# Patient Record
Sex: MALE | Race: OTHER | URBAN - METROPOLITAN AREA
[De-identification: names, ages, dates, MRNs, and addresses within clinical notes are randomized per-mention and may not be internally consistent; named-entity substitution may affect disease eponyms.]

---

## 2019-03-29 ENCOUNTER — INPATIENT (INPATIENT)
Facility: HOSPITAL | Age: 34
LOS: 5 days | Discharge: HOME | DRG: 189 | End: 2019-04-04
Attending: INTERNAL MEDICINE | Admitting: INTERNAL MEDICINE
Payer: MEDICAID

## 2019-03-29 VITALS
WEIGHT: 315 LBS | DIASTOLIC BLOOD PRESSURE: 94 MMHG | OXYGEN SATURATION: 87 % | SYSTOLIC BLOOD PRESSURE: 124 MMHG | TEMPERATURE: 98 F | RESPIRATION RATE: 22 BRPM | HEART RATE: 94 BPM | HEIGHT: 66 IN

## 2019-03-29 LAB
ALBUMIN SERPL ELPH-MCNC: 3.1 G/DL — LOW (ref 3.5–5)
ALP SERPL-CCNC: 100 U/L — SIGNIFICANT CHANGE UP (ref 40–120)
ALT FLD-CCNC: 67 U/L DA — HIGH (ref 10–60)
ANION GAP SERPL CALC-SCNC: 2 MMOL/L — LOW (ref 5–17)
AST SERPL-CCNC: 25 U/L — SIGNIFICANT CHANGE UP (ref 10–40)
BASE EXCESS BLDA CALC-SCNC: 5.9 MMOL/L — HIGH (ref -2–2)
BASOPHILS # BLD AUTO: 0.09 K/UL — SIGNIFICANT CHANGE UP (ref 0–0.2)
BASOPHILS NFR BLD AUTO: 0.6 % — SIGNIFICANT CHANGE UP (ref 0–2)
BILIRUB SERPL-MCNC: 1 MG/DL — SIGNIFICANT CHANGE UP (ref 0.2–1.2)
BUN SERPL-MCNC: 14 MG/DL — SIGNIFICANT CHANGE UP (ref 7–18)
CALCIUM SERPL-MCNC: 8.3 MG/DL — LOW (ref 8.4–10.5)
CHLORIDE SERPL-SCNC: 101 MMOL/L — SIGNIFICANT CHANGE UP (ref 96–108)
CO2 SERPL-SCNC: 36 MMOL/L — HIGH (ref 22–31)
CREAT SERPL-MCNC: 0.88 MG/DL — SIGNIFICANT CHANGE UP (ref 0.5–1.3)
D DIMER BLD IA.RAPID-MCNC: 546 NG/ML DDU — HIGH
EOSINOPHIL # BLD AUTO: 0.4 K/UL — SIGNIFICANT CHANGE UP (ref 0–0.5)
EOSINOPHIL NFR BLD AUTO: 2.8 % — SIGNIFICANT CHANGE UP (ref 0–6)
FLU A RESULT: SIGNIFICANT CHANGE UP
FLU A RESULT: SIGNIFICANT CHANGE UP
FLUAV AG NPH QL: SIGNIFICANT CHANGE UP
FLUBV AG NPH QL: SIGNIFICANT CHANGE UP
GLUCOSE SERPL-MCNC: 103 MG/DL — HIGH (ref 70–99)
HCO3 BLDA-SCNC: 34 MMOL/L — HIGH (ref 23–27)
HCT VFR BLD CALC: 51.8 % — HIGH (ref 39–50)
HGB BLD-MCNC: 14.9 G/DL — SIGNIFICANT CHANGE UP (ref 13–17)
HOROWITZ INDEX BLDA+IHG-RTO: 21 — SIGNIFICANT CHANGE UP
IMM GRANULOCYTES NFR BLD AUTO: 0.5 % — SIGNIFICANT CHANGE UP (ref 0–1.5)
LYMPHOCYTES # BLD AUTO: 21.6 % — SIGNIFICANT CHANGE UP (ref 13–44)
LYMPHOCYTES # BLD AUTO: 3.09 K/UL — SIGNIFICANT CHANGE UP (ref 1–3.3)
MCHC RBC-ENTMCNC: 24.6 PG — LOW (ref 27–34)
MCHC RBC-ENTMCNC: 28.8 GM/DL — LOW (ref 32–36)
MCV RBC AUTO: 85.5 FL — SIGNIFICANT CHANGE UP (ref 80–100)
MONOCYTES # BLD AUTO: 1.02 K/UL — HIGH (ref 0–0.9)
MONOCYTES NFR BLD AUTO: 7.1 % — SIGNIFICANT CHANGE UP (ref 2–14)
NEUTROPHILS # BLD AUTO: 9.61 K/UL — HIGH (ref 1.8–7.4)
NEUTROPHILS NFR BLD AUTO: 67.4 % — SIGNIFICANT CHANGE UP (ref 43–77)
NRBC # BLD: 0 /100 WBCS — SIGNIFICANT CHANGE UP (ref 0–0)
PCO2 BLDA: 70 MMHG — CRITICAL HIGH (ref 32–46)
PH BLDA: 7.31 — LOW (ref 7.35–7.45)
PLATELET # BLD AUTO: 311 K/UL — SIGNIFICANT CHANGE UP (ref 150–400)
PO2 BLDA: 72 MMHG — LOW (ref 74–108)
POTASSIUM SERPL-MCNC: 3.9 MMOL/L — SIGNIFICANT CHANGE UP (ref 3.5–5.3)
POTASSIUM SERPL-SCNC: 3.9 MMOL/L — SIGNIFICANT CHANGE UP (ref 3.5–5.3)
PROT SERPL-MCNC: 7.9 G/DL — SIGNIFICANT CHANGE UP (ref 6–8.3)
RBC # BLD: 6.06 M/UL — HIGH (ref 4.2–5.8)
RBC # FLD: 16.7 % — HIGH (ref 10.3–14.5)
RSV RESULT: SIGNIFICANT CHANGE UP
RSV RNA RESP QL NAA+PROBE: SIGNIFICANT CHANGE UP
SAO2 % BLDA: 93 % — SIGNIFICANT CHANGE UP (ref 92–96)
SODIUM SERPL-SCNC: 139 MMOL/L — SIGNIFICANT CHANGE UP (ref 135–145)
TROPONIN I SERPL-MCNC: 0.02 NG/ML — SIGNIFICANT CHANGE UP (ref 0–0.04)
WBC # BLD: 14.28 K/UL — HIGH (ref 3.8–10.5)
WBC # FLD AUTO: 14.28 K/UL — HIGH (ref 3.8–10.5)

## 2019-03-29 PROCEDURE — 93970 EXTREMITY STUDY: CPT | Mod: 26

## 2019-03-29 PROCEDURE — 71045 X-RAY EXAM CHEST 1 VIEW: CPT | Mod: 26

## 2019-03-29 RX ORDER — CEFTRIAXONE 500 MG/1
1 INJECTION, POWDER, FOR SOLUTION INTRAMUSCULAR; INTRAVENOUS ONCE
Qty: 0 | Refills: 0 | Status: COMPLETED | OUTPATIENT
Start: 2019-03-29 | End: 2019-03-29

## 2019-03-29 RX ORDER — AZITHROMYCIN 500 MG/1
500 TABLET, FILM COATED ORAL ONCE
Qty: 0 | Refills: 0 | Status: COMPLETED | OUTPATIENT
Start: 2019-03-29 | End: 2019-03-29

## 2019-03-29 RX ORDER — IPRATROPIUM/ALBUTEROL SULFATE 18-103MCG
3 AEROSOL WITH ADAPTER (GRAM) INHALATION ONCE
Qty: 0 | Refills: 0 | Status: COMPLETED | OUTPATIENT
Start: 2019-03-29 | End: 2019-03-29

## 2019-03-29 NOTE — ED ADULT NURSE NOTE - NSIMPLEMENTINTERV_GEN_ALL_ED
Implemented All Universal Safety Interventions:  [place] to call system. Call bell, personal items and telephone within reach. Instruct patient to call for assistance. Room bathroom lighting operational. Non-slip footwear when patient is off stretcher. Physically safe environment: no spills, clutter or unnecessary equipment. Stretcher in lowest position, wheels locked, appropriate side rails in place.

## 2019-03-29 NOTE — ED PROVIDER NOTE - CARE PLAN
Principal Discharge DX:	Severe persistent asthma with exacerbation  Secondary Diagnosis:	Pneumonia of both lower lobes due to infectious organism

## 2019-03-29 NOTE — ED ADULT NURSE NOTE - OBJECTIVE STATEMENT
pt complains of shortness of breath and headache when he wakes up. pt says he has sleep apnea but no bipap machine. pt able to speak in full sentences.

## 2019-03-29 NOTE — ED PROVIDER NOTE - OBJECTIVE STATEMENT
35 y/o M patient with a significant PMHx of Asthma, incarcerated in [facility] and no significant PSHx presents to the ED with c/o chest pain, cough, dizziness, HA x 1w. Patient denies any fever, DVT or PE, cardiac problems. Patient smokes marijuana. NKDA.

## 2019-03-30 DIAGNOSIS — J45.51 SEVERE PERSISTENT ASTHMA WITH (ACUTE) EXACERBATION: ICD-10-CM

## 2019-03-30 LAB
ANION GAP SERPL CALC-SCNC: 3 MMOL/L — LOW (ref 5–17)
BASE EXCESS BLDA CALC-SCNC: 3.7 MMOL/L — HIGH (ref -2–2)
BASE EXCESS BLDA CALC-SCNC: 3.8 MMOL/L — HIGH (ref -2–2)
BASE EXCESS BLDA CALC-SCNC: 4.3 MMOL/L — HIGH (ref -2–2)
BASE EXCESS BLDA CALC-SCNC: 6.8 MMOL/L — HIGH (ref -2–2)
BASOPHILS # BLD AUTO: 0.06 K/UL — SIGNIFICANT CHANGE UP (ref 0–0.2)
BASOPHILS NFR BLD AUTO: 0.5 % — SIGNIFICANT CHANGE UP (ref 0–2)
BLOOD GAS COMMENTS ARTERIAL: SIGNIFICANT CHANGE UP
BLOOD GAS COMMENTS ARTERIAL: SIGNIFICANT CHANGE UP
BUN SERPL-MCNC: 12 MG/DL — SIGNIFICANT CHANGE UP (ref 7–18)
CALCIUM SERPL-MCNC: 8.3 MG/DL — LOW (ref 8.4–10.5)
CHLORIDE SERPL-SCNC: 102 MMOL/L — SIGNIFICANT CHANGE UP (ref 96–108)
CHOLEST SERPL-MCNC: 118 MG/DL — SIGNIFICANT CHANGE UP (ref 10–199)
CO2 SERPL-SCNC: 32 MMOL/L — HIGH (ref 22–31)
CREAT SERPL-MCNC: 0.74 MG/DL — SIGNIFICANT CHANGE UP (ref 0.5–1.3)
EOSINOPHIL # BLD AUTO: 0.01 K/UL — SIGNIFICANT CHANGE UP (ref 0–0.5)
EOSINOPHIL NFR BLD AUTO: 0.1 % — SIGNIFICANT CHANGE UP (ref 0–6)
GLUCOSE SERPL-MCNC: 124 MG/DL — HIGH (ref 70–99)
HBA1C BLD-MCNC: 6.1 % — HIGH (ref 4–5.6)
HCO3 BLDA-SCNC: 34 MMOL/L — HIGH (ref 23–27)
HCO3 BLDA-SCNC: 34 MMOL/L — HIGH (ref 23–27)
HCO3 BLDA-SCNC: 35 MMOL/L — HIGH (ref 23–27)
HCO3 BLDA-SCNC: 36 MMOL/L — HIGH (ref 23–27)
HCT VFR BLD CALC: 51.9 % — HIGH (ref 39–50)
HDLC SERPL-MCNC: 30 MG/DL — LOW
HGB BLD-MCNC: 14.7 G/DL — SIGNIFICANT CHANGE UP (ref 13–17)
HOROWITZ INDEX BLDA+IHG-RTO: 30 — SIGNIFICANT CHANGE UP
HOROWITZ INDEX BLDA+IHG-RTO: 50 — SIGNIFICANT CHANGE UP
IMM GRANULOCYTES NFR BLD AUTO: 0.6 % — SIGNIFICANT CHANGE UP (ref 0–1.5)
LIPID PNL WITH DIRECT LDL SERPL: 74 MG/DL — SIGNIFICANT CHANGE UP
LYMPHOCYTES # BLD AUTO: 0.97 K/UL — LOW (ref 1–3.3)
LYMPHOCYTES # BLD AUTO: 7.4 % — LOW (ref 13–44)
MAGNESIUM SERPL-MCNC: 2.5 MG/DL — SIGNIFICANT CHANGE UP (ref 1.6–2.6)
MCHC RBC-ENTMCNC: 24.4 PG — LOW (ref 27–34)
MCHC RBC-ENTMCNC: 28.3 GM/DL — LOW (ref 32–36)
MCV RBC AUTO: 86.2 FL — SIGNIFICANT CHANGE UP (ref 80–100)
MONOCYTES # BLD AUTO: 0.18 K/UL — SIGNIFICANT CHANGE UP (ref 0–0.9)
MONOCYTES NFR BLD AUTO: 1.4 % — LOW (ref 2–14)
NEUTROPHILS # BLD AUTO: 11.78 K/UL — HIGH (ref 1.8–7.4)
NEUTROPHILS NFR BLD AUTO: 90 % — HIGH (ref 43–77)
NRBC # BLD: 0 /100 WBCS — SIGNIFICANT CHANGE UP (ref 0–0)
PCO2 BLDA: 77 MMHG — CRITICAL HIGH (ref 32–46)
PCO2 BLDA: 81 MMHG — CRITICAL HIGH (ref 32–46)
PCO2 BLDA: 83 MMHG — CRITICAL HIGH (ref 32–46)
PCO2 BLDA: 88 MMHG — CRITICAL HIGH (ref 32–46)
PH BLDA: 7.22 — LOW (ref 7.35–7.45)
PH BLDA: 7.24 — LOW (ref 7.35–7.45)
PH BLDA: 7.25 — LOW (ref 7.35–7.45)
PH BLDA: 7.3 — LOW (ref 7.35–7.45)
PHOSPHATE SERPL-MCNC: 4.2 MG/DL — SIGNIFICANT CHANGE UP (ref 2.5–4.5)
PLATELET # BLD AUTO: 268 K/UL — SIGNIFICANT CHANGE UP (ref 150–400)
PO2 BLDA: 209 MMHG — HIGH (ref 74–108)
PO2 BLDA: 57 MMHG — LOW (ref 74–108)
PO2 BLDA: 65 MMHG — LOW (ref 74–108)
PO2 BLDA: 80 MMHG — SIGNIFICANT CHANGE UP (ref 74–108)
POTASSIUM SERPL-MCNC: 4.2 MMOL/L — SIGNIFICANT CHANGE UP (ref 3.5–5.3)
POTASSIUM SERPL-SCNC: 4.2 MMOL/L — SIGNIFICANT CHANGE UP (ref 3.5–5.3)
RBC # BLD: 6.02 M/UL — HIGH (ref 4.2–5.8)
RBC # FLD: 16.5 % — HIGH (ref 10.3–14.5)
SAO2 % BLDA: 88 % — LOW (ref 92–96)
SAO2 % BLDA: 89 % — LOW (ref 92–96)
SAO2 % BLDA: 93 % — SIGNIFICANT CHANGE UP (ref 92–96)
SAO2 % BLDA: 99 % — HIGH (ref 92–96)
SODIUM SERPL-SCNC: 137 MMOL/L — SIGNIFICANT CHANGE UP (ref 135–145)
TOTAL CHOLESTEROL/HDL RATIO MEASUREMENT: 3.9 RATIO — SIGNIFICANT CHANGE UP (ref 3.4–9.6)
TRIGL SERPL-MCNC: 70 MG/DL — SIGNIFICANT CHANGE UP (ref 10–149)
TSH SERPL-MCNC: 2.17 UU/ML — SIGNIFICANT CHANGE UP (ref 0.34–4.82)
VIT B12 SERPL-MCNC: 664 PG/ML — SIGNIFICANT CHANGE UP (ref 232–1245)
WBC # BLD: 13.08 K/UL — HIGH (ref 3.8–10.5)
WBC # FLD AUTO: 13.08 K/UL — HIGH (ref 3.8–10.5)

## 2019-03-30 PROCEDURE — 99291 CRITICAL CARE FIRST HOUR: CPT

## 2019-03-30 RX ORDER — PANTOPRAZOLE SODIUM 20 MG/1
40 TABLET, DELAYED RELEASE ORAL DAILY
Qty: 0 | Refills: 0 | Status: DISCONTINUED | OUTPATIENT
Start: 2019-03-30 | End: 2019-03-31

## 2019-03-30 RX ORDER — CEFTRIAXONE 500 MG/1
1 INJECTION, POWDER, FOR SOLUTION INTRAMUSCULAR; INTRAVENOUS EVERY 24 HOURS
Qty: 0 | Refills: 0 | Status: DISCONTINUED | OUTPATIENT
Start: 2019-03-30 | End: 2019-04-03

## 2019-03-30 RX ORDER — FUROSEMIDE 40 MG
20 TABLET ORAL ONCE
Qty: 0 | Refills: 0 | Status: COMPLETED | OUTPATIENT
Start: 2019-03-30 | End: 2019-03-30

## 2019-03-30 RX ORDER — ENOXAPARIN SODIUM 100 MG/ML
40 INJECTION SUBCUTANEOUS EVERY 12 HOURS
Qty: 0 | Refills: 0 | Status: DISCONTINUED | OUTPATIENT
Start: 2019-03-30 | End: 2019-03-30

## 2019-03-30 RX ORDER — AZITHROMYCIN 500 MG/1
500 TABLET, FILM COATED ORAL EVERY 24 HOURS
Qty: 0 | Refills: 0 | Status: DISCONTINUED | OUTPATIENT
Start: 2019-03-30 | End: 2019-04-03

## 2019-03-30 RX ORDER — HEPARIN SODIUM 5000 [USP'U]/ML
5000 INJECTION INTRAVENOUS; SUBCUTANEOUS EVERY 8 HOURS
Qty: 0 | Refills: 0 | Status: DISCONTINUED | OUTPATIENT
Start: 2019-03-30 | End: 2019-04-04

## 2019-03-30 RX ORDER — IPRATROPIUM/ALBUTEROL SULFATE 18-103MCG
3 AEROSOL WITH ADAPTER (GRAM) INHALATION EVERY 6 HOURS
Qty: 0 | Refills: 0 | Status: DISCONTINUED | OUTPATIENT
Start: 2019-03-30 | End: 2019-04-04

## 2019-03-30 RX ADMIN — HEPARIN SODIUM 5000 UNIT(S): 5000 INJECTION INTRAVENOUS; SUBCUTANEOUS at 21:21

## 2019-03-30 RX ADMIN — Medication 3 MILLILITER(S): at 00:45

## 2019-03-30 RX ADMIN — CEFTRIAXONE 100 GRAM(S): 500 INJECTION, POWDER, FOR SOLUTION INTRAMUSCULAR; INTRAVENOUS at 00:07

## 2019-03-30 RX ADMIN — Medication 40 MILLIGRAM(S): at 17:19

## 2019-03-30 RX ADMIN — Medication 3 MILLILITER(S): at 21:07

## 2019-03-30 RX ADMIN — Medication 40 MILLIGRAM(S): at 11:27

## 2019-03-30 RX ADMIN — PANTOPRAZOLE SODIUM 40 MILLIGRAM(S): 20 TABLET, DELAYED RELEASE ORAL at 11:28

## 2019-03-30 RX ADMIN — Medication 20 MILLIGRAM(S): at 12:11

## 2019-03-30 RX ADMIN — ENOXAPARIN SODIUM 40 MILLIGRAM(S): 100 INJECTION SUBCUTANEOUS at 06:09

## 2019-03-30 RX ADMIN — Medication 125 MILLIGRAM(S): at 00:06

## 2019-03-30 RX ADMIN — Medication 3 MILLILITER(S): at 09:32

## 2019-03-30 RX ADMIN — AZITHROMYCIN 250 MILLIGRAM(S): 500 TABLET, FILM COATED ORAL at 00:45

## 2019-03-30 RX ADMIN — Medication 40 MILLIGRAM(S): at 06:08

## 2019-03-30 RX ADMIN — Medication 3 MILLILITER(S): at 15:24

## 2019-03-30 RX ADMIN — Medication 3 MILLILITER(S): at 00:07

## 2019-03-30 RX ADMIN — HEPARIN SODIUM 5000 UNIT(S): 5000 INJECTION INTRAVENOUS; SUBCUTANEOUS at 13:39

## 2019-03-30 NOTE — PROGRESS NOTE ADULT - SUBJECTIVE AND OBJECTIVE BOX
INTERVAL HPI/OVERNIGHT EVENTS:   Admitted to the ICU    PRESSORS: [ ] YES [ x] NO      Antimicrobial:  azithromycin  IVPB 500 milliGRAM(s) IV Intermittent every 24 hours  cefTRIAXone   IVPB 1 Gram(s) IV Intermittent every 24 hours    Cardiovascular:    Pulmonary:  ALBUTerol/ipratropium for Nebulization 3 milliLiter(s) Nebulizer every 6 hours    Hematalogic:  enoxaparin Injectable 40 milliGRAM(s) SubCutaneous every 12 hours    Other:  methylPREDNISolone sodium succinate Injectable 40 milliGRAM(s) IV Push every 6 hours  pantoprazole  Injectable 40 milliGRAM(s) IV Push daily      Drug Dosing Weight  Height (cm): 167.64 (29 Mar 2019 16:43)  Weight (kg): 226.8 (29 Mar 2019 16:43)  BMI (kg/m2): 80.7 (29 Mar 2019 16:43)  BSA (m2): 2.95 (29 Mar 2019 16:43)    CENTRAL LINE: [ ] YES [x ] NO  LOCATION:     REMOVE: [ ] YES [ ] NO      GRANDE: [ ] YES [x ] NO      REMOVE:  [ ] YES [ ] NO      A-LINE:  [ ] YES [ x] NO  LOCATION:     REMOVE:  [ ] YES [ ] NO      PMH/Social Hx/Fam Hx -reviewed admission note, no change since admission  PAST MEDICAL & SURGICAL HISTORY:  Asthma        T(C): 36.4 (03-30-19 @ 00:35), Max: 36.7 (03-29-19 @ 19:11)  HR: 86 (03-30-19 @ 02:59)  BP: 146/98 (03-30-19 @ 02:59)  BP(mean): --  ABP: --  ABP(mean): --  RR: 22 (03-30-19 @ 02:59)  SpO2: 99% (03-30-19 @ 02:59)  Wt(kg): --    ABG - ( 30 Mar 2019 02:27 )  pH, Arterial: 7.24  pH, Blood: x     /  pCO2: 83    /  pO2: 80    / HCO3: 34    / Base Excess: 3.7   /  SaO2: 93                          PHYSICAL EXAM:    General - NAD, Morbidly obese  Neck - No noticeable or palpable swelling, redness or rash around throat or on face  Lymph Nodes - No lymphadenopathy  Cardiovascular - RRR, no JVD, no carotid bruits  Lungs - Decreased Breath sounds due to increased body Habitus  Abdomen - Normal bowel sounds, abdomen soft and nontender  Extremities - +1 edema, cyanosis or clubbing  Musculo Skeletal - 5/5 strength upper and lower extremity.  .  Neurological – Alert and oriented x 3      LABS:  CBC Full  -  ( 29 Mar 2019 20:21 )  WBC Count : 14.28 K/uL  RBC Count : 6.06 M/uL  Hemoglobin : 14.9 g/dL  Hematocrit : 51.8 %  Platelet Count - Automated : 311 K/uL  Mean Cell Volume : 85.5 fl  Mean Cell Hemoglobin : 24.6 pg  Mean Cell Hemoglobin Concentration : 28.8 gm/dL  Auto Neutrophil # : 9.61 K/uL  Auto Lymphocyte # : 3.09 K/uL  Auto Monocyte # : 1.02 K/uL  Auto Eosinophil # : 0.40 K/uL  Auto Basophil # : 0.09 K/uL  Auto Neutrophil % : 67.4 %  Auto Lymphocyte % : 21.6 %  Auto Monocyte % : 7.1 %  Auto Eosinophil % : 2.8 %  Auto Basophil % : 0.6 %    03-29    139  |  101  |  14  ----------------------------<  103<H>  3.9   |  36<H>  |  0.88    Ca    8.3<L>      29 Mar 2019 20:21    TPro  7.9  /  Alb  3.1<L>  /  TBili  1.0  /  DBili  x   /  AST  25  /  ALT  67<H>  /  AlkPhos  100  03-29            RADIOLOGY & ADDITIONAL STUDIES REVIEWED:      GOALS OF CARE DISCUSSION WITH PATIENT/FAMILY/PROXY/ QUESTIONS WERE ANSWERED TO THE BEST OF MY ABILITIES    CRITICAL CARE TIME SPENT: 35 minutes INTERVAL HPI/OVERNIGHT EVENTS:   Admitted to the ICU    PRESSORS: [ ] YES [ x] NO      Antimicrobial:  azithromycin  IVPB 500 milliGRAM(s) IV Intermittent every 24 hours  cefTRIAXone   IVPB 1 Gram(s) IV Intermittent every 24 hours    Cardiovascular:    Pulmonary:  ALBUTerol/ipratropium for Nebulization 3 milliLiter(s) Nebulizer every 6 hours    Hematalogic:  enoxaparin Injectable 40 milliGRAM(s) SubCutaneous every 12 hours    Other:  methylPREDNISolone sodium succinate Injectable 40 milliGRAM(s) IV Push every 6 hours  pantoprazole  Injectable 40 milliGRAM(s) IV Push daily      Drug Dosing Weight  Height (cm): 167.64 (29 Mar 2019 16:43)  Weight (kg): 226.8 (29 Mar 2019 16:43)  BMI (kg/m2): 80.7 (29 Mar 2019 16:43)  BSA (m2): 2.95 (29 Mar 2019 16:43)    CENTRAL LINE: [ ] YES [x ] NO  LOCATION:     REMOVE: [ ] YES [ ] NO      GRANDE: [ ] YES [x ] NO      REMOVE:  [ ] YES [ ] NO      A-LINE:  [ ] YES [ x] NO  LOCATION:     REMOVE:  [ ] YES [ ] NO      PMH/Social Hx/Fam Hx -reviewed admission note, no change since admission  PAST MEDICAL & SURGICAL HISTORY:  Asthma        T(C): 36.4 (03-30-19 @ 00:35), Max: 36.7 (03-29-19 @ 19:11)  HR: 86 (03-30-19 @ 02:59)  BP: 146/98 (03-30-19 @ 02:59)  BP(mean): --  ABP: --  ABP(mean): --  RR: 22 (03-30-19 @ 02:59)  SpO2: 99% (03-30-19 @ 02:59)  Wt(kg): --    ABG - ( 30 Mar 2019 02:27 )  pH, Arterial: 7.24  pH, Blood: x     /  pCO2: 83    /  pO2: 80    / HCO3: 34    / Base Excess: 3.7   /  SaO2: 93        PHYSICAL EXAM:    General - NAD, Morbidly obese  Neck - No noticeable or palpable swelling, redness or rash around throat or on face  Lymph Nodes - No lymphadenopathy  Cardiovascular - RRR, no JVD, no carotid bruits  Lungs - Decreased Breath sounds due to increased body Habitus  Abdomen - Normal bowel sounds, abdomen soft and nontender  Extremities - +1 edema, cyanosis or clubbing  Musculo Skeletal - 5/5 strength upper and lower extremity.  .  Neurological – Alert and oriented x 3      LABS:  CBC Full  -  ( 29 Mar 2019 20:21 )  WBC Count : 14.28 K/uL  RBC Count : 6.06 M/uL  Hemoglobin : 14.9 g/dL  Hematocrit : 51.8 %  Platelet Count - Automated : 311 K/uL  Mean Cell Volume : 85.5 fl  Mean Cell Hemoglobin : 24.6 pg  Mean Cell Hemoglobin Concentration : 28.8 gm/dL  Auto Neutrophil # : 9.61 K/uL  Auto Lymphocyte # : 3.09 K/uL  Auto Monocyte # : 1.02 K/uL  Auto Eosinophil # : 0.40 K/uL  Auto Basophil # : 0.09 K/uL  Auto Neutrophil % : 67.4 %  Auto Lymphocyte % : 21.6 %  Auto Monocyte % : 7.1 %  Auto Eosinophil % : 2.8 %  Auto Basophil % : 0.6 %    03-29    139  |  101  |  14  ----------------------------<  103<H>  3.9   |  36<H>  |  0.88    Ca    8.3<L>      29 Mar 2019 20:21    TPro  7.9  /  Alb  3.1<L>  /  TBili  1.0  /  DBili  x   /  AST  25  /  ALT  67<H>  /  AlkPhos  100  03-29    RADIOLOGY & ADDITIONAL STUDIES REVIEWED:      GOALS OF CARE DISCUSSION WITH PATIENT/FAMILY/PROXY/ QUESTIONS WERE ANSWERED TO THE BEST OF MY ABILITIES    CRITICAL CARE TIME SPENT: 35 minutes

## 2019-03-30 NOTE — H&P ADULT - NSHPPHYSICALEXAM_GEN_ALL_CORE
PHYSICAL EXAM:    GENERAL: obese, no respiratory distress    HEAD:  Atraumatic, Normocephalic    EYES: EOMI, PERRLA, conjunctiva and sclera clear    NECK: Supple, No JVD    CHEST/LUNG: + wheeze bilateral and diffuse    HEART: Regular rate and rhythm; No murmurs, rubs, or gallops    ABDOMEN: Soft, Nontender, Nondistended; Bowel sounds present    EXTREMITIES:  2+ Peripheral Pulses, No clubbing, cyanosis, or edema    PSYCH: AAOx3    NEUROLOGY: non-focal    SKIN: No rashes or lesions    No other pertinent positive finding except mentioned as above.

## 2019-03-30 NOTE — ED ADULT NURSE REASSESSMENT NOTE - NS ED NURSE REASSESS COMMENT FT1
p[ awake alert oriented x3 pt hooked to bipap,hooked to cardiac monitor.waiting for ICU. CONSULT.
pt sleeping on bipap ,hooked to cardiac monitor,with saline lock intact.
received pt aao x3 with o2 nasal cannula at 2lpm,waiting to be seen.
pt endorsed to DONNA Tabares. pt stable and free from s/s of distress.

## 2019-03-30 NOTE — H&P ADULT - NSHPLABSRESULTS_GEN_ALL_CORE
Vital Signs Last 24 Hrs  T(C): 36.4 (30 Mar 2019 00:35), Max: 36.7 (29 Mar 2019 19:11)  T(F): 97.5 (30 Mar 2019 00:35), Max: 98 (29 Mar 2019 19:11)  HR: 89 (30 Mar 2019 00:35) (89 - 109)  BP: 152/96 (30 Mar 2019 00:35) (122/85 - 152/96)  BP(mean): --  RR: 20 (30 Mar 2019 00:35) (20 - 22)  SpO2: 100% (30 Mar 2019 00:35) (87% - 100%)      Labs:                        14.9   14.28 )-----------( 311      ( 29 Mar 2019 20:21 )             51.8     03-29    139  |  101  |  14  ----------------------------<  103<H>  3.9   |  36<H>  |  0.88    Ca    8.3<L>      29 Mar 2019 20:21    TPro  7.9  /  Alb  3.1<L>  /  TBili  1.0  /  DBili  x   /  AST  25  /  ALT  67<H>  /  AlkPhos  100  03-29

## 2019-03-30 NOTE — H&P ADULT - NSHPREVIEWOFSYSTEMS_GEN_ALL_CORE
REVIEW OF SYSTEMS:    CONSTITUTIONAL: No weakness, fevers or chills  EYES/ENT: No visual changes;  No vertigo or throat pain   NECK: No pain or stiffness  RESPIRATORY: + cough, wheezing, shortness of breath  CARDIOVASCULAR: No chest pain or palpitations  GASTROINTESTINAL: No abdominal or epigastric pain. No nausea, vomiting, or hematemesis; No diarrhea or constipation. No melena or hematochezia.  GENITOURINARY: No dysuria, frequency or hematuria  NEUROLOGICAL: No numbness or weakness  SKIN: No itching, rashes  No other complaint except mentioned as above.

## 2019-03-30 NOTE — PROGRESS NOTE ADULT - ASSESSMENT
34 M with LAZARO started on CPAP recently and not compliance with machine and medication, recently incarcerated, no significant PSH presented for SOB,     1. Acute hypoxic hypercapnic respiratory failure from non compliance  - Likely from asthma and/or obesity hypoventilation syndrome and/or LAZARO  - f/u blood culture and procalcitonin   - Continue with Rocephin and azithromycin as unable to r/o PNA as limited CXR  - Duoneb, solumedrol  - continue with BIPAP support  - Check echo     2. DVT PPx BMI adjusted   - 40mg BID Lovenox  - Protonix on GI PPX 34 M with LAZARO started on CPAP recently and not compliance with machine and medication, recently incarcerated, no significant PSH presented for SOB,     1. Acute hypoxic hypercapnic respiratory failure from non compliance  - Likely from obesity hypoventilation syndrome and/or LAZARO ; also marijuana use   - significant CO2 retention on ABG, also chronic based on serum bicarb   - f/u blood culture and procalcitonin   - Continue with Rocephin and azithromycin as unable to r/o PNA as limited CXR  - Duoneb, solumedrol  - continue with BIPAP support ( gave a break this morning and was given breakfast, not desating , but will place back on BIPAP given Resp acidosis)  - Echo WNL    2. DVT PPx BMI adjusted   - 40mg BID Lovenox  - Protonix on GI PPX

## 2019-03-30 NOTE — H&P ADULT - ATTENDING COMMENTS
MICU ATTENDING.    I have personally seen and examined th ept. I was physically present fro the key elements service provided.    I total spent 35 min critical acre time. I agree with the above history, physical and plan which I edited where appropriate. 33 y/o morbidly obese, LAZARO, non complaint with CPAP presented with cough and SOB. Started on BIPAP in ER.    A/P hypoxic/hypercapneic resp failure   ASthma   LAZARO   Morbid obesity    MICU COnt with BIPAP, serial ABG, taper Fio2 as tolerated  Panculture, empiric antibiotics   Steroid, bronchodilators  PFT as out pt  DVT/GI prophylaxis

## 2019-03-30 NOTE — H&P ADULT - ASSESSMENT
34 M with LAZARO started on CPAP recently and not compliance with machine and medication, recently incarcerated, no significant PSH presented for SOB,     1. Acute hypoxic hypercapnic respiratory failure from non compliance  likely from LAZARO and/or asthma  -flu panel negative, f/u blood culture and procalcitonin   -continue with Rocephin and azithromycin as unable to r/o PNA as limited CXR  -Duoneb, solumedrol  -continue with BIPAP support    2. DVT PPx BMI adjusted to 40mg BID Lovenox  Protonix on GI PPX 34 M with LAZARO started on CPAP recently and not compliance with machine and medication, recently incarcerated, no significant PSH presented for SOB,     1. Acute hypoxic hypercapnic respiratory failure from non compliance  likely from asthma and/or obesity hypoventilation syndrome and/or LAZARO  -flu panel negative, f/u blood culture and procalcitonin   -continue with Rocephin and azithromycin as unable to r/o PNA as limited CXR  -Duoneb, solumedrol  -continue with BIPAP support  -will do echo     2. DVT PPx BMI adjusted to 40mg BID Lovenox  Protonix on GI PPX

## 2019-03-30 NOTE — H&P ADULT - HISTORY OF PRESENT ILLNESS
34 M with LAZARO started on CPAP recently and not compliance with machine and medication, recently incarcerated, no significant PSH presented for SOB, cough and subjective fever started 2 weeks ago and progressively getting worse. Patient was put on BIPAP in ED.  Denied chest pain, palpitation, diaphoresis, and any other symptoms.   When seen by ICU, patient is breathing comfortably on BIPAP, sleeping. Patient admit feeling subjectively better. Significant for bilateral diffuse wheezing. Limited CXR due to body weight.   Vital Signs T(C): 36.4 HR: 89  BP: 152/96  RR: 20 SpO2: 100% ABG - ( 30 Mar 2019 00:50 )  pH, Arterial: 7.22  pH, Blood: x     /  pCO2: 88    /  pO2: 209   / HCO3: 35    / Base Excess: 3.8   /  SaO2: 99

## 2019-03-31 LAB
ALBUMIN SERPL ELPH-MCNC: 3 G/DL — LOW (ref 3.5–5)
ALP SERPL-CCNC: 82 U/L — SIGNIFICANT CHANGE UP (ref 40–120)
ALT FLD-CCNC: 51 U/L DA — SIGNIFICANT CHANGE UP (ref 10–60)
ANION GAP SERPL CALC-SCNC: 5 MMOL/L — SIGNIFICANT CHANGE UP (ref 5–17)
AST SERPL-CCNC: 11 U/L — SIGNIFICANT CHANGE UP (ref 10–40)
BASOPHILS # BLD AUTO: 0.02 K/UL — SIGNIFICANT CHANGE UP (ref 0–0.2)
BASOPHILS NFR BLD AUTO: 0.2 % — SIGNIFICANT CHANGE UP (ref 0–2)
BILIRUB SERPL-MCNC: 0.9 MG/DL — SIGNIFICANT CHANGE UP (ref 0.2–1.2)
BUN SERPL-MCNC: 16 MG/DL — SIGNIFICANT CHANGE UP (ref 7–18)
CALCIUM SERPL-MCNC: 8.7 MG/DL — SIGNIFICANT CHANGE UP (ref 8.4–10.5)
CHLORIDE SERPL-SCNC: 101 MMOL/L — SIGNIFICANT CHANGE UP (ref 96–108)
CO2 SERPL-SCNC: 33 MMOL/L — HIGH (ref 22–31)
CREAT SERPL-MCNC: 0.8 MG/DL — SIGNIFICANT CHANGE UP (ref 0.5–1.3)
EOSINOPHIL # BLD AUTO: 0 K/UL — SIGNIFICANT CHANGE UP (ref 0–0.5)
EOSINOPHIL NFR BLD AUTO: 0 % — SIGNIFICANT CHANGE UP (ref 0–6)
GLUCOSE SERPL-MCNC: 115 MG/DL — HIGH (ref 70–99)
HCT VFR BLD CALC: 49.6 % — SIGNIFICANT CHANGE UP (ref 39–50)
HGB BLD-MCNC: 14.3 G/DL — SIGNIFICANT CHANGE UP (ref 13–17)
IMM GRANULOCYTES NFR BLD AUTO: 0.5 % — SIGNIFICANT CHANGE UP (ref 0–1.5)
LYMPHOCYTES # BLD AUTO: 1.37 K/UL — SIGNIFICANT CHANGE UP (ref 1–3.3)
LYMPHOCYTES # BLD AUTO: 10.5 % — LOW (ref 13–44)
MAGNESIUM SERPL-MCNC: 2.8 MG/DL — HIGH (ref 1.6–2.6)
MCHC RBC-ENTMCNC: 24.4 PG — LOW (ref 27–34)
MCHC RBC-ENTMCNC: 28.8 GM/DL — LOW (ref 32–36)
MCV RBC AUTO: 84.5 FL — SIGNIFICANT CHANGE UP (ref 80–100)
MONOCYTES # BLD AUTO: 0.61 K/UL — SIGNIFICANT CHANGE UP (ref 0–0.9)
MONOCYTES NFR BLD AUTO: 4.7 % — SIGNIFICANT CHANGE UP (ref 2–14)
NEUTROPHILS # BLD AUTO: 11.04 K/UL — HIGH (ref 1.8–7.4)
NEUTROPHILS NFR BLD AUTO: 84.1 % — HIGH (ref 43–77)
NRBC # BLD: 0 /100 WBCS — SIGNIFICANT CHANGE UP (ref 0–0)
PHOSPHATE SERPL-MCNC: 4.2 MG/DL — SIGNIFICANT CHANGE UP (ref 2.5–4.5)
PLATELET # BLD AUTO: 324 K/UL — SIGNIFICANT CHANGE UP (ref 150–400)
POTASSIUM SERPL-MCNC: 4.4 MMOL/L — SIGNIFICANT CHANGE UP (ref 3.5–5.3)
POTASSIUM SERPL-SCNC: 4.4 MMOL/L — SIGNIFICANT CHANGE UP (ref 3.5–5.3)
PROT SERPL-MCNC: 7.6 G/DL — SIGNIFICANT CHANGE UP (ref 6–8.3)
RBC # BLD: 5.87 M/UL — HIGH (ref 4.2–5.8)
RBC # FLD: 16.2 % — HIGH (ref 10.3–14.5)
SODIUM SERPL-SCNC: 139 MMOL/L — SIGNIFICANT CHANGE UP (ref 135–145)
WBC # BLD: 13.1 K/UL — HIGH (ref 3.8–10.5)
WBC # FLD AUTO: 13.1 K/UL — HIGH (ref 3.8–10.5)

## 2019-03-31 PROCEDURE — 71045 X-RAY EXAM CHEST 1 VIEW: CPT | Mod: 26

## 2019-03-31 RX ORDER — FUROSEMIDE 40 MG
20 TABLET ORAL ONCE
Qty: 0 | Refills: 0 | Status: COMPLETED | OUTPATIENT
Start: 2019-03-31 | End: 2019-03-31

## 2019-03-31 RX ADMIN — Medication 20 MILLIGRAM(S): at 09:46

## 2019-03-31 RX ADMIN — Medication 3 MILLILITER(S): at 09:23

## 2019-03-31 RX ADMIN — HEPARIN SODIUM 5000 UNIT(S): 5000 INJECTION INTRAVENOUS; SUBCUTANEOUS at 05:21

## 2019-03-31 RX ADMIN — Medication 3 MILLILITER(S): at 03:24

## 2019-03-31 RX ADMIN — Medication 3 MILLILITER(S): at 21:09

## 2019-03-31 RX ADMIN — CEFTRIAXONE 100 GRAM(S): 500 INJECTION, POWDER, FOR SOLUTION INTRAMUSCULAR; INTRAVENOUS at 00:55

## 2019-03-31 RX ADMIN — Medication 40 MILLIGRAM(S): at 09:46

## 2019-03-31 RX ADMIN — Medication 40 MILLIGRAM(S): at 00:55

## 2019-03-31 RX ADMIN — Medication 40 MILLIGRAM(S): at 05:20

## 2019-03-31 RX ADMIN — AZITHROMYCIN 250 MILLIGRAM(S): 500 TABLET, FILM COATED ORAL at 00:56

## 2019-03-31 RX ADMIN — HEPARIN SODIUM 5000 UNIT(S): 5000 INJECTION INTRAVENOUS; SUBCUTANEOUS at 13:03

## 2019-03-31 RX ADMIN — Medication 3 MILLILITER(S): at 15:12

## 2019-03-31 NOTE — PROGRESS NOTE ADULT - SUBJECTIVE AND OBJECTIVE BOX
INTERVAL HPI/OVERNIGHT EVENTS:   Refusing to use BIPAP    PRESSORS: [ ] YES [ x] NO        Antimicrobial:  azithromycin  IVPB 500 milliGRAM(s) IV Intermittent every 24 hours  cefTRIAXone   IVPB 1 Gram(s) IV Intermittent every 24 hours    Cardiovascular:    Pulmonary:  ALBUTerol/ipratropium for Nebulization 3 milliLiter(s) Nebulizer every 6 hours    Hematalogic:  heparin  Injectable 5000 Unit(s) SubCutaneous every 8 hours    Other:  methylPREDNISolone sodium succinate Injectable 40 milliGRAM(s) IV Push every 6 hours  pantoprazole  Injectable 40 milliGRAM(s) IV Push daily      Drug Dosing Weight  Height (cm): 167.64 (29 Mar 2019 16:43)  Weight (kg): 226.8 (29 Mar 2019 16:43)  BMI (kg/m2): 80.7 (29 Mar 2019 16:43)  BSA (m2): 2.95 (29 Mar 2019 16:43)    CENTRAL LINE: [ ] YES [x ] NO  LOCATION:         GRANDE: [ ] YES [x ] NO          A-LINE:  [ ] YES [ x] NO  LOCATION:         PMH/Social Hx/[facility] Hx -reviewed admission note, no change since admission  PAST MEDICAL & SURGICAL HISTORY:  Asthma        T(C): 36.7 (03-30-19 @ 23:53), Max: 37.2 (03-30-19 @ 17:06)  HR: 101 (03-30-19 @ 23:00)  BP: 146/92 (03-30-19 @ 23:00)  BP(mean): 107 (03-30-19 @ 23:00)  ABP: --  ABP(mean): --  RR: 31 (03-30-19 @ 23:00)  SpO2: 91% (03-30-19 @ 23:00)  Wt(kg): --    ABG - ( 30 Mar 2019 10:25 )  pH, Arterial: 7.30  pH, Blood: x     /  pCO2: 77    /  pO2: 57    / HCO3: 36    / Base Excess: 6.8   /  SaO2: 88                            PHYSICAL EXAM:    General - NAD, Morbidly obese  Neck - No noticeable or palpable swelling, redness or rash around throat or on face  Lymph Nodes - No lymphadenopathy  Cardiovascular - RRR, no JVD, no carotid bruits  Lungs - Decreased Breath sounds due to increased body Habitus  Abdomen - Normal bowel sounds, abdomen soft and nontender  Extremities - +1 edema, cyanosis or clubbing  Musculo Skeletal - 5/5 strength upper and lower extremity.  .  Neurological – Alert and oriented x 3        LABS:  CBC Full  -  ( 30 Mar 2019 06:31 )  WBC Count : 13.08 K/uL  RBC Count : 6.02 M/uL  Hemoglobin : 14.7 g/dL  Hematocrit : 51.9 %  Platelet Count - Automated : 268 K/uL  Mean Cell Volume : 86.2 fl  Mean Cell Hemoglobin : 24.4 pg  Mean Cell Hemoglobin Concentration : 28.3 gm/dL  Auto Neutrophil # : 11.78 K/uL  Auto Lymphocyte # : 0.97 K/uL  Auto Monocyte # : 0.18 K/uL  Auto Eosinophil # : 0.01 K/uL  Auto Basophil # : 0.06 K/uL  Auto Neutrophil % : 90.0 %  Auto Lymphocyte % : 7.4 %  Auto Monocyte % : 1.4 %  Auto Eosinophil % : 0.1 %  Auto Basophil % : 0.5 %    03-30    137  |  102  |  12  ----------------------------<  124<H>  4.2   |  32<H>  |  0.74    Ca    8.3<L>      30 Mar 2019 06:31  Phos  4.2     03-30  Mg     2.5     03-30    TPro  7.9  /  Alb  3.1<L>  /  TBili  1.0  /  DBili  x   /  AST  25  /  ALT  67<H>  /  AlkPhos  100  03-29            RADIOLOGY & ADDITIONAL STUDIES REVIEWED:      GOALS OF CARE DISCUSSION WITH PATIENT/FAMILY/PROXY/ QUESTIONS WERE ANSWERED TO THE BEST OF MY ABILITIES    CRITICAL CARE TIME SPENT: 35 minutes

## 2019-03-31 NOTE — PROGRESS NOTE ADULT - ASSESSMENT
34 M with LAZARO started on CPAP recently and not compliance with machine and medication, recently incarcerated, no significant PSH presented for SOB,     1. Acute hypoxic hypercapnic respiratory failure from non compliance  - Likely from obesity hypoventilation syndrome and/or LAZARO ; also marijuana use   - significant CO2 retention on ABG. Likely Chronic    - f/u blood culture and procalcitonin   - Continue with Rocephin and azithromycin as unable to r/o PNA as limited CXR  - Duoneb, solumedrol  - continue with BIPAP support as needed.   Gave breaks in the evening, but will place back on BIPAP given Resp acidosis  - Echo WNL    2. DVT PPx BMI adjusted   - 40mg BID Lovenox  - Protonix on GI PPX

## 2019-04-01 LAB
ANION GAP SERPL CALC-SCNC: 3 MMOL/L — LOW (ref 5–17)
BUN SERPL-MCNC: 22 MG/DL — HIGH (ref 7–18)
CALCIUM SERPL-MCNC: 8.3 MG/DL — LOW (ref 8.4–10.5)
CHLORIDE SERPL-SCNC: 101 MMOL/L — SIGNIFICANT CHANGE UP (ref 96–108)
CO2 SERPL-SCNC: 34 MMOL/L — HIGH (ref 22–31)
CREAT SERPL-MCNC: 0.83 MG/DL — SIGNIFICANT CHANGE UP (ref 0.5–1.3)
GLUCOSE SERPL-MCNC: 80 MG/DL — SIGNIFICANT CHANGE UP (ref 70–99)
HCT VFR BLD CALC: 49.4 % — SIGNIFICANT CHANGE UP (ref 39–50)
HGB BLD-MCNC: 14.2 G/DL — SIGNIFICANT CHANGE UP (ref 13–17)
MAGNESIUM SERPL-MCNC: 2.8 MG/DL — HIGH (ref 1.6–2.6)
MCHC RBC-ENTMCNC: 24.4 PG — LOW (ref 27–34)
MCHC RBC-ENTMCNC: 28.7 GM/DL — LOW (ref 32–36)
MCV RBC AUTO: 84.7 FL — SIGNIFICANT CHANGE UP (ref 80–100)
NRBC # BLD: 0 /100 WBCS — SIGNIFICANT CHANGE UP (ref 0–0)
PHOSPHATE SERPL-MCNC: 5.8 MG/DL — HIGH (ref 2.5–4.5)
PLATELET # BLD AUTO: 292 K/UL — SIGNIFICANT CHANGE UP (ref 150–400)
POTASSIUM SERPL-MCNC: 4.3 MMOL/L — SIGNIFICANT CHANGE UP (ref 3.5–5.3)
POTASSIUM SERPL-SCNC: 4.3 MMOL/L — SIGNIFICANT CHANGE UP (ref 3.5–5.3)
RBC # BLD: 5.83 M/UL — HIGH (ref 4.2–5.8)
RBC # FLD: 16.7 % — HIGH (ref 10.3–14.5)
SODIUM SERPL-SCNC: 138 MMOL/L — SIGNIFICANT CHANGE UP (ref 135–145)
WBC # BLD: 12.66 K/UL — HIGH (ref 3.8–10.5)
WBC # FLD AUTO: 12.66 K/UL — HIGH (ref 3.8–10.5)

## 2019-04-01 PROCEDURE — 71045 X-RAY EXAM CHEST 1 VIEW: CPT | Mod: 26

## 2019-04-01 RX ORDER — CHLORHEXIDINE GLUCONATE 213 G/1000ML
1 SOLUTION TOPICAL
Qty: 0 | Refills: 0 | Status: DISCONTINUED | OUTPATIENT
Start: 2019-04-01 | End: 2019-04-04

## 2019-04-01 RX ADMIN — Medication 3 MILLILITER(S): at 21:51

## 2019-04-01 RX ADMIN — AZITHROMYCIN 250 MILLIGRAM(S): 500 TABLET, FILM COATED ORAL at 00:00

## 2019-04-01 RX ADMIN — Medication 40 MILLIGRAM(S): at 06:13

## 2019-04-01 RX ADMIN — Medication 3 MILLILITER(S): at 15:13

## 2019-04-01 RX ADMIN — HEPARIN SODIUM 5000 UNIT(S): 5000 INJECTION INTRAVENOUS; SUBCUTANEOUS at 22:38

## 2019-04-01 RX ADMIN — HEPARIN SODIUM 5000 UNIT(S): 5000 INJECTION INTRAVENOUS; SUBCUTANEOUS at 15:58

## 2019-04-01 RX ADMIN — CEFTRIAXONE 100 GRAM(S): 500 INJECTION, POWDER, FOR SOLUTION INTRAMUSCULAR; INTRAVENOUS at 00:00

## 2019-04-01 RX ADMIN — CHLORHEXIDINE GLUCONATE 1 APPLICATION(S): 213 SOLUTION TOPICAL at 16:06

## 2019-04-01 RX ADMIN — HEPARIN SODIUM 5000 UNIT(S): 5000 INJECTION INTRAVENOUS; SUBCUTANEOUS at 08:17

## 2019-04-01 RX ADMIN — Medication 3 MILLILITER(S): at 09:32

## 2019-04-01 NOTE — PROGRESS NOTE ADULT - ASSESSMENT
34 M with LAZARO started on CPAP recently and not compliance with machine and medication, recently incarcerated, no significant PSH presented for SOB,     1. Acute hypoxic hypercapnic respiratory failure from non compliance  - Likely from obesity hypoventilation syndrome and/or LAZARO ; also marijuana use   - significant CO2 retention on ABG. Likely Chronic    - Blood cultures Negative  - C/W Rocephin and azithromycin 3/30  - Duoneb, solumedrol  - continue with BIPAP support as needed.   - Gave breaks in the evening, but will place back on BIPAP given Resp acidosis  - Echo WNL    2. DVT PPx BMI adjusted   - 40mg BID Lovenox  - Protonix on GI PPX 34 M with LAZARO started on CPAP recently and not compliance with machine and medication, recently incarcerated, no significant PSH presented for SOB,     1. Acute hypoxic hypercapnic respiratory failure from non compliance  - Likely from obesity hypoventilation syndrome and/or LAZARO ; also marijuana use   - significant CO2 retention on ABG. Likely Chronic    - Blood cultures Negative  - C/W Rocephin and azithromycin 3/30  - Duoneb, solumedrol  - BIPAP  PRN  as needed.   - Echo WNL  - Advice given on weight loss and Pulmonary follow up for CPAP machine at home    2. DVT PPx BMI adjusted   - 40mg BID Lovenox  - Protonix on GI PPX

## 2019-04-01 NOTE — PROGRESS NOTE ADULT - SUBJECTIVE AND OBJECTIVE BOX
INTERVAL HPI/OVERNIGHT EVENTS:   No overnight events    PRESSORS: [ ] YES [x ] NO    Antimicrobial:  azithromycin  IVPB 500 milliGRAM(s) IV Intermittent every 24 hours  cefTRIAXone   IVPB 1 Gram(s) IV Intermittent every 24 hours    Cardiovascular:    Pulmonary:  ALBUTerol/ipratropium for Nebulization 3 milliLiter(s) Nebulizer every 6 hours    Hematalogic:  heparin  Injectable 5000 Unit(s) SubCutaneous every 8 hours    Other:  predniSONE   Tablet 40 milliGRAM(s) Oral daily      Drug Dosing Weight  Height (cm): 167.64 (29 Mar 2019 16:43)  Weight (kg): 226.8 (29 Mar 2019 16:43)  BMI (kg/m2): 80.7 (29 Mar 2019 16:43)  BSA (m2): 2.95 (29 Mar 2019 16:43)    CENTRAL LINE: [ ] YES [x ] NO  LOCATION:         GRANDE: [ ] YES [x ] NO          A-LINE:  [ ] YES [ x] NO  LOCATION:       PMH/Social Hx/[facility] Hx -reviewed admission note, no change since admission  PAST MEDICAL & SURGICAL HISTORY:  Asthma        T(C): 36.3 (04-01-19 @ 06:00), Max: 36.3 (04-01-19 @ 06:00)  HR: 87 (04-01-19 @ 10:00)  BP: 133/80 (04-01-19 @ 10:00)  BP(mean): 95 (04-01-19 @ 10:00)  ABP: --  ABP(mean): --  RR: 21 (04-01-19 @ 10:00)  SpO2: 91% (04-01-19 @ 10:00)  Wt(kg): --          03-31 @ 07:01  -  04-01 @ 07:00  --------------------------------------------------------  IN: 940 mL / OUT: 0 mL / NET: 940 mL            PHYSICAL EXAM:    General - NAD, Morbidly obese  Neck - No noticeable or palpable swelling, redness or rash around throat or on face  Lymph Nodes - No lymphadenopathy  Cardiovascular - RRR, no JVD, no carotid bruits  Lungs - Decreased Breath sounds due to increased body Habitus  Abdomen - Normal bowel sounds, abdomen soft and nontender  Extremities - +1 edema, cyanosis or clubbing  Musculo Skeletal - 5/5 strength upper and lower extremity.  .  Neurological – Alert and oriented x 3        LABS:  CBC Full  -  ( 01 Apr 2019 06:33 )  WBC Count : 12.66 K/uL  RBC Count : 5.83 M/uL  Hemoglobin : 14.2 g/dL  Hematocrit : 49.4 %  Platelet Count - Automated : 292 K/uL  Mean Cell Volume : 84.7 fl  Mean Cell Hemoglobin : 24.4 pg  Mean Cell Hemoglobin Concentration : 28.7 gm/dL  Auto Neutrophil # : x  Auto Lymphocyte # : x  Auto Monocyte # : x  Auto Eosinophil # : x  Auto Basophil # : x  Auto Neutrophil % : x  Auto Lymphocyte % : x  Auto Monocyte % : x  Auto Eosinophil % : x  Auto Basophil % : x    04-01    138  |  101  |  22<H>  ----------------------------<  80  4.3   |  34<H>  |  0.83    Ca    8.3<L>      01 Apr 2019 06:33  Phos  5.8     04-01  Mg     2.8     04-01    TPro  7.6  /  Alb  3.0<L>  /  TBili  0.9  /  DBili  x   /  AST  11  /  ALT  51  /  AlkPhos  82  03-31        Culture Results:   No growth to date. (03-30 @ 09:07)      RADIOLOGY & ADDITIONAL STUDIES REVIEWED:      GOALS OF CARE DISCUSSION WITH PATIENT/FAMILY/PROXY/ QUESTIONS WERE ANSWERED TO THE BEST OF MY ABILITIES    CRITICAL CARE TIME SPENT: 35 minutes

## 2019-04-01 NOTE — CHART NOTE - NSCHARTNOTEFT_GEN_A_CORE
34 M with LAZARO started on CPAP recently and not compliance with machine and medication, recently incarcerated, no significant PSH presented for SOB. ABG 7.31/70/72 on room air. Worsening hypercapnia to 88, placed on Bipap and admitted to ICU for acute hypercapnic and hypoxic respiratory failure.     1. Acute hypoxic hypercapnic respiratory failure from non compliance  - Likely from obesity hypoventilation syndrome and/or LAZARO ; also marijuana use   - significant CO2 retention on ABG. Likely chronic as serum bicarb in 30s  - CXR: bilateral airspace infiltrate concerning for pulmonary edema v/s PNA  - ECHO : normal EF and normal diastolic function   - Blood cultures Negative  - c/w Rocephin and azithromycin till 4/3  - c/w Duoneb and tapering steroids   - BIPAP at night   - Advice given on weight loss and Pulmonary follow up for CPAP machine at home    2. DVT PPx  - Heparin sq for DVT ppx   - No indication for GI ppx.      Patient stable for downgrade to medicine floor. Signed out to Penny Morales and  Night PGY 1 Dr. Adriano Angeles.

## 2019-04-01 NOTE — PROGRESS NOTE ADULT - SUBJECTIVE AND OBJECTIVE BOX
INTERVAL HPI/OVERNIGHT EVENTS:       Antimicrobial:  azithromycin  IVPB 500 milliGRAM(s) IV Intermittent every 24 hours  cefTRIAXone   IVPB 1 Gram(s) IV Intermittent every 24 hours    Cardiovascular:    Pulmonary:  ALBUTerol/ipratropium for Nebulization 3 milliLiter(s) Nebulizer every 6 hours    Hematalogic:  heparin  Injectable 5000 Unit(s) SubCutaneous every 8 hours    Other:  chlorhexidine 4% Liquid 1 Application(s) Topical <User Schedule>  predniSONE   Tablet 40 milliGRAM(s) Oral daily      Drug Dosing Weight  Height (cm): 167.64 (29 Mar 2019 16:43)  Weight (kg): 226.8 (29 Mar 2019 16:43)  BMI (kg/m2): 80.7 (29 Mar 2019 16:43)  BSA (m2): 2.95 (29 Mar 2019 16:43)    CENTRAL LINE: [ ] YES [ ] NO  LOCATION:   DATE INSERTED:    GRANDE: [ ] YES [ ] NO    DATE INSERTED:    A-LINE:  [ ] YES [ ] NO  LOCATION:   DATE INSERTED:    PMH/Social Hx/Fam Hx -reviewed admission note, no change since admission  PAST MEDICAL & SURGICAL HISTORY:  Asthma      T(C): 36.3 (04-01-19 @ 06:00), Max: 36.3 (04-01-19 @ 06:00)  HR: 89 (04-01-19 @ 13:00)  BP: 152/90 (04-01-19 @ 13:00)  BP(mean): 106 (04-01-19 @ 13:00)  ABP: --  ABP(mean): --  RR: 31 (04-01-19 @ 13:00)  SpO2: 92% (04-01-19 @ 13:00)  Wt(kg): --          03-31 @ 07:01  -  04-01 @ 07:00  --------------------------------------------------------  IN: 940 mL / OUT: 0 mL / NET: 940 mL            PHYSICAL EXAM:    GENERAL: No signs of distress, comfortable  HEAD: Atraumatic, Normocephalic  EYES: EOMI, PERRLA  ENMT: No erythema, exudates, or enlargement, Moist mucous membranes  NECK: Supple, normal appearance, No JVD; [  ] central line (if applicable)  CHEST/LUNG: No chest deformity, fair bilateral air entry; No rales, rhonchi, wheezing; crackles  HEART: Regular rate and rhythm; No murmurs, rubs, or gallops;   ABDOMEN: Soft, Nontender, Nondistended; Bowel sounds present  EXTREMITIES:  + Peripheral Pulses, +1 edema with chronic skin changes on b/l lower ext  NERVOUS SYSTEM: awake and alert   LYMPH: No lymphadenopathy noted  SKIN: No rashes or lesions; good turgor, warm, dry      LABS:  CBC Full  -  ( 01 Apr 2019 06:33 )  WBC Count : 12.66 K/uL  RBC Count : 5.83 M/uL  Hemoglobin : 14.2 g/dL  Hematocrit : 49.4 %  Platelet Count - Automated : 292 K/uL  Mean Cell Volume : 84.7 fl  Mean Cell Hemoglobin : 24.4 pg  Mean Cell Hemoglobin Concentration : 28.7 gm/dL  Auto Neutrophil # : x  Auto Lymphocyte # : x  Auto Monocyte # : x  Auto Eosinophil # : x  Auto Basophil # : x  Auto Neutrophil % : x  Auto Lymphocyte % : x  Auto Monocyte % : x  Auto Eosinophil % : x  Auto Basophil % : x    04-01    138  |  101  |  22<H>  ----------------------------<  80  4.3   |  34<H>  |  0.83    Ca    8.3<L>      01 Apr 2019 06:33  Phos  5.8     04-01  Mg     2.8     04-01    TPro  7.6  /  Alb  3.0<L>  /  TBili  0.9  /  DBili  x   /  AST  11  /  ALT  51  /  AlkPhos  82  03-31        Culture Results:   No growth to date. (03-30 @ 09:07)      RADIOLOGY & ADDITIONAL STUDIES REVIEWED         IMPRESSION:  PAST MEDICAL & SURGICAL HISTORY:  Asthma   p/w       Assessment and Plan:   · Assessment		  34 M with LAZARO started on CPAP recently and not compliance with machine and medication, recently incarcerated, no significant PSH presented for SOB,     1. Acute hypoxic hypercapnic respiratory failure from non compliance  - Likely from obesity hypoventilation syndrome and/or LAZARO ; also marijuana use   - significant CO2 retention on ABG. Likely Chronic    - Blood cultures Negative  - C/W Rocephin and azithromycin 3/30  - Duoneb, solumedrol  - BIPAP  PRN  as needed.   - Echo WNL  - Advice given on weight loss and Pulmonary follow up for CPAP machine at home    2. DVT PPx BMI adjusted   - 40mg BID Lovenox  - Protonix on GI PPX          GOALS OF CARE DISCUSSION WITH PATIENT/FAMILY/PROXY

## 2019-04-02 DIAGNOSIS — Z29.9 ENCOUNTER FOR PROPHYLACTIC MEASURES, UNSPECIFIED: ICD-10-CM

## 2019-04-02 DIAGNOSIS — Z02.9 ENCOUNTER FOR ADMINISTRATIVE EXAMINATIONS, UNSPECIFIED: ICD-10-CM

## 2019-04-02 DIAGNOSIS — E66.01 MORBID (SEVERE) OBESITY DUE TO EXCESS CALORIES: ICD-10-CM

## 2019-04-02 DIAGNOSIS — J96.91 RESPIRATORY FAILURE, UNSPECIFIED WITH HYPOXIA: ICD-10-CM

## 2019-04-02 LAB
ALBUMIN SERPL ELPH-MCNC: 2.8 G/DL — LOW (ref 3.5–5)
ALP SERPL-CCNC: 71 U/L — SIGNIFICANT CHANGE UP (ref 40–120)
ALT FLD-CCNC: 36 U/L DA — SIGNIFICANT CHANGE UP (ref 10–60)
ANION GAP SERPL CALC-SCNC: 6 MMOL/L — SIGNIFICANT CHANGE UP (ref 5–17)
AST SERPL-CCNC: 13 U/L — SIGNIFICANT CHANGE UP (ref 10–40)
BASE EXCESS BLDA CALC-SCNC: 5.7 MMOL/L — HIGH (ref -2–2)
BASOPHILS # BLD AUTO: 0.03 K/UL — SIGNIFICANT CHANGE UP (ref 0–0.2)
BASOPHILS NFR BLD AUTO: 0.3 % — SIGNIFICANT CHANGE UP (ref 0–2)
BILIRUB SERPL-MCNC: 0.8 MG/DL — SIGNIFICANT CHANGE UP (ref 0.2–1.2)
BLOOD GAS COMMENTS ARTERIAL: SIGNIFICANT CHANGE UP
BUN SERPL-MCNC: 19 MG/DL — HIGH (ref 7–18)
CALCIUM SERPL-MCNC: 8.2 MG/DL — LOW (ref 8.4–10.5)
CHLORIDE SERPL-SCNC: 102 MMOL/L — SIGNIFICANT CHANGE UP (ref 96–108)
CO2 SERPL-SCNC: 30 MMOL/L — SIGNIFICANT CHANGE UP (ref 22–31)
CREAT SERPL-MCNC: 0.79 MG/DL — SIGNIFICANT CHANGE UP (ref 0.5–1.3)
EOSINOPHIL # BLD AUTO: 0.06 K/UL — SIGNIFICANT CHANGE UP (ref 0–0.5)
EOSINOPHIL NFR BLD AUTO: 0.6 % — SIGNIFICANT CHANGE UP (ref 0–6)
GLUCOSE SERPL-MCNC: 75 MG/DL — SIGNIFICANT CHANGE UP (ref 70–99)
HCO3 BLDA-SCNC: 32 MMOL/L — HIGH (ref 23–27)
HCT VFR BLD CALC: 49 % — SIGNIFICANT CHANGE UP (ref 39–50)
HGB BLD-MCNC: 14.3 G/DL — SIGNIFICANT CHANGE UP (ref 13–17)
HOROWITZ INDEX BLDA+IHG-RTO: 21 — SIGNIFICANT CHANGE UP
IMM GRANULOCYTES NFR BLD AUTO: 0.3 % — SIGNIFICANT CHANGE UP (ref 0–1.5)
LYMPHOCYTES # BLD AUTO: 2.93 K/UL — SIGNIFICANT CHANGE UP (ref 1–3.3)
LYMPHOCYTES # BLD AUTO: 27.6 % — SIGNIFICANT CHANGE UP (ref 13–44)
MAGNESIUM SERPL-MCNC: 2.6 MG/DL — SIGNIFICANT CHANGE UP (ref 1.6–2.6)
MCHC RBC-ENTMCNC: 24.7 PG — LOW (ref 27–34)
MCHC RBC-ENTMCNC: 29.2 GM/DL — LOW (ref 32–36)
MCV RBC AUTO: 84.8 FL — SIGNIFICANT CHANGE UP (ref 80–100)
MONOCYTES # BLD AUTO: 1.04 K/UL — HIGH (ref 0–0.9)
MONOCYTES NFR BLD AUTO: 9.8 % — SIGNIFICANT CHANGE UP (ref 2–14)
NEUTROPHILS # BLD AUTO: 6.53 K/UL — SIGNIFICANT CHANGE UP (ref 1.8–7.4)
NEUTROPHILS NFR BLD AUTO: 61.4 % — SIGNIFICANT CHANGE UP (ref 43–77)
NRBC # BLD: 0 /100 WBCS — SIGNIFICANT CHANGE UP (ref 0–0)
PCO2 BLDA: 54 MMHG — HIGH (ref 32–46)
PH BLDA: 7.39 — SIGNIFICANT CHANGE UP (ref 7.35–7.45)
PHOSPHATE SERPL-MCNC: 4.1 MG/DL — SIGNIFICANT CHANGE UP (ref 2.5–4.5)
PLATELET # BLD AUTO: 294 K/UL — SIGNIFICANT CHANGE UP (ref 150–400)
PO2 BLDA: 61 MMHG — LOW (ref 74–108)
POTASSIUM SERPL-MCNC: 3.9 MMOL/L — SIGNIFICANT CHANGE UP (ref 3.5–5.3)
POTASSIUM SERPL-SCNC: 3.9 MMOL/L — SIGNIFICANT CHANGE UP (ref 3.5–5.3)
PROT SERPL-MCNC: 6.9 G/DL — SIGNIFICANT CHANGE UP (ref 6–8.3)
RBC # BLD: 5.78 M/UL — SIGNIFICANT CHANGE UP (ref 4.2–5.8)
RBC # FLD: 16.5 % — HIGH (ref 10.3–14.5)
SAO2 % BLDA: 91 % — LOW (ref 92–96)
SODIUM SERPL-SCNC: 138 MMOL/L — SIGNIFICANT CHANGE UP (ref 135–145)
WBC # BLD: 10.62 K/UL — HIGH (ref 3.8–10.5)
WBC # FLD AUTO: 10.62 K/UL — HIGH (ref 3.8–10.5)

## 2019-04-02 PROCEDURE — 99222 1ST HOSP IP/OBS MODERATE 55: CPT

## 2019-04-02 PROCEDURE — 99233 SBSQ HOSP IP/OBS HIGH 50: CPT | Mod: GC

## 2019-04-02 RX ADMIN — Medication 40 MILLIGRAM(S): at 06:14

## 2019-04-02 RX ADMIN — CHLORHEXIDINE GLUCONATE 1 APPLICATION(S): 213 SOLUTION TOPICAL at 06:14

## 2019-04-02 RX ADMIN — Medication 3 MILLILITER(S): at 14:17

## 2019-04-02 RX ADMIN — HEPARIN SODIUM 5000 UNIT(S): 5000 INJECTION INTRAVENOUS; SUBCUTANEOUS at 21:53

## 2019-04-02 RX ADMIN — Medication 3 MILLILITER(S): at 09:32

## 2019-04-02 RX ADMIN — CEFTRIAXONE 100 GRAM(S): 500 INJECTION, POWDER, FOR SOLUTION INTRAMUSCULAR; INTRAVENOUS at 00:30

## 2019-04-02 RX ADMIN — Medication 3 MILLILITER(S): at 03:26

## 2019-04-02 RX ADMIN — HEPARIN SODIUM 5000 UNIT(S): 5000 INJECTION INTRAVENOUS; SUBCUTANEOUS at 13:15

## 2019-04-02 RX ADMIN — HEPARIN SODIUM 5000 UNIT(S): 5000 INJECTION INTRAVENOUS; SUBCUTANEOUS at 07:08

## 2019-04-02 RX ADMIN — Medication 3 MILLILITER(S): at 21:20

## 2019-04-02 RX ADMIN — AZITHROMYCIN 250 MILLIGRAM(S): 500 TABLET, FILM COATED ORAL at 00:31

## 2019-04-02 NOTE — CONSULT NOTE ADULT - SUBJECTIVE AND OBJECTIVE BOX
Source: Patient,     Reason for Consultation:    Chief Complaint:    HPI:          MEDICATIONS  (STANDING):  ALBUTerol/ipratropium for Nebulization 3 milliLiter(s) Nebulizer every 6 hours  azithromycin  IVPB 500 milliGRAM(s) IV Intermittent every 24 hours  cefTRIAXone   IVPB 1 Gram(s) IV Intermittent every 24 hours  chlorhexidine 4% Liquid 1 Application(s) Topical <User Schedule>  heparin  Injectable 5000 Unit(s) SubCutaneous every 8 hours  predniSONE   Tablet 40 milliGRAM(s) Oral daily    MEDICATIONS  (PRN):    Allergies    No Known Allergies    Intolerances        PAST MEDICAL & SURGICAL HISTORY:  Asthma    FAMILY HISTORY:    SOCIAL HISTORY  Smoking History:   Alcohol:  Drugs:  Occupation:    T(C): 36.9 (04-02-19 @ 14:18), Max: 36.9 (04-02-19 @ 14:18)  HR: 96 (04-02-19 @ 15:02) (79 - 104)  BP: 138/65 (04-02-19 @ 14:18) (135/85 - 152/97)  RR: 17 (04-02-19 @ 14:18) (15 - 27)  SpO2: 93% (04-02-19 @ 15:02) (91% - 97%)    LABS:                        14.3   10.62 )-----------( 294      ( 02 Apr 2019 07:45 )             49.0     04-02    138  |  102  |  19<H>  ----------------------------<  75  3.9   |  30  |  0.79    Ca    8.2<L>      02 Apr 2019 07:45  Phos  4.1     04-02  Mg     2.6     04-02    TPro  6.9  /  Alb  2.8<L>  /  TBili  0.8  /  DBili  x   /  AST  13  /  ALT  36  /  AlkPhos  71  04-02    ABG - ( 02 Apr 2019 17:41 )  pH, Arterial: 7.39  pH, Blood: x     /  pCO2: 54    /  pO2: 61    / HCO3: 32    / Base Excess: 5.7   /  SaO2: 91                                Microbiology  Culture Results:   No growth to date. (03-30 @ 09:07)      RADIOLOGY & ADDITIONAL STUDIES: (My Reading) Source: Patient, Chart    Reason for Consultation: respiratory failure    Chief Complaint: Respiratory failure    HPI:  34 M with LAZARO started on CPAP recently and not compliance with machine and medication, recently incarcerated, no significant PSH presented for SOB, cough and subjective fever started 2 weeks ago and progressively getting worse. Patient was put on BIPAP in ED.  Denied chest pain, palpitation, diaphoresis, and any other symptoms.   When seen by ICU, patient is breathing comfortably on BIPAP, sleeping. Patient admit feeling subjectively better. Significant for bilateral diffuse wheezing. Limited CXR due to body weight.   Vital Signs T(C): 36.4 HR: 89  BP: 152/96  RR: 20 SpO2: 100% ABG - ( 30 Mar 2019 00:50 )  pH, Arterial: 7.22  pH, Blood: x     /  pCO2: 88    /  pO2: 209   / HCO3: 35    / Base Excess: 3.8   /  SaO2: 99 (30 Mar 2019 01:35)    The patient was monitored in the ICU and transferred to the floor. He has chronic dyspnea on exertion. He was no history of asthma but was given inhalers in [facility] but didnt notice any improvement. He was diagnosed with sleep apnea 3 years but does not use it as he is homeless.    MEDICATIONS  (STANDING):  ALBUTerol/ipratropium for Nebulization 3 milliLiter(s) Nebulizer every 6 hours  azithromycin  IVPB 500 milliGRAM(s) IV Intermittent every 24 hours  cefTRIAXone   IVPB 1 Gram(s) IV Intermittent every 24 hours  chlorhexidine 4% Liquid 1 Application(s) Topical <User Schedule>  heparin  Injectable 5000 Unit(s) SubCutaneous every 8 hours  predniSONE   Tablet 40 milliGRAM(s) Oral daily    MEDICATIONS  (PRN):    Allergies    No Known Allergies    Intolerances    PAST MEDICAL & SURGICAL HISTORY:  Asthma    FAMILY HISTORY:    SOCIAL HISTORY  Smoking History: Smokes marijuana with tobacco  Alcohol: Denies  Drugs: Marijuana  Occupation: Unemployed    T(C): 36.9 (04-02-19 @ 14:18), Max: 36.9 (04-02-19 @ 14:18)  HR: 96 (04-02-19 @ 15:02) (79 - 104)  BP: 138/65 (04-02-19 @ 14:18) (135/85 - 152/97)  RR: 17 (04-02-19 @ 14:18) (15 - 27)  SpO2: 93% (04-02-19 @ 15:02) (91% - 97%)    LABS:                        14.3   10.62 )-----------( 294      ( 02 Apr 2019 07:45 )             49.0     04-02    138  |  102  |  19<H>  ----------------------------<  75  3.9   |  30  |  0.79    Ca    8.2<L>      02 Apr 2019 07:45  Phos  4.1     04-02  Mg     2.6     04-02    TPro  6.9  /  Alb  2.8<L>  /  TBili  0.8  /  DBili  x   /  AST  13  /  ALT  36  /  AlkPhos  71  04-02    ABG - ( 02 Apr 2019 17:41 )  pH, Arterial: 7.39  pH, Blood: x     /  pCO2: 54    /  pO2: 61    / HCO3: 32    / Base Excess: 5.7   /  SaO2: 91        Microbiology  Culture Results:   No growth to date. (03-30 @ 09:07)    RADIOLOGY & ADDITIONAL STUDIES: (My Reading)    CXR- limited portable film. Prominent interstitial markings

## 2019-04-02 NOTE — PROGRESS NOTE ADULT - PROBLEM SELECTOR PLAN 2
BMI 80.7  Admits to unhealthy health maintenance practices including unhealthy diet  -Cont nocturnal BIPAP  -Dietician consult

## 2019-04-02 NOTE — PROGRESS NOTE ADULT - ASSESSMENT
34 M with LAZARO started on CPAP recently and not compliance with machine and medication, recently incarcerated, no significant PSH presented for SOB, cough and subjective fever started 2 weeks ago and progressively getting worse. Patient was put on BIPAP in ED.  Denied chest pain, palpitation, diaphoresis, and any other symptoms.   When seen by ICU, patient is breathing comfortably on BIPAP, sleeping. Patient admit feeling subjectively better. Significant for bilateral diffuse wheezing. Limited CXR due to body weight.   Vital Signs T(C): 36.4 HR: 89  BP: 152/96  RR: 20 SpO2: 100% ABG - ( 30 Mar 2019 00:50 )  pH, Arterial: 7.22  pH, Blood: x     /  pCO2: 88    /  pO2: 209   / HCO3: 35    / Base Excess: 3.8   /  SaO2: 99        pt. was initially admitted to ICU for acute hypoxic/hypercapneic resp. failure 34 M with LAZARO started on CPAP recently and not compliance with machine and medication, recently incarcerated, no significant PSH presented for SOB, cough and subjective fever started 2 weeks ago and progressively getting worse. Patient was put on BIPAP in ED.  Denied chest pain, palpitation, diaphoresis, and any other symptoms.   When seen by ICU, patient is breathing comfortably on BIPAP, sleeping. Patient admit feeling subjectively better. Significant for bilateral diffuse wheezing. Limited CXR due to body weight.   Vital Signs T(C): 36.4 HR: 89  BP: 152/96  RR: 20 SpO2: 100% ABG - ( 30 Mar 2019 00:50 )  pH, Arterial: 7.22  pH, Blood: x     /  pCO2: 88    /  pO2: 209   / HCO3: 35    / Base Excess: 3.8   /  SaO2: 99        pt. was initially admitted to ICU for acute hypoxic/hypercapneic resp. failure, treated with bronchodilators, steroids and nocturnal BIPAP, downgraded to medicine to complete course of hospitalization.

## 2019-04-02 NOTE — PROGRESS NOTE ADULT - SUBJECTIVE AND OBJECTIVE BOX
NP Note discussed with  Primary Attending    Patient is a 34y old  Male who presents with a chief complaint of SOB (01 Apr 2019 14:26)      INTERVAL HPI/OVERNIGHT EVENTS: no new complaints    MEDICATIONS  (STANDING):  ALBUTerol/ipratropium for Nebulization 3 milliLiter(s) Nebulizer every 6 hours  azithromycin  IVPB 500 milliGRAM(s) IV Intermittent every 24 hours  cefTRIAXone   IVPB 1 Gram(s) IV Intermittent every 24 hours  chlorhexidine 4% Liquid 1 Application(s) Topical <User Schedule>  heparin  Injectable 5000 Unit(s) SubCutaneous every 8 hours  predniSONE   Tablet 40 milliGRAM(s) Oral daily    MEDICATIONS  (PRN):      __________________________________________________  REVIEW OF SYSTEMS:    CONSTITUTIONAL: No fever,   EYES: no acute visual disturbances  NECK: No pain or stiffness  RESPIRATORY: No cough; No shortness of breath  CARDIOVASCULAR: No chest pain, no palpitations  GASTROINTESTINAL: No pain. No nausea or vomiting; No diarrhea   NEUROLOGICAL: No headache or numbness, no tremors  MUSCULOSKELETAL: No joint pain, no muscle pain  GENITOURINARY: no dysuria, no frequency, no hesitancy  PSYCHIATRY: no depression , no anxiety  ALL OTHER  ROS negative        Vital Signs Last 24 Hrs  T(C): 36.9 (02 Apr 2019 14:18), Max: 36.9 (02 Apr 2019 14:18)  T(F): 98.5 (02 Apr 2019 14:18), Max: 98.5 (02 Apr 2019 14:18)  HR: 95 (02 Apr 2019 14:18) (79 - 104)  BP: 138/65 (02 Apr 2019 14:18) (131/94 - 152/97)  BP(mean): 98 (01 Apr 2019 22:00) (94 - 113)  RR: 17 (02 Apr 2019 14:18) (15 - 27)  SpO2: 96% (02 Apr 2019 14:18) (91% - 97%)    ________________________________________________  PHYSICAL EXAM:  GENERAL: NAD  HEENT: Normocephalic;  conjunctivae and sclerae clear; moist mucous membranes, morbidly obese  NECK : supple  CHEST/LUNG: Clear to auscultation bilaterally with good air entry   HEART: S1 S2  regular; no murmurs, gallops or rubs  ABDOMEN: Soft, Nontender, Nondistended; Bowel sounds present  EXTREMITIES: no cyanosis; no edema; no calf tenderness  SKIN: warm and dry; no rash  NERVOUS SYSTEM:  Awake and alert; Oriented  to place, person and time ; no new deficits    _________________________________________________  LABS:                        14.3   10.62 )-----------( 294      ( 02 Apr 2019 07:45 )             49.0     04-02    138  |  102  |  19<H>  ----------------------------<  75  3.9   |  30  |  0.79    Ca    8.2<L>      02 Apr 2019 07:45  Phos  4.1     04-02  Mg     2.6     04-02    TPro  6.9  /  Alb  2.8<L>  /  TBili  0.8  /  DBili  x   /  AST  13  /  ALT  36  /  AlkPhos  71  04-02        CAPILLARY BLOOD GLUCOSE    RADIOLOGY & ADDITIONAL TESTS:      Xray Chest 1 View- PORTABLE-Routine (04.01.19 @ 08:34) >  EXAM:  XR CHEST PORTABLE ROUTINE 1V                            PROCEDURE DATE:  04/01/2019          INTERPRETATION:  CLINICAL STATEMENT: Follow-up chest pain.    TECHNIQUE: AP view of the chest.    COMPARISON: 3/31/2019    FINDINGS/  IMPRESSION:  Study limited due to suboptimal inspiration. Increased interstitial lung   markings without significant change. Improving infiltrate left lung.    No significant pleural effusion.    Heart size cannot be accurately assessed in this projection.    < end of copied text >      US Duplex Venous Lower Ext Complete, Bilateral (03.29.19 @ 21:48) >  EXAM:  US DPLX LWR EXT VEINS COMPL BI                            *** ADDENDUM 03/29/2019  ***      Addendum:    Please note bilateral lower extremities was performed.      Exam Date: 3/29/2019 9:48 PM    Ultrasound of the right and left lower extremity deep venous system         CLINICAL INFORMATION:      Bilateral lower extremity swelling    TECHNIQUE:   Doppler ultrasonography of the bilateral lower extremity   deep venous system was performed using grey scale, color flow and   spectral wave form analysis.  The veins evaluated included the common   femoral vein, the inflow of the greater saphenous vein, the superficial   femoral vein and the popliteal vein.      FINDINGS:    No previous examinations are available for review.    The bilateral lower extremity deep venous system demonstrated no   abnormality.  The veins were patent with intact Doppler flow.   This flow   varied with respiration.   Flow augmented with ipsilateral calf   compression. On transverse and longitudinal images no intraluminal   thrombus was found.  The veins were directly compressible by the   ultrasound transducer.            IMPRESSION:   Unremarkable ultrasound of the right and left lower   extremity deep venous system.             *** END OF ADDENDUM 03/29/2019  ***      PROCEDURE DATE:  03/29/2019          INTERPRETATION:  DATE: 3/29/2019 9:48 PM  HISTORY: Bilateral lower extremity swelling  COMPARISON: None  TECHNIQUE: Real-time duplex sonography of the deep veins of the right   lower extremity with color and spectral Doppler, with and without   compression. Study is limited by body cyst.    FINDINGS:      In the right leg, the following veins are compressible: the common   femoral vein, femoral vein, popliteal vein, and visualized segments of   the posterior tibial veins.    Doppler examination shows normal spontaneous and phasic flow.    IMPRESSION:     No sonographic evidence of acute deep venous thrombosis in the right   femoropopliteal system and in the posterior tibial veins.. If concern for   acute deep vein thrombosis persists, consider follow-up evaluation in 5-7   days.    < end of copied text >      Imaging Personally Reviewed:  YES/NO    Consultant(s) Notes Reviewed:   YES/ No    Care Discussed with Consultants :     Plan of care was discussed with patient and /or primary care giver; all questions and concerns were addressed and care was aligned with patient's wishes.

## 2019-04-02 NOTE — CONSULT NOTE ADULT - ASSESSMENT
1) Acute on chronic hypercapneic respiratory failure likely from acute viral vs bacterial respiratory infection  2) Super Obese BMI >80  3) Marijuana use    Clinically stable with no increased work of breathing  Will need to be assessed for need for home O2 prior to dc  His CPAP machine is working but states he doesnt use due to living situation  He likely doesnt need a new machine but may need pressure adjustments and a new sleep study once his living situation is addressed  Outpatient PFTs to assess for evidence of obstruction for possible asthma  Can empirically dc him on albuterol MDI prn until evaluation 1) Acute on chronic hypercapneic respiratory failure likely from acute viral vs bacterial respiratory infection  2) Super Obese BMI >80  3) Marijuana use    Clinically stable with no increased work of breathing  Will need to be assessed for need for home O2 prior to dc  His CPAP machine is working but states he doesnt use due to living situation  He likely doesnt need a new machine but may need pressure adjustments and a new sleep study once his living situation is addressed  Outpatient PFTs to assess for evidence of obstruction for possible asthma  Can empirically dc him on albuterol MDI prn until evaluation  His O2 sat should be maintained at 90% only given chronic hypercapnea

## 2019-04-02 NOTE — PROGRESS NOTE ADULT - PROBLEM SELECTOR PLAN 1
Admitted with hypoxic and hypercapneic resp failure 2/2 obesity, non compliance with CPAP and Marijuana use  -Downgraded from ICU 4/1, on RA with no resp distress  -Cont Abx until tomorrow  -Complete 5 day course of prednisone then stop  -f/u RA ABG  -Pulm dr. Krishna consulted  -Cont nocturnal BIPAP

## 2019-04-03 ENCOUNTER — TRANSCRIPTION ENCOUNTER (OUTPATIENT)
Age: 34
End: 2019-04-03

## 2019-04-03 LAB
ANION GAP SERPL CALC-SCNC: 4 MMOL/L — LOW (ref 5–17)
BUN SERPL-MCNC: 16 MG/DL — SIGNIFICANT CHANGE UP (ref 7–18)
CALCIUM SERPL-MCNC: 8.5 MG/DL — SIGNIFICANT CHANGE UP (ref 8.4–10.5)
CHLORIDE SERPL-SCNC: 103 MMOL/L — SIGNIFICANT CHANGE UP (ref 96–108)
CO2 SERPL-SCNC: 31 MMOL/L — SIGNIFICANT CHANGE UP (ref 22–31)
CREAT SERPL-MCNC: 0.85 MG/DL — SIGNIFICANT CHANGE UP (ref 0.5–1.3)
GLUCOSE SERPL-MCNC: 84 MG/DL — SIGNIFICANT CHANGE UP (ref 70–99)
HCT VFR BLD CALC: 50.6 % — HIGH (ref 39–50)
HGB BLD-MCNC: 14.6 G/DL — SIGNIFICANT CHANGE UP (ref 13–17)
MCHC RBC-ENTMCNC: 24.3 PG — LOW (ref 27–34)
MCHC RBC-ENTMCNC: 28.9 GM/DL — LOW (ref 32–36)
MCV RBC AUTO: 84.1 FL — SIGNIFICANT CHANGE UP (ref 80–100)
NRBC # BLD: 0 /100 WBCS — SIGNIFICANT CHANGE UP (ref 0–0)
PLATELET # BLD AUTO: 295 K/UL — SIGNIFICANT CHANGE UP (ref 150–400)
POTASSIUM SERPL-MCNC: 4 MMOL/L — SIGNIFICANT CHANGE UP (ref 3.5–5.3)
POTASSIUM SERPL-SCNC: 4 MMOL/L — SIGNIFICANT CHANGE UP (ref 3.5–5.3)
RBC # BLD: 6.02 M/UL — HIGH (ref 4.2–5.8)
RBC # FLD: 17 % — HIGH (ref 10.3–14.5)
SODIUM SERPL-SCNC: 138 MMOL/L — SIGNIFICANT CHANGE UP (ref 135–145)
WBC # BLD: 12.54 K/UL — HIGH (ref 3.8–10.5)
WBC # FLD AUTO: 12.54 K/UL — HIGH (ref 3.8–10.5)

## 2019-04-03 PROCEDURE — 99233 SBSQ HOSP IP/OBS HIGH 50: CPT | Mod: GC

## 2019-04-03 PROCEDURE — 99232 SBSQ HOSP IP/OBS MODERATE 35: CPT

## 2019-04-03 RX ORDER — ALBUTEROL 90 UG/1
1 AEROSOL, METERED ORAL
Qty: 1 | Refills: 0
Start: 2019-04-03 | End: 2019-05-02

## 2019-04-03 RX ORDER — ALBUTEROL 90 UG/1
1 AEROSOL, METERED ORAL EVERY 6 HOURS
Qty: 0 | Refills: 0 | Status: DISCONTINUED | OUTPATIENT
Start: 2019-04-03 | End: 2019-04-04

## 2019-04-03 RX ADMIN — HEPARIN SODIUM 5000 UNIT(S): 5000 INJECTION INTRAVENOUS; SUBCUTANEOUS at 13:17

## 2019-04-03 RX ADMIN — CEFTRIAXONE 100 GRAM(S): 500 INJECTION, POWDER, FOR SOLUTION INTRAMUSCULAR; INTRAVENOUS at 00:39

## 2019-04-03 RX ADMIN — Medication 3 MILLILITER(S): at 03:02

## 2019-04-03 RX ADMIN — Medication 3 MILLILITER(S): at 08:59

## 2019-04-03 RX ADMIN — AZITHROMYCIN 250 MILLIGRAM(S): 500 TABLET, FILM COATED ORAL at 00:40

## 2019-04-03 RX ADMIN — HEPARIN SODIUM 5000 UNIT(S): 5000 INJECTION INTRAVENOUS; SUBCUTANEOUS at 21:51

## 2019-04-03 RX ADMIN — Medication 3 MILLILITER(S): at 14:39

## 2019-04-03 RX ADMIN — Medication 40 MILLIGRAM(S): at 06:35

## 2019-04-03 RX ADMIN — CHLORHEXIDINE GLUCONATE 1 APPLICATION(S): 213 SOLUTION TOPICAL at 06:34

## 2019-04-03 RX ADMIN — HEPARIN SODIUM 5000 UNIT(S): 5000 INJECTION INTRAVENOUS; SUBCUTANEOUS at 06:35

## 2019-04-03 RX ADMIN — Medication 3 MILLILITER(S): at 21:04

## 2019-04-03 NOTE — PROGRESS NOTE ADULT - ASSESSMENT
1) Acute on chronic hypercapneic respiratory failure likely from acute viral vs bacterial respiratory infection  2) Super Obese BMI >80  3) Marijuana use    Clinically stable  Mild peripheral edema  Pt advised to contact [employer] to service CPAP machine given long disuse, unclear if he can get a new machine without returning it depending on his insurance  Follow up social work  Can dc on prn albuterol  Outpatient reassessment for asthma with complete PFTs  Outpatient split study for CPAP settings adjustment

## 2019-04-03 NOTE — DISCHARGE NOTE PROVIDER - CARE PROVIDER_API CALL
Patient/Family Jefe Krishna)  Critical Care Medicine; Internal Medicine; Pulmonary Disease  [address] [place] [address]  Phone: [phone]  Fax: [phone]  Follow Up Time: 1 week Jefe Krishna)  Critical Care Medicine; Internal Medicine; Pulmonary Disease  [address] [place] [address]  Phone: [phone]  Fax: [phone]  Follow Up Time: 1 week    [address]  Phone: [phone]  Fax: (   )    -  Follow Up Time: 1 week

## 2019-04-03 NOTE — PROGRESS NOTE ADULT - SUBJECTIVE AND OBJECTIVE BOX
Interval Events: No overnight events. Breathing stable. Still has some leg swelling.    OBJECTIVE:    T(C): 36.3 (03 Apr 2019 05:10), Max: 36.9 (02 Apr 2019 14:18)  T(F): 97.3 (03 Apr 2019 05:10), Max: 98.5 (02 Apr 2019 14:18)  HR: 81 (03 Apr 2019 05:10) (81 - 96)  BP: 150/77 (03 Apr 2019 05:10) (129/69 - 150/77)  RR: 22 (03 Apr 2019 05:10) (17 - 22)  SpO2: 98% (03 Apr 2019 05:10) (93% - 98%)    CAPILLARY BLOOD GLUCOSE    PHYSICAL EXAM:  General: Awake, alert, oriented X 3.   HEENT: Atraumatic, PERRL, Anicteric.   Lymph Nodes: No palpable lymphadenopathy  Respiratory: Clear to auscultation bilaterally, no wheezes, rhonchi, or rales  Cardiovascular: S1 S2 normal. No murmurs, rubs or gallops.   Abdomen: Soft, non-tender, non-distended. No organomegaly.  Extremities: Warm to touch. Peripheral pulse palpable. + Pedal edema  Skin: No rashes or skin lesions  Neurological: No focal deficits.  Psychiatry: Appropriate mood and affect.    HOSPITAL MEDICATIONS:  MEDICATIONS  (STANDING):  ALBUTerol/ipratropium for Nebulization 3 milliLiter(s) Nebulizer every 6 hours  azithromycin  IVPB 500 milliGRAM(s) IV Intermittent every 24 hours  cefTRIAXone   IVPB 1 Gram(s) IV Intermittent every 24 hours  chlorhexidine 4% Liquid 1 Application(s) Topical <User Schedule>  heparin  Injectable 5000 Unit(s) SubCutaneous every 8 hours  predniSONE   Tablet 40 milliGRAM(s) Oral daily    MEDICATIONS  (PRN):    LABS:                        14.6   12.54 )-----------( 295      ( 03 Apr 2019 07:32 )             50.6     04-03    138  |  103  |  16  ----------------------------<  84  4.0   |  31  |  0.85    Ca    8.5      03 Apr 2019 07:32  Phos  4.1     04-02  Mg     2.6     04-02    TPro  6.9  /  Alb  2.8<L>  /  TBili  0.8  /  DBili  x   /  AST  13  /  ALT  36  /  AlkPhos  71  04-02        Arterial Blood Gas:  04-02 @ 17:41  7.39/54/61/32/91/5.7  ABG lactate: --        MICROBIOLOGY:       RADIOLOGY:  [X] Reviewed and interpreted by me

## 2019-04-03 NOTE — PROGRESS NOTE ADULT - PROBLEM SELECTOR PLAN 1
Admitted with hypoxic and hypercapneic resp failure 2/2 obesity, non compliance with CPAP and Marijuana use  -Downgraded from ICU 4/1, on RA with no resp distress  -Cont Abx until tomorrow  -Complete 5 day course of prednisone then stop  -f/u RA ABG  -Pulm dr. Krishna consulted  -Cont nocturnal BIPAP  4/3-Dr. Krishna note appreciated  -6 min walk performed, pt. desaturated to 87% upon completion of walk however went right back up to 90% upon sitting, likely not a home O2 candidate. Admitted with hypoxic and hypercapneic resp failure 2/2 obesity, non compliance with CPAP and Marijuana use  -Downgraded from ICU 4/1, on RA with no resp distress  -Cont Abx until tomorrow  -Complete 5 day course of prednisone then stop  -f/u RA ABG  -Pulm dr. Krishna consulted  -Cont nocturnal BIPAP  4/3-Dr. Krishna note appreciated  -6 min walk performed, pt. desaturated to 87% upon completion of walk however went right back up to 90% upon sitting, likely not a home O2 candidate.  -Will d/c with Albuterol MDI  -f/u sleep studies as outpt.

## 2019-04-03 NOTE — DISCHARGE NOTE PROVIDER - PROVIDER TOKENS
PROVIDER:[TOKEN:[[id number]],FOLLOWUP:[1 week]] PROVIDER:[TOKEN:[[id number]],FOLLOWUP:[1 week]],FREE:[LAST:[Josué],FIRST:[Sascha],PHONE:[phone]:[(   [phone]    -],ADDRESS:[Dr. Josué Caicedo],FOLLOWUP:[1 week]]

## 2019-04-03 NOTE — DISCHARGE NOTE PROVIDER - NSDCCPCAREPLAN_GEN_ALL_CORE_FT
PRINCIPAL DISCHARGE DIAGNOSIS  Diagnosis: Acute respiratory failure with hypoxia and hypercapnia  Assessment and Plan of Treatment: You were admitted SOB and found to be in acute respiratory failure due to CO2 retention and low O2 saturation.  This is likely due to hypoventilation associated with your large body habitus, your non compliance with CPAP and your use of marijuana.  Your resp. status was also worse due to viral or bacterial infection which was treated with antibiotics.  You also received steroids and bronchodilators, placed on BIPAP with improved resp. status.  Please follow up with pulmonologist Dr. Krishna.      SECONDARY DISCHARGE DIAGNOSES  Diagnosis: Morbid obesity  Assessment and Plan of Treatment: Your body weight is 499.9 lbs and your BMI is 80.7.  This as reported by you is due to poor eating habits and lack of physical activity.  Obesity may result with many health conditions such as diabetes, heart disease, kidney failure, arthritis and many more.  Please consider weight management while you are young.  Many resources available to you on the web.  Please consider gradual increase in activity level as tolerated.    Diagnosis: Pneumonia of both lower lobes due to infectious organism  Assessment and Plan of Treatment: You were treated with antibiotics for Pneumonia with resolution.  Please follow up with your primary doctor within one week    Diagnosis: Discharge planning issues  Assessment and Plan of Treatment: You reported lack of stable living situation which makes it difficult for you to maintain health.   will look for shelter placement for you. PRINCIPAL DISCHARGE DIAGNOSIS  Diagnosis: Acute respiratory failure with hypoxia and hypercapnia  Assessment and Plan of Treatment: You were admitted SOB and found to be in acute respiratory failure due to CO2 retention and low O2 saturation.  This is likely due to hypoventilation associated with your large body habitus, your non compliance with CPAP and your use of marijuana.  Your resp. status was also worse due to viral or bacterial infection which was treated with antibiotics.  You also received steroids and bronchodilators, placed on BIPAP with improved resp. status.  Please follow up with pulmonologist Dr. Krishna.      SECONDARY DISCHARGE DIAGNOSES  Diagnosis: Morbid obesity  Assessment and Plan of Treatment: Your body weight is 499.9 lbs and your BMI is 80.7.  This as reported by you is due to poor eating habits and lack of physical activity.  Obesity may result with many health conditions such as diabetes, heart disease, kidney failure, arthritis and many more.  Please consider weight management while you are young.  Many resources available to you on the web.  Please consider gradual increase in activity level as tolerated.    Diagnosis: Pneumonia of both lower lobes due to infectious organism  Assessment and Plan of Treatment: You were treated with antibiotics for Pneumonia with resolution.  Please follow up with your primary doctor within one week  YOU ARE SCHEDULED TO VISIT DR. KRISHNA PULMONOLOGIST ON APRIL 16 AT 11:30 AM.  PLEASE MAKE SURE TO TAKE DISCHARGE PAPERS WITH YOU.    Diagnosis: Discharge planning issues  Assessment and Plan of Treatment: You reported lack of stable living situation which makes it difficult for you to maintain health.   will look for shelter placement for you.  You have changed your mind about shelter placement and want to go to your mother's place at this time.   her provided you with literature about resources and contacts should you decide to go to shelter.

## 2019-04-03 NOTE — DISCHARGE NOTE PROVIDER - CARE PROVIDERS DIRECT ADDRESSES
,[email or web address]

## 2019-04-03 NOTE — PROGRESS NOTE ADULT - SUBJECTIVE AND OBJECTIVE BOX
NP Note discussed with  Primary Attending    Patient is a 34y old  Male who presents with a chief complaint of SOB (02 Apr 2019 17:18)      INTERVAL HPI/OVERNIGHT EVENTS: no new complaints    MEDICATIONS  (STANDING):  ALBUTerol/ipratropium for Nebulization 3 milliLiter(s) Nebulizer every 6 hours  azithromycin  IVPB 500 milliGRAM(s) IV Intermittent every 24 hours  cefTRIAXone   IVPB 1 Gram(s) IV Intermittent every 24 hours  chlorhexidine 4% Liquid 1 Application(s) Topical <User Schedule>  heparin  Injectable 5000 Unit(s) SubCutaneous every 8 hours  predniSONE   Tablet 40 milliGRAM(s) Oral daily    MEDICATIONS  (PRN):      __________________________________________________  REVIEW OF SYSTEMS:    CONSTITUTIONAL: No fever,   EYES: no acute visual disturbances  NECK: No pain or stiffness  RESPIRATORY: No cough; No shortness of breath  CARDIOVASCULAR: No chest pain, no palpitations  GASTROINTESTINAL: No pain. No nausea or vomiting; No diarrhea   NEUROLOGICAL: No headache or numbness, no tremors  MUSCULOSKELETAL: No joint pain, no muscle pain  GENITOURINARY: no dysuria, no frequency, no hesitancy  PSYCHIATRY: no depression , no anxiety  ALL OTHER  ROS negative        Vital Signs Last 24 Hrs  T(C): 36.3 (03 Apr 2019 05:10), Max: 36.9 (02 Apr 2019 14:18)  T(F): 97.3 (03 Apr 2019 05:10), Max: 98.5 (02 Apr 2019 14:18)  HR: 81 (03 Apr 2019 05:10) (81 - 96)  BP: 150/77 (03 Apr 2019 05:10) (129/69 - 150/77)  BP(mean): --  RR: 22 (03 Apr 2019 05:10) (17 - 22)  SpO2: 98% (03 Apr 2019 05:10) (93% - 98%)    ________________________________________________  PHYSICAL EXAM:  GENERAL: NAD  HEENT: Normocephalic;  conjunctivae and sclerae clear; moist mucous membranes;   NECK : supple  CHEST/LUNG: Clear to auscultation bilaterally with good air entry   HEART: S1 S2  regular; no murmurs, gallops or rubs  ABDOMEN: Soft, Nontender, Nondistended; Bowel sounds present  EXTREMITIES: no cyanosis; no edema; no calf tenderness  SKIN: warm and dry; no rash  NERVOUS SYSTEM:  Awake and alert; Oriented  to place, person and time ; no new deficits    _________________________________________________  LABS:                        14.6   12.54 )-----------( 295      ( 03 Apr 2019 07:32 )             50.6     04-03    138  |  103  |  16  ----------------------------<  84  4.0   |  31  |  0.85    Ca    8.5      03 Apr 2019 07:32  Phos  4.1     04-02  Mg     2.6     04-02    TPro  6.9  /  Alb  2.8<L>  /  TBili  0.8  /  DBili  x   /  AST  13  /  ALT  36  /  AlkPhos  71  04-02        CAPILLARY BLOOD GLUCOSE            RADIOLOGY & ADDITIONAL TESTS:    Imaging Personally Reviewed:  YES/NO    Consultant(s) Notes Reviewed:   YES/ No    Care Discussed with Consultants :     Plan of care was discussed with patient and /or primary care giver; all questions and concerns were addressed and care was aligned with patient's wishes.

## 2019-04-03 NOTE — PROGRESS NOTE ADULT - ASSESSMENT
34 M with LAZARO started on CPAP recently and not compliance with machine and medication, recently incarcerated, no significant PSH presented for SOB, cough and subjective fever started 2 weeks ago and progressively getting worse. Patient was put on BIPAP in ED.  Denied chest pain, palpitation, diaphoresis, and any other symptoms.   When seen by ICU, patient is breathing comfortably on BIPAP, sleeping. Patient admit feeling subjectively better. Significant for bilateral diffuse wheezing. Limited CXR due to body weight.   Vital Signs T(C): 36.4 HR: 89  BP: 152/96  RR: 20 SpO2: 100% ABG - ( 30 Mar 2019 00:50 )  pH, Arterial: 7.22  pH, Blood: x     /  pCO2: 88    /  pO2: 209   / HCO3: 35    / Base Excess: 3.8   /  SaO2: 99        pt. was initially admitted to ICU for acute hypoxic/hypercapneic resp. failure, treated with bronchodilators, steroids and nocturnal BIPAP, downgraded to medicine to complete course of hospitalization.

## 2019-04-03 NOTE — PROGRESS NOTE ADULT - PROBLEM SELECTOR PLAN 2
BMI 80.7  Admits to unhealthy health maintenance practices including unhealthy diet  -Cont nocturnal BIPAP  -Dietician consult BMI 80.7  Admits to unhealthy health maintenance practices including unhealthy diet  -Cont nocturnal BIPAP  -Dietician consult  4/3-pt. counselling  -Counselled re. exercise as noted deconditioned at this time

## 2019-04-03 NOTE — DISCHARGE NOTE PROVIDER - HOSPITAL COURSE
34 M with LAZARO started on CPAP recently and not compliance with machine and medication, recently incarcerated, no significant PSH presented for SOB, cough and subjective fever started 2 weeks ago and progressively getting worse. Patient was put on BIPAP in ED.  Denied chest pain, palpitation, diaphoresis, and any other symptoms.         Pt. was initially admitted to ICU with acute hypoxic and hypercapneic resp. failure 2/2 obesity hypoventilation and/or LAZARO, non compliance and marijuana use.          CXR->Pulmonary vascular congestion; underlying pulmonary infiltrates/pneumonia cannot be excluded; No gross pleural effusion or pneumothorax; Enlargement of the cardiac silhouette.        US DPLX LWR EXT VEINS->No sonographic evidence of acute deep venous thrombosis in the right femoropopliteal system and in the posterior tibial veins.. If concern for     acute deep vein thrombosis persists, consider follow-up evaluation in 5-7     days. 34 M with LAZARO started on CPAP recently and not compliance with machine and medication, recently incarcerated, no significant PSH presented for SOB, cough and subjective fever started 2 weeks ago and progressively getting worse. Patient was put on BIPAP in ED.  Denied chest pain, palpitation, diaphoresis, and any other symptoms.         Pt. was initially admitted to ICU with acute hypoxic and hypercapneic resp. failure 2/2 obesity hypoventilation and/or LAZARO, non compliance and marijuana use.          CXR->Pulmonary vascular congestion; underlying pulmonary infiltrates/pneumonia cannot be excluded; No gross pleural effusion or pneumothorax; Enlargement of the cardiac silhouette.        US DPLX LWR EXT VEINS->No sonographic evidence of acute deep venous thrombosis in the right femoropopliteal system and in the posterior tibial veins.. If concern for     acute deep vein thrombosis persists, consider follow-up evaluation in 5-7     days.        Pt. was treated with Abx, Bronchodilators and Steroids with improved overall resp. status, ABG on RA 04/02->7.18-49-55-32-91%.    Pt. was followed by pulmonary Dr. Krishna-recommended to f/u sleep studies as outpt. and adjust pressure settings on CPAP; discharge with MDI prn; evaluate for home O2 which was done by 6 min walk and proved pt. not to be a home O2 candidate. 34 M with LAZARO started on CPAP recently and not compliance with machine and medication, recently incarcerated, no significant PSH presented for SOB, cough and subjective fever started 2 weeks ago and progressively getting worse. Patient was put on BIPAP in ED.  Denied chest pain, palpitation, diaphoresis, and any other symptoms.         Pt. was initially admitted to ICU with acute hypoxic and hypercapneic resp. failure 2/2 obesity hypoventilation and/or LAZARO, non compliance and marijuana use.          CXR->Pulmonary vascular congestion; underlying pulmonary infiltrates/pneumonia cannot be excluded; No gross pleural effusion or pneumothorax; Enlargement of the cardiac silhouette.        US DPLX LWR EXT VEINS->No sonographic evidence of acute deep venous thrombosis in the right femoropopliteal system and in the posterior tibial veins.. If concern for     acute deep vein thrombosis persists, consider follow-up evaluation in 5-7     days.        Pt. was treated with Abx, Bronchodilators and Steroids with improved overall resp. status, ABG on RA 04/02->7.03-91-18-32-91%.    Pt. was followed by pulmonary Dr. Krishna-recommended to f/u sleep studies as outpt. and adjust pressure settings on CPAP; discharge with MDI prn; evaluate for home O2 which was done by 6 min walk and proved pt. not to be a home O2 candidate.    Pt. declined shelter placement and decided to discharge to his mother's home.  Referrals and contacts provided by SW for possible future need.  Pt. is medically stable for discharge.

## 2019-04-04 ENCOUNTER — TRANSCRIPTION ENCOUNTER (OUTPATIENT)
Age: 34
End: 2019-04-04

## 2019-04-04 VITALS
OXYGEN SATURATION: 94 % | TEMPERATURE: 99 F | SYSTOLIC BLOOD PRESSURE: 137 MMHG | DIASTOLIC BLOOD PRESSURE: 60 MMHG | HEART RATE: 86 BPM | RESPIRATION RATE: 18 BRPM

## 2019-04-04 LAB
ANION GAP SERPL CALC-SCNC: 3 MMOL/L — LOW (ref 5–17)
BUN SERPL-MCNC: 13 MG/DL — SIGNIFICANT CHANGE UP (ref 7–18)
CALCIUM SERPL-MCNC: 8.6 MG/DL — SIGNIFICANT CHANGE UP (ref 8.4–10.5)
CHLORIDE SERPL-SCNC: 102 MMOL/L — SIGNIFICANT CHANGE UP (ref 96–108)
CO2 SERPL-SCNC: 35 MMOL/L — HIGH (ref 22–31)
CREAT SERPL-MCNC: 0.71 MG/DL — SIGNIFICANT CHANGE UP (ref 0.5–1.3)
CULTURE RESULTS: SIGNIFICANT CHANGE UP
GLUCOSE SERPL-MCNC: 81 MG/DL — SIGNIFICANT CHANGE UP (ref 70–99)
HCT VFR BLD CALC: 49.9 % — SIGNIFICANT CHANGE UP (ref 39–50)
HGB BLD-MCNC: 14.8 G/DL — SIGNIFICANT CHANGE UP (ref 13–17)
MCHC RBC-ENTMCNC: 24.6 PG — LOW (ref 27–34)
MCHC RBC-ENTMCNC: 29.7 GM/DL — LOW (ref 32–36)
MCV RBC AUTO: 82.9 FL — SIGNIFICANT CHANGE UP (ref 80–100)
NRBC # BLD: 0 /100 WBCS — SIGNIFICANT CHANGE UP (ref 0–0)
PLATELET # BLD AUTO: 287 K/UL — SIGNIFICANT CHANGE UP (ref 150–400)
POTASSIUM SERPL-MCNC: 3.7 MMOL/L — SIGNIFICANT CHANGE UP (ref 3.5–5.3)
POTASSIUM SERPL-SCNC: 3.7 MMOL/L — SIGNIFICANT CHANGE UP (ref 3.5–5.3)
RBC # BLD: 6.02 M/UL — HIGH (ref 4.2–5.8)
RBC # FLD: 16.5 % — HIGH (ref 10.3–14.5)
SODIUM SERPL-SCNC: 140 MMOL/L — SIGNIFICANT CHANGE UP (ref 135–145)
SPECIMEN SOURCE: SIGNIFICANT CHANGE UP
WBC # BLD: 11.02 K/UL — HIGH (ref 3.8–10.5)
WBC # FLD AUTO: 11.02 K/UL — HIGH (ref 3.8–10.5)

## 2019-04-04 PROCEDURE — 93306 TTE W/DOPPLER COMPLETE: CPT

## 2019-04-04 PROCEDURE — 80048 BASIC METABOLIC PNL TOTAL CA: CPT

## 2019-04-04 PROCEDURE — 71045 X-RAY EXAM CHEST 1 VIEW: CPT

## 2019-04-04 PROCEDURE — 82607 VITAMIN B-12: CPT

## 2019-04-04 PROCEDURE — 87631 RESP VIRUS 3-5 TARGETS: CPT

## 2019-04-04 PROCEDURE — 93005 ELECTROCARDIOGRAM TRACING: CPT

## 2019-04-04 PROCEDURE — 93970 EXTREMITY STUDY: CPT

## 2019-04-04 PROCEDURE — 85379 FIBRIN DEGRADATION QUANT: CPT

## 2019-04-04 PROCEDURE — 94760 N-INVAS EAR/PLS OXIMETRY 1: CPT

## 2019-04-04 PROCEDURE — 84484 ASSAY OF TROPONIN QUANT: CPT

## 2019-04-04 PROCEDURE — 83036 HEMOGLOBIN GLYCOSYLATED A1C: CPT

## 2019-04-04 PROCEDURE — 96374 THER/PROPH/DIAG INJ IV PUSH: CPT

## 2019-04-04 PROCEDURE — 87040 BLOOD CULTURE FOR BACTERIA: CPT

## 2019-04-04 PROCEDURE — 84100 ASSAY OF PHOSPHORUS: CPT

## 2019-04-04 PROCEDURE — 82803 BLOOD GASES ANY COMBINATION: CPT

## 2019-04-04 PROCEDURE — 96375 TX/PRO/DX INJ NEW DRUG ADDON: CPT | Mod: 76

## 2019-04-04 PROCEDURE — 80053 COMPREHEN METABOLIC PANEL: CPT

## 2019-04-04 PROCEDURE — 36415 COLL VENOUS BLD VENIPUNCTURE: CPT

## 2019-04-04 PROCEDURE — 85027 COMPLETE CBC AUTOMATED: CPT

## 2019-04-04 PROCEDURE — 80061 LIPID PANEL: CPT

## 2019-04-04 PROCEDURE — 83735 ASSAY OF MAGNESIUM: CPT

## 2019-04-04 PROCEDURE — 94660 CPAP INITIATION&MGMT: CPT

## 2019-04-04 PROCEDURE — 84443 ASSAY THYROID STIM HORMONE: CPT

## 2019-04-04 PROCEDURE — 94640 AIRWAY INHALATION TREATMENT: CPT

## 2019-04-04 PROCEDURE — 99239 HOSP IP/OBS DSCHRG MGMT >30: CPT

## 2019-04-04 PROCEDURE — 99291 CRITICAL CARE FIRST HOUR: CPT | Mod: 25

## 2019-04-04 RX ADMIN — Medication 3 MILLILITER(S): at 08:52

## 2019-04-04 RX ADMIN — HEPARIN SODIUM 5000 UNIT(S): 5000 INJECTION INTRAVENOUS; SUBCUTANEOUS at 05:22

## 2019-04-04 RX ADMIN — CHLORHEXIDINE GLUCONATE 1 APPLICATION(S): 213 SOLUTION TOPICAL at 05:21

## 2019-04-04 NOTE — PROGRESS NOTE ADULT - SUBJECTIVE AND OBJECTIVE BOX
MEDICAL ATTENDING NOTE  Patient is a 34y old  Male who presents with a chief complaint of SOB (03 Apr 2019 12:33)      HPI:  34 M with LAZARO started on CPAP recently and not compliance with machine and medication, recently incarcerated, no significant PSH presented for SOB, cough and subjective fever started 2 weeks ago and progressively getting worse. Patient was put on BIPAP in ED.  Denied chest pain, palpitation, diaphoresis, and any other symptoms.   When seen by ICU, patient is breathing comfortably on BIPAP, sleeping. Patient admit feeling subjectively better. Significant for bilateral diffuse wheezing. Limited CXR due to body weight.   Vital Signs T(C): 36.4 HR: 89  BP: 152/96  RR: 20 SpO2: 100% ABG - ( 30 Mar 2019 00:50 )  pH, Arterial: 7.22  pH, Blood: x     /  pCO2: 88    /  pO2: 209   / HCO3: 35    / Base Excess: 3.8   /  SaO2: 99 (30 Mar 2019 01:35)      INTERVAL HPI/OVERNIGHT EVENTS: no new complaints    MEDICATIONS  (STANDING):  ALBUTerol/ipratropium for Nebulization 3 milliLiter(s) Nebulizer every 6 hours  chlorhexidine 4% Liquid 1 Application(s) Topical <User Schedule>  heparin  Injectable 5000 Unit(s) SubCutaneous every 8 hours    MEDICATIONS  (PRN):  ALBUTerol    90 MICROgram(s) HFA Inhaler 1 Puff(s) Inhalation every 6 hours PRN bronchospasms      __________________________________________________  REVIEW OF SYSTEMS:    CONSTITUTIONAL: No fever,   EYES: no acute visual disturbances  NECK: No pain or stiffness  RESPIRATORY: No cough; No shortness of breath  CARDIOVASCULAR: No chest pain, no palpitations  GASTROINTESTINAL: No pain. No nausea or vomiting; No diarrhea   NEUROLOGICAL: No headache or numbness, no tremors  MUSCULOSKELETAL: No joint pain, no muscle pain  GENITOURINARY: no dysuria, no frequency, no hesitancy  PSYCHIATRY: no depression , no anxiety  ALL OTHER  ROS negative        Vital Signs Last 24 Hrs  T(C): 37.2 (04 Apr 2019 12:54), Max: 37.2 (04 Apr 2019 12:54)  T(F): 99 (04 Apr 2019 12:54), Max: 99 (04 Apr 2019 12:54)  HR: 86 (04 Apr 2019 12:54) (75 - 88)  BP: 137/60 (04 Apr 2019 12:54) (129/62 - 137/60)  BP(mean): --  RR: 18 (04 Apr 2019 12:54) (18 - 18)  SpO2: 94% (04 Apr 2019 12:54) (94% - 97%)    ________________________________________________  PHYSICAL EXAM:  GENERAL: Morbidly obese man, ambulatory  HEENT: Normocephalic;  conjunctivae and sclerae clear; moist mucous membranes;   NECK : supple  CHEST/LUNG: Clear to auscultation bilaterally with good air entry   HEART: S1 S2  regular; no murmurs, gallops or rubs  ABDOMEN: Soft, Nontender, Nondistended; Bowel sounds present  EXTREMITIES: no cyanosis; no edema; no calf tenderness  SKIN: warm and dry; no rash  NERVOUS SYSTEM:  Awake and alert; Oriented  to place, person and time ; no new deficits    _________________________________________________  LABS:                        14.8   11.02 )-----------( 287      ( 04 Apr 2019 06:40 )             49.9     04-04    140  |  102  |  13  ----------------------------<  81  3.7   |  35<H>  |  0.71    Ca    8.6      04 Apr 2019 06:40          CAPILLARY BLOOD GLUCOSE

## 2019-04-04 NOTE — PROGRESS NOTE ADULT - PROBLEM SELECTOR PLAN 2
BMI 80.7  Admits to unhealthy health maintenance practices including unhealthy diet  -Cont nocturnal BIPAP  Patient was referred to Bariatric Surgery, given phone number.   Also, Bariatric Surgery RN contacted patient and invited him to informational session, which he said he would attend.   -Dietician consult  4/3-pt. counselling  -Counselled re. exercise as noted deconditioned at this time

## 2019-04-04 NOTE — DISCHARGE NOTE NURSING/CASE MANAGEMENT/SOCIAL WORK - NSDCDPATPORTLINK_GEN_ALL_CORE
You can access the [email or web address], offered by [facility], by registering with the following website: [email or web address]

## 2019-04-04 NOTE — PROGRESS NOTE ADULT - PROBLEM SELECTOR PLAN 3
Pt. reports homelessness with recent incarceration  -S/W following for shelter placement..
Pt. reports homelessness with recent incarceration  Patient has decided to go to his mother's home, rather than await shelter placement.  He was given information by SW for intake in the future, if he needs it.
Pt. reports homelessness with recent incarceration  -S/W following for shelter placement..

## 2019-04-04 NOTE — PROGRESS NOTE ADULT - PROBLEM SELECTOR PLAN 1
Admitted with hypoxic and hypercapneic resp failure 2/2 obesity, non compliance with CPAP and Marijuana use  -Downgraded from ICU 4/1, on RA with no resp distress    -Complete 5 day course of prednisone then stop  -f/u RA ABG  -Pulm dr. Krishna consulted  -Cont nocturnal BIPAP  4/3-Dr. Krishna note appreciated  -6 min walk performed, pt. desaturated to 87% upon completion of walk however went right back up to 90% upon sitting, likely not a home O2 candidate.  -Will d/c with Albuterol MDI  -f/u sleep studies as outpt.

## 2019-04-04 NOTE — PROGRESS NOTE ADULT - PROVIDER SPECIALTY LIST ADULT
Critical Care
Internal Medicine
Internal Medicine
Pulmonology
Critical Care
Internal Medicine

## 2019-04-04 NOTE — PROGRESS NOTE ADULT - ATTENDING COMMENTS
Patient seen and examined with resident, Addendum to above.    35 y/o male with morbid obesity, LAZARO on CPAP but not compliant, current marijuana smoker admitted with acute hypercapnic respiratory failure requiring bipap support. Reported to be wheezing at time of admission, suggestive of a bronchospastic process. Improved with bipap support. Walking to bathroom with out difficulty. [facility] has a cpap machine as outpatient.     Assessment:  1. Acute on chronic hypercapnic respiratory failure - Unclear if underlying history of asthma, though does endorse smoking marijuana. CXR with out large infiltrates, does not appear to be toxic. Cont. Bipap support and oxygen. Empiric antibiotics. F/u cultures. Short course of steroids. Echo noted. .  2. LAZARO/OHS - Bipap support at night time. Oxygen support in day time.     - OOB to chair  - DVT prophyalxis   - Transfer out of ICU today
Please refer to my note from today.
Patient seen and examined with resident, Addendum to above.    35 y/o male with morbid obesity, LAZARO on CPAP but not compliant, current marijuana smoker admitted with acute hypercapnic respiratory failure requiring bipap support. Reported to be wheezing at time of admission, suggestive of a bronchospastic process.     Assessment:  1. Acute on chronic hypercapnic respiratory failure - Unclear if underlying history of asthma, though does endorse smoking marijuana. CXR with out large infiltrates, does not appear to be toxic. Cont. Bipap support and oxygen. Empiric antibiotics. F/u cultures. Short course of steroids. F/u Echo.  2. LAZARO/OHS - Bipap support at night time. Oxygen support in day time.     - OOB to chair  - DVT prophyalxis   - 32 min of critical care time spent on this patient's care/
Patient was seen and counseled at the bedside.   He is interested in working, but told SW that he can find a job on his own.   He was given a referral to Bariatrics, which he accepted.  Bariatric RN contacted him and invited him to informational session.   F/u CR and Dr. Krishna.
Patient was examined and discussed with MARCY Ceja.     He was admitted for SOB and lethargy and found to have LAZARO with hypoxic and hypercarbic respiratory failure.   He is morbidly obese and housing insecure.     Plan:  Pulmonary consultation.  SW consultation.
Patient was interviewed and examined at the bedside and discussed with MARCY Ceja.     He is feeling much better.  No SOB.     Obese man in NAD  Vital Signs Last 24 Hrs  T(C): 36.9 (03 Apr 2019 14:23), Max: 36.9 (03 Apr 2019 14:23)  T(F): 98.5 (03 Apr 2019 14:23), Max: 98.5 (03 Apr 2019 14:23)  HR: 85 (03 Apr 2019 17:08) (81 - 92)  BP: 151/85 (03 Apr 2019 14:23) (129/69 - 151/85)  BP(mean): --  RR: 17 (03 Apr 2019 14:23) (17 - 22)  SpO2: 95% (03 Apr 2019 17:08) (93% - 98%)  Lungs, clear  Cor, RRR  Blood Gas Profile - Arterial (04.02.19 @ 17:41)    pH, Arterial: 7.39    pCO2, Arterial: 54 mmHg    pO2, Arterial: 61 mmHg    HCO3, Arterial: 32 mmol/L    Base Excess, Arterial: 5.7 mmol/L    Oxygen Saturation, Arterial: 91 %    FIO2, Arterial: 21    Blood Gas Comments Arterial: room air lr                        14.6   12.54 )-----------( 295      ( 03 Apr 2019 07:32 )             50.6   04-03    138  |  103  |  16  ----------------------------<  84  4.0   |  31  |  0.85    Ca    8.5      03 Apr 2019 07:32  Phos  4.1     04-02  Mg     2.6     04-02    TPro  6.9  /  Alb  2.8<L>  /  TBili  0.8  /  DBili  x   /  AST  13  /  ALT  36  /  AlkPhos  71  04-02     IMP:  COPD with carbon dioxide retention, now compensated.  Previously diagnosed obstructive sleep apnea.           Morbid obesity          Undomiciled          Unemployed  Plan: Discontinue antibiotics.  Discontinue steroids.  Continue nebulizer.          SW has applied for shelter.

## 2019-04-04 NOTE — DIETITIAN INITIAL EVALUATION ADULT. - PERTINENT LABORATORY DATA
04-04 Na140 mmol/L Glu 81 mg/dL K+ 3.7 mmol/L Cr  0.71 mg/dL BUN 13 mg/dL 04-02 Phos 4.1 mg/dL 04-02 Alb 2.8 g/dL<L> 03-30 [id number] 6.1 %<H> 03-30 Chol 118 mg/dL LDL 74 mg/dL HDL 30 mg/dL<L> Trig 70 mg/dL

## 2019-04-04 NOTE — CHART NOTE - NSCHARTNOTEFT_GEN_A_CORE
Upon Nutritional Assessment by the Registered Dietitian your patient was determined to meet criteria / has evidence of the following diagnosis/diagnoses:          [ ]  Mild Protein Calorie Malnutrition        [ ]  Moderate Protein Calorie Malnutrition        [ ] Severe Protein Calorie Malnutrition        [ ] Unspecified Protein Calorie Malnutrition        [ ] Underweight / BMI <19        [x ] Morbid Obesity / BMI > 40      Findings as based on:  •  Comprehensive nutrition assessment and consultation  •  Calorie counts (nutrient intake analysis)  •  Food acceptance and intake status from observations by staff  •  Follow up  •  Patient education  •  Intervention secondary to interdisciplinary rounds  •   concerns      Treatment:    The following diet has been recommended:      PROVIDER Section:     By signing this assessment you are acknowledging and agree with the diagnosis/diagnoses assigned by the Registered Dietitian    Comments:  suggest to change diet to DASH/TLC in view of elevated BMI

## 2019-04-12 ENCOUNTER — APPOINTMENT (OUTPATIENT)
Dept: CARDIOLOGY | Facility: CLINIC | Age: 34
End: 2019-04-12
Payer: MEDICAID

## 2019-04-12 VITALS
HEIGHT: 66 IN | WEIGHT: 315 LBS | BODY MASS INDEX: 50.62 KG/M2 | SYSTOLIC BLOOD PRESSURE: 128 MMHG | DIASTOLIC BLOOD PRESSURE: 84 MMHG

## 2019-04-12 PROCEDURE — 93306 TTE W/DOPPLER COMPLETE: CPT

## 2019-04-12 PROCEDURE — 99214 OFFICE O/P EST MOD 30 MIN: CPT

## 2019-04-12 NOTE — DISCUSSION/SUMMARY
[FreeTextEntry1] : In a summary Ifrah Ca is a young male with Morbid obesity and shortness of breath on exertion, echo done was technically difficult. LV systolic function appears to be preserved. Very well aware of adverse outcomes of morbid obesity. Lose weight. Sleep apnea, recommend follow up with sleep specialist. Stop Marijuana use.

## 2019-04-12 NOTE — PHYSICAL EXAM
[General Appearance - Well Developed] : well developed [Normal Appearance] : normal appearance [Well Groomed] : well groomed [No Deformities] : no deformities [General Appearance - Well Nourished] : well nourished [General Appearance - In No Acute Distress] : no acute distress [Normal Conjunctiva] : the conjunctiva exhibited no abnormalities [Eyelids - No Xanthelasma] : the eyelids demonstrated no xanthelasmas [No Oral Cyanosis] : no oral cyanosis [Normal Oral Mucosa] : normal oral mucosa [No Oral Pallor] : no oral pallor [Respiration, Rhythm And Depth] : normal respiratory rhythm and effort [Auscultation Breath Sounds / Voice Sounds] : lungs were clear to auscultation bilaterally [Heart Rate And Rhythm] : heart rate and rhythm were normal [Arterial Pulses Normal] : the arterial pulses were normal [Murmurs] : no murmurs present [Heart Sounds] : normal S1 and S2 [Edema] : no peripheral edema present [Bowel Sounds] : normal bowel sounds [Abnormal Walk] : normal gait [Abdomen Soft] : soft [Abdomen Tenderness] : non-tender [Cyanosis, Localized] : no localized cyanosis [Nail Clubbing] : no clubbing of the fingernails [Skin Turgor] : normal skin turgor [Skin Color & Pigmentation] : normal skin color and pigmentation [] : no rash [Oriented To Time, Place, And Person] : oriented to person, place, and time [Impaired Insight] : insight and judgment were intact [FreeTextEntry1] : No JVD

## 2019-04-12 NOTE — HISTORY OF PRESENT ILLNESS
[FreeTextEntry1] : Ifrah Ca is a 34 year old male with morbid obesity comes for cardiac evaluation. History of hypertension, used to be on medications which he stopped taking by himself couple of years ago as he was in [facility] on and off. BP has been normal with out medications. Denies any chest pain or palpitations. Chronic mild- moderate shortness of breath on exertion which is relieved with rest in few minutes . Gained about 70 pounds since 2016. Wants to get weight loss surgery.

## 2019-05-14 ENCOUNTER — APPOINTMENT (OUTPATIENT)
Dept: PULMONOLOGY | Facility: CLINIC | Age: 34
End: 2019-05-14

## 2019-06-04 ENCOUNTER — APPOINTMENT (OUTPATIENT)
Dept: PULMONOLOGY | Facility: CLINIC | Age: 34
End: 2019-06-04
Payer: MEDICAID

## 2019-06-04 VITALS — BODY MASS INDEX: 82.8 KG/M2 | WEIGHT: 315 LBS

## 2019-06-04 VITALS
SYSTOLIC BLOOD PRESSURE: 136 MMHG | DIASTOLIC BLOOD PRESSURE: 79 MMHG | OXYGEN SATURATION: 89 % | HEIGHT: 66 IN | HEART RATE: 90 BPM

## 2019-06-04 PROCEDURE — 94010 BREATHING CAPACITY TEST: CPT

## 2019-06-04 PROCEDURE — ZZZZZ: CPT

## 2019-06-04 PROCEDURE — 99213 OFFICE O/P EST LOW 20 MIN: CPT | Mod: 25

## 2019-06-04 NOTE — PHYSICAL EXAM
[Well Groomed] : well groomed [General Appearance - Well Developed] : well developed [Normal Appearance] : normal appearance [No Deformities] : no deformities [General Appearance - Well Nourished] : well nourished [General Appearance - In No Acute Distress] : no acute distress [Normal Conjunctiva] : the conjunctiva exhibited no abnormalities [Normal Oropharynx] : normal oropharynx [IV] : IV [Jugular Venous Distention Increased] : there was no jugular-venous distention [Neck Appearance] : the appearance of the neck was normal [Neck Cervical Mass (___cm)] : no neck mass was observed [Heart Sounds] : normal S1 and S2 [Heart Rate And Rhythm] : heart rate and rhythm were normal [Murmurs] : no murmurs present [Edema] : no peripheral edema present [Arterial Pulses Normal] : the arterial pulses were normal [Respiration, Rhythm And Depth] : normal respiratory rhythm and effort [Auscultation Breath Sounds / Voice Sounds] : lungs were clear to auscultation bilaterally [Exaggerated Use Of Accessory Muscles For Inspiration] : no accessory muscle use [Abdomen Tenderness] : non-tender [Bowel Sounds] : normal bowel sounds [Abdomen Soft] : soft [Abnormal Walk] : normal gait [Cyanosis, Localized] : no localized cyanosis [Petechial Hemorrhages (___cm)] : no petechial hemorrhages [Nail Clubbing] : no clubbing of the fingernails [FreeTextEntry1] : 1+ pedal edema bilaterally [Skin Color & Pigmentation] : normal skin color and pigmentation [] : no ischemic changes [Cranial Nerves] : cranial nerves 2-12 were intact [Skin Turgor] : normal skin turgor [Deep Tendon Reflexes (DTR)] : deep tendon reflexes were 2+ and symmetric [No Focal Deficits] : no focal deficits [Oriented To Time, Place, And Person] : oriented to person, place, and time [Impaired Insight] : insight and judgment were intact

## 2019-06-04 NOTE — PROCEDURE
[FreeTextEntry1] : Spirometry shows a restrictive pattern. Would need lung volumes for confirmation however the patient does not fit into the body box.

## 2019-06-04 NOTE — ASSESSMENT
[FreeTextEntry1] : 1) Chronic dyspnea likely multifactorial with super obesity, cocaine use. 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 Not on CPAP

## 2019-06-04 NOTE — REASON FOR VISIT
[Follow-Up - From Hospitalization] : a hospitalization follow-up [FreeTextEntry1] : respiratory failure

## 2019-07-01 ENCOUNTER — OUTPATIENT (OUTPATIENT)
Dept: OUTPATIENT SERVICES | Facility: HOSPITAL | Age: 34
LOS: 1 days | End: 2019-07-01
Payer: MEDICAID

## 2019-07-20 ENCOUNTER — INPATIENT (INPATIENT)
Facility: HOSPITAL | Age: 34
LOS: 20 days | Discharge: HOME HEALTH | DRG: 32 | End: 2019-08-10
Attending: INTERNAL MEDICINE | Admitting: PSYCHIATRY & NEUROLOGY
Payer: MEDICAID

## 2019-07-20 ENCOUNTER — APPOINTMENT (OUTPATIENT)
Dept: SLEEP CENTER | Facility: CLINIC | Age: 34
End: 2019-07-20

## 2019-07-20 VITALS
RESPIRATION RATE: 24 BRPM | WEIGHT: 315 LBS | HEART RATE: 82 BPM | DIASTOLIC BLOOD PRESSURE: 90 MMHG | OXYGEN SATURATION: 96 % | SYSTOLIC BLOOD PRESSURE: 134 MMHG | HEIGHT: 66 IN

## 2019-07-20 PROCEDURE — 99285 EMERGENCY DEPT VISIT HI MDM: CPT | Mod: 25

## 2019-07-20 PROCEDURE — 93010 ELECTROCARDIOGRAM REPORT: CPT

## 2019-07-20 NOTE — ED ADULT NURSE NOTE - NSIMPLEMENTINTERV_GEN_ALL_ED
Implemented All Fall Risk Interventions:  [place] to call system. Call bell, personal items and telephone within reach. Instruct patient to call for assistance. Room bathroom lighting operational. Non-slip footwear when patient is off stretcher. Physically safe environment: no spills, clutter or unnecessary equipment. Stretcher in lowest position, wheels locked, appropriate side rails in place. Provide visual cue, wrist band, yellow gown, etc. Monitor gait and stability. Monitor for mental status changes and reorient to person, place, and time. Review medications for side effects contributing to fall risk. Reinforce activity limits and safety measures with patient and family.

## 2019-07-20 NOTE — ED ADULT NURSE NOTE - OBJECTIVE STATEMENT
35 y/o male PMH morbid obesity presents to ED via EMS from sleep center c/o blurry vision in @ eye, numbness/tingling to lips and b/l feet, diarrhea x 1 month, generalized abdominal pain. Pt states "was admitted last month for difficulty breathing. Was told to follow up with a sleep study. I may need a machine to breathe at night. They wouldn't take me today though because I was late for my appointment. I feel like all these issues I'm having are from not sleeping enough. I also haven't been eating at all, only stuffing my face with fruit." Reports swimming a few times over the last few months and states "I hope I didn't get a parasite." Denies chest pain, n/v, cough, blood in stool. Pt arrived on 4L O2. Placed on cardiac monitor and is NSR. EKG performed. Gross motor and neuro intact. Safety and comfort provided. 35 y/o male PMH morbid obesity presents to ED via EMS from sleep center c/o blurry vision in b/l eyes, worse in R eye, numbness/tingling to lips and b/l feet, diarrhea x 1 month, generalized abdominal discomfort. Pt states "I was admitted last month for difficulty breathing. Was told to follow up with a sleep study. I may need a machine to breathe at night. They wouldn't take me today though because I was late for my appointment. I feel like all these issues I'm having are from not sleeping enough. I also haven't been eating at all, only eating a lot of fruit." Reports swimming a few times over the last few months and states "I hope I didn't get a parasite." Also reports eye discharge x 1 week since he went to the beach. Denies chest pain, n/v, cough, blood in stool, dizziness. Pt arrived on 4L O2. Placed on cardiac monitor and is NSR. EKG performed. Gross motor intact. Pupils dilated to 7mm. +sensation to all extremities. Safety and comfort provided.

## 2019-07-21 DIAGNOSIS — G93.2 BENIGN INTRACRANIAL HYPERTENSION: ICD-10-CM

## 2019-07-21 LAB
ALBUMIN SERPL ELPH-MCNC: 3.2 G/DL — LOW (ref 3.3–5)
ALBUMIN SERPL ELPH-MCNC: 3.4 G/DL — SIGNIFICANT CHANGE UP (ref 3.3–5)
ALP SERPL-CCNC: 79 U/L — SIGNIFICANT CHANGE UP (ref 40–120)
ALP SERPL-CCNC: 80 U/L — SIGNIFICANT CHANGE UP (ref 40–120)
ALT FLD-CCNC: 20 U/L — SIGNIFICANT CHANGE UP (ref 10–45)
ALT FLD-CCNC: 20 U/L — SIGNIFICANT CHANGE UP (ref 10–45)
ANION GAP SERPL CALC-SCNC: 10 MMOL/L — SIGNIFICANT CHANGE UP (ref 5–17)
ANION GAP SERPL CALC-SCNC: 9 MMOL/L — SIGNIFICANT CHANGE UP (ref 5–17)
APTT BLD: 29.3 SEC — SIGNIFICANT CHANGE UP (ref 27.5–36.3)
AST SERPL-CCNC: 21 U/L — SIGNIFICANT CHANGE UP (ref 10–40)
AST SERPL-CCNC: 23 U/L — SIGNIFICANT CHANGE UP (ref 10–40)
BASOPHILS # BLD AUTO: 0.1 K/UL — SIGNIFICANT CHANGE UP (ref 0–0.2)
BILIRUB SERPL-MCNC: 2.2 MG/DL — HIGH (ref 0.2–1.2)
BILIRUB SERPL-MCNC: 2.8 MG/DL — HIGH (ref 0.2–1.2)
BUN SERPL-MCNC: 10 MG/DL — SIGNIFICANT CHANGE UP (ref 7–23)
BUN SERPL-MCNC: 10 MG/DL — SIGNIFICANT CHANGE UP (ref 7–23)
CALCIUM SERPL-MCNC: 8.5 MG/DL — SIGNIFICANT CHANGE UP (ref 8.4–10.5)
CALCIUM SERPL-MCNC: 8.7 MG/DL — SIGNIFICANT CHANGE UP (ref 8.4–10.5)
CHLORIDE SERPL-SCNC: 96 MMOL/L — SIGNIFICANT CHANGE UP (ref 96–108)
CHLORIDE SERPL-SCNC: 96 MMOL/L — SIGNIFICANT CHANGE UP (ref 96–108)
CO2 SERPL-SCNC: 34 MMOL/L — HIGH (ref 22–31)
CO2 SERPL-SCNC: 34 MMOL/L — HIGH (ref 22–31)
CREAT SERPL-MCNC: 0.78 MG/DL — SIGNIFICANT CHANGE UP (ref 0.5–1.3)
CREAT SERPL-MCNC: 0.85 MG/DL — SIGNIFICANT CHANGE UP (ref 0.5–1.3)
D DIMER BLD IA.RAPID-MCNC: 559 NG/ML DDU — HIGH
EOSINOPHIL # BLD AUTO: 0.1 K/UL — SIGNIFICANT CHANGE UP (ref 0–0.5)
EOSINOPHIL NFR BLD AUTO: 2 % — SIGNIFICANT CHANGE UP (ref 0–6)
GLUCOSE SERPL-MCNC: 101 MG/DL — HIGH (ref 70–99)
GLUCOSE SERPL-MCNC: 81 MG/DL — SIGNIFICANT CHANGE UP (ref 70–99)
HCT VFR BLD CALC: 51.8 % — HIGH (ref 39–50)
HGB BLD-MCNC: 15 G/DL — SIGNIFICANT CHANGE UP (ref 13–17)
HIV 1 & 2 AB SERPL IA.RAPID: SIGNIFICANT CHANGE UP
INR BLD: 1.48 RATIO — HIGH (ref 0.88–1.16)
LYMPHOCYTES # BLD AUTO: 2.2 K/UL — SIGNIFICANT CHANGE UP (ref 1–3.3)
LYMPHOCYTES # BLD AUTO: 28 % — SIGNIFICANT CHANGE UP (ref 13–44)
MCHC RBC-ENTMCNC: 22.3 PG — LOW (ref 27–34)
MCHC RBC-ENTMCNC: 29 GM/DL — LOW (ref 32–36)
MCV RBC AUTO: 77 FL — LOW (ref 80–100)
MONOCYTES # BLD AUTO: 0.8 K/UL — SIGNIFICANT CHANGE UP (ref 0–0.9)
MONOCYTES NFR BLD AUTO: 1 % — LOW (ref 2–14)
NEUTROPHILS # BLD AUTO: 7.3 K/UL — SIGNIFICANT CHANGE UP (ref 1.8–7.4)
NEUTROPHILS NFR BLD AUTO: 69 % — SIGNIFICANT CHANGE UP (ref 43–77)
PLATELET # BLD AUTO: 239 K/UL — SIGNIFICANT CHANGE UP (ref 150–400)
POTASSIUM SERPL-MCNC: 4.1 MMOL/L — SIGNIFICANT CHANGE UP (ref 3.5–5.3)
POTASSIUM SERPL-MCNC: 4.2 MMOL/L — SIGNIFICANT CHANGE UP (ref 3.5–5.3)
POTASSIUM SERPL-SCNC: 4.1 MMOL/L — SIGNIFICANT CHANGE UP (ref 3.5–5.3)
POTASSIUM SERPL-SCNC: 4.2 MMOL/L — SIGNIFICANT CHANGE UP (ref 3.5–5.3)
PROT SERPL-MCNC: 6.7 G/DL — SIGNIFICANT CHANGE UP (ref 6–8.3)
PROT SERPL-MCNC: 6.8 G/DL — SIGNIFICANT CHANGE UP (ref 6–8.3)
PROTHROM AB SERPL-ACNC: 17.2 SEC — HIGH (ref 10–12.9)
RBC # BLD: 6.73 M/UL — HIGH (ref 4.2–5.8)
RBC # FLD: 18.7 % — HIGH (ref 10.3–14.5)
SODIUM SERPL-SCNC: 139 MMOL/L — SIGNIFICANT CHANGE UP (ref 135–145)
SODIUM SERPL-SCNC: 140 MMOL/L — SIGNIFICANT CHANGE UP (ref 135–145)
WBC # BLD: 10.5 K/UL — SIGNIFICANT CHANGE UP (ref 3.8–10.5)
WBC # FLD AUTO: 10.5 K/UL — SIGNIFICANT CHANGE UP (ref 3.8–10.5)

## 2019-07-21 PROCEDURE — 99223 1ST HOSP IP/OBS HIGH 75: CPT | Mod: GC

## 2019-07-21 PROCEDURE — 70498 CT ANGIOGRAPHY NECK: CPT | Mod: 26

## 2019-07-21 PROCEDURE — 70496 CT ANGIOGRAPHY HEAD: CPT | Mod: 26,77

## 2019-07-21 PROCEDURE — 71045 X-RAY EXAM CHEST 1 VIEW: CPT | Mod: 26

## 2019-07-21 PROCEDURE — 70496 CT ANGIOGRAPHY HEAD: CPT | Mod: 26

## 2019-07-21 PROCEDURE — 70450 CT HEAD/BRAIN W/O DYE: CPT | Mod: 26,59

## 2019-07-21 RX ORDER — ENOXAPARIN SODIUM 100 MG/ML
40 INJECTION SUBCUTANEOUS DAILY
Refills: 0 | Status: DISCONTINUED | OUTPATIENT
Start: 2019-07-21 | End: 2019-07-22

## 2019-07-21 RX ORDER — SODIUM CHLORIDE 9 MG/ML
1000 INJECTION INTRAMUSCULAR; INTRAVENOUS; SUBCUTANEOUS ONCE
Refills: 0 | Status: COMPLETED | OUTPATIENT
Start: 2019-07-21 | End: 2019-07-21

## 2019-07-21 RX ADMIN — SODIUM CHLORIDE 1000 MILLILITER(S): 9 INJECTION INTRAMUSCULAR; INTRAVENOUS; SUBCUTANEOUS at 05:38

## 2019-07-21 RX ADMIN — ENOXAPARIN SODIUM 40 MILLIGRAM(S): 100 INJECTION SUBCUTANEOUS at 12:29

## 2019-07-21 RX ADMIN — SODIUM CHLORIDE 1000 MILLILITER(S): 9 INJECTION INTRAMUSCULAR; INTRAVENOUS; SUBCUTANEOUS at 13:18

## 2019-07-21 NOTE — CONSULT NOTE ADULT - ASSESSMENT
35 y/o R-handed Black M with no PMH other than self-reported possible LAZARO presenting with 5 days of painless monocular severe blurred vision and reduced acuity of the right eye with associated B/L perioral numbness and R. arm paresthesias.      1) Transient monocular vision loss (TMVL) vs amaurosis fugax.   Rather rapid onset of painless monocular visual loss and risk factor of smoking (marijuana regular use), morbid obesity and LAZARO increase likelihood a vascular process is underlying.  His visual symptoms localize anterior to the optic chiasm and based on severe discrepency in temporal visual field, visual acuity and color perception appears to be a macular issue.  Accompanied by significant abnormalties on OD fundoscopic exam, likely there is an underlying ophthalmologic issue.  -CT head as well as CTA head and neck of poor quality due to body habitus and motion artifact.  However, CTA neck does not appear to demonstrate a severe carotid artery stenosis.  This lowers, but does not eliminate, the possibility of amaurosis fugax.     NIHSS Score 12:02 19  1a Level of consciousness questions: 0  1b.Level of consciousness questions: 0  1c.Level of consciousness commands:0  2. Best Gaze: 1  3. Visual: 1  4.Facial Palsy: 0  5a. Motor left arm: 0  5b. Motor right arm: 0  6a.Motor left le  6b Motor right le  7.Limb Ataxia: 0  8. Sensory: 0  9.Best Language: 0   10.Dysarthria: 0  11.Extinction and Inattention: 0  12.Distal motor function: 0  MRS: 0    Plan:  -STAT ophthalmologic consult   -Monitor neurologic function for any acute worsening or new symptoms  -Follow up MRI brain and orbits may be needed.  -Hgb A1C, Lipid panel, CMP.   -No acute further neurologic work up: not a tPA or thrombectomy candidate.   -Outpatient follow up with neurology.     Zane Anthony, DO  PGY-2 Neurology Service INCOMPLETE    35 y/o R-handed Black M with no PMH other than self-reported possible LAZARO presenting with 5 days of painless monocular severe blurred vision and reduced acuity of the right eye with associated B/L perioral numbness and R. arm paresthesias.      1) Transient monocular vision loss (TMVL)     Rather rapid onset of painless monocular visual loss and risk factor of smoking (marijuana regular use), morbid obesity and LAZARO increase likelihood a vascular process is underlying.  His visual symptoms localize anterior to the optic chiasm and based on severe discrepancy in temporal visual field, visual acuity and color perception appears to be a macular issue.  Accompanied by significant abnormalties on OD fundoscopic exam, likely there is an underlying ophthalmologic issue.  -CT head as well as CTA head and neck of poor quality due to body habitus and motion artifact.  However, CTA neck does not appear to demonstrate a severe carotid artery stenosis.  This lowers, but does not eliminate, the possibility of amaurosis fugax.     NIHSS Score 12:02 19  1a Level of consciousness questions: 0  1b.Level of consciousness questions: 0  1c.Level of consciousness commands:0  2. Best Gaze: 1  3. Visual: 1  4.Facial Palsy: 0  5a. Motor left arm: 0  5b. Motor right arm: 0  6a.Motor left le  6b Motor right le  7.Limb Ataxia: 0  8. Sensory: 0  9.Best Language: 0   10.Dysarthria: 0  11.Extinction and Inattention: 0  12.Distal motor function: 0  MRS: 0    -STAT ophthalmologic consult revealed B/L severe papilledema.    Plan:  -STAT CTV to evaluate for venous sinus thrombosis.  Will need admission for further neurologic monitoring and work up of cause of his papilledema.   -Hgb A1C, Lipid panel, CMP.     Zane Anthony, DO  PGY-2 Neurology Service 33 y/o R-handed Black M with no PMH other than self-reported possible LAZARO presenting with 5 days of painless monocular severe blurred vision and reduced acuity of the right eye with associated B/L perioral numbness and R. arm paresthesias.      1) Transient monocular vision loss (TMVL)     Rather rapid onset of painless monocular visual loss and risk factor of smoking (marijuana regular use), morbid obesity and LAZARO increase likelihood a vascular process is underlying.  His visual symptoms localize anterior to the optic chiasm and based on severe discrepancy in temporal visual field, visual acuity and color perception appears to be a macular issue.  Accompanied by significant abnormalties on OD fundoscopic exam, likely there is an underlying ophthalmologic issue.  -CT head as well as CTA head and neck of poor quality due to body habitus and motion artifact.  However, CTA neck does not appear to demonstrate a severe carotid artery stenosis.  This lowers, but does not eliminate, the possibility of amaurosis fugax.     NIHSS Score 12:02 19  1a Level of consciousness questions: 0  1b.Level of consciousness questions: 0  1c.Level of consciousness commands:0  2. Best Gaze: 1  3. Visual: 1  4.Facial Palsy: 0  5a. Motor left arm: 0  5b. Motor right arm: 0  6a.Motor left le  6b Motor right le  7.Limb Ataxia: 0  8. Sensory: 0  9.Best Language: 0   10.Dysarthria: 0  11.Extinction and Inattention: 0  12.Distal motor function: 0  MRS: 0    -STAT ophthalmologic consult revealed B/L severe papilledema.    Plan:  -STAT CTV to evaluate for venous sinus thrombosis.  Despite having received IV contrast earlier, importance of ruling out venous sinus thrombosis outweighs risks of added contrast.    -Will need admission for further neurologic monitoring and work up of cause of his papilledema.   -Hgb A1C, Lipid panel, CMP.     Zane Anthony, DO  PGY-2 Neurology Service 33 y/o R-handed Black M with no PMH other than self-reported possible LAZARO presenting with 5 days of painless monocular severe blurred vision and reduced acuity of the right eye with associated B/L perioral numbness and R. arm paresthesias.      1) Transient monocular vision loss (TMVL)     Rather rapid onset of painless monocular visual loss and risk factor of smoking (marijuana regular use), morbid obesity and LAZARO increase likelihood a vascular process is underlying.  His visual symptoms localize anterior to the optic chiasm and based on severe discrepancy in temporal visual field, visual acuity and color perception appears to be a macular issue.  Accompanied by significant abnormalties on OD fundoscopic exam, likely there is an underlying ophthalmologic issue.  -CT head as well as CTA head and neck of poor quality due to body habitus and motion artifact.  However, CTA neck does not appear to demonstrate a severe carotid artery stenosis.  This lowers, but does not eliminate, the possibility of amaurosis fugax.   -CT Venogram protocol done and though of poor quality, less suspicious for venous sinus thrombosis. Despite having received IV contrast earlier, importance of ruling out venous sinus thrombosis outweighs risks of added contrast.    -Case reviewed and discussed with Dr. Law, radiology and Dr. Mensah.     NIHSS Score 12:02 19  1a Level of consciousness questions: 0  1b.Level of consciousness questions: 0  1c.Level of consciousness commands:0  2. Best Gaze: 1  3. Visual: 1  4.Facial Palsy: 0  5a. Motor left arm: 0  5b. Motor right arm: 0  6a.Motor left le  6b Motor right le  7.Limb Ataxia: 0  8. Sensory: 0  9.Best Language: 0   10.Dysarthria: 0  11.Extinction and Inattention: 0  12.Distal motor function: 0  MRS: 0    Plan:  -Will need admission for further neurologic monitoring and work up of cause of his papilledema including pseudotumor cerebri.   -Hgb A1C, Lipid panel, CMP.   -Will likely need IR guided LP but will need to ensure his weight (227.2 kg) is not a prohibitive factor in pursuing this.     Zane Anthony, DO  PGY-2 Neurology Service

## 2019-07-21 NOTE — ED ADULT NURSE REASSESSMENT NOTE - NS ED NURSE REASSESS COMMENT FT1
MD Randolph assessed pt at approximately 2350, called code stroke at 0000 due to R eye visual loss. Stroke team arrived approximately 0005. Taken to CT on hospital bed. NEuro team decided against TPA due to onset of symptoms being 5-7 days ago. NIHSS score 2 for visual deficits (see stroke flowsheet). Per MD Randolph at 01:15am, hourly neuro checks not needed. Pt O2 decreases to high 80s when pt is sleeping. Currently on 6L. Waiting for optho to assess.

## 2019-07-21 NOTE — ED PROVIDER NOTE - ATTENDING CONTRIBUTION TO CARE
pt is a 34 yr old morbidly obese, LAZARO with r eye visual loss, non focal neuro exam.  made a stroke code ct head nl, ct angio pending, felt that this is an isolated ophtho issue retinal findings on exam? awaiting consult but otherwise non focal neuro exam. labs sent.

## 2019-07-21 NOTE — ED PROVIDER NOTE - PHYSICAL EXAMINATION
PHYSICAL EXAM:  GENERAL: NAD, well-groomed, well-developed  HEAD:  Atraumatic, Normocephalic  EYES: PERRLA, conjunctiva and sclera clear, disconjugate gaze, R eyes naturally abducts, visual acuit 20/30 L eye, impossible to eval on R eye, pt can barely make out light/shapes   ENMT: No tonsillar erythema, exudates, or enlargement; Moist mucous membranes  NECK: Supple, No JVD  HEART: Regular rate and rhythm; No murmurs, rubs, or gallops  RESPIRATORY: CTA B/L, No W/R/R, distant lung sounds, oxygen sats 95% on 2LNC  ABDOMEN: Soft, Nontender, Nondistended; morbidly obese  NEUROLOGY: A&Ox3, normal speech, no sensory/motor deficits   EXTREMITIES:  2+ Peripheral Pulses, No clubbing, cyanosis, or edema  SKIN: warm, dry, normal color, no rash or abnormal lesions

## 2019-07-21 NOTE — H&P ADULT - ASSESSMENT
35 y/o R-handed Black M with no PMH other than self-reported possible LAZARO presenting with 5 days of painless monocular severe blurred vision and reduced acuity of the right eye with associated B/L perioral numbness and R. arm paresthesias.      1) Transient monocular vision loss (TMVL)     Rather rapid onset of painless monocular visual loss and risk factor of smoking (marijuana regular use), morbid obesity and LAZARO increase likelihood a vascular process is underlying.  His visual symptoms localize anterior to the optic chiasm and based on severe discrepancy in temporal visual field, visual acuity and color perception appears to be a macular issue.  Accompanied by significant abnormalities on OD fundoscopic exam, likely there is an underlying ophthalmologic issue.  -CT head as well as CTA head and neck of poor quality due to body habitus and motion artifact.  However, CTA neck does not appear to demonstrate a severe carotid artery stenosis.  This lowers, but does not eliminate, the possibility of amaurosis fugax.   -CT Venogram protocol done and though of poor quality, less suspicious for venous sinus thrombosis. Despite having received IV contrast earlier, importance of ruling out venous sinus thrombosis outweighs risks of added contrast.    -Case reviewed and discussed with Dr. Law, radiology and Dr. Mensah.     NIHSS Score 12:02 19  1a Level of consciousness questions: 0  1b.Level of consciousness questions: 0  1c.Level of consciousness commands:0  2. Best Gaze: 1  3. Visual: 1  4.Facial Palsy: 0  5a. Motor left arm: 0  5b. Motor right arm: 0  6a.Motor left le  6b Motor right le  7.Limb Ataxia: 0  8. Sensory: 0  9.Best Language: 0   10.Dysarthria: 0  11.Extinction and Inattention: 0  12.Distal motor function: 0  MRS: 0    Plan:  -Will need admission for further neurologic monitoring and work up of cause of his papilledema including pseudotumor cerebri.   -Hgb A1C, Lipid panel, CMP.   -Will likely need IR guided LP but will need to ensure his weight (227.2 kg) is not a prohibitive factor in pursuing this.     Zane Anthony, DO  PGY-2 Neurology Service

## 2019-07-21 NOTE — CONSULT NOTE ADULT - SUBJECTIVE AND OBJECTIVE BOX
HPI: Mr. Ifrah Pantoja is a 35 y/o R-handed Black male with no self-reported PMH other than possible LAZARO who presented on 07/20/19 from the sleep center after reporting 5 days of monocular right eye blurred vision accompanied by perioral numbness and tingling and R arm paresthesias.  He reports his last known well was when he arose on 07/15/19 at about 08:00.  Progressively throughout the coming days he began to notice worsening blurred vision out of the right eye and perioral numbness on both sides.  This continued to worsen to the point where he reports painless loss of vision other than general shapes and figures from the right eye.  He did not seek medical attention immediately as he was concerned that he would miss his appointment for CPAP evaluation for suspected LAZARO.  Upon hearing these symptoms at his appointment today, he was sent directly to the ED.  He denies any weakness, headache, black shade like loss of vision or diplopia.  He admits to some color disturbances and tearing of the right eye, but denies any eye pain.  He denies any visual changes in his left eye including blurred vision, disturbance in visual acuity or diplopia.      REVIEW OF SYSTEMS  A 10-system ROS was performed and is negative except for those items noted above and/or in the HPI.    PAST MEDICAL & SURGICAL HISTORY:  None reported other than wisdom teeth surgery 7 years ago    FAMILY HISTORY: HTN, DM    SOCIAL HISTORY: Smokes cigars socially, regular marijuana smoker, social drinker.  Denies other recreational drugs.     MEDICATIONS (HOME): None    ALLERGIES/INTOLERANCES: NKDA    VITALS & EXAMINATION:  Vital Signs Last 24 Hrs  T(C): 37 (20 Jul 2019 23:16), Max: 37 (20 Jul 2019 23:16)  T(F): 98.6 (20 Jul 2019 23:16), Max: 98.6 (20 Jul 2019 23:16)  HR: 88 (20 Jul 2019 23:16) (82 - 88)  BP: 150/81 (20 Jul 2019 23:16) (134/90 - 150/81)  BP(mean): --  RR: 20 (20 Jul 2019 23:16) (20 - 24)  SpO2: 98% (20 Jul 2019 23:16) (96% - 98%)    General: Morbidly obese male laying in bed, appears older than stated age, in no apparent distress including pain  Head: Normocephalic & atraumatic.  Cardiovascular (>2): RRR no murmurs, rubs or gallops.    Respiratory: Patent airway. All lung fields are clear to auscultation bilaterally but have diminished air entry B/L with shallow breaths.    Neurological (>12):  MS: Awake, alert, oriented to person, place, situation, time. Normal affect. Follows all commands.  Language: Speech is clear, fluent with good repetition, comprehension and naming.     CNs: PERRLA (R = 3mm, L = 3mm).     Acuity  OD Visual acuity: 20/200.  Color vision impaired (recognizes red as orange)   OS Visual acuity: 20/30 Color vision preserved.     OD superior and inferior temporal visual field loss.   OS visual fields preserved.     OD exotropia on primary gaze which worsens on right horizontal gaze.   No nystagmus appreciated.  PT reported no diplopia, but was clouded by blurred vision. V1-3 intact to LT/pinprick, well developed masseter muscles b/l. No facial asymmetry at nasolabial or epicanthal folds b/l, full eye closure strength b/l. Hearing grossly normal (rubbing fingers) b/l. Symmetric palate elevation in midline. Gag reflex deferred. Head turning & shoulder shrug intact b/l. Tongue midline, normal movements, no atrophy.    Fundoscopic:   OD: Disc color pale with dark vasculature poorly seen.  Retina appears significantly paler compared with OS and appears edematous.  Hypervascularization noted.  Optic cup to disc ratio appears 1/3.  Optic disc borders not sharp.       OS: Optic disc appears more sharp, retina appears pale but light pink.  Hypervascularization noted again.  Optic cup to disc ratio appears about 1/3.  Disc borders appear sharp.   zoraida w/ sharp discs margins No vascular changes.      Motor: Normal muscle bulk & tone. No noticeable tremor or seizure. No pronator drift.              Deltoid	Biceps	Triceps	Wrist	Finger ABd	   R	5	5	5	5	5		5 	  L	5	5	5	5	5		5    	H-Flex	H-Ext	H-ABd	H-ADd	K-Flex	K-Ext	D-Flex	P-Flex  R	5	5	5	5	5	5	5	5 	   L	5	5	5	5	5	5	5	5	     Sensation: Intact to LT/PP/Temp/Vibration/Position b/l throughout.   Cortical: Extinction on DSS (neglect): none    LABORATORY:  CBC                       15.0   10.5  )-----------( 239      ( 21 Jul 2019 00:11 )             51.8     Chem 07-21    140  |  96  |  10  ----------------------------<  81  4.2   |  34<H>  |  0.85    Ca    8.7      21 Jul 2019 00:11    TPro  6.7  /  Alb  3.4  /  TBili  2.8<H>  /  DBili  x   /  AST  21  /  ALT  20  /  AlkPhos  79  07-21    LFTs LIVER FUNCTIONS - ( 21 Jul 2019 00:11 )  Alb: 3.4 g/dL / Pro: 6.7 g/dL / ALK PHOS: 79 U/L / ALT: 20 U/L / AST: 21 U/L / GGT: x           Coagulopathy PT/INR - ( 21 Jul 2019 00:11 )   PT: 17.2 sec;   INR: 1.48 ratio    PTT - ( 21 Jul 2019 00:11 )  PTT:29.3 sec    Radiology (XR, CT, MR, U/S, TTE/REN):    < from: CT Head No Cont (07.21.19 @ 00:36) >  CT BRAIN:    Limited examination secondary to streak artifact, photon starvation and   image noise secondary to body habitus.    Within the limitations of the examination there is no evidence of acute   intracranial hemorrhage, mass effect, midline shift, extra axial   collection, hydrocephalus, or evidence of acute vascular territorial   infarction.    Mild left sphenoid sinus mucosal thickening. Remainder the paranasal   sinuses and mastoid air cells are clear. Intraorbital contents are   unremarkable. Visualized osseous structures are intact.     IMPRESSION:     Limited examination secondary to body habitus.    No acuteintracranial hemorrhage, mass effect, or evidence of acute   vascular territorial infarction.    If clinical symptoms persist or worsen, more sensitive evaluation with   brain MRI may be obtained, if no contraindications exist.     These findings were discussed with Dr. Anthony at 7/21/2019 12:26 AM by Dr. Purdy with read back confirmation.        < end of copied text >    < from: CT Angio Head w/ IV Cont (07.21.19 @ 00:36) >  FINDINGS:     CT ANGIOGRAPHY NECK:    Nondiagnostic evaluation of the major arterial vessels and soft tissues   of the neck secondary to body habitus.    CT ANGIOGRAPHY BRAIN:    Nondiagnostic evaluation of the petrous and cavernous segments of the   internal carotid arteries. Bilateral supraclinoid segments are grossly   patent. Bilateral proximal middle cerebral and anterior cerebral arteries   are grossly patent.    Nondiagnostic evaluation of the intracranial vertebral and proximal   basilar artery. Proximal bilateral posterior cerebral arteries are patent.    Evaluation for intracranial aneurysm is markedly limited. Partially empty   sella. Marked focal bony thinning involving the posterior walls of the   sphenoid sinuses at the level of the cavernoussinuses/cavernous segments   of the internal carotid arteries. Bony dehiscence cannot be entirely   excluded. Evaluation is limited on this examination. Chronic left medial   orbital wall deformity.    IMPRESSION:     CT angiography neck: Nondiagnostic evaluation of the cervical carotid and   vertebral vasculature.    CT angiography brain: No proximal large vessel occlusion involving the   anterior, middle, or posterior cerebral arteries. Nondiagnostic   evaluation of the intracranial internal carotid, vertebral, and basilar   arteries.    Essentially nondiagnostic evaluation for intracranial aneurysm.    < end of copied text >  < from: CT Angio Head w/ IV Cont (07.21.19 @ 00:36) >  FINDINGS:     CT ANGIOGRAPHY NECK:    Nondiagnostic evaluation of the major arterial vessels and soft tissues   of the neck secondary to body habitus.    CT ANGIOGRAPHY BRAIN:    Nondiagnostic evaluation of the petrous and cavernous segments of the   internal carotid arteries. Bilateral supraclinoid segments are grossly   patent. Bilateral proximal middle cerebral and anterior cerebral arteries   are grossly patent.    Nondiagnostic evaluation of the intracranial vertebral and proximal   basilar artery. Proximal bilateral posterior cerebral arteries are patent.    Evaluation for intracranial aneurysm is markedly limited. Partially empty   sella. Marked focal bony thinning involving the posterior walls of the   sphenoid sinuses at the level of the cavernoussinuses/cavernous segments   of the internal carotid arteries. Bony dehiscence cannot be entirely   excluded. Evaluation is limited on this examination. Chronic left medial   orbital wall deformity.    IMPRESSION:     CT angiography neck: Nondiagnostic evaluation of the cervical carotid and   vertebral vasculature.    CT angiography brain: No proximal large vessel occlusion involving the   anterior, middle, or posterior cerebral arteries. Nondiagnostic   evaluation of the intracranial internal carotid, vertebral, and basilar   arteries.    Essentially nondiagnostic evaluation for intracranial aneurysm.    < end of copied text > HPI: Mr. Ifrah Pantoja is a 35 y/o R-handed Black male with no self-reported PMH other than possible LAZARO who presented on 07/20/19 from the sleep center after reporting 5 days of monocular right eye blurred vision accompanied by perioral numbness and tingling and R arm paresthesias.  He reports his last known well was when he arose on 07/15/19 at about 08:00.  Progressively throughout the coming days he began to notice worsening blurred vision out of the right eye and perioral numbness on both sides.  This continued to worsen to the point where he reports painless loss of vision other than general shapes and figures from the right eye.  He did not seek medical attention immediately as he was concerned that he would miss his appointment for CPAP evaluation for suspected LAZARO.  Upon hearing these symptoms at his appointment today, he was sent directly to the ED.  He denies any weakness, black shade like loss of vision or diplopia.  He admits to some color disturbances and tearing of the right eye, but denies any eye pain.  On initial questioning, he denied any headache.  However, on repeated pressing he admits to a dull B/L posterior headache associated with the symptoms which was worse upon wakening.  This had resolved by the time of presentation to the hospital on 07/21.  He admits to "floaters" in his left eye but denies blurred vision, disturbance in visual acuity or diplopia.      REVIEW OF SYSTEMS  A 10-system ROS was performed and is negative except for those items noted above and/or in the HPI.    PAST MEDICAL & SURGICAL HISTORY:  None reported other than wisdom teeth surgery 7 years ago    FAMILY HISTORY: HTN, DM    SOCIAL HISTORY: Smokes cigars socially, regular marijuana smoker, social drinker.  Denies other recreational drugs.     MEDICATIONS (HOME): None    ALLERGIES/INTOLERANCES: NKDA    VITALS & EXAMINATION:  Vital Signs Last 24 Hrs  T(C): 37 (20 Jul 2019 23:16), Max: 37 (20 Jul 2019 23:16)  T(F): 98.6 (20 Jul 2019 23:16), Max: 98.6 (20 Jul 2019 23:16)  HR: 88 (20 Jul 2019 23:16) (82 - 88)  BP: 150/81 (20 Jul 2019 23:16) (134/90 - 150/81)  BP(mean): --  RR: 20 (20 Jul 2019 23:16) (20 - 24)  SpO2: 98% (20 Jul 2019 23:16) (96% - 98%)    General: Morbidly obese male laying in bed, appears older than stated age, in no apparent distress including pain  Head: Normocephalic & atraumatic.  Cardiovascular (>2): RRR no murmurs, rubs or gallops.    Respiratory: Patent airway. All lung fields are clear to auscultation bilaterally but have diminished air entry B/L with shallow breaths.    Neurological (>12):  MS: Awake, alert, oriented to person, place, situation, time. Normal affect. Follows all commands.  Language: Speech is clear, fluent with good repetition, comprehension and naming.     CNs: PERRLA (R = 3mm, L = 3mm).     Acuity  OD Visual acuity: 20/200.  Color vision impaired (recognizes red as orange)   OS Visual acuity: 20/30 Color vision preserved.     OD superior and inferior temporal visual field loss.   OS visual fields preserved.     OD exotropia on primary gaze which worsens on right horizontal gaze.   No nystagmus appreciated.  PT reported no diplopia, but was clouded by blurred vision. V1-3 intact to LT/pinprick, well developed masseter muscles b/l. No facial asymmetry at nasolabial or epicanthal folds b/l, full eye closure strength b/l. Hearing grossly normal (rubbing fingers) b/l. Symmetric palate elevation in midline. Gag reflex deferred. Head turning & shoulder shrug intact b/l. Tongue midline, normal movements, no atrophy.    Fundoscopic:   OD: Disc color pale with dark vasculature poorly seen.  Retina appears significantly paler compared with OS and appears edematous.  Hypervascularization noted.  Optic cup to disc ratio appears 1/3.  Optic disc borders not sharp.       OS: Optic disc appears more sharp, retina appears pale but light pink.  Hypervascularization noted again.  Optic cup to disc ratio appears about 1/3.  Disc borders appear sharp.   zoraida w/ sharp discs margins No vascular changes.      Motor: Normal muscle bulk & tone. No noticeable tremor or seizure. No pronator drift.              Deltoid	Biceps	Triceps	Wrist	Finger ABd	   R	5	5	5	5	5		5 	  L	5	5	5	5	5		5    	H-Flex	H-Ext	H-ABd	H-ADd	K-Flex	K-Ext	D-Flex	P-Flex  R	5	5	5	5	5	5	5	5 	   L	5	5	5	5	5	5	5	5	     Sensation: Intact to LT/PP/Temp/Vibration/Position b/l throughout.   Cortical: Extinction on DSS (neglect): none    LABORATORY:  CBC                       15.0   10.5  )-----------( 239      ( 21 Jul 2019 00:11 )             51.8     Chem 07-21    140  |  96  |  10  ----------------------------<  81  4.2   |  34<H>  |  0.85    Ca    8.7      21 Jul 2019 00:11    TPro  6.7  /  Alb  3.4  /  TBili  2.8<H>  /  DBili  x   /  AST  21  /  ALT  20  /  AlkPhos  79  07-21    LFTs LIVER FUNCTIONS - ( 21 Jul 2019 00:11 )  Alb: 3.4 g/dL / Pro: 6.7 g/dL / ALK PHOS: 79 U/L / ALT: 20 U/L / AST: 21 U/L / GGT: x           Coagulopathy PT/INR - ( 21 Jul 2019 00:11 )   PT: 17.2 sec;   INR: 1.48 ratio    PTT - ( 21 Jul 2019 00:11 )  PTT:29.3 sec    Radiology (XR, CT, MR, U/S, TTE/REN):    < from: CT Head No Cont (07.21.19 @ 00:36) >  CT BRAIN:    Limited examination secondary to streak artifact, photon starvation and   image noise secondary to body habitus.    Within the limitations of the examination there is no evidence of acute   intracranial hemorrhage, mass effect, midline shift, extra axial   collection, hydrocephalus, or evidence of acute vascular territorial   infarction.    Mild left sphenoid sinus mucosal thickening. Remainder the paranasal   sinuses and mastoid air cells are clear. Intraorbital contents are   unremarkable. Visualized osseous structures are intact.     IMPRESSION:     Limited examination secondary to body habitus.    No acuteintracranial hemorrhage, mass effect, or evidence of acute   vascular territorial infarction.    If clinical symptoms persist or worsen, more sensitive evaluation with   brain MRI may be obtained, if no contraindications exist.     These findings were discussed with Dr. Anthony at 7/21/2019 12:26 AM by Dr. Purdy with read back confirmation.        < end of copied text >    < from: CT Angio Head w/ IV Cont (07.21.19 @ 00:36) >  FINDINGS:     CT ANGIOGRAPHY NECK:    Nondiagnostic evaluation of the major arterial vessels and soft tissues   of the neck secondary to body habitus.    CT ANGIOGRAPHY BRAIN:    Nondiagnostic evaluation of the petrous and cavernous segments of the   internal carotid arteries. Bilateral supraclinoid segments are grossly   patent. Bilateral proximal middle cerebral and anterior cerebral arteries   are grossly patent.    Nondiagnostic evaluation of the intracranial vertebral and proximal   basilar artery. Proximal bilateral posterior cerebral arteries are patent.    Evaluation for intracranial aneurysm is markedly limited. Partially empty   sella. Marked focal bony thinning involving the posterior walls of the   sphenoid sinuses at the level of the cavernoussinuses/cavernous segments   of the internal carotid arteries. Bony dehiscence cannot be entirely   excluded. Evaluation is limited on this examination. Chronic left medial   orbital wall deformity.    IMPRESSION:     CT angiography neck: Nondiagnostic evaluation of the cervical carotid and   vertebral vasculature.    CT angiography brain: No proximal large vessel occlusion involving the   anterior, middle, or posterior cerebral arteries. Nondiagnostic   evaluation of the intracranial internal carotid, vertebral, and basilar   arteries.    Essentially nondiagnostic evaluation for intracranial aneurysm.    < end of copied text >  < from: CT Angio Head w/ IV Cont (07.21.19 @ 00:36) >  FINDINGS:     CT ANGIOGRAPHY NECK:    Nondiagnostic evaluation of the major arterial vessels and soft tissues   of the neck secondary to body habitus.    CT ANGIOGRAPHY BRAIN:    Nondiagnostic evaluation of the petrous and cavernous segments of the   internal carotid arteries. Bilateral supraclinoid segments are grossly   patent. Bilateral proximal middle cerebral and anterior cerebral arteries   are grossly patent.    Nondiagnostic evaluation of the intracranial vertebral and proximal   basilar artery. Proximal bilateral posterior cerebral arteries are patent.    Evaluation for intracranial aneurysm is markedly limited. Partially empty   sella. Marked focal bony thinning involving the posterior walls of the   sphenoid sinuses at the level of the cavernoussinuses/cavernous segments   of the internal carotid arteries. Bony dehiscence cannot be entirely   excluded. Evaluation is limited on this examination. Chronic left medial   orbital wall deformity.    IMPRESSION:     CT angiography neck: Nondiagnostic evaluation of the cervical carotid and   vertebral vasculature.    CT angiography brain: No proximal large vessel occlusion involving the   anterior, middle, or posterior cerebral arteries. Nondiagnostic   evaluation of the intracranial internal carotid, vertebral, and basilar   arteries.    Essentially nondiagnostic evaluation for intracranial aneurysm.    < end of copied text >    < from: CT Angio Head w/ IV Cont (07.21.19 @ 05:19) > (CT Venogram protocol evaluation for venous sinus thrombosis).     FINDINGS:  The examination is limited by artifact caused by photon starvation due to   large patient body habitus.  There is poor visualization of the intracranial vasculature.    The superior sagittal sinus, internal cerebral veins, basal veins of   Anthony, vein of Jorge and straight sinus are grossly patent.      The torcular Herophili, transverse sinuses, sigmoid sinuses and upper   internal jugular veins cannot be assessed on this examination.      IMPRESSION: Limited examination due to artifact from large patient body   habitus.  The torcular Herophili, transverse sinuses, sigmoid sinuses and   upper internal jugular veins cannot be assessed on this examination, and   therefore dural venous sinus thrombosis cannot be excluded.    < end of copied text >

## 2019-07-21 NOTE — PATIENT PROFILE ADULT - VISION (WITH CORRECTIVE LENSES IF THE PATIENT USUALLY WEARS THEM):
part of current HPI/Partially impaired: cannot see medication labels or newsprint, but can see obstacles in path, and the surrounding layout; can count fingers at arm's length

## 2019-07-21 NOTE — ED PROVIDER NOTE - PROGRESS NOTE DETAILS
Agustín PGY2- code stroke initially called when discovered pt had complete loss of vision R eye, after determined likely not to be neuro Optho consulted for abnormal fundoscopic exam, per optho pt has b/l severe papilledema likely subacute, neuro reconsulted, concern for elevated ICP, CT venogram ordered, likely admit to neuro for inpatient MRI, LP Satinder PGY2- Pt was signed out to me. Called neurology who requested admission to Dr. Mensah for a workup for intrcranial hypertension. No clot seen on CT scan by stroke attending.

## 2019-07-21 NOTE — CONSULT NOTE ADULT - SUBJECTIVE AND OBJECTIVE BOX
[facility] Ophthalmology Consult Note    HPI: 33 y/o M, morbid obesity, LAZARO, presents to [facility] with 5 days of poor vision OD, headaches, and tunnel vision. Patient reports that he woke up Monday morning with decreased vision in his right eye which continued to worsen over the course of the week. He reports headaches, pulsatile tinnitus, and tunnel vision OU which also started 5 days prior. Denies eye pain, flashes/floaters, diplopia, trauma      PMH: None  Meds: None  POcHx (including surgeries/lasers/trauma):  None  Drops: None  FamHx: None  Social Hx: None  Allergies: NKDA    ROS:  General (neg), Vision (per HPI), Head and Neck (neg), Pulm (neg), CV (neg), GI (neg),  (neg), Musculoskeletal (neg), Skin/Integ (neg), Neuro (neg), Endocrine (neg), Heme (neg), All/Immuno (neg)    Mood and Affect Appropriate ( x ),  Oriented to Time, Place, and Person x 3 ( x )    Ophthalmology Exam    Visual acuity (sc): HM OD; 20/20 -2 OS  Pupils: PERRL OU, no APD  Ttono: 12, 13 OU  Extraocular movements (EOMs): Full OU, no pain, no diplopia  Confrontational Visual Field (CVF):  1/4 OS, missing inferotemporal, supernasal, infero nasal. Unable to test OD 2/2 poor vision  Color Plates: 12/12 OU    Slit Lamp Exam (SLE)  External:  Flat OU  Lids/Lashes/Lacrimal Ducts: Flat OU    Sclera/Conjunctiva:  W+Q OU  Cornea: 2+ SPK OU  Anterior Chamber: D+Q OU   Iris:  Flat OU  Lens:  Cl OU    Fundus Exam: dilated with 1% tropicamide and 2.5% phenylephrine  Approval obtained from primary team for dilation  Patient aware that pupils can remained dilated for at least 4-6 hours  Exam performed with 20D lens    Vitreous: wnl OU  Disc, cup/disc: bilaterally swollen hyperemic discs w/ nerve fiber layer edema circumferentially around the discs, margins obscured OU, OS > OD. Dilated and tortuous retinal veins OU with macular folds OS  Macula:  temporal IRH OD, flat OU  Vessels:  narrow arterioles OU  Periphery: scattered IRH and MAs    Diagnostic Testing: EXAM: CT ANGIO BRAIN (W)AW IC     EXAM: CT ANGIO NECK (W)AW IC       PROCEDURE DATE: 07/21/2019           INTERPRETATION: CLINICAL INFORMATION: Blurry vision, mila-oral numbness,   right arm paresthesia.     TECHNIQUE:   Contrast enhanced axial CT images were acquired from the aortic arch to the   vertex of the calvarium, during the angiographic phase. Additional   post-contrast axial CT images through the head were obtained. Two- and   three-dimensional maximum intensity projection reformats were generated from   the angiographic images.   160 cc of Omnipaque-300 mg/ml were administered intravenously, without   immediate complication. 40 cc was discarded.     COMPARISON: None.     FINDINGS:     CT ANGIOGRAPHY NECK:     Nondiagnostic evaluation of the major arterial vessels and soft tissues of   the neck secondary to body habitus.     CT ANGIOGRAPHY BRAIN:     Nondiagnostic evaluation of the petrous and cavernous segments of the   internal carotid arteries. Bilateral supraclinoid segments are grossly   patent. Bilateral proximal middle cerebral and anterior cerebral arteries   are grossly patent.     Nondiagnostic evaluation of the intracranial vertebral and proximal basilar   artery. Proximal bilateral posterior cerebral arteries are patent.     Evaluation for intracranial aneurysm is markedly limited. Partially empty   sella. Marked focal bony thinning involving the posterior walls of the   sphenoid sinuses at the level of the cavernous sinuses/cavernous segments of   the internal carotid arteries. Bony dehiscence cannot be entirely excluded.   Evaluation is limited on this examination. Chronic left medial orbital wall   deformity.     IMPRESSION:     CT angiography neck: Nondiagnostic evaluation of the cervical carotid and   vertebral vasculature.     CT angiography brain: No proximal large vessel occlusion involving the   anterior, middle, or posterior cerebral arteries. Nondiagnostic evaluation   of the intracranial internal carotid, vertebral, and basilar arteries.     Essentially nondiagnostic evaluation for intracranial aneurysm.       Assessment: 33 y/o M, LAZARO and morbid obesity, presents with 5 days of poor vision OD, headaches, pulsatile tinnitus, and tunnel vision. Severe optic disc edema seen on DFE, left eye > right eye.       Plan:  - MRI/MRV of brain with gadolinium, MRI orbits w/ gadolinium to r/o intracranial mass  - LP per neurology  - neuro consult given additional findings  - will follow in hospital    Follow-Up:  Patient should follow up his/her ophthalmologist or in the [facility] Ophthalmology Practice within 1 week of discharge  [address] [place] [address].  [place] [address]  [phone] [facility] Ophthalmology Consult Note    HPI: 33 y/o black male, morbid obesity, LAZARO, presents to [facility] with 5 days of poor vision OD, headaches, and tunnel vision. Patient reports that he woke up Monday morning with decreased vision in his right eye which continued to worsen over the course of the week. He reports headaches, pulsatile tinnitus, and tunnel vision OU which also started 5 days prior. Denies eye pain, flashes/floaters, diplopia, trauma      PMH: None  Meds: None  POcHx (including surgeries/lasers/trauma):  None  Drops: None  FamHx: None  Social Hx: None  Allergies: NKDA    ROS:  General (neg), Vision (per HPI), Head and Neck (neg), Pulm (neg), CV (neg), GI (neg),  (neg), Musculoskeletal (neg), Skin/Integ (neg), Neuro (neg), Endocrine (neg), Heme (neg), All/Immuno (neg)    Mood and Affect Appropriate ( x ),  Oriented to Time, Place, and Person x 3 ( x )    Ophthalmology Exam    Visual acuity (sc): HM OD; 20/20 -2 OS  Pupils: + APD OD, Round and reactive OS  Ttono: 12, 13 OU  Extraocular movements (EOMs): Full OU, no pain, no diplopia  Confrontational Visual Field (CVF):  1/4 OS, missing inferotemporal, supernasal, infero nasal. Unable to test OD 2/2 poor vision  Color Plates: 12/12 OU    Slit Lamp Exam (SLE)  External:  Flat OU  Lids/Lashes/Lacrimal Ducts: Flat OU    Sclera/Conjunctiva:  W+Q OU  Cornea: 2+ SPK OU  Anterior Chamber: D+Q OU   Iris:  Flat OU  Lens:  Cl OU    Fundus Exam: dilated with 1% tropicamide and 2.5% phenylephrine  Approval obtained from primary team for dilation  Patient aware that pupils can remained dilated for at least 4-6 hours  Exam performed with 20D lens    Vitreous: wnl OU  Disc, cup/disc: bilaterally swollen hyperemic discs w/ nerve fiber layer edema circumferentially around the discs, margins obscured OU, OS > OD. Dilated and tortuous retinal veins OU with macular folds OS  Macula:  temporal IRH OD, flat OU  Vessels:  narrow arterioles OU  Periphery: scattered IRH and MAs OD    Diagnostic Testing: EXAM: CT ANGIO BRAIN (W)AW IC     EXAM: CT ANGIO NECK (W)AW IC       PROCEDURE DATE: 07/21/2019           INTERPRETATION: CLINICAL INFORMATION: Blurry vision, mila-oral numbness,   right arm paresthesia.     TECHNIQUE:   Contrast enhanced axial CT images were acquired from the aortic arch to the   vertex of the calvarium, during the angiographic phase. Additional   post-contrast axial CT images through the head were obtained. Two- and   three-dimensional maximum intensity projection reformats were generated from   the angiographic images.   160 cc of Omnipaque-300 mg/ml were administered intravenously, without   immediate complication. 40 cc was discarded.     COMPARISON: None.     FINDINGS:     CT ANGIOGRAPHY NECK:     Nondiagnostic evaluation of the major arterial vessels and soft tissues of   the neck secondary to body habitus.     CT ANGIOGRAPHY BRAIN:     Nondiagnostic evaluation of the petrous and cavernous segments of the   internal carotid arteries. Bilateral supraclinoid segments are grossly   patent. Bilateral proximal middle cerebral and anterior cerebral arteries   are grossly patent.     Nondiagnostic evaluation of the intracranial vertebral and proximal basilar   artery. Proximal bilateral posterior cerebral arteries are patent.     Evaluation for intracranial aneurysm is markedly limited. Partially empty   sella. Marked focal bony thinning involving the posterior walls of the   sphenoid sinuses at the level of the cavernous sinuses/cavernous segments of   the internal carotid arteries. Bony dehiscence cannot be entirely excluded.   Evaluation is limited on this examination. Chronic left medial orbital wall   deformity.     IMPRESSION:     CT angiography neck: Nondiagnostic evaluation of the cervical carotid and   vertebral vasculature.     CT angiography brain: No proximal large vessel occlusion involving the   anterior, middle, or posterior cerebral arteries. Nondiagnostic evaluation   of the intracranial internal carotid, vertebral, and basilar arteries.     Essentially nondiagnostic evaluation for intracranial aneurysm.       Assessment: 33 y/o M, LAZARO and morbid obesity, presents with 5 days of poor vision OD, headaches, pulsatile tinnitus, and tunnel vision. Severe optic disc edema seen on DFE, left eye > right eye.       Plan:  - MRI/MRV of brain with gadolinium, MRI orbits w/ gadolinium to r/o intracranial mass  - LP per neurology  - neuro consult given additional findings  - will follow in hospital    Follow-Up:  Patient should follow up his/her ophthalmologist or in the [facility] Ophthalmology Practice within 1 week of discharge  [address] [place] [address].  [place] [address]  [phone] [facility] Ophthalmology Consult Note    HPI: 35 y/o black male, morbid obesity, LAZARO, presents to [facility] with 5 days of poor vision OD, headaches, and tunnel vision. Patient reports that he woke up Monday morning with decreased vision in his right eye which continued to worsen over the course of the week. He reports headaches, pulsatile tinnitus, and tunnel vision OU which also started 5 days prior. Denies eye pain, flashes/floaters, diplopia, trauma      PMH: LAZARO, morbid obesity  Meds: None  POcHx (including surgeries/lasers/trauma):  None  Drops: None  FamHx: None  Social Hx: None  Allergies: NKDA    ROS:  General (neg), Vision (per HPI), Head and Neck (neg), Pulm (neg), CV (neg), GI (neg),  (neg), Musculoskeletal (neg), Skin/Integ (neg), Neuro (neg), Endocrine (neg), Heme (neg), All/Immuno (neg)    Mood and Affect Appropriate ( x ),  Oriented to Time, Place, and Person x 3 ( x )    Ophthalmology Exam    Visual acuity (sc): HM OD; 20/20 -2 OS  Pupils: + APD OD, Round and reactive OS  Ttono: 12, 13 OU  Extraocular movements (EOMs): Full OU, no pain, no diplopia  Confrontational Visual Field (CVF):  1/4 OS, missing inferotemporal, supernasal, infero nasal. Unable to test OD 2/2 poor vision  Color Plates: 10/12 OS, unable to test OD 2/2 poor vision    Slit Lamp Exam (SLE)  External:  Flat OU  Lids/Lashes/Lacrimal Ducts: Flat OU    Sclera/Conjunctiva:  W+Q OU  Cornea: 2+ SPK OU  Anterior Chamber: D+Q OU   Iris:  Flat OU  Lens:  Cl OU    Fundus Exam: dilated with 1% tropicamide and 2.5% phenylephrine  Approval obtained from primary team for dilation  Patient aware that pupils can remained dilated for at least 4-6 hours  Exam performed with 20D lens    Vitreous: wnl OU  Disc, cup/disc: bilaterally swollen hyperemic discs w/ nerve fiber layer edema circumferentially around the discs, margins obscured OU, OS > OD. Dilated and tortuous retinal veins OU with macular folds OS  Macula:  temporal IRH OD, flat OU  Vessels:  narrow arterioles OU  Periphery: scattered IRH and MAs OD    Diagnostic Testing: EXAM: CT ANGIO BRAIN (W)AW IC     EXAM: CT ANGIO NECK (W)AW IC       PROCEDURE DATE: 07/21/2019           INTERPRETATION: CLINICAL INFORMATION: Blurry vision, mila-oral numbness,   right arm paresthesia.     TECHNIQUE:   Contrast enhanced axial CT images were acquired from the aortic arch to the   vertex of the calvarium, during the angiographic phase. Additional   post-contrast axial CT images through the head were obtained. Two- and   three-dimensional maximum intensity projection reformats were generated from   the angiographic images.   160 cc of Omnipaque-300 mg/ml were administered intravenously, without   immediate complication. 40 cc was discarded.     COMPARISON: None.     FINDINGS:     CT ANGIOGRAPHY NECK:     Nondiagnostic evaluation of the major arterial vessels and soft tissues of   the neck secondary to body habitus.     CT ANGIOGRAPHY BRAIN:     Nondiagnostic evaluation of the petrous and cavernous segments of the   internal carotid arteries. Bilateral supraclinoid segments are grossly   patent. Bilateral proximal middle cerebral and anterior cerebral arteries   are grossly patent.     Nondiagnostic evaluation of the intracranial vertebral and proximal basilar   artery. Proximal bilateral posterior cerebral arteries are patent.     Evaluation for intracranial aneurysm is markedly limited. Partially empty   sella. Marked focal bony thinning involving the posterior walls of the   sphenoid sinuses at the level of the cavernous sinuses/cavernous segments of   the internal carotid arteries. Bony dehiscence cannot be entirely excluded.   Evaluation is limited on this examination. Chronic left medial orbital wall   deformity.     IMPRESSION:     CT angiography neck: Nondiagnostic evaluation of the cervical carotid and   vertebral vasculature.     CT angiography brain: No proximal large vessel occlusion involving the   anterior, middle, or posterior cerebral arteries. Nondiagnostic evaluation   of the intracranial internal carotid, vertebral, and basilar arteries.     Essentially nondiagnostic evaluation for intracranial aneurysm.       Assessment: 35 y/o M, LAZARO and morbid obesity, presents with 5 days of poor vision OD, headaches, pulsatile tinnitus, and tunnel vision intermittently. HM vision OD, + APD, EOMs full w/o pain. Ant seg exam unremarkable. Severe optic disc edema seen on DFE, left eye > right eye. Multiple intraretinal hemorrhages and microaneurysms also seen OD. Given morbid obesity and LAZARO, differential for poor vision in right eye includes non arteritic ischemic optic neuropathy vs. ocular ischemic syndrome.      Plan:  - MRI/MRV of brain with gadolinium, MRI orbits w/ gadolinium to r/o intracranial mass  - Carotid duplex to evaluate carotid circulation  - LP per neurology  - neuro consult given additional findings  - will follow in hospital  - D/w Dr. Adkins (neuro-ophthalmology attending)    Follow-Up:  Patient should follow up his/her ophthalmologist or in the [facility] Ophthalmology Practice within 1 week of discharge  [address] [facility] [place] [address] [phone]

## 2019-07-21 NOTE — H&P ADULT - NSHPPHYSICALEXAM_GEN_ALL_CORE
VITALS & EXAMINATION:  Vital Signs Last 24 Hrs  T(C): 36.9 (21 Jul 2019 07:24), Max: 37 (20 Jul 2019 23:16)  T(F): 98.4 (21 Jul 2019 07:24), Max: 98.6 (20 Jul 2019 23:16)  HR: 94 (21 Jul 2019 07:24) (82 - 97)  BP: 149/86 (21 Jul 2019 07:24) (134/90 - 150/81)  BP(mean): --  RR: 22 (21 Jul 2019 07:24) (20 - 24)  SpO2: 98% (21 Jul 2019 07:24) (95% - 98%)    General: Morbidly obese male laying in bed, appears older than stated age, in no apparent distress including pain  Head: Normocephalic & atraumatic.  Cardiovascular (>2): RRR no murmurs, rubs or gallops.    Respiratory: Patent airway. All lung fields are clear to auscultation bilaterally but have diminished air entry B/L with shallow breaths.  Neurological (>12):  MS: Awake, alert, oriented to person, place, situation, time. Normal affect. Follows all commands.  Language: Speech is clear, fluent with good repetition, comprehension and naming.   CNs: PERRLA (R = 3mm, L = 3mm).   Acuity  OD Visual acuity: 20/200.  Color vision impaired (recognizes red as orange)   OS Visual acuity: 20/30 Color vision preserved.   OD superior and inferior temporal visual field loss.   OS visual fields preserved.   OD exotropia on primary gaze which worsens on right horizontal gaze.   No nystagmus appreciated.  PT reported no diplopia, but was clouded by blurred vision. V1-3 intact to LT/pinprick, well developed masseter muscles b/l. No facial asymmetry at nasolabial or epicanthal folds b/l, full eye closure strength b/l. Hearing grossly normal (rubbing fingers) b/l. Symmetric palate elevation in midline. Gag reflex deferred. Head turning & shoulder shrug intact b/l. Tongue midline, normal movements, no atrophy.    Fundoscopic:   OD: Disc color pale with dark vasculature poorly seen.  Retina appears significantly paler compared with OS and appears edematous.  Hypervascularization noted.  Optic cup to disc ratio appears 1/3.  Optic disc borders not sharp.     OS: Optic disc appears more sharp, retina appears pale but light pink.  Hypervascularization noted again.  Optic cup to disc ratio appears about 1/3.  Disc borders appear sharp.   zoraida w/ sharp discs margins No vascular changes.    Motor: Normal muscle bulk & tone. No noticeable tremor or seizure. No pronator drift.              Deltoid	Biceps	Triceps	Wrist	Finger ABd	   R	5	5	5	5	5		5 	  L	5	5	5	5	5		5  	H-Flex	H-Ext	H-ABd	H-ADd	K-Flex	K-Ext	D-Flex	P-Flex  R	5	5	5	5	5	5	5	5 	   L	5	5	5	5	5	5	5	5	   Sensation: Intact to LT/PP/Temp/Vibration/Position b/l throughout.   Cortical: Extinction on DSS (neglect): none

## 2019-07-21 NOTE — H&P ADULT - HISTORY OF PRESENT ILLNESS
HPI: Mr. Ifrah Pantoja is a 33 y/o R-handed Black male with no self-reported PMH other than possible LAZARO who presented on 07/20/19 from the sleep center after reporting 5 days of monocular right eye blurred vision accompanied by perioral numbness and tingling and R arm paresthesias.  He reports his last known well was when he arose on 07/15/19 at about 08:00.  Progressively throughout the coming days he began to notice worsening blurred vision out of the right eye and perioral numbness on both sides.  This continued to worsen to the point where he reports painless loss of vision other than general shapes and figures from the right eye.  He did not seek medical attention immediately as he was concerned that he would miss his appointment for CPAP evaluation for suspected LAZARO.  Upon hearing these symptoms at his appointment today, he was sent directly to the ED.  He denies any weakness, black shade like loss of vision or diplopia.  He admits to some color disturbances and tearing of the right eye, but denies any eye pain.  On initial questioning, he denied any headache.  However, on repeated pressing he admits to a dull B/L posterior headache associated with the symptoms which was worse upon wakening.  This had resolved by the time of presentation to the hospital on 07/21.  He admits to "floaters" in his left eye but denies blurred vision, disturbance in visual acuity or diplopia.

## 2019-07-21 NOTE — ED PROVIDER NOTE - OBJECTIVE STATEMENT
34 yoM, PMHx of LAZARO, BIB EMS during polysomnography for concern for progression of R sided blurry vision that began 5 days ago and is now almost complete R sided vision loss. No trauma. Has had dull HA. No N/V. No other speech or focal weaknesses. No hx of eye or brain issues. No fever. L eye without sx.

## 2019-07-21 NOTE — ED ADULT NURSE REASSESSMENT NOTE - NS ED NURSE REASSESS COMMENT FT1
Pt currently sleeping. Continues to desat to high 80s% even on 5-6L O2 when sleeping. When awake, pt )2 increases to 94-97% on 5-6L. Waiting for dispo.

## 2019-07-21 NOTE — H&P ADULT - ATTENDING COMMENTS
patient seen and examined with neurology resident. Patient endorses that HA is minimal and that he is not usually bothered by HA.   history as noted above.   exam notable for only light detection in R eye, L eye acuity at least 20/40. However, there appears to be relative loss of peripheral vision, as ability to finger count is inconsistent in periphery. 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 There is anisocoria from APD, OS 4mm and reactive, OD 2mm non-reactive, no ptosis. Fundoscopic exam shows papilledema bilaterally. Remainder of exam remarkable only for bilateral conjunctival injection and copious tearing (which patient describes as chronic for many years).     Given that HA has not been prominent now or in past, and given that VF are narrowed, I suspect that patient has had increased ICP chronically resulting in peripheral vision loss. I am less clear why there was abrupt loss of vision in R eye raising concern for ischemic compression of optic N. vascular supply (see https://jamanetwork.com/journals/jamaneurology/fullarticle/3542710 for similar case report.)  CT vascular imaging is inconclusive, and does not r/o sinus thrombosis, but absence of significant HA and other cranial N. findings, as well as presence of evidence of chronic increased ICP, make sinus thrombosis relatively less likely.     Plan:  1. check D-dimer - should be elevated in presence of sinus thrombosis.  2. consider MRI/V - but may not be priority if patient has to be transported off-site.   3. LP under fluroscopy or CT guidance - if possible.   4. consider neurosurgery consult for lumbar drain - may need  shunt chronically.   5. formal VF testing by ophalmology  6. OCT testing by ophthalmology

## 2019-07-21 NOTE — H&P ADULT - NSHPLABSRESULTS_GEN_ALL_CORE
CBC                       15.0   10.5  )-----------( 239      ( 21 Jul 2019 00:11 )             51.8   Chem 07-21  140  |  96  |  10  ----------------------------<  81  4.2   |  34<H>  |  0.85  Ca    8.7      21 Jul 2019 00:11  TPro  6.7  /  Alb  3.4  /  TBili  2.8<H>  /  DBili  x   /  AST  21  /  ALT  20  /  AlkPhos  79  07-21  LFTs LIVER FUNCTIONS - ( 21 Jul 2019 00:11 )  Alb: 3.4 g/dL / Pro: 6.7 g/dL / ALK PHOS: 79 U/L / ALT: 20 U/L / AST: 21 U/L / GGT: x         Coagulopathy PT/INR - ( 21 Jul 2019 00:11 )   PT: 17.2 sec;   INR: 1.48 ratio    PTT - ( 21 Jul 2019 00:11 )  PTT:29.3 sec    CT BRAIN 07.21.19 @ 00:36  Limited examination secondary to streak artifact, photon starvation and image noise secondary to body habitus.  Within the limitations of the examination there is no evidence of acute intracranial hemorrhage, mass effect, midline shift, extra axial collection, hydrocephalus, or evidence of acute vascular territorial   infarction. Mild left sphenoid sinus mucosal thickening. Remainder the paranasal sinuses and mastoid air cells are clear. Intraorbital contents are unremarkable. Visualized osseous structures are intact.     IMPRESSION:   Limited examination secondary to body habitus.  No acute intracranial hemorrhage, mass effect, or evidence of acute vascular territorial infarction.  If clinical symptoms persist or worsen, more sensitive evaluation with   brain MRI may be obtained, if no contraindications exist. These findings were discussed with Dr. Anthony at 7/21/2019 12:26 AM by Dr. Purdy with read back confirmation.    CT Angio Head w/ IV Cont (07.21.19 @ 00:36) >  CT ANGIOGRAPHY NECK:  Nondiagnostic evaluation of the major arterial vessels and soft tissues of the neck secondary to body habitus.  CT ANGIOGRAPHY BRAIN:  Nondiagnostic evaluation of the petrous and cavernous segments of the internal carotid arteries. Bilateral supraclinoid segments are grossly patent. Bilateral proximal middle cerebral and anterior cerebral arteries   are grossly patent.  Nondiagnostic evaluation of the intracranial vertebral and proximal basilar artery. Proximal bilateral posterior cerebral arteries are patent.  Evaluation for intracranial aneurysm is markedly limited. Partially empty sella. Marked focal bony thinning involving the posterior walls of the sphenoid sinuses at the level of the cavernoussinuses/cavernous segments of the internal carotid arteries. Bony dehiscence cannot be entirely excluded. Evaluation is limited on this examination. Chronic left medial orbital wall deformity.    IMPRESSION:   CT angiography neck: Nondiagnostic evaluation of the cervical carotid and vertebral vasculature.  CT angiography brain: No proximal large vessel occlusion involving the anterior, middle, or posterior cerebral arteries. Nondiagnostic evaluation of the intracranial internal carotid, vertebral, and basilar arteries.    CT Angio Head w/ IV Cont (07.21.19 @ 05:19) > (CT Venogram protocol evaluation for venous sinus thrombosis).  FINDINGS:  The examination is limited by artifact caused by photon starvation due to large patient body habitus.  There is poor visualization of the intracranial vasculature.  The superior sagittal sinus, internal cerebral veins, basal veins of Anthony, vein of Jorge and straight sinus are grossly patent.  The torcular Herophili, transverse sinuses, sigmoid sinuses and upper internal jugular veins cannot be assessed on this examination.    IMPRESSION: Limited examination due to artifact from large patient body habitus.  The torcular Herophili, transverse sinuses, sigmoid sinuses and upper internal jugular veins cannot be assessed on this examination, and therefore dural venous sinus thrombosis cannot be excluded.

## 2019-07-21 NOTE — ED ADULT NURSE REASSESSMENT NOTE - NS ED NURSE REASSESS COMMENT FT1
spoke with Dr Galicia of the neurology team regarding cpap for patient. patient is extremely hypoxic on room air (o2 sat of deloris 60). patient is mid 90s on 5L NC. patient is sleepy although easily arousable. patient c/o sob and requested a cpap machine which he states he uses at home. respiratory was called for cpap machine. pending orders and RT for cpap. waiting for a bed upstairs. will continue to monitor.

## 2019-07-22 LAB
APTT BLD: 31.2 SEC — SIGNIFICANT CHANGE UP (ref 27.5–36.3)
BASOPHILS # BLD AUTO: 0.05 K/UL — SIGNIFICANT CHANGE UP (ref 0–0.2)
BASOPHILS NFR BLD AUTO: 0.6 % — SIGNIFICANT CHANGE UP (ref 0–2)
EOSINOPHIL # BLD AUTO: 0.32 K/UL — SIGNIFICANT CHANGE UP (ref 0–0.5)
EOSINOPHIL NFR BLD AUTO: 3.6 % — SIGNIFICANT CHANGE UP (ref 0–6)
HCT VFR BLD CALC: 51.9 % — HIGH (ref 39–50)
HGB BLD-MCNC: 14 G/DL — SIGNIFICANT CHANGE UP (ref 13–17)
IMM GRANULOCYTES NFR BLD AUTO: 0.8 % — SIGNIFICANT CHANGE UP (ref 0–1.5)
INR BLD: 1.39 RATIO — HIGH (ref 0.88–1.16)
LYMPHOCYTES # BLD AUTO: 1.56 K/UL — SIGNIFICANT CHANGE UP (ref 1–3.3)
LYMPHOCYTES # BLD AUTO: 17.8 % — SIGNIFICANT CHANGE UP (ref 13–44)
MCHC RBC-ENTMCNC: 21.8 PG — LOW (ref 27–34)
MCHC RBC-ENTMCNC: 27 GM/DL — LOW (ref 32–36)
MCV RBC AUTO: 80.7 FL — SIGNIFICANT CHANGE UP (ref 80–100)
MONOCYTES # BLD AUTO: 0.65 K/UL — SIGNIFICANT CHANGE UP (ref 0–0.9)
MONOCYTES NFR BLD AUTO: 7.4 % — SIGNIFICANT CHANGE UP (ref 2–14)
NEUTROPHILS # BLD AUTO: 6.13 K/UL — SIGNIFICANT CHANGE UP (ref 1.8–7.4)
NEUTROPHILS NFR BLD AUTO: 69.8 % — SIGNIFICANT CHANGE UP (ref 43–77)
PLATELET # BLD AUTO: 221 K/UL — SIGNIFICANT CHANGE UP (ref 150–400)
PROTHROM AB SERPL-ACNC: 16.2 SEC — HIGH (ref 10–12.9)
RBC # BLD: 6.43 M/UL — HIGH (ref 4.2–5.8)
RBC # FLD: 19.4 % — HIGH (ref 10.3–14.5)
T3 SERPL-MCNC: 95 NG/DL — SIGNIFICANT CHANGE UP (ref 80–200)
T4 AB SER-ACNC: 5.6 UG/DL — SIGNIFICANT CHANGE UP (ref 4.6–12)
T4 FREE SERPL-MCNC: 1.1 NG/DL — SIGNIFICANT CHANGE UP (ref 0.9–1.8)
TSH SERPL-MCNC: 6.44 UIU/ML — HIGH (ref 0.27–4.2)
WBC # BLD: 8.78 K/UL — SIGNIFICANT CHANGE UP (ref 3.8–10.5)
WBC # FLD AUTO: 8.78 K/UL — SIGNIFICANT CHANGE UP (ref 3.8–10.5)

## 2019-07-22 PROCEDURE — 99233 SBSQ HOSP IP/OBS HIGH 50: CPT | Mod: GC

## 2019-07-22 RX ORDER — ENOXAPARIN SODIUM 100 MG/ML
40 INJECTION SUBCUTANEOUS DAILY
Refills: 0 | Status: DISCONTINUED | OUTPATIENT
Start: 2019-07-22 | End: 2019-07-23

## 2019-07-22 RX ORDER — ACETAZOLAMIDE 250 MG/1
500 TABLET ORAL DAILY
Refills: 0 | Status: DISCONTINUED | OUTPATIENT
Start: 2019-07-22 | End: 2019-07-22

## 2019-07-22 RX ORDER — ACETAZOLAMIDE 250 MG/1
500 TABLET ORAL
Refills: 0 | Status: DISCONTINUED | OUTPATIENT
Start: 2019-07-22 | End: 2019-07-23

## 2019-07-22 RX ADMIN — ENOXAPARIN SODIUM 40 MILLIGRAM(S): 100 INJECTION SUBCUTANEOUS at 17:27

## 2019-07-22 RX ADMIN — ACETAZOLAMIDE 500 MILLIGRAM(S): 250 TABLET ORAL at 17:27

## 2019-07-22 NOTE — PROGRESS NOTE ADULT - ATTENDING COMMENTS
34 M CLass IV obesity, p/w bilateral vision loss R>L, mild headache, and paresthesias. Pt endorsed change in facial sensation, hearing, and taste, but CN were all intact on exam other than the visual exam which showed 20/200 bilaterally, pt had decreased temporal visual fields bilaterally, and red desaturation in the right eye, as well as mild proptosis compared to pictures of himself he was able to present.     DDX: malignant pseudotumor cerebri, Grave's disease, cavernous sinus thrombosis, Kirit Hunt syndrome, optic neuritis    Plan:  Send TSH and T4 to rule out Grave's disease  Pt planned for CT guided LP tomorrow morning  Start diamox now for possible IIH and see if there is any improvement.   repeated CTV to evaluate for cavernous sinus thrombosis. Will give fluids prior to IV contrast.   If testing negative and symptoms worsen will have to consider MRI to rule out leptomeningeal disease or optic neuritis.
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
## 2019-07-22 NOTE — PROGRESS NOTE ADULT - ASSESSMENT
INCOMPLETE      35 y/o R-handed Black M with no PMH other than self-reported possible LAZARO presenting with 5 days of painless monocular severe blurred vision and reduced acuity of the right eye with associated B/L perioral numbness and R. arm paresthesias.      1) Transient monocular vision loss (TMVL)     Rather rapid onset of painless monocular visual loss and risk factor of smoking (marijuana regular use), morbid obesity and LAZARO increase likelihood a vascular process is underlying.  His visual symptoms localize anterior to the optic chiasm and based on severe discrepancy in temporal visual field, visual acuity and color perception appears to be a macular issue.  Accompanied by significant abnormalities on OD fundoscopic exam, likely there is an underlying ophthalmologic issue.  -CT head as well as CTA head and neck of poor quality due to body habitus and motion artifact.  However, CTA neck does not appear to demonstrate a severe carotid artery stenosis.  This lowers, but does not eliminate, the possibility of amaurosis fugax.   -CT Venogram protocol done and though of poor quality, less suspicious for venous sinus thrombosis. Despite having received IV contrast earlier, importance of ruling out venous sinus thrombosis outweighs risks of added contrast.    -Case reviewed and discussed with Dr. Law, radiology and Dr. Mensah.     NIHSS Score 12:02 19  1a Level of consciousness questions: 0  1b.Level of consciousness questions: 0  1c.Level of consciousness commands:0  2. Best Gaze: 1  3. Visual: 1  4.Facial Palsy: 0  5a. Motor left arm: 0  5b. Motor right arm: 0  6a.Motor left le  6b Motor right le  7.Limb Ataxia: 0  8. Sensory: 0  9.Best Language: 0   10.Dysarthria: 0  11.Extinction and Inattention: 0  12.Distal motor function: 0  MRS: 0    Plan:  -Will need admission for further neurologic monitoring and work up of cause of his papilledema including pseudotumor cerebri.   -Hgb A1C, Lipid panel, CMP.   -Will likely need IR guided LP but will need to ensure his weight (227.2 kg) is not a prohibitive factor in pursuing this.     Zane Anthony, DO  PGY-2 Neurology Service 33 y/o R-handed [place] M with h/o hypothyroidism (off meds), self-reported possible LAZARO presenting with 5 days of painless severe blurred vision (Right >Left) and reduced acuity of the right eye with associated B/L perioral numbness and R. arm paresthesias.      1) Transient monocular vision loss (TMVL)     -CT head as well as CTA head and neck of poor quality due to body habitus and motion artifact.  However, CTA neck does not appear to demonstrate a severe carotid artery stenosis.  This lowers, but does not eliminate, the possibility of amaurosis fugax.   -CT Venogram protocol done and though of poor quality, less suspicious for venous sinus thrombosis. Despite having received IV contrast earlier, importance of ruling out venous sinus thrombosis outweighs risks of added contrast.        Plan:  -IR-guided LP under CT planned for 07/23 at noon- Request to measure opening pressure.    -Started Diamox 500 mg BID for presumed pseudotumor cerebri.   -Will need to discuss with ophthalmology for formal visual field testing.     Zane Anthony, DO  PGY-2 Neurology Service

## 2019-07-23 LAB
ALBUMIN SERPL ELPH-MCNC: 3.6 G/DL — SIGNIFICANT CHANGE UP (ref 3.3–5)
ALP SERPL-CCNC: 86 U/L — SIGNIFICANT CHANGE UP (ref 40–120)
ALT FLD-CCNC: 23 U/L — SIGNIFICANT CHANGE UP (ref 10–45)
ANION GAP SERPL CALC-SCNC: 8 MMOL/L — SIGNIFICANT CHANGE UP (ref 5–17)
APTT BLD: 32.9 SEC — SIGNIFICANT CHANGE UP (ref 27.5–36.3)
AST SERPL-CCNC: 24 U/L — SIGNIFICANT CHANGE UP (ref 10–40)
BILIRUB DIRECT SERPL-MCNC: 0.4 MG/DL — HIGH (ref 0–0.2)
BILIRUB INDIRECT FLD-MCNC: 1.6 MG/DL — HIGH (ref 0.2–1)
BILIRUB SERPL-MCNC: 2 MG/DL — HIGH (ref 0.2–1.2)
BILIRUB SERPL-MCNC: 2 MG/DL — HIGH (ref 0.2–1.2)
BUN SERPL-MCNC: 9 MG/DL — SIGNIFICANT CHANGE UP (ref 7–23)
CALCIUM SERPL-MCNC: 9.2 MG/DL — SIGNIFICANT CHANGE UP (ref 8.4–10.5)
CHLORIDE SERPL-SCNC: 97 MMOL/L — SIGNIFICANT CHANGE UP (ref 96–108)
CO2 SERPL-SCNC: 34 MMOL/L — HIGH (ref 22–31)
CREAT SERPL-MCNC: 0.82 MG/DL — SIGNIFICANT CHANGE UP (ref 0.5–1.3)
GAMMA INTERFERON BACKGROUND BLD IA-ACNC: 0.01 IU/ML — SIGNIFICANT CHANGE UP
GLUCOSE SERPL-MCNC: 93 MG/DL — SIGNIFICANT CHANGE UP (ref 70–99)
HBV SURFACE AB SER-ACNC: SIGNIFICANT CHANGE UP
HBV SURFACE AG SER-ACNC: SIGNIFICANT CHANGE UP
INR BLD: 1.27 RATIO — HIGH (ref 0.88–1.16)
M TB IFN-G BLD-IMP: NEGATIVE — SIGNIFICANT CHANGE UP
M TB IFN-G CD4+ BCKGRND COR BLD-ACNC: 0 IU/ML — SIGNIFICANT CHANGE UP
M TB IFN-G CD4+CD8+ BCKGRND COR BLD-ACNC: 0.01 IU/ML — SIGNIFICANT CHANGE UP
OB PNL STL: NEGATIVE — SIGNIFICANT CHANGE UP
POTASSIUM SERPL-MCNC: 4 MMOL/L — SIGNIFICANT CHANGE UP (ref 3.5–5.3)
POTASSIUM SERPL-SCNC: 4 MMOL/L — SIGNIFICANT CHANGE UP (ref 3.5–5.3)
PROT SERPL-MCNC: 7.4 G/DL — SIGNIFICANT CHANGE UP (ref 6–8.3)
PROTHROM AB SERPL-ACNC: 14.6 SEC — HIGH (ref 10–12.9)
QUANT TB PLUS MITOGEN MINUS NIL: 8.35 IU/ML — SIGNIFICANT CHANGE UP
SODIUM SERPL-SCNC: 139 MMOL/L — SIGNIFICANT CHANGE UP (ref 135–145)

## 2019-07-23 PROCEDURE — 99233 SBSQ HOSP IP/OBS HIGH 50: CPT | Mod: GC

## 2019-07-23 PROCEDURE — 99232 SBSQ HOSP IP/OBS MODERATE 35: CPT

## 2019-07-23 PROCEDURE — G0452: CPT | Mod: 26

## 2019-07-23 PROCEDURE — 77012 CT SCAN FOR NEEDLE BIOPSY: CPT | Mod: 26

## 2019-07-23 RX ORDER — ACETAZOLAMIDE 250 MG/1
1000 TABLET ORAL
Refills: 0 | Status: DISCONTINUED | OUTPATIENT
Start: 2019-07-23 | End: 2019-07-26

## 2019-07-23 RX ADMIN — ENOXAPARIN SODIUM 40 MILLIGRAM(S): 100 INJECTION SUBCUTANEOUS at 11:25

## 2019-07-23 RX ADMIN — ACETAZOLAMIDE 1000 MILLIGRAM(S): 250 TABLET ORAL at 17:04

## 2019-07-23 RX ADMIN — ACETAZOLAMIDE 500 MILLIGRAM(S): 250 TABLET ORAL at 05:44

## 2019-07-23 NOTE — PROGRESS NOTE ADULT - SUBJECTIVE AND OBJECTIVE BOX
SUBJECTIVE: PT reports continued right eye blurred vision described as just seeing shapes and lights which he feels is still not improved in any way.  Now also reporting left eye decreased visual acuity and blurring.  Continues to report left tongue, maxillary and mandibular numbness, abdominal fullness, diarrhea and raspy voice.  Notes associated increased tearing of both eyes R>L.  Reports abdominal fullness is better since starting Diamox as he is urinating often.  Denies weakness.     REVIEW OF SYSTEMS : All other systems are negative except as was mentioned in the subjective.    MEDICATIONS  (STANDING):  acetazolamide    Tablet 1000 milliGRAM(s) Oral two times a day    MEDICATIONS  (PRN):    OBJECTIVE:  Vital Signs Last 24 Hrs  T(C): 36.6 (23 Jul 2019 16:20), Max: 37 (23 Jul 2019 08:20)  T(F): 97.9 (23 Jul 2019 16:20), Max: 98.6 (23 Jul 2019 08:20)  HR: 85 (23 Jul 2019 16:20) (83 - 102)  BP: 132/86 (23 Jul 2019 16:20) (130/78 - 139/89)  BP(mean): --  RR: 18 (23 Jul 2019 16:20) (18 - 20)  SpO2: 99% (23 Jul 2019 16:20) (95% - 99%)	    PHYSICAL EXAM:  •	GENERAL APPEARANCE: Morbidly obese male, appears stated age sitting up in chair, in NAD.   •	HEENT: Head, ears and nose non-traumatic. Oropharynx clear, no bite ozzy on tongue.  •	MUSCULOSKELETAL: No cyanosis, clubbing.  Mild non-pitting edema of B/L LE.   •	PSYCHIATRIC: Mood is congruent, normal affect.  •	NEUROLOGIC EXAMINATION:        o	MENTAL STATUS & HIGHER CORTICAL FUNCTION: The patient is oriented to self, situation, time and place. Attention and concentration are preserved. Recent and remote memory function is intact.         o	LANGUAGE: Speech is fluent with no dysarthria and no aphasia.         o	CRANIAL NERVES:              I- No anosmia  	II - Pupils are equal and reactive without afferent pupillary defect. Visual fields exhibit Bi-temporal hemianopsia.  Visual acuity 20/200 right eye, 20/50 left eye. Red desaturation in Right eye.  Has some issues on ischi-patrick plates with red-green but can see most numbers.       	III, IV, and VI - R. eye exotropia, extraocular movements are full with normal pursuit and saccades.  Left gaze left-beating nystagmus.   	V - Light touch is intact in all three divisions. Motor V is intact.    	VII - No facial asymmetry or weakness. VIII - hearing intact to tuning fork.  Lopez and Rinne testing reveals no asymmetry.  	IX - Palate rises symmetrically in the midline. XI - Shoulder shrug is normal. XII - Tongue protrudes in the midline.        o	MOTOR EXAMINATION: Normal bulk in all four extremities. Muscle strength is 5/5 in all four extremities.         o	SENSORY EXAMINATION: Sensory examination is grossly intact to pain and light touch in all 4 extremities.        o	COORDINATION: Fine coordinated movements in tact other than subtle dysdiadochokinesia on left hand REBECCA.  Finger to nose showed no dysmetria. No tremors present.   Asterixis was not present.

## 2019-07-23 NOTE — PROGRESS NOTE ADULT - ATTENDING COMMENTS
I have interviewed and examined the patient and reviewed the residents note including the history, exam, assessment, and plan.  I agree with the residents assessment and plan.    35 y/o M, LAZARO and morbid obesity, presents with 5 days of poor vision OD, headaches, pulsatile tinnitus, and tunnel vision intermittently. HM vision OD, + APD, EOMs full w/o pain. Ant seg exam unremarkable. Severe optic disc edema seen on DFE, left eye > right eye. Multiple intraretinal hemorrhages and microaneurysms also seen OD. Given morbid obesity and LAZARO, differential for poor vision in right eye includes non arteritic ischemic optic neuropathy vs. less likely ocular ischemic syndrome. SLE negative on admission for cell in AC. Inferior altitudinal defect suspicious for NAION OD.  Pt will require w/u for bilateral disc swelling to evaluated for elevated intracranial pressure.  Pt also has evidence of venous congestion possibly secondary to chronic papilledema vs cardiovascular vs hypercoaguable cause.  Scattered mid-peripheral DBH raises concern for ocular ischemic syndrome, although less likely as pt has no other signs.  The decrease in vision, APD, along with an altitudinal field defect OD suggsts that this pt may have had an NAION secondary to chronic disc swelling vs undiagnosed cardiovascular risk factors.  There are case reports of pts with bilateral CRVO secondary to LAZARO.  Pt has severe LAZARO and low O2 which may be an explanation as well if imaging and LP does not explain exam findings.    Plan:  - May require further imaging as initial imaging non-diagnostic- such as MRI/MRV/MRA head and neck or carotid dopplers  - IR guided LP per neurology with opening pressure- needed urgently for diagnosis and further treatment  - will need hypercoaguable work up, consider heme/onc consult  - will need cardiovascular work up including EKG, echo, carotids, BP, BS, HgA1c  - will follow in hospital closely  - pt advised to let team know if he notices any changes in his vision  - guarded visual prognosis at this time, explained to patient  - plan per Neuro, agree with Diamox for now  - if pt has elevated ICP, weight loss required  - findings and plan discussed with patient and primary team  - case and findings d/w Dr. Adkins (neuro-ophthalmology attending)    Follow-Up:  Patient should follow up his/her ophthalmologist or in the [facility] Neuro-Ophthalmology Practice within 1 week of discharge    Tamanna Suggs MD

## 2019-07-23 NOTE — PROGRESS NOTE ADULT - ASSESSMENT
35 y/o R-handed [place]ian M with h/o hypothyroidism (off meds), self-reported LAZARO presenting with 5 days of painless severe blurred vision (Right >Left) and reduced acuity of the right eye with associated B/L perioral numbness and R. arm paresthesias.      1) Transient monocular vision loss (TMVL) now involving left eye (binocular)     -CT head as well as CTA head and neck of poor quality due to body habitus and motion artifact.  However, CTA neck does not appear to demonstrate a severe carotid artery stenosis.  This lowers, but does not eliminate, the possibility of amaurosis fugax.   -CT Venogram protocol done and though of poor quality, less suspicious for venous sinus thrombosis. Despite having received IV contrast earlier, importance of ruling out venous sinus thrombosis outweighs risks of added contrast.    -LP under CT planned for 07/23, but patient received DVT prophylaxis and procedure was cancelled.    -Per Optho formal visual field testing not done inpatient.     2)Prolonged INR, PT and elevated D-dimer  -Concern for possible hypercoagulable state.  Getting hypercoagulable work up and will note for any further abnormalities.   -Trend coags.     3)H/O hypothyroidism with increased TSH  -Free T4 and T3 are normal  -Will hold off on starting synthroid for now, but may discuss with endocrinology regarding possible sub-clinical hypothyroidism     Plan:  -IR-guided LP under CT planned for 07/24 at noon- Request to measure opening pressure.  No DVT prophylaxis (no lovenox) for next 24 hours.     -Started Diamox 500 mg BID for presumed pseudotumor cerebri.  Will increase to 1000 mg BID given worsening symptoms.     Zane Anthony, DO  PGY-2 Neurology Service

## 2019-07-23 NOTE — PROGRESS NOTE ADULT - ATTENDING COMMENTS
35 y/o man with class IV obesity, p/w bilateral progressive vision loss, right > left, proptosis,. Today feels that left eye vision in getting worse and that he can barely see his fingers in front of his face. He was started on diamox yesterday and is tolerating. He has been urinating frequently. He feels better and that the swelling in his stomach is going down. Pt was seen walking to the bathroom and there was no ataxia. On CN examination there as bilateral end gaze nystagmus, eye watering, PERRL, no APD, temporal visual fields were diminished, visual acuity was less than 20/200 in the right and 20/70 in the left, he has color desaturation in the left eye. CN were otherwise intact, sensory-motor exam otherwise intact.     Main differential is still malignant pseudotumor cerebri. He was supposed to have LP under CT today, but was canceled as he had a dose of lovenox last night. Will try and have it done. Tomorrow.   Will increase diamox to 1000mg BID.   If ICP increased on spinal tap, will need to have shunt placed.   Otherwise, medically, he has an increased TSH but normal T4, which is possible subclinical hypothyroidism. Will also send for HgA1c to evaluate for DMII given pt's obesity. He also likely has LAZARO, pt says he keeps missing his sleep study appointments.   He has pedal edema and pannus edema which is likely lymphedema due to obesity. His PT and INR were increased, will send hypercoagulable studies to work this up as there is no other source for this.

## 2019-07-24 LAB
ALBUMIN SERPL ELPH-MCNC: 3.5 G/DL — SIGNIFICANT CHANGE UP (ref 3.3–5)
ALP SERPL-CCNC: 85 U/L — SIGNIFICANT CHANGE UP (ref 40–120)
ALT FLD-CCNC: 23 U/L — SIGNIFICANT CHANGE UP (ref 10–45)
ANION GAP SERPL CALC-SCNC: 9 MMOL/L — SIGNIFICANT CHANGE UP (ref 5–17)
APPEARANCE CSF: CLEAR — SIGNIFICANT CHANGE UP
APPEARANCE SPUN FLD: COLORLESS — SIGNIFICANT CHANGE UP
APTT BLD: 36.4 SEC — HIGH (ref 27.5–36.3)
AST SERPL-CCNC: 24 U/L — SIGNIFICANT CHANGE UP (ref 10–40)
AT III ACT/NOR PPP CHRO: 83 % — LOW (ref 85–135)
B2 GLYCOPROT1 AB SER QL: NEGATIVE — SIGNIFICANT CHANGE UP
BILIRUB SERPL-MCNC: 1.6 MG/DL — HIGH (ref 0.2–1.2)
BUN SERPL-MCNC: 9 MG/DL — SIGNIFICANT CHANGE UP (ref 7–23)
CALCIUM SERPL-MCNC: 9 MG/DL — SIGNIFICANT CHANGE UP (ref 8.4–10.5)
CARDIOLIPIN AB SER-ACNC: POSITIVE
CHLORIDE SERPL-SCNC: 101 MMOL/L — SIGNIFICANT CHANGE UP (ref 96–108)
CO2 SERPL-SCNC: 33 MMOL/L — HIGH (ref 22–31)
COLOR CSF: SIGNIFICANT CHANGE UP
CREAT SERPL-MCNC: 0.89 MG/DL — SIGNIFICANT CHANGE UP (ref 0.5–1.3)
CRYPTOC AG CSF-ACNC: NEGATIVE — SIGNIFICANT CHANGE UP
CSF PCR RESULT: SIGNIFICANT CHANGE UP
DRVVT SCREEN TO CONFIRM RATIO: SIGNIFICANT CHANGE UP
GLUCOSE CSF-MCNC: 86 MG/DL — HIGH (ref 40–70)
GLUCOSE SERPL-MCNC: 104 MG/DL — HIGH (ref 70–99)
GRAM STN FLD: SIGNIFICANT CHANGE UP
HBA1C BLD-MCNC: 6.1 % — HIGH (ref 4–5.6)
HCT VFR BLD CALC: 54.7 % — HIGH (ref 39–50)
HCT VFR BLD CALC: 55.2 % — HIGH (ref 39–50)
HGB BLD-MCNC: 14.5 G/DL — SIGNIFICANT CHANGE UP (ref 13–17)
HGB BLD-MCNC: 14.5 G/DL — SIGNIFICANT CHANGE UP (ref 13–17)
INR BLD: 1.14 RATIO — SIGNIFICANT CHANGE UP (ref 0.88–1.16)
LA NT DPL PPP QL: 31.9 SEC — SIGNIFICANT CHANGE UP
LYMPHOCYTES # CSF: 68 % — SIGNIFICANT CHANGE UP (ref 40–80)
MCHC RBC-ENTMCNC: 21.3 PG — LOW (ref 27–34)
MCHC RBC-ENTMCNC: 21.5 PG — LOW (ref 27–34)
MCHC RBC-ENTMCNC: 26.3 GM/DL — LOW (ref 32–36)
MCHC RBC-ENTMCNC: 26.5 GM/DL — LOW (ref 32–36)
MCV RBC AUTO: 80.4 FL — SIGNIFICANT CHANGE UP (ref 80–100)
MCV RBC AUTO: 82 FL — SIGNIFICANT CHANGE UP (ref 80–100)
MONOS+MACROS NFR CSF: 32 % — SIGNIFICANT CHANGE UP (ref 15–45)
NEUTROPHILS # CSF: 0 % — SIGNIFICANT CHANGE UP (ref 0–6)
NRBC NFR CSF: 2 /UL — SIGNIFICANT CHANGE UP (ref 0–5)
PLATELET # BLD AUTO: 225 K/UL — SIGNIFICANT CHANGE UP (ref 150–400)
PLATELET # BLD AUTO: 242 K/UL — SIGNIFICANT CHANGE UP (ref 150–400)
POTASSIUM SERPL-MCNC: 4 MMOL/L — SIGNIFICANT CHANGE UP (ref 3.5–5.3)
POTASSIUM SERPL-SCNC: 4 MMOL/L — SIGNIFICANT CHANGE UP (ref 3.5–5.3)
PROT C ACT/NOR PPP: 64 % — LOW (ref 74–150)
PROT CSF-MCNC: 23 MG/DL — SIGNIFICANT CHANGE UP (ref 15–45)
PROT SERPL-MCNC: 7.2 G/DL — SIGNIFICANT CHANGE UP (ref 6–8.3)
PROTHROM AB SERPL-ACNC: 13 SEC — SIGNIFICANT CHANGE UP (ref 10–13.1)
RBC # BLD: 6.73 M/UL — HIGH (ref 4.2–5.8)
RBC # BLD: 6.8 M/UL — HIGH (ref 4.2–5.8)
RBC # CSF: 3 /UL — HIGH (ref 0–0)
RBC # FLD: 19.8 % — HIGH (ref 10.3–14.5)
RBC # FLD: 20 % — HIGH (ref 10.3–14.5)
SODIUM SERPL-SCNC: 143 MMOL/L — SIGNIFICANT CHANGE UP (ref 135–145)
SPECIMEN SOURCE: SIGNIFICANT CHANGE UP
TUBE TYPE: SIGNIFICANT CHANGE UP
WBC # BLD: 8.65 K/UL — SIGNIFICANT CHANGE UP (ref 3.8–10.5)
WBC # BLD: 9.47 K/UL — SIGNIFICANT CHANGE UP (ref 3.8–10.5)
WBC # FLD AUTO: 8.65 K/UL — SIGNIFICANT CHANGE UP (ref 3.8–10.5)
WBC # FLD AUTO: 9.47 K/UL — SIGNIFICANT CHANGE UP (ref 3.8–10.5)

## 2019-07-24 PROCEDURE — 99232 SBSQ HOSP IP/OBS MODERATE 35: CPT | Mod: GC

## 2019-07-24 RX ORDER — ENOXAPARIN SODIUM 100 MG/ML
40 INJECTION SUBCUTANEOUS DAILY
Refills: 0 | Status: DISCONTINUED | OUTPATIENT
Start: 2019-07-24 | End: 2019-07-26

## 2019-07-24 RX ADMIN — ENOXAPARIN SODIUM 40 MILLIGRAM(S): 100 INJECTION SUBCUTANEOUS at 18:24

## 2019-07-24 RX ADMIN — ACETAZOLAMIDE 1000 MILLIGRAM(S): 250 TABLET ORAL at 05:05

## 2019-07-24 RX ADMIN — ACETAZOLAMIDE 1000 MILLIGRAM(S): 250 TABLET ORAL at 18:24

## 2019-07-24 NOTE — PROGRESS NOTE ADULT - ASSESSMENT
35 y/o R-handed [place]ian M with h/o hypothyroidism (off meds), self-reported LAZARO presenting with 5 days of painless severe blurred vision (Right >Left) and reduced acuity of the right eye with associated B/L perioral numbness and R. arm paresthesias.  CTV performed yesterday but was a poor quality study.     Plan:   -Repeat CTV today  -LP under CT today- Request to measure opening pressure.    -Started Diamox 500 mg BID for presumed pseudotumor cerebri.  Will increase to 1000 mg BID given worsening symptoms. 35 y/o R-handed [place]ian M with h/o hypothyroidism (off meds), self-reported LAZARO presenting with 5 days of painless severe blurred vision (Right >Left) and reduced acuity of the right eye with associated B/L perioral numbness and R. arm paresthesias.  CTV performed yesterday but was a poor quality study.     Plan:   -Repeat CTV today  -LP under CT today- Request to measure opening pressure.    -Diamox increased to 1000 mg BID given worsening symptoms.

## 2019-07-24 NOTE — PROGRESS NOTE ADULT - SUBJECTIVE AND OBJECTIVE BOX
Status post CT guided lumbar puncture at the _L3-L4__  level utilizing a _6 inch 20 gauge__ spinal needle.      Opening pressure over 55mg Hg    _40__cc of _clear__ cerebral spinal fluid was obtained.    Closing pressure: 16mg Hg    No immediate complications

## 2019-07-24 NOTE — PROGRESS NOTE ADULT - ATTENDING COMMENTS
35 y/o man with morbid obesity , presenting with rapidly progressive worsening vision believed to be due to malignant IIIH.   PT not examined today due to being off floor for tests and then later he was sleeping and did not want to be disturbed.     He had CT guided LP which showed opening pressure >40cm H20, which is likely underestimate of pressure as patient could not be positioned due to body habitus.  60cc of fluid drained. Routine studies sent.     Diamox increased to 1000mg BID.   Will consult Neurosurgery as patient will require shunting given is degree of vision loss and difficulty getting repeated taps. 33 y/o man with morbid obesity , presenting with rapidly progressive worsening vision believed to be due to malignant IIIH.   PT not examined today by myself due to being off floor for tests and then later he was sleeping and did not want to be disturbed.     He had CT guided LP which showed opening pressure >40cm H20, which is likely underestimate of pressure as patient could not be positioned due to body habitus.  60cc of fluid drained. Routine studies sent.     Diamox increased to 1000mg BID.   Will consult Neurosurgery as patient will require shunting given is degree of vision loss and difficulty getting repeated taps.

## 2019-07-24 NOTE — PROGRESS NOTE ADULT - SUBJECTIVE AND OBJECTIVE BOX
SUBJECTIVE: No new complaints. No events    REVIEW OF SYSTEMS : All other systems are negative except as was mentioned in the subjective.    MEDICATIONS  (STANDING):  acetazolamide    Tablet 1000 milliGRAM(s) Oral two times a day    MEDICATIONS  (PRN):    OBJECTIVE:  Vital Signs Last 24 Hrs  T(C): 36.7 (24 Jul 2019 08:09), Max: 36.9 (24 Jul 2019 05:08)  T(F): 98.1 (24 Jul 2019 08:09), Max: 98.4 (24 Jul 2019 05:08)  HR: 95 (24 Jul 2019 10:58) (81 - 103)  BP: 138/81 (24 Jul 2019 08:09) (96/65 - 138/81)  RR: 18 (24 Jul 2019 08:09) (18 - 18)  SpO2: 97% (24 Jul 2019 10:58) (94% - 99%)    PHYSICAL EXAM:  •	GENERAL APPEARANCE: Morbidly obese male, appears stated age sitting up in chair, in NAD.   •	HEENT: Head, ears and nose non-traumatic. Oropharynx clear, no bite zozy on tongue.  •	MUSCULOSKELETAL: No cyanosis, clubbing.  Mild non-pitting edema of B/L LE.   •	PSYCHIATRIC: Mood is congruent, normal affect.  •	NEUROLOGIC EXAMINATION:        o	MENTAL STATUS & HIGHER CORTICAL FUNCTION: The patient is oriented to self, situation, time and place. Attention and concentration are preserved. Recent and remote memory function is intact.         o	LANGUAGE: Speech is fluent with no dysarthria and no aphasia.         o	CRANIAL NERVES:              I- No anosmia  	II - Pupils are equal and reactive without afferent pupillary defect. Visual fields exhibit Bi-temporal hemianopsia.  Visual acuity 20/200 right eye, 20/50 left eye. Red desaturation in Right eye.  Has some issues on ischi-patrick plates with red-green but can see most numbers.       	III, IV, and VI - R. eye exotropia, extraocular movements are full with normal pursuit and saccades.  Left gaze left-beating nystagmus.   	V - Light touch is intact in all three divisions. Motor V is intact.    	VII - No facial asymmetry or weakness. VIII - hearing intact to tuning fork.  Lopez and Rinne testing reveals no asymmetry.  	IX - Palate rises symmetrically in the midline. XI - Shoulder shrug is normal. XII - Tongue protrudes in the midline.        o	MOTOR EXAMINATION: Normal bulk in all four extremities. Muscle strength is 5/5 in all four extremities.         o	SENSORY EXAMINATION: Sensory examination is grossly intact to pain and light touch in all 4 extremities.        o	COORDINATION: Fine coordinated movements in tact other than subtle dysdiadochokinesia on left hand REBECCA.  Finger to nose showed no dysmetria. No tremors present.   Asterixis was not present.

## 2019-07-24 NOTE — CHART NOTE - NSCHARTNOTEFT_GEN_A_CORE
Pt seen at bedside. On CPAP for respiratory desaturation. Unable to perform exam. LP today with elevated opening pressure. Started on Diamox per Neurology. Will continue to follow

## 2019-07-25 DIAGNOSIS — Z71.89 OTHER SPECIFIED COUNSELING: ICD-10-CM

## 2019-07-25 LAB
ALBUMIN CSF-MCNC: 8.9 MG/DL — LOW (ref 14–25)
ALBUMIN SERPL ELPH-MCNC: 2904 MG/DL — LOW (ref 3500–5200)
IGG CSF-MCNC: 2.5 MG/DL — SIGNIFICANT CHANGE UP
IGG FLD-MCNC: 1421 MG/DL — SIGNIFICANT CHANGE UP (ref 610–1660)
IGG SYNTH RATE SER+CSF CALC-MRATE: -2.8 MG/DAY — SIGNIFICANT CHANGE UP
IGG/ALB CLEAR SER+CSF-RTO: 0.6 — SIGNIFICANT CHANGE UP
IGG/ALB CSF: 0.28 RATIO — HIGH
IGG/ALB SER: 0.49 RATIO — SIGNIFICANT CHANGE UP

## 2019-07-25 PROCEDURE — 99233 SBSQ HOSP IP/OBS HIGH 50: CPT | Mod: GC

## 2019-07-25 PROCEDURE — 99232 SBSQ HOSP IP/OBS MODERATE 35: CPT

## 2019-07-25 PROCEDURE — 99223 1ST HOSP IP/OBS HIGH 75: CPT | Mod: 57

## 2019-07-25 PROCEDURE — 71275 CT ANGIOGRAPHY CHEST: CPT | Mod: 26

## 2019-07-25 PROCEDURE — 70496 CT ANGIOGRAPHY HEAD: CPT | Mod: 26

## 2019-07-25 RX ORDER — ACETAMINOPHEN 500 MG
650 TABLET ORAL EVERY 6 HOURS
Refills: 0 | Status: DISCONTINUED | OUTPATIENT
Start: 2019-07-25 | End: 2019-07-30

## 2019-07-25 RX ORDER — SODIUM CHLORIDE 9 MG/ML
1000 INJECTION INTRAMUSCULAR; INTRAVENOUS; SUBCUTANEOUS ONCE
Refills: 0 | Status: COMPLETED | OUTPATIENT
Start: 2019-07-25 | End: 2019-07-25

## 2019-07-25 RX ORDER — POLYETHYLENE GLYCOL 3350 17 G/17G
17 POWDER, FOR SOLUTION ORAL DAILY
Refills: 0 | Status: DISCONTINUED | OUTPATIENT
Start: 2019-07-25 | End: 2019-08-10

## 2019-07-25 RX ORDER — SENNA PLUS 8.6 MG/1
1 TABLET ORAL DAILY
Refills: 0 | Status: DISCONTINUED | OUTPATIENT
Start: 2019-07-25 | End: 2019-08-10

## 2019-07-25 RX ORDER — NYSTATIN CREAM 100000 [USP'U]/G
1 CREAM TOPICAL
Refills: 0 | Status: DISCONTINUED | OUTPATIENT
Start: 2019-07-25 | End: 2019-08-10

## 2019-07-25 RX ADMIN — NYSTATIN CREAM 1 APPLICATION(S): 100000 CREAM TOPICAL at 21:19

## 2019-07-25 RX ADMIN — Medication 650 MILLIGRAM(S): at 22:06

## 2019-07-25 RX ADMIN — ACETAZOLAMIDE 1000 MILLIGRAM(S): 250 TABLET ORAL at 18:42

## 2019-07-25 RX ADMIN — Medication 650 MILLIGRAM(S): at 22:36

## 2019-07-25 RX ADMIN — ENOXAPARIN SODIUM 40 MILLIGRAM(S): 100 INJECTION SUBCUTANEOUS at 18:42

## 2019-07-25 RX ADMIN — SODIUM CHLORIDE 1000 MILLILITER(S): 9 INJECTION INTRAMUSCULAR; INTRAVENOUS; SUBCUTANEOUS at 18:42

## 2019-07-25 RX ADMIN — ACETAZOLAMIDE 1000 MILLIGRAM(S): 250 TABLET ORAL at 05:51

## 2019-07-25 NOTE — PROGRESS NOTE ADULT - SUBJECTIVE AND OBJECTIVE BOX
[facility] Ophthalmology Progress Note    S: Pt seen at bedside. Pt too lethargic to give accurate visual acuity and was not amenable to exam today    Mood and Affect Extreme lethargy    Ophthalmology Exam    Visual acuity (sc): CHRIS 2/2 lethargy  Pupils: + APD OD, Round and reactive OS  Ttono: 14 OU  Extraocular movements (EOMs): CHRIS 2/2 lethargy  Confrontational Visual Field (CVF): CHRIS 2/2 lethargy  Color Plates: CHRIS 2/2 lethargy    Pen Light Exam (PLE)   External:  Flat OU  Lids/Lashes/Lacrimal Ducts: Flat OU    Sclera/Conjunctiva:  W+Q OU  Cornea: CL OU  Anterior Chamber: D+Q OU   Iris:  Flat OU  Lens:  Cl OU    Diagnostic Testing: EXAM: CT ANGIO BRAIN (W)AW IC     EXAM: CT ANGIO NECK (W)AW IC       PROCEDURE DATE: 07/21/2019           INTERPRETATION: CLINICAL INFORMATION: Blurry vision, mila-oral numbness,   right arm paresthesia.     TECHNIQUE:   Contrast enhanced axial CT images were acquired from the aortic arch to the   vertex of the calvarium, during the angiographic phase. Additional   post-contrast axial CT images through the head were obtained. Two- and   three-dimensional maximum intensity projection reformats were generated from   the angiographic images.   160 cc of Omnipaque-300 mg/ml were administered intravenously, without   immediate complication. 40 cc was discarded.     COMPARISON: None.     FINDINGS:     CT ANGIOGRAPHY NECK:     Nondiagnostic evaluation of the major arterial vessels and soft tissues of   the neck secondary to body habitus.     CT ANGIOGRAPHY BRAIN:     Nondiagnostic evaluation of the petrous and cavernous segments of the   internal carotid arteries. Bilateral supraclinoid segments are grossly   patent. Bilateral proximal middle cerebral and anterior cerebral arteries   are grossly patent.     Nondiagnostic evaluation of the intracranial vertebral and proximal basilar   artery. Proximal bilateral posterior cerebral arteries are patent.     Evaluation for intracranial aneurysm is markedly limited. Partially empty   sella. Marked focal bony thinning involving the posterior walls of the   sphenoid sinuses at the level of the cavernous sinuses/cavernous segments of   the internal carotid arteries. Bony dehiscence cannot be entirely excluded.   Evaluation is limited on this examination. Chronic left medial orbital wall   deformity.     IMPRESSION:     CT angiography neck: Nondiagnostic evaluation of the cervical carotid and   vertebral vasculature.     CT angiography brain: No proximal large vessel occlusion involving the   anterior, middle, or posterior cerebral arteries. Nondiagnostic evaluation   of the intracranial internal carotid, vertebral, and basilar arteries.     Essentially nondiagnostic evaluation for intracranial aneurysm.       Assessment: 35 y/o M, LAZARO and morbid obesity, presents with 5 days of poor vision OD, headaches, pulsatile tinnitus, and tunnel vision intermittently. HM vision OD, + APD, EOMs full w/o pain. Ant seg exam unremarkable. Severe optic disc edema seen on DFE, left eye > right eye. Multiple intraretinal hemorrhages and microaneurysms also seen OD. Given morbid obesity and LAZARO, differential for poor vision in right eye includes non arteritic ischemic optic neuropathy vs. less likely ocular ischemic syndrome. SLE negative on admission for cell in AC. Inferior altitudinal defect suspicious for NAION OD.  Pt will require w/u for bilateral disc swelling to evaluated for elevated intracranial pressure.  Pt also has evidence of venous congestion possibly secondary to chronic papilledema vs cardiovascular vs hypercoaguable cause.  Scattered mid-peripheral DBH raises concern for ocular ischemic syndrome, although less likely as pt has no other signs.  The decrease in vision, APD, along with an altitudinal field defect OD suggsts that this pt may have had an NAION secondary to chronic disc swelling vs undiagnosed cardiovascular risk factors.  There are case reports of pts with bilateral CRVO secondary to LAZARO.  Pt has severe LAZARO and low O2 which may be an explanation as well if imaging and LP does not explain exam findings. IR guided LP with elevated opening pressure    Plan:  - plan per Neuro, agree with Diamox for now  - will need hypercoaguable work up, consider heme/onc consult, weight loss advised, agree with plan for shunt  - will need cardiovascular work up including EKG, echo, carotids, BP, BS, HgA1c  - will follow in hospital closely  - pt advised to let team know if he notices any changes in his vision  - guarded visual prognosis at this time, explained to patient  - findings and plan discussed with patient and primary team  - case and findings d/w Dr. Adkins (neuro-ophthalmology attending)    Follow-Up:  Patient should follow up his/her ophthalmologist or in the [facility] Neuro-Ophthalmology Practice within 1 week of discharge  [address].  [place], [place] [id number] [phone]    s/d/w Dr. Suggs [facility] Ophthalmology Progress Note    S: Pt seen at bedside. Pt too lethargic to give accurate visual acuity and was not amenable to exam today.  Not easily arousable, nurse brought to the room to assist in waking the pt up with little success.    Mood and Affect Extreme lethargy, unable to assess A and O due to lethargy    Ophthalmology Exam    Visual acuity (sc): CHRIS 2/2 lethargy  Pupils: + APD OD, Round and reactive OS  Ttono: 14 OU  Extraocular movements (EOMs): CHRIS 2/2 lethargy  Confrontational Visual Field (CVF): CHRIS 2/2 lethargy  Color Plates: CHRIS 2/2 lethargy    Pen Light Exam (PLE)   External:  Flat OU  Lids/Lashes/Lacrimal Ducts: Flat OU    Sclera/Conjunctiva:  W+Q OU  Cornea: CL OU  Anterior Chamber: D+Q OU   Iris:  Flat OU  Lens:  Cl OU    Diagnostic Testing: EXAM: CT ANGIO BRAIN (W)AW IC     EXAM: CT ANGIO NECK (W)AW IC       PROCEDURE DATE: 07/21/2019           INTERPRETATION: CLINICAL INFORMATION: Blurry vision, mila-oral numbness,   right arm paresthesia.     TECHNIQUE:   Contrast enhanced axial CT images were acquired from the aortic arch to the   vertex of the calvarium, during the angiographic phase. Additional   post-contrast axial CT images through the head were obtained. Two- and   three-dimensional maximum intensity projection reformats were generated from   the angiographic images.   160 cc of Omnipaque-300 mg/ml were administered intravenously, without   immediate complication. 40 cc was discarded.     COMPARISON: None.     FINDINGS:     CT ANGIOGRAPHY NECK:     Nondiagnostic evaluation of the major arterial vessels and soft tissues of   the neck secondary to body habitus.     CT ANGIOGRAPHY BRAIN:     Nondiagnostic evaluation of the petrous and cavernous segments of the   internal carotid arteries. Bilateral supraclinoid segments are grossly   patent. Bilateral proximal middle cerebral and anterior cerebral arteries   are grossly patent.     Nondiagnostic evaluation of the intracranial vertebral and proximal basilar   artery. Proximal bilateral posterior cerebral arteries are patent.     Evaluation for intracranial aneurysm is markedly limited. Partially empty   sella. Marked focal bony thinning involving the posterior walls of the   sphenoid sinuses at the level of the cavernous sinuses/cavernous segments of   the internal carotid arteries. Bony dehiscence cannot be entirely excluded.   Evaluation is limited on this examination. Chronic left medial orbital wall   deformity.     IMPRESSION:     CT angiography neck: Nondiagnostic evaluation of the cervical carotid and   vertebral vasculature.     CT angiography brain: No proximal large vessel occlusion involving the   anterior, middle, or posterior cerebral arteries. Nondiagnostic evaluation   of the intracranial internal carotid, vertebral, and basilar arteries.     Essentially nondiagnostic evaluation for intracranial aneurysm.       Assessment: 33 y/o M, LAZARO and morbid obesity, presents with 5 days of poor vision OD, headaches, pulsatile tinnitus, and tunnel vision intermittently. HM vision OD, + APD, EOMs full w/o pain. Ant seg exam unremarkable. Severe optic disc edema seen on DFE, left eye > right eye. Multiple intraretinal hemorrhages and microaneurysms also seen OD. Given morbid obesity and LAZARO, differential for poor vision in right eye includes non arteritic ischemic optic neuropathy vs. less likely ocular ischemic syndrome. SLE negative on admission for cell in AC. Inferior altitudinal defect suspicious for NAION OD secondary to LAZARO.  Pt found to have elevated ICP on IR guided LP suggesting pseudotumor cerebri as a cause for the papilledema.  No venous thrombosis or mass noted on imaging.    Plan:  - plan per Neuro, agree with Diamox for now and potential need for  shunt due to severe vision loss  - will need hypercoaguable work up, consider heme/onc consult, weight loss advised, agree with plan for shunt  - will need cardiovascular work up including EKG, echo, carotids, BP, BS, HgA1c  - will follow in hospital closely  - pt advised to let team know if he notices any changes in his vision  - guarded visual prognosis at this time, explained to patient  - findings and plan discussed with patient and primary team  - case and findings d/w Dr. Adkins (neuro-ophthalmology attending)    Follow-Up:  Patient should follow up his/her ophthalmologist or in the [facility] Neuro-Ophthalmology Practice within 1 week of discharge  [address] [place] [address] [phone]    s/d/w Dr. Suggs

## 2019-07-25 NOTE — PROGRESS NOTE ADULT - SUBJECTIVE AND OBJECTIVE BOX
SUBJECTIVE: No new complaints. No events    REVIEW OF SYSTEMS : All other systems are negative except as was mentioned in the subjective.    MEDICATIONS  (STANDING):  acetazolamide    Tablet 1000 milliGRAM(s) Oral two times a day  enoxaparin Injectable 40 milliGRAM(s) SubCutaneous daily    MEDICATIONS  (PRN):    OBJECTIVE:  Vital Signs Last 24 Hrs  T(C): 36.9 (25 Jul 2019 04:29), Max: 37.3 (24 Jul 2019 16:41)  T(F): 98.4 (25 Jul 2019 04:29), Max: 99.2 (24 Jul 2019 16:41)  HR: 97 (25 Jul 2019 04:29) (92 - 110)  BP: 131/84 (25 Jul 2019 04:29) (131/81 - 179/100)  BP(mean): --  RR: 22 (25 Jul 2019 04:29) (18 - 24)  SpO2: 100% (25 Jul 2019 04:29) (91% - 100%)    PHYSICAL EXAM:  •	GENERAL APPEARANCE: Morbidly obese male, appears stated age sitting up in chair, in NAD.   •	HEENT: Head, ears and nose non-traumatic. Oropharynx clear, no bite ozzy on tongue.  •	MUSCULOSKELETAL: No cyanosis, clubbing.  Mild non-pitting edema of B/L LE.   •	PSYCHIATRIC: Mood is congruent, normal affect.  •	NEUROLOGIC EXAMINATION:        o	MENTAL STATUS & HIGHER CORTICAL FUNCTION: The patient is oriented to self, situation, time and place. Attention and concentration are preserved. Recent and remote memory function is intact.         o	LANGUAGE: Speech is fluent with no dysarthria and no aphasia.         o	CRANIAL NERVES:              I- No anosmia  	II - Pupils are equal and reactive without afferent pupillary defect. Visual fields exhibit Bi-temporal hemianopsia.  Visual acuity 20/200 right eye, 20/50 left eye. Red desaturation in Right eye.  Has some issues on ischi-patrick plates with red-green but can see most numbers.       	III, IV, and VI - R. eye exotropia, extraocular movements are full with normal pursuit and saccades.  Left gaze left-beating nystagmus.   	V - Light touch is intact in all three divisions. Motor V is intact.    	VII - No facial asymmetry or weakness. VIII - hearing intact to tuning fork.  Lopez and Rinne testing reveals no asymmetry.  	IX - Palate rises symmetrically in the midline. XI - Shoulder shrug is normal. XII - Tongue protrudes in the midline.        o	MOTOR EXAMINATION: Normal bulk in all four extremities. Muscle strength is 5/5 in all four extremities.         o	SENSORY EXAMINATION: Sensory examination is grossly intact to pain and light touch in all 4 extremities.        o	COORDINATION: Fine coordinated movements in tact other than subtle dysdiadochokinesia on left hand REBECCA.  Finger to nose showed no dysmetria. No tremors present.   Asterixis was not present. SUBJECTIVE: Continues to c/o worsening vision in left eye.  Note he cannot see lights well at this point.  Slept well but feels would benefit from BiPAP during day.  No more diarrhea, admits to constipation.  LP done 07/24 evening with significantly elevated pressure.  CTV not done at same time as requested.     REVIEW OF SYSTEMS : All other systems are negative except as was mentioned in the subjective.    MEDICATIONS  (STANDING):  acetazolamide    Tablet 1000 milliGRAM(s) Oral two times a day  enoxaparin Injectable 40 milliGRAM(s) SubCutaneous daily  nystatin Powder 1 Application(s) Topical two times a day    MEDICATIONS  (PRN):    OBJECTIVE:  Vital Signs Last 24 Hrs  T(C): 36.9 (25 Jul 2019 04:29), Max: 37.3 (24 Jul 2019 16:41)  T(F): 98.4 (25 Jul 2019 04:29), Max: 99.2 (24 Jul 2019 16:41)  HR: 97 (25 Jul 2019 04:29) (92 - 110)  BP: 131/84 (25 Jul 2019 04:29) (131/81 - 179/100)  BP(mean): --  RR: 22 (25 Jul 2019 04:29) (18 - 24)  SpO2: 100% (25 Jul 2019 04:29) (91% - 100%)    PHYSICAL EXAM:  •	GENERAL APPEARANCE: Morbidly obese male, appears stated age laying in bed in NAD.   •	HEENT: Head, ears and nose non-traumatic. Oropharynx clear, no bite ozzy on tongue.  •	MUSCULOSKELETAL: No cyanosis, clubbing..  Moderate non-pitting edema of B/L LE.   •	PSYCHIATRIC: Mood is congruent, normal affect.  •	NEUROLOGIC EXAMINATION:        o	MENTAL STATUS & HIGHER CORTICAL FUNCTION: The patient is oriented to self, situation, time and place. Attention and concentration are preserved. Recent and remote memory function is intact.         o	LANGUAGE: Speech is fluent with no dysarthria and no aphasia.         o	CRANIAL NERVES:              I- No anosmia  	II - Pupils are now sluggish.  Left 3 mm-->2mm, R 2mm-->1 mm sluggish.  Visual fields worse with exhibit Bi-temporal superior and inferior hemianopsia.  Visual acuity 20/200 right eye, 20/50 left eye. Having significant difficulty in perceiving shapes and lights at this time.        	III, IV, and VI - R. eye exotropia, extraocular movements are full with normal pursuit and saccades.  Left gaze left-beating nystagmus.   	V - Light touch is intact in all three divisions. Motor V is intact.    	VII - No facial asymmetry or weakness. VIII - hearing intact to tuning fork.  Lopez and Rinne testing reveals no asymmetry.  	IX - Palate rises symmetrically in the midline. XI - Shoulder shrug is normal. XII - Tongue protrudes in the midline.        o	MOTOR EXAMINATION: Normal bulk in all four extremities. Muscle strength is 5/5 in all four extremities.         o	SENSORY EXAMINATION: Sensory examination is grossly intact to pain and light touch in all 4 extremities.        o	COORDINATION: Fine coordinated movements in tact other than persistent subtle dysdiadochokinesia on left hand REBECCA.                            14.5   9.47  )-----------( 225      ( 24 Jul 2019 19:26 )             55.2   PT/INR - ( 24 Jul 2019 19:37 )   PT: 13.0 sec;   INR: 1.14 ratio         PTT - ( 24 Jul 2019 19:37 )  PTT:36.4 sec    07-24    143  |  101  |  9   ----------------------------<  104<H>  4.0   |  33<H>  |  0.89    Ca    9.0      24 Jul 2019 16:14    TPro  x   /  Alb  2904<L>  /  TBili  x   /  DBili  x   /  AST  x   /  ALT  x   /  AlkPhos  x   07-24    Anticardiolipin +   Anti-thrombin III  Protein C abnormality

## 2019-07-25 NOTE — CONSULT NOTE ADULT - ASSESSMENT
Ifrah Chaves  34M w/ PMH of obesity p/w progressively worsening vision loss, numbness in UEs and headache over the last week found to have severe optic disc edema, and an LP opening pressure of over 55.     -Currently admitted to neurology, optho following  - cont acetazolamide  -Candidate for  shunt placement due to progressive vision loss and headache concerning for IIH  -will preop pending discussion with attending

## 2019-07-25 NOTE — PROGRESS NOTE ADULT - ASSESSMENT
INCOMPLETE    33 y/o R-handed Ecdorian M with h/o hypothyroidism (off meds), self-reported LAZARO presenting with 5 days of painless severe blurred vision (Right >Left) and reduced acuity of the right eye with associated B/L perioral numbness and R. arm paresthesias.  CTV performed yesterday but was a poor quality study.     Plan:   -Repeat CTV today  -LP under CT today- Request to measure opening pressure.    -Diamox increased to 1000 mg BID given worsening symptoms. 33 y/o R-handed [place]ian M with h/o hypothyroidism (off meds), self-reported LAZARO presenting with 5 days of painless severe blurred vision (Right >Left) and reduced acuity of the right eye with associated B/L perioral numbness and R. arm paresthesias.  CTV performed yesterday but was a poor quality study.     1) Increased intracranial pressure  Secondary to either venous sinus thrombus vs IIH   B/L Papilledema, worsening vision.     2) Hypercoagulable state  Hypercoag work up notable for anti-thrombin III,Protein C, coag panel prolonged  CT angiogram of chest to R/O PE as PT still SOB with elevated D dimer and hypercoag panel     3) A1C 6.1  Monitor glucose q4    Plan:   -Repeat CTV today (symptoms are worsening and order is in as CT Angiogram with CT Venogram protocol.  Discussed with radiology about absolute need to have it done today, 07/25).  If venous thrombus found, will start heparin.    -Hypercoagulable work up significant for anticardiolipin, anti-thrombin III, protein C, IgA antibody==>Consult Hematology for suspected venous thrombus and further anticoagulation need.   -Consult NSG for  Shunt given increased opening pressure >40 given rapid progression of symptoms.   -Diamox to continue at 1000 mg BID given worsening symptoms.   -NPO for CTV, afterwards restart diabetic diet.   -Glucose accu checks q 4h.  May need ISS.   -Consider endocrinology consult for elevated TSH and normal T3, T4 in setting of also elevated A1C.    -500-1000 cc bolus IVNS after CTV given contrast.      Zane Anthony, DO  PGY-2 Neurology Service

## 2019-07-25 NOTE — PROGRESS NOTE ADULT - ATTENDING COMMENTS
33 y/o super morbidly obese man with progressive vision loss now in both eyes, R>L. Opening pressure on LP was 55. NO improvement in vision however. Today could barely make out hand in front of his face. Likely due to malignant pseudotumor cerebri. There is concern that he may have underlying sinus thrombus as his PT and INR were abnormal and his hypercoagulable panel came back positive for anticardiolipin ab, positive cardiolipin ab IgA, and low antithrombin III. He went for CTV which show clear sagittal sinus but the sigmoid sinuses could not be visualized due to habitus. There was also some concern due to him being short of breath on exam today for possible PE, but the CTA also was poor quality and inconclusive.     Due to worsening vision, will need to be shunted. Neurosurgery following, pending review by attending.   Will continue Diamox 1000mg BID. May need to increase to 1500mg BID due to pt's size.   Ophthalmology following, also recommending cardiology workup with echo, carotids, EKG. Will get Heme consult for abnormal hypercoagulable labs.   Hg A1c was 6.1 which is borderline.   C/w CPAP at night for Obstructive sleep Apnea.

## 2019-07-25 NOTE — PROGRESS NOTE ADULT - ATTENDING COMMENTS
I have interviewed and examined the patient and reviewed the residents note including the history, exam, assessment, and plan.  I agree with the residents assessment and plan.    33 y/o M, LAZARO and morbid obesity, presents with 5 days of poor vision OD, headaches, pulsatile tinnitus, and tunnel vision intermittently. HM vision OD, + APD, EOMs full w/o pain. Ant seg exam unremarkable. Severe optic disc edema seen on DFE, left eye > right eye. Multiple intraretinal hemorrhages and microaneurysms also seen OD. Given morbid obesity and LAZARO, differential for poor vision in right eye includes non arteritic ischemic optic neuropathy vs. less likely ocular ischemic syndrome. SLE negative on admission for cell in AC. Inferior altitudinal defect suspicious for NAION OD secondary to LAZARO.  Pt found to have elevated ICP on IR guided LP suggesting pseudotumor cerebri as a cause for the papilledema.    Plan:  - plan per Neuro, agree with Diamox for now and potential need for  shunt due to severe vision loss  - will need hypercoaguable work up, consider heme/onc consult, weight loss advised, agree with plan for shunt  - will need cardiovascular work up including EKG, echo, carotids, BP, BS, HgA1c  - will follow in hospital closely  - pt advised to let team know if he notices any changes in his vision  - guarded visual prognosis at this time, explained to patient  - findings and plan discussed with patient and primary team  - case and findings d/w Dr. Adkins (neuro-ophthalmology attending)    Follow-Up:  Patient should follow up his/her ophthalmologist or in the [facility] Neuro-Ophthalmology Practice within 1 week of discharge    Tamanna Suggs MD

## 2019-07-25 NOTE — CONSULT NOTE ADULT - SUBJECTIVE AND OBJECTIVE BOX
p ([id number])     HPI:  HPI: Mr. Ifrah Pantoja is a 33 y/o R-handed Black male with no self-reported PMH other than possible LAZARO who presented on 07/20/19 from the sleep center after reporting 5 days of monocular right eye blurred vision accompanied by perioral numbness and tingling and R arm paresthesias.  He reports his last known well was when he arose on 07/15/19 at about 08:00.  Progressively throughout the coming days he began to notice worsening blurred vision out of the right eye and perioral numbness on both sides.  This continued to worsen to the point where he reports painless loss of vision other than general shapes and figures from the right eye.  He did not seek medical attention immediately as he was concerned that he would miss his appointment for CPAP evaluation for suspected LAZARO.  Upon hearing these symptoms at his appointment today, he was sent directly to the ED.  He denies any weakness, black shade like loss of vision or diplopia.  He admits to some color disturbances and tearing of the right eye, but denies any eye pain.  On initial questioning, he denied any headache.  However, on repeated pressing he admits to a dull B/L posterior headache associated with the symptoms which was worse upon wakening.  This had resolved by the time of presentation to the hospital on 07/21.  He admits to "floaters" in his left eye but denies blurred vision, disturbance in visual acuity or diplopia. (21 Jul 2019 08:15)      Imaging: HCT, CTA unremarkable.     Exam:  Exam: AAOx3, FC, 5/5 throughout.    Vision: able to name objects with difficulty, L eye: nasal field cuts and he is unable to count fingers accurately, while R eye is unable to see light.     --Anticoagulation:  enoxaparin Injectable 40 milliGRAM(s) SubCutaneous daily    =====================  PAST MEDICAL HISTORY   LAZARO (obstructive sleep apnea)  Asthma    PAST SURGICAL HISTORY         MEDICATIONS:  Antibiotics:    Neuro:    Other:  acetazolamide    Tablet 1000 milliGRAM(s) Oral two times a day      SOCIAL HISTORY:   Occupation:   Marital Status:     FAMILY HISTORY:      ROS: Negative except per HPI    LABS:  PT/INR - ( 24 Jul 2019 19:37 )   PT: 13.0 sec;   INR: 1.14 ratio         PTT - ( 24 Jul 2019 19:37 )  PTT:36.4 sec                        14.5   9.47  )-----------( 225      ( 24 Jul 2019 19:26 )             55.2     07-24    143  |  101  |  9   ----------------------------<  104<H>  4.0   |  33<H>  |  0.89    Ca    9.0      24 Jul 2019 16:14    TPro  x   /  Alb  2904<L>  /  TBili  x   /  DBili  x   /  AST  x   /  ALT  x   /  AlkPhos  x   07-24

## 2019-07-26 LAB
ALBUMIN SERPL ELPH-MCNC: 4.2 G/DL — SIGNIFICANT CHANGE UP (ref 3.3–5)
ALP SERPL-CCNC: 100 U/L — SIGNIFICANT CHANGE UP (ref 40–120)
ALT FLD-CCNC: 27 U/L — SIGNIFICANT CHANGE UP (ref 10–45)
ANION GAP SERPL CALC-SCNC: 10 MMOL/L — SIGNIFICANT CHANGE UP (ref 5–17)
APCR PPP: 2.86 RATIO — SIGNIFICANT CHANGE UP
APTT BLD: 37.6 SEC — HIGH (ref 27.5–36.3)
AST SERPL-CCNC: 22 U/L — SIGNIFICANT CHANGE UP (ref 10–40)
BASE EXCESS BLDA CALC-SCNC: 1.3 MMOL/L — SIGNIFICANT CHANGE UP (ref -2–2)
BASE EXCESS BLDV CALC-SCNC: 1.2 MMOL/L — SIGNIFICANT CHANGE UP (ref -2–2)
BILIRUB SERPL-MCNC: 1.7 MG/DL — HIGH (ref 0.2–1.2)
BUN SERPL-MCNC: 10 MG/DL — SIGNIFICANT CHANGE UP (ref 7–23)
CA-I SERPL-SCNC: 1.21 MMOL/L — SIGNIFICANT CHANGE UP (ref 1.12–1.3)
CALCIUM SERPL-MCNC: 9.5 MG/DL — SIGNIFICANT CHANGE UP (ref 8.4–10.5)
CHLORIDE BLDV-SCNC: 111 MMOL/L — HIGH (ref 96–108)
CHLORIDE SERPL-SCNC: 99 MMOL/L — SIGNIFICANT CHANGE UP (ref 96–108)
CO2 BLDA-SCNC: 34 MMOL/L — HIGH (ref 22–30)
CO2 BLDV-SCNC: 34 MMOL/L — HIGH (ref 22–30)
CO2 SERPL-SCNC: 28 MMOL/L — SIGNIFICANT CHANGE UP (ref 22–31)
CREAT SERPL-MCNC: 0.72 MG/DL — SIGNIFICANT CHANGE UP (ref 0.5–1.3)
GAS PNL BLDA: SIGNIFICANT CHANGE UP
GAS PNL BLDV: 132 MMOL/L — LOW (ref 135–145)
GAS PNL BLDV: SIGNIFICANT CHANGE UP
GAS PNL BLDV: SIGNIFICANT CHANGE UP
GLUCOSE BLDV-MCNC: 92 MG/DL — SIGNIFICANT CHANGE UP (ref 70–99)
GLUCOSE SERPL-MCNC: 78 MG/DL — SIGNIFICANT CHANGE UP (ref 70–99)
HCO3 BLDA-SCNC: 31 MMOL/L — HIGH (ref 21–29)
HCO3 BLDV-SCNC: 32 MMOL/L — HIGH (ref 21–29)
HCT VFR BLD CALC: 60.2 % — CRITICAL HIGH (ref 39–50)
HCT VFR BLDA CALC: 43 % — SIGNIFICANT CHANGE UP (ref 39–50)
HGB BLD CALC-MCNC: 14 G/DL — SIGNIFICANT CHANGE UP (ref 13–17)
HGB BLD-MCNC: 15.8 G/DL — SIGNIFICANT CHANGE UP (ref 13–17)
HOROWITZ INDEX BLDV+IHG-RTO: SIGNIFICANT CHANGE UP
INR BLD: 1 RATIO — SIGNIFICANT CHANGE UP (ref 0.88–1.16)
LACTATE BLDV-MCNC: 0.7 MMOL/L — SIGNIFICANT CHANGE UP (ref 0.7–2)
MCHC RBC-ENTMCNC: 21.8 PG — LOW (ref 27–34)
MCHC RBC-ENTMCNC: 26.2 GM/DL — LOW (ref 32–36)
MCV RBC AUTO: 83.1 FL — SIGNIFICANT CHANGE UP (ref 80–100)
OTHER CELLS CSF MANUAL: 18 ML/DL — SIGNIFICANT CHANGE UP (ref 18–22)
PCO2 BLDA: 80 MMHG — CRITICAL HIGH (ref 32–46)
PCO2 BLDV: 84 MMHG — HIGH (ref 35–50)
PH BLDA: 7.22 — LOW (ref 7.35–7.45)
PH BLDV: 7.2 — LOW (ref 7.35–7.45)
PLATELET # BLD AUTO: 169 K/UL — SIGNIFICANT CHANGE UP (ref 150–400)
PO2 BLDA: 93 MMHG — SIGNIFICANT CHANGE UP (ref 74–108)
PO2 BLDV: 87 MMHG — HIGH (ref 25–45)
POTASSIUM BLDV-SCNC: 3.6 MMOL/L — SIGNIFICANT CHANGE UP (ref 3.5–5.3)
POTASSIUM SERPL-MCNC: 4 MMOL/L — SIGNIFICANT CHANGE UP (ref 3.5–5.3)
POTASSIUM SERPL-SCNC: 4 MMOL/L — SIGNIFICANT CHANGE UP (ref 3.5–5.3)
PROT SERPL-MCNC: 8.6 G/DL — HIGH (ref 6–8.3)
PROTHROM AB SERPL-ACNC: 11.3 SEC — SIGNIFICANT CHANGE UP (ref 10–13.1)
RBC # BLD: 7.24 M/UL — HIGH (ref 4.2–5.8)
RBC # FLD: 20.5 % — HIGH (ref 10.3–14.5)
SAO2 % BLDA: 97 % — HIGH (ref 92–96)
SAO2 % BLDV: 96 % — HIGH (ref 67–88)
SODIUM SERPL-SCNC: 137 MMOL/L — SIGNIFICANT CHANGE UP (ref 135–145)
WBC # BLD: 7.71 K/UL — SIGNIFICANT CHANGE UP (ref 3.8–10.5)
WBC # FLD AUTO: 7.71 K/UL — SIGNIFICANT CHANGE UP (ref 3.8–10.5)

## 2019-07-26 PROCEDURE — 93970 EXTREMITY STUDY: CPT | Mod: 26

## 2019-07-26 PROCEDURE — 99222 1ST HOSP IP/OBS MODERATE 55: CPT | Mod: GC

## 2019-07-26 PROCEDURE — 99233 SBSQ HOSP IP/OBS HIGH 50: CPT | Mod: GC

## 2019-07-26 PROCEDURE — 83020 HEMOGLOBIN ELECTROPHORESIS: CPT | Mod: 26

## 2019-07-26 RX ORDER — ACETAZOLAMIDE 250 MG/1
1000 TABLET ORAL
Refills: 0 | Status: DISCONTINUED | OUTPATIENT
Start: 2019-07-27 | End: 2019-08-10

## 2019-07-26 RX ORDER — ACETAZOLAMIDE 250 MG/1
1500 TABLET ORAL
Refills: 0 | Status: DISCONTINUED | OUTPATIENT
Start: 2019-07-26 | End: 2019-07-26

## 2019-07-26 RX ADMIN — SENNA PLUS 1 TABLET(S): 8.6 TABLET ORAL at 11:24

## 2019-07-26 RX ADMIN — ACETAZOLAMIDE 1000 MILLIGRAM(S): 250 TABLET ORAL at 05:13

## 2019-07-26 RX ADMIN — ACETAZOLAMIDE 1500 MILLIGRAM(S): 250 TABLET ORAL at 18:24

## 2019-07-26 RX ADMIN — POLYETHYLENE GLYCOL 3350 17 GRAM(S): 17 POWDER, FOR SOLUTION ORAL at 11:24

## 2019-07-26 RX ADMIN — NYSTATIN CREAM 1 APPLICATION(S): 100000 CREAM TOPICAL at 05:13

## 2019-07-26 RX ADMIN — NYSTATIN CREAM 1 APPLICATION(S): 100000 CREAM TOPICAL at 18:25

## 2019-07-26 NOTE — PROGRESS NOTE ADULT - SUBJECTIVE AND OBJECTIVE BOX
[facility] Ophthalmology Progress Note    S: Pt seen at bedside. Pt too lethargic to give accurate visual acuity again     Mood and Affect Extreme lethargy    Ophthalmology Exam    Visual acuity (sc): BTL OD, At least 20/40 OS  Pupils: + APD OD, Round and reactive OS  Extraocular movements (EOMs): CHRIS 2/2 lethargy  Confrontational Visual Field (CVF): CHRIS 2/2 lethargy  Color Plates: CHRIS 2/2 lethargy    Pen Light Exam (PLE)   External:  Flat OU  Lids/Lashes/Lacrimal Ducts: Flat OU    Sclera/Conjunctiva:  W+Q OU  Cornea: CL OU  Anterior Chamber: D+Q OU   Iris:  Flat OU  Lens:  Cl OU    Diagnostic Testing: EXAM: CT ANGIO BRAIN (W)AW IC     EXAM: CT ANGIO NECK (W)AW IC       PROCEDURE DATE: 07/21/2019           INTERPRETATION: CLINICAL INFORMATION: Blurry vision, mila-oral numbness,   right arm paresthesia.     TECHNIQUE:   Contrast enhanced axial CT images were acquired from the aortic arch to the   vertex of the calvarium, during the angiographic phase. Additional   post-contrast axial CT images through the head were obtained. Two- and   three-dimensional maximum intensity projection reformats were generated from   the angiographic images.   160 cc of Omnipaque-300 mg/ml were administered intravenously, without   immediate complication. 40 cc was discarded.     COMPARISON: None.     FINDINGS:     CT ANGIOGRAPHY NECK:     Nondiagnostic evaluation of the major arterial vessels and soft tissues of   the neck secondary to body habitus.     CT ANGIOGRAPHY BRAIN:     Nondiagnostic evaluation of the petrous and cavernous segments of the   internal carotid arteries. Bilateral supraclinoid segments are grossly   patent. Bilateral proximal middle cerebral and anterior cerebral arteries   are grossly patent.     Nondiagnostic evaluation of the intracranial vertebral and proximal basilar   artery. Proximal bilateral posterior cerebral arteries are patent.     Evaluation for intracranial aneurysm is markedly limited. Partially empty   sella. Marked focal bony thinning involving the posterior walls of the   sphenoid sinuses at the level of the cavernous sinuses/cavernous segments of   the internal carotid arteries. Bony dehiscence cannot be entirely excluded.   Evaluation is limited on this examination. Chronic left medial orbital wall   deformity.     IMPRESSION:     CT angiography neck: Nondiagnostic evaluation of the cervical carotid and   vertebral vasculature.     CT angiography brain: No proximal large vessel occlusion involving the   anterior, middle, or posterior cerebral arteries. Nondiagnostic evaluation   of the intracranial internal carotid, vertebral, and basilar arteries.     Essentially nondiagnostic evaluation for intracranial aneurysm.       Assessment: 35 y/o M, LAZARO and morbid obesity, presents with 5 days of poor vision OD, headaches, pulsatile tinnitus, and tunnel vision intermittently. HM vision OD, + APD, EOMs full w/o pain. Ant seg exam unremarkable. Severe optic disc edema seen on DFE, left eye > right eye. Multiple intraretinal hemorrhages and microaneurysms also seen OD. Given morbid obesity and LAZARO, differential for poor vision in right eye includes non arteritic ischemic optic neuropathy vs. less likely ocular ischemic syndrome. SLE negative on admission for cell in AC. Inferior altitudinal defect suspicious for NAION OD.  Pt will require w/u for bilateral disc swelling to evaluated for elevated intracranial pressure.  Pt also has evidence of venous congestion possibly secondary to chronic papilledema vs cardiovascular vs hypercoaguable cause.  Scattered mid-peripheral DBH raises concern for ocular ischemic syndrome, although less likely as pt has no other signs.  The decrease in vision, APD, along with an altitudinal field defect OD suggsts that this pt may have had an NAION secondary to chronic disc swelling vs undiagnosed cardiovascular risk factors.  There are case reports of pts with bilateral CRVO secondary to LAZARO.  Pt has severe LAZARO and low O2 which may be an explanation as well if imaging and LP does not explain exam findings. IR guided LP with elevated opening pressure    Plan:  - plan per Neuro, agree with Diamox for now  - will need hypercoaguable work up, heme/onc on board, weight loss advised, agree with plan for shunt  - will need cardiovascular work up including EKG, echo, carotids, BP, BS, HgA1c  - will follow in hospital closely  - pt advised to let team know if he notices any changes in his vision  - guarded visual prognosis at this time, explained to patient  - findings and plan discussed with patient and primary team  - case and findings initially d/w Dr. Adkins (neuro-ophthalmology attending)    Follow-Up:  Patient should follow up his/her ophthalmologist or in the [facility] Neuro-Ophthalmology Practice within 1 week of discharge  [address] [place] [address] [phone]    s/d/w Dr. Zamorano (attending)

## 2019-07-26 NOTE — PROGRESS NOTE ADULT - SUBJECTIVE AND OBJECTIVE BOX
Vital Signs Last 24 Hrs  T(C): 36.4 (26 Jul 2019 05:24), Max: 36.8 (25 Jul 2019 08:19)  T(F): 97.5 (26 Jul 2019 05:24), Max: 98.2 (25 Jul 2019 08:19)  HR: 95 (26 Jul 2019 05:58) (79 - 105)  BP: 135/88 (26 Jul 2019 05:24) (111/67 - 157/92)  BP(mean): --  RR: 20 (26 Jul 2019 05:24) (18 - 22)  SpO2: 95% (26 Jul 2019 05:58) (93% - 100%)    EXAM  intact except vision, R eye no light perception, L eye: able to see light, name objects/ count fingers inconsistently.

## 2019-07-26 NOTE — PROGRESS NOTE ADULT - ASSESSMENT
35 y/o R-handed [place]ian M with h/o hypothyroidism (off meds), self-reported LAZARO presenting with 5 days of painless severe blurred vision (Right >Left) and reduced acuity of the right eye with associated B/L perioral numbness and R. arm paresthesias.  CTV performed yesterday but was a poor quality study.     1) Increased intracranial pressure- Worsening symptoms.   Secondary to either venous sinus thrombus vs IIH.  Repeat CTV didn't demonstrate any thrombus though again limited by habitus, especially in    B/L Papilledema, worsening vision.   Ophtho on board.   Neurosurgery consulted for  shunt.  Needs medical clearance.  Consulted internal medicine and anesthesia for medical clearance.   If PT's symptoms continue to worsen as rapidly as they have over past few days, will discuss risk vs benefit of  Shunt earlier if medical clearance work up not complete.   NPO after midnight  Head of bed elevated to 30 degrees.  Keep PT up as supine and Trendelenburg positions will worsen condition.   Discussed temporizing LP with radiology and this is not an option at this time.   Increase acetazolamide to 1500 mg BID in interim.     2) Hypercoagulable state  Hypercoag work up notable for anti-thrombin III,Protein C, coag panel prolonged  Hematology consulted   CT angiogram of chest to R/O PE as PT still SOB with elevated D dimer and hypercoag panel     3) A1C 6.1  NPO for now.  Once diet resumed, monitor glucose q4  Insulin sliding scale interim     Zane Anthony, DO  PGY-2 Neurology Service

## 2019-07-26 NOTE — CONSULT NOTE ADULT - SUBJECTIVE AND OBJECTIVE BOX
HPI:  HPI: Mr. Ifrah Pantoja is a 33 y/o R-handed Black male with no self-reported PMH other than possible LAZARO who presented on 07/20/19 from the sleep center after reporting 5 days of monocular right eye blurred vision accompanied by perioral numbness and tingling and R arm paresthesias.  He reports his last known well was when he arose on 07/15/19 at about 08:00.  Progressively throughout the coming days he began to notice worsening blurred vision out of the right eye and perioral numbness on both sides.  This continued to worsen to the point where he reports painless loss of vision other than general shapes and figures from the right eye.  He did not seek medical attention immediately as he was concerned that he would miss his appointment for CPAP evaluation for suspected LAZARO.  Upon hearing these symptoms at his appointment today, he was sent directly to the ED.  He denies any weakness, black shade like loss of vision or diplopia.  He admits to some color disturbances and tearing of the right eye, but denies any eye pain.  On initial questioning, he denied any headache.  However, on repeated pressing he admits to a dull B/L posterior headache associated with the symptoms which was worse upon wakening.  This had resolved by the time of presentation to the hospital on 07/21.  He admits to "floaters" in his left eye but denies blurred vision, disturbance in visual acuity or diplopia. (21 Jul 2019 08:15)      PAST MEDICAL & SURGICAL HISTORY:  LAZARO (obstructive sleep apnea)  Asthma      Review of Systems:   CONSTITUTIONAL: No fever, weight loss, or fatigue  EYES: No eye pain, visual disturbances, or discharge  ENMT:  No difficulty hearing, tinnitus, vertigo; No sinus or throat pain  NECK: No pain or stiffness  BREASTS: No pain, masses, or nipple discharge  RESPIRATORY: No cough, wheezing, chills or hemoptysis; No shortness of breath  CARDIOVASCULAR: No chest pain, palpitations, dizziness, or leg swelling  GASTROINTESTINAL: No abdominal or epigastric pain. No nausea, vomiting, or hematemesis; No diarrhea or constipation. No melena or hematochezia.  GENITOURINARY: No dysuria, frequency, hematuria, or incontinence  NEUROLOGICAL: No headaches, memory loss, loss of strength, numbness, or tremors  SKIN: No itching, burning, rashes, or lesions   LYMPH NODES: No enlarged glands  ENDOCRINE: No heat or cold intolerance; No hair loss  MUSCULOSKELETAL: No joint pain or swelling; No muscle, back, or extremity pain  PSYCHIATRIC: No depression, anxiety, mood swings, or difficulty sleeping  HEME/LYMPH: No easy bruising, or bleeding gums  ALLERY AND IMMUNOLOGIC: No hives or eczema    Allergies    No Known Allergies    Intolerances        Social History:     FAMILY HISTORY:      MEDICATIONS  (STANDING):  acetazolamide    Tablet 1000 milliGRAM(s) Oral two times a day  nystatin Powder 1 Application(s) Topical two times a day  polyethylene glycol 3350 17 Gram(s) Oral daily  senna 1 Tablet(s) Oral daily    MEDICATIONS  (PRN):  acetaminophen   Tablet .. 650 milliGRAM(s) Oral every 6 hours PRN Temp greater or equal to 38C (100.4F), Mild Pain (1 - 3), Moderate Pain (4 - 6), Severe Pain (7 - 10)        CAPILLARY BLOOD GLUCOSE        I&O's Summary    25 Jul 2019 07:01  -  26 Jul 2019 07:00  --------------------------------------------------------  IN: 1870 mL / OUT: 3 mL / NET: 1867 mL    Vital Signs Last 24 Hrs  T(C): 36.7 (26 Jul 2019 07:55), Max: 36.7 (25 Jul 2019 12:50)  T(F): 98.1 (26 Jul 2019 07:55), Max: 98.1 (25 Jul 2019 12:50)  HR: 97 (26 Jul 2019 09:43) (79 - 100)  BP: 141/90 (26 Jul 2019 07:55) (111/67 - 157/92)  BP(mean): --  RR: 20 (26 Jul 2019 07:55) (18 - 22)  SpO2: 96% (26 Jul 2019 09:43) (93% - 100%)    PHYSICAL EXAM:  GENERAL: NAD, well-developed  HEAD:  Atraumatic, Normocephalic  EYES: EOMI, PERRLA, conjunctiva and sclera clear  NECK: Supple, No JVD  CHEST/LUNG: Clear to auscultation bilaterally; No wheeze  HEART: Regular rate and rhythm; No murmurs, rubs, or gallops  ABDOMEN: Soft, Nontender, Nondistended; Bowel sounds present  EXTREMITIES:  2+ Peripheral Pulses, No clubbing, cyanosis, or edema  PSYCH: AAOx3  NEUROLOGY: non-focal  SKIN: No rashes or lesions    LABS:                        15.8   7.71  )-----------( 169      ( 26 Jul 2019 08:24 )             60.2     07-26    137  |  99  |  10  ----------------------------<  78  4.0   |  28  |  0.72    Ca    9.5      26 Jul 2019 06:22    TPro  8.6<H>  /  Alb  4.2  /  TBili  1.7<H>  /  DBili  x   /  AST  22  /  ALT  27  /  AlkPhos  100  07-26    PT/INR - ( 26 Jul 2019 10:03 )   PT: 11.3 sec;   INR: 1.00 ratio         PTT - ( 26 Jul 2019 10:03 )  PTT:37.6 sec          RADIOLOGY & ADDITIONAL TESTS:    Imaging Personally Reviewed:    Consultant(s) Notes Reviewed:      Care Discussed with Consultants/Other Providers: HPI:  Patient is a 35 y/o M w/ a PMHx of LAZARO and morbid obesity who initially p/w 5 days of monocular right eye blurred vision a/w perioral numbness/tingling and R arm paresthesias. His symptoms began on 7/15 and they progressively became worse over the next 5 days. Loss of vision was initially painless, but he also reports a B/L posterior headache which was worse upon awakening. Patient mentioned these symptoms at his CPAP evaluation and he was advised to go to the ED for further evaluation. In the ED, patient was noted to have severe papilledema on exam. Code stroke was called and initial imaging was nondiagnostic. Patient was ultimately admitted to Neurology for workup of intracranial hypertension. During patient's hospitalization, his vision loss has continued to progress. He endorses B/L vision loss (R>L). Patient underwent an LP which found an opening pressure of 55. There was no improvement in his vision afterward. Overall, patient's presentation is concerning for possible malignant pseudotumor cerebri vs underlying sinus thrombosis. Patient has had imaging of his head/neck, but they are limited 2/2 his body habitus. Patient also had a CTA Chest to r/o PE and this imaging was nondiagnostic. Overall, no evidence of a thrombosis has been found in these limited studies. Given concern for potential thrombosis, a hypercoagulable workup was pursued which revealed a + IgA anticardiolipin Ab (23.6), negative IgM and IgG anticardiolipin Abs, negative LA, negative Beta-2 Glycoprotein 1 Ab, Slightly low protein C (64), Slightly low antithrombin III assay (83%), and normal antithrombin III antigen (20). Given concern for possible hypercoagulable state and these recent lab results, Hematology was consulted for further evaluation.     PAST MEDICAL & SURGICAL HISTORY:  LAZARO (obstructive sleep apnea)  Asthma      Review of Systems:   CONSTITUTIONAL: No fever, weight loss, or fatigue  EYES: No eye pain, visual disturbances, or discharge  ENMT:  No difficulty hearing, tinnitus, vertigo; No sinus or throat pain  NECK: No pain or stiffness  BREASTS: No pain, masses, or nipple discharge  RESPIRATORY: No cough, wheezing, chills or hemoptysis; No shortness of breath  CARDIOVASCULAR: No chest pain, palpitations, dizziness, or leg swelling  GASTROINTESTINAL: No abdominal or epigastric pain. No nausea, vomiting, or hematemesis; No diarrhea or constipation. No melena or hematochezia.  GENITOURINARY: No dysuria, frequency, hematuria, or incontinence  NEUROLOGICAL: No headaches, memory loss, loss of strength, numbness, or tremors  SKIN: No itching, burning, rashes, or lesions   LYMPH NODES: No enlarged glands  ENDOCRINE: No heat or cold intolerance; No hair loss  MUSCULOSKELETAL: No joint pain or swelling; No muscle, back, or extremity pain  PSYCHIATRIC: No depression, anxiety, mood swings, or difficulty sleeping  HEME/LYMPH: No easy bruising, or bleeding gums  ALLERY AND IMMUNOLOGIC: No hives or eczema    Allergies    No Known Allergies    Intolerances        Social History:     FAMILY HISTORY:      MEDICATIONS  (STANDING):  acetazolamide    Tablet 1000 milliGRAM(s) Oral two times a day  nystatin Powder 1 Application(s) Topical two times a day  polyethylene glycol 3350 17 Gram(s) Oral daily  senna 1 Tablet(s) Oral daily    MEDICATIONS  (PRN):  acetaminophen   Tablet .. 650 milliGRAM(s) Oral every 6 hours PRN Temp greater or equal to 38C (100.4F), Mild Pain (1 - 3), Moderate Pain (4 - 6), Severe Pain (7 - 10)        CAPILLARY BLOOD GLUCOSE        I&O's Summary    25 Jul 2019 07:01  -  26 Jul 2019 07:00  --------------------------------------------------------  IN: 1870 mL / OUT: 3 mL / NET: 1867 mL    Vital Signs Last 24 Hrs  T(C): 36.7 (26 Jul 2019 07:55), Max: 36.7 (25 Jul 2019 12:50)  T(F): 98.1 (26 Jul 2019 07:55), Max: 98.1 (25 Jul 2019 12:50)  HR: 97 (26 Jul 2019 09:43) (79 - 100)  BP: 141/90 (26 Jul 2019 07:55) (111/67 - 157/92)  BP(mean): --  RR: 20 (26 Jul 2019 07:55) (18 - 22)  SpO2: 96% (26 Jul 2019 09:43) (93% - 100%)    PHYSICAL EXAM:  GENERAL: NAD, well-developed  HEAD:  Atraumatic, Normocephalic  EYES: EOMI, PERRLA, conjunctiva and sclera clear  NECK: Supple, No JVD  CHEST/LUNG: Clear to auscultation bilaterally; No wheeze  HEART: Regular rate and rhythm; No murmurs, rubs, or gallops  ABDOMEN: Soft, Nontender, Nondistended; Bowel sounds present  EXTREMITIES:  2+ Peripheral Pulses, No clubbing, cyanosis, or edema  PSYCH: AAOx3  NEUROLOGY: non-focal  SKIN: No rashes or lesions    LABS:                        15.8   7.71  )-----------( 169      ( 26 Jul 2019 08:24 )             60.2     07-26    137  |  99  |  10  ----------------------------<  78  4.0   |  28  |  0.72    Ca    9.5      26 Jul 2019 06:22    TPro  8.6<H>  /  Alb  4.2  /  TBili  1.7<H>  /  DBili  x   /  AST  22  /  ALT  27  /  AlkPhos  100  07-26    PT/INR - ( 26 Jul 2019 10:03 )   PT: 11.3 sec;   INR: 1.00 ratio         PTT - ( 26 Jul 2019 10:03 )  PTT:37.6 sec          RADIOLOGY & ADDITIONAL TESTS:    Imaging Personally Reviewed:    Consultant(s) Notes Reviewed:      Care Discussed with Consultants/Other Providers: HPI:  Patient is a 35 y/o M w/ a PMHx of LAZARO and morbid obesity who initially p/w 5 days of monocular right eye blurred vision a/w perioral numbness/tingling and R arm paresthesias. His symptoms began on 7/15 and they progressively became worse over the next 5 days. Loss of vision was initially painless, but he also reports a B/L posterior headache which was worse upon awakening. Patient mentioned these symptoms at his CPAP evaluation and he was advised to go to the ED for further evaluation. In the ED, patient was noted to have severe papilledema on exam. Code stroke was called and initial imaging was nondiagnostic. Patient was ultimately admitted to Neurology for workup of intracranial hypertension. During patient's hospitalization, his vision loss has continued to progress. He endorses B/L vision loss (R>L). Patient underwent an LP which found an opening pressure of 55. There was no improvement in his vision afterward. Overall, patient's presentation is concerning for possible malignant pseudotumor cerebri vs underlying sinus thrombosis. Patient has had imaging of his head/neck, but they are limited 2/2 his body habitus. Patient also had a CTA Chest to r/o PE and this imaging was nondiagnostic. Overall, no evidence of a thrombosis has been found in these limited studies. Given concern for potential thrombosis, a hypercoagulable workup was pursued which revealed a + IgA anticardiolipin Ab (23.6), negative IgM and IgG anticardiolipin Abs, negative LA, negative Beta-2 Glycoprotein 1 Ab, Slightly low protein C (64), Slightly low antithrombin III assay (83%), and normal antithrombin III antigen (20). Given concern for possible hypercoagulable state and these recent lab results, Hematology was consulted for further evaluation.     Patient seen and examined. He was sitting in a chair at time of encounter. He was a bit somnolent, but arousable. He continues to endorse progressively worsening vision loss and a B/L occipital headache. He also endorses occasional shortness of breath. No chest pain, nausea, vomiting, or abdominal pain. Patient was afebrile overnight. Patient denies any personal/family history of bleeding or clotting disorders. Social history notable for regular marijuana use and social cigar use. Patient's labs are also notable for an elevated hematocrit (60.2 this AM).     PAST MEDICAL & SURGICAL HISTORY:  LAZARO (obstructive sleep apnea)  Asthma      Review of Systems:   CONSTITUTIONAL: No fever or chills  EYES: + Progressive vision loss, No discharge  ENMT: No sinus or throat pain  NECK: No pain or stiffness  RESPIRATORY: + Occasional shortness of breath, No wheezing  CARDIOVASCULAR: No chest pain or palpitations  GASTROINTESTINAL: No nausea, vomiting, or abdominal pain  GENITOURINARY: No dysuria or incontinence  NEUROLOGICAL: + Occipital headache, No synocpe  SKIN: No itching or rash  MUSCULOSKELETAL: No joint pain or back pain    Allergies    No Known Allergies    Intolerances    Social History: Regular marijuana use, Smokes cigars socially, Social EtOH use, Reports that he lives with his mother    FAMILY HISTORY:  Denies family history of bleeding/clotting disorders    MEDICATIONS  (STANDING):  acetazolamide    Tablet 1000 milliGRAM(s) Oral two times a day  nystatin Powder 1 Application(s) Topical two times a day  polyethylene glycol 3350 17 Gram(s) Oral daily  senna 1 Tablet(s) Oral daily    MEDICATIONS  (PRN):  acetaminophen   Tablet .. 650 milliGRAM(s) Oral every 6 hours PRN Temp greater or equal to 38C (100.4F), Mild Pain (1 - 3), Moderate Pain (4 - 6), Severe Pain (7 - 10)        CAPILLARY BLOOD GLUCOSE        I&O's Summary    25 Jul 2019 07:01  -  26 Jul 2019 07:00  --------------------------------------------------------  IN: 1870 mL / OUT: 3 mL / NET: 1867 mL    Vital Signs Last 24 Hrs  T(C): 36.7 (26 Jul 2019 07:55), Max: 36.7 (25 Jul 2019 12:50)  T(F): 98.1 (26 Jul 2019 07:55), Max: 98.1 (25 Jul 2019 12:50)  HR: 97 (26 Jul 2019 09:43) (79 - 100)  BP: 141/90 (26 Jul 2019 07:55) (111/67 - 157/92)  BP(mean): --  RR: 20 (26 Jul 2019 07:55) (18 - 22)  SpO2: 96% (26 Jul 2019 09:43) (93% - 100%)    PHYSICAL EXAM:  GENERAL: NAD, Sitting in a chair  HEENT: NC/AT, MMM  NECK: Supple  CHEST/LUNG: Diminished air entry, No wheezing or rhonchi appreciated  HEART: RRR; +S1/S2  ABDOMEN: Obese, +BS, Soft, NT, No rigidity  GENITOURINARY: No Robert, No suprapubic TTP  EXTREMITIES: + B/L LE pitting edema  NEUROLOGY: A bit somnolent, but arousable, Following commands, Moving all extremities  SKIN: Warm and dry  PSYCH: Calm    LABS:                        15.8   7.71  )-----------( 169      ( 26 Jul 2019 08:24 )             60.2     07-26    137  |  99  |  10  ----------------------------<  78  4.0   |  28  |  0.72    Ca    9.5      26 Jul 2019 06:22    TPro  8.6<H>  /  Alb  4.2  /  TBili  1.7<H>  /  DBili  x   /  AST  22  /  ALT  27  /  AlkPhos  100  07-26    PT/INR - ( 26 Jul 2019 10:03 )   PT: 11.3 sec;   INR: 1.00 ratio         PTT - ( 26 Jul 2019 10:03 )  PTT:37.6 sec          RADIOLOGY & ADDITIONAL TESTS:  Studies reviewed. HPI:  Patient is a 35 y/o M w/ a PMHx of LAZARO and morbid obesity who initially p/w 5 days of monocular right eye blurred vision a/w perioral numbness/tingling and R arm paresthesias. His symptoms began on 7/15 and they progressively became worse over the next 5 days. Loss of vision was initially painless, but he also reports a B/L posterior headache which was worse upon awakening. Patient mentioned these symptoms at his CPAP evaluation and he was advised to go to the ED for further evaluation. In the ED, patient was noted to have severe papilledema on exam. Code stroke was called and initial imaging was nondiagnostic. Patient was ultimately admitted to Neurology for workup of intracranial hypertension. During patient's hospitalization, his vision loss has continued to progress. He endorses B/L vision loss (R>L). Patient underwent an LP which found an opening pressure of 55. There was no improvement in his vision afterward. Overall, patient's presentation is concerning for possible malignant pseudotumor cerebri vs underlying sinus thrombosis. Patient has had imaging of his head/neck, but they are limited 2/2 his body habitus. Patient also had a CTA Chest to r/o PE and this imaging was nondiagnostic. Overall, no evidence of a thrombosis has been found in these limited studies. Given concern for potential thrombosis, a hypercoagulable workup was pursued which revealed a + IgA anticardiolipin Ab (23.6), negative IgM and IgG anticardiolipin Abs, DRVVT negative, negative Beta-2 Glycoprotein 1 Ab, slightly low protein C functional assay (64), low protein C antigen assay (53), slightly low antithrombin III assay (83%), and normal antithrombin III antigen (20). Given concern for possible hypercoagulable state and these recent lab results, Hematology was consulted for further evaluation.     Patient seen and examined. He was sitting in a chair at time of encounter. He was a bit somnolent, but arousable. He continues to endorse progressively worsening vision loss and a B/L occipital headache. He also endorses occasional shortness of breath. No chest pain, nausea, vomiting, or abdominal pain. Patient was afebrile overnight. Patient denies any personal/family history of bleeding or clotting disorders. Social history notable for regular marijuana use and social cigar use. Patient's labs are also notable for an elevated hematocrit (60.2 this AM).     PAST MEDICAL & SURGICAL HISTORY:  LAZARO (obstructive sleep apnea)  Asthma      Review of Systems:   CONSTITUTIONAL: No fever or chills  EYES: + Progressive vision loss, No discharge  ENMT: No sinus or throat pain  NECK: No pain or stiffness  RESPIRATORY: + Occasional shortness of breath, No wheezing  CARDIOVASCULAR: No chest pain or palpitations  GASTROINTESTINAL: No nausea, vomiting, or abdominal pain  GENITOURINARY: No dysuria or incontinence  NEUROLOGICAL: + Occipital headache, No synocpe  SKIN: No itching or rash  MUSCULOSKELETAL: No joint pain or back pain    Allergies    No Known Allergies    Intolerances    Social History: Regular marijuana use, Smokes cigars socially, Social EtOH use, Reports that he lives with his mother    FAMILY HISTORY:  Denies family history of bleeding/clotting disorders    MEDICATIONS  (STANDING):  acetazolamide    Tablet 1000 milliGRAM(s) Oral two times a day  nystatin Powder 1 Application(s) Topical two times a day  polyethylene glycol 3350 17 Gram(s) Oral daily  senna 1 Tablet(s) Oral daily    MEDICATIONS  (PRN):  acetaminophen   Tablet .. 650 milliGRAM(s) Oral every 6 hours PRN Temp greater or equal to 38C (100.4F), Mild Pain (1 - 3), Moderate Pain (4 - 6), Severe Pain (7 - 10)        CAPILLARY BLOOD GLUCOSE        I&O's Summary    25 Jul 2019 07:01  -  26 Jul 2019 07:00  --------------------------------------------------------  IN: 1870 mL / OUT: 3 mL / NET: 1867 mL    Vital Signs Last 24 Hrs  T(C): 36.7 (26 Jul 2019 07:55), Max: 36.7 (25 Jul 2019 12:50)  T(F): 98.1 (26 Jul 2019 07:55), Max: 98.1 (25 Jul 2019 12:50)  HR: 97 (26 Jul 2019 09:43) (79 - 100)  BP: 141/90 (26 Jul 2019 07:55) (111/67 - 157/92)  BP(mean): --  RR: 20 (26 Jul 2019 07:55) (18 - 22)  SpO2: 96% (26 Jul 2019 09:43) (93% - 100%)    PHYSICAL EXAM:  GENERAL: NAD, Sitting in a chair  HEENT: NC/AT, MMM  NECK: Supple  CHEST/LUNG: Diminished air entry, No wheezing or rhonchi appreciated  HEART: RRR; +S1/S2  ABDOMEN: Obese, +BS, Soft, NT, No rigidity  GENITOURINARY: No Robert, No suprapubic TTP  EXTREMITIES: + B/L LE pitting edema  NEUROLOGY: A bit somnolent, but arousable, Following commands, Moving all extremities  SKIN: Warm and dry  PSYCH: Calm    LABS:                        15.8   7.71  )-----------( 169      ( 26 Jul 2019 08:24 )             60.2     07-26    137  |  99  |  10  ----------------------------<  78  4.0   |  28  |  0.72    Ca    9.5      26 Jul 2019 06:22    TPro  8.6<H>  /  Alb  4.2  /  TBili  1.7<H>  /  DBili  x   /  AST  22  /  ALT  27  /  AlkPhos  100  07-26    PT/INR - ( 26 Jul 2019 10:03 )   PT: 11.3 sec;   INR: 1.00 ratio         PTT - ( 26 Jul 2019 10:03 )  PTT:37.6 sec          RADIOLOGY & ADDITIONAL TESTS:  Studies reviewed. HPI:  Patient is a 33 y/o M w/ a PMHx of LAZARO and morbid obesity who initially p/w 5 days of monocular right eye blurred vision a/w perioral numbness/tingling and R arm paresthesias. His symptoms began on 7/15 and they progressively became worse over the next 5 days. Loss of vision was initially painless, but he also reports a B/L posterior headache which was worse upon awakening. Patient mentioned these symptoms at his CPAP evaluation and he was advised to go to the ED for further evaluation. In the ED, patient was noted to have severe papilledema on exam. Code stroke was called and initial imaging was nondiagnostic. Patient was ultimately admitted to Neurology for workup of intracranial hypertension. During patient's hospitalization, his vision loss has continued to progress. He endorses B/L vision loss (R>L). Patient underwent an LP which found an opening pressure of 55. There was no improvement in his vision afterward. Overall, patient's presentation is concerning for possible malignant pseudotumor cerebri vs underlying sinus thrombosis. Patient has had imaging of his head/neck, but they are limited 2/2 his body habitus. Patient also had a CTA Chest to r/o PE and this imaging was nondiagnostic. Overall, no evidence of a thrombosis has been found in these limited studies. Given concern for potential thrombosis, a hypercoagulable workup was pursued which revealed a + IgA anticardiolipin Ab (23.6), negative IgM and IgG anticardiolipin Abs, DRVVT negative, negative Beta-2 Glycoprotein 1 Ab, slightly low protein C functional assay (64), low protein C antigen assay (53), slightly low antithrombin III assay (83%), and normal antithrombin III antigen (20). Given concern for possible hypercoagulable state and these recent lab results, Hematology was consulted for further evaluation.     Patient seen and examined. He was sitting in a chair at time of encounter. He was a bit somnolent, but arousable. He continues to endorse progressively worsening vision loss and a B/L occipital headache. He also endorses occasional shortness of breath. No chest pain, nausea, vomiting, or abdominal pain. Patient was afebrile overnight. Patient denies any personal/family history of bleeding or clotting disorders. Social history notable for regular marijuana use and social cigar use. Patient's labs are also notable for an elevated hematocrit (60.2 this AM).     PAST MEDICAL & SURGICAL HISTORY:  LAZARO (obstructive sleep apnea)  Asthma      Review of Systems:   CONSTITUTIONAL: No fever or chills  EYES: + Progressive vision loss, No discharge  ENMT: No sinus or throat pain  NECK: No pain or stiffness  RESPIRATORY: + Occasional shortness of breath, No wheezing  CARDIOVASCULAR: No chest pain or palpitations  GASTROINTESTINAL: No nausea, vomiting, or abdominal pain  GENITOURINARY: No dysuria or incontinence  NEUROLOGICAL: + Occipital headache, No synocpe  SKIN: No itching or rash  MUSCULOSKELETAL: No joint pain or back pain    Allergies    No Known Allergies    Intolerances    Social History: Regular marijuana use, Smokes cigars socially, Social EtOH use, Reports that he lives with his mother    FAMILY HISTORY:  Denies family history of bleeding/clotting disorders    MEDICATIONS  (STANDING):  acetazolamide    Tablet 1000 milliGRAM(s) Oral two times a day  nystatin Powder 1 Application(s) Topical two times a day  polyethylene glycol 3350 17 Gram(s) Oral daily  senna 1 Tablet(s) Oral daily    MEDICATIONS  (PRN):  acetaminophen   Tablet .. 650 milliGRAM(s) Oral every 6 hours PRN Temp greater or equal to 38C (100.4F), Mild Pain (1 - 3), Moderate Pain (4 - 6), Severe Pain (7 - 10)        CAPILLARY BLOOD GLUCOSE        I&O's Summary    25 Jul 2019 07:01  -  26 Jul 2019 07:00  --------------------------------------------------------  IN: 1870 mL / OUT: 3 mL / NET: 1867 mL    Vital Signs Last 24 Hrs  T(C): 36.7 (26 Jul 2019 07:55), Max: 36.7 (25 Jul 2019 12:50)  T(F): 98.1 (26 Jul 2019 07:55), Max: 98.1 (25 Jul 2019 12:50)  HR: 97 (26 Jul 2019 09:43) (79 - 100)  BP: 141/90 (26 Jul 2019 07:55) (111/67 - 157/92)  BP(mean): --  RR: 20 (26 Jul 2019 07:55) (18 - 22)  SpO2: 96% (26 Jul 2019 09:43) (93% - 100%)    PHYSICAL EXAM:  GENERAL: NAD, Sitting in a chair  HEENT: NC/AT, MMM  NECK: Supple  CHEST/LUNG: Diminished air entry, No wheezing or rhonchi appreciated  HEART: RRR; +S1/S2  ABDOMEN: Obese, +BS, Soft, NT, No rigidity  GENITOURINARY: No Robert, No suprapubic TTP  EXTREMITIES: 2+ pitting B/L LE edema  NEUROLOGY: A bit somnolent, but arousable, Following commands, Moving all extremities  SKIN: Warm and dry  PSYCH: Calm    LABS:                        15.8   7.71  )-----------( 169      ( 26 Jul 2019 08:24 )             60.2     07-26    137  |  99  |  10  ----------------------------<  78  4.0   |  28  |  0.72    Ca    9.5      26 Jul 2019 06:22    TPro  8.6<H>  /  Alb  4.2  /  TBili  1.7<H>  /  DBili  x   /  AST  22  /  ALT  27  /  AlkPhos  100  07-26    PT/INR - ( 26 Jul 2019 10:03 )   PT: 11.3 sec;   INR: 1.00 ratio         PTT - ( 26 Jul 2019 10:03 )  PTT:37.6 sec          RADIOLOGY & ADDITIONAL TESTS:  Studies reviewed.

## 2019-07-26 NOTE — PROGRESS NOTE ADULT - SUBJECTIVE AND OBJECTIVE BOX
SUBJECTIVE: PT having rapid worsening of his vision.  Notes he no longer sees anything out of his right eye other than moving objects, lights and colors.  Worsening visual acuity in left eye as well as visual fields.  Reports being more somnolent and has worsening B/L posterior headache, especially when flat.       REVIEW OF SYSTEMS : All other systems are negative except as was mentioned in the subjective.    MEDICATIONS  (STANDING):  acetazolamide    Tablet 1000 milliGRAM(s) Oral two times a day  nystatin Powder 1 Application(s) Topical two times a day  polyethylene glycol 3350 17 Gram(s) Oral daily  senna 1 Tablet(s) Oral daily    MEDICATIONS  (PRN):  acetaminophen   Tablet .. 650 milliGRAM(s) Oral every 6 hours PRN Temp greater or equal to 38C (100.4F), Mild Pain (1 - 3), Moderate Pain (4 - 6), Severe Pain (7 - 10)    OBJECTIVE:  Vital Signs Last 24 Hrs  T(C): 36.7 (26 Jul 2019 12:47), Max: 36.7 (25 Jul 2019 16:44)  T(F): 98 (26 Jul 2019 12:47), Max: 98.1 (25 Jul 2019 16:44)  HR: 89 (26 Jul 2019 12:47) (79 - 100)  BP: 153/95 (26 Jul 2019 12:47) (123/67 - 157/92)  BP(mean): --  RR: 21 (26 Jul 2019 12:47) (18 - 22)  SpO2: 94% (26 Jul 2019 12:47) (93% - 100%)    PHYSICAL EXAM:  •	GENERAL APPEARANCE: Morbidly obese male, appears stated age laying in bed in NAD but more somnolent.   •	HEENT: Head, ears and nose non-traumatic. Oropharynx clear, no bite ozzy on tongue.  •	Cardio: Limited due to habitus but S1, S2 no murmurs, rubs or gallops heard.  Distant heart sounds.   •           RESP:  Breathing shallow, on 4L O2 via NC.  No crackles or wheezes heard.   •	MUSCULOSKELETAL: No cyanosis, clubbing.  Moderate 2+ pitting edema of B/L LE.  RLE increased circumference at calf vs LLE.   •	PSYCHIATRIC: Mood is congruent, normal affect.  •	NEUROLOGIC EXAMINATION:        o	MENTAL STATUS & HIGHER CORTICAL FUNCTION: The patient is oriented to self, situation, time and place.  Significantly more lethargic throughout day.  Can be aroused to name, but immediately returns to lethargic state.  Follows commands.          o	LANGUAGE: Speech is fluent with no dysarthria and no aphasia.          o	CRANIAL NERVES:              I- No anosmia  	II - Pupils are more sluggish.  Left 3 mm-->2mm, R 5mm-->3 mm sluggish.  Possible left APD, though inconsistent on repeat examining.  Visual fields worse with Bi-temporal superior and inferior hemianopsia.  Visual acuity 20/200 right eye, 20/50 left eye. Having significant difficulty in perceiving shapes and lights at this time.  Can see colors, and gross objects, but having difficulty in both eyes with borders.         	III, IV, and VI - R. eye exotropia, extraocular movements are full with normal pursuit and saccades.  Left gaze left-beating nystagmus.   	V - Light touch is intact in all three divisions. Motor V is intact.    	VII - No facial asymmetry or weakness.   	IX - Palate rises symmetrically in the midline. XI - Shoulder shrug is normal. XII - Tongue protrudes in the midline.        o	MOTOR EXAMINATION: Normal bulk in all four extremities. Muscle strength is 5/5 in all four extremities.         o	SENSORY EXAMINATION: Sensory examination is grossly intact to pain and light touch in all 4 extremities.        o	COORDINATION: Fine coordinated movements in tact other than persistent subtle dysdiadochokinesia on left hand REBECCA.      07-26    137  |  99  |  10  ----------------------------<  78  4.0   |  28  |  0.72    Ca    9.5      26 Jul 2019 06:22    TPro  8.6<H>  /  Alb  4.2  /  TBili  1.7<H>  /  DBili  x   /  AST  22  /  ALT  27  /  AlkPhos  100  07-26    CBC Full  -  ( 26 Jul 2019 08:24 )  WBC Count : 7.71 K/uL  RBC Count : 7.24 M/uL  Hemoglobin : 15.8 g/dL  Hematocrit : 60.2 %  Platelet Count - Automated : 169 K/uL  Mean Cell Volume : 83.1 fl  Mean Cell Hemoglobin : 21.8 pg  Mean Cell Hemoglobin Concentration : 26.2 gm/dL  Auto Neutrophil # : x  Auto Lymphocyte # : x  Auto Monocyte # : x  Auto Eosinophil # : x  Auto Basophil # : x  Auto Neutrophil % : x  Auto Lymphocyte % : x  Auto Monocyte % : x  Auto Eosinophil % : x  Auto Basophil % : x                    Anticardiolipin +   Anti-thrombin III  Protein C abnormality

## 2019-07-26 NOTE — CONSULT NOTE ADULT - SUBJECTIVE AND OBJECTIVE BOX
Patient is a 34y old  Male who presents with a chief complaint of Acute left eye visual acuity loss concern for pseudotumor cerebri and vision loss (26 Jul 2019 16:14)      HPI:  HPI: Mr. Ifrah Pantoja is a 35 y/o R-handed Black male with no self-reported PMH other than possible LAZARO who presented on 07/20/19 from the sleep center after reporting 5 days of monocular right eye blurred vision accompanied by perioral numbness and tingling and R arm paresthesias.  He reports his last known well was when he arose on 07/15/19 at about 08:00.  Progressively throughout the coming days he began to notice worsening blurred vision out of the right eye and perioral numbness on both sides.  This continued to worsen to the point where he reports painless loss of vision other than general shapes and figures from the right eye.  He did not seek medical attention immediately as he was concerned that he would miss his appointment for CPAP evaluation for suspected LAZARO.  Upon hearing these symptoms at his appointment today, he was sent directly to the ED.  He denies any weakness, black shade like loss of vision or diplopia.  He admits to some color disturbances and tearing of the right eye, but denies any eye pain.  On initial questioning, he denied any headache.  However, on repeated pressing he admits to a dull B/L posterior headache associated with the symptoms which was worse upon wakening.  This had resolved by the time of presentation to the hospital on 07/21.  He admits to "floaters" in his left eye but denies blurred vision, disturbance in visual acuity or diplopia. (21 Jul 2019 08:15)      PAST MEDICAL & SURGICAL HISTORY:  LAZARO (obstructive sleep apnea)  Asthma      Review of Systems:   CONSTITUTIONAL: No fever,  or fatigue  RESPIRATORY: On supp O2  CARDIOVASCULAR: No chest pain, palpitations, dizziness, or leg swelling  GASTROINTESTINAL: No abdominal or epigastric pain. No nausea, vomiting, or hematemesis; No diarrhea or constipation.   NEUROLOGICAL: Headaches,           Allergies    No Known Allergies    Intolerances        Social History:     FAMILY HISTORY:      MEDICATIONS  (STANDING):  acetazolamide    Tablet 1500 milliGRAM(s) Oral two times a day  nystatin Powder 1 Application(s) Topical two times a day  polyethylene glycol 3350 17 Gram(s) Oral daily  senna 1 Tablet(s) Oral daily    MEDICATIONS  (PRN):  acetaminophen   Tablet .. 650 milliGRAM(s) Oral every 6 hours PRN Temp greater or equal to 38C (100.4F), Mild Pain (1 - 3), Moderate Pain (4 - 6), Severe Pain (7 - 10)      CAPILLARY BLOOD GLUCOSE        I&O's Summary    25 Jul 2019 07:01  -  26 Jul 2019 07:00  --------------------------------------------------------  IN: 1870 mL / OUT: 3 mL / NET: 1867 mL    26 Jul 2019 07:01  -  26 Jul 2019 18:58  --------------------------------------------------------  IN: 0 mL / OUT: 0 mL / NET: 0 mL        PHYSICAL EXAM:    GENERAL: NAD  NECK: Supple, No JVD  CHEST/LUNG: Clear to auscultation bilaterally; No wheezing.  HEART: Regular rate and rhythm; No murmurs, rubs, or gallops  ABDOMEN: Soft, Nontender, Nondistended; Bowel sounds present  EXTREMITIES:  2+ Peripheral Pulses, No edema  NEUROLOGY: AAOx 3      LABS:                        15.8   7.71  )-----------( 169      ( 26 Jul 2019 08:24 )             60.2     07-26    137  |  99  |  10  ----------------------------<  78  4.0   |  28  |  0.72    Ca    9.5      26 Jul 2019 06:22    TPro  8.6<H>  /  Alb  4.2  /  TBili  1.7<H>  /  DBili  x   /  AST  22  /  ALT  27  /  AlkPhos  100  07-26    PT/INR - ( 26 Jul 2019 10:03 )   PT: 11.3 sec;   INR: 1.00 ratio         PTT - ( 26 Jul 2019 10:03 )  PTT:37.6 sec        CAPILLARY BLOOD GLUCOSE        07-24 @ 21:46  Culture-urine --  Culture results   No growth  method type --  Organism --  Organism Identification --  Specimen source .CSF           07-24 @ 21:46  Culture blood --  Culture results   No growth  Gram stain   polymorphonuclear leukocytes seen  No organisms seen  by cytocentrifuge  Gram stain blood --  Method type --  Organism --  Organism identification --  Specimen source .CSF      RADIOLOGY & ADDITIONAL TESTS:    Imaging Personally Reviewed:    Consultant(s) Notes Reviewed:      Care Discussed with Consultants/Other Providers:    Thanks for consult. Will follow.

## 2019-07-26 NOTE — PROGRESS NOTE ADULT - ATTENDING COMMENTS
35 y/o man with progressively worsening vision loss with high suspicion for malignant pseudotumor cerebri. He had a lumbar puncture 2 days ago with opening pressure of 55cm and 60cc of fluid drained and no improvement in his vision. Today he has bilateral increase in pupil size to 5mm with APD on the left seen by optho and his mental status is gradually becoming more lethargic. He is also complaining of more headache.     Draining more fluid would be the optimal treatment to temporize his symptoms and findings however another CT guided LP cannot be arranged. He will need to have shunt placed as soon as possible. He is pending medical and anesthesia clearance, as due to his morbid obesity there as clearly more risks of putting him under anesthesia for surgery.     Hematology consulted for abnormal coags and elevated hematocrit. CTV head/ CTA chest were subpar imaging for ruling out venous clot or PE. Concern for polycythemia given increase on hematocrit since starting diamox. Could possibly be from LAZARO vs other underlying causes. Would like to start increased dose of lovenox and aspirin after surgery. Gentle hydration, and workup for polycythemia.     Head of bed to be maintained >30deg elevated. Increased diamox to 1500mg BID, as given his size, he will likely need larger dosage.   NPO after midnight as per NSX for possible shunt tomorrow. Lovenox held, SCD's for dvt ppx for now.

## 2019-07-26 NOTE — CONSULT NOTE ADULT - ASSESSMENT
· Assessment	  35 y/o R-handed [place]ian M with h/o hypothyroidism (off meds), self-reported LAZARO presenting with 5 days of painless severe blurred vision (Right >Left) and reduced acuity of the right eye with associated B/L perioral numbness and R. arm paresthesias.  CTV performed yesterday but was a poor quality study.     1) Increased intracranial pressure- Worsening symptoms.     For  shunt. Neurology/NeuroSx f/up noted.  Increase acetazolamide to 1500 mg BID     2) Hypercoagulable state  Hypercoag work up notable for anti-thrombin III,Protein C, coag panel prolonged  Hematology f/up.  CTA chest Limited study - NO PE.    Considering pt's medical condition and nature of surgery, pt is at intermediate risk for surgery.

## 2019-07-26 NOTE — CONSULT NOTE ADULT - ASSESSMENT
*** NOTE INCOMPLETE *** Patient is a 35 y/o M w/ a PMHx of LAZARO and morbid obesity who initially p/w 5 days of monocular right eye blurred vision a/w perioral numbness/tingling and R arm paresthesias which has continued to progress, found to have a very high opening pressure on LP concerning for possible malignant pseudotumor cerebri vs underlying sinus thrombosis w/ imaging nondiagnostic 2/2 body habitus. Hematology consulted for evaluation of hypercoagulable workup.      *** NOTE INCOMPLETE *** Patient is a 35 y/o M w/ a PMHx of LAZARO and morbid obesity who initially p/w 5 days of monocular right eye blurred vision a/w perioral numbness/tingling and R arm paresthesias which has continued to progress, found to have a very high opening pressure on LP concerning for possible malignant pseudotumor cerebri vs underlying sinus thrombosis w/ imaging nondiagnostic 2/2 body habitus. Hematology consulted for evaluation of hypercoagulable workup.    # Hypercoagulable workup  - Patient p/w progressively worsening vision loss a/w occipital headache and was found to have severe papilledema and an elevated opening pressure on LP (55) raising concern for intracranial hypertension. Patient's presentation is concerning for malignant pseudotumor cerebri vs underlying sinus thrombosis. Though no evidence of a thrombosis has been found during patient's hospitalization, imaging studies are very limited given patient's body habitus.   - Reviewed hypercoagulable workup initiated by Neurology team: + IgA anticardiolipin Ab (23.6), negative IgM/IgG anticardiolipin Abs, DRVVT negative, and negative Beta-2 Glycoprotein 1 Ab. Diagnostic criteria for APLS is not met based on these labs. Recommend checking silica clotting time to complete workup.  - Difficult to interpret Protein C and Antithrombin III levels in the setting of acute illness. Antithrombin III levels may also be falsely low as patient had received Lovenox prior. Labs can be repeated as an outpatient if concern for hypercoagulability remains once acute illness has resolved/stabilized  - Factor V Leiden, Prothrombin gene, MTHFR gene mutation testing are currently pending. Follow-up results  - Patient with underlying risk factors for thrombosis including morbid obesity and occasional smoking history  - Follow-up LE dopplers to rule out DVT    # Polycythemia  - Patient noted to have an uptrending HCT over the past few days (HCT = 60.2 this AM). His baseline Hct appears to be ~ 51. 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 Also suspect that his HCT increase is related to being on Acetazolamide  - Given concern for possible hypercoagulable state and ? underlying sinus thrombosis, recommend trying to keep HCT < 60. Will plan to phlebotomize ~ 250cc today.  - Consider starting gentle hydration to keep HCT from uptrending, though would need to watch patient's fluid status very closely as he is notably edematous on exam  - Check Erythropoietin (EPO) level, JAK2 mutation, and Hgb electrophoresis  - Recommend starting patient on prophylactic Lovenox 60mg SQ QD  - Monitor CBC daily    # Somnolence  - Patient noted to be a bit somnolent on exam today, though he is arousable. Concern for possible hypercapnia in the setting of LAZARO/obesity +/- underlying neurologic issues. Of note, patient had a recent hospitalization in 3/2019 for hypercapnic respiratory failure  - Recommend Pulmonary evaluation for assistance with CPAP/BiPAP  - Management of intracranial hypertension per Neurology/Neurosurgery    Plan d/w primary team     Timi Santos, PGY4  Hematology-Oncology Fellow  Pager: [phone]

## 2019-07-27 DIAGNOSIS — E66.2 MORBID (SEVERE) OBESITY WITH ALVEOLAR HYPOVENTILATION: ICD-10-CM

## 2019-07-27 DIAGNOSIS — J45.909 UNSPECIFIED ASTHMA, UNCOMPLICATED: ICD-10-CM

## 2019-07-27 DIAGNOSIS — E66.9 OBESITY, UNSPECIFIED: ICD-10-CM

## 2019-07-27 DIAGNOSIS — Z29.9 ENCOUNTER FOR PROPHYLACTIC MEASURES, UNSPECIFIED: ICD-10-CM

## 2019-07-27 DIAGNOSIS — G93.2 BENIGN INTRACRANIAL HYPERTENSION: ICD-10-CM

## 2019-07-27 DIAGNOSIS — G47.33 OBSTRUCTIVE SLEEP APNEA (ADULT) (PEDIATRIC): ICD-10-CM

## 2019-07-27 DIAGNOSIS — R06.02 SHORTNESS OF BREATH: ICD-10-CM

## 2019-07-27 DIAGNOSIS — Z01.811 ENCOUNTER FOR PREPROCEDURAL RESPIRATORY EXAMINATION: ICD-10-CM

## 2019-07-27 LAB
ALBUMIN SERPL ELPH-MCNC: 3.6 G/DL — SIGNIFICANT CHANGE UP (ref 3.3–5)
ALP SERPL-CCNC: 81 U/L — SIGNIFICANT CHANGE UP (ref 40–120)
ALT FLD-CCNC: 23 U/L — SIGNIFICANT CHANGE UP (ref 10–45)
ANION GAP SERPL CALC-SCNC: 9 MMOL/L — SIGNIFICANT CHANGE UP (ref 5–17)
ANISOCYTOSIS BLD QL: SLIGHT — SIGNIFICANT CHANGE UP
APPEARANCE UR: ABNORMAL
APTT BLD: 34.5 SEC — SIGNIFICANT CHANGE UP (ref 27.5–36.3)
AST SERPL-CCNC: 20 U/L — SIGNIFICANT CHANGE UP (ref 10–40)
BACTERIA # UR AUTO: NEGATIVE — SIGNIFICANT CHANGE UP
BASE EXCESS BLDA CALC-SCNC: 0 MMOL/L — SIGNIFICANT CHANGE UP (ref -2–2)
BASE EXCESS BLDA CALC-SCNC: 1.5 MMOL/L — SIGNIFICANT CHANGE UP (ref -2–2)
BASO STIPL BLD QL SMEAR: PRESENT — SIGNIFICANT CHANGE UP
BASOPHILS # BLD AUTO: 0.07 K/UL — SIGNIFICANT CHANGE UP (ref 0–0.2)
BASOPHILS NFR BLD AUTO: 0.9 % — SIGNIFICANT CHANGE UP (ref 0–2)
BILIRUB SERPL-MCNC: 1.6 MG/DL — HIGH (ref 0.2–1.2)
BILIRUB UR-MCNC: NEGATIVE — SIGNIFICANT CHANGE UP
BUN SERPL-MCNC: 11 MG/DL — SIGNIFICANT CHANGE UP (ref 7–23)
CA-I BLD-SCNC: 1.26 MMOL/L — SIGNIFICANT CHANGE UP (ref 1.12–1.3)
CALCIUM SERPL-MCNC: 9.2 MG/DL — SIGNIFICANT CHANGE UP (ref 8.4–10.5)
CHLORIDE SERPL-SCNC: 102 MMOL/L — SIGNIFICANT CHANGE UP (ref 96–108)
CO2 BLDA-SCNC: 32 MMOL/L — HIGH (ref 22–30)
CO2 BLDA-SCNC: 34 MMOL/L — HIGH (ref 22–30)
CO2 SERPL-SCNC: 28 MMOL/L — SIGNIFICANT CHANGE UP (ref 22–31)
COLOR SPEC: YELLOW — SIGNIFICANT CHANGE UP
CREAT SERPL-MCNC: 0.6 MG/DL — SIGNIFICANT CHANGE UP (ref 0.5–1.3)
CULTURE RESULTS: NO GROWTH — SIGNIFICANT CHANGE UP
DIFF PNL FLD: NEGATIVE — SIGNIFICANT CHANGE UP
EOSINOPHIL # BLD AUTO: 0.26 K/UL — SIGNIFICANT CHANGE UP (ref 0–0.5)
EOSINOPHIL NFR BLD AUTO: 3.5 % — SIGNIFICANT CHANGE UP (ref 0–6)
EPI CELLS # UR: 3 — SIGNIFICANT CHANGE UP
GAS PNL BLDA: SIGNIFICANT CHANGE UP
GLUCOSE SERPL-MCNC: 82 MG/DL — SIGNIFICANT CHANGE UP (ref 70–99)
GLUCOSE UR QL: NEGATIVE — SIGNIFICANT CHANGE UP
HCO3 BLDA-SCNC: 29 MMOL/L — SIGNIFICANT CHANGE UP (ref 21–29)
HCO3 BLDA-SCNC: 32 MMOL/L — HIGH (ref 21–29)
HCT VFR BLD CALC: 53.5 % — HIGH (ref 39–50)
HCYS SERPL-MCNC: 7 UMOL/L — SIGNIFICANT CHANGE UP
HGB BLD-MCNC: 13.8 G/DL — SIGNIFICANT CHANGE UP (ref 13–17)
HYALINE CASTS # UR AUTO: 0 /LPF — SIGNIFICANT CHANGE UP (ref 0–2)
HYPOCHROMIA BLD QL: SLIGHT — SIGNIFICANT CHANGE UP
INR BLD: 1.12 RATIO — SIGNIFICANT CHANGE UP (ref 0.88–1.16)
KETONES UR-MCNC: NEGATIVE — SIGNIFICANT CHANGE UP
LEUKOCYTE ESTERASE UR-ACNC: NEGATIVE — SIGNIFICANT CHANGE UP
LYMPHOCYTES # BLD AUTO: 1.41 K/UL — SIGNIFICANT CHANGE UP (ref 1–3.3)
LYMPHOCYTES # BLD AUTO: 19.1 % — SIGNIFICANT CHANGE UP (ref 13–44)
MACROCYTES BLD QL: SLIGHT — SIGNIFICANT CHANGE UP
MANUAL SMEAR VERIFICATION: SIGNIFICANT CHANGE UP
MCHC RBC-ENTMCNC: 21.4 PG — LOW (ref 27–34)
MCHC RBC-ENTMCNC: 25.8 GM/DL — LOW (ref 32–36)
MCV RBC AUTO: 83.1 FL — SIGNIFICANT CHANGE UP (ref 80–100)
MONOCYTES # BLD AUTO: 0.45 K/UL — SIGNIFICANT CHANGE UP (ref 0–0.9)
MONOCYTES NFR BLD AUTO: 6.1 % — SIGNIFICANT CHANGE UP (ref 2–14)
NEUTROPHILS # BLD AUTO: 5.2 K/UL — SIGNIFICANT CHANGE UP (ref 1.8–7.4)
NEUTROPHILS NFR BLD AUTO: 70.4 % — SIGNIFICANT CHANGE UP (ref 43–77)
NITRITE UR-MCNC: NEGATIVE — SIGNIFICANT CHANGE UP
PCO2 BLDA: 72 MMHG — CRITICAL HIGH (ref 32–46)
PCO2 BLDA: 79 MMHG — CRITICAL HIGH (ref 32–46)
PH BLDA: 7.22 — LOW (ref 7.35–7.45)
PH BLDA: 7.24 — LOW (ref 7.35–7.45)
PH UR: 7 — SIGNIFICANT CHANGE UP (ref 5–8)
PLAT MORPH BLD: NORMAL — SIGNIFICANT CHANGE UP
PLATELET # BLD AUTO: 156 K/UL — SIGNIFICANT CHANGE UP (ref 150–400)
PO2 BLDA: 83 MMHG — SIGNIFICANT CHANGE UP (ref 74–108)
PO2 BLDA: 84 MMHG — SIGNIFICANT CHANGE UP (ref 74–108)
POLYCHROMASIA BLD QL SMEAR: SLIGHT — SIGNIFICANT CHANGE UP
POTASSIUM SERPL-MCNC: 3.9 MMOL/L — SIGNIFICANT CHANGE UP (ref 3.5–5.3)
POTASSIUM SERPL-SCNC: 3.9 MMOL/L — SIGNIFICANT CHANGE UP (ref 3.5–5.3)
PROT SERPL-MCNC: 7.2 G/DL — SIGNIFICANT CHANGE UP (ref 6–8.3)
PROT UR-MCNC: NEGATIVE — SIGNIFICANT CHANGE UP
PROTHROM AB SERPL-ACNC: 12.8 SEC — SIGNIFICANT CHANGE UP (ref 10–13.1)
RBC # BLD: 6.44 M/UL — HIGH (ref 4.2–5.8)
RBC # FLD: 20.1 % — HIGH (ref 10.3–14.5)
RBC BLD AUTO: ABNORMAL
RBC CASTS # UR COMP ASSIST: 2 /HPF — SIGNIFICANT CHANGE UP (ref 0–4)
SAO2 % BLDA: 96 % — SIGNIFICANT CHANGE UP (ref 92–96)
SAO2 % BLDA: 96 % — SIGNIFICANT CHANGE UP (ref 92–96)
SODIUM SERPL-SCNC: 139 MMOL/L — SIGNIFICANT CHANGE UP (ref 135–145)
SP GR SPEC: 1.02 — SIGNIFICANT CHANGE UP (ref 1.01–1.02)
SPECIMEN SOURCE: SIGNIFICANT CHANGE UP
UROBILINOGEN FLD QL: ABNORMAL
WBC # BLD: 7.39 K/UL — SIGNIFICANT CHANGE UP (ref 3.8–10.5)
WBC # FLD AUTO: 7.39 K/UL — SIGNIFICANT CHANGE UP (ref 3.8–10.5)
WBC UR QL: 5 /HPF — SIGNIFICANT CHANGE UP (ref 0–5)

## 2019-07-27 PROCEDURE — 99231 SBSQ HOSP IP/OBS SF/LOW 25: CPT

## 2019-07-27 PROCEDURE — 99223 1ST HOSP IP/OBS HIGH 75: CPT

## 2019-07-27 PROCEDURE — 99232 SBSQ HOSP IP/OBS MODERATE 35: CPT | Mod: GC

## 2019-07-27 RX ADMIN — NYSTATIN CREAM 1 APPLICATION(S): 100000 CREAM TOPICAL at 05:49

## 2019-07-27 RX ADMIN — ACETAZOLAMIDE 1000 MILLIGRAM(S): 250 TABLET ORAL at 18:32

## 2019-07-27 RX ADMIN — NYSTATIN CREAM 1 APPLICATION(S): 100000 CREAM TOPICAL at 18:32

## 2019-07-27 RX ADMIN — ACETAZOLAMIDE 1000 MILLIGRAM(S): 250 TABLET ORAL at 05:49

## 2019-07-27 NOTE — PROGRESS NOTE ADULT - SUBJECTIVE AND OBJECTIVE BOX
34y Male admitted with visual issue, polycythemia with HCT >60, high opening pressure on LP.     Interval Hx: Seated in chair at bedside, somnolent for a period after awakening him. On oxygen. Still has visual acuity issues and has a headaches, persistent, after the LP. No cough, has SOB, leg edema. No bruising noted by patient.     Vital Signs Last 24 Hrs  T(C): 36.6 (27 Jul 2019 12:20), Max: 36.9 (26 Jul 2019 23:51)  T(F): 97.9 (27 Jul 2019 12:20), Max: 98.4 (26 Jul 2019 23:51)  HR: 85 (27 Jul 2019 12:20) (77 - 92)  BP: 137/84 (27 Jul 2019 12:20) (109/68 - 140/82)  BP(mean): --  RR: 28 (27 Jul 2019 12:20) (16 - 28)  SpO2: 97% (27 Jul 2019 12:20) (94% - 100%)    07-26-19 @ 07:01 - 07-27-19 @ 07:00  --------------------------------------------------------  IN: 0 mL / OUT: 0 mL / NET: 0 mL    07-27-19 @ 07:01  - 07-27-19 @ 13:27  --------------------------------------------------------  IN: 0 mL / OUT: 0 mL / NET: 0 mL        PHYSICAL EXAM:  Constitutional: morbidly obese,  in no acute distress  Eyes: Anicteric.   ENT: Oropharynx is unremarkable, no pallor  Neck: No neck nodes appreciated.   Pulmonary: Clear to auscultation bilaterally  Cardiac: RRR, no murmurs  Abdomen: Normoactive bowel sounds, soft and nontender  Lymphatic: No cervical, supraclavicular or axillary lymphadenopathy appreciated  Skin: normal appearance, no significant bruising or petechiae  Neurology: Grossly intact, AAOx3  ext     chronic pitting edema    LABS:  07-27    139  |  102  |  11  ----------------------------<  82  3.9   |  28  |  0.60    Ca    9.2      27 Jul 2019 06:43    TPro  7.2  /  Alb  3.6  /  TBili  1.6<H>  /  DBili  x   /  AST  20  /  ALT  23  /  AlkPhos  81  07-27                          13.8   7.39  )-----------( 156      ( 27 Jul 2019 09:03 )             53.5     LIVER FUNCTIONS - ( 27 Jul 2019 06:43 )  Alb: 3.6 g/dL / Pro: 7.2 g/dL / ALK PHOS: 81 U/L / ALT: 23 U/L / AST: 20 U/L / GGT: x           PT/INR - ( 27 Jul 2019 08:28 )   PT: 12.8 sec;   INR: 1.12 ratio         PTT - ( 27 Jul 2019 08:28 )  PTT:34.5 sec

## 2019-07-27 NOTE — CONSULT NOTE ADULT - PROBLEM SELECTOR RECOMMENDATION 5
Patient with untreated LAZARO/OHS, super obesity.   At high risk for pulmonary complications due to obesity and disordered breathing.   Recommend anesthesia precautions and prolonged post-op monitoring due to risk sof LAZARO/OHS

## 2019-07-27 NOTE — PROGRESS NOTE ADULT - ASSESSMENT
· Assessment	  35 y/o R-handed [place]ian M with h/o hypothyroidism (off meds), self-reported LAZARO presenting with 5 days of painless severe blurred vision (Right >Left) and reduced acuity of the right eye with associated B/L perioral numbness and R. arm paresthesias.  CTV performed yesterday but was a poor quality study.     1) Increased intracranial pressure- Worsening symptoms.     For  shunt on 7/30 Neurology/NeuroSx f/up noted.  Increase acetazolamide to 1500 mg BID     2) Hypercoagulable state  Hypercoag work up notable for anti-thrombin III,Protein C, coag panel prolonged  Hematology f/up.  CTA chest Limited study - NO PE.    LAZARO:  Pul eval noted.  Nocturnal BIPAP    Considering pt's medical condition and nature of surgery, pt is at intermediate risk for surgery.

## 2019-07-27 NOTE — CONSULT NOTE ADULT - PROBLEM SELECTOR RECOMMENDATION 2
Elevated pCO2 and bicarbonate from chronic hypoventilation  Currently on CPAP 22cm H20  Would attempt bipap 22/15 and 30% FiO2 in hospital.

## 2019-07-27 NOTE — CONSULT NOTE ADULT - PROBLEM SELECTOR RECOMMENDATION 3
Will need outpatient sleep study and titration on bilevel to optimize NIPPV settings.   Would check free and total testosterone.  TSH mildly elevated but normal T4.

## 2019-07-27 NOTE — PROGRESS NOTE ADULT - SUBJECTIVE AND OBJECTIVE BOX
Patient is a 34y old  Male who presents with a chief complaint of Acute left eye visual acuity loss concern for pseudotumor cerebri and vision loss (2019 15:45)      SUBJECTIVE / OVERNIGHT EVENTS:    Events noted.  CONSTITUTIONAL: No fever,  or fatigue  RESPIRATORY: On supp O2  No N/V/Abd pain      MEDICATIONS  (STANDING):  acetazolamide    Tablet 1000 milliGRAM(s) Oral two times a day  nystatin Powder 1 Application(s) Topical two times a day  polyethylene glycol 3350 17 Gram(s) Oral daily  senna 1 Tablet(s) Oral daily    MEDICATIONS  (PRN):  acetaminophen   Tablet .. 650 milliGRAM(s) Oral every 6 hours PRN Temp greater or equal to 38C (100.4F), Mild Pain (1 - 3), Moderate Pain (4 - 6), Severe Pain (7 - 10)        CAPILLARY BLOOD GLUCOSE        I&O's Summary    2019 07:  -  2019 07:00  --------------------------------------------------------  IN: 0 mL / OUT: 0 mL / NET: 0 mL    2019 07:01  -  2019 22:30  --------------------------------------------------------  IN: 0 mL / OUT: 0 mL / NET: 0 mL        PHYSICAL EXAM:    NECK: Supple, No JVD  CHEST/LUNG: Clear to auscultation bilaterally; No wheezing.  HEART: Regular rate and rhythm; No murmurs, rubs, or gallops  ABDOMEN: Soft, Nontender, Nondistended; Bowel sounds present  EXTREMITIES:   No edema  NEUROLOGY: AAO       LABS:                        13.8   7.39  )-----------( 156      ( 2019 09:03 )             53.5         139  |  102  |  11  ----------------------------<  82  3.9   |  28  |  0.60    Ca    9.2      2019 06:43    TPro  7.2  /  Alb  3.6  /  TBili  1.6<H>  /  DBili  x   /  AST  20  /  ALT  23  /  AlkPhos  81      PT/INR - ( 2019 08:28 )   PT: 12.8 sec;   INR: 1.12 ratio         PTT - ( 2019 08:28 )  PTT:34.5 sec      Urinalysis Basic - ( 2019 07:00 )    Color: Yellow / Appearance: Turbid / S.018 / pH: x  Gluc: x / Ketone: Negative  / Bili: Negative / Urobili: 2 mg/dL   Blood: x / Protein: Negative / Nitrite: Negative   Leuk Esterase: Negative / RBC: 2 /hpf / WBC 5 /HPF   Sq Epi: x / Non Sq Epi: 3 / Bacteria: Negative      CAPILLARY BLOOD GLUCOSE         @ 21:46  Culture-urine --  Culture results   No growth  method type --  Organism --  Organism Identification --  Specimen source .CSF            @ 21:46  Culture blood --  Culture results   No growth  Gram stain   polymorphonuclear leukocytes seen  No organisms seen  by cytocentrifuge  Gram stain blood --  Method type --  Organism --  Organism identification --  Specimen source .CSF      RADIOLOGY & ADDITIONAL TESTS:    Imaging Personally Reviewed:    Consultant(s) Notes Reviewed:      Care Discussed with Consultants/Other Providers:

## 2019-07-27 NOTE — PROGRESS NOTE ADULT - ASSESSMENT
33 y/o R-handed [place]ian M with h/o hypothyroidism (off meds), self-reported LAZARO presenting with 5 days of painless severe blurred vision (Right >Left) and reduced acuity of the right eye with associated B/L perioral numbness and R. arm paresthesias.  CTV performed yesterday but was a poor quality study.     1) Increased intracranial pressure  Secondary to either venous sinus thrombus vs IIH.  Repeat CTV didn't demonstrate any thrombus though again limited by habitus, especially in    B/L Papilledema, worsening vision.   Ophtho on board.   Neurosurgery consulted for  shunt  If PT's symptoms continue to worsen as rapidly as they have over past few days, will discuss risk vs benefit of  Shunt earlier if medical clearance work up not complete.   NPO after midnight  Head of bed elevated to 30 degrees.  Keep PT up as supine and Trendelenburg positions will worsen condition.   Discussed temporizing LP with radiology and this is not an option at this time.   Increase acetazolamide to 1500 mg BID in interim.     2) Hypercoagulable state  Hypercoag work up notable for anti-thrombin III,Protein C, coag panel prolonged  Hematology consulted   CT angiogram of chest to R/O PE as PT still SOB with elevated D dimer and hypercoag panel     3) A1C 6.1  NPO for now.  Once diet resumed, monitor glucose q4  Insulin sliding scale interim

## 2019-07-27 NOTE — CONSULT NOTE ADULT - ASSESSMENT
34M with super obesity, probably LAZARO, chronic hypoventilation, likely asthma, restrictive PFTs, now here with vision loss from intracranial hypertension, awaiting  shunt.   This patient likely has obesity hypoventilation syndrome with associated hypoxemia and secondary polycythemia, LAZARO and asthma.

## 2019-07-27 NOTE — CONSULT NOTE ADULT - SUBJECTIVE AND OBJECTIVE BOX
CHIEF COMPLAINT:  Vision loss    HPI:     33 y/o male with possible LAZARO, substance abuse, super obesity who presented on 19 from the sleep center after reporting 5 days of monocular right eye blurred vision accompanied by perioral numbness and tingling and R arm paresthesias.    He did not seek medical attention immediately as he was concerned that he would miss his appointment for CPAP evaluation for suspected LAZARO.    He reports shortness of breath, PND, orthopnea, snoring, nocturia, wheezing, daytime sleepiness, weight gain over the past year, obesity for all of his life. He has snored for years. Has excessive daytime somnolence, falls asleep watching TV, as passenger in car, but reports memory and concentration intact.      Reports having a sleep study or two maybe 5 years ago but does not recall where in [place] or the results. Does not use home CPAP but has been using it in the hospital.     Now awaiting  shunt for idiopathic intracranial hypertension causing above neurologic symptoms.       PAST MEDICAL & SURGICAL HISTORY:  LAZARO (obstructive sleep apnea)  Asthma  right calf cellulitis      FAMILY HISTORY:  Mother is obese  father no disease    SOCIAL HISTORY:  Smoking: smokes marijuana daily for years  Substance Use: as above  EtOH Use: social  Marital Status: [ X] Single [ ]  [ ]  [ ]   Occupation: drug seller (unemployed)  Recent Travel: no  Country of Birth: [place]    Allergies    No Known Allergies    Intolerances        HOME MEDICATIONS:  Home Medications:  None    REVIEW OF SYSTEMS:  Constitutional: [ ] negative [ ] fevers [ ] chills [ ] weight loss [+ ] weight gain  HEENT: [+ ] negative [ ] dry eyes [ ] eye irritation [ ] postnasal drip [ ] nasal congestion  CV: [ ] negative  [ ] chest pain [ +] orthopnea [ ] palpitations [ ] murmur  Resp: [ ] negative [ ] cough [+ ] shortness of breath [+ ] dyspnea [+ ] wheezing [ -] sputum [ -] hemoptysis  GI: [+ ] negative [ ] nausea [ ] vomiting [ ] diarrhea [ ] constipation [ ] abd pain [ ] dysphagia   : [ ] negative [ ] dysuria [+] nocturia [ ] hematuria [ ] increased urinary frequency  Musculoskeletal: [+ ] negative [ ] back pain [ ] myalgias [ ] arthralgias [ ] fracture  Skin: [ +] negative [ ] rash [ ] itch  Neurological: [ ] negative [ ++] headache [ ] dizziness [ ] syncope [ ] weakness [ ] numbness  Psychiatric: [ +] negative [ ] anxiety [ ] depression  Endocrine: [+ ] negative [ ] diabetes [ ] thyroid problem  Hematologic/Lymphatic: [+] polycythemia[ ] anemia [ ] bleeding problem  Allergic/Immunologic: [+] negative [ ] itchy eyes [ ] nasal discharge [ ] hives [ ] angioedema  [+ ] All other systems negative  [ ] Unable to assess ROS because ________    OBJECTIVE:  ICU Vital Signs Last 24 Hrs  T(C): 36.6 (2019 12:20), Max: 36.9 (2019 23:51)  T(F): 97.9 (2019 12:20), Max: 98.4 (2019 23:51)  HR: 85 (2019 12:20) (77 - 92)  BP: 137/84 (2019 12:20) (109/68 - 140/82)  RR: 28 (2019 12:20) (16 - 28)  SpO2: 97% (2019 12:20) (94% - 100%)         @  @ 07:00  --------------------------------------------------------  IN: 0 mL / OUT: 0 mL / NET: 0 mL     @  @ 15:46  --------------------------------------------------------  IN: 0 mL / OUT: 0 mL / NET: 0 mL      CAPILLARY BLOOD GLUCOSE          PHYSICAL EXAM:  General: very obese, in no distress sitting in chair with O2 via nasal cannula.   HEENT: NCAT, PERRLA, EOMI  Lymph Nodes: no adenopathy  Neck: no JVD, obese neck  Respiratory: clear breath sounds bilaterally A and P  Normal effort  Cardiovascular: rate regular, normal s1 and s2   Abdomen: very obese, non-tender  Extremities: no clubbing/cyanosis, mild LE edema bilaterally  Skin:  cellulitic changes of right leg medially  Neurological: awake, alert, attentive, conversany  Psychiatry: normal    HOSPITAL MEDICATIONS:  Standing Meds:  acetazolamide    Tablet 1000 milliGRAM(s) Oral two times a day  nystatin Powder 1 Application(s) Topical two times a day  polyethylene glycol 3350 17 Gram(s) Oral daily  senna 1 Tablet(s) Oral daily      PRN Meds:  acetaminophen   Tablet .. 650 milliGRAM(s) Oral every 6 hours PRN      LABS:                        13.8   7.39  )-----------( 156      ( 2019 09:03 )             53.5     Hgb Trend: 13.8<--, 15.8<--, 14.5<--, 14.5<--, 14.0<--      139  |  102  |  11  ----------------------------<  82  3.9   |  28  |  0.60    Ca    9.2      2019 06:43    TPro  7.2  /  Alb  3.6  /  TBili  1.6<H>  /  DBili  x   /  AST  20  /  ALT  23  /  AlkPhos  81      Creatinine Trend: 0.60<--, 0.72<--, 0.89<--, 0.82<--, 0.78<--, 0.85<--  PT/INR - ( 2019 08:28 )   PT: 12.8 sec;   INR: 1.12 ratio         PTT - ( 2019 08:28 )  PTT:34.5 sec  Urinalysis Basic - ( 2019 07:00 )    Color: Yellow / Appearance: Turbid / S.018 / pH: x  Gluc: x / Ketone: Negative  / Bili: Negative / Urobili: 2 mg/dL   Blood: x / Protein: Negative / Nitrite: Negative   Leuk Esterase: Negative / RBC: 2 /hpf / WBC 5 /HPF   Sq Epi: x / Non Sq Epi: 3 / Bacteria: Negative      Arterial Blood Gas:   @ 11:33  7.24/72/84/29/96/.0  ABG lactate: --  Arterial Blood Gas:   @ 09:44  7.22/79/83/32/96/1.5  ABG lactate: --  Arterial Blood Gas:   @ 21:00  7.22/80/93/31/97/1.3  ABG lactate: --    Venous Blood Gas:   @ 23:43  7.20/84/87/32/96  VBG Lactate: 0.7      MICROBIOLOGY:     Culture - CSF with Gram Stain (collected 2019 21:46)  Source: .CSF  Gram Stain (2019 23:41):    polymorphonuclear leukocytes seen    No organisms seen    by cytocentrifuge  Preliminary Report (2019 16:33):    No growth        RADIOLOGY:  [ X] Reviewed and interpreted by me  CT poor quality due to gantry contact artifact  Small lung volumes, unable to evaluate parenchyma well due to artifact  main PA 3.3cm    PULMONARY FUNCTION TESTS: FEV1 2.06L, 53%  Spirometry suggestive of restrictive dysfunction with normal flow volume loop

## 2019-07-27 NOTE — PROGRESS NOTE ADULT - ASSESSMENT
Morbidly obese male with related respiratory issues, headaches and visual issues with polycythemia likely secondary. Epo levels an JAK2 not reported as yet. HCT is decreased to 53.5. This is not expected to be a primary polycythemia, where the goal of HCT would be <45%. 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 Target was to decrease HCT below 55% as this is secondary polycythemia in this clinical picture

## 2019-07-27 NOTE — PROGRESS NOTE ADULT - ATTENDING COMMENTS
0900 7/27/19 Attending Note    The patient was evaluated with the neurology resident. History and physical examination undertaken and chart was reviewed. Spoke to nurse.     35yo obese male admitted for benign intracranial HTN and currently complains of HA without significant nausea or vomiting. Denies diplopia, is able to walk to bathroom and currently examined while sitting in chair. Alert, oriented, without any confusional state. Cranial nerves are unremarkable except constricted visual fields. Extraocular movements are full, there is no nystagmus. Bulbar fxns normal. Motor strength is symmetric and there is mild hyperreflexia of RUE. No babinski sign. Primary sensory modalities are unremarkable. I observed the patient walking independantly without any ataxia. FTN normal including REBECCA.     Medical issues including polycythemia, clotting disorders, obesity, are being addressed by hematology and neurosurgical consultation is awaiting clearance from anesthesia and medical team. Patient is fluid restricted. Will follow.     Signed S. Thomas Mcpherson

## 2019-07-27 NOTE — PROGRESS NOTE ADULT - SUBJECTIVE AND OBJECTIVE BOX
SUBJECTIVE: Not feeling well, mouth feels dry.     REVIEW OF SYSTEMS : All other systems are negative except as was mentioned in the subjective.    MEDICATIONS  (STANDING):  acetazolamide    Tablet 1000 milliGRAM(s) Oral two times a day  nystatin Powder 1 Application(s) Topical two times a day  polyethylene glycol 3350 17 Gram(s) Oral daily  senna 1 Tablet(s) Oral daily    MEDICATIONS  (PRN):  acetaminophen   Tablet .. 650 milliGRAM(s) Oral every 6 hours PRN Temp greater or equal to 38C (100.4F), Mild Pain (1 - 3), Moderate Pain (4 - 6), Severe Pain (7 - 10)      Vital Signs (24 Hrs):  T(C): 36.2 (07-27-19 @ 08:17), Max: 36.9 (07-26-19 @ 23:51)  HR: 82 (07-27-19 @ 08:17) (77 - 97)  BP: 119/72 (07-27-19 @ 08:17) (109/68 - 153/95)  RR: 16 (07-27-19 @ 08:17) (16 - 21)  SpO2: 100% (07-27-19 @ 08:17) (94% - 100%)  Wt(kg): --  Daily     Daily     I&O's Summary    26 Jul 2019 07:01  -  27 Jul 2019 07:00  --------------------------------------------------------  IN: 0 mL / OUT: 0 mL / NET: 0 mL        PHYSICAL EXAM:  •	GENERAL APPEARANCE: Morbidly obese male, appears stated age laying in bed in NAD but more somnolent.   •	HEENT: Head, ears and nose non-traumatic. Oropharynx clear, no bite ozzy on tongue.  •	Cardio: Limited due to habitus but S1, S2 no murmurs, rubs or gallops heard.  Distant heart sounds.   •           RESP:  Breathing shallow, on 4L O2 via NC.  No crackles or wheezes heard.   •	MUSCULOSKELETAL: No cyanosis, clubbing.  Moderate 2+ pitting edema of B/L LE.  RLE increased circumference at calf vs LLE.   •	PSYCHIATRIC: Mood is congruent, normal affect.  •	NEUROLOGIC EXAMINATION:        o	MENTAL STATUS & HIGHER CORTICAL FUNCTION: The patient is oriented to self, situation, time and place.  Significantly more lethargic throughout day.  Can be aroused to name, but immediately returns to lethargic state.  Follows commands.          o	LANGUAGE: Speech is fluent with no dysarthria and no aphasia.          o	CRANIAL NERVES  	II - Pupils are more sluggish.  Left 3 mm-->2mm, R 5mm-->3 mm sluggish.  Possible left APD, though inconsistent on repeat examining.  Visual fields worse with Bi-temporal superior and inferior hemianopsia.  Visual acuity 20/200 right eye, 20/50 left eye. Having significant difficulty in perceiving shapes and lights at this time.  Can see colors, and gross objects, but having difficulty in both eyes with borders.         	III, IV, and VI - R. eye exotropia, extraocular movements are full with normal pursuit and saccades.  Left gaze left-beating nystagmus.   	V - Light touch is intact in all three divisions. Motor V is intact.    	VII - No facial asymmetry or weakness.   	IX - Palate rises symmetrically in the midline. XI - Shoulder shrug is normal. XII - Tongue protrudes in the midline.        o	MOTOR EXAMINATION: Normal bulk in all four extremities. Muscle strength is 5/5 in all four extremities.         o	SENSORY EXAMINATION: Sensory examination is grossly intact to pain and light touch in all 4 extremities.        o	COORDINATION: Fine coordinated movements in tact other than persistent subtle dysdiadochokinesia on left hand REBECCA.      07-26    137  |  99  |  10  ----------------------------<  78  4.0   |  28  |  0.72    Ca    9.5      26 Jul 2019 06:22    TPro  8.6<H>  /  Alb  4.2  /  TBili  1.7<H>  /  DBili  x   /  AST  22  /  ALT  27  /  AlkPhos  100  07-26    CBC Full  -  ( 26 Jul 2019 08:24 )  WBC Count : 7.71 K/uL  RBC Count : 7.24 M/uL  Hemoglobin : 15.8 g/dL  Hematocrit : 60.2 %  Platelet Count - Automated : 169 K/uL  Mean Cell Volume : 83.1 fl  Mean Cell Hemoglobin : 21.8 pg  Mean Cell Hemoglobin Concentration : 26.2 gm/dL  Auto Neutrophil # : x  Auto Lymphocyte # : x  Auto Monocyte # : x  Auto Eosinophil # : x  Auto Basophil # : x  Auto Neutrophil % : x  Auto Lymphocyte % : x  Auto Monocyte % : x  Auto Eosinophil % : x  Auto Basophil % : x                    Anticardiolipin +   Anti-thrombin III  Protein C abnormality

## 2019-07-27 NOTE — PROVIDER CONTACT NOTE (CRITICAL VALUE NOTIFICATION) - BACKGROUND
34 year old male p/w left eye visual acuity loss concern for pseudi tumor cerebri and vision loss
Informing provider

## 2019-07-27 NOTE — CONSULT NOTE ADULT - PROBLEM SELECTOR RECOMMENDATION 9
Multifactorial from restrictive lung dysfunction from obesity hypoventilation and asthma.   Currently using oxygen. Keep SpO2 above 88%

## 2019-07-27 NOTE — PROGRESS NOTE ADULT - ASSESSMENT
34F with pseudotumor cerebri a/w vision loss    PLAN:  Plan for  shunt next week, awaiting OR time  acetozolamide   Medical clearance appreciated 34F with pseudotumor cerebri a/w vision loss    PLAN:  Plan for  shunt on Tuesday 7/30  acetozolamide   Medical clearance appreciated

## 2019-07-27 NOTE — PROGRESS NOTE ADULT - SUBJECTIVE AND OBJECTIVE BOX
Patient seen and examined at bedside.     Vital Signs Last 24 Hrs  T(C): 36.2 (27 Jul 2019 08:17), Max: 36.9 (26 Jul 2019 23:51)  T(F): 97.1 (27 Jul 2019 08:17), Max: 98.4 (26 Jul 2019 23:51)  HR: 82 (27 Jul 2019 08:17) (77 - 97)  BP: 119/72 (27 Jul 2019 08:17) (109/68 - 153/95)  BP(mean): --  RR: 16 (27 Jul 2019 08:17) (16 - 21)  SpO2: 100% (27 Jul 2019 08:17) (94% - 100%)    EXAM  intact except vision, R eye no light perception, L eye: able to see light, name objects/ count fingers inconsistently.

## 2019-07-28 PROCEDURE — 99232 SBSQ HOSP IP/OBS MODERATE 35: CPT

## 2019-07-28 PROCEDURE — 99231 SBSQ HOSP IP/OBS SF/LOW 25: CPT

## 2019-07-28 RX ADMIN — POLYETHYLENE GLYCOL 3350 17 GRAM(S): 17 POWDER, FOR SOLUTION ORAL at 12:18

## 2019-07-28 RX ADMIN — ACETAZOLAMIDE 1000 MILLIGRAM(S): 250 TABLET ORAL at 18:36

## 2019-07-28 RX ADMIN — NYSTATIN CREAM 1 APPLICATION(S): 100000 CREAM TOPICAL at 05:42

## 2019-07-28 RX ADMIN — ACETAZOLAMIDE 1000 MILLIGRAM(S): 250 TABLET ORAL at 05:42

## 2019-07-28 RX ADMIN — NYSTATIN CREAM 1 APPLICATION(S): 100000 CREAM TOPICAL at 18:37

## 2019-07-28 RX ADMIN — SENNA PLUS 1 TABLET(S): 8.6 TABLET ORAL at 12:18

## 2019-07-28 NOTE — PROGRESS NOTE ADULT - ASSESSMENT
34M with super obesity, probably LAZARO, chronic hypoventilation, likely asthma, restrictive PFTs, now here with vision loss from intracranial hypertension, awaiting  shunt.   This patient likely has obesity hypoventilation syndrome with associated hypoxemia and secondary polycythemia, LAZARO and asthma. 34M with super obesity, probably LAZARO, chronic hypoventilation, likely asthma, restrictive PFTs, now here with vision loss from intracranial hypertension, awaiting  shunt.   This patient likely has obesity hypoventilation syndrome with associated hypoxemia and secondary polycythemia, LAZARO and asthma.   ****************  7/28- bipap adjusted to 20/8-comfortable

## 2019-07-28 NOTE — PROGRESS NOTE ADULT - PROBLEM SELECTOR PLAN 2
Elevated pCO2 and bicarbonate from chronic hypoventilation  Currently on CPAP 22cm H20  Would attempt bipap 22/15 and 30% FiO2 in hospital. Elevated pCO2 and bicarbonate from chronic hypoventilation  Currently on  bipap 20/8 and 30% FiO2 in hospital-tolerating well

## 2019-07-28 NOTE — PROGRESS NOTE ADULT - PROBLEM SELECTOR PLAN 1
Multifactorial from restrictive lung dysfunction from obesity hypoventilation and asthma.   Currently using oxygen. Keep SpO2 above 88%.

## 2019-07-28 NOTE — PROGRESS NOTE ADULT - SUBJECTIVE AND OBJECTIVE BOX
Patient is a 34y old  Male who presents with a chief complaint of Acute left eye visual acuity loss concern for pseudotumor cerebri and vision loss (2019 17:41)      SUBJECTIVE / OVERNIGHT EVENTS:    Events noted.  CONSTITUTIONAL: No fever,  or fatigue  RESPIRATORY: On supp O2  No N/V/Abd pain    MEDICATIONS  (STANDING):  acetazolamide    Tablet 1000 milliGRAM(s) Oral two times a day  nystatin Powder 1 Application(s) Topical two times a day  polyethylene glycol 3350 17 Gram(s) Oral daily  senna 1 Tablet(s) Oral daily    MEDICATIONS  (PRN):  acetaminophen   Tablet .. 650 milliGRAM(s) Oral every 6 hours PRN Temp greater or equal to 38C (100.4F), Mild Pain (1 - 3), Moderate Pain (4 - 6), Severe Pain (7 - 10)        CAPILLARY BLOOD GLUCOSE        I&O's Summary    2019 07:  -  2019 07:00  --------------------------------------------------------  IN: 120 mL / OUT: 0 mL / NET: 120 mL    2019 07:  -  2019 19:21  --------------------------------------------------------  IN: 150 mL / OUT: 0 mL / NET: 150 mL        PHYSICAL EXAM:  GENERAL: NAD  NECK: Supple, No JVD  CHEST/LUNG: Clear to auscultation bilaterally; No wheezing.  HEART: Regular rate and rhythm; No murmurs, rubs, or gallops  ABDOMEN: Soft, Nontender, Nondistended; Bowel sounds present  EXTREMITIES:   No edema  NEUROLOGY: AAO X 3      LABS:                        13.8   7.39  )-----------( 156      ( 2019 09:03 )             53.5     -    139  |  102  |  11  ----------------------------<  82  3.9   |  28  |  0.60    Ca    9.2      2019 06:43    TPro  7.2  /  Alb  3.6  /  TBili  1.6<H>  /  DBili  x   /  AST  20  /  ALT  23  /  AlkPhos  81      PT/INR - ( 2019 08:28 )   PT: 12.8 sec;   INR: 1.12 ratio         PTT - ( 2019 08:28 )  PTT:34.5 sec      Urinalysis Basic - ( 2019 07:00 )    Color: Yellow / Appearance: Turbid / S.018 / pH: x  Gluc: x / Ketone: Negative  / Bili: Negative / Urobili: 2 mg/dL   Blood: x / Protein: Negative / Nitrite: Negative   Leuk Esterase: Negative / RBC: 2 /hpf / WBC 5 /HPF   Sq Epi: x / Non Sq Epi: 3 / Bacteria: Negative      CAPILLARY BLOOD GLUCOSE         @ 21:46  Culture-urine --  Culture results   No growth  method type --  Organism --  Organism Identification --  Specimen source .CSF            @ 21:46  Culture blood --  Culture results   No growth  Gram stain   polymorphonuclear leukocytes seen  No organisms seen  by cytocentrifuge  Gram stain blood --  Method type --  Organism --  Organism identification --  Specimen source .CSF      RADIOLOGY & ADDITIONAL TESTS:    Imaging Personally Reviewed:    Consultant(s) Notes Reviewed:      Care Discussed with Consultants/Other Providers:

## 2019-07-28 NOTE — PROGRESS NOTE ADULT - SUBJECTIVE AND OBJECTIVE BOX
[facility] Ophthalmology Follow Up Note    S: Pt seen at bedside, more alert, states that bipap has helped him. Still having visual symptoms     Mood and Affect Appropriate ( x ),  Oriented to Time, Place, and Person x 3 ( x )    Ophthalmology Exam    Visual acuity (sc): HM OD, 20/200 OS  Pupils: PERRL OU,  APD OD  Ttono: 14, 13   Extraocular movements (EOMs): Full OU, no pain, no diplopia   Confrontational Visual Field (CVF):  OD unable to assess due to HM, OS diminished 4/4 but more diminished inferonasally.   Color Plates:     Slit Lamp Exam (SLE)  External:  Flat OU  Lids/Lashes/Lacrimal Ducts: Flat Ou    Sclera/Conjunctiva:  W+Q OU  Cornea: Cl OU  Anterior Chamber: D+Q OU   Iris:  Flat OU  Lens:  Cl OU      Assessment:      Plan:      Follow-Up:  Patient should follow up his/her ophthalmologist or in the [facility] Ophthalmology Practice within 1 week of discharge.  [address] [place] [address].  [place] [address] [phone]    S/D/W Dr Suggs (attending) [facility] Ophthalmology Follow Up Note    S: Pt seen at bedside, more alert, states that bipap has helped him. Still having visual symptoms     Mood and Affect Appropriate ( x ),  Oriented to Time, Place, and Person x 3 ( x )    Ophthalmology Exam    Visual acuity (sc): HM OD, 20/100 OS  Pupils: PERRL OU,  APD OD  Ttono: 14, 13   Extraocular movements (EOMs): Full OU, no pain, no diplopia   Confrontational Visual Field (CVF):  OD unable to assess due to HM, OS diminished 4/4 but more diminished inferonasally.   Color Plates:     Slit Lamp Exam (SLE)  External:  Flat OU  Lids/Lashes/Lacrimal Ducts: Flat Ou    Sclera/Conjunctiva:  W+Q OU  Cornea: Cl OU  Anterior Chamber: D+Q OU (OS- white strands)  Iris:  Flat OU  Lens:  Cl OU      Assessment:      Plan:      Follow-Up:  Patient should follow up his/her ophthalmologist or in the [facility] Ophthalmology Practice within 1 week of discharge.  [address] [place] [address].  [place] [address]  [phone]    S/D/W Dr Suggs (attending) [facility] Ophthalmology Follow Up Note    S: Pt seen at bedside, more alert, states that bipap has helped him. Still having visual symptoms     Mood and Affect Appropriate ( x ),  Oriented to Time, Place, and Person x 3 ( x )    Ophthalmology Exam    Visual acuity (sc): HM OD, 20/100 OS  Pupils: PERRL OU,  APD OD  Ttono: 14, 13   Extraocular movements (EOMs): Full OU, no pain, no diplopia   Confrontational Visual Field (CVF):  OD unable to assess due to HM, OS diminished 4/4 but more diminished inferonasally.   Color Plates:     Slit Lamp Exam (SLE)  External:  Flat OU  Lids/Lashes/Lacrimal Ducts: Flat Ou    Sclera/Conjunctiva:  W+Q OU  Cornea: Cl OU  Anterior Chamber: D+Q OU (OS- white strands in front of iris)  Iris:  Flat OU  Lens:  Cl OU      Assessment: 35 y/o M, LAZARO and morbid obesity, presents with 5 days of poor vision OD, headaches, pulsatile tinnitus, and tunnel vision intermittently. HM vision OD, + APD, EOMs full w/o pain. Ant seg exam unremarkable. Severe optic disc edema seen on DFE, left eye > right eye. Multiple intraretinal hemorrhages and microaneurysms also seen OD. Given morbid obesity and LAZARO, differential for poor vision in right eye includes non arteritic ischemic optic neuropathy vs. less likely ocular ischemic syndrome. SLE negative on admission for cell in AC. Inferior altitudinal defect suspicious for NAION OD.  Pt will require w/u for bilateral disc swelling to evaluated for elevated intracranial pressure.  Pt also has evidence of venous congestion possibly secondary to chronic papilledema vs cardiovascular vs hypercoaguable cause.  Scattered mid-peripheral DBH raises concern for ocular ischemic syndrome, although less likely as pt has no other signs.  The decrease in vision, APD, along with an altitudinal field defect OD suggsts that this pt may have had an NAION secondary to chronic disc swelling vs undiagnosed cardiovascular risk factors.  There are case reports of pts with bilateral CRVO secondary to LAZARO.  Pt has severe LAZARO and low O2 which may be an explanation as well if imaging and LP does not explain exam findings. IR guided LP with elevated opening pressure. Patient is pending  shunt on tuesday with neurosurgery.         Plan:    INCOMPLETE NOTE  - plan per Neuro, agree with Diamox for now  - will need hypercoaguable work up, heme/onc on board, weight loss advised, agree with plan for shunt on tuesday.   - will need cardiovascular work up including EKG, echo, carotids, BP, BS, HgA1c  - pt advised to let team know if he notices any changes in his vision  - guarded visual prognosis at this time, explained to patient  - findings and plan discussed with patient and primary team  - case and findings initially d/w Dr. Adkins (neuro-ophthalmology attending)    Follow-Up:  Patient should follow up his/her ophthalmologist or in the [facility] Ophthalmology Practice within 1 week of discharge.  [address] [facility].  [place], [place] [id number] [phone] [facility] Ophthalmology Follow Up Note    S: Pt seen at bedside, more alert, states that bipap has helped him. Still having visual symptoms     Mood and Affect Appropriate ( x ),  Oriented to Time, Place, and Person x 3 ( x )    Ophthalmology Exam    Visual acuity (sc): HM OD, 20/100 OS  Pupils: PERRL OU,  APD OD  Ttono: 14, 13   Extraocular movements (EOMs): Full OU, no pain, no diplopia   Confrontational Visual Field (CVF):  OD unable to assess due to HM, OS diminished 4/4 but more diminished inferonasally.   Color Plates:     Slit Lamp Exam (SLE)  External:  Flat OU  Lids/Lashes/Lacrimal Ducts: Flat Ou    Sclera/Conjunctiva:  W+Q OU  Cornea: Cl OU  Anterior Chamber: D+Q OU   Iris:  Flat OU, Iris atrophy 3 clock hour OS  Lens:  Cl OU      Assessment: 33 y/o M, LAZARO and morbid obesity, presents with 5 days of poor vision OD, headaches, pulsatile tinnitus, and tunnel vision intermittently. HM vision OD, + APD, EOMs full w/o pain. Ant seg exam unremarkable. Severe optic disc edema seen on DFE, left eye > right eye. Multiple intraretinal hemorrhages and microaneurysms also seen OD. Given morbid obesity and LAZARO, differential for poor vision in right eye includes non arteritic ischemic optic neuropathy vs. less likely ocular ischemic syndrome. SLE negative on admission for cell in AC. Inferior altitudinal defect suspicious for NAION OD.  Pt will require w/u for bilateral disc swelling to evaluated for elevated intracranial pressure.  Pt also has evidence of venous congestion possibly secondary to chronic papilledema vs cardiovascular vs hypercoaguable cause.  Scattered mid-peripheral DBH raises concern for ocular ischemic syndrome, although less likely as pt has no other signs.  The decrease in vision, APD, along with an altitudinal field defect OD suggsts that this pt may have had an NAION secondary to chronic disc swelling vs undiagnosed cardiovascular risk factors.  There are case reports of pts with bilateral CRVO secondary to LAZARO.  Pt has severe LAZARO and low O2 which may be an explanation as well if imaging and LP does not explain exam findings. IR guided LP with elevated opening pressure. Patient is pending  shunt on tuesday with neurosurgery.         Plan:    - plan per Neuro, agree with Diamox for now  - will need hypercoaguable work up, heme/onc on board, weight loss advised, agree with plan for shunt on tuesday.   - will need cardiovascular work up including EKG, echo, carotids, BP, BS, HgA1c  - pt advised to let team know if he notices any changes in his vision  - guarded visual prognosis at this time, explained to patient  - findings and plan discussed with patient and family at bedside  - case and findings initially d/w Dr. Adkins (neuro-ophthalmology attending)    Follow-Up:  Patient should follow up his/her ophthalmologist or in the [facility] Ophthalmology Practice within 1 week of discharge.  [address] [facility] [place] [address] [phone] [facility] Ophthalmology Follow Up Note    S: Pt seen at bedside, more alert, states that bipap has helped him. Still having visual symptoms     Mood and Affect Appropriate ( x ),  Oriented to Time, Place, and Person x 3 ( x )    Ophthalmology Exam    Visual acuity (sc): HM OD, 20/100 OS  Pupils: PERRL OU,  APD OD  Ttono: 14, 13   Extraocular movements (EOMs): Full OU, no pain, no diplopia   Confrontational Visual Field (CVF):  OD unable to assess due to HM, OS diminished 4/4 but can see a little bit more inferonasally.   Color Plates:     Slit Lamp Exam (SLE)  External:  Flat OU  Lids/Lashes/Lacrimal Ducts: Flat Ou    Sclera/Conjunctiva:  W+Q OU  Cornea: Cl OU  Anterior Chamber: D+Q OU   Iris:  Flat OU, Iris atrophy 3 clock hour OS  Lens:  Cl OU      Assessment: 35 y/o M, LAZARO and morbid obesity, presents with 5 days of poor vision OD, headaches, pulsatile tinnitus, and tunnel vision intermittently. HM vision OD, + APD, EOMs full w/o pain. Ant seg exam unremarkable. Severe optic disc edema seen on DFE, left eye > right eye. Multiple intraretinal hemorrhages and microaneurysms also seen OD. Given morbid obesity and LAZARO, differential for poor vision in right eye includes non arteritic ischemic optic neuropathy vs. less likely ocular ischemic syndrome. SLE negative on admission for cell in AC. Inferior altitudinal defect suspicious for NAION OD.  Pt will require w/u for bilateral disc swelling to evaluated for elevated intracranial pressure.  Pt also has evidence of venous congestion possibly secondary to chronic papilledema vs cardiovascular vs hypercoaguable cause.  Scattered mid-peripheral DBH raises concern for ocular ischemic syndrome, although less likely as pt has no other signs.  The decrease in vision, APD, along with an altitudinal field defect OD suggsts that this pt may have had an NAION secondary to chronic disc swelling vs undiagnosed cardiovascular risk factors.  There are case reports of pts with bilateral CRVO secondary to LAZARO.  Pt has severe LAZARO and low O2 which may be an explanation as well if imaging and LP does not explain exam findings. IR guided LP with elevated opening pressure. Patient is pending  shunt on tuesday with neurosurgery.         Plan:    - plan per Neuro, agree with Diamox for now  - will need hypercoaguable work up, heme/onc on board, weight loss advised, agree with plan for shunt on tuesday.   - will need cardiovascular work up including EKG, echo, carotids, BP, BS, HgA1c  - pt advised to let team know if he notices any changes in his vision  - guarded visual prognosis at this time, explained to patient  - findings and plan discussed with patient and family at bedside  - case and findings initially d/w Dr. Adkins (neuro-ophthalmology attending)    Follow-Up:  Patient should follow up his/her ophthalmologist or in the [facility] Ophthalmology Practice within 1 week of discharge.  [address] [facility].  [place], [place] [id number] [phone] [facility] Ophthalmology Follow Up Note    S: Pt seen at bedside, more alert, states that bipap has helped him. Still having visual symptoms     Mood and Affect Appropriate ( x ),  Oriented to Time, Place, and Person x 3 ( x )    Ophthalmology Exam    Visual acuity (sc): HM OD, 20/100 OS  Pupils: PERRL OU,  APD OD  Ttono: 14, 13   Extraocular movements (EOMs): Full OU, no pain, no diplopia   Confrontational Visual Field (CVF):  OD unable to assess due to HM, OS diminished 4/4 but can see a little bit more inferonasally.   Color Plates: 9/12     Slit Lamp Exam (SLE)  External:  Flat OU  Lids/Lashes/Lacrimal Ducts: Flat Ou    Sclera/Conjunctiva:  W+Q OU  Cornea: Cl OU  Anterior Chamber: D+Q OU   Iris:  Flat OU, Iris atrophy 3 clock hour OS  Lens:  Cl OU      Assessment: 35 y/o M, LAZARO and morbid obesity, presents with 5 days of poor vision OD, headaches, pulsatile tinnitus, and tunnel vision intermittently. HM vision OD, + APD, EOMs full w/o pain. Ant seg exam unremarkable. Severe optic disc edema seen on DFE, left eye > right eye. Multiple intraretinal hemorrhages and microaneurysms also seen OD. Given morbid obesity and LAZARO, differential for poor vision in right eye includes non arteritic ischemic optic neuropathy vs. less likely ocular ischemic syndrome. SLE negative on admission for cell in AC. Inferior altitudinal defect suspicious for NAION OD.  Pt will require w/u for bilateral disc swelling to evaluated for elevated intracranial pressure.  Pt also has evidence of venous congestion possibly secondary to chronic papilledema vs cardiovascular vs hypercoaguable cause.  Scattered mid-peripheral DBH raises concern for ocular ischemic syndrome, although less likely as pt has no other signs.  The decrease in vision, APD, along with an altitudinal field defect OD suggsts that this pt may have had an NAION secondary to chronic disc swelling vs undiagnosed cardiovascular risk factors.  There are case reports of pts with bilateral CRVO secondary to LAZARO.  Pt has severe LAZARO and low O2 which may be an explanation as well if imaging and LP does not explain exam findings. IR guided LP with elevated opening pressure. Patient is pending  shunt on tuesday with neurosurgery.         Plan:    - plan per Neuro, agree with Diamox for now  - will need hypercoaguable work up, heme/onc on board, weight loss advised, agree with plan for shunt on tuesday.   - will need cardiovascular work up including EKG, echo, carotids, BP, BS, HgA1c  - pt advised to let team know if he notices any changes in his vision  - guarded visual prognosis at this time, explained to patient  - findings and plan discussed with patient and family at bedside  - case and findings initially d/w Dr. Adkins (neuro-ophthalmology attending)    Follow-Up:  Patient should follow up his/her ophthalmologist or in the [facility] Ophthalmology Practice within 1 week of discharge.  [address] [facility].  [place], [place] [id number] [phone]

## 2019-07-28 NOTE — PROGRESS NOTE ADULT - PROBLEM SELECTOR PLAN 5
Patient with untreated LAZARO/OHS, super obesity.   At high risk for pulmonary complications due to obesity and disordered breathing.   Recommend anesthesia precautions and prolonged post-op monitoring due to risk sof LAZARO/OHS.

## 2019-07-28 NOTE — PROGRESS NOTE ADULT - SUBJECTIVE AND OBJECTIVE BOX
Patient seen and examined at bedside.    --Anticoagulation--    T(C): 36.8 (07-28-19 @ 08:17), Max: 36.8 (07-27-19 @ 20:28)  HR: 70 (07-28-19 @ 11:04) (70 - 93)  BP: 138/85 (07-28-19 @ 08:17) (128/75 - 145/81)  RR: 22 (07-28-19 @ 08:17) (16 - 28)  SpO2: 98% (07-28-19 @ 11:04) (94% - 98%)  Wt(kg): --    Exam:  intact except vision, R eye no light perception, L eye: able to see light, name objects/ count fingers inconsistently. Patient seen and examined at bedside.    --Anticoagulation--    T(C): 36.8 (07-28-19 @ 08:17), Max: 36.8 (07-27-19 @ 20:28)  HR: 70 (07-28-19 @ 11:04) (70 - 93)  BP: 138/85 (07-28-19 @ 08:17) (128/75 - 145/81)  RR: 22 (07-28-19 @ 08:17) (16 - 28)  SpO2: 98% (07-28-19 @ 11:04) (94% - 98%)  Wt(kg): --    Exam:  intact except vision, R eye light perception, L eye:name objects/ count fingers inconsistently.

## 2019-07-28 NOTE — PROGRESS NOTE ADULT - ASSESSMENT
· Assessment	  33 y/o R-handed Ecdorian M with h/o hypothyroidism (off meds), self-reported LAZARO presenting with 5 days of painless severe blurred vision (Right >Left) and reduced acuity of the right eye with associated B/L perioral numbness and R. arm paresthesias.  CTV performed yesterday but was a poor quality study.     1) Increased intracranial pressure- Worsening symptoms.     For  shunt on 7/30.  Neurology/NeuroSx f/up noted.    On  acetazolamide to 1500 mg BID     LAZARO/Morbid obesity:  Pul f/up   Nocturnal BIPAP    Considering pt's medical condition and nature of surgery, pt is at intermediate risk for surgery.

## 2019-07-28 NOTE — PROGRESS NOTE ADULT - ATTENDING COMMENTS
As above  SOB - multifactorial - obesity hypoventilation, likely asthma, morbid obesity  Asthma - Start symbicort, duonebs  LAZARO/OHS - Start Bipap 22/15, FiO2 30% at night. Needs outpatient sleep study and titration.  Obesity - Bariatric surgery referral  GI/DVT ppx  Pre-op Pulmonary assessment. High risk for pulmonary complications, aspiration pneumonia, bronchitis, atelectasis As above-bipap initiated (cpap prior)  SOB - multifactorial - obesity hypoventilation, likely asthma, morbid obesity  Asthma - Start symbicortroger  LAZARO/OHS - on Bipap 20/8, FiO2 30% at night. Needs outpatient sleep study and titration.  Obesity - Bariatric surgery referral  GI/DVT ppx  Pre-op Pulmonary assessment. High risk for pulmonary complications, aspiration pneumonia, bronchitis, atelectasis  Philip Silva MD-Pulmonary   [phone]

## 2019-07-28 NOTE — PROGRESS NOTE ADULT - ASSESSMENT
33 y/o R-handed Ecuadorian M with h/o hypothyroidism (off meds), self-reported LAZARO presenting with 5 days of painless severe blurred vision (Right >Left) and reduced acuity of the right eye with associated B/L perioral numbness and R. arm paresthesias.  CTV performed yesterday but was a poor quality study.     1) Increased intracranial pressure  Secondary to either venous sinus thrombus vs IIH.  Repeat CTV didn't demonstrate any thrombus though again limited by habitus, especially in    B/L Papilledema, worsening vision.   Ophtho on board.   Neurosurgery consulted for  shunt  Plan for  shunt, neurosurgery on board, family to make final decision.    NPO after midnight  Head of bed elevated to 30 degrees.  Keep PT up as supine and Trendelenburg positions will worsen condition.   Discussed temporizing LP with radiology and this is not an option at this time.   Increase acetazolamide to 1500 mg BID in interim.     2) Hypercoagulable state  Hypercoag work up notable for anti-thrombin III,Protein C, coag panel prolonged  Hematology consulted   CT angiogram of chest to R/O PE as PT still SOB with elevated D dimer and hypercoag panel     3) A1C 6.1  On diet now., monitor glucose q4  Insulin sliding scale interim

## 2019-07-28 NOTE — PROGRESS NOTE ADULT - SUBJECTIVE AND OBJECTIVE BOX
CHIEF COMPLAINT: f/up for sob, obesity hypoventilation, osas, asthma, restrictive dysfunction--    Interval Events:    REVIEW OF SYSTEMS:  Constitutional: No fevers or chills. No weight loss. No fatigue or generalized malaise.  Eyes: No itching or discharge from the eyes  ENT: No ear pain. No ear discharge. No nasal congestion. No post nasal drip. No epistaxis. No throat pain. No sore throat. No difficulty swallowing.   CV: No chest pain. No palpitations. No lightheadedness or dizziness.   Resp: No dyspnea at rest. No dyspnea on exertion. No orthopnea. No wheezing. No cough. No stridor. No sputum production. No chest pain with respiration.  GI: No nausea. No vomiting. No diarrhea.  MSK: No joint pain or pain in any extremities  Integumentary: No skin lesions. No pedal edema.  Neurological: No gross motor weakness. No sensory changes.  [ ] All other systems negative  [ ] Unable to assess ROS because ________    OBJECTIVE:  ICU Vital Signs Last 24 Hrs  T(C): 36.8 (2019 04:26), Max: 36.8 (2019 20:28)  T(F): 98.2 (2019 04:26), Max: 98.2 (2019 20:28)  HR: 79 (2019 04:26) (70 - 93)  BP: 128/75 (2019 04:26) (119/72 - 145/81)  BP(mean): --  ABP: --  ABP(mean): --  RR: 16 (2019 04:26) (16 - 28)  SpO2: 97% (2019 04:26) (94% - 100%)         @ : @ 07:00  --------------------------------------------------------  IN: 0 mL / OUT: 0 mL / NET: 0 mL     @ 07: @ 05:59  --------------------------------------------------------  IN: 120 mL / OUT: 0 mL / NET: 120 mL      CAPILLARY BLOOD GLUCOSE          PHYSICAL EXAM:  General: Awake, alert, oriented X 3.   HEENT: Atraumatic, normocephalic.                 Mallampatti Grade                 No nasal congestion.                No tonsillar or pharyngeal exudates.  Lymph Nodes: No palpable lymphadenopathy  Neck: No JVD. No carotid bruit.   Respiratory: Normal chest expansion                         Normal percussion                         Normal and equal air entry                         No wheeze, rhonchi or rales.  Cardiovascular: S1 S2 normal. No murmurs, rubs or gallops.   Abdomen: Soft, non-tender, non-distended. No organomegaly. Normoactive bowel sounds.  Extremities: Warm to touch. Peripheral pulse palpable. No pedal edema.   Skin: No rashes or skin lesions  Neurological: Motor and sensory examination equal and normal in all four extremities.  Psychiatry: Appropriate mood and affect.    HOSPITAL MEDICATIONS:  MEDICATIONS  (STANDING):  acetazolamide    Tablet 1000 milliGRAM(s) Oral two times a day  nystatin Powder 1 Application(s) Topical two times a day  polyethylene glycol 3350 17 Gram(s) Oral daily  senna 1 Tablet(s) Oral daily    MEDICATIONS  (PRN):  acetaminophen   Tablet .. 650 milliGRAM(s) Oral every 6 hours PRN Temp greater or equal to 38C (100.4F), Mild Pain (1 - 3), Moderate Pain (4 - 6), Severe Pain (7 - 10)      LABS:                        13.8   7.39  )-----------( 156      ( 2019 09:03 )             53.5         139  |  102  |  11  ----------------------------<  82  3.9   |  28  |  0.60    Ca    9.2      2019 06:43    TPro  7.2  /  Alb  3.6  /  TBili  1.6<H>  /  DBili  x   /  AST  20  /  ALT  23  /  AlkPhos  81  07-27    PT/INR - ( 2019 08:28 )   PT: 12.8 sec;   INR: 1.12 ratio         PTT - ( 2019 08:28 )  PTT:34.5 sec  Urinalysis Basic - ( 2019 07:00 )    Color: Yellow / Appearance: Turbid / S.018 / pH: x  Gluc: x / Ketone: Negative  / Bili: Negative / Urobili: 2 mg/dL   Blood: x / Protein: Negative / Nitrite: Negative   Leuk Esterase: Negative / RBC: 2 /hpf / WBC 5 /HPF   Sq Epi: x / Non Sq Epi: 3 / Bacteria: Negative      Arterial Blood Gas:   @ 11:33  7.24/72/84/29/96/.0  ABG lactate: --  Arterial Blood Gas:   @ 09:44  7.22/79/83/32/96/1.5  ABG lactate: --  Arterial Blood Gas:   @ 21:00  7.22/80/93/31/97/1.3  ABG lactate: --    Venous Blood Gas:   @ 23:43  7.20/84/87/32/96  VBG Lactate: 0.7      MICROBIOLOGY:     RADIOLOGY:  [ ] Reviewed and interpreted by me    Point of Care Ultrasound Findings:    PFT:    EKG: CHIEF COMPLAINT: f/up for sob, obesity hypoventilation, osas, asthma, restrictive dysfunction--dry mouth and HA    Interval Events: used bipap o/n     REVIEW OF SYSTEMS:  Constitutional: No fevers or chills. No weight loss. + fatigue or generalized malaise.  Eyes: No itching or discharge from the eyes  ENT: No ear pain. No ear discharge. No nasal congestion. No post nasal drip. No epistaxis. No throat pain. No sore throat. No difficulty swallowing.   CV: No chest pain. No palpitations. No lightheadedness or dizziness.   Resp: No dyspnea at rest. + dyspnea on exertion. No orthopnea. No wheezing. No cough. No stridor. No sputum production. No chest pain with respiration.  GI: No nausea. No vomiting. No diarrhea.  MSK: No joint pain or pain in any extremities  Integumentary: No skin lesions. No pedal edema.  Neurological: No gross motor weakness. No sensory changes.  [+ ] All other systems negative  [ ] Unable to assess ROS because ________    OBJECTIVE:  ICU Vital Signs Last 24 Hrs  T(C): 36.8 (2019 04:26), Max: 36.8 (2019 20:28)  T(F): 98.2 (2019 04:26), Max: 98.2 (2019 20:28)  HR: 79 (2019 04:26) (70 - 93)  BP: 128/75 (2019 04:26) (119/72 - 145/81)  BP(mean): --  ABP: --  ABP(mean): --  RR: 16 (2019 04:26) (16 - 28)  SpO2: 97% (2019 04:26) (94% - 100%)         @ :  -   @ 07:00  --------------------------------------------------------  IN: 0 mL / OUT: 0 mL / NET: 0 mL     @ 07: @ 05:59  --------------------------------------------------------  IN: 120 mL / OUT: 0 mL / NET: 120 mL      CAPILLARY BLOOD GLUCOSE          PHYSICAL EXAM: obese in bed on NC O2  General: Awake, alert, oriented X 3.   HEENT: Atraumatic, normocephalic.                 Mallampatti Grade 3                No nasal congestion.                No tonsillar or pharyngeal exudates.  Lymph Nodes: No palpable lymphadenopathy  Neck: No JVD. No carotid bruit.   Respiratory: Normal chest expansion                         Normal percussion                         Normal and equal air entry                         No wheeze, rhonchi or rales.  Cardiovascular: S1 S2 normal. No murmurs, rubs or gallops.   Abdomen: Soft, non-tender, non-distended. No organomegaly. Normoactive bowel sounds.  Extremities: Warm to touch. Peripheral pulse palpable. + pedal edema.   Skin: No rashes or skin lesions  Neurological: Motor and sensory examination equal and normal in all four extremities.  Psychiatry: Appropriate mood and affect.    HOSPITAL MEDICATIONS:  MEDICATIONS  (STANDING):  acetazolamide    Tablet 1000 milliGRAM(s) Oral two times a day  nystatin Powder 1 Application(s) Topical two times a day  polyethylene glycol 3350 17 Gram(s) Oral daily  senna 1 Tablet(s) Oral daily    MEDICATIONS  (PRN):  acetaminophen   Tablet .. 650 milliGRAM(s) Oral every 6 hours PRN Temp greater or equal to 38C (100.4F), Mild Pain (1 - 3), Moderate Pain (4 - 6), Severe Pain (7 - 10)      LABS:                        13.8   7.39  )-----------( 156      ( 2019 09:03 )             53.5     07-    139  |  102  |  11  ----------------------------<  82  3.9   |  28  |  0.60    Ca    9.2      2019 06:43    TPro  7.2  /  Alb  3.6  /  TBili  1.6<H>  /  DBili  x   /  AST  20  /  ALT  23  /  AlkPhos  81      PT/INR - ( 2019 08:28 )   PT: 12.8 sec;   INR: 1.12 ratio         PTT - ( 2019 08:28 )  PTT:34.5 sec  Urinalysis Basic - ( 2019 07:00 )    Color: Yellow / Appearance: Turbid / S.018 / pH: x  Gluc: x / Ketone: Negative  / Bili: Negative / Urobili: 2 mg/dL   Blood: x / Protein: Negative / Nitrite: Negative   Leuk Esterase: Negative / RBC: 2 /hpf / WBC 5 /HPF   Sq Epi: x / Non Sq Epi: 3 / Bacteria: Negative      Arterial Blood Gas:   @ 11:33  7.24/72/84/29/96/.0  ABG lactate: --  Arterial Blood Gas:   @ 09:44  7.22/79/83/32/96/1.5  ABG lactate: --  Arterial Blood Gas:   @ 21:00  7.22/80/93/31/97/1.3  ABG lactate: --    Venous Blood Gas:   @ 23:43  7.20/84/87/32/96  VBG Lactate: 0.7      MICROBIOLOGY:     RADIOLOGY:  [ ] Reviewed and interpreted by me    Point of Care Ultrasound Findings:    PFT:    EKG:

## 2019-07-28 NOTE — PROGRESS NOTE ADULT - ASSESSMENT
34F with pseudotumor cerebri a/w vision loss    PLAN:  -Plan for  shunt on Tuesday 7/30  -Acetozolamide 34F with pseudotumor cerebri a/w vision loss    PLAN:  -Plan for  shunt on Tuesday 7/30  -Acetozolamide   - pending heme clearance

## 2019-07-28 NOTE — PROGRESS NOTE ADULT - ATTENDING COMMENTS
1644 7/28/19 Attending Note    The patient was evaluated with the neurology resident. History and physical examination undertaken and chart was reviewed. Spoke to nurse.     35yo obese male admitted for benign intracranial HTN, HA has slight improvement since LP, which showed evidence if IIH with elevated opening pressure. Alert, oriented, without any confusional state. Cranial nerves not examined at this time. Bulbar fxns normal. Motor strength is symmetric. Primary sensory modalities are unremarkable.    Medical issues including polycythemia, clotting disorders, obesity, are being addressed by hematology and neurosurgical consultation is awaiting clearance from anesthesia and medical team. Patient is fluid restricted. Will follow.     Spoke with family at bedside, explained benefits of  Shunting and addressed any concerns regarding patient's current diagnosis and treatment plan. Neurosurgery at bedside as well. Will follow.     Signed CHICO Mcpherson

## 2019-07-29 ENCOUNTER — TRANSCRIPTION ENCOUNTER (OUTPATIENT)
Age: 34
End: 2019-07-29

## 2019-07-29 DIAGNOSIS — Z76.89 PERSONS ENCOUNTERING HEALTH SERVICES IN OTHER SPECIFIED CIRCUMSTANCES: ICD-10-CM

## 2019-07-29 LAB
ANION GAP SERPL CALC-SCNC: 11 MMOL/L — SIGNIFICANT CHANGE UP (ref 5–17)
APTT BLD: 32.8 SEC — SIGNIFICANT CHANGE UP (ref 27.5–36.3)
BASOPHILS # BLD AUTO: 0.06 K/UL — SIGNIFICANT CHANGE UP (ref 0–0.2)
BASOPHILS NFR BLD AUTO: 1 % — SIGNIFICANT CHANGE UP (ref 0–2)
BUN SERPL-MCNC: 11 MG/DL — SIGNIFICANT CHANGE UP (ref 7–23)
CALCIUM SERPL-MCNC: 8.9 MG/DL — SIGNIFICANT CHANGE UP (ref 8.4–10.5)
CHLORIDE SERPL-SCNC: 103 MMOL/L — SIGNIFICANT CHANGE UP (ref 96–108)
CO2 SERPL-SCNC: 24 MMOL/L — SIGNIFICANT CHANGE UP (ref 22–31)
CREAT SERPL-MCNC: 0.65 MG/DL — SIGNIFICANT CHANGE UP (ref 0.5–1.3)
DNA PLOIDY SPEC FC-IMP: SIGNIFICANT CHANGE UP
DRVVT SCREEN TO CONFIRM RATIO: SIGNIFICANT CHANGE UP
EOSINOPHIL # BLD AUTO: 0.41 K/UL — SIGNIFICANT CHANGE UP (ref 0–0.5)
EOSINOPHIL NFR BLD AUTO: 6.8 % — HIGH (ref 0–6)
GLUCOSE SERPL-MCNC: 70 MG/DL — SIGNIFICANT CHANGE UP (ref 70–99)
HCT VFR BLD CALC: 52.4 % — HIGH (ref 39–50)
HGB BLD-MCNC: 14 G/DL — SIGNIFICANT CHANGE UP (ref 13–17)
IMM GRANULOCYTES NFR BLD AUTO: 0.5 % — SIGNIFICANT CHANGE UP (ref 0–1.5)
INR BLD: 1.15 RATIO — SIGNIFICANT CHANGE UP (ref 0.88–1.16)
LA NT DPL PPP QL: 29.9 SEC — SIGNIFICANT CHANGE UP
LYMPHOCYTES # BLD AUTO: 1.58 K/UL — SIGNIFICANT CHANGE UP (ref 1–3.3)
LYMPHOCYTES # BLD AUTO: 26.1 % — SIGNIFICANT CHANGE UP (ref 13–44)
MCHC RBC-ENTMCNC: 21.4 PG — LOW (ref 27–34)
MCHC RBC-ENTMCNC: 26.7 GM/DL — LOW (ref 32–36)
MCV RBC AUTO: 80 FL — SIGNIFICANT CHANGE UP (ref 80–100)
MONOCYTES # BLD AUTO: 0.59 K/UL — SIGNIFICANT CHANGE UP (ref 0–0.9)
MONOCYTES NFR BLD AUTO: 9.7 % — SIGNIFICANT CHANGE UP (ref 2–14)
NEUTROPHILS # BLD AUTO: 3.39 K/UL — SIGNIFICANT CHANGE UP (ref 1.8–7.4)
NEUTROPHILS NFR BLD AUTO: 55.9 % — SIGNIFICANT CHANGE UP (ref 43–77)
PLATELET # BLD AUTO: 160 K/UL — SIGNIFICANT CHANGE UP (ref 150–400)
POTASSIUM SERPL-MCNC: 3.6 MMOL/L — SIGNIFICANT CHANGE UP (ref 3.5–5.3)
POTASSIUM SERPL-SCNC: 3.6 MMOL/L — SIGNIFICANT CHANGE UP (ref 3.5–5.3)
PROTHROM AB SERPL-ACNC: 13.1 SEC — SIGNIFICANT CHANGE UP (ref 10–13.1)
PTR INTERPRETATION: SIGNIFICANT CHANGE UP
RBC # BLD: 6.55 M/UL — HIGH (ref 4.2–5.8)
RBC # FLD: 20.3 % — HIGH (ref 10.3–14.5)
SODIUM SERPL-SCNC: 138 MMOL/L — SIGNIFICANT CHANGE UP (ref 135–145)
WBC # BLD: 6.06 K/UL — SIGNIFICANT CHANGE UP (ref 3.8–10.5)
WBC # FLD AUTO: 6.06 K/UL — SIGNIFICANT CHANGE UP (ref 3.8–10.5)

## 2019-07-29 PROCEDURE — 99232 SBSQ HOSP IP/OBS MODERATE 35: CPT

## 2019-07-29 PROCEDURE — 99232 SBSQ HOSP IP/OBS MODERATE 35: CPT | Mod: GC

## 2019-07-29 PROCEDURE — 99233 SBSQ HOSP IP/OBS HIGH 50: CPT | Mod: GC

## 2019-07-29 RX ADMIN — NYSTATIN CREAM 1 APPLICATION(S): 100000 CREAM TOPICAL at 05:50

## 2019-07-29 RX ADMIN — Medication 650 MILLIGRAM(S): at 18:32

## 2019-07-29 RX ADMIN — ACETAZOLAMIDE 1000 MILLIGRAM(S): 250 TABLET ORAL at 05:50

## 2019-07-29 RX ADMIN — Medication 650 MILLIGRAM(S): at 17:03

## 2019-07-29 RX ADMIN — ACETAZOLAMIDE 1000 MILLIGRAM(S): 250 TABLET ORAL at 17:03

## 2019-07-29 NOTE — PROGRESS NOTE ADULT - ATTENDING COMMENTS
Patient seen and examined with neurology team and above note reviewed and I agree with assessment and plan as outlined. Patient reporting no significant change in visual symptoms and continue head pressure and pain.  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   He is awaiting neurosurgery intervention for  shunt and medicine and hematology clearance today.  Continue diamox 1000mg BID and continue medical and neurologic management and supportive care and all questions answered.

## 2019-07-29 NOTE — PROGRESS NOTE ADULT - SUBJECTIVE AND OBJECTIVE BOX
Patient seen and examined at bedside.    --Anticoagulation--    T(C): 36.5 (07-29-19 @ 04:39), Max: 36.8 (07-28-19 @ 08:17)  HR: 79 (07-29-19 @ 04:39) (70 - 92)  BP: 127/82 (07-29-19 @ 04:39) (117/72 - 162/102)  RR: 18 (07-29-19 @ 04:39) (18 - 22)  SpO2: 99% (07-29-19 @ 04:39) (94% - 100%)  Wt(kg): --    Exam:  intact except vision, R eye light perception, L eye:name objects/ count fingers inconsistently.

## 2019-07-29 NOTE — PROGRESS NOTE ADULT - SUBJECTIVE AND OBJECTIVE BOX
INTERVAL HPI/OVERNIGHT EVENTS:  Patient S&E at bedside. No o/n events,     MEDICATIONS  (STANDING):  acetazolamide    Tablet 1000 milliGRAM(s) Oral two times a day  nystatin Powder 1 Application(s) Topical two times a day  polyethylene glycol 3350 17 Gram(s) Oral daily  senna 1 Tablet(s) Oral daily    MEDICATIONS  (PRN):  acetaminophen   Tablet .. 650 milliGRAM(s) Oral every 6 hours PRN Temp greater or equal to 38C (100.4F), Mild Pain (1 - 3), Moderate Pain (4 - 6), Severe Pain (7 - 10)    Allergies    No Known Allergies    Intolerances        VITAL SIGNS:  T(F): 97.7 (07-29-19 @ 04:39)  HR: 86 (07-29-19 @ 10:51)  BP: 127/82 (07-29-19 @ 04:39)  RR: 18 (07-29-19 @ 04:39)  SpO2: 98% (07-29-19 @ 10:51)  Wt(kg): --    PHYSICAL EXAM: INCOMPLETE    Constitutional: obese, NAD  Respiratory: CTAB  Cardiovascular: RRR, S1/S2  Gastrointestinal: +BS, soft, NT  Extremities: b/l LE edema  Neurological:     LABS:                        14.0   6.06  )-----------( 160      ( 29 Jul 2019 10:14 )             52.4     07-29    138  |  103  |  11  ----------------------------<  70  3.6   |  24  |  0.65    Ca    8.9      29 Jul 2019 07:04      PT/INR - ( 29 Jul 2019 09:57 )   PT: 13.1 sec;   INR: 1.15 ratio    PTT - ( 29 Jul 2019 09:57 )  PTT:32.8 sec    RADIOLOGY & ADDITIONAL TESTS:  Studies reviewed. INTERVAL HPI/OVERNIGHT EVENTS:  Patient S&E at bedside. No o/n events. Pt sitting in a chair, more awake today. Reports having a headache and b/l visual changes, notes diminished vision in temporal field.     MEDICATIONS  (STANDING):  acetazolamide    Tablet 1000 milliGRAM(s) Oral two times a day  nystatin Powder 1 Application(s) Topical two times a day  polyethylene glycol 3350 17 Gram(s) Oral daily  senna 1 Tablet(s) Oral daily    MEDICATIONS  (PRN):  acetaminophen   Tablet .. 650 milliGRAM(s) Oral every 6 hours PRN Temp greater or equal to 38C (100.4F), Mild Pain (1 - 3), Moderate Pain (4 - 6), Severe Pain (7 - 10)    Allergies    No Known Allergies    Intolerances    VITAL SIGNS:  T(F): 97.7 (07-29-19 @ 04:39)  HR: 86 (07-29-19 @ 10:51)  BP: 127/82 (07-29-19 @ 04:39)  RR: 18 (07-29-19 @ 04:39)  SpO2: 98% (07-29-19 @ 10:51)  Wt(kg): --    PHYSICAL EXAM:    Constitutional: obese, NAD  Respiratory: CTAB  Cardiovascular: RRR, S1/S2  Gastrointestinal: +BS, soft, NT  Extremities: b/l LE edema    LABS:                        14.0   6.06  )-----------( 160      ( 29 Jul 2019 10:14 )             52.4     07-29    138  |  103  |  11  ----------------------------<  70  3.6   |  24  |  0.65    Ca    8.9      29 Jul 2019 07:04      PT/INR - ( 29 Jul 2019 09:57 )   PT: 13.1 sec;   INR: 1.15 ratio    PTT - ( 29 Jul 2019 09:57 )  PTT:32.8 sec    RADIOLOGY & ADDITIONAL TESTS:  Studies reviewed.

## 2019-07-29 NOTE — PROGRESS NOTE ADULT - SUBJECTIVE AND OBJECTIVE BOX
Patient is a 34y old  Male who presents with a chief complaint of Acute left eye visual acuity loss concern for pseudotumor cerebri and vision loss (29 Jul 2019 16:25)      SUBJECTIVE / OVERNIGHT EVENTS:    Events noted.  CONSTITUTIONAL: No fever,  or fatigue  On supp O2  C/o Headache  No N/V    MEDICATIONS  (STANDING):  acetazolamide    Tablet 1000 milliGRAM(s) Oral two times a day  nystatin Powder 1 Application(s) Topical two times a day  polyethylene glycol 3350 17 Gram(s) Oral daily  senna 1 Tablet(s) Oral daily    MEDICATIONS  (PRN):  acetaminophen   Tablet .. 650 milliGRAM(s) Oral every 6 hours PRN Temp greater or equal to 38C (100.4F), Mild Pain (1 - 3), Moderate Pain (4 - 6), Severe Pain (7 - 10)        CAPILLARY BLOOD GLUCOSE        I&O's Summary    28 Jul 2019 07:01  -  29 Jul 2019 07:00  --------------------------------------------------------  IN: 150 mL / OUT: 0 mL / NET: 150 mL    29 Jul 2019 07:01  -  29 Jul 2019 23:08  --------------------------------------------------------  IN: 680 mL / OUT: 0 mL / NET: 680 mL        PHYSICAL EXAM:    NECK: Supple, No JVD  CHEST/LUNG: Clear to auscultation bilaterally; No wheezing.  HEART: Regular rate and rhythm; No murmurs, rubs, or gallops  ABDOMEN: Soft, Nontender, Nondistended; Bowel sounds present  EXTREMITIES:   No edema  NEUROLOGY: AAO X 3      LABS:                        14.0   6.06  )-----------( 160      ( 29 Jul 2019 10:14 )             52.4     07-29    138  |  103  |  11  ----------------------------<  70  3.6   |  24  |  0.65    Ca    8.9      29 Jul 2019 07:04      PT/INR - ( 29 Jul 2019 09:57 )   PT: 13.1 sec;   INR: 1.15 ratio         PTT - ( 29 Jul 2019 09:57 )  PTT:32.8 sec        CAPILLARY BLOOD GLUCOSE        07-24 @ 21:46  Culture-urine --  Culture results   No growth  method type --  Organism --  Organism Identification --  Specimen source .CSF           07-24 @ 21:46  Culture blood --  Culture results   No growth  Gram stain   polymorphonuclear leukocytes seen  No organisms seen  by cytocentrifuge  Gram stain blood --  Method type --  Organism --  Organism identification --  Specimen source .CSF      RADIOLOGY & ADDITIONAL TESTS:    Imaging Personally Reviewed:    Consultant(s) Notes Reviewed:      Care Discussed with Consultants/Other Providers:

## 2019-07-29 NOTE — PROGRESS NOTE ADULT - ASSESSMENT
Patient is a 33 y/o M w/ a PMHx of LAZARO and morbid obesity who initially p/w 5 days of monocular right eye blurred vision a/w perioral numbness/tingling and R arm paresthesias which has continued to progress, found to have a very high opening pressure on LP concerning for possible malignant pseudotumor cerebri vs underlying sinus thrombosis w/ imaging nondiagnostic 2/2 body habitus. Hematology consulted for evaluation of hypercoagulable workup.    1. Hypercoagulable workup  - Patient p/w progressively worsening vision loss a/w occipital headache and was found to have severe papilledema and an elevated opening pressure on LP (55) raising concern for intracranial hypertension. Patient's presentation is concerning for malignant pseudotumor cerebri vs underlying sinus thrombosis. Though no evidence of a thrombosis has been found during patient's hospitalization, imaging studies are very limited given patient's body habitus.   - Reviewed hypercoagulable workup initiated by Neurology team: + IgA anticardiolipin Ab (23.6), negative IgM/IgG anticardiolipin Abs, DRVVT negative, and negative Beta-2 Glycoprotein 1 Ab. Diagnostic criteria for APLS is not met based on these labs. Recommend checking silica clotting time to complete workup.  - Difficult to interpret Protein C and Antithrombin III levels in the setting of acute illness. Antithrombin III levels may also be falsely low as patient had received Lovenox prior. Labs can be repeated as an outpatient if concern for hypercoagulability remains once acute illness has resolved/stabilized  - Factor V Leiden, Prothrombin gene, MTHFR gene mutation testing are currently pending. Follow-up results  - Patient with underlying risk factors for thrombosis including morbid obesity and occasional smoking history  - LE dopplers with no evidence of DVT    2. Polycythemia  - Patient noted to have an uptrending HCT over the past few days (Hct as high as 60.2). His baseline Hct appears to be ~ 51. 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 Also suspect that his HCT increase is related to being on Acetazolamide  - Given concern for possible hypercoagulable state and ? underlying sinus thrombosis, recommend Hct goal <55%. He is s/p phlebotomy on 7/26 with 250 cc removal and Hct today is 52.4, goal is < 55% prior to OR.    - f/u Erythropoietin (EPO) level, JAK2 mutation, and Hgb electrophoresis  - Recommend starting patient on prophylactic Lovenox 60mg SQ QD  - Monitor CBC daily Patient is a 33 y/o M w/ a PMHx of LAZARO and morbid obesity who initially p/w 5 days of monocular right eye blurred vision a/w perioral numbness/tingling and R arm paresthesias which has continued to progress, found to have a very high opening pressure on LP concerning for possible malignant pseudotumor cerebri vs underlying sinus thrombosis w/ imaging nondiagnostic 2/2 body habitus. Hematology consulted for evaluation of hypercoagulable workup.    1. Hypercoagulable workup  - Patient p/w progressively worsening vision loss a/w occipital headache and was found to have severe papilledema and an elevated opening pressure on LP (55) raising concern for intracranial hypertension. Patient's presentation is concerning for malignant pseudotumor cerebri vs underlying sinus thrombosis. Though no evidence of a thrombosis has been found during patient's hospitalization, imaging studies are very limited given patient's body habitus.   - Reviewed hypercoagulable workup initiated by Neurology team: + IgA anticardiolipin Ab (23.6), negative IgM/IgG anticardiolipin Abs, DRVVT negative, and negative Beta-2 Glycoprotein 1 Ab. Diagnostic criteria for APLS is not met based on these labs.   - Difficult to interpret Protein C and Antithrombin III levels in the setting of acute illness. Antithrombin III levels may also be falsely low as patient had received Lovenox prior. Labs can be repeated as an outpatient if concern for hypercoagulability remains once acute illness has resolved/stabilized  - Factor V Leiden, Prothrombin gene, MTHFR gene mutation testing are currently pending. Follow-up results  - Patient with underlying risk factors for thrombosis including morbid obesity and occasional smoking history  - LE dopplers with no evidence of DVT    2. Polycythemia  - Patient noted to have an uptrending HCT over the past few days (Hct as high as 60.2). His baseline Hct appears to be ~ 51. 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 Also suspect that his HCT increase is related to being on Acetazolamide  - Given concern for possible hypercoagulable state and ? underlying sinus thrombosis, recommend Hct goal <55%. He is s/p phlebotomy on 7/26 with 250 cc removal and Hct today is 52.4. Goal Hcg is < 55% prior to OR.    - f/u Erythropoietin (EPO) level, JAK2 mutation, and Hgb electrophoresis  - Recommend starting patient on prophylactic Lovenox 60mg SQ QD  - Monitor CBC daily    d/w primary team

## 2019-07-29 NOTE — PROGRESS NOTE ADULT - ASSESSMENT
34F with pseudotumor cerebri a/w vision loss    PLAN:  -Plan for  shunt on Tuesday 7/30  - Acetozolamide 1000 mg bid   - pending heme clearance

## 2019-07-29 NOTE — PROGRESS NOTE ADULT - ATTENDING COMMENTS
Patient is a 35 y/o M w/ a PMHx of LAZARO and morbid obesity who initially p/w 5 days of monocular right eye blurred vision a/w perioral numbness/tingling and R arm paresthesias which has continued to progress, found to have a very high opening pressure on LP concerning for possible malignant pseudotumor cerebri vs underlying sinus thrombosis w/ imaging nondiagnostic 2/2 body habitus. Hematology consulted for evaluation of hypercoagulable workup. Patient also noted to have polycythema with rising Hct (as high as 60.2) during hospitalization. His baseline Hct appears to be ~ 51. 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 Improved with phlebotomy of 250 cc on 7/26 and stable for OR if HCT<55%.

## 2019-07-29 NOTE — PROGRESS NOTE ADULT - ATTENDING COMMENTS
I have interviewed and examined the patient and reviewed the residents note including the history, exam, assessment, and plan.  I agree with the residents assessment and plan.    35 y/o M, LAZARO and morbid obesity, presents with 5 days of poor vision OD, headaches, pulsatile tinnitus, and tunnel vision intermittently.  Pt found to have elevated open pressure on LP, suggestive of IIH secondary to morbid obesity.  Plan:  - plan per Neuro, agree with Diamox for now  -  hypercoaguable per heme/onc  - weight loss advised, agree with plan for shunt tomorrow   - pt advised to let team know if he notices any changes in his vision  - guarded visual prognosis at this time, explained to patient  - findings and plan discussed with patient and primary team as vision has worsened since admission, relayed urgency of  shunt to team as vision has worsened.  - case and findings initially d/w Dr. Adkins (neuro-ophthalmology attending)  - will continue to follow closely    Follow-Up:  Patient should follow up his/her ophthalmologist or in the [facility] within 1 week of discharge.    Tamanna Suggs MD

## 2019-07-29 NOTE — PROGRESS NOTE ADULT - ASSESSMENT
35 y/o R-handed [place]ian M with h/o hypothyroidism (off meds), self-reported LAZARO presenting with 5 days of painless severe blurred vision (Right >Left) and reduced acuity of the right eye with associated B/L perioral numbness and R. arm paresthesias. Pending neurosurgery operation tomorrow for  shunt placement.    1) Increased intracranial pressure- Worsening symptoms.   Secondary to either venous sinus thrombus vs IIH.  Repeat CTV didn't demonstrate any thrombus though again limited by habitus, especially in  B/L Papilledema, worsening vision.   Ophtho on board - notes worsening vision  Neurosurgery consulted for  shunt. Awaiting Hematology clearance   NPO after midnight  Keep head of bed elevated to 30 degrees.  Keep PT up as supine and Trendelenburg positions will worsen condition.   Acetazolamide to 1000 mg BID in interim.     2) Hypercoagulable state  Hypercoag work up notable for anti-thrombin III,Protein C, coag panel prolonged  Hematology consulted - awaiting surgical clearance  CT angiogram of chest to R/O PE as PT still SOB with elevated D dimer and hypercoag panel    3) A1C 6.1  NPO after midnight for possible  shunt placement tomorrow  Resume sliding scale after surgery 35 y/o R-handed [place]ian M with h/o hypothyroidism (off meds), self-reported LAZARO presenting with 5 days of painless severe blurred vision (Right >Left) and reduced acuity of the right eye with associated B/L perioral numbness and R. arm paresthesias. Pending neurosurgery operation tomorrow for  shunt placement.    1) Increased intracranial pressure- Worsening symptoms.   Secondary to IIH.  Repeat CTV didn't demonstrate any thrombus though again limited by habitus, especially in  B/L Papilledema, worsening vision.   Ophtho on board - notes worsening vision  Neurosurgery consulted for  shunt. Awaiting Hematology, medicine clearance   NPO after midnight  Keep head of bed elevated to 30 degrees.  Keep PT up as supine and Trendelenburg positions will worsen condition.   Acetazolamide to 1000 mg BID in interim.     2) Hypercoagulable state  Hypercoag work up notable for anti-thrombin III,Protein C, coag panel prolonged  Hematology consulted - awaiting surgical clearance  CT angiogram of chest to R/O PE as PT still SOB with elevated D dimer and hypercoag panel    3) A1C 6.1  NPO after midnight for possible  shunt placement tomorrow  Resume sliding scale after surgery

## 2019-07-29 NOTE — PROGRESS NOTE ADULT - SUBJECTIVE AND OBJECTIVE BOX
[facility] Ophthalmology Follow Up Note    S: Pt seen at bedside, admits worsening of vision since admission    Mood and Affect Appropriate ( x ),  Oriented to Time, Place, and Person x 3 ( x )    Ophthalmology Exam    Visual acuity (sc): 20/400 with eccentric fixation OD, 20/40 OS, PHNI OU  Pupils: PERRL OU,  APD OD  Ttono: 18  Extraocular movements (EOMs): Full OU, no pain, no diplopia   Confrontational Visual Field (CVF):  OD HM ST quadrant, OS central island  Color Plates: 9/12 OS     Pen Light Exam (PLE)  External:  Flat OU  Lids/Lashes/Lacrimal Ducts: Flat Ou    Sclera/Conjunctiva:  W+Q OU  Cornea: Cl OU  Anterior Chamber: D+F OU   Iris:  Flat OU  Lens:  Cl OU      Assessment: 33 y/o M, LAZARO and morbid obesity, presents with 5 days of poor vision OD, headaches, pulsatile tinnitus, and tunnel vision intermittently. HM vision OD, + APD, EOMs full w/o pain. Ant seg exam unremarkable. Severe optic disc edema seen on DFE, left eye > right eye. Multiple intraretinal hemorrhages and microaneurysms also seen OD. Given morbid obesity and LAZARO, differential for poor vision in right eye includes non arteritic ischemic optic neuropathy vs. less likely ocular ischemic syndrome. SLE negative on admission for cell in AC. Inferior altitudinal defect suspicious for NAION OD.  Pt will require w/u for bilateral disc swelling to evaluated for elevated intracranial pressure.  Pt also has evidence of venous congestion possibly secondary to chronic papilledema vs cardiovascular vs hypercoaguable cause.  Scattered mid-peripheral DBH raises concern for ocular ischemic syndrome, although less likely as pt has no other signs.  The decrease in vision, APD, along with an altitudinal field defect OD suggsts that this pt may have had an NAION secondary to chronic disc swelling vs undiagnosed cardiovascular risk factors.  There are case reports of pts with bilateral CRVO secondary to LAZARO.  Pt has severe LAZARO and low O2 which may be an explanation as well if imaging and LP does not explain exam findings. IR guided LP with elevated opening pressure. Patient is pending  shunt on tuesday with neurosurgery.     Plan:  - plan per Neuro, agree with Diamox for now  -  hypercoaguable per heme/onc  - weight loss advised, agree with plan for shunt tomorrow   - pt advised to let team know if he notices any changes in his vision  - guarded visual prognosis at this time, explained to patient  - findings and plan discussed with patient and primary team as vision has worsened since admission  - case and findings initially d/w Dr. Adkins (neuro-ophthalmology attending)    Follow-Up:  Patient should follow up his/her ophthalmologist or in the [facility] Practice within 1 week of discharge.  [address] [place] [address].  [place] [address] [phone]    s/d/w Dr. Suggs [facility] Ophthalmology Follow Up Note    S: Pt seen at bedside, admits worsening of vision since admission    Mood and Affect Appropriate ( x ),  Oriented to Time, Place, and Person x 3 ( x )    Ophthalmology Exam    Visual acuity (sc): 20/400 with eccentric fixation OD, 20/40 OS, PHNI OU  Pupils: PERRL OU,  APD OD  Ttono: 18  Extraocular movements (EOMs): Full OU, no pain, no diplopia   Confrontational Visual Field (CVF):  OD HM ST quadrant, OS central island  Color Plates: 9/12 OS     Pen Light Exam (PLE)  External:  Flat OU  Lids/Lashes/Lacrimal Ducts: Flat Ou    Sclera/Conjunctiva:  W+Q OU  Cornea: Cl OU  Anterior Chamber: D+F OU   Iris:  Flat OU  Lens:  Cl OU      Assessment: 33 y/o M, LAZARO and morbid obesity, presents with 5 days of poor vision OD, headaches, pulsatile tinnitus, and tunnel vision intermittently.  Pt found to have elevated open pressure on LP, suggestive of IIH secondary to morbid obesity.  Plan:  - plan per Neuro, agree with Diamox for now  -  hypercoaguable per heme/onc  - weight loss advised, agree with plan for shunt tomorrow   - pt advised to let team know if he notices any changes in his vision  - guarded visual prognosis at this time, explained to patient  - findings and plan discussed with patient and primary team as vision has worsened since admission, relayed urgency of  shunt to team as vision has worsened.  - case and findings initially d/w Dr. Adkins (neuro-ophthalmology attending)  - will continue to follow closely    Follow-Up:  Patient should follow up his/her ophthalmologist or in the [facility] within 1 week of discharge.  [address] [place] [address].  [place] [address]  [phone]    s/d/w Dr. Suggs

## 2019-07-29 NOTE — PROGRESS NOTE ADULT - SUBJECTIVE AND OBJECTIVE BOX
SUBJECTIVE:     INTERVAL HISTORY:    PAST MEDICAL & SURGICAL HISTORY:  LAZARO (obstructive sleep apnea)  Asthma    FAMILY HISTORY:    SOCIAL HISTORY:   T/E/D:   Occupation:   Lives with:     MEDICATIONS (HOME):  Home Medications:    MEDICATIONS  (STANDING):  acetazolamide    Tablet 1000 milliGRAM(s) Oral two times a day  nystatin Powder 1 Application(s) Topical two times a day  polyethylene glycol 3350 17 Gram(s) Oral daily  senna 1 Tablet(s) Oral daily    MEDICATIONS  (PRN):  acetaminophen   Tablet .. 650 milliGRAM(s) Oral every 6 hours PRN Temp greater or equal to 38C (100.4F), Mild Pain (1 - 3), Moderate Pain (4 - 6), Severe Pain (7 - 10)    ALLERGIES/INTOLERANCES:  Allergies  No Known Allergies    Intolerances    VITALS & EXAMINATION:  Vital Signs Last 24 Hrs  T(C): 36.7 (29 Jul 2019 12:37), Max: 36.8 (28 Jul 2019 16:19)  T(F): 98 (29 Jul 2019 12:37), Max: 98.2 (28 Jul 2019 16:19)  HR: 84 (29 Jul 2019 12:37) (75 - 91)  BP: 115/69 (29 Jul 2019 12:37) (115/69 - 162/102)  BP(mean): --  RR: 19 (29 Jul 2019 12:37) (18 - 22)  SpO2: 97% (29 Jul 2019 12:37) (94% - 100%)    General:  Constitutional: Obese Male, appears stated age, in no apparent distress including pain  Head: Normocephalic & atraumatic.  ENT: Patent ear canals, intact TM, mucus membranes moist & pink, neck supple, no lymphadenopathy.   Respiratory: Patent airway. All lung fields are clear to auscultation bilaterally.  Extremities: No cyanosis, clubbing, or edema.  Skin: No rashes, bruising, or discoloration.    Cardiovascular (>2): RRR no murmurs. Carotid pulsations symmetric, no bruits. Normal capillary beds refill, 1-2 seconds or less.     Neurological (>12):  MS: Awake, alert, oriented to person, place, situation, time. Normal affect. Follows all commands.    Language: Speech is clear, fluent with good repetition & comprehension (able to name objects___)    CNs: PERRLA (R = 3mm, L = 3mm). VFF. EOMI no nystagmus, no diplopia. V1-3 intact to LT/pinprick, well developed masseter muscles b/l. No facial asymmetry b/l, full eye closure strength b/l. Hearing grossly normal (rubbing fingers) b/l. Symmetric palate elevation in midline. Gag reflex deferred. Head turning & shoulder shrug intact b/l. Tongue midline, normal movements, no atrophy.    Fundoscopic: pale w/ sharp discs margins No vascular changes.      Motor: Normal muscle bulk & tone. No noticeable tremor or seizure. No pronator drift.              Deltoid	Biceps	Triceps	Wrist	Finger ABd	   R	5	5	5	5	5		5 	  L	5	5	5	5	5		5    	H-Flex	H-Ext	H-ABd	H-ADd	K-Flex	K-Ext	D-Flex	P-Flex  R	5	5	5	5	5	5	5	5 	   L	5	5	5	5	5	5	5	5	     Sensation: Intact to LT/PP/Temp/Vibration/Position b/l throughout.     Cortical: Extinction on DSS (neglect): none    Reflexes:              Biceps(C5)       BR(C6)     Triceps(C7)               Patellar(L4)    Achilles(S1)    Plantar Resp  R	2	          2	             2		        2		    2		Down   L	2	          2	             2		        2		    2		Down     Coordination: intact rapid-alt movements. No dysmetria to FTN/HTS    Gait: Normal Romberg. No postural instability. Normal stance and tandem gait.     LABORATORY:  CBC                       14.0   6.06  )-----------( 160      ( 29 Jul 2019 10:14 )             52.4     Chem 07-29    138  |  103  |  11  ----------------------------<  70  3.6   |  24  |  0.65    Ca    8.9      29 Jul 2019 07:04      LFTs   Coagulopathy PT/INR - ( 29 Jul 2019 09:57 )   PT: 13.1 sec;   INR: 1.15 ratio         PTT - ( 29 Jul 2019 09:57 )  PTT:32.8 sec  Lipid Panel   A1c 07-24 [id number] 6.1    Cardiac enzymes     U/A   CSF  Immunological  Other    STUDIES & IMAGING:  Studies (EKG, EEG, EMG, etc):     Radiology (XR, CT, MR, U/S, TTE/REN): SUBJECTIVE:   Patient interviewed and examined at the bedside on the morning of 7/29/19. Patient endorses continued unrelenting headache and mildly worsening vision bilaterally. Patient continues to note diminished vision out of the temporal field specifically. Patient deemed to be a candidate for  shunt as per Neurosurgery. Cleared by medicine and Cardiology, awaiting clearance from Hematology. If cleared to be taken to OR tomorrow. To continue with Diamox for time being.    PAST MEDICAL & SURGICAL HISTORY:  LAZARO (obstructive sleep apnea)  Asthma    MEDICATIONS  (STANDING):  acetazolamide    Tablet 1000 milliGRAM(s) Oral two times a day  nystatin Powder 1 Application(s) Topical two times a day  polyethylene glycol 3350 17 Gram(s) Oral daily  senna 1 Tablet(s) Oral daily    MEDICATIONS  (PRN):  acetaminophen   Tablet .. 650 milliGRAM(s) Oral every 6 hours PRN Temp greater or equal to 38C (100.4F), Mild Pain (1 - 3), Moderate Pain (4 - 6), Severe Pain (7 - 10)    ALLERGIES/INTOLERANCES:  Allergies  No Known Allergies    Intolerances    VITALS & EXAMINATION:  Vital Signs Last 24 Hrs  T(C): 36.7 (29 Jul 2019 12:37), Max: 36.8 (28 Jul 2019 16:19)  T(F): 98 (29 Jul 2019 12:37), Max: 98.2 (28 Jul 2019 16:19)  HR: 84 (29 Jul 2019 12:37) (75 - 91)  BP: 115/69 (29 Jul 2019 12:37) (115/69 - 162/102)  BP(mean): --  RR: 19 (29 Jul 2019 12:37) (18 - 22)  SpO2: 97% (29 Jul 2019 12:37) (94% - 100%)    General:  •	GENERAL APPEARANCE: Morbidly obese male, appears stated age sitting in chair in NAD, conversational  •	HEENT: Head, ears and nose non-traumatic. Oropharynx clear, no bite marks on tongue.  •	NEUROLOGIC EXAMINATION:        o	MENTAL STATUS & HIGHER CORTICAL FUNCTION: The patient is oriented to self, situation, time and place.  Significantly more responsive today.       o	LANGUAGE: Speech is fluent with no dysarthria and no aphasia.          o	CRANIAL NERVES:              I- No anosmia  	II - Pupils are more sluggish.  Left 3 mm-->2mm, R 5mm-->3 mm sluggish.  Visual fields worse with Bi-temporal superior and inferior hemianopsia.  Visual acuity 20/200 right eye, 20/50 left eye. Having significant difficulty in perceiving shapes and lights at this time.  Can see colors, and gross objects, but having difficulty in both eyes with borders.         	III, IV, and VI - R. eye exotropia, extraocular movements are full with normal pursuit and saccades.  Left gaze left-beating nystagmus.   	V - Light touch is intact in all three divisions. Motor V is intact.    	VII - No facial asymmetry or weakness.   	IX - Palate rises symmetrically in the midline. XI - Shoulder shrug is normal. XII - Tongue protrudes in the midline.        o	MOTOR EXAMINATION: Normal bulk in all four extremities. Muscle strength is 5/5 in all four extremities.         o	SENSORY EXAMINATION: Sensory examination is grossly intact to pain and light touch in all 4 extremities.        o	COORDINATION: Fine coordinated movements in tact     LABORATORY:  CBC                       14.0   6.06  )-----------( 160      ( 29 Jul 2019 10:14 )             52.4     Chem 07-29    138  |  103  |  11  ----------------------------<  70  3.6   |  24  |  0.65    Ca    8.9      29 Jul 2019 07:04      LFTs   Coagulopathy PT/INR - ( 29 Jul 2019 09:57 )   PT: 13.1 sec;   INR: 1.15 ratio         PTT - ( 29 Jul 2019 09:57 )  PTT:32.8 sec SUBJECTIVE:   Patient interviewed and examined at the bedside on the morning of 7/29/19. Patient endorses continued unrelenting headache and mildly worsening vision bilaterally. Patient continues to note diminished vision out of the temporal field specifically. Patient deemed to be a candidate for  shunt as per Neurosurgery. Cleared by medicine and Cardiology, awaiting clearance from Hematology. If cleared to be taken to OR tomorrow. To continue with Diamox for time being.    PAST MEDICAL & SURGICAL HISTORY:  LAZARO (obstructive sleep apnea)  Asthma    MEDICATIONS  (STANDING):  acetazolamide    Tablet 1000 milliGRAM(s) Oral two times a day  nystatin Powder 1 Application(s) Topical two times a day  polyethylene glycol 3350 17 Gram(s) Oral daily  senna 1 Tablet(s) Oral daily    MEDICATIONS  (PRN):  acetaminophen   Tablet .. 650 milliGRAM(s) Oral every 6 hours PRN Temp greater or equal to 38C (100.4F), Mild Pain (1 - 3), Moderate Pain (4 - 6), Severe Pain (7 - 10)    ALLERGIES/INTOLERANCES:  Allergies  No Known Allergies    Intolerances    VITALS & EXAMINATION:  Vital Signs Last 24 Hrs  T(C): 36.7 (29 Jul 2019 12:37), Max: 36.8 (28 Jul 2019 16:19)  T(F): 98 (29 Jul 2019 12:37), Max: 98.2 (28 Jul 2019 16:19)  HR: 84 (29 Jul 2019 12:37) (75 - 91)  BP: 115/69 (29 Jul 2019 12:37) (115/69 - 162/102)  BP(mean): --  RR: 19 (29 Jul 2019 12:37) (18 - 22)  SpO2: 97% (29 Jul 2019 12:37) (94% - 100%)    General:  •	GENERAL APPEARANCE: Morbidly obese male, appears stated age sitting in chair in NAD, conversational  •	HEENT: Head, ears and nose non-traumatic. Oropharynx clear, no bite marks on tongue.  •	NEUROLOGIC EXAMINATION:        o	MENTAL STATUS & HIGHER CORTICAL FUNCTION: The patient is oriented to self, situation, time and place.  Significantly more responsive today.       o	LANGUAGE: Speech is fluent with no dysarthria and no aphasia.          o	CRANIAL NERVES:              I- No anosmia  	II - Pupils are more sluggish.  Left 3 mm-->2mm, R 5mm-->3 mm sluggish.  Visual fields worse with Bi-temporal superior and inferior hemianopsia.  Visual acuity 20/200 right eye, 20/50 left eye. Having significant difficulty in perceiving shapes and lights at this time.  Can see colors, and gross objects, but having difficulty in both eyes with borders.         	III, IV, and VI - R. eye exotropia, extraocular movements are full with normal pursuit and saccades.  Left gaze left-beating nystagmus. Right cranial nerve sixth palsy  	V - Light touch is intact in all three divisions. Motor V is intact.    	VII - No facial asymmetry or weakness.   	IX - Palate rises symmetrically in the midline. XI - Shoulder shrug is normal. XII - Tongue protrudes in the midline.        o	MOTOR EXAMINATION: Normal bulk in all four extremities. Muscle strength is 5/5 in all four extremities.         o	SENSORY EXAMINATION: Sensory examination is grossly intact to pain and light touch in all 4 extremities.        o	COORDINATION: Fine coordinated movements in tact     LABORATORY:  CBC                       14.0   6.06  )-----------( 160      ( 29 Jul 2019 10:14 )             52.4     Chem 07-29    138  |  103  |  11  ----------------------------<  70  3.6   |  24  |  0.65    Ca    8.9      29 Jul 2019 07:04      LFTs   Coagulopathy PT/INR - ( 29 Jul 2019 09:57 )   PT: 13.1 sec;   INR: 1.15 ratio         PTT - ( 29 Jul 2019 09:57 )  PTT:32.8 sec

## 2019-07-29 NOTE — PROGRESS NOTE ADULT - ASSESSMENT
· Assessment	  33 y/o R-handed Ecd[place] M with h/o hypothyroidism (off meds), self-reported LAZARO presenting with 5 days of painless severe blurred vision (Right >Left) and reduced acuity of the right eye with associated B/L perioral numbness and R. arm paresthesias.  CTV performed yesterday but was a poor quality study.     1) Increased intracranial pressure- Worsening symptoms.     For  shunt on 7/30.  Neurology/NeuroSx f/up noted.    On  acetazolamide / Opthal f/up noted.    LAZARO/Morbid obesity:  Pul f/up   Nocturnal BIPAP    Polycythemia:  Hem f/up noted.  S/p phlebotomy.    Considering pt's medical condition and nature of surgery, pt is at intermediate risk for surgery.

## 2019-07-30 ENCOUNTER — APPOINTMENT (OUTPATIENT)
Dept: NEUROSURGERY | Facility: HOSPITAL | Age: 34
End: 2019-07-30
Payer: COMMERCIAL

## 2019-07-30 PROBLEM — G47.33 OBSTRUCTIVE SLEEP APNEA (ADULT) (PEDIATRIC): Chronic | Status: ACTIVE | Noted: 2019-07-21

## 2019-07-30 LAB
ALBUMIN SERPL ELPH-MCNC: 3.9 G/DL — SIGNIFICANT CHANGE UP (ref 3.3–5)
ALP SERPL-CCNC: 95 U/L — SIGNIFICANT CHANGE UP (ref 40–120)
ALT FLD-CCNC: 31 U/L — SIGNIFICANT CHANGE UP (ref 10–45)
ANION GAP SERPL CALC-SCNC: 14 MMOL/L — SIGNIFICANT CHANGE UP (ref 5–17)
AST SERPL-CCNC: 29 U/L — SIGNIFICANT CHANGE UP (ref 10–40)
BASOPHILS # BLD AUTO: 0 K/UL — SIGNIFICANT CHANGE UP (ref 0–0.2)
BASOPHILS NFR BLD AUTO: 0.4 % — SIGNIFICANT CHANGE UP (ref 0–2)
BILIRUB SERPL-MCNC: 1.3 MG/DL — HIGH (ref 0.2–1.2)
BLD GP AB SCN SERPL QL: NEGATIVE — SIGNIFICANT CHANGE UP
BUN SERPL-MCNC: 11 MG/DL — SIGNIFICANT CHANGE UP (ref 7–23)
CALCIUM SERPL-MCNC: 9.2 MG/DL — SIGNIFICANT CHANGE UP (ref 8.4–10.5)
CHLORIDE SERPL-SCNC: 104 MMOL/L — SIGNIFICANT CHANGE UP (ref 96–108)
CO2 SERPL-SCNC: 25 MMOL/L — SIGNIFICANT CHANGE UP (ref 22–31)
CREAT SERPL-MCNC: 0.67 MG/DL — SIGNIFICANT CHANGE UP (ref 0.5–1.3)
EOSINOPHIL # BLD AUTO: 0.1 K/UL — SIGNIFICANT CHANGE UP (ref 0–0.5)
EOSINOPHIL NFR BLD AUTO: 0.9 % — SIGNIFICANT CHANGE UP (ref 0–6)
EPO SERPL-MCNC: 6.1 MIU/ML — SIGNIFICANT CHANGE UP (ref 2.6–18.5)
GLUCOSE SERPL-MCNC: 110 MG/DL — HIGH (ref 70–99)
HCT VFR BLD CALC: 51.3 % — HIGH (ref 39–50)
HEMOGLOBIN INTERPRETATION: SIGNIFICANT CHANGE UP
HGB A MFR BLD: 97.9 % — SIGNIFICANT CHANGE UP (ref 95.8–98)
HGB A2 MFR BLD: 2.1 % — SIGNIFICANT CHANGE UP (ref 2–3.2)
HGB BLD-MCNC: 15.1 G/DL — SIGNIFICANT CHANGE UP (ref 13–17)
LYMPHOCYTES # BLD AUTO: 2 K/UL — SIGNIFICANT CHANGE UP (ref 1–3.3)
LYMPHOCYTES # BLD AUTO: 21.9 % — SIGNIFICANT CHANGE UP (ref 13–44)
MCHC RBC-ENTMCNC: 22.7 PG — LOW (ref 27–34)
MCHC RBC-ENTMCNC: 29.4 GM/DL — LOW (ref 32–36)
MCV RBC AUTO: 77.2 FL — LOW (ref 80–100)
MONOCYTES # BLD AUTO: 0.3 K/UL — SIGNIFICANT CHANGE UP (ref 0–0.9)
MONOCYTES NFR BLD AUTO: 3.2 % — SIGNIFICANT CHANGE UP (ref 2–14)
MTHFR GENE INTERPRETATION: SIGNIFICANT CHANGE UP
NEUTROPHILS # BLD AUTO: 6.6 K/UL — SIGNIFICANT CHANGE UP (ref 1.8–7.4)
NEUTROPHILS NFR BLD AUTO: 73.6 % — SIGNIFICANT CHANGE UP (ref 43–77)
PLATELET # BLD AUTO: 170 K/UL — SIGNIFICANT CHANGE UP (ref 150–400)
POTASSIUM SERPL-MCNC: 3.5 MMOL/L — SIGNIFICANT CHANGE UP (ref 3.5–5.3)
POTASSIUM SERPL-SCNC: 3.5 MMOL/L — SIGNIFICANT CHANGE UP (ref 3.5–5.3)
PROT SERPL-MCNC: 7.9 G/DL — SIGNIFICANT CHANGE UP (ref 6–8.3)
RBC # BLD: 6.65 M/UL — HIGH (ref 4.2–5.8)
RBC # FLD: 19.5 % — HIGH (ref 10.3–14.5)
RH IG SCN BLD-IMP: POSITIVE — SIGNIFICANT CHANGE UP
RH IG SCN BLD-IMP: POSITIVE — SIGNIFICANT CHANGE UP
SODIUM SERPL-SCNC: 143 MMOL/L — SIGNIFICANT CHANGE UP (ref 135–145)
WBC # BLD: 8.9 K/UL — SIGNIFICANT CHANGE UP (ref 3.8–10.5)
WBC # FLD AUTO: 8.9 K/UL — SIGNIFICANT CHANGE UP (ref 3.8–10.5)

## 2019-07-30 PROCEDURE — 62223 ESTABLISH BRAIN CAVITY SHUNT: CPT | Mod: 62,22

## 2019-07-30 PROCEDURE — 61781 SCAN PROC CRANIAL INTRA: CPT

## 2019-07-30 PROCEDURE — 99233 SBSQ HOSP IP/OBS HIGH 50: CPT | Mod: GC

## 2019-07-30 PROCEDURE — 70450 CT HEAD/BRAIN W/O DYE: CPT | Mod: 26

## 2019-07-30 PROCEDURE — 62223 ESTABLISH BRAIN CAVITY SHUNT: CPT | Mod: 62

## 2019-07-30 RX ORDER — ACETAMINOPHEN 500 MG
650 TABLET ORAL EVERY 6 HOURS
Refills: 0 | Status: DISCONTINUED | OUTPATIENT
Start: 2019-07-30 | End: 2019-08-10

## 2019-07-30 RX ORDER — OXYCODONE HYDROCHLORIDE 5 MG/1
10 TABLET ORAL EVERY 4 HOURS
Refills: 0 | Status: DISCONTINUED | OUTPATIENT
Start: 2019-07-30 | End: 2019-08-04

## 2019-07-30 RX ORDER — CEFAZOLIN SODIUM 1 G
2000 VIAL (EA) INJECTION EVERY 8 HOURS
Refills: 0 | Status: COMPLETED | OUTPATIENT
Start: 2019-07-30 | End: 2019-07-31

## 2019-07-30 RX ORDER — OXYCODONE HYDROCHLORIDE 5 MG/1
5 TABLET ORAL EVERY 4 HOURS
Refills: 0 | Status: DISCONTINUED | OUTPATIENT
Start: 2019-07-30 | End: 2019-08-06

## 2019-07-30 RX ORDER — ACETAMINOPHEN 500 MG
1000 TABLET ORAL ONCE
Refills: 0 | Status: COMPLETED | OUTPATIENT
Start: 2019-07-30 | End: 2019-07-30

## 2019-07-30 RX ORDER — DOCUSATE SODIUM 100 MG
100 CAPSULE ORAL THREE TIMES A DAY
Refills: 0 | Status: DISCONTINUED | OUTPATIENT
Start: 2019-07-30 | End: 2019-08-10

## 2019-07-30 RX ORDER — SODIUM CHLORIDE 9 MG/ML
1000 INJECTION INTRAMUSCULAR; INTRAVENOUS; SUBCUTANEOUS
Refills: 0 | Status: DISCONTINUED | OUTPATIENT
Start: 2019-07-30 | End: 2019-07-30

## 2019-07-30 RX ORDER — HYDROMORPHONE HYDROCHLORIDE 2 MG/ML
0.5 INJECTION INTRAMUSCULAR; INTRAVENOUS; SUBCUTANEOUS
Refills: 0 | Status: DISCONTINUED | OUTPATIENT
Start: 2019-07-30 | End: 2019-07-30

## 2019-07-30 RX ORDER — DEXTROSE MONOHYDRATE, SODIUM CHLORIDE, AND POTASSIUM CHLORIDE 50; .745; 4.5 G/1000ML; G/1000ML; G/1000ML
1000 INJECTION, SOLUTION INTRAVENOUS
Refills: 0 | Status: DISCONTINUED | OUTPATIENT
Start: 2019-07-30 | End: 2019-07-31

## 2019-07-30 RX ORDER — HYDROMORPHONE HYDROCHLORIDE 2 MG/ML
0.25 INJECTION INTRAMUSCULAR; INTRAVENOUS; SUBCUTANEOUS ONCE
Refills: 0 | Status: DISCONTINUED | OUTPATIENT
Start: 2019-07-30 | End: 2019-07-30

## 2019-07-30 RX ORDER — ONDANSETRON 8 MG/1
4 TABLET, FILM COATED ORAL ONCE
Refills: 0 | Status: DISCONTINUED | OUTPATIENT
Start: 2019-07-30 | End: 2019-07-30

## 2019-07-30 RX ADMIN — DEXTROSE MONOHYDRATE, SODIUM CHLORIDE, AND POTASSIUM CHLORIDE 75 MILLILITER(S): 50; .745; 4.5 INJECTION, SOLUTION INTRAVENOUS at 18:15

## 2019-07-30 RX ADMIN — NYSTATIN CREAM 1 APPLICATION(S): 100000 CREAM TOPICAL at 23:22

## 2019-07-30 RX ADMIN — ACETAZOLAMIDE 1000 MILLIGRAM(S): 250 TABLET ORAL at 23:21

## 2019-07-30 RX ADMIN — Medication 100 MILLIGRAM(S): at 23:21

## 2019-07-30 RX ADMIN — Medication 100 MILLIGRAM(S): at 23:22

## 2019-07-30 RX ADMIN — Medication 400 MILLIGRAM(S): at 18:45

## 2019-07-30 RX ADMIN — NYSTATIN CREAM 1 APPLICATION(S): 100000 CREAM TOPICAL at 05:07

## 2019-07-30 RX ADMIN — HYDROMORPHONE HYDROCHLORIDE 0.25 MILLIGRAM(S): 2 INJECTION INTRAMUSCULAR; INTRAVENOUS; SUBCUTANEOUS at 19:45

## 2019-07-30 RX ADMIN — Medication 1000 MILLIGRAM(S): at 19:00

## 2019-07-30 NOTE — PROGRESS NOTE ADULT - SUBJECTIVE AND OBJECTIVE BOX
SUBJECTIVE:   Patient interviewed and examined at the bedside on the morning of 7/30/19. Patient cleared for  shunt placement to occur at approximately 15:00 today. Has been cleared by Medicine, Cardiology, Hematology for operation. Patient notes marginal worsening of visual acuity especially in R temporal field. Patient to continue with 1000mg of Diamox BID for time being. No overnight events    PAST MEDICAL & SURGICAL HISTORY:  LAZARO (obstructive sleep apnea)  Asthma    MEDICATIONS (HOME):  Home Medications:    MEDICATIONS  (STANDING):  acetazolamide    Tablet 1000 milliGRAM(s) Oral two times a day  nystatin Powder 1 Application(s) Topical two times a day  polyethylene glycol 3350 17 Gram(s) Oral daily  senna 1 Tablet(s) Oral daily    MEDICATIONS  (PRN):  acetaminophen   Tablet .. 650 milliGRAM(s) Oral every 6 hours PRN Temp greater or equal to 38C (100.4F), Mild Pain (1 - 3), Moderate Pain (4 - 6), Severe Pain (7 - 10)    ALLERGIES/INTOLERANCES:  Allergies  No Known Allergies    Intolerances    VITALS & EXAMINATION:  Vital Signs Last 24 Hrs  T(C): 36.4 (30 Jul 2019 13:27), Max: 36.8 (29 Jul 2019 20:07)  T(F): 97.6 (30 Jul 2019 11:29), Max: 98.2 (29 Jul 2019 20:07)  HR: 78 (30 Jul 2019 13:27) (78 - 91)  BP: 137/85 (30 Jul 2019 13:27) (126/81 - 166/80)  BP(mean): --  RR: 20 (30 Jul 2019 13:27) (16 - 20)  SpO2: 100% (30 Jul 2019 13:27) (91% - 100%)    General:  •	GENERAL APPEARANCE: Morbidly obese male, appears stated age sitting in chair in NAD, conversational  •	HEENT: Head, ears and nose non-traumatic. Oropharynx clear, no bite marks on tongue.  •	NEUROLOGIC EXAMINATION:        o	MENTAL STATUS & HIGHER CORTICAL FUNCTION: The patient is oriented to self, situation, time and place.  Significantly more responsive today.       o	LANGUAGE: Speech is fluent with no dysarthria and no aphasia.          o	CRANIAL NERVES:              I- No anosmia  	II - Pupils are more sluggish.  Left 3 mm-->2mm, R 5mm-->3 mm sluggish.  Visual fields worse with Bi-temporal superior and inferior hemianopsia.  Visual acuity 20/200 right eye, 20/50 left eye. Having significant difficulty in perceiving shapes and lights at this time.  Can see colors, and gross objects, but having difficulty in both eyes with borders.         	III, IV, and VI - R. eye exotropia, extraocular movements are full with normal pursuit and saccades.  Left gaze left-beating nystagmus. Right cranial nerve sixth palsy  	V - Light touch is intact in all three divisions. Motor V is intact.    	VII - No facial asymmetry or weakness.   	IX - Palate rises symmetrically in the midline. XI - Shoulder shrug is normal. XII - Tongue protrudes in the midline.        o	MOTOR EXAMINATION: Normal bulk in all four extremities. Muscle strength is 5/5 in all four extremities.         o	SENSORY EXAMINATION: Sensory examination is grossly intact to pain and light touch in all 4 extremities.        o	COORDINATION: Fine coordinated movements in tact       LABORATORY:  CBC                       14.0   6.06  )-----------( 160      ( 29 Jul 2019 10:14 )             52.4     Chem 07-29    138  |  103  |  11  ----------------------------<  70  3.6   |  24  |  0.65    Ca    8.9      29 Jul 2019 07:04      LFTs   Coagulopathy PT/INR - ( 29 Jul 2019 09:57 )   PT: 13.1 sec;   INR: 1.15 ratio         PTT - ( 29 Jul 2019 09:57 )  PTT:32.8 sec  Lipid Panel   A1c 07-24 [id number] 6.1

## 2019-07-30 NOTE — BRIEF OPERATIVE NOTE - OPERATION/FINDINGS
The patient was brought to the operating room, underwent induction of general endotracheal airway, positioned supine, head turned towards left. Neurosurgery proceeded with insertion of shunt into right ventricle. Abdomen was entered via umbilical stump and insufflated. Laparoscopic camera was placed 2cm above umbilicus. Shunt was tunneled into peritoneal cavity and catheter was pulled through right upper quadrant incision. Shunt catheter was subsequently reinserted into the peritoneal cavity under direct visualization and found to flush appropriately. Abdomen closed with Monocryl suture and patient transported to PACU on non-rebreather mask.
na

## 2019-07-30 NOTE — PROGRESS NOTE ADULT - ASSESSMENT
34F with pseudotumor cerebri a/w vision loss    PLAN:  -Plan for  shunt on Tuesday 7/30  - Acetozolamide 1000 mg bid

## 2019-07-30 NOTE — PROGRESS NOTE ADULT - SUBJECTIVE AND OBJECTIVE BOX
Patient seen and examined at bedside.    --Anticoagulation--    T(C): 36 (07-30-19 @ 17:25), Max: 36.8 (07-29-19 @ 20:07)  HR: 86 (07-30-19 @ 18:45) (78 - 95)  BP: 140/65 (07-30-19 @ 18:45) (126/81 - 173/95)  RR: 19 (07-30-19 @ 18:45) (16 - 22)  SpO2: 100% (07-30-19 @ 18:45) (91% - 100%)  Wt(kg): --    Exam: intact except for vision, Has light perception in R eye, and finger counting in L eye.

## 2019-07-30 NOTE — PROGRESS NOTE ADULT - SUBJECTIVE AND OBJECTIVE BOX
Patient seen and examined at bedside.    --Anticoagulation--    T(C): 36.6 (07-30-19 @ 04:55), Max: 36.8 (07-29-19 @ 20:07)  HR: 78 (07-30-19 @ 04:55) (75 - 91)  BP: 128/83 (07-30-19 @ 04:55) (115/69 - 166/80)  RR: 20 (07-30-19 @ 04:55) (16 - 20)  SpO2: 98% (07-30-19 @ 04:55) (91% - 98%)  Wt(kg): --    Exam:  intact except vision, R eye light perception, L eye: name objects/ count fingers inconsistently.

## 2019-07-30 NOTE — PRE-ANESTHESIA EVALUATION ADULT - NSANTHOSAYNRD_GEN_A_CORE
No. LAZARO screening performed.  STOP BANG Legend: 0-2 = LOW Risk; 3-4 = INTERMEDIATE Risk; 5-8 = HIGH Risk

## 2019-07-30 NOTE — BRIEF OPERATIVE NOTE - NSICDXBRIEFPROCEDURE_GEN_ALL_CORE_FT
PROCEDURES:  Ventriculoperitoneal shunt 30-Jul-2019 14:35:00  Kendall Sprague
PROCEDURES:  Ventriculoperitoneal shunt 30-Jul-2019 14:35:00  Kendall Sprague

## 2019-07-30 NOTE — CHART NOTE - NSCHARTNOTEFT_GEN_A_CORE
Surgery Post op Note    Procedure: Ventriculoperitoneal Shunt (abdominal portion) joint case with neurosurgery   POD #0    SUBJECTIVE: Patient seen and evaluated at the bedside in PACU. Father at the bedside with the patient. On BIPAP. Resting comfortably. Complaint of pain at the bandage on right side of head.      Objective:   Vital Signs Last 24 Hrs  T(C): 36 (30 Jul 2019 17:25), Max: 36.7 (29 Jul 2019 23:25)  T(F): 96.8 (30 Jul 2019 17:25), Max: 98 (29 Jul 2019 23:25)  HR: 86 (30 Jul 2019 18:45) (78 - 95)  BP: 140/65 (30 Jul 2019 18:45) (126/81 - 173/95)  BP(mean): 94 (30 Jul 2019 18:45) (94 - 128)  RR: 19 (30 Jul 2019 18:45) (17 - 22)  SpO2: 100% (30 Jul 2019 18:45) (91% - 100%)  I&O's Summary    29 Jul 2019 07:01  -  30 Jul 2019 07:00  --------------------------------------------------------  IN: 680 mL / OUT: 600 mL / NET: 80 mL    30 Jul 2019 07:01  -  30 Jul 2019 20:45  --------------------------------------------------------  IN: 250 mL / OUT: 0 mL / NET: 250 mL      I&O's Detail    29 Jul 2019 07:01 - 30 Jul 2019 07:00  --------------------------------------------------------  IN:    Oral Fluid: 680 mL  Total IN: 680 mL    OUT:    Voided: 600 mL  Total OUT: 600 mL    Total NET: 80 mL      30 Jul 2019 07:01 - 30 Jul 2019 20:45  --------------------------------------------------------  IN:    IV PiggyBack: 100 mL    sodium chloride 0.9% with potassium chloride 20 mEq/L: 150 mL  Total IN: 250 mL    OUT:  Total OUT: 0 mL    Total NET: 250 mL            LABS:                        15.1   8.9   )-----------( 170      ( 30 Jul 2019 18:13 )             51.3     07-30    143  |  104  |  11  ----------------------------<  110<H>  3.5   |  25  |  0.67    Ca    9.2      30 Jul 2019 18:13    TPro  7.9  /  Alb  3.9  /  TBili  1.3<H>  /  DBili  x   /  AST  29  /  ALT  31  /  AlkPhos  95  07-30    PT/INR - ( 29 Jul 2019 09:57 )   PT: 13.1 sec;   INR: 1.15 ratio         PTT - ( 29 Jul 2019 09:57 )  PTT:32.8 sec      MEDICATIONS  (STANDING):  acetazolamide    Tablet 1000 milliGRAM(s) Oral two times a day  ceFAZolin   IVPB 2000 milliGRAM(s) IV Intermittent every 8 hours  docusate sodium 100 milliGRAM(s) Oral three times a day  nystatin Powder 1 Application(s) Topical two times a day  polyethylene glycol 3350 17 Gram(s) Oral daily  senna 1 Tablet(s) Oral daily  sodium chloride 0.9% with potassium chloride 20 mEq/L 1000 milliLiter(s) (75 mL/Hr) IV Continuous <Continuous>  sodium chloride 0.9%. 1000 milliLiter(s) (100 mL/Hr) IV Continuous <Continuous>    MEDICATIONS  (PRN):  acetaminophen   Tablet .. 650 milliGRAM(s) Oral every 6 hours PRN Temp greater or equal to 38C (100.4F), Mild Pain (1 - 3)  HYDROmorphone  Injectable 0.5 milliGRAM(s) IV Push every 10 minutes PRN Moderate Pain (4 - 6)  ondansetron Injectable 4 milliGRAM(s) IV Push once PRN Nausea and/or Vomiting  oxyCODONE    IR 5 milliGRAM(s) Oral every 4 hours PRN Moderate Pain (4 - 6)  oxyCODONE    IR 10 milliGRAM(s) Oral every 4 hours PRN Severe Pain (7 - 10)      Physical exam  General: NAD, on BIPAP, resting comfortably  Neuro: AAO x 3   Abdomen: soft, nontender, obese, two port sites covered with op sites (one at the umbilicus and other RUQ)    Assessment/Plan: 34y Male with pseudotumor cerebri now s/p  shunt, POD #0.    - monitor abdominal surgical incisions  - care per neurosurgery     Blank Pelaez PA-C   p [phone]

## 2019-07-30 NOTE — PROGRESS NOTE ADULT - ATTENDING COMMENTS
Patient's breathing today improved, till on supplemental oxygen by NC, but using BIPAP when needed, and CPAP at night. Vision is the same, he can see light and movement in front of his eyes. His pupils are slightly dilated at 5mm bilat, no ophthalmoplegia, although eyes are slightly disconjugate on orthophoric gaze. Otherwise neuro exam is the same.   Awaiting shunt placement by neurosurgery.

## 2019-07-30 NOTE — PROGRESS NOTE ADULT - ASSESSMENT
35 y/o R-handed [place]ian M with h/o hypothyroidism (off meds), self-reported LAZARO presenting with 5 days of painless severe blurred vision (Right >Left) and reduced acuity of the right eye with associated B/L perioral numbness and R. arm paresthesias. Pending neurosurgery operation tomorrow for  shunt placement.    1) Increased intracranial pressure- Worsening symptoms.   Secondary to IIH.  Repeat CTV didn't demonstrate any thrombus though again limited by habitus, especially in  B/L Papilledema, worsening vision.   Ophtho on board - notes worsening vision as of yesterdays note, confirmed by patient  Neurosurgery consulted for  shunt. To go to surgery at 15:00 today  NPO after midnight status maintained  Keep head of bed elevated to 30 degrees.  Keep PT up as supine and Trendelenburg positions will worsen condition.   Acetazolamide to 1000 mg BID in interim.     2) Hypercoagulable state  Hypercoag work up notable for anti-thrombin III,Protein C, coag panel prolonged  Hematology consulted and on board  CT angiogram of chest to R/O PE as PT still SOB with elevated D dimer and hypercoag panel    3) A1C 6.1  NPO after midnight for  shunt placement today  Resume sliding scale after surgery      Jose Dahl - PGY 2 35 y/o R-handed [place] M with h/o hypothyroidism (off meds), self-reported LAZARO presenting with 5 days of painless severe blurred vision (Right >Left) and reduced acuity of the right eye with associated B/L perioral numbness and R. arm paresthesias. Pending neurosurgery operation tomorrow for  shunt placement.    1) Increased intracranial pressure- Worsening symptoms.   Secondary to IIH.  Repeat CTV didn't demonstrate any thrombus though again limited by habitus, especially in  B/L Papilledema, worsening vision.   Ophtho on board - notes worsening vision as of yesterdays note, confirmed by patient  Neurosurgery consulted for  shunt. To go to surgery at 15:00 today  NPO after midnight status maintained  Keep head of bed elevated to 30 degrees.  Keep PT up as supine and Trendelenburg positions will worsen condition.   Acetazolamide to 1000 mg BID in interim. - Decreased from 1500mg due to worsening acidosis    2) Hypercoagulable state  Hypercoag work up notable for anti-thrombin III,Protein C, coag panel prolonged  Hematology consulted and on board  CT angiogram of chest to R/O PE as PT still SOB with elevated D dimer and hypercoag panel = yielded nondiagnostic study    3) A1C 6.1  NPO after midnight for  shunt placement today  Resume sliding scale after surgery      Jose Dahl - PGY 2

## 2019-07-31 LAB — PROT S FREE PPP-ACNC: 80 % NORMAL — SIGNIFICANT CHANGE UP (ref 70–150)

## 2019-07-31 PROCEDURE — 99233 SBSQ HOSP IP/OBS HIGH 50: CPT | Mod: GC

## 2019-07-31 PROCEDURE — 99232 SBSQ HOSP IP/OBS MODERATE 35: CPT | Mod: GC

## 2019-07-31 PROCEDURE — 99232 SBSQ HOSP IP/OBS MODERATE 35: CPT

## 2019-07-31 RX ADMIN — OXYCODONE HYDROCHLORIDE 10 MILLIGRAM(S): 5 TABLET ORAL at 04:11

## 2019-07-31 RX ADMIN — Medication 1 DROP(S): at 23:25

## 2019-07-31 RX ADMIN — NYSTATIN CREAM 1 APPLICATION(S): 100000 CREAM TOPICAL at 17:14

## 2019-07-31 RX ADMIN — ACETAZOLAMIDE 1000 MILLIGRAM(S): 250 TABLET ORAL at 17:14

## 2019-07-31 RX ADMIN — Medication 100 MILLIGRAM(S): at 05:09

## 2019-07-31 RX ADMIN — OXYCODONE HYDROCHLORIDE 10 MILLIGRAM(S): 5 TABLET ORAL at 10:06

## 2019-07-31 RX ADMIN — Medication 1 DROP(S): at 12:38

## 2019-07-31 RX ADMIN — OXYCODONE HYDROCHLORIDE 10 MILLIGRAM(S): 5 TABLET ORAL at 17:17

## 2019-07-31 RX ADMIN — OXYCODONE HYDROCHLORIDE 10 MILLIGRAM(S): 5 TABLET ORAL at 04:40

## 2019-07-31 RX ADMIN — NYSTATIN CREAM 1 APPLICATION(S): 100000 CREAM TOPICAL at 07:12

## 2019-07-31 RX ADMIN — ACETAZOLAMIDE 1000 MILLIGRAM(S): 250 TABLET ORAL at 05:09

## 2019-07-31 RX ADMIN — OXYCODONE HYDROCHLORIDE 10 MILLIGRAM(S): 5 TABLET ORAL at 17:00

## 2019-07-31 RX ADMIN — Medication 1 DROP(S): at 17:14

## 2019-07-31 RX ADMIN — OXYCODONE HYDROCHLORIDE 10 MILLIGRAM(S): 5 TABLET ORAL at 11:00

## 2019-07-31 RX ADMIN — Medication 100 MILLIGRAM(S): at 21:58

## 2019-07-31 RX ADMIN — Medication 100 MILLIGRAM(S): at 06:08

## 2019-07-31 NOTE — PROGRESS NOTE ADULT - SUBJECTIVE AND OBJECTIVE BOX
[facility] Ophthalmology Follow Up Note    S: Pt seen at bedside, admits improvement of vision in both eyes since shunt placed. POD #1    Mood and Affect Appropriate ( x ),  Oriented to Time, Place, and Person x 3 ( x )    Ophthalmology Exam    Visual acuity (sc): 20/400 with eccentric fixation OD, 20/30 OS, PHNI OU  Pupils: PERRL OU,  APD OD  Ttono: 20  Extraocular movements (EOMs): Full OU, no pain, no diplopia   Confrontational Visual Field (CVF):  OD HM all 4 quadrants, OS central island  Color Plates: CHRIS OD 2/2 poor VA, 7/12 OS     Pen Light Exam (PLE)  External:  Flat OU  Lids/Lashes/Lacrimal Ducts: Flat Ou    Sclera/Conjunctiva:  W+Q OU  Cornea: Cl OU  Anterior Chamber: D+F OU   Iris:  Flat OU  Lens:  Cl OU      Assessment: 33 y/o M, LAZARO and morbid obesity, presents with 5 days of poor vision OD, headaches, pulsatile tinnitus, and tunnel vision intermittently.  Pt found to have elevated open pressure on LP, suggestive of IIH secondary to morbid obesity s/p  shunt 7/30/19 with mild improvement of vision  Plan:  - on Diamox per neuro  - weight loss stressed  - pt advised to let team know if he notices any changes in his vision  - guarded visual prognosis at this time, explained to patient  - case and findings initially d/w Dr. Adkins (neuro-ophthalmology attending), will need appointment upon discharge  - will continue to follow closely    Follow-Up:  Patient should follow up his/her ophthalmologist or in the [facility] Neuro-Ophthalmology Practice within 1 week of discharge.  [address] [place] [address] [phone]    s/d/w Dr. Suggs [facility] Ophthalmology Follow Up Note    S: Pt seen at bedside, admits improvement of vision in both eyes since shunt placed. POD #1    Mood and Affect Appropriate ( x ),  Oriented to Time, Place, and Person x 3 ( x )    Ophthalmology Exam    Visual acuity (sc): 20/400 with eccentric fixation OD, 20/30 OS, PHNI OU  Pupils: PERRL OU,  APD OD  Ttono: 20  Extraocular movements (EOMs): Full OU, no pain, no diplopia   Confrontational Visual Field (CVF):  OD HM all 4 quadrants, OS central island  Color Plates: CHRIS OD 2/2 poor VA, 7/12 OS     Pen Light Exam (PLE)  External:  Flat OU  Lids/Lashes/Lacrimal Ducts: Flat OU  Sclera/Conjunctiva:  W+Q OU  Cornea: Cl OU  Anterior Chamber: D+F OU   Iris:  Flat OU  Lens:  Cl OU      Assessment: 33 y/o M, LAZARO and morbid obesity, presents with 5 days of poor vision OD, headaches, pulsatile tinnitus, and tunnel vision intermittently.  Pt found to have papilledema on exam with an elevated open pressure on LP, suggestive of IIH secondary to morbid obesity s/p  shunt 7/30/19 with mild improvement of vision OU.  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  Plan:  - on Diamox per neuro  - weight loss stressed  - pt advised to let team know if he notices any changes in his vision  - guarded visual prognosis at this time, explained to patient  - case and findings initially d/w Dr. Adkins (neuro-ophthalmology attending), will need appointment upon discharge  - will continue to follow closely  - findings and plan discussed with patient and primary team    Follow-Up:  Patient should follow up his/her ophthalmologist or in the [facility] Neuro-Ophthalmology Practice within 1 week of discharge.  [address] [place] [address]  [phone]    s/d/w Dr. Suggs

## 2019-07-31 NOTE — PROVIDER CONTACT NOTE (OTHER) - RECOMMENDATIONS
Provider will come see pt at bedside
Continue to monitor. Possible labs/stool sample
notify provider

## 2019-07-31 NOTE — PHYSICAL THERAPY INITIAL EVALUATION ADULT - ADDITIONAL COMMENTS
Pt reports that he resides alone in a friends garage, 0 steps to enter, PTA independent with mobility and ADL's, owns no DME, (+)driving.

## 2019-07-31 NOTE — PROGRESS NOTE ADULT - ASSESSMENT
35 y/o R-handed [place]ian M with h/o hypothyroidism (off meds), self-reported LAZARO presenting with 5 days of painless severe blurred vision (Right >Left) and reduced acuity of the right eye with associated B/L perioral numbness and R. arm paresthesias. S/P  shunt placement POD 1    1) Increased intracranial pressure- Stabilizing symptoms.   Secondary to IIH.  B/L Papilledema, worsening vision.   Ophtho on board - notes worsening vision as of yesterdays note, confirmed by patient, will be asked to see patient again to assess extent of papilledema  Neurosurgery consulted, s/p  shunt placement  Keep head of bed elevated to 30 degrees.  Keep PT up as supine and Trendelenburg positions will worsen condition.   Maintain Acetazolamide to 1000 mg BID in interim    2) Hypercoagulable state  Hypercoag work up notable for anti-thrombin III,Protein C, coag panel prolonged  Hematology consulted and on board    3) A1C 6.1  Clear Liquid diet currently  Consider transfer to medicine service given morbid obesity and sequela of obesity hypoventilation syndrome      Jose Dahl - PGY 2

## 2019-07-31 NOTE — PROGRESS NOTE ADULT - SUBJECTIVE AND OBJECTIVE BOX
SUBJECTIVE:   Patient interviewed and examined at the bedside on the morning of 7/31/19. Patient underwent  shunt placement yesterday via Neurosurgery, confirmatory Head CT demonstrated appropriate placement. No acute events overnight. Patient notes his headache is still present but not as bad as before. Patient reports his visual acuity is still diminished but perhaps less so than before surgery.    PAST MEDICAL & SURGICAL HISTORY:  LAZARO (obstructive sleep apnea)  Asthma    MEDICATIONS  (STANDING):  acetazolamide    Tablet 1000 milliGRAM(s) Oral two times a day  artificial tears (preservative free) Ophthalmic Solution 1 Drop(s) Both EYES four times a day  docusate sodium 100 milliGRAM(s) Oral three times a day  nystatin Powder 1 Application(s) Topical two times a day  polyethylene glycol 3350 17 Gram(s) Oral daily  senna 1 Tablet(s) Oral daily    MEDICATIONS  (PRN):  acetaminophen   Tablet .. 650 milliGRAM(s) Oral every 6 hours PRN Temp greater or equal to 38C (100.4F), Mild Pain (1 - 3)  artificial  tears Solution 1 Drop(s) Both EYES daily PRN Dry Eyes  oxyCODONE    IR 5 milliGRAM(s) Oral every 4 hours PRN Moderate Pain (4 - 6)  oxyCODONE    IR 10 milliGRAM(s) Oral every 4 hours PRN Severe Pain (7 - 10)    ALLERGIES/INTOLERANCES:  Allergies  No Known Allergies    Intolerances    VITALS & EXAMINATION:  Vital Signs Last 24 Hrs  T(C): 37.1 (31 Jul 2019 11:30), Max: 37.1 (31 Jul 2019 11:30)  T(F): 98.8 (31 Jul 2019 11:30), Max: 98.8 (31 Jul 2019 11:30)  HR: 74 (31 Jul 2019 11:30) (74 - 101)  BP: 127/74 (31 Jul 2019 11:30) (113/70 - 173/95)  BP(mean): 83 (30 Jul 2019 22:15) (79 - 128)  RR: 18 (31 Jul 2019 11:30) (15 - 22)  SpO2: 96% (31 Jul 2019 11:30) (91% - 100%)    General:  •	GENERAL APPEARANCE: Morbidly obese male, appears stated age sitting in hospital bed in NAD, conversational  •	HEENT: Head, ears and nose non-traumatic. Oropharynx clear, no bite marks on tongue.  •	NEUROLOGIC EXAMINATION:        o	MENTAL STATUS & HIGHER CORTICAL FUNCTION: The patient is oriented to self, situation, time and place.  Significantly more responsive today.         o	LANGUAGE: Speech is fluent with no dysarthria and no aphasia.          o	CRANIAL NERVES:              I- No anosmia  	II - Pupils are more sluggish.  Left 3 mm-->2mm, R 5mm-->3 mm sluggish.  Visual fields worse with Bi-temporal superior and inferior hemianopsia.  Visual acuity 20/200 right eye, 20/50 left eye. Having significant difficulty in perceiving shapes and lights at this time.  Can see colors, and gross objects, but having difficulty in both eyes with borders. Improved since yesterday, confrontation in temporal fields better picked up today        	III, IV, and VI - R. eye exotropia, extraocular movements are full with normal pursuit and saccades.  Left gaze left-beating nystagmus. Right cranial nerve sixth palsy  	V - Light touch is intact in all three divisions. Motor V is intact.    	VII - No facial asymmetry or weakness.   	IX - Palate rises symmetrically in the midline. XI - Shoulder shrug is normal. XII - Tongue protrudes in the midline.        o	MOTOR EXAMINATION: Normal bulk in all four extremities. Muscle strength is 5/5 in all four extremities.         o	SENSORY EXAMINATION: Sensory examination is grossly intact to pain and light touch in all 4 extremities.        o	COORDINATION: Fine coordinated movements in tact       LABORATORY:  CBC                       15.1   8.9   )-----------( 170      ( 30 Jul 2019 18:13 )             51.3     Chem 07-30    143  |  104  |  11  ----------------------------<  110<H>  3.5   |  25  |  0.67    Ca    9.2      30 Jul 2019 18:13    TPro  7.9  /  Alb  3.9  /  TBili  1.3<H>  /  DBili  x   /  AST  29  /  ALT  31  /  AlkPhos  95  07-30    LFTs LIVER FUNCTIONS - ( 30 Jul 2019 18:13 )  Alb: 3.9 g/dL / Pro: 7.9 g/dL / ALK PHOS: 95 U/L / ALT: 31 U/L / AST: 29 U/L / GGT: x           Radiology (XR, CT, MR, U/S, TTE/REN):  < from: CT Head No Cont (07.30.19 @ 22:01) >  IMPRESSION:    There has been interval placement of a right frontal approach shunt   catheter, with tip at the confluence of the foramina of [place]. The right   lateral ventricle has decreased in size and is now slitlike. The   remainder of the ventricular system is unchanged.    < end of copied text >

## 2019-07-31 NOTE — PROGRESS NOTE ADULT - SUBJECTIVE AND OBJECTIVE BOX
Trauma Surgery Progress Note     Subjective/24hour Events: No acute events overnight. Pain controlled. Tolerating diet. No N/V. No CP or SOB.    Vital Signs:  Vital Signs Last 24 Hrs  T(C): 36.6 (30 Jul 2019 22:49), Max: 36.6 (30 Jul 2019 22:49)  T(F): 97.9 (30 Jul 2019 22:49), Max: 97.9 (30 Jul 2019 22:49)  HR: 100 (31 Jul 2019 03:07) (78 - 101)  BP: 133/87 (30 Jul 2019 22:49) (115/58 - 173/95)  BP(mean): 83 (30 Jul 2019 22:15) (79 - 128)  RR: 20 (30 Jul 2019 22:49) (15 - 22)  SpO2: 96% (31 Jul 2019 03:07) (93% - 100%)    CAPILLARY BLOOD GLUCOSE          I&O's Detail    29 Jul 2019 07:01  -  30 Jul 2019 07:00  --------------------------------------------------------  IN:    Oral Fluid: 680 mL  Total IN: 680 mL    OUT:    Voided: 600 mL  Total OUT: 600 mL    Total NET: 80 mL      30 Jul 2019 07:01  -  31 Jul 2019 06:12  --------------------------------------------------------  IN:    IV PiggyBack: 100 mL    sodium chloride 0.9% with potassium chloride 20 mEq/L: 375 mL  Total IN: 475 mL    OUT:  Total OUT: 0 mL    Total NET: 475 mL            MEDICATIONS  (STANDING):  acetazolamide    Tablet 1000 milliGRAM(s) Oral two times a day  docusate sodium 100 milliGRAM(s) Oral three times a day  nystatin Powder 1 Application(s) Topical two times a day  polyethylene glycol 3350 17 Gram(s) Oral daily  senna 1 Tablet(s) Oral daily  sodium chloride 0.9% with potassium chloride 20 mEq/L 1000 milliLiter(s) (75 mL/Hr) IV Continuous <Continuous>    MEDICATIONS  (PRN):  acetaminophen   Tablet .. 650 milliGRAM(s) Oral every 6 hours PRN Temp greater or equal to 38C (100.4F), Mild Pain (1 - 3)  artificial  tears Solution 1 Drop(s) Both EYES daily PRN Dry Eyes  oxyCODONE    IR 5 milliGRAM(s) Oral every 4 hours PRN Moderate Pain (4 - 6)  oxyCODONE    IR 10 milliGRAM(s) Oral every 4 hours PRN Severe Pain (7 - 10)        Physical Exam:  Gen: NAD  LS: nml respiratory effort  Card: pulse regularly present  GI: abd soft, nontender  Ext: warm      Labs:    07-30    143  |  104  |  11  ----------------------------<  110<H>  3.5   |  25  |  0.67    Ca    9.2      30 Jul 2019 18:13    TPro  7.9  /  Alb  3.9  /  TBili  1.3<H>  /  DBili  x   /  AST  29  /  ALT  31  /  AlkPhos  95  07-30    LIVER FUNCTIONS - ( 30 Jul 2019 18:13 )  Alb: 3.9 g/dL / Pro: 7.9 g/dL / ALK PHOS: 95 U/L / ALT: 31 U/L / AST: 29 U/L / GGT: x                                 15.1   8.9   )-----------( 170      ( 30 Jul 2019 18:13 )             51.3     PT/INR - ( 29 Jul 2019 09:57 )   PT: 13.1 sec;   INR: 1.15 ratio         PTT - ( 29 Jul 2019 09:57 )  PTT:32.8 sec          Imaging: Trauma Surgery Progress Note     Subjective/24hour Events: No acute events overnight. Denies headache or pain. Tolerating diet. No N/V. No CP or SOB.    Vital Signs:  Vital Signs Last 24 Hrs  T(C): 36.6 (30 Jul 2019 22:49), Max: 36.6 (30 Jul 2019 22:49)  T(F): 97.9 (30 Jul 2019 22:49), Max: 97.9 (30 Jul 2019 22:49)  HR: 100 (31 Jul 2019 03:07) (78 - 101)  BP: 133/87 (30 Jul 2019 22:49) (115/58 - 173/95)  BP(mean): 83 (30 Jul 2019 22:15) (79 - 128)  RR: 20 (30 Jul 2019 22:49) (15 - 22)  SpO2: 96% (31 Jul 2019 03:07) (93% - 100%)    CAPILLARY BLOOD GLUCOSE          I&O's Detail    29 Jul 2019 07:01  -  30 Jul 2019 07:00  --------------------------------------------------------  IN:    Oral Fluid: 680 mL  Total IN: 680 mL    OUT:    Voided: 600 mL  Total OUT: 600 mL    Total NET: 80 mL      30 Jul 2019 07:01  -  31 Jul 2019 06:12  --------------------------------------------------------  IN:    IV PiggyBack: 100 mL    sodium chloride 0.9% with potassium chloride 20 mEq/L: 375 mL  Total IN: 475 mL    OUT:  Total OUT: 0 mL    Total NET: 475 mL            MEDICATIONS  (STANDING):  acetazolamide    Tablet 1000 milliGRAM(s) Oral two times a day  docusate sodium 100 milliGRAM(s) Oral three times a day  nystatin Powder 1 Application(s) Topical two times a day  polyethylene glycol 3350 17 Gram(s) Oral daily  senna 1 Tablet(s) Oral daily  sodium chloride 0.9% with potassium chloride 20 mEq/L 1000 milliLiter(s) (75 mL/Hr) IV Continuous <Continuous>    MEDICATIONS  (PRN):  acetaminophen   Tablet .. 650 milliGRAM(s) Oral every 6 hours PRN Temp greater or equal to 38C (100.4F), Mild Pain (1 - 3)  artificial  tears Solution 1 Drop(s) Both EYES daily PRN Dry Eyes  oxyCODONE    IR 5 milliGRAM(s) Oral every 4 hours PRN Moderate Pain (4 - 6)  oxyCODONE    IR 10 milliGRAM(s) Oral every 4 hours PRN Severe Pain (7 - 10)        Physical Exam:  Gen: NAD  HEENT: right  shunt in place w/ dressing c/d/i, no change in visual acuity from baseline  LS: nml respiratory effort  Card: pulse regularly present  GI: abd soft, nontender  Ext: warm      Labs:    07-30    143  |  104  |  11  ----------------------------<  110<H>  3.5   |  25  |  0.67    Ca    9.2      30 Jul 2019 18:13    TPro  7.9  /  Alb  3.9  /  TBili  1.3<H>  /  DBili  x   /  AST  29  /  ALT  31  /  AlkPhos  95  07-30    LIVER FUNCTIONS - ( 30 Jul 2019 18:13 )  Alb: 3.9 g/dL / Pro: 7.9 g/dL / ALK PHOS: 95 U/L / ALT: 31 U/L / AST: 29 U/L / GGT: x                                 15.1   8.9   )-----------( 170      ( 30 Jul 2019 18:13 )             51.3     PT/INR - ( 29 Jul 2019 09:57 )   PT: 13.1 sec;   INR: 1.15 ratio         PTT - ( 29 Jul 2019 09:57 )  PTT:32.8 sec

## 2019-07-31 NOTE — PHYSICAL THERAPY INITIAL EVALUATION ADULT - CRITERIA FOR SKILLED THERAPEUTIC INTERVENTIONS
rehab potential/anticipated discharge recommendation/anticipated equipment needs at discharge/functional limitations in following categories/impairments found

## 2019-07-31 NOTE — PROGRESS NOTE ADULT - SUBJECTIVE AND OBJECTIVE BOX
Interval Events:  s/p  shunt placement yesterday  Feeling well  Denies headache  On BiPAP - using nightly on 20/8 FiO2 30    REVIEW OF SYSTEMS:  [x] All other systems negative  [ ] Unable to assess ROS because ________    OBJECTIVE:  ICU Vital Signs Last 24 Hrs  T(C): 36.7 (31 Jul 2019 15:52), Max: 37.1 (31 Jul 2019 11:30)  T(F): 98.1 (31 Jul 2019 15:52), Max: 98.8 (31 Jul 2019 11:30)  HR: 95 (31 Jul 2019 15:57) (74 - 139)  BP: 126/77 (31 Jul 2019 15:57) (113/70 - 173/95)  BP(mean): 83 (30 Jul 2019 22:15) (79 - 128)  ABP: --  ABP(mean): --  RR: 18 (31 Jul 2019 15:52) (15 - 22)  SpO2: 90% (31 Jul 2019 15:57) (90% - 100%)        07-30 @ 07:01 - 07-31 @ 07:00  --------------------------------------------------------  IN: 475 mL / OUT: 0 mL / NET: 475 mL    07-31 @ 07:01 - 07-31 @ 16:56  --------------------------------------------------------  IN: 980 mL / OUT: 400 mL / NET: 580 mL      CAPILLARY BLOOD GLUCOSE          PHYSICAL EXAM:  General: NAD, Obese  Neck: Supple  Respiratory: CTAB  Cardiovascular: RRR, no murmur, 1+ edema  Abdomen: Soft  Extremities: Warm  Skin: Intact  Neurological:  shunt in place. A&Ox3  Psychiatry: Normal mood and affect    HOSPITAL MEDICATIONS:      acetazolamide    Tablet 1000 milliGRAM(s) Oral two times a day        acetaminophen   Tablet .. 650 milliGRAM(s) Oral every 6 hours PRN  oxyCODONE    IR 5 milliGRAM(s) Oral every 4 hours PRN  oxyCODONE    IR 10 milliGRAM(s) Oral every 4 hours PRN    docusate sodium 100 milliGRAM(s) Oral three times a day  polyethylene glycol 3350 17 Gram(s) Oral daily  senna 1 Tablet(s) Oral daily            artificial  tears Solution 1 Drop(s) Both EYES daily PRN  artificial tears (preservative free) Ophthalmic Solution 1 Drop(s) Both EYES four times a day  nystatin Powder 1 Application(s) Topical two times a day        LABS:                        15.1   8.9   )-----------( 170      ( 30 Jul 2019 18:13 )             51.3     Hgb Trend: 15.1<--, 14.0<--, 13.8<--, 15.8<--, 14.5<--  07-30    143  |  104  |  11  ----------------------------<  110<H>  3.5   |  25  |  0.67    Ca    9.2      30 Jul 2019 18:13    TPro  7.9  /  Alb  3.9  /  TBili  1.3<H>  /  DBili  x   /  AST  29  /  ALT  31  /  AlkPhos  95  07-30    Creatinine Trend: 0.67<--, 0.65<--, 0.60<--, 0.72<--, 0.89<--, 0.82<--      MICROBIOLOGY:     RADIOLOGY:  [ ] Reviewed and interpreted by me    ASSESSMENT AND RECOMMENDATION:    34M with class 3 obesity with evidence of hypoventilation (elevated HCO3 and pCO2) and likely obstructive sleep apnea who presented with vision loss s/p  shunt for treatment of pseudotumor cerebri.     Increased EPAP to 10 - concern that 8 is not high enough to resolve obstructive events if possible  Depending on where he will be discharged may be a candidate for AVAPS-AE  If plan is to send to rehab then he will be able to go with BiPAP once appropriate settings are determined  Ultimately will need close follow up for sleep study in order to properly titrate settings  Discussed with social work today that he expressed concern that he does have a definitely place to stay after discharge    Hunter Harris MD  Pulmonary and Critical Care Fellow  [phone]

## 2019-07-31 NOTE — PROGRESS NOTE ADULT - ASSESSMENT
35yo M presented 7/21 for acute visual acuity loss of left eye and concern for with pseudotumor cerebri. Now s/p ventriculoperitoneal shunt on 7/30.    Plan  - Advance diet as tolerated  -   - 35yo M presented 7/21 for acute visual acuity loss of left eye and concern for with pseudotumor cerebri. Now s/p ventriculoperitoneal shunt on 7/30.    Plan  - Advance diet as tolerated  - Monitor visual exam  - Monitor surgical sites for signs of bleeding or hematoma  - F/u read for post  shunt CT Head scan 35yo M presented 7/21 for acute visual acuity loss of left eye and concern for with pseudotumor cerebri. Now s/p ventriculoperitoneal shunt on 7/30.    Plan  - Advance diet as tolerated  - Monitor visual exam  - Monitor surgical sites for signs of bleeding or hematoma  - F/u read for post  shunt CT Head scan  - Surgery will sign off  - Page [phone] with any questions

## 2019-07-31 NOTE — PROVIDER CONTACT NOTE (OTHER) - REASON
Pt complains of new onset of L eye blurry vision. pt complains of diarrhea for a month.
pt lethargic and pupils larger than 1200 neuro check
Pt verbalized seeing red

## 2019-07-31 NOTE — PROGRESS NOTE ADULT - ATTENDING COMMENTS
I have interviewed and examined the patient and reviewed the residents note including the history, exam, assessment, and plan.  I agree with the residents assessment and plan.    33 y/o M, LAZARO and morbid obesity, presents with 5 days of poor vision OD, headaches, pulsatile tinnitus, and tunnel vision intermittently.  Pt found to have papilledema on exam with an elevated open pressure on LP, suggestive of IIH secondary to morbid obesity s/p  shunt 7/30/19 with mild improvement of vision OU.  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  Plan:  - on Diamox per neuro  - weight loss stressed  - pt advised to let team know if he notices any changes in his vision  - guarded visual prognosis at this time, explained to patient  - case and findings initially d/w Dr. Adkins (neuro-ophthalmology attending), will need appointment upon discharge  - will continue to follow closely  - findings and plan discussed with patient and primary team    Follow-Up:  Patient should follow up his/her ophthalmologist or in the [facility] within 1 week of discharge.    Tamanna Suggs MD

## 2019-07-31 NOTE — PHYSICAL THERAPY INITIAL EVALUATION ADULT - GAIT DEVIATIONS NOTED, PT EVAL
decreased step length/decreased gabriel/decreased stride length/decreased weight-shifting ability/lateral sway, unsteady gait, unable to negotiate obstacles/decreased velocity of limb motion

## 2019-07-31 NOTE — PROGRESS NOTE ADULT - ATTENDING COMMENTS
pt seen and examined d/w the team  agree  with the assessment plan of the pulmonary fellow  consider AVAPS AE

## 2019-07-31 NOTE — PROGRESS NOTE ADULT - SUBJECTIVE AND OBJECTIVE BOX
Patient is a 34y old  Male who presents with a chief complaint of Acute left eye visual acuity loss concern for pseudotumor cerebri and vision loss (31 Jul 2019 16:56)      SUBJECTIVE / OVERNIGHT EVENTS:    Events noted.  CONSTITUTIONAL: No fever,  or fatigue  RESPIRATORY: No cough, wheezing,  No shortness of breath  CARDIOVASCULAR: No chest pain, palpitations,   GASTROINTESTINAL: No abdominal or epigastric pain.   NEUROLOGICAL: No headaches,     MEDICATIONS  (STANDING):  acetazolamide    Tablet 1000 milliGRAM(s) Oral two times a day  artificial tears (preservative free) Ophthalmic Solution 1 Drop(s) Both EYES four times a day  docusate sodium 100 milliGRAM(s) Oral three times a day  nystatin Powder 1 Application(s) Topical two times a day  polyethylene glycol 3350 17 Gram(s) Oral daily  senna 1 Tablet(s) Oral daily    MEDICATIONS  (PRN):  acetaminophen   Tablet .. 650 milliGRAM(s) Oral every 6 hours PRN Temp greater or equal to 38C (100.4F), Mild Pain (1 - 3)  artificial  tears Solution 1 Drop(s) Both EYES daily PRN Dry Eyes  oxyCODONE    IR 5 milliGRAM(s) Oral every 4 hours PRN Moderate Pain (4 - 6)  oxyCODONE    IR 10 milliGRAM(s) Oral every 4 hours PRN Severe Pain (7 - 10)        CAPILLARY BLOOD GLUCOSE        I&O's Summary    30 Jul 2019 07:01  -  31 Jul 2019 07:00  --------------------------------------------------------  IN: 475 mL / OUT: 0 mL / NET: 475 mL    31 Jul 2019 07:01  -  31 Jul 2019 22:48  --------------------------------------------------------  IN: 980 mL / OUT: 400 mL / NET: 580 mL        PHYSICAL EXAM:    NECK: Supple, No JVD  CHEST/LUNG: Clear to auscultation bilaterally; No wheezing.  HEART: Regular rate and rhythm; No murmurs, rubs, or gallops  ABDOMEN: Soft, Nontender, Nondistended; Bowel sounds present  EXTREMITIES:   No edema  NEUROLOGY: AAO       LABS:                        15.1   8.9   )-----------( 170      ( 30 Jul 2019 18:13 )             51.3     07-30    143  |  104  |  11  ----------------------------<  110<H>  3.5   |  25  |  0.67    Ca    9.2      30 Jul 2019 18:13    TPro  7.9  /  Alb  3.9  /  TBili  1.3<H>  /  DBili  x   /  AST  29  /  ALT  31  /  AlkPhos  95  07-30            CAPILLARY BLOOD GLUCOSE                    RADIOLOGY & ADDITIONAL TESTS:    Imaging Personally Reviewed:    Consultant(s) Notes Reviewed:      Care Discussed with Consultants/Other Providers:

## 2019-07-31 NOTE — PROVIDER CONTACT NOTE (OTHER) - ASSESSMENT
Pt verbalized seeing red and feeling like his BP was high. Pt family at bedside. Pt stated feeling stressed. Pt eyes are red and watery. Pupils 5RB. Vital signs taken and provider notified
Pt states upon awakening this evening around 1830, pt had new onset of L eye blurry vision. Pt able to see RN but states she is blurry. Pt able to state the number of fingers RN is showing, but says that it is blurry.    Pt states he has been having liquid stool for about a month, after he went to [place] [address] [place] and went into the pool. Pt concerned it may be a parasite. Pt states he thinks he is constipated but is still having small amounts of loose stool
pupils 5rb b/l  vss  a&ox4  increased drowsiness from 1200 neuro check

## 2019-07-31 NOTE — PHYSICAL THERAPY INITIAL EVALUATION ADULT - PERTINENT HX OF CURRENT PROBLEM, REHAB EVAL
Pt is 34M admitted 7/21/19 PMHx possible LAZARO; presented from the sleep center s/p reporting 5 days of monocular right eye blurred vision accompanied by perioral numbness & tingling, R arm paresthesias.

## 2019-07-31 NOTE — PROVIDER CONTACT NOTE (OTHER) - ACTION/TREATMENT ORDERED:
Provider will see pt. No other action required at this time. Will continue to monitor pt.
Will discuss with team. No interventions at this time for new onset of blurry vision.  Will order labs for stool. Continue to monitor.
provider came with Dr Angeles to assess  pt status deemed " ok "   will possibly do lumbar puncture

## 2019-07-31 NOTE — PHYSICAL THERAPY INITIAL EVALUATION ADULT - GENERAL OBSERVATIONS, REHAB EVAL
Pt received OOB sitting in chair, A&OX4, following commands, pleasant & eager to participate, reports blurred vision, can only see shadows from right eye, impaired peripheral vision bilaterally

## 2019-07-31 NOTE — PROVIDER CONTACT NOTE (OTHER) - SITUATION
Pt verbalized seeing red and feeling like his BP was high. Pt family at bedside. Pt stated feeling stressed.
Pt complains of new onset of L eye blurry vision. pt complains of consistent liquid stool for a month.
pt lethargic and pupils larger than 1200 neuro check

## 2019-07-31 NOTE — PROGRESS NOTE ADULT - ASSESSMENT
· Assessment	  35 [facility] with h/o hypothyroidism (off meds), self-reported LAZARO presenting with 5 days of painless severe blurred vision (Right >Left) and reduced acuity of the right eye with associated B/L perioral numbness and R. arm paresthesias.  CTV performed yesterday but was a poor quality study.     1) IS/p  shunt:    Neurology/NeuroSx f/up noted.    LAZARO/Morbid obesity:  Pul f/up noted.  AVAPS AE    Polycythemia:  Hem f/up   S/p phlebotomy.

## 2019-07-31 NOTE — PROGRESS NOTE ADULT - ATTENDING COMMENTS
35 y/o man, with class III obesity, vision loss R>L eyes 2/2 pseudotumor cerebri, s/p shunt placement POD 1. Today patient notes somewhat improved vision. He is able to count fingers and can read and type on his phone with concentration. Pt is very medically complicated with morbid obesity, severe LAZARO with likely resultant pulmonary HTN and diastolic heart failure noted on outpatient visit records, secondary polycythemia likely due to LAZARO, with hx of chronic daily marijuana use and cocaine use, now with the vision loss due to pseudotumor.   Pt seen by pulmonology, consult appreciated. Will need to be on either BIPAP or recommending AVAPS AE. Will need close follow-up.   Will also need follow up with ophthalmology as well as hematology.   PT/OT eval.   Pt reports to social work that he does not have anywhere to go after discharge. Will have to work on possibly sending him to rehab.

## 2019-07-31 NOTE — PROVIDER CONTACT NOTE (OTHER) - BACKGROUND
shunt placement
acute left eye visual acuity loss concern for pseudotumor cerebri and vision loss.
increased icp

## 2019-08-01 PROCEDURE — 99222 1ST HOSP IP/OBS MODERATE 55: CPT | Mod: GC

## 2019-08-01 PROCEDURE — 99233 SBSQ HOSP IP/OBS HIGH 50: CPT | Mod: GC

## 2019-08-01 RX ADMIN — Medication 1 DROP(S): at 23:09

## 2019-08-01 RX ADMIN — NYSTATIN CREAM 1 APPLICATION(S): 100000 CREAM TOPICAL at 17:31

## 2019-08-01 RX ADMIN — ACETAZOLAMIDE 1000 MILLIGRAM(S): 250 TABLET ORAL at 17:31

## 2019-08-01 RX ADMIN — ACETAZOLAMIDE 1000 MILLIGRAM(S): 250 TABLET ORAL at 05:15

## 2019-08-01 RX ADMIN — Medication 1 DROP(S): at 17:31

## 2019-08-01 RX ADMIN — SENNA PLUS 1 TABLET(S): 8.6 TABLET ORAL at 12:01

## 2019-08-01 RX ADMIN — Medication 1 DROP(S): at 12:00

## 2019-08-01 RX ADMIN — OXYCODONE HYDROCHLORIDE 5 MILLIGRAM(S): 5 TABLET ORAL at 12:38

## 2019-08-01 RX ADMIN — OXYCODONE HYDROCHLORIDE 5 MILLIGRAM(S): 5 TABLET ORAL at 12:08

## 2019-08-01 RX ADMIN — Medication 100 MILLIGRAM(S): at 23:09

## 2019-08-01 RX ADMIN — NYSTATIN CREAM 1 APPLICATION(S): 100000 CREAM TOPICAL at 05:16

## 2019-08-01 RX ADMIN — Medication 1 DROP(S): at 05:15

## 2019-08-01 RX ADMIN — OXYCODONE HYDROCHLORIDE 5 MILLIGRAM(S): 5 TABLET ORAL at 05:51

## 2019-08-01 RX ADMIN — Medication 100 MILLIGRAM(S): at 05:15

## 2019-08-01 RX ADMIN — POLYETHYLENE GLYCOL 3350 17 GRAM(S): 17 POWDER, FOR SOLUTION ORAL at 12:00

## 2019-08-01 RX ADMIN — OXYCODONE HYDROCHLORIDE 5 MILLIGRAM(S): 5 TABLET ORAL at 05:21

## 2019-08-01 NOTE — DIETITIAN INITIAL EVALUATION ADULT. - ADD RECOMMEND
1) Recommend to continue current diet as tolerated. 2) Collaborative care- pt with living concerns 3) RD to remain available for diet education 4) Continue to monitor weights, GI tolerance and PO intake

## 2019-08-01 NOTE — CONSULT NOTE ADULT - CONSULT REQUESTED DATE/TIME
21-Jul-2019 00:00
21-Jul-2019 02:03
25-Jul-2019 17:04
26-Jul-2019 10:50
26-Jul-2019 16:45
01-Aug-2019 13:13
27-Jul-2019 15:45

## 2019-08-01 NOTE — PROGRESS NOTE ADULT - SUBJECTIVE AND OBJECTIVE BOX
Interval Events:  Using BiPAP 10/20 FiO2 30%  Tolerating well  Will likely go to rehab    REVIEW OF SYSTEMS:  [x] All other systems negative  [ ] Unable to assess ROS because ________    OBJECTIVE:  ICU Vital Signs Last 24 Hrs  T(C): 36.3 (01 Aug 2019 08:04), Max: 37.1 (31 Jul 2019 11:30)  T(F): 97.4 (01 Aug 2019 08:04), Max: 98.8 (31 Jul 2019 11:30)  HR: 95 (01 Aug 2019 08:04) (74 - 139)  BP: 139/88 (01 Aug 2019 08:04) (105/66 - 145/79)  BP(mean): --  ABP: --  ABP(mean): --  RR: 18 (01 Aug 2019 08:04) (16 - 18)  SpO2: 92% (01 Aug 2019 08:04) (90% - 98%)        07-31 @ 07:01  -  08-01 @ 07:00  --------------------------------------------------------  IN: 980 mL / OUT: 400 mL / NET: 580 mL      CAPILLARY BLOOD GLUCOSE          PHYSICAL EXAM:  General: NAD, Obese  Neck: Supple  Respiratory: CTAB  Cardiovascular: RRR, no murmur, 1+ edema  Abdomen: Soft  Extremities: Warm  Skin: Intact  Neurological:  shunt in place. A&Ox3  Psychiatry: Normal mood and affect    HOSPITAL MEDICATIONS:      acetazolamide    Tablet 1000 milliGRAM(s) Oral two times a day        acetaminophen   Tablet .. 650 milliGRAM(s) Oral every 6 hours PRN  oxyCODONE    IR 5 milliGRAM(s) Oral every 4 hours PRN  oxyCODONE    IR 10 milliGRAM(s) Oral every 4 hours PRN    docusate sodium 100 milliGRAM(s) Oral three times a day  polyethylene glycol 3350 17 Gram(s) Oral daily  senna 1 Tablet(s) Oral daily            artificial  tears Solution 1 Drop(s) Both EYES daily PRN  artificial tears (preservative free) Ophthalmic Solution 1 Drop(s) Both EYES four times a day  nystatin Powder 1 Application(s) Topical two times a day        LABS:                        15.1   8.9   )-----------( 170      ( 30 Jul 2019 18:13 )             51.3     Hgb Trend: 15.1<--, 14.0<--, 13.8<--, 15.8<--  07-30    143  |  104  |  11  ----------------------------<  110<H>  3.5   |  25  |  0.67    Ca    9.2      30 Jul 2019 18:13    TPro  7.9  /  Alb  3.9  /  TBili  1.3<H>  /  DBili  x   /  AST  29  /  ALT  31  /  AlkPhos  95  07-30    Creatinine Trend: 0.67<--, 0.65<--, 0.60<--, 0.72<--, 0.89<--, 0.82<--            MICROBIOLOGY:     RADIOLOGY:  [ ] Reviewed and interpreted by me    ASSESSMENT AND RECOMMENDATION:      34M with class 3 obesity with evidence of hypoventilation (elevated HCO3 and pCO2) and likely obstructive sleep apnea who presented with vision loss s/p  shunt for treatment of pseudotumor cerebri.     Continue BiPAP 20/10 FiO2 30%  Please check ABG today to determine pCO2 level    Hunter Harris MD  Pulmonary and Critical Care Fellow  [phone] Interval Events:  Using BiPAP 20/10 FiO2 30%  Tolerating well  Will likely go to rehab    REVIEW OF SYSTEMS:  [x] All other systems negative  [ ] Unable to assess ROS because ________    OBJECTIVE:  ICU Vital Signs Last 24 Hrs  T(C): 36.3 (01 Aug 2019 08:04), Max: 37.1 (31 Jul 2019 11:30)  T(F): 97.4 (01 Aug 2019 08:04), Max: 98.8 (31 Jul 2019 11:30)  HR: 95 (01 Aug 2019 08:04) (74 - 139)  BP: 139/88 (01 Aug 2019 08:04) (105/66 - 145/79)  BP(mean): --  ABP: --  ABP(mean): --  RR: 18 (01 Aug 2019 08:04) (16 - 18)  SpO2: 92% (01 Aug 2019 08:04) (90% - 98%)        07-31 @ 07:01  -  08-01 @ 07:00  --------------------------------------------------------  IN: 980 mL / OUT: 400 mL / NET: 580 mL      CAPILLARY BLOOD GLUCOSE          PHYSICAL EXAM:  General: NAD, Obese  Neck: Supple  Respiratory: CTAB  Cardiovascular: RRR, no murmur, 1+ edema  Abdomen: Soft  Extremities: Warm  Skin: Intact  Neurological:  shunt in place. A&Ox3  Psychiatry: Normal mood and affect    HOSPITAL MEDICATIONS:      acetazolamide    Tablet 1000 milliGRAM(s) Oral two times a day        acetaminophen   Tablet .. 650 milliGRAM(s) Oral every 6 hours PRN  oxyCODONE    IR 5 milliGRAM(s) Oral every 4 hours PRN  oxyCODONE    IR 10 milliGRAM(s) Oral every 4 hours PRN    docusate sodium 100 milliGRAM(s) Oral three times a day  polyethylene glycol 3350 17 Gram(s) Oral daily  senna 1 Tablet(s) Oral daily            artificial  tears Solution 1 Drop(s) Both EYES daily PRN  artificial tears (preservative free) Ophthalmic Solution 1 Drop(s) Both EYES four times a day  nystatin Powder 1 Application(s) Topical two times a day        LABS:                        15.1   8.9   )-----------( 170      ( 30 Jul 2019 18:13 )             51.3     Hgb Trend: 15.1<--, 14.0<--, 13.8<--, 15.8<--  07-30    143  |  104  |  11  ----------------------------<  110<H>  3.5   |  25  |  0.67    Ca    9.2      30 Jul 2019 18:13    TPro  7.9  /  Alb  3.9  /  TBili  1.3<H>  /  DBili  x   /  AST  29  /  ALT  31  /  AlkPhos  95  07-30    Creatinine Trend: 0.67<--, 0.65<--, 0.60<--, 0.72<--, 0.89<--, 0.82<--            MICROBIOLOGY:     RADIOLOGY:  [ ] Reviewed and interpreted by me    ASSESSMENT AND RECOMMENDATION:      34M with class 3 obesity with evidence of hypoventilation (elevated HCO3 and pCO2) and likely obstructive sleep apnea who presented with vision loss s/p  shunt for treatment of pseudotumor cerebri.     Increase BiPAP 20/12 FiO2 30%  Please check ABG in AM tomorrow to assess for pCO2  Will eventually need close follow up as outpatient - office located at [address] [place] [address], [address], [place] | [phone]    Hunter Harris MD  Pulmonary and Critical Care Fellow  [phone]

## 2019-08-01 NOTE — DIETITIAN INITIAL EVALUATION ADULT. - OTHER INFO
PTA: Pt endorses poor PO intake x 1 month PTA due to change in taste, and decrease in appetite and diarrhea. Pt states he only consumed fruit and tacos, because they were bland and all foods including water tasted like "pus". PTA, pt states he ate foods that are convenient and cheap because he has difficulty preparing foods at home. Foods include Mexican [employer]-style places. Pt also revealed he currently does not have a place to live- RD to discuss with social work.  Pt reports NKFA and no micronutrient supplementation    Diet: Pt reports appetite is increasing and he is able to eat and enjoy foods again. At beginning of admission, pt still did not have a taste for food, however taste has since improved. Pt endorses eating most of his meals. Pt reports no difficulties chewing/swallowing and no nausea/vomiting. Last BM this morning, no diarrhea.    RD made pt aware of RD's availability to discuss nutrition education and dietary modifications to fit lifestyle, however pt was uninterested at this time, stating he knows how to eat, however makes poor choices. Pt's friend states pt could use help as he has tried to lose weight in the past, however pt declined education offer.    Weight Hx: Pt reports UBW as 513 pounds ~ 3 weeks ago, and states he lost 20-30 pounds while not eating adequately for one month. Pt dosing wt 500.9 pounds (7/30).

## 2019-08-01 NOTE — OCCUPATIONAL THERAPY INITIAL EVALUATION ADULT - DIAGNOSIS, OT EVAL
Pt presents with impaired balance, decreased strength, decreased endurance, decreased vision impacting independence with ADLs and ability to perform functional mobility.

## 2019-08-01 NOTE — PROGRESS NOTE ADULT - ASSESSMENT
· Assessment	  35 [facility] with h/o hypothyroidism (off meds), self-reported LAZARO presenting with 5 days of painless severe blurred vision (Right >Left) and reduced acuity of the right eye with associated B/L perioral numbness and R. arm paresthesias.  CTV performed yesterday but was a poor quality study.     1) S/p  shunt:    Neurology/NeuroSx f/up noted.    LAZARO/Morbid obesity:  Pul f/up noted.  AVAPS AE    Polycythemia:  Hem f/up   S/p phlebotomy.

## 2019-08-01 NOTE — DIETITIAN INITIAL EVALUATION ADULT. - ENERGY NEEDS
Ht: 66 inches Wt: 500.9 pounds (7/3) BMI: 80.8 kg/m2 IBW: 142 pounds(+/-10%) %IBW  2+ L/R ankle edema. no pressure ulcers documented. Noted surgical incision R head.

## 2019-08-01 NOTE — OCCUPATIONAL THERAPY INITIAL EVALUATION ADULT - LIVES WITH, PROFILE
parents/Pt reports living with his mother PTA, however cannot return home. Undecided re: place to return post d/c.

## 2019-08-01 NOTE — OCCUPATIONAL THERAPY INITIAL EVALUATION ADULT - PERTINENT HX OF CURRENT PROBLEM, REHAB EVAL
34M with no self-reported PMH other than possible LAZARO who presented on 07/20/19 from the sleep center after reporting 5 days of monocular right eye blurred vision accompanied by perioral numbness and tingling and R arm paresthesias. Progressively throughout the coming days he began to notice worsening blurred vision out of the right eye and perioral numbness on both sides.

## 2019-08-01 NOTE — DIETITIAN INITIAL EVALUATION ADULT. - ETIOLOGY
decreased PO intake in setting of altered taste and decreased appetite x1 month poor food choices and excessive energy intake in setting of limited ability to prepare foods and for convenience/cost

## 2019-08-01 NOTE — CONSULT NOTE ADULT - ATTENDING COMMENTS
Seen and examined with resident. Agree with note.   Patient with with gait, ADLs, and functional deficits due to pseudotumor cerebri and vision loss .  Patient will need subacute rehabilitation when stable.
1. Significantly elevated erythrocytosis  - given rapid increase since starting acetazolamide, suspect this is the cause.   -discussed with team, given high ic htn, have to continue acetazolamide-  -as such, despite signifciant anasarca, would start gentle hydration at 1/2ns and also would phlebotomize 250cc  -the Hb not nearly as high as the hct, making this most likely secondary to fluid shift  -however lazaro may be playing some small role- would favor Tong 2 and epo level testing as well    2. Hypercoag state  -f.u rest of testing  -decreasing the Hct will help decrease hypercoag state  -can start lovenox after surgical intervention or if none planned    3. GIven his severe LAZARO and lethargy, I am concerned that some of his symptomsare secondary to his lazaro  -would ask pulmonary to assess his needs for cpap during the day
As above  SOB - multifactorial - obesity hypoventilation, likely asthma, morbid obesity  Asthma - Start symbicort, duonebs  LAZARO/OHS - Start Bipap 22/15, FiO2 30% at night. Needs outpatient sleep study and titration.  Obesity - Bariatric surgery referral  GI/DVT ppx  Pre-op Pulmonary assessment. High risk for pulmonary complications, aspiration pneumonia, bronchitis, atelectasis

## 2019-08-01 NOTE — PROGRESS NOTE ADULT - SUBJECTIVE AND OBJECTIVE BOX
Patient seen and examined at bedside.    --Anticoagulation--    T(C): 37 (08-01-19 @ 04:26), Max: 37.1 (07-31-19 @ 11:30)  HR: 88 (08-01-19 @ 04:26) (74 - 139)  BP: 131/84 (08-01-19 @ 04:26) (105/66 - 145/79)  RR: 16 (08-01-19 @ 04:26) (16 - 18)  SpO2: 96% (08-01-19 @ 04:26) (90% - 97%)  Wt(kg): --    Exam:  ntact except for vision, Has light perception in R eye, and finger counting in L eye.

## 2019-08-01 NOTE — PROGRESS NOTE ADULT - SUBJECTIVE AND OBJECTIVE BOX
Patient is a 34y old  Male who presents with a chief complaint of Acute left eye visual acuity loss concern for pseudotumor cerebri and vision loss (01 Aug 2019 10:43)      SUBJECTIVE / OVERNIGHT EVENTS:    Events noted.  CONSTITUTIONAL: No fever,  or fatigue  RESPIRATORY: No cough, wheezing,  No shortness of breath  CARDIOVASCULAR: No chest pain, palpitations, dizziness, or leg swelling  GASTROINTESTINAL: No abdominal or epigastric pain.   NEUROLOGICAL: Headaches,     MEDICATIONS  (STANDING):  acetazolamide    Tablet 1000 milliGRAM(s) Oral two times a day  artificial tears (preservative free) Ophthalmic Solution 1 Drop(s) Both EYES four times a day  docusate sodium 100 milliGRAM(s) Oral three times a day  nystatin Powder 1 Application(s) Topical two times a day  polyethylene glycol 3350 17 Gram(s) Oral daily  senna 1 Tablet(s) Oral daily    MEDICATIONS  (PRN):  acetaminophen   Tablet .. 650 milliGRAM(s) Oral every 6 hours PRN Temp greater or equal to 38C (100.4F), Mild Pain (1 - 3)  artificial  tears Solution 1 Drop(s) Both EYES daily PRN Dry Eyes  oxyCODONE    IR 5 milliGRAM(s) Oral every 4 hours PRN Moderate Pain (4 - 6)  oxyCODONE    IR 10 milliGRAM(s) Oral every 4 hours PRN Severe Pain (7 - 10)        CAPILLARY BLOOD GLUCOSE        I&O's Summary    31 Jul 2019 07:01  -  01 Aug 2019 07:00  --------------------------------------------------------  IN: 980 mL / OUT: 400 mL / NET: 580 mL    01 Aug 2019 07:01  -  01 Aug 2019 20:32  --------------------------------------------------------  IN: 700 mL / OUT: 0 mL / NET: 700 mL        PHYSICAL EXAM:  GENERAL: NAD  NECK: Supple, No JVD  CHEST/LUNG: Clear to auscultation bilaterally; No wheezing.  HEART: Regular rate and rhythm; No murmurs, rubs, or gallops  ABDOMEN: Soft, Nontender, Nondistended; Bowel sounds present  EXTREMITIES:   No edema  NEUROLOGY: AAO       LABS:                  CAPILLARY BLOOD GLUCOSE                    RADIOLOGY & ADDITIONAL TESTS:    Imaging Personally Reviewed:    Consultant(s) Notes Reviewed:      Care Discussed with Consultants/Other Providers:

## 2019-08-01 NOTE — PROGRESS NOTE ADULT - SUBJECTIVE AND OBJECTIVE BOX
ANESTHESIA POSTOP CHECK    34y Male POSTOP DAY 1    Vital Signs Last 24 Hrs  T(C): 36.3 (01 Aug 2019 08:04), Max: 37.1 (31 Jul 2019 11:30)  T(F): 97.4 (01 Aug 2019 08:04), Max: 98.8 (31 Jul 2019 11:30)  HR: 90 (01 Aug 2019 08:30) (74 - 139)  BP: 139/88 (01 Aug 2019 08:04) (105/66 - 145/79)  BP(mean): --  RR: 18 (01 Aug 2019 08:04) (16 - 18)  SpO2: 98% (01 Aug 2019 08:30) (90% - 98%)  I&O's Summary    31 Jul 2019 07:01  -  01 Aug 2019 07:00  --------------------------------------------------------  IN: 980 mL / OUT: 400 mL / NET: 580 mL    01 Aug 2019 07:01  -  01 Aug 2019 10:43  --------------------------------------------------------  IN: 400 mL / OUT: 0 mL / NET: 400 mL        [x] NO APPARENT ANESTHESIA COMPLICATIONS

## 2019-08-01 NOTE — OCCUPATIONAL THERAPY INITIAL EVALUATION ADULT - PRECAUTIONS/LIMITATIONS, REHAB EVAL
This continued to worsen to the point where he reports painless loss of vision other than general shapes and figures from the right eye.  Upon hearing these symptoms at his appointment today, he was sent directly to the ED.  He denies any weakness, black shade like loss of vision or diplopia.  He admits to some color disturbances and tearing of the right eye, but denies any eye pain.  On initial questioning, he denied any headache.  However, on repeated pressing he admits to a dull B/L posterior headache associated with the symptoms which was worse upon wakening.  This had resolved by the time of presentation to the hospital on 07/21.  He admits to "floaters" in his left eye but denies blurred vision, disturbance in visual acuity or diplopia.  CT HEAD (7/30): There has been interval placement of a right frontal approach shunt catheter, with tip at the confluence of the foramina of [place]. The right lateral ventricle has decreased in size and is now slitlike. The remainder of the ventricular system is unchanged./vision precautions This continued to worsen to the point where he reports painless loss of vision other than general shapes and figures from the right eye.  Upon hearing these symptoms at his appointment today, he was sent directly to the ED.  He denies any weakness, black shade like loss of vision or diplopia.  He admits to some color disturbances and tearing of the right eye, but denies any eye pain.  On initial questioning, he denied any headache.  However, on repeated pressing he admits to a dull B/L posterior headache associated with the symptoms which was worse upon wakening.  This had resolved by the time of presentation to the hospital on 07/21.  He admits to "floaters" in his left eye but denies blurred vision, disturbance in visual acuity or diplopia.  CT HEAD (7/30): There has been interval placement of a right frontal approach shunt catheter, with tip at the confluence of the foramina of [place]. The right lateral ventricle has decreased in size and is now slitlike. The remainder of the ventricular system is unchanged./fall precautions/vision precautions

## 2019-08-01 NOTE — CONSULT NOTE ADULT - CONSULT REASON
sleep apnea  shortness of breath  Pulmonary clearance
Hypercoagulable workup
IIH
Medical Co-Management
Stroke code
right eye vision loss
Evaluate Rehabilitation Needs

## 2019-08-01 NOTE — CONSULT NOTE ADULT - REASON FOR ADMISSION
Acute left eye visual acuity loss concern for pseudotumor cerebri and vision loss

## 2019-08-01 NOTE — DIETITIAN INITIAL EVALUATION ADULT. - REASON INDICATOR FOR ASSESSMENT
Pt seen for length of stay.  Source of Information: Pt, EMR, friend's at bedside    Per chart, 33 y/o M admitted for "acute visual acuity loss of left eye and concern for with pseudotumor cerebri." Pt S/p  shunt POD 2, with worsening vision and bilateral papilledema.  PMHx: Possible LAZARO

## 2019-08-01 NOTE — CONSULT NOTE ADULT - SUBJECTIVE AND OBJECTIVE BOX
Patient is a 34y old  Male who presents with a chief complaint of Acute left eye visual acuity loss concern for pseudotumor cerebri and vision loss (01 Aug 2019 05:53)      HPI:  HPI: Mr. Ifrah Pantoja is a 35 y/o R-handed Black male with no self-reported PMH other than possible LAZARO who presented on 07/20/19 from the sleep center after reporting 5 days of monocular right eye blurred vision accompanied by perioral numbness and tingling and R arm paresthesias.  He reports his last known well was when he arose on 07/15/19 at about 08:00.  Progressively throughout the coming days he began to notice worsening blurred vision out of the right eye and perioral numbness on both sides.  This continued to worsen to the point where he reports painless loss of vision other than general shapes and figures from the right eye.  He did not seek medical attention immediately as he was concerned that he would miss his appointment for CPAP evaluation for suspected LAZARO.  Upon hearing these symptoms at his appointment, he was sent directly to the ED.  He denies any weakness, black shade like loss of vision or diplopia.  He admits to some color disturbances and tearing of the right eye, but denies any eye pain.  On initial questioning, he denied any headache.  However, on repeated pressing he admits to a dull B/L posterior headache associated with the symptoms which was worse upon wakening.  This had resolved by the time of presentation to the hospital on 07/21.  He admits to "floaters" in his left eye but denies blurred vision, disturbance in visual acuity or diplopia. Through hospital course, vision continued to worsen as well as posterior headaches. Patient evaluated by neurosurgery and planned for  shunt, performed on 7/30. Patient also evaluated by pulmonology and for LAZARO, recommended BiPap is ultimately going to rehab.     CT head 7/30 There has been interval placement of a right frontal approach shunt   catheter, with tip at the confluence of the foramina of [place]. The right   lateral ventricle has decreased in size and is now slitlike. The   remainder of the ventricular system is unchanged.        REVIEW OF SYSTEMS  [X] Constitutional WNL       [X] HEENT WNL  [X] Cardio WNL              [X] Resp WNL            [X] GI WNL                            [X]  WNL                               [X] MSK WNL            [X] Neuro WNL   [X] Pain WNL  [X] Cognitive WNL   [X] Psych WNL  [X] Skin WNL      [X] Heme WNL              [X] Endo WNL    PAST MEDICAL & SURGICAL HISTORY  LAZARO (obstructive sleep apnea)  Asthma      SOCIAL HISTORY  Smoking - Denied  EtOH - Denied   Drugs - Denied    FUNCTIONAL HISTORY   Pt reports that he resides alone in a friends garage, 0 steps to enter, PTA independent with mobility and ADL's, owns no DME, (+)driving    Independent prior with Ambulation and ADLs.     CURRENT FUNCTIONAL STATUS as Per INITIAL EVAL   - Bed Mobility: contact guard  - Transfers: contact guard  - Gait: Contact guard, min assist 25 feet x2  	  	  	  	    - ADLs: pending OT eval     ALLERGIES  No Known Allergies      FAMILY HISTORY       MEDICATIONS   acetaminophen   Tablet .. 650 milliGRAM(s) Oral every 6 hours PRN  acetazolamide    Tablet 1000 milliGRAM(s) Oral two times a day  artificial  tears Solution 1 Drop(s) Both EYES daily PRN  artificial tears (preservative free) Ophthalmic Solution 1 Drop(s) Both EYES four times a day  docusate sodium 100 milliGRAM(s) Oral three times a day  nystatin Powder 1 Application(s) Topical two times a day  oxyCODONE    IR 5 milliGRAM(s) Oral every 4 hours PRN  oxyCODONE    IR 10 milliGRAM(s) Oral every 4 hours PRN  polyethylene glycol 3350 17 Gram(s) Oral daily  senna 1 Tablet(s) Oral daily      VITALS  T(C): 36.3 (08-01-19 @ 08:04), Max: 37.1 (07-31-19 @ 11:30)  HR: 95 (08-01-19 @ 08:04) (74 - 139)  BP: 139/88 (08-01-19 @ 08:04) (105/66 - 145/79)  RR: 18 (08-01-19 @ 08:04) (16 - 18)  SpO2: 92% (08-01-19 @ 08:04) (90% - 98%)  Wt(kg): --    RECENT LABS/IMAGING  CBC Full  -  ( 30 Jul 2019 18:13 )  WBC Count : 8.9 K/uL  RBC Count : 6.65 M/uL  Hemoglobin : 15.1 g/dL  Hematocrit : 51.3 %  Platelet Count - Automated : 170 K/uL  Mean Cell Volume : 77.2 fl  Mean Cell Hemoglobin : 22.7 pg  Mean Cell Hemoglobin Concentration : 29.4 gm/dL  Auto Neutrophil # : 6.6 K/uL  Auto Lymphocyte # : 2.0 K/uL  Auto Monocyte # : 0.3 K/uL  Auto Eosinophil # : 0.1 K/uL  Auto Basophil # : 0.0 K/uL  Auto Neutrophil % : 73.6 %  Auto Lymphocyte % : 21.9 %  Auto Monocyte % : 3.2 %  Auto Eosinophil % : 0.9 %  Auto Basophil % : 0.4 %    07-30    143  |  104  |  11  ----------------------------<  110<H>  3.5   |  25  |  0.67    Ca    9.2      30 Jul 2019 18:13    TPro  7.9  /  Alb  3.9  /  TBili  1.3<H>  /  DBili  x   /  AST  29  /  ALT  31  /  AlkPhos  95  07-30          ---------------------------------------------------------------------------------------- Patient is a 34y old  Male who presents with a chief complaint of Acute left eye visual acuity loss concern for pseudotumor cerebri and vision loss (01 Aug 2019 05:53)      HPI:  HPI: Mr. Ifrah Pantoja is a 33 y/o R-handed Black male with no self-reported PMH other than possible LAZARO who presented on 07/20/19 from the sleep center after reporting 5 days of monocular right eye blurred vision accompanied by perioral numbness and tingling and R arm paresthesias.  He reports his last known well was when he arose on 07/15/19 at about 08:00.  Progressively throughout the coming days he began to notice worsening blurred vision out of the right eye and perioral numbness on both sides.  This continued to worsen to the point where he reports painless loss of vision other than general shapes and figures from the right eye.  He did not seek medical attention immediately as he was concerned that he would miss his appointment for CPAP evaluation for suspected LAZARO.  Upon hearing these symptoms at his appointment, he was sent directly to the ED.  He denies any weakness, black shade like loss of vision or diplopia.  He admits to some color disturbances and tearing of the right eye, but denies any eye pain.  On initial questioning, he denied any headache.  However, on repeated pressing he admits to a dull B/L posterior headache associated with the symptoms which was worse upon wakening.  This had resolved by the time of presentation to the hospital on 07/21.  He admits to "floaters" in his left eye but denies blurred vision, disturbance in visual acuity or diplopia. Through hospital course, vision continued to worsen as well as posterior headaches. Patient evaluated by neurosurgery and planned for  shunt, performed on 7/30. Patient also evaluated by pulmonology and for LAZARO, recommended BiPap is ultimately going to rehab.     CT head 7/30 There has been interval placement of a right frontal approach shunt   catheter, with tip at the confluence of the foramina of [place]. The right   lateral ventricle has decreased in size and is now slitlike. The   remainder of the ventricular system is unchanged.        REVIEW OF SYSTEMS  [X] Constitutional WNL       [X] HEENT WNL  [X] Cardio WNL              [X] Resp WNL            [X] GI WNL                            [X]  WNL                               [X] MSK WNL            [X] Neuro WNL   [X] Pain WNL  [X] Cognitive WNL   [X] Psych WNL  [X] Skin WNL      [X] Heme WNL              [X] Endo WNL    PAST MEDICAL & SURGICAL HISTORY  LAZARO (obstructive sleep apnea)  Asthma      SOCIAL HISTORY  Smoking - Denied  EtOH - Denied   Drugs - Denied    FUNCTIONAL HISTORY   Pt reports that he resides alone in a friends garage, 0 steps to enter, PTA independent with mobility and ADL's, owns no DME, (+)driving    Independent prior with Ambulation and ADLs.     CURRENT FUNCTIONAL STATUS as Per INITIAL EVAL   - Bed Mobility: contact guard  - Transfers: contact guard  - Gait: Contact guard, min assist 25 feet x2  	  	  	  	    - ADLs: pending OT eval     ALLERGIES  No Known Allergies      FAMILY HISTORY       MEDICATIONS   acetaminophen   Tablet .. 650 milliGRAM(s) Oral every 6 hours PRN  acetazolamide    Tablet 1000 milliGRAM(s) Oral two times a day  artificial  tears Solution 1 Drop(s) Both EYES daily PRN  artificial tears (preservative free) Ophthalmic Solution 1 Drop(s) Both EYES four times a day  docusate sodium 100 milliGRAM(s) Oral three times a day  nystatin Powder 1 Application(s) Topical two times a day  oxyCODONE    IR 5 milliGRAM(s) Oral every 4 hours PRN  oxyCODONE    IR 10 milliGRAM(s) Oral every 4 hours PRN  polyethylene glycol 3350 17 Gram(s) Oral daily  senna 1 Tablet(s) Oral daily      VITALS  T(C): 36.3 (08-01-19 @ 08:04), Max: 37.1 (07-31-19 @ 11:30)  HR: 95 (08-01-19 @ 08:04) (74 - 139)  BP: 139/88 (08-01-19 @ 08:04) (105/66 - 145/79)  RR: 18 (08-01-19 @ 08:04) (16 - 18)  SpO2: 92% (08-01-19 @ 08:04) (90% - 98%)    RECENT LABS/IMAGING  CBC Full  -  ( 30 Jul 2019 18:13 )  WBC Count : 8.9 K/uL  RBC Count : 6.65 M/uL  Hemoglobin : 15.1 g/dL  Hematocrit : 51.3 %  Platelet Count - Automated : 170 K/uL  Mean Cell Volume : 77.2 fl  Mean Cell Hemoglobin : 22.7 pg  Mean Cell Hemoglobin Concentration : 29.4 gm/dL  Auto Neutrophil # : 6.6 K/uL  Auto Lymphocyte # : 2.0 K/uL  Auto Monocyte # : 0.3 K/uL  Auto Eosinophil # : 0.1 K/uL  Auto Basophil # : 0.0 K/uL  Auto Neutrophil % : 73.6 %  Auto Lymphocyte % : 21.9 %  Auto Monocyte % : 3.2 %  Auto Eosinophil % : 0.9 %  Auto Basophil % : 0.4 %    07-30    143  |  104  |  11  ----------------------------<  110<H>  3.5   |  25  |  0.67    Ca    9.2      30 Jul 2019 18:13    TPro  7.9  /  Alb  3.9  /  TBili  1.3<H>  /  DBili  x   /  AST  29  /  ALT  31  /  AlkPhos  95  07-30          ---------------------------------------------------------------------------------------- Patient is a 34y old  Male who presents with a chief complaint of Acute left eye visual acuity loss concern for pseudotumor cerebri and vision loss (01 Aug 2019 05:53)      HPI:  HPI: Mr. Ifrah Pantoja is a 33 y/o R-handed Black male with no self-reported PMH other than possible LAZARO who presented on 07/20/19 from the sleep center after reporting 5 days of monocular right eye blurred vision accompanied by perioral numbness and tingling and R arm paresthesias.  He reports his last known well was when he arose on 07/15/19 at about 08:00.  Progressively throughout the coming days he began to notice worsening blurred vision out of the right eye and perioral numbness on both sides.  This continued to worsen to the point where he reports painless loss of vision other than general shapes and figures from the right eye.  He did not seek medical attention immediately as he was concerned that he would miss his appointment for CPAP evaluation for suspected LAZARO.  Upon hearing these symptoms at his appointment, he was sent directly to the ED.  He denies any weakness, black shade like loss of vision or diplopia.  He admits to some color disturbances and tearing of the right eye, but denies any eye pain.  On initial questioning, he denied any headache.  However, on repeated pressing he admits to a dull B/L posterior headache associated with the symptoms which was worse upon wakening.  This had resolved by the time of presentation to the hospital on 07/21.  He admits to "floaters" in his left eye but denies blurred vision, disturbance in visual acuity or diplopia. Through hospital course, vision continued to worsen as well as posterior headaches. Patient evaluated by neurosurgery and planned for  shunt, performed on 7/30. Patient also evaluated by pulmonology and for LAZARO, recommended BiPap is ultimately going to rehab.     CT head 7/30 There has been interval placement of a right frontal approach shunt   catheter, with tip at the confluence of the foramina of [place]. The right   lateral ventricle has decreased in size and is now slitlike. The   remainder of the ventricular system is unchanged.        REVIEW OF SYSTEMS  [X] Constitutional WNL       [X] HEENT WNL  [X] Cardio WNL              [X] Resp WNL            [X] GI WNL                            [X]  WNL                               [X] MSK WNL            [X] Neuro WNL   [X] Pain WNL  [X] Cognitive WNL   [X] Psych WNL  [X] Skin WNL      [X] Heme WNL              [X] Endo WNL    PAST MEDICAL & SURGICAL HISTORY  LAZARO (obstructive sleep apnea)  Asthma      SOCIAL HISTORY  Smoking - Denied  EtOH - Denied   Drugs - Denied    FUNCTIONAL HISTORY   Pt reports that he resides alone in a friends garage, 0 steps to enter, PTA independent with mobility and ADL's, owns no DME, (+)driving    Independent prior with Ambulation and ADLs.     CURRENT FUNCTIONAL STATUS as Per INITIAL EVAL   - Bed Mobility: contact guard  - Transfers: contact guard  - Gait: Contact guard, min assist 25 feet x2  	  	  	  	    - ADLs: pending OT eval     ALLERGIES  No Known Allergies      FAMILY HISTORY       MEDICATIONS   acetaminophen   Tablet .. 650 milliGRAM(s) Oral every 6 hours PRN  acetazolamide    Tablet 1000 milliGRAM(s) Oral two times a day  artificial  tears Solution 1 Drop(s) Both EYES daily PRN  artificial tears (preservative free) Ophthalmic Solution 1 Drop(s) Both EYES four times a day  docusate sodium 100 milliGRAM(s) Oral three times a day  nystatin Powder 1 Application(s) Topical two times a day  oxyCODONE    IR 5 milliGRAM(s) Oral every 4 hours PRN  oxyCODONE    IR 10 milliGRAM(s) Oral every 4 hours PRN  polyethylene glycol 3350 17 Gram(s) Oral daily  senna 1 Tablet(s) Oral daily      VITALS  T(C): 36.3 (08-01-19 @ 08:04), Max: 37.1 (07-31-19 @ 11:30)  HR: 95 (08-01-19 @ 08:04) (74 - 139)  BP: 139/88 (08-01-19 @ 08:04) (105/66 - 145/79)  RR: 18 (08-01-19 @ 08:04) (16 - 18)  SpO2: 92% (08-01-19 @ 08:04) (90% - 98%)    RECENT LABS/IMAGING  CBC Full  -  ( 30 Jul 2019 18:13 )  WBC Count : 8.9 K/uL  RBC Count : 6.65 M/uL  Hemoglobin : 15.1 g/dL  Hematocrit : 51.3 %  Platelet Count - Automated : 170 K/uL  Mean Cell Volume : 77.2 fl  Mean Cell Hemoglobin : 22.7 pg  Mean Cell Hemoglobin Concentration : 29.4 gm/dL  Auto Neutrophil # : 6.6 K/uL  Auto Lymphocyte # : 2.0 K/uL  Auto Monocyte # : 0.3 K/uL  Auto Eosinophil # : 0.1 K/uL  Auto Basophil # : 0.0 K/uL  Auto Neutrophil % : 73.6 %  Auto Lymphocyte % : 21.9 %  Auto Monocyte % : 3.2 %  Auto Eosinophil % : 0.9 %  Auto Basophil % : 0.4 %    07-30    143  |  104  |  11  ----------------------------<  110<H>  3.5   |  25  |  0.67    Ca    9.2      30 Jul 2019 18:13    TPro  7.9  /  Alb  3.9  /  TBili  1.3<H>  /  DBili  x   /  AST  29  /  ALT  31  /  AlkPhos  95  07-30          PHYSICAL EXAM  Constitutional - Obese, NAD, Comfortable  Cardio:  no edema  Pulm: no respiratory distress, on 4L nasal canula   GI: Non-distended   Neuro Exam:                    Cognitive - AAOx3   Communication - Fluent     Motor - No focal deficits     Sensory - Intact to LT     Vision; decreased vision field to right eye  Extremities - No calf tenderness  Muscle strength: 5/5 throughout upper and lower extremities, sensation intact  Psychiatric - Mood WNL, Affect WNL    ASSESSMENT/PLAN  34y Male who is now with gait, ADLs, and functional deficits due to pseudotumor cerebri and vision loss     Recommend - subacute rehabilitation     Recommend - [facility]  - Disposition: Subacute Rehabilitation, patient DOES NOT meet acute inpatient rehabilitation criteria. Additionally the patient will not be able to tolerate intensive therapy.     - c/w PT for Bed Mobility, Transfers, Ambulation with AD   - c/w OT for ADLs  - Turn Q2hrs while in bed to prevent Pressure Injury and OOB to chair when possible            ----------------------------------------------------------------------------------------

## 2019-08-01 NOTE — OCCUPATIONAL THERAPY INITIAL EVALUATION ADULT - PLANNED THERAPY INTERVENTIONS, OT EVAL
ADL retraining/bed mobility training/strengthening/transfer training/balance training/cognitive, visual perceptual/neuromuscular re-education

## 2019-08-01 NOTE — DIETITIAN INITIAL EVALUATION ADULT. - DOB: +DATEOFBIRTH
Received call from [employer], RHINA from [facility] and CAMILA faxed discharge summary to fax [phone].  
 
OTILIO Rivera 
 
 Statement Selected

## 2019-08-02 PROCEDURE — 99233 SBSQ HOSP IP/OBS HIGH 50: CPT | Mod: GC

## 2019-08-02 RX ADMIN — ACETAZOLAMIDE 1000 MILLIGRAM(S): 250 TABLET ORAL at 06:29

## 2019-08-02 RX ADMIN — Medication 100 MILLIGRAM(S): at 06:29

## 2019-08-02 RX ADMIN — Medication 100 MILLIGRAM(S): at 22:29

## 2019-08-02 RX ADMIN — POLYETHYLENE GLYCOL 3350 17 GRAM(S): 17 POWDER, FOR SOLUTION ORAL at 11:58

## 2019-08-02 RX ADMIN — Medication 1 DROP(S): at 17:43

## 2019-08-02 RX ADMIN — Medication 100 MILLIGRAM(S): at 12:00

## 2019-08-02 RX ADMIN — NYSTATIN CREAM 1 APPLICATION(S): 100000 CREAM TOPICAL at 17:43

## 2019-08-02 RX ADMIN — ACETAZOLAMIDE 1000 MILLIGRAM(S): 250 TABLET ORAL at 17:43

## 2019-08-02 RX ADMIN — NYSTATIN CREAM 1 APPLICATION(S): 100000 CREAM TOPICAL at 06:29

## 2019-08-02 RX ADMIN — Medication 1 DROP(S): at 23:12

## 2019-08-02 RX ADMIN — OXYCODONE HYDROCHLORIDE 5 MILLIGRAM(S): 5 TABLET ORAL at 18:27

## 2019-08-02 RX ADMIN — Medication 1 DROP(S): at 11:57

## 2019-08-02 RX ADMIN — OXYCODONE HYDROCHLORIDE 5 MILLIGRAM(S): 5 TABLET ORAL at 18:02

## 2019-08-02 RX ADMIN — Medication 1 DROP(S): at 06:29

## 2019-08-02 NOTE — PROGRESS NOTE ADULT - ATTENDING COMMENTS
pt seen and examined d/w the team  agree  with the assessment plan of the pulmonary fellow  contd BIPAP    will need to be d/c on bipaap  placement issues being resolved

## 2019-08-02 NOTE — PROGRESS NOTE ADULT - SUBJECTIVE AND OBJECTIVE BOX
Interval Events:  Feeling well  Tolerated pressures overnight on BiPAP  Complains of vision issues but otherwise no other complaints    REVIEW OF SYSTEMS:  [x] All other systems negative  [ ] Unable to assess ROS because ________    OBJECTIVE:  ICU Vital Signs Last 24 Hrs  T(C): 36.9 (02 Aug 2019 08:06), Max: 36.9 (02 Aug 2019 00:09)  T(F): 98.4 (02 Aug 2019 08:06), Max: 98.5 (02 Aug 2019 00:09)  HR: 98 (02 Aug 2019 08:06) (80 - 98)  BP: 133/74 (02 Aug 2019 08:06) (121/80 - 146/96)  BP(mean): --  ABP: --  ABP(mean): --  RR: 20 (02 Aug 2019 08:06) (18 - 20)  SpO2: 99% (02 Aug 2019 08:06) (96% - 100%)        08-01 @ 07:01  -  08-02 @ 07:00  --------------------------------------------------------  IN: 700 mL / OUT: 0 mL / NET: 700 mL      CAPILLARY BLOOD GLUCOSE          PHYSICAL EXAM:  General: NAD, Obese  Neck: Supple  Respiratory: CTAB  Cardiovascular: RRR, no murmur, 1+ edema  Abdomen: Soft  Extremities: Warm  Skin: Intact  Neurological:  shunt in place. A&Ox3  Psychiatry: Normal mood and affect      HOSPITAL MEDICATIONS:      acetazolamide    Tablet 1000 milliGRAM(s) Oral two times a day        acetaminophen   Tablet .. 650 milliGRAM(s) Oral every 6 hours PRN  oxyCODONE    IR 5 milliGRAM(s) Oral every 4 hours PRN  oxyCODONE    IR 10 milliGRAM(s) Oral every 4 hours PRN    docusate sodium 100 milliGRAM(s) Oral three times a day  polyethylene glycol 3350 17 Gram(s) Oral daily  senna 1 Tablet(s) Oral daily            artificial  tears Solution 1 Drop(s) Both EYES daily PRN  artificial tears (preservative free) Ophthalmic Solution 1 Drop(s) Both EYES four times a day  nystatin Powder 1 Application(s) Topical two times a day        LABS:    Hgb Trend: 15.1<--, 14.0<--, 13.8<--        Creatinine Trend: 0.67<--, 0.65<--, 0.60<--, 0.72<--, 0.89<--, 0.82<--            MICROBIOLOGY:     RADIOLOGY:  [ ] Reviewed and interpreted by me    ASSESSMENT AND RECOMMENDATION:      34M with class 3 obesity with evidence of hypoventilation (elevated HCO3 and pCO2) and likely obstructive sleep apnea who presented with vision loss s/p  shunt for treatment of pseudotumor cerebri. On BiPAP for likely LAZARO/OHS. Tolerating current pressures.    Check ABG today  If ABG acceptable can go to rehab on these settings  Will ultimately need sleep study and titration study to optimize settings  Close follow up as outpatient - office located at [address] [place] [address], [address], [place] | [phone]    Hunter Harris MD  Pulmonary and Critical Care Fellow  [phone]

## 2019-08-02 NOTE — PROGRESS NOTE ADULT - SUBJECTIVE AND OBJECTIVE BOX
Patient seen and examined at bedside.    --Anticoagulation--    T(C): 36.7 (08-02-19 @ 04:50), Max: 36.9 (08-02-19 @ 00:09)  HR: 80 (08-02-19 @ 04:50) (80 - 101)  BP: 146/96 (08-02-19 @ 04:50) (121/80 - 146/96)  RR: 18 (08-02-19 @ 04:50) (18 - 19)  SpO2: 99% (08-02-19 @ 04:50) (92% - 100%)  Wt(kg): --    Exam:  intact except for vision, Has light perception in R eye, and finger counting in L eye.

## 2019-08-02 NOTE — PROGRESS NOTE ADULT - SUBJECTIVE AND OBJECTIVE BOX
Patient is a 34y old  Male who presents with a chief complaint of Acute left eye visual acuity loss concern for pseudotumor cerebri and vision loss (02 Aug 2019 16:27)      SUBJECTIVE / OVERNIGHT EVENTS:    Events noted.  CONSTITUTIONAL: No fever,  or fatigue  RESPIRATORY: No cough, wheezing,  No shortness of breath  CARDIOVASCULAR: No chest pain, palpitations, dizziness, or leg swelling  GASTROINTESTINAL: No abdominal or epigastric pain. No nausea, vomiting.  NEUROLOGICAL: No headaches,     MEDICATIONS  (STANDING):  acetazolamide    Tablet 1000 milliGRAM(s) Oral two times a day  artificial tears (preservative free) Ophthalmic Solution 1 Drop(s) Both EYES four times a day  docusate sodium 100 milliGRAM(s) Oral three times a day  nystatin Powder 1 Application(s) Topical two times a day  polyethylene glycol 3350 17 Gram(s) Oral daily  senna 1 Tablet(s) Oral daily    MEDICATIONS  (PRN):  acetaminophen   Tablet .. 650 milliGRAM(s) Oral every 6 hours PRN Temp greater or equal to 38C (100.4F), Mild Pain (1 - 3)  artificial  tears Solution 1 Drop(s) Both EYES daily PRN Dry Eyes  oxyCODONE    IR 5 milliGRAM(s) Oral every 4 hours PRN Moderate Pain (4 - 6)  oxyCODONE    IR 10 milliGRAM(s) Oral every 4 hours PRN Severe Pain (7 - 10)        CAPILLARY BLOOD GLUCOSE        I&O's Summary    01 Aug 2019 07:01  -  02 Aug 2019 07:00  --------------------------------------------------------  IN: 700 mL / OUT: 0 mL / NET: 700 mL    02 Aug 2019 07:01  -  03 Aug 2019 01:50  --------------------------------------------------------  IN: 780 mL / OUT: 0 mL / NET: 780 mL        PHYSICAL EXAM:  GENERAL: NAD  NECK: Supple, No JVD  CHEST/LUNG: Clear to auscultation bilaterally; No wheezing.  HEART: Regular rate and rhythm; No murmurs, rubs, or gallops  ABDOMEN: Soft, Nontender, Nondistended; Bowel sounds present  EXTREMITIES:   No edema  NEUROLOGY: AAO X 3      LABS:                  CAPILLARY BLOOD GLUCOSE                    RADIOLOGY & ADDITIONAL TESTS:    Imaging Personally Reviewed:    Consultant(s) Notes Reviewed:      Care Discussed with Consultants/Other Providers:

## 2019-08-02 NOTE — PROGRESS NOTE ADULT - ASSESSMENT
· Assessment	  33 y/o R-handed [facility] with h/o hypothyroidism (off meds), self-reported LAZARO presenting with 5 days of painless severe blurred vision (Right >Left) and reduced acuity of the right eye with associated B/L perioral numbness and R. arm paresthesias.  CTV performed yesterday but was a poor quality study.      S/p  shunt:    Neurology/NeuroSx f/up noted.    LAZARO/Morbid obesity:  Pul f/up noted.  On BIPAP    PT

## 2019-08-03 LAB
ALBUMIN SERPL ELPH-MCNC: 3.3 G/DL — SIGNIFICANT CHANGE UP (ref 3.3–5)
ALP SERPL-CCNC: 89 U/L — SIGNIFICANT CHANGE UP (ref 40–120)
ALT FLD-CCNC: 33 U/L — SIGNIFICANT CHANGE UP (ref 10–45)
ANION GAP SERPL CALC-SCNC: 15 MMOL/L — SIGNIFICANT CHANGE UP (ref 5–17)
AST SERPL-CCNC: 27 U/L — SIGNIFICANT CHANGE UP (ref 10–40)
BILIRUB SERPL-MCNC: 1.1 MG/DL — SIGNIFICANT CHANGE UP (ref 0.2–1.2)
BUN SERPL-MCNC: 14 MG/DL — SIGNIFICANT CHANGE UP (ref 7–23)
CALCIUM SERPL-MCNC: 9.2 MG/DL — SIGNIFICANT CHANGE UP (ref 8.4–10.5)
CHLORIDE SERPL-SCNC: 103 MMOL/L — SIGNIFICANT CHANGE UP (ref 96–108)
CO2 SERPL-SCNC: 23 MMOL/L — SIGNIFICANT CHANGE UP (ref 22–31)
CREAT SERPL-MCNC: 0.63 MG/DL — SIGNIFICANT CHANGE UP (ref 0.5–1.3)
GLUCOSE SERPL-MCNC: 80 MG/DL — SIGNIFICANT CHANGE UP (ref 70–99)
HCT VFR BLD CALC: 49.4 % — SIGNIFICANT CHANGE UP (ref 39–50)
HGB BLD-MCNC: 14.4 G/DL — SIGNIFICANT CHANGE UP (ref 13–17)
MCHC RBC-ENTMCNC: 22.4 PG — LOW (ref 27–34)
MCHC RBC-ENTMCNC: 29.2 GM/DL — LOW (ref 32–36)
MCV RBC AUTO: 76.8 FL — LOW (ref 80–100)
PLATELET # BLD AUTO: 187 K/UL — SIGNIFICANT CHANGE UP (ref 150–400)
POTASSIUM SERPL-MCNC: 3.7 MMOL/L — SIGNIFICANT CHANGE UP (ref 3.5–5.3)
POTASSIUM SERPL-SCNC: 3.7 MMOL/L — SIGNIFICANT CHANGE UP (ref 3.5–5.3)
PROT SERPL-MCNC: 7.1 G/DL — SIGNIFICANT CHANGE UP (ref 6–8.3)
RBC # BLD: 6.43 M/UL — HIGH (ref 4.2–5.8)
RBC # FLD: 20 % — HIGH (ref 10.3–14.5)
SODIUM SERPL-SCNC: 141 MMOL/L — SIGNIFICANT CHANGE UP (ref 135–145)
WBC # BLD: 5.8 K/UL — SIGNIFICANT CHANGE UP (ref 3.8–10.5)
WBC # FLD AUTO: 5.8 K/UL — SIGNIFICANT CHANGE UP (ref 3.8–10.5)

## 2019-08-03 PROCEDURE — 70450 CT HEAD/BRAIN W/O DYE: CPT | Mod: 26

## 2019-08-03 RX ADMIN — Medication 1 DROP(S): at 17:21

## 2019-08-03 RX ADMIN — OXYCODONE HYDROCHLORIDE 10 MILLIGRAM(S): 5 TABLET ORAL at 22:03

## 2019-08-03 RX ADMIN — NYSTATIN CREAM 1 APPLICATION(S): 100000 CREAM TOPICAL at 17:22

## 2019-08-03 RX ADMIN — Medication 1 DROP(S): at 22:00

## 2019-08-03 RX ADMIN — Medication 100 MILLIGRAM(S): at 05:23

## 2019-08-03 RX ADMIN — OXYCODONE HYDROCHLORIDE 10 MILLIGRAM(S): 5 TABLET ORAL at 22:33

## 2019-08-03 RX ADMIN — Medication 1 DROP(S): at 05:23

## 2019-08-03 RX ADMIN — ACETAZOLAMIDE 1000 MILLIGRAM(S): 250 TABLET ORAL at 05:23

## 2019-08-03 RX ADMIN — ACETAZOLAMIDE 1000 MILLIGRAM(S): 250 TABLET ORAL at 17:21

## 2019-08-03 RX ADMIN — NYSTATIN CREAM 1 APPLICATION(S): 100000 CREAM TOPICAL at 05:24

## 2019-08-03 RX ADMIN — Medication 1 DROP(S): at 12:14

## 2019-08-03 NOTE — PROGRESS NOTE ADULT - ASSESSMENT
· Assessment	  33 y/o R-handed [facility] M with h/o hypothyroidism (off meds), self-reported LAZARO presenting with 5 days of painless severe blurred vision (Right >Left) and reduced acuity of the right eye with associated B/L perioral numbness and R. arm paresthesias.  CTV performed yesterday but was a poor quality study.     Red hue both eyes-   Opthalmology f/up noted from 8/2/19  Will get CT head to r/o neurologic cause.  Pt feels that symptoms are improving      LAZARO/Morbid obesity:  Pul f/up noted.  On BIPAP    Dw family.

## 2019-08-03 NOTE — PROGRESS NOTE ADULT - SUBJECTIVE AND OBJECTIVE BOX
Patient is a 34y old  Male who presents with a chief complaint of Acute left eye visual acuity loss concern for pseudotumor cerebri and vision loss (02 Aug 2019 16:49)      SUBJECTIVE / OVERNIGHT EVENTS:    Events noted.  Red hue in both eyes - improved       MEDICATIONS  (STANDING):  acetazolamide    Tablet 1000 milliGRAM(s) Oral two times a day  artificial tears (preservative free) Ophthalmic Solution 1 Drop(s) Both EYES four times a day  docusate sodium 100 milliGRAM(s) Oral three times a day  nystatin Powder 1 Application(s) Topical two times a day  polyethylene glycol 3350 17 Gram(s) Oral daily  senna 1 Tablet(s) Oral daily    MEDICATIONS  (PRN):  acetaminophen   Tablet .. 650 milliGRAM(s) Oral every 6 hours PRN Temp greater or equal to 38C (100.4F), Mild Pain (1 - 3)  artificial  tears Solution 1 Drop(s) Both EYES daily PRN Dry Eyes  oxyCODONE    IR 5 milliGRAM(s) Oral every 4 hours PRN Moderate Pain (4 - 6)  oxyCODONE    IR 10 milliGRAM(s) Oral every 4 hours PRN Severe Pain (7 - 10)        CAPILLARY BLOOD GLUCOSE        I&O's Summary    02 Aug 2019 07:01  -  03 Aug 2019 07:00  --------------------------------------------------------  IN: 780 mL / OUT: 0 mL / NET: 780 mL    03 Aug 2019 07:01  -  03 Aug 2019 18:19  --------------------------------------------------------  IN: 240 mL / OUT: 400 mL / NET: -160 mL        PHYSICAL EXAM:    NECK: Supple, No JVD  CHEST/LUNG: Clear to auscultation bilaterally; No wheezing.  HEART: Regular rate and rhythm; No murmurs, rubs, or gallops  ABDOMEN: Soft, Nontender, Nondistended; Bowel sounds present  EXTREMITIES:   No edema  NEUROLOGY: AAO       LABS:                        14.4   5.8   )-----------( 187      ( 03 Aug 2019 06:36 )             49.4     08-03    141  |  103  |  14  ----------------------------<  80  3.7   |  23  |  0.63    Ca    9.2      03 Aug 2019 06:36    TPro  7.1  /  Alb  3.3  /  TBili  1.1  /  DBili  x   /  AST  27  /  ALT  33  /  AlkPhos  89  08-03            CAPILLARY BLOOD GLUCOSE                    RADIOLOGY & ADDITIONAL TESTS:    Imaging Personally Reviewed:    Consultant(s) Notes Reviewed:      Care Discussed with Consultants/Other Providers:

## 2019-08-04 RX ADMIN — OXYCODONE HYDROCHLORIDE 5 MILLIGRAM(S): 5 TABLET ORAL at 09:16

## 2019-08-04 RX ADMIN — OXYCODONE HYDROCHLORIDE 5 MILLIGRAM(S): 5 TABLET ORAL at 10:00

## 2019-08-04 RX ADMIN — NYSTATIN CREAM 1 APPLICATION(S): 100000 CREAM TOPICAL at 05:48

## 2019-08-04 RX ADMIN — OXYCODONE HYDROCHLORIDE 10 MILLIGRAM(S): 5 TABLET ORAL at 23:29

## 2019-08-04 RX ADMIN — NYSTATIN CREAM 1 APPLICATION(S): 100000 CREAM TOPICAL at 18:09

## 2019-08-04 RX ADMIN — Medication 1 DROP(S): at 18:09

## 2019-08-04 RX ADMIN — Medication 1 DROP(S): at 11:35

## 2019-08-04 RX ADMIN — Medication 1 DROP(S): at 23:01

## 2019-08-04 RX ADMIN — ACETAZOLAMIDE 1000 MILLIGRAM(S): 250 TABLET ORAL at 05:48

## 2019-08-04 RX ADMIN — OXYCODONE HYDROCHLORIDE 10 MILLIGRAM(S): 5 TABLET ORAL at 22:59

## 2019-08-04 RX ADMIN — ACETAZOLAMIDE 1000 MILLIGRAM(S): 250 TABLET ORAL at 18:10

## 2019-08-04 NOTE — PROGRESS NOTE ADULT - NSHPATTENDINGPLANDISCUSS_GEN_ALL_CORE
patient, neurology team
patient and neurology team
patient,
patient, neurology team
primary team
team
patient, neurology team
patient and neurology team.
patient, neurology team
patient, neurology team
patient

## 2019-08-04 NOTE — PROGRESS NOTE ADULT - SUBJECTIVE AND OBJECTIVE BOX
Patient seen and examined at bedside.    --Anticoagulation--    T(C): 36.7 (08-02-19 @ 04:50), Max: 36.9 (08-02-19 @ 00:09)  HR: 80 (08-02-19 @ 04:50) (80 - 101)  BP: 146/96 (08-02-19 @ 04:50) (121/80 - 146/96)  RR: 18 (08-02-19 @ 04:50) (18 - 19)  SpO2: 99% (08-02-19 @ 04:50) (92% - 100%)  Wt(kg): --    Exam:  intact except for vision, Has light perception in R eye, and finger counting in L eye. Now has red tinted vision

## 2019-08-04 NOTE — PROGRESS NOTE ADULT - ASSESSMENT
· Assessment	  35 y/o R-handed E[employer]orian M with h/o hypothyroidism (off meds), self-reported LAZARO presenting with 5 days of painless severe blurred vision (Right >Left) and reduced acuity of the right eye with associated B/L perioral numbness and R. arm paresthesias.  CTV performed yesterday but was a poor quality study.     Red hue both eyes-   Opthalmology f/up noted from 8/2/19  Will get CT head to r/o neurologic cause.  Pt feels that symptoms are improving      LAZARO/Morbid obesity:  Pul f/up noted.  On BIPAP    Dw family.

## 2019-08-04 NOTE — PROGRESS NOTE ADULT - SUBJECTIVE AND OBJECTIVE BOX
SUBJECTIVE:   Patient seen and examined at bedside today for reported B/L arm numbness and tingling.  He notes it started yesterday and is intermittent but frequently there.  The paresthesias are exacerbated by sitting laying and extending his neck.  He denies any weakness and notes no symptoms in the legs.  Admits to back pain from mid-cervical region down to mid-thoracic region.  Reports no prior episodes similar in nature.  Notes good  strength.  Reports his visual acuity is improving, but he is now continuously seeing everything in both eyes in a red tint, similar to several days prior but now persistent.  Denies urinary or bowel incontinence.  Notes no other new neurologic symptoms.  Reports recovering well from procedure.     MEDICATIONS  (STANDING):  acetazolamide    Tablet 1000 milliGRAM(s) Oral two times a day  artificial tears (preservative free) Ophthalmic Solution 1 Drop(s) Both EYES four times a day  docusate sodium 100 milliGRAM(s) Oral three times a day  nystatin Powder 1 Application(s) Topical two times a day  polyethylene glycol 3350 17 Gram(s) Oral daily  senna 1 Tablet(s) Oral daily    MEDICATIONS  (PRN):  acetaminophen   Tablet .. 650 milliGRAM(s) Oral every 6 hours PRN Temp greater or equal to 38C (100.4F), Mild Pain (1 - 3)  artificial  tears Solution 1 Drop(s) Both EYES daily PRN Dry Eyes  oxyCODONE    IR 5 milliGRAM(s) Oral every 4 hours PRN Moderate Pain (4 - 6)  oxyCODONE    IR 10 milliGRAM(s) Oral every 4 hours PRN Severe Pain (7 - 10)    ALLERGIES/INTOLERANCES: No Known Allergies    Vital Signs Last 24 Hrs  T(C): 36.5 (03 Aug 2019 23:32), Max: 36.7 (03 Aug 2019 15:26)  T(F): 97.7 (03 Aug 2019 23:32), Max: 98.1 (03 Aug 2019 22:00)  HR: 76 (04 Aug 2019 00:31) (76 - 101)  BP: 112/73 (03 Aug 2019 23:32) (112/73 - 141/71)  BP(mean): --  RR: 18 (03 Aug 2019 23:32) (18 - 19)  SpO2: 95% (04 Aug 2019 00:31) (92% - 100%)    General:  •	GENERAL APPEARANCE: Morbidly obese male, appears stated age standing up in room in NAD, conversational  •	HEENT: Head with right frontal scar s/p procedure healing well with staples in place.  No streaking, tenderness or signs of infection.  Incision is c/d/i.   •	NEUROLOGIC EXAMINATION:        o	MENTAL STATUS & HIGHER CORTICAL FUNCTION: The patient is oriented to self, situation, time and place.  Significantly more alert than on presentation.          o	LANGUAGE: Speech is fluent with no dysarthria and no aphasia.          o	CRANIAL NERVES:              I- No anosmia  	II - Pupils are briskly reactive and symmetric.  PERRLA   Left 3 mm-->2mm, R 5mm-->3 mm sluggish.  Visual fields worse with Bi-temporal superior and inferior hemianopsia.  Visual acuity 20/200 right eye, 20/50 left eye. Having significant difficulty in perceiving shapes and lights at this time.  Can see colors, and gross objects, but having difficulty in both eyes with borders. Improved since yesterday, confrontation in temporal fields better picked up today        	III, IV, and VI - R. eye exotropia, extraocular movements are full with normal pursuit and saccades.  Left gaze left-beating nystagmus. Right cranial nerve sixth palsy  	V - Light touch is intact in all three divisions. Motor V is intact.    	VII - No facial asymmetry or weakness.   	IX - Palate rises symmetrically in the midline. XI - Shoulder shrug is normal. XII - Tongue protrudes in the midline.        o	MOTOR EXAMINATION: Normal bulk in all four extremities. Muscle strength is 5/5 in all four extremities.         o	SENSORY EXAMINATION: Sensory examination is grossly intact to pain and light touch in all 4 extremities.        o	COORDINATION: Fine coordinated movements in tact       LABORATORY:  CBC                       15.1   8.9   )-----------( 170      ( 30 Jul 2019 18:13 )             51.3     Chem 07-30    143  |  104  |  11  ----------------------------<  110<H>  3.5   |  25  |  0.67    Ca    9.2      30 Jul 2019 18:13    TPro  7.9  /  Alb  3.9  /  TBili  1.3<H>  /  DBili  x   /  AST  29  /  ALT  31  /  AlkPhos  95  07-30    LFTs LIVER FUNCTIONS - ( 30 Jul 2019 18:13 )  Alb: 3.9 g/dL / Pro: 7.9 g/dL / ALK PHOS: 95 U/L / ALT: 31 U/L / AST: 29 U/L / GGT: x           Radiology (XR, CT, MR, U/S, TTE/REN):  < from: CT Head No Cont (07.30.19 @ 22:01) >  IMPRESSION:    There has been interval placement of a right frontal approach shunt   catheter, with tip at the confluence of the foramina of [place]. The right   lateral ventricle has decreased in size and is now slitlike. The   remainder of the ventricular system is unchanged.    < end of copied text > SUBJECTIVE:   Patient seen and examined at bedside today for reported B/L arm numbness and tingling.  He notes it started yesterday and is intermittent but frequently there.  The paresthesias are exacerbated by sitting laying and extending his neck.  He denies any weakness and notes no symptoms in the legs.  Admits to back pain from mid-cervical region down to mid-thoracic region.  Reports no prior episodes similar in nature.  Notes good  strength.  Reports his visual acuity is improving, but he is now continuously seeing everything in both eyes in a red tint, similar to several days prior but now persistent.  Denies urinary or bowel incontinence.  Notes no other new neurologic symptoms.  Reports recovering well from procedure.     MEDICATIONS  (STANDING):  acetazolamide    Tablet 1000 milliGRAM(s) Oral two times a day  artificial tears (preservative free) Ophthalmic Solution 1 Drop(s) Both EYES four times a day  docusate sodium 100 milliGRAM(s) Oral three times a day  nystatin Powder 1 Application(s) Topical two times a day  polyethylene glycol 3350 17 Gram(s) Oral daily  senna 1 Tablet(s) Oral daily    MEDICATIONS  (PRN):  acetaminophen   Tablet .. 650 milliGRAM(s) Oral every 6 hours PRN Temp greater or equal to 38C (100.4F), Mild Pain (1 - 3)  artificial  tears Solution 1 Drop(s) Both EYES daily PRN Dry Eyes  oxyCODONE    IR 5 milliGRAM(s) Oral every 4 hours PRN Moderate Pain (4 - 6)  oxyCODONE    IR 10 milliGRAM(s) Oral every 4 hours PRN Severe Pain (7 - 10)    ALLERGIES/INTOLERANCES: No Known Allergies    Vital Signs Last 24 Hrs  T(C): 36.5 (03 Aug 2019 23:32), Max: 36.7 (03 Aug 2019 15:26)  T(F): 97.7 (03 Aug 2019 23:32), Max: 98.1 (03 Aug 2019 22:00)  HR: 76 (04 Aug 2019 00:31) (76 - 101)  BP: 112/73 (03 Aug 2019 23:32) (112/73 - 141/71)  BP(mean): --  RR: 18 (03 Aug 2019 23:32) (18 - 19)  SpO2: 95% (04 Aug 2019 00:31) (92% - 100%)    General:  •	GENERAL APPEARANCE: Morbidly obese male, appears stated age standing up in room in NAD, conversational  •	HEENT: Head with right frontal scar s/p procedure healing well with staples in place.  No streaking, tenderness or signs of infection.  Incision is c/d/i.   •	NEUROLOGIC EXAMINATION:        o	MENTAL STATUS & HIGHER CORTICAL FUNCTION: The patient is oriented to self, situation, time and place.  Significantly more alert than on presentation.          o	LANGUAGE: Speech is fluent with no dysarthria and no aphasia.          o	CRANIAL NERVES:              I- No anosmia  	II - Pupils are briskly reactive and symmetric.  PERRLA   Left 3 mm-->2mm, R 5mm-->3 mm sluggish.  Visual fields worse with Bi-temporal superior and inferior hemianopsia.  Visual acuity 20/200 right eye, 20/50 left eye. Having significant difficulty in perceiving shapes and lights at this time.  Can see colors, and gross objects, but having difficulty in both eyes with borders. Improved since yesterday, confrontation in temporal fields better picked up today        	III, IV, and VI - R. eye exotropia, extraocular movements are full with normal pursuit and saccades.  Left gaze left-beating nystagmus. Right cranial nerve sixth palsy  	V - Light touch is intact in all three divisions. Motor V is intact.    	VII - No facial asymmetry or weakness.   	IX - Palate rises symmetrically in the midline. XI - Shoulder shrug is normal. XII - Tongue protrudes in the midline.        o	MOTOR EXAMINATION: Normal bulk in all four extremities. Muscle strength is 5/5 in all four extremities.         o	SENSORY EXAMINATION: Sensory examination is grossly intact to pain and light touch in all 4 extremities.        o	COORDINATION: Fine coordinated movements in tact   LABORATORY:                        14.4   5.8   )-----------( 187      ( 03 Aug 2019 06:36 )             49.4     Chem 07-30  08-03    141  |  103  |  14  ----------------------------<  80  3.7   |  23  |  0.63    Ca    9.2      03 Aug 2019 06:36    TPro  7.1  /  Alb  3.3  /  TBili  1.1  /  DBili  x   /  AST  27  /  ALT  33  /  AlkPhos  89  08-03    Radiology (XR, CT, MR, U/S, TTE/REN):    < from: CT Head No Cont (08.03.19 @ 19:31) >  FINDINGS:    Right frontal approach  shunt catheter terminates in the foramen of   unremarkable. The ventricular system is unchanged with slitlike   appearance of the right lateral ventricle. Mild procedural related   hypodensity within the right frontal lobe along the tract of the shunt   catheter.    There is no acute intracranial mass-effect, hemorrhage, midline shift, or   abnormal extra-axial fluid collection. Basal cisterns are patent. Bony   thinning along the anteroinferior sellar floor appears unchanged.    Paranasal sinuses andmastoid air cells are clear. Postprocedural changes   of the right frontal scalp related to recent  shunt catheter placement.   No calvarial fracture.    IMPRESSION:   Stable appearance of the lateral ventricles with  shunt catheter in   place.  No acute intracranial bleeding, mass effect, or shift.    < end of copied text > SUBJECTIVE:   Patient seen and examined at bedside today for reported B/L arm numbness and tingling.  He notes it started yesterday and is intermittent but frequently there.  The paresthesias are exacerbated by sitting laying and extending his neck.  He denies any weakness and notes no symptoms in the legs.  Admits to back pain from mid-cervical region down to mid-thoracic region.  Reports no prior episodes similar in nature.  Notes good  strength.  Reports his visual acuity is improving, but he is now continuously seeing everything in both eyes in a red tint, similar to several days prior but now persistent.  Denies urinary or bowel incontinence.  Notes no other new neurologic symptoms.  Reports recovering well from procedure.     MEDICATIONS  (STANDING):  acetazolamide    Tablet 1000 milliGRAM(s) Oral two times a day  artificial tears (preservative free) Ophthalmic Solution 1 Drop(s) Both EYES four times a day  docusate sodium 100 milliGRAM(s) Oral three times a day  nystatin Powder 1 Application(s) Topical two times a day  polyethylene glycol 3350 17 Gram(s) Oral daily  senna 1 Tablet(s) Oral daily    MEDICATIONS  (PRN):  acetaminophen   Tablet .. 650 milliGRAM(s) Oral every 6 hours PRN Temp greater or equal to 38C (100.4F), Mild Pain (1 - 3)  artificial  tears Solution 1 Drop(s) Both EYES daily PRN Dry Eyes  oxyCODONE    IR 5 milliGRAM(s) Oral every 4 hours PRN Moderate Pain (4 - 6)  oxyCODONE    IR 10 milliGRAM(s) Oral every 4 hours PRN Severe Pain (7 - 10)    ALLERGIES/INTOLERANCES: No Known Allergies    Vital Signs Last 24 Hrs  T(C): 36.5 (03 Aug 2019 23:32), Max: 36.7 (03 Aug 2019 15:26)  T(F): 97.7 (03 Aug 2019 23:32), Max: 98.1 (03 Aug 2019 22:00)  HR: 76 (04 Aug 2019 00:31) (76 - 101)  BP: 112/73 (03 Aug 2019 23:32) (112/73 - 141/71)  BP(mean): --  RR: 18 (03 Aug 2019 23:32) (18 - 19)  SpO2: 95% (04 Aug 2019 00:31) (92% - 100%)    General:  •	GENERAL APPEARANCE: Morbidly obese male, appears stated age standing up in room in NAD, conversational  •	HEENT: Head with right frontal scar s/p procedure healing well with staples in place.  No streaking, tenderness or signs of infection.  Incision is c/d/i.   •	NEUROLOGIC EXAMINATION:        o	MENTAL STATUS & HIGHER CORTICAL FUNCTION: The patient is oriented to self, situation, time and place.  Significantly more alert than on presentation.          o	LANGUAGE: Speech is fluent with no dysarthria and no aphasia.          o	CRANIAL NERVES:              I- No anosmia  	II - Pupils are briskly reactive and symmetric.  PERRLA   Left 3 mm-->2mm, R 5mm-->3 mm sluggish.  Visual fields worse with Bi-temporal superior and inferior hemianopsia.  Visual acuity 20/200 right eye, 20/50 left eye. Having significant difficulty in perceiving shapes and lights at this time.  Can see colors, and gross objects, but having difficulty in both eyes with borders. Improved since yesterday, confrontation in temporal fields better picked up today        	III, IV, and VI - R. eye exotropia, extraocular movements are full with normal pursuit and saccades.  Left gaze left-beating nystagmus. Right cranial nerve sixth palsy  	V - Light touch is intact in all three divisions. Motor V is intact.    	VII - No facial asymmetry or weakness.   	IX - Palate rises symmetrically in the midline. XI - Shoulder shrug is normal. XII - Tongue protrudes in the midline.        o	MOTOR EXAMINATION: Normal bulk in all four extremities. Muscle strength is 5/5 in all four extremities.         o	SENSORY EXAMINATION: Sensory examination is grossly intact to pain and light touch in all 4 extremities.        o	COORDINATION: Fine coordinated movements in tact   LABORATORY:                        14.4   5.8   )-----------( 187      ( 03 Aug 2019 06:36 )             49.4     Chem 07-30  08-03    141  |  103  |  14  ----------------------------<  80  3.7   |  23  |  0.63    Ca    9.2      03 Aug 2019 06:36    TPro  7.1  /  Alb  3.3  /  TBili  1.1  /  DBili  x   /  AST  27  /  ALT  33  /  AlkPhos  89  08-03    Radiology (XR, CT, MR, U/S, TTE/REN):    < from: CT Head No Cont (08.03.19 @ 19:31) >  FINDINGS:    Right frontal approach  shunt catheter terminates in the foramen of   unremarkable. The ventricular system is unchanged with slitlike   appearance of the right lateral ventricle. Mild procedural related   hypodensity within the right frontal lobe along the tract of the shunt   catheter.    There is no acute intracranial mass-effect, hemorrhage, midline shift, or   abnormal extra-axial fluid collection. Basal cisterns are patent. Bony   thinning along the anteroinferior sellar floor appears unchanged.    Paranasal sinuses andmastoid air cells are clear. Postprocedural changes   of the right frontal scalp related to recent  shunt catheter placement.   No calvarial fracture.    IMPRESSION:   Stable appearance of the lateral ventricles with  shunt catheter in   place.  No acute intracranial bleeding, mass effect, or shift.

## 2019-08-04 NOTE — PROGRESS NOTE ADULT - ASSESSMENT
35 y/o R-handed [place] M with h/o hypothyroidism (off meds), self-reported LAZARO presenting with 5 days of painless severe blurred vision (Right >Left) and reduced acuity of the right eye with associated B/L perioral numbness and R. arm paresthesias. S/P  shunt placement POD 1    1) Increased intracranial pressure- Stabilizing symptoms.   Secondary to IIH.  B/L Papilledema, worsening vision.   Ophtho on board - notes worsening vision as of yesterdays note, confirmed by patient, will be asked to see patient again to assess extent of papilledema  Neurosurgery consulted, s/p  shunt placement  Keep head of bed elevated to 30 degrees.  Keep PT up as supine and Trendelenburg positions will worsen condition.   Maintain Acetazolamide to 1000 mg BID in interim    2) Hypercoagulable state  Hypercoag work up notable for anti-thrombin III,Protein C, coag panel prolonged  Hematology consulted and on board    3) A1C 6.1  Clear Liquid diet currently  Consider transfer to medicine service given morbid obesity and sequela of obesity hypoventilation syndrome      Jose Dahl - PGY 2 35 y/o R-handed [place] M with h/o hypothyroidism (off meds), self-reported LAZARO presenting with 5 days of painless severe blurred vision (Right >Left) and reduced acuity of the right eye with associated B/L perioral numbness and R. arm paresthesias. S/P  shunt placement.    Now having worsening episodes of B/L Red vision tint.  Does have improved acuity in vision with L. eye 20/30 and R. eye 20/100 but has differing visual field cuts from before (now has R. nasal field cut).  Reporting B/L numbness and tingling down his arms x 2 days.  Worsened with head extension, sitting and laying. Improved with standing.  Reports cervical and thoracic back pain.      1) Increased intracranial pressure- Stabilizing symptoms but red color tint concerning.   Likely still manifestation of IIH.  B/L Papilledema still present, but improved.    Ophtho and NSG on board - F/U with recs  Keep head of bed elevated to 30 degrees.  Keep PT up as supine and Trendelenburg positions will worsen condition.   Reduce Acetazolamide to 500 mg BID as this could be causing his B/L paresthesias, numbness/tingling.     2) B/L neck and arm paresthesias.   Likely positional in nature as well as side effect of high dose acetazolamide.   Reduce acetazolamide from 1000 mg BID to 500 mg BID now that patient is s/p VPS.   No need for imaging at this time of neck.     3) Hypercoagulable state  Hypercoag work up notable for anti-thrombin III,Protein C, coag panel prolonged  Hematology consulted and on board    4) A1C 6.1  Outpatient endocrine follow up for pre-diabetes and hypothyroidism.     Zane Anthony,   PGY-2 Neurology Service

## 2019-08-04 NOTE — PROGRESS NOTE ADULT - ASSESSMENT
34M s/p  shunt  -Certas at 4  -Optho eval for red tinted vision, please f/u reccomendations.   -cont diamox  - dc staples on day 14

## 2019-08-05 LAB
JAK2 EXON 12 MUTATION: NEGATIVE — SIGNIFICANT CHANGE UP
JAK2 P.V617F BLD/T QL: NEGATIVE — SIGNIFICANT CHANGE UP

## 2019-08-05 PROCEDURE — 93306 TTE W/DOPPLER COMPLETE: CPT | Mod: 26

## 2019-08-05 RX ADMIN — Medication 1 DROP(S): at 17:08

## 2019-08-05 RX ADMIN — Medication 100 MILLIGRAM(S): at 11:23

## 2019-08-05 RX ADMIN — Medication 650 MILLIGRAM(S): at 11:24

## 2019-08-05 RX ADMIN — OXYCODONE HYDROCHLORIDE 5 MILLIGRAM(S): 5 TABLET ORAL at 23:41

## 2019-08-05 RX ADMIN — OXYCODONE HYDROCHLORIDE 5 MILLIGRAM(S): 5 TABLET ORAL at 22:53

## 2019-08-05 RX ADMIN — ACETAZOLAMIDE 1000 MILLIGRAM(S): 250 TABLET ORAL at 05:22

## 2019-08-05 RX ADMIN — POLYETHYLENE GLYCOL 3350 17 GRAM(S): 17 POWDER, FOR SOLUTION ORAL at 11:23

## 2019-08-05 RX ADMIN — Medication 1 DROP(S): at 11:23

## 2019-08-05 RX ADMIN — ACETAZOLAMIDE 1000 MILLIGRAM(S): 250 TABLET ORAL at 17:08

## 2019-08-05 NOTE — PROGRESS NOTE ADULT - ASSESSMENT
· Assessment	  35 [facility]/o R[facility]handed [facility] with h/o hypothyroidism (off meds), self-reported LAZARO presenting with 5 days of painless severe blurred vision (Right >Left) and reduced acuity of the right eye with associated B/L perioral numbness and R. arm paresthesias.  CTV performed yesterday but was a poor quality study.     Red hue both eyes-   Dw opthal resident on 8/3. Adv that there is no abnormality from eye.   NeuroSx f/up noted.    LAZARO/Morbid obesity:  Pul f/up noted.  On BIPAP

## 2019-08-05 NOTE — PROGRESS NOTE ADULT - SUBJECTIVE AND OBJECTIVE BOX
Patient seen and examined at bedside.    --Anticoagulation--    T(C): 36.7 (08-05-19 @ 05:19), Max: 37.1 (08-04-19 @ 11:52)  HR: 80 (08-05-19 @ 05:19) (61 - 90)  BP: 146/82 (08-05-19 @ 05:19) (98/56 - 146/82)  RR: 18 (08-05-19 @ 05:19) (18 - 22)  SpO2: 100% (08-05-19 @ 05:19) (94% - 100%)  Wt(kg): --    Exam:  intact except for vision, Has light perception in R eye, and finger counting in L eye. Now has red tinted vision

## 2019-08-06 LAB
HCT VFR BLD CALC: 55.7 % — HIGH (ref 39–50)
HGB BLD-MCNC: 15 G/DL — SIGNIFICANT CHANGE UP (ref 13–17)
MCHC RBC-ENTMCNC: 21.3 PG — LOW (ref 27–34)
MCHC RBC-ENTMCNC: 26.9 GM/DL — LOW (ref 32–36)
MCV RBC AUTO: 79 FL — LOW (ref 80–100)
PLATELET # BLD AUTO: 266 K/UL — SIGNIFICANT CHANGE UP (ref 150–400)
RBC # BLD: 7.05 M/UL — HIGH (ref 4.2–5.8)
RBC # FLD: 21.5 % — HIGH (ref 10.3–14.5)
WBC # BLD: 6.28 K/UL — SIGNIFICANT CHANGE UP (ref 3.8–10.5)
WBC # FLD AUTO: 6.28 K/UL — SIGNIFICANT CHANGE UP (ref 3.8–10.5)

## 2019-08-06 PROCEDURE — 99232 SBSQ HOSP IP/OBS MODERATE 35: CPT

## 2019-08-06 PROCEDURE — 70450 CT HEAD/BRAIN W/O DYE: CPT | Mod: 26

## 2019-08-06 RX ADMIN — ACETAZOLAMIDE 1000 MILLIGRAM(S): 250 TABLET ORAL at 05:49

## 2019-08-06 RX ADMIN — ACETAZOLAMIDE 1000 MILLIGRAM(S): 250 TABLET ORAL at 17:46

## 2019-08-06 RX ADMIN — Medication 1 DROP(S): at 22:05

## 2019-08-06 RX ADMIN — Medication 1 DROP(S): at 11:22

## 2019-08-06 RX ADMIN — OXYCODONE HYDROCHLORIDE 5 MILLIGRAM(S): 5 TABLET ORAL at 21:30

## 2019-08-06 RX ADMIN — NYSTATIN CREAM 1 APPLICATION(S): 100000 CREAM TOPICAL at 05:49

## 2019-08-06 RX ADMIN — Medication 100 MILLIGRAM(S): at 05:49

## 2019-08-06 RX ADMIN — OXYCODONE HYDROCHLORIDE 5 MILLIGRAM(S): 5 TABLET ORAL at 20:50

## 2019-08-06 RX ADMIN — Medication 1 DROP(S): at 05:50

## 2019-08-06 RX ADMIN — Medication 1 DROP(S): at 17:46

## 2019-08-06 NOTE — PROGRESS NOTE ADULT - SUBJECTIVE AND OBJECTIVE BOX
Patient is a 34y old  Male who presents with a chief complaint of Acute left eye visual acuity loss concern for pseudotumor cerebri and vision loss (02 Aug 2019 16:49)      SUBJECTIVE / OVERNIGHT EVENTS:    Events noted.  Red hue in both eyes - still present      MEDICATIONS  (STANDING):  acetazolamide    Tablet 1000 milliGRAM(s) Oral two times a day  artificial tears (preservative free) Ophthalmic Solution 1 Drop(s) Both EYES four times a day  docusate sodium 100 milliGRAM(s) Oral three times a day  nystatin Powder 1 Application(s) Topical two times a day  polyethylene glycol 3350 17 Gram(s) Oral daily  senna 1 Tablet(s) Oral daily    MEDICATIONS  (PRN):  acetaminophen   Tablet .. 650 milliGRAM(s) Oral every 6 hours PRN Temp greater or equal to 38C (100.4F), Mild Pain (1 - 3)  artificial  tears Solution 1 Drop(s) Both EYES daily PRN Dry Eyes  oxyCODONE    IR 5 milliGRAM(s) Oral every 4 hours PRN Moderate Pain (4 - 6)  oxyCODONE    IR 10 milliGRAM(s) Oral every 4 hours PRN Severe Pain (7 - 10)        CAPILLARY BLOOD GLUCOSE        I&O's Summary    02 Aug 2019 07:01  -  03 Aug 2019 07:00  --------------------------------------------------------  IN: 780 mL / OUT: 0 mL / NET: 780 mL    03 Aug 2019 07:01  -  03 Aug 2019 18:19  --------------------------------------------------------  IN: 240 mL / OUT: 400 mL / NET: -160 mL        PHYSICAL EXAM:    NECK: Supple, No JVD  CHEST/LUNG: Clear to auscultation bilaterally; No wheezing.  HEART: Regular rate and rhythm; No murmurs, rubs, or gallops  ABDOMEN: Soft, Nontender, Nondistended; Bowel sounds present  EXTREMITIES:   No edema  NEUROLOGY: AAO       LABS:                        14.4   5.8   )-----------( 187      ( 03 Aug 2019 06:36 )             49.4     08-03    141  |  103  |  14  ----------------------------<  80  3.7   |  23  |  0.63    Ca    9.2      03 Aug 2019 06:36    TPro  7.1  /  Alb  3.3  /  TBili  1.1  /  DBili  x   /  AST  27  /  ALT  33  /  AlkPhos  89  08-03            CAPILLARY BLOOD GLUCOSE                    RADIOLOGY & ADDITIONAL TESTS:    Imaging Personally Reviewed:    Consultant(s) Notes Reviewed:      Care Discussed with Consultants/Other Providers:

## 2019-08-06 NOTE — CHART NOTE - NSCHARTNOTEFT_GEN_A_CORE
Hematology -    Consulted for erythrocytosis. HGB/HCT now within normal limits. No primary hematologic cause of the erythrocytosis - JAK2 negative. Elevated HCT due to underlying LAZARO/morbid obesity. No further intervention required. Will sign off at this time, please page back with any questions or concerns.    Sharonda Ruiz MD  Hematology/Oncology Fellow  Pager: [phone]

## 2019-08-06 NOTE — PROGRESS NOTE ADULT - SUBJECTIVE AND OBJECTIVE BOX
[facility] Ophthalmology Follow Up Note    S: Pt seen at bedside, feels his vision is darkening in both eyes    Mood and Affect Appropriate ( x ),  Oriented to Time, Place, and Person x 3 ( x )    Ophthalmology Exam    Visual acuity (sc): 20/400 with eccentric fixation OD, 20/70 OS, PHNI OU  Pupils: R&R OU,  APD OD  Extraocular movements (EOMs): Full OU, no pain, no diplopia   Confrontational Visual Field (CVF):  OD HM all 4 quadrants and right/paracentrally , OS central island    Pen Light Exam (PLE)  External:  Flat OU  Lids/Lashes/Lacrimal Ducts: Flat Ou    Sclera/Conjunctiva:  W+Q OU  Cornea: Cl OU  Anterior Chamber: D+F OU   Iris:  Flat OU  Lens:  Cl OU      Assessment: 35 y/o M, LAZARO and morbid obesity, presents with 5 days of poor vision OD, headaches, pulsatile tinnitus, and tunnel vision intermittently.  Pt found to have elevated open pressure on LP, suggestive of IIH secondary to morbid obesity s/p  shunt 7/30/19 with mild improvement of vision, now with red hue in periphery, no vit heme on DFE friday, no RT/RH/ RD, started c/o darkening of vision and now with decreased visual acuity OS  Plan:  - non-ocular cause of reddish hue, can d/w neuro  - decreasing visual acuity/ vision changes may be 2/2 altered ICP (high or low) vs optic nerve ischemia. Neurology and Neurosx should discuss measuring ICP vs changing PO Diamox vs other options  - on Diamox per neuro  - weight loss stressed  - pt advised to let team know if he notices any changes in his vision  - guarded visual prognosis at this time, explained to patient  - case and findings initially d/w Dr. Adkins (neuro-ophthalmology attending), will need appointment upon discharge  - will continue to follow closely    Follow-Up:  Patient should follow up his/her ophthalmologist or in the [facility] Neuro-Ophthalmology Practice within 1 week of discharge.  [address] [place] [address] [phone]    s/d/w Dr. Suggs -attending [facility] Ophthalmology Follow Up Note    S: Pt seen at bedside, feels his vision is darkening in both eyes    Mood and Affect Appropriate ( x ),  Oriented to Time, Place, and Person x 3 ( x )    Ophthalmology Exam    Visual acuity (sc): 20/400 with eccentric fixation OD, 20/70 OS, PHNI OU  Pupils: R&R OU,  APD OD  Extraocular movements (EOMs): Full OU, no pain, no diplopia   Confrontational Visual Field (CVF):  OD HM all 4 quadrants and right/paracentrally , OS central island    Pen Light Exam (PLE)  External:  Flat OU  Lids/Lashes/Lacrimal Ducts: Flat Ou    Sclera/Conjunctiva:  W+Q OU  Cornea: Cl OU  Anterior Chamber: D+F OU   Iris:  Flat OU  Lens:  Cl OU      Assessment: 33 y/o M, LAZARO and morbid obesity, presents with 5 days of poor vision OD, headaches, pulsatile tinnitus, and tunnel vision intermittently.  Pt found to have elevated open pressure on LP, suggestive of IIH secondary to morbid obesity s/p  shunt 7/30/19 with mild improvement of vision, now with red hue in periphery, no vit heme on DFE friday, no RT/RH/ RD, started c/o darkening of vision and now with decreased visual acuity OS  Plan:  - decreasing visual acuity/ vision changes may be 2/2 altered ICP (high or low) vs optic nerve ischemia. Neurology and Neurosx should discuss measuring ICP vs changing PO Diamox vs other options  - on Diamox per neuro  - weight loss stressed  - pt advised to let team know if he notices any changes in his vision  - guarded visual prognosis at this time, explained to patient  - new findings discussed with Dr. Adkins, rec discussing ICP with neuro and neurosx  - will continue to follow closely  - findings and plan discussed with patient and neurosx and neurology    Follow-Up:  Patient should follow up his/her ophthalmologist or in the [facility] Neuro-Ophthalmology Practice within 1 week of discharge, sooner if symptoms worsen or change.  [address] [place] [address]  [phone]    s/d/w Dr. Suggs -attending

## 2019-08-06 NOTE — PROGRESS NOTE ADULT - ATTENDING COMMENTS
I have interviewed and examined the patient and reviewed the residents note including the history, exam, assessment, and plan.  I agree with the residents assessment and plan.    33 y/o M, LAZARO and morbid obesity, presents with 5 days of poor vision OD, headaches, pulsatile tinnitus, and tunnel vision intermittently.  Pt found to have elevated open pressure on LP, suggestive of IIH secondary to morbid obesity s/p  shunt 7/30/19 with mild improvement of vision, now with red hue in periphery, no vit heme on DFE friday, no RT/RH/ RD, started c/o darkening of vision and now with decreased visual acuity OS  Plan:  - decreasing visual acuity/ vision changes may be 2/2 altered ICP (high or low) vs optic nerve ischemia. Neurology and Neurosx should discuss measuring ICP vs changing PO Diamox vs other options  - on Diamox per neuro  - weight loss stressed  - pt advised to let team know if he notices any changes in his vision  - guarded visual prognosis at this time, explained to patient  - new findings discussed with Dr. Adkins, rec discussing ICP with neuro and neurosx  - will continue to follow closely  - findings and plan discussed with patient and neurosx and neurology    Follow-Up:  Patient should follow up his/her ophthalmologist or in the [facility] Neuro-Ophthalmology Practice within 1 week of discharge, sooner if symptoms worsen or change.  Tamanna Suggs MD

## 2019-08-06 NOTE — PROGRESS NOTE ADULT - ASSESSMENT
· Assessment	  35 [facility] with h/o hypothyroidism (off meds), self-reported LAZARO presenting with 5 days of painless severe blurred vision (Right >Left) and reduced acuity of the right eye with associated B/L perioral numbness and R. arm paresthesias.  CTV performed yesterday but was a poor quality study.     Red hue both eyes-   Dw opthal resident on 8/3. Adv that there is no abnormality from eye.   NeuroSx f/up noted.  Opthal f/up requested.     LAZARO/Morbid obesity:  Pul f/up noted.  On BIPAP

## 2019-08-07 ENCOUNTER — TRANSCRIPTION ENCOUNTER (OUTPATIENT)
Age: 34
End: 2019-08-07

## 2019-08-07 LAB
HCT VFR BLD CALC: 53.7 % — HIGH (ref 39–50)
HGB BLD-MCNC: 14.9 G/DL — SIGNIFICANT CHANGE UP (ref 13–17)
MCHC RBC-ENTMCNC: 21.6 PG — LOW (ref 27–34)
MCHC RBC-ENTMCNC: 27.7 GM/DL — LOW (ref 32–36)
MCV RBC AUTO: 77.8 FL — LOW (ref 80–100)
PLATELET # BLD AUTO: 270 K/UL — SIGNIFICANT CHANGE UP (ref 150–400)
RBC # BLD: 6.9 M/UL — HIGH (ref 4.2–5.8)
RBC # FLD: 21.8 % — HIGH (ref 10.3–14.5)
WBC # BLD: 6.88 K/UL — SIGNIFICANT CHANGE UP (ref 3.8–10.5)
WBC # FLD AUTO: 6.88 K/UL — SIGNIFICANT CHANGE UP (ref 3.8–10.5)

## 2019-08-07 RX ORDER — OXYCODONE HYDROCHLORIDE 5 MG/1
5 TABLET ORAL EVERY 4 HOURS
Refills: 0 | Status: DISCONTINUED | OUTPATIENT
Start: 2019-08-07 | End: 2019-08-10

## 2019-08-07 RX ADMIN — ACETAZOLAMIDE 1000 MILLIGRAM(S): 250 TABLET ORAL at 09:30

## 2019-08-07 RX ADMIN — Medication 1 DROP(S): at 17:04

## 2019-08-07 RX ADMIN — ACETAZOLAMIDE 1000 MILLIGRAM(S): 250 TABLET ORAL at 20:55

## 2019-08-07 RX ADMIN — OXYCODONE HYDROCHLORIDE 5 MILLIGRAM(S): 5 TABLET ORAL at 21:30

## 2019-08-07 RX ADMIN — Medication 1 DROP(S): at 11:11

## 2019-08-07 RX ADMIN — NYSTATIN CREAM 1 APPLICATION(S): 100000 CREAM TOPICAL at 17:04

## 2019-08-07 RX ADMIN — Medication 1 DROP(S): at 06:32

## 2019-08-07 RX ADMIN — OXYCODONE HYDROCHLORIDE 5 MILLIGRAM(S): 5 TABLET ORAL at 20:47

## 2019-08-07 RX ADMIN — NYSTATIN CREAM 1 APPLICATION(S): 100000 CREAM TOPICAL at 06:32

## 2019-08-07 NOTE — DISCHARGE NOTE NURSING/CASE MANAGEMENT/SOCIAL WORK - NSDCPEWEB_GEN_ALL_CORE
[place] website --- [email or web address] for Tobacco Control website --- [email or web address]

## 2019-08-07 NOTE — CHART NOTE - NSCHARTNOTEFT_GEN_A_CORE
Nutrition Follow Up Note  Patient seen for: Nutrition follow up on     Source: Comprehensive chart review, patient, previous RD note    Diet : Regular    As per chart, Pt is a 34 year old male, history of hypothyroidism (off meds), self-reported LAZARO presenting with 5 days of painless severe blurred vision (Right >Left) and reduced acuity of the right eye with associated B/L perioral numbness and R. arm paresthesias. CTV performed  but was a poor quality study. S/P  shunt , plan to d/c staples on day 14. As per SW, "patient will have to go home with home care(PT/OT) and bipap as per pulmonary team. trying to establish where patient will be staying after discharge".     Patient reports good PO intake. Consuming % of his meals. RD visited patient at time of lunch, completing 100%. No reports of nausea/vomiting/diarrhea/constipation. Last BM reported today. Denies any difficulties chewing/swallowing. Tolerating without any signs/symptoms of intolerance. Offered Pt nutrition education as per last RD note, however Pt declined at this time.     PO intake : good    Source for PO intake: patient    Enteral /Parenteral Nutrition: N/A    Daily Weight in k.7 ()  % Weight Change: No new weights documented in flow sheets    Pertinent Medications: MEDICATIONS  (STANDING):  acetazolamide    Tablet 1000 milliGRAM(s) Oral two times a day  artificial tears (preservative free) Ophthalmic Solution 1 Drop(s) Both EYES four times a day  docusate sodium 100 milliGRAM(s) Oral three times a day  nystatin Powder 1 Application(s) Topical two times a day  polyethylene glycol 3350 17 Gram(s) Oral daily  senna 1 Tablet(s) Oral daily    MEDICATIONS  (PRN):  acetaminophen   Tablet .. 650 milliGRAM(s) Oral every 6 hours PRN Temp greater or equal to 38C (100.4F), Mild Pain (1 - 3)  artificial  tears Solution 1 Drop(s) Both EYES daily PRN Dry Eyes    Pertinent Labs: Reviewed, WNL    Skin per nursing documentation: surgical incisions right head, abdomen, as per flow sheets  Edema: 2+ B/L ankles    Estimated Needs:   [X] no change since previous assessment    Previous Nutrition Diagnosis: Unintended weight loss + Overweight/Obesity   Nutrition Diagnosis is: Unintended weight loss is resolved, pt consuming >75% of his meals. Overweight/obesity is ongoing, being addressed with RD attempting to provide patient with diet education, however Pt not amenable at this time.     New Nutrition Diagnosis: N/A    Recommend  1) Continue with current diet order of regular.   2) Monitor pt's PO intake, weight, skin, edema, GI distress.   3) RD to attempt diet education at next follow up visit if patient receptive/willing.     Monitoring and Evaluation:     Continue to monitor Nutritional intake, Tolerance to diet prescription, weights, labs, skin integrity    RD remains available upon request and will follow up per protocol Nutrition Follow Up Note  Patient seen for: Nutrition follow up on     Source: Comprehensive chart review, patient, previous RD note    Diet : Regular    As per chart, Pt is a 34 year old male, history of hypothyroidism (off meds), self-reported LAZARO presenting with 5 days of painless severe blurred vision (Right >Left) and reduced acuity of the right eye with associated B/L perioral numbness and R. arm paresthesias. CTV performed  but was a poor quality study. S/P  shunt , plan to d/c staples on day 14. As per SW, "patient will have to go home with home care(PT/OT) and bipap as per pulmonary team. trying to establish where patient will be staying after discharge".     Patient reports good PO intake. Consuming % of his meals. RD visited patient at time of lunch, completing 100%. No reports of nausea/vomiting/diarrhea/constipation. Last BM reported today. Denies any difficulties chewing/swallowing. Tolerating without any signs/symptoms of intolerance. Offered Pt nutrition education as per last RD note, however Pt declined at this time.     PO intake : good    Source for PO intake: patient    Enteral /Parenteral Nutrition: N/A    Daily Weight in k.7 ()  % Weight Change: No new weights documented in flow sheets    Pertinent Medications: MEDICATIONS  (STANDING):  acetazolamide    Tablet 1000 milliGRAM(s) Oral two times a day  artificial tears (preservative free) Ophthalmic Solution 1 Drop(s) Both EYES four times a day  docusate sodium 100 milliGRAM(s) Oral three times a day  nystatin Powder 1 Application(s) Topical two times a day  polyethylene glycol 3350 17 Gram(s) Oral daily  senna 1 Tablet(s) Oral daily    MEDICATIONS  (PRN):  acetaminophen   Tablet .. 650 milliGRAM(s) Oral every 6 hours PRN Temp greater or equal to 38C (100.4F), Mild Pain (1 - 3)  artificial  tears Solution 1 Drop(s) Both EYES daily PRN Dry Eyes    Pertinent Labs: Reviewed, WNL    Skin per nursing documentation: surgical incisions right head, abdomen, as per flow sheets  Edema: 2+ B/L ankles    Estimated Needs:   [X] no change since previous assessment    Previous Nutrition Diagnosis: Unintended weight loss + Overweight/Obesity   Nutrition Diagnosis is: Unintended weight loss is resolved, pt consuming >75% of his meals. Overweight/obesity is ongoing, being addressed with RD attempting to provide patient with diet education, however Pt not amenable at this time.     New Nutrition Diagnosis: Not ready for diet/lifestyle change   Related to: denial of need to change  As evidenced by: unwillingness to participate in therapeutic diet education     Recommend  1) Continue with current diet order of regular.   2) Monitor pt's PO intake, weight, skin, edema, GI distress.   3) RD to attempt diet education at next follow up visit if patient receptive/willing.     Monitoring and Evaluation:     Continue to monitor Nutritional intake, Tolerance to diet prescription, weights, labs, skin integrity    RD remains available upon request and will follow up per protocol

## 2019-08-07 NOTE — DISCHARGE NOTE PROVIDER - CARE PROVIDERS DIRECT ADDRESSES
,[email or web address]

## 2019-08-07 NOTE — DISCHARGE NOTE PROVIDER - HOSPITAL COURSE
35 y/o M, LAZARO and morbid obesity, presents with 5 days of poor vision OD, headaches, pulsatile tinnitus, and tunnel vision intermittently.  Pt found to have elevated open pressure on LP, suggestive of IIH secondary to morbid obesity s/p  shunt 7/30/19 with mild improvement of vision, now with red hue in periphery, no vit heme on DFE friday, no RT/RH/ RD, started c/o darkening of vision and now with decreased visual acuity OS    - decreasing visual acuity/ vision changes may be 2/2 altered ICP (high or low) vs optic nerve ischemia.- on Diamox per neuro    - dc staples on day 14 post surgery . Follow up with neuro surgery.

## 2019-08-07 NOTE — DISCHARGE NOTE NURSING/CASE MANAGEMENT/SOCIAL WORK - NSDCDPATPORTLINK_GEN_ALL_CORE
You can access the [facility] Patient Portal, offered by [facility], by registering with the following website: [email or web address]

## 2019-08-07 NOTE — CHART NOTE - NSCHARTNOTEFT_GEN_A_CORE
Medicine NP note:  Patient with chronic hypercapnic respiratory failure and OHS will require non invasive ventilation with avaps-ae.   Bipap ST has been tried and proven ineffective due to pt's high PCO2 levels. Without non invasive ventilation the patient will continue to have high PCO2 levels and possible readmission.

## 2019-08-07 NOTE — CHART NOTE - NSCHARTNOTEFT_GEN_A_CORE
Medicine NP note:  34y old male LAZARO/Morbidly obese: presents with a chief complaint of Acute left eye visual acuity loss concern for pseudotumor cerebri and vision loss (02 Aug 2019 16:49) / h/o hypothyroidism (off meds), self-reported LAZARO presenting with 5 days of painless severe blurred vision (Right >Left) and reduced acuity of the right eye with associated B/L perioral numbness and R. arm paresthesias.      Red hue both eyes-seen and followed by opthalmology - with no acute abnormality from eye.   NeuroSx f/up noted and opthalmology f/up completed.   Pt. was seen and followed by Pulmonology and has hypercapnic respiratory failure  and OHS with need for Bipap (on Bipap).  Pt. pending final plan for out-pt f/u.  Discussed with Dr. Sanders.

## 2019-08-07 NOTE — DISCHARGE NOTE PROVIDER - CARE PROVIDER_API CALL
Alondra Cruz)  [facility] Neurosurgery  General  [address], [address]  [place] [address]  Phone: [phone]  Fax: [phone]  Follow Up Time: Alondra Cruz)  [facility] Neurosurgery  General  [address], [address]  [place] [address]  Phone: [phone]  Fax: [phone]  Follow Up Time:     Jim Adkins)  Ophthalmology  [address], [address]  [place] [address]  Phone: [phone]  Fax: [phone]  Follow Up Time:

## 2019-08-07 NOTE — DISCHARGE NOTE PROVIDER - NSFOLLOWUPCLINICS_GEN_ALL_ED_FT
[facility] General Internal Medicine  General Internal Medicine  [address] [place] [address]  Phone: [phone]  Fax:   Follow Up Time:     [facility] Pulmonolgy and Sleep Medicine  Pulmonology  [address] [place] [address]  Phone: [phone]  Fax:   Follow Up Time:

## 2019-08-07 NOTE — DISCHARGE NOTE PROVIDER - PROVIDER TOKENS
PROVIDER:[TOKEN:[[id number]]] PROVIDER:[TOKEN:[[id number]]],PROVIDER:[TOKEN:[[id number]]]

## 2019-08-07 NOTE — PROGRESS NOTE ADULT - ASSESSMENT
34M s/p  shunt  -Carmen at   -Optho eval for red tinted vision, will discuss with Neurology for further management   - dc staples on day 14

## 2019-08-07 NOTE — DISCHARGE NOTE NURSING/CASE MANAGEMENT/SOCIAL WORK - NSDCPEEMAIL_GEN_ALL_CORE
[facility] for Tobacco Control email [email or web address]

## 2019-08-07 NOTE — DISCHARGE NOTE PROVIDER - NSDCFUADDAPPT_GEN_ALL_CORE_FT
Follow-Up:  Patient should follow up his/her ophthalmologist or in the [facility] Neuro-Ophthalmology Practice within 1 week of discharge, sooner if symptoms worsen or change.  [address] [place] [address]  [phone]  Follow up with Neurosurgery on Aug 14/2019 for staples removal.

## 2019-08-07 NOTE — DISCHARGE NOTE PROVIDER - NSDCCPCAREPLAN_GEN_ALL_CORE_FT
PRINCIPAL DISCHARGE DIAGNOSIS  Diagnosis: Intracranial hypertension  Assessment and Plan of Treatment: s/p  shunt on 7/30/19  Follow up with Neurosurgery by 8/13/2019 for staples removal.      SECONDARY DISCHARGE DIAGNOSES  Diagnosis: Visual impairment  Assessment and Plan of Treatment: - c/w diamox as prescribed  - weight loss stressed  - pt advised to let team know if he notices any changes in his vision  - guarded visual prognosis at this time, explained to patient

## 2019-08-07 NOTE — PROGRESS NOTE ADULT - SUBJECTIVE AND OBJECTIVE BOX
Patient is a 34y old  Male who presents with a chief complaint of Acute left eye visual acuity loss concern for pseudotumor cerebri and vision loss (07 Aug 2019 15:54)      SUBJECTIVE / OVERNIGHT EVENTS:    Events noted.  CONSTITUTIONAL: No fever,  or fatigue  RESPIRATORY: No cough, wheezing,  No shortness of breath  CARDIOVASCULAR: No chest pain, palpitations, dizziness, or leg swelling  GASTROINTESTINAL: No abdominal or epigastric pain.   NEUROLOGICAL: No headaches,     MEDICATIONS  (STANDING):  acetazolamide    Tablet 1000 milliGRAM(s) Oral two times a day  artificial tears (preservative free) Ophthalmic Solution 1 Drop(s) Both EYES four times a day  docusate sodium 100 milliGRAM(s) Oral three times a day  nystatin Powder 1 Application(s) Topical two times a day  polyethylene glycol 3350 17 Gram(s) Oral daily  senna 1 Tablet(s) Oral daily    MEDICATIONS  (PRN):  acetaminophen   Tablet .. 650 milliGRAM(s) Oral every 6 hours PRN Temp greater or equal to 38C (100.4F), Mild Pain (1 - 3)  artificial  tears Solution 1 Drop(s) Both EYES daily PRN Dry Eyes  oxyCODONE    IR 5 milliGRAM(s) Oral every 4 hours PRN Moderate Pain (4 - 6)        CAPILLARY BLOOD GLUCOSE        I&O's Summary    06 Aug 2019 07:01  -  07 Aug 2019 07:00  --------------------------------------------------------  IN: 1200 mL / OUT: 0 mL / NET: 1200 mL    07 Aug 2019 07:01  -  07 Aug 2019 23:08  --------------------------------------------------------  IN: 560 mL / OUT: 0 mL / NET: 560 mL        PHYSICAL EXAM:  GENERAL: NAD  NECK: Supple, No JVD  CHEST/LUNG: Clear to auscultation bilaterally; No wheezing.  HEART: Regular rate and rhythm; No murmurs, rubs, or gallops  ABDOMEN: Soft, Nontender, Nondistended; Bowel sounds present  EXTREMITIES:   No edema  NEUROLOGY: AAO       LABS:                        14.9   6.88  )-----------( 270      ( 07 Aug 2019 14:54 )             53.7                   CAPILLARY BLOOD GLUCOSE                    RADIOLOGY & ADDITIONAL TESTS:    Imaging Personally Reviewed:    Consultant(s) Notes Reviewed:      Care Discussed with Consultants/Other Providers:

## 2019-08-07 NOTE — PROGRESS NOTE ADULT - SUBJECTIVE AND OBJECTIVE BOX
Patient seen and examined at bedside.    --Anticoagulation--    T(C): 36.7 (08-07-19 @ 05:18), Max: 37.3 (08-06-19 @ 17:21)  HR: 73 (08-07-19 @ 05:18) (73 - 92)  BP: 114/77 (08-07-19 @ 05:18) (114/77 - 128/82)  RR: 19 (08-07-19 @ 05:18) (19 - 20)  SpO2: 100% (08-07-19 @ 05:18) (95% - 100%)  Wt(kg): --    Exam:  34M s/p  shunt  -Certas at 4  -Optho eval for red tinted vision, please f/u reccomendations.   -cont diamox  - dc staples on day 14

## 2019-08-07 NOTE — PROGRESS NOTE ADULT - ASSESSMENT
· Assessment	  33 [facility] with h/o hypothyroidism (off meds), self-reported LAZARO presenting with 5 days of painless severe blurred vision (Right >Left) and reduced acuity of the right eye with associated B/L perioral numbness and R. arm paresthesias.  CTV performed yesterday but was a poor quality study.     Red hue both eyes-   NeuroSx f/up noted.  Opthal f/up noted: OP f/up with opthal.      LAZARO/Morbid obesity:  Pul f/up noted.  On BIPAP

## 2019-08-08 PROCEDURE — 99232 SBSQ HOSP IP/OBS MODERATE 35: CPT

## 2019-08-08 RX ADMIN — Medication 1 DROP(S): at 06:18

## 2019-08-08 RX ADMIN — NYSTATIN CREAM 1 APPLICATION(S): 100000 CREAM TOPICAL at 17:08

## 2019-08-08 RX ADMIN — NYSTATIN CREAM 1 APPLICATION(S): 100000 CREAM TOPICAL at 06:18

## 2019-08-08 RX ADMIN — OXYCODONE HYDROCHLORIDE 5 MILLIGRAM(S): 5 TABLET ORAL at 21:10

## 2019-08-08 RX ADMIN — ACETAZOLAMIDE 1000 MILLIGRAM(S): 250 TABLET ORAL at 06:18

## 2019-08-08 RX ADMIN — ACETAZOLAMIDE 1000 MILLIGRAM(S): 250 TABLET ORAL at 17:08

## 2019-08-08 RX ADMIN — Medication 1 DROP(S): at 17:08

## 2019-08-08 RX ADMIN — OXYCODONE HYDROCHLORIDE 5 MILLIGRAM(S): 5 TABLET ORAL at 20:38

## 2019-08-08 RX ADMIN — Medication 1 DROP(S): at 12:00

## 2019-08-08 NOTE — PROGRESS NOTE ADULT - ASSESSMENT
· Assessment	  35 y/o R-handed Ecdorian M with h/o hypothyroidism (off meds), self-reported LAZARO presenting with 5 days of painless severe blurred vision (Right >Left) and reduced acuity of the right eye with associated B/L perioral numbness and R. arm paresthesias.  CTV performed yesterday but was a poor quality study.     Red hue both eyes-   NeuroSx f/up noted.  Opthal f/up noted: OP f/up with opthal.      LAZARO/Morbid obesity:  Pul f/up noted.  On BIPAP    Based on patients' ongoing issues with deconditioning and generalized weakness secondary to patient's diagnosis of (LAZARO , Pseudo tumor cerebri), patient will require a semi electric hospital bed. This is necessary to achieve positioning, elevation and the head of bed needs to be elevated at least 30 degrees most of the time. Bed pillows and wedges have been tried and ruled out. · Assessment	  33 y/o R-handed Ecdorian M with h/o hypothyroidism (off meds), self-reported LAZARO presenting with 5 days of painless severe blurred vision (Right >Left) and reduced acuity of the right eye with associated B/L perioral numbness and R. arm paresthesias.  CTV performed yesterday but was a poor quality study.     Red hue both eyes-   NeuroSx f/up noted.  Opthal f/up noted: OP f/up with opthal.      LAZARO/Morbid obesity:  Pul f/up noted.  On BIPAP    DC planning.    Based on patients' ongoing issues with deconditioning and generalized weakness secondary to patient's diagnosis of (LAZARO , Pseudo tumor cerebri), patient will require a semi electric hospital bed. This is necessary to achieve positioning, elevation and the head of bed needs to be elevated at least 30 degrees most of the time. Bed pillows and wedges have been tried and ruled out.

## 2019-08-08 NOTE — PROGRESS NOTE ADULT - SUBJECTIVE AND OBJECTIVE BOX
[facility] Ophthalmology Follow Up Note    S: Pt seen at bedside, feels his vision is still darkening in both eyes    Mood and Affect Appropriate ( x ),  Oriented to Time, Place, and Person x 3 ( x )    Ophthalmology Exam    Visual acuity (sc): 20/400 with eccentric fixation OD, 20/70 OS, PHNI OU  Pupils: R&R OU,  APD OD  Extraocular movements (EOMs): Full OU, no pain, no diplopia   Confrontational Visual Field (CVF):  OD HM all 4 quadrants and right/paracentrally , OS central island    Pen Light Exam (PLE)  External:  Flat OU  Lids/Lashes/Lacrimal Ducts: Flat Ou    Sclera/Conjunctiva:  W+Q OU  Cornea: Cl OU  Anterior Chamber: D+F OU   Iris:  Flat OU  Lens:  Cl OU      Assessment: 33 y/o M, LAZARO and morbid obesity, presents with 5 days of poor vision OD, headaches, pulsatile tinnitus, and tunnel vision intermittently.  Pt found to have elevated open pressure on LP, suggestive of IIH secondary to morbid obesity s/p  shunt 7/30/19 with mild improvement of vision, now with red hue in periphery, no vit heme on DFE friday, no RT/RH/ RD, started c/o darkening of vision and now with decreased visual acuity OS  Plan:  - decreasing visual acuity/ vision changes may be 2/2 altered ICP (high or low) vs optic nerve ischemia. Neurology and Neurosx should discuss measuring ICP vs changing PO Diamox vs other options  - on Diamox per neuro  - weight loss stressed  - pt advised to let team know if he notices any changes in his vision  - guarded visual prognosis at this time, explained to patient  - new findings discussed with Dr. Adkins, rec discussing ICP with neuro and neurosx  - will continue to follow closely  - findings and plan discussed with patient and neurosx and neurology    Follow-Up:  Patient should follow up his/her ophthalmologist or in the [facility] Neuro-Ophthalmology Practice within 1 week of discharge, sooner if symptoms worsen or change.  [address] [place] [address]  [phone]    s/d/w Dr. Suggs -attending

## 2019-08-08 NOTE — PROGRESS NOTE ADULT - SUBJECTIVE AND OBJECTIVE BOX
Patient is a 34y old  Male who presents with a chief complaint of Acute left eye visual acuity loss concern for pseudotumor cerebri and vision loss (07 Aug 2019 15:54)      SUBJECTIVE / OVERNIGHT EVENTS:    Events noted.  CONSTITUTIONAL: No fever,  or fatigue  RESPIRATORY: No cough, wheezing,  No shortness of breath  CARDIOVASCULAR: No chest pain, palpitations, dizziness, or leg swelling  GASTROINTESTINAL: No abdominal or epigastric pain.       MEDICATIONS  (STANDING):  acetazolamide    Tablet 1000 milliGRAM(s) Oral two times a day  artificial tears (preservative free) Ophthalmic Solution 1 Drop(s) Both EYES four times a day  docusate sodium 100 milliGRAM(s) Oral three times a day  nystatin Powder 1 Application(s) Topical two times a day  polyethylene glycol 3350 17 Gram(s) Oral daily  senna 1 Tablet(s) Oral daily    MEDICATIONS  (PRN):  acetaminophen   Tablet .. 650 milliGRAM(s) Oral every 6 hours PRN Temp greater or equal to 38C (100.4F), Mild Pain (1 - 3)  artificial  tears Solution 1 Drop(s) Both EYES daily PRN Dry Eyes  oxyCODONE    IR 5 milliGRAM(s) Oral every 4 hours PRN Moderate Pain (4 - 6)        CAPILLARY BLOOD GLUCOSE        I&O's Summary    06 Aug 2019 07:01  -  07 Aug 2019 07:00  --------------------------------------------------------  IN: 1200 mL / OUT: 0 mL / NET: 1200 mL    07 Aug 2019 07:01  -  07 Aug 2019 23:08  --------------------------------------------------------  IN: 560 mL / OUT: 0 mL / NET: 560 mL        PHYSICAL EXAM:    NECK: Supple, No JVD  CHEST/LUNG: Clear to auscultation bilaterally; No wheezing.  HEART: Regular rate and rhythm; No murmurs, rubs, or gallops  ABDOMEN: Soft, Nontender, Nondistended; Bowel sounds present  EXTREMITIES:   No edema  NEUROLOGY: AAO       LABS:                        14.9   6.88  )-----------( 270      ( 07 Aug 2019 14:54 )             53.7                   CAPILLARY BLOOD GLUCOSE                    RADIOLOGY & ADDITIONAL TESTS:    Imaging Personally Reviewed:    Consultant(s) Notes Reviewed:      Care Discussed with Consultants/Other Providers:

## 2019-08-08 NOTE — PROGRESS NOTE ADULT - SUBJECTIVE AND OBJECTIVE BOX
Patient seen and examined at bedside.    --Anticoagulation--    T(C): 36.2 (08-08-19 @ 05:31), Max: 36.9 (08-07-19 @ 07:43)  HR: 73 (08-08-19 @ 05:31) (73 - 90)  BP: 112/68 (08-08-19 @ 05:31) (112/68 - 153/88)  RR: 18 (08-08-19 @ 05:31) (18 - 20)  SpO2: 100% (08-08-19 @ 05:31) (94% - 100%)  Wt(kg): --    Exam:  intact except for vision, Has light perception in R eye, and finger counting in L eye. Continues to have red tinted vision

## 2019-08-09 RX ORDER — LEVOTHYROXINE SODIUM 125 MCG
25 TABLET ORAL DAILY
Refills: 0 | Status: DISCONTINUED | OUTPATIENT
Start: 2019-08-09 | End: 2019-08-10

## 2019-08-09 RX ADMIN — NYSTATIN CREAM 1 APPLICATION(S): 100000 CREAM TOPICAL at 06:33

## 2019-08-09 RX ADMIN — ACETAZOLAMIDE 1000 MILLIGRAM(S): 250 TABLET ORAL at 17:47

## 2019-08-09 RX ADMIN — Medication 1 DROP(S): at 13:14

## 2019-08-09 RX ADMIN — NYSTATIN CREAM 1 APPLICATION(S): 100000 CREAM TOPICAL at 17:48

## 2019-08-09 RX ADMIN — Medication 1 DROP(S): at 06:33

## 2019-08-09 RX ADMIN — Medication 1 DROP(S): at 17:46

## 2019-08-09 RX ADMIN — ACETAZOLAMIDE 1000 MILLIGRAM(S): 250 TABLET ORAL at 06:34

## 2019-08-09 RX ADMIN — OXYCODONE HYDROCHLORIDE 5 MILLIGRAM(S): 5 TABLET ORAL at 19:55

## 2019-08-09 RX ADMIN — OXYCODONE HYDROCHLORIDE 5 MILLIGRAM(S): 5 TABLET ORAL at 20:25

## 2019-08-09 NOTE — PROGRESS NOTE ADULT - ASSESSMENT
· Assessment	  35 y/o R-handed Ecdorian M with h/o hypothyroidism (off meds), self-reported LAZARO presenting with 5 days of painless severe blurred vision (Right >Left) and reduced acuity of the right eye with associated B/L perioral numbness and R. arm paresthesias.  CTV performed yesterday but was a poor quality study.     Red hue both eyes-   NeuroSx f/up noted.  Opthal f/up noted: OP f/up with opthal.  Neuro f/up requested.    LAZARO/Morbid obesity:  Pul f/up noted.  On BIPAP    DC planning.    Based on patients' ongoing issues with deconditioning and generalized weakness secondary to patient's diagnosis of (LAZARO , Pseudo tumor cerebri), patient will require a semi electric hospital bed. This is necessary to achieve positioning, elevation and the head of bed needs to be elevated at least 30 degrees most of the time. Bed pillows and wedges have been tried and ruled out.

## 2019-08-09 NOTE — PROGRESS NOTE ADULT - SUBJECTIVE AND OBJECTIVE BOX
Patient seen and examined at bedside.    --Anticoagulation--    T(C): 36.6 (08-09-19 @ 04:45), Max: 37.4 (08-08-19 @ 11:19)  HR: 77 (08-09-19 @ 04:45) (75 - 102)  BP: 134/101 (08-09-19 @ 04:45) (127/81 - 149/93)  RR: 19 (08-09-19 @ 04:45) (18 - 19)  SpO2: 100% (08-09-19 @ 04:45) (87% - 100%)  Wt(kg): --    Exam: intact except for vision, Has light perception in R eye, and finger counting in L eye. Continues to have red tinted vision

## 2019-08-10 VITALS — OXYGEN SATURATION: 93 % | HEART RATE: 90 BPM

## 2019-08-10 PROCEDURE — 84439 ASSAY OF FREE THYROXINE: CPT

## 2019-08-10 PROCEDURE — 86147 CARDIOLIPIN ANTIBODY EA IG: CPT

## 2019-08-10 PROCEDURE — 87205 SMEAR GRAM STAIN: CPT

## 2019-08-10 PROCEDURE — 84157 ASSAY OF PROTEIN OTHER: CPT

## 2019-08-10 PROCEDURE — 85301 ANTITHROMBIN III ANTIGEN: CPT

## 2019-08-10 PROCEDURE — 83090 ASSAY OF HOMOCYSTEINE: CPT

## 2019-08-10 PROCEDURE — 82947 ASSAY GLUCOSE BLOOD QUANT: CPT

## 2019-08-10 PROCEDURE — 97162 PT EVAL MOD COMPLEX 30 MIN: CPT

## 2019-08-10 PROCEDURE — 84295 ASSAY OF SERUM SODIUM: CPT

## 2019-08-10 PROCEDURE — 81240 F2 GENE: CPT

## 2019-08-10 PROCEDURE — 94660 CPAP INITIATION&MGMT: CPT

## 2019-08-10 PROCEDURE — 97116 GAIT TRAINING THERAPY: CPT

## 2019-08-10 PROCEDURE — 93005 ELECTROCARDIOGRAM TRACING: CPT

## 2019-08-10 PROCEDURE — 85379 FIBRIN DEGRADATION QUANT: CPT

## 2019-08-10 PROCEDURE — 97110 THERAPEUTIC EXERCISES: CPT

## 2019-08-10 PROCEDURE — 83605 ASSAY OF LACTIC ACID: CPT

## 2019-08-10 PROCEDURE — 85014 HEMATOCRIT: CPT

## 2019-08-10 PROCEDURE — 83036 HEMOGLOBIN GLYCOSYLATED A1C: CPT

## 2019-08-10 PROCEDURE — 89051 BODY FLUID CELL COUNT: CPT

## 2019-08-10 PROCEDURE — 82803 BLOOD GASES ANY COMBINATION: CPT

## 2019-08-10 PROCEDURE — 85613 RUSSELL VIPER VENOM DILUTED: CPT

## 2019-08-10 PROCEDURE — 93970 EXTREMITY STUDY: CPT

## 2019-08-10 PROCEDURE — 85027 COMPLETE CBC AUTOMATED: CPT

## 2019-08-10 PROCEDURE — 82945 GLUCOSE OTHER FLUID: CPT

## 2019-08-10 PROCEDURE — 82435 ASSAY OF BLOOD CHLORIDE: CPT

## 2019-08-10 PROCEDURE — 82668 ASSAY OF ERYTHROPOIETIN: CPT

## 2019-08-10 PROCEDURE — 84436 ASSAY OF TOTAL THYROXINE: CPT

## 2019-08-10 PROCEDURE — 82248 BILIRUBIN DIRECT: CPT

## 2019-08-10 PROCEDURE — 81270 JAK2 GENE: CPT

## 2019-08-10 PROCEDURE — 36415 COLL VENOUS BLD VENIPUNCTURE: CPT

## 2019-08-10 PROCEDURE — 83020 HEMOGLOBIN ELECTROPHORESIS: CPT

## 2019-08-10 PROCEDURE — 81291 MTHFR GENE: CPT

## 2019-08-10 PROCEDURE — 85302 CLOT INHIBIT PROT C ANTIGEN: CPT

## 2019-08-10 PROCEDURE — 80048 BASIC METABOLIC PNL TOTAL CA: CPT

## 2019-08-10 PROCEDURE — 97530 THERAPEUTIC ACTIVITIES: CPT

## 2019-08-10 PROCEDURE — 81241 F5 GENE: CPT

## 2019-08-10 PROCEDURE — 86900 BLOOD TYPING SEROLOGIC ABO: CPT

## 2019-08-10 PROCEDURE — 86146 BETA-2 GLYCOPROTEIN ANTIBODY: CPT

## 2019-08-10 PROCEDURE — 86901 BLOOD TYPING SEROLOGIC RH(D): CPT

## 2019-08-10 PROCEDURE — 82272 OCCULT BLD FECES 1-3 TESTS: CPT

## 2019-08-10 PROCEDURE — 86480 TB TEST CELL IMMUN MEASURE: CPT

## 2019-08-10 PROCEDURE — 87483 CNS DNA AMP PROBE TYPE 12-25: CPT

## 2019-08-10 PROCEDURE — 84443 ASSAY THYROID STIM HORMONE: CPT

## 2019-08-10 PROCEDURE — 85307 ASSAY ACTIVATED PROTEIN C: CPT

## 2019-08-10 PROCEDURE — 82247 BILIRUBIN TOTAL: CPT

## 2019-08-10 PROCEDURE — 86706 HEP B SURFACE ANTIBODY: CPT

## 2019-08-10 PROCEDURE — 80053 COMPREHEN METABOLIC PANEL: CPT

## 2019-08-10 PROCEDURE — 85306 CLOT INHIBIT PROT S FREE: CPT

## 2019-08-10 PROCEDURE — 93306 TTE W/DOPPLER COMPLETE: CPT

## 2019-08-10 PROCEDURE — 86850 RBC ANTIBODY SCREEN: CPT

## 2019-08-10 PROCEDURE — 87070 CULTURE OTHR SPECIMN AEROBIC: CPT

## 2019-08-10 PROCEDURE — 85610 PROTHROMBIN TIME: CPT

## 2019-08-10 PROCEDURE — C1894: CPT

## 2019-08-10 PROCEDURE — 82330 ASSAY OF CALCIUM: CPT

## 2019-08-10 PROCEDURE — 84132 ASSAY OF SERUM POTASSIUM: CPT

## 2019-08-10 PROCEDURE — 70498 CT ANGIOGRAPHY NECK: CPT

## 2019-08-10 PROCEDURE — 71045 X-RAY EXAM CHEST 1 VIEW: CPT

## 2019-08-10 PROCEDURE — 86403 PARTICLE AGGLUT ANTBDY SCRN: CPT

## 2019-08-10 PROCEDURE — 97166 OT EVAL MOD COMPLEX 45 MIN: CPT

## 2019-08-10 PROCEDURE — 87340 HEPATITIS B SURFACE AG IA: CPT

## 2019-08-10 PROCEDURE — 85303 CLOT INHIBIT PROT C ACTIVITY: CPT

## 2019-08-10 PROCEDURE — 86703 HIV-1/HIV-2 1 RESULT ANTBDY: CPT

## 2019-08-10 PROCEDURE — 71275 CT ANGIOGRAPHY CHEST: CPT

## 2019-08-10 PROCEDURE — 85300 ANTITHROMBIN III ACTIVITY: CPT

## 2019-08-10 PROCEDURE — 70496 CT ANGIOGRAPHY HEAD: CPT

## 2019-08-10 PROCEDURE — 81001 URINALYSIS AUTO W/SCOPE: CPT

## 2019-08-10 PROCEDURE — 82962 GLUCOSE BLOOD TEST: CPT

## 2019-08-10 PROCEDURE — 77012 CT SCAN FOR NEEDLE BIOPSY: CPT

## 2019-08-10 PROCEDURE — 85730 THROMBOPLASTIN TIME PARTIAL: CPT

## 2019-08-10 PROCEDURE — C1713: CPT

## 2019-08-10 PROCEDURE — 99285 EMERGENCY DEPT VISIT HI MDM: CPT | Mod: 25

## 2019-08-10 PROCEDURE — C1889: CPT

## 2019-08-10 PROCEDURE — 70450 CT HEAD/BRAIN W/O DYE: CPT

## 2019-08-10 PROCEDURE — 97535 SELF CARE MNGMENT TRAINING: CPT

## 2019-08-10 PROCEDURE — 84480 ASSAY TRIIODOTHYRONINE (T3): CPT

## 2019-08-10 RX ORDER — OXYCODONE HYDROCHLORIDE 5 MG/1
1 TABLET ORAL
Qty: 20 | Refills: 0
Start: 2019-08-10 | End: 2019-08-14

## 2019-08-10 RX ORDER — LEVOTHYROXINE SODIUM 125 MCG
1 TABLET ORAL
Qty: 30 | Refills: 0
Start: 2019-08-10 | End: 2019-09-08

## 2019-08-10 RX ORDER — POLYETHYLENE GLYCOL 3350 17 G/17G
17 POWDER, FOR SOLUTION ORAL
Qty: 0 | Refills: 0 | DISCHARGE
Start: 2019-08-10

## 2019-08-10 RX ORDER — SENNA PLUS 8.6 MG/1
1 TABLET ORAL
Qty: 0 | Refills: 0 | DISCHARGE
Start: 2019-08-10

## 2019-08-10 RX ORDER — DOCUSATE SODIUM 100 MG
1 CAPSULE ORAL
Qty: 0 | Refills: 0 | DISCHARGE
Start: 2019-08-10

## 2019-08-10 RX ORDER — ACETAZOLAMIDE 250 MG/1
2 TABLET ORAL
Qty: 120 | Refills: 0
Start: 2019-08-10 | End: 2019-09-08

## 2019-08-10 RX ORDER — ACETAZOLAMIDE 250 MG/1
500 TABLET ORAL
Refills: 0 | Status: DISCONTINUED | OUTPATIENT
Start: 2019-08-10 | End: 2019-08-10

## 2019-08-10 RX ADMIN — Medication 1 DROP(S): at 12:52

## 2019-08-10 RX ADMIN — ACETAZOLAMIDE 1000 MILLIGRAM(S): 250 TABLET ORAL at 05:34

## 2019-08-10 RX ADMIN — Medication 25 MICROGRAM(S): at 05:34

## 2019-08-10 RX ADMIN — ACETAZOLAMIDE 500 MILLIGRAM(S): 250 TABLET ORAL at 17:39

## 2019-08-10 RX ADMIN — NYSTATIN CREAM 1 APPLICATION(S): 100000 CREAM TOPICAL at 05:35

## 2019-08-10 NOTE — PROGRESS NOTE ADULT - ATTENDING COMMENTS
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 He continues to only count fingers. Unable to read on his phone.     Will reduce diamox to 500mg BID and HCT shows slit like ventricles  Remainder of plan per resident; s note

## 2019-08-10 NOTE — PROGRESS NOTE ADULT - ASSESSMENT
35 y/o R-handed Ecuadorian M with h/o hypothyroidism (off meds), self-reported LAZARO presenting with 5 days of painless severe blurred vision (Right >Left) and reduced acuity of the right eye with associated B/L perioral numbness and R. arm paresthesias. S/P  shunt placement.        Keep head of bed elevated to 30 degrees.  Keep PT up as supine and Trendelenburg positions will worsen condition.   Reduce Acetazolamide to 500 mg BID po from 1000mg bid  Follow up with Ophthalmology and Neurosurgery within 1 week of discharge  Follow up with Neurology within 2 weeks of discharge

## 2019-08-10 NOTE — PROGRESS NOTE ADULT - PROVIDER SPECIALTY LIST ADULT
Anesthesia
Heme/Onc
Heme/Onc
Internal Medicine
Neurology
Neurosurgery
Ophthalmology
Pulmonology
Radiology
Surgery
Neurology
Ophthalmology
Ophthalmology
Internal Medicine
Neurology

## 2019-08-10 NOTE — PROGRESS NOTE ADULT - ASSESSMENT
34M s/p  shunt  - Certas at 4  - Optho rec o/p f/u. Continues to have Red hue in visual fields.    - dc staples on POD 14

## 2019-08-10 NOTE — PROGRESS NOTE ADULT - SUBJECTIVE AND OBJECTIVE BOX
SUBJECTIVE:   Patient seen and examined at bedside on morning of 8/10/19. Patient continues to recover well from  shunt placement. Still notes red hue to vision being present.       MEDICATIONS  (STANDING):  acetazolamide    Tablet 1000 milliGRAM(s) Oral two times a day  artificial tears (preservative free) Ophthalmic Solution 1 Drop(s) Both EYES four times a day  docusate sodium 100 milliGRAM(s) Oral three times a day  nystatin Powder 1 Application(s) Topical two times a day  polyethylene glycol 3350 17 Gram(s) Oral daily  senna 1 Tablet(s) Oral daily    MEDICATIONS  (PRN):  acetaminophen   Tablet .. 650 milliGRAM(s) Oral every 6 hours PRN Temp greater or equal to 38C (100.4F), Mild Pain (1 - 3)  artificial  tears Solution 1 Drop(s) Both EYES daily PRN Dry Eyes  oxyCODONE    IR 5 milliGRAM(s) Oral every 4 hours PRN Moderate Pain (4 - 6)  oxyCODONE    IR 10 milliGRAM(s) Oral every 4 hours PRN Severe Pain (7 - 10)    ALLERGIES/INTOLERANCES: No Known Allergies    Vital Signs Last 24 Hrs  T(C): 36.5 (10 Aug 2019 12:45), Max: 37.2 (09 Aug 2019 16:19)  T(F): 97.7 (10 Aug 2019 12:45), Max: 99 (09 Aug 2019 16:19)  HR: 89 (10 Aug 2019 12:45) (66 - 100)  BP: 130/85 (10 Aug 2019 12:45) (102/57 - 147/80)  BP(mean): --  RR: 19 (10 Aug 2019 12:45) (18 - 19)  SpO2: 93% (10 Aug 2019 12:45) (91% - 100%)    General:  •	GENERAL APPEARANCE: Morbidly obese male, appears stated age standing up in room in NAD, conversational  •	HEENT: Head with right frontal scar s/p procedure healing well with staples in place.  No streaking, tenderness or signs of infection.  Incision is c/d/i.   •	NEUROLOGIC EXAMINATION:        o	MENTAL STATUS & HIGHER CORTICAL FUNCTION: The patient is oriented to self, situation, time and place.  Significantly more alert than on presentation.          o	LANGUAGE: Speech is fluent with no dysarthria and no aphasia.          o	CRANIAL NERVES:              I- No anosmia  	II - Pupils are briskly reactive and symmetric.  PERRLA   Left 3 mm-->2mm, R 5mm-->3 mm sluggish.  Visual fields worse with Bi-temporal superior and inferior hemianopsia.  Visual acuity 20/200 right eye, 20/50 left eye. Having significant difficulty in perceiving shapes and lights at this time.  Can see colors, and gross objects, but having difficulty in both eyes with borders. Improved since yesterday, confrontation in temporal fields better picked up today        	III, IV, and VI - R. eye exotropia, extraocular movements are full with normal pursuit and saccades.   	V - Light touch is intact in all three divisions. Motor V is intact.    	VII - No facial asymmetry or weakness.   	IX - Palate rises symmetrically in the midline. XI - Shoulder shrug is normal. XII - Tongue protrudes in the midline.        o	MOTOR EXAMINATION: Normal bulk in all four extremities. Muscle strength is 5/5 in all four extremities.         o	SENSORY EXAMINATION: Sensory examination is grossly intact to pain and light touch in all 4 extremities.        o	COORDINATION: Fine coordinated movements in tact     I&O's Summary    09 Aug 2019 07:01  -  10 Aug 2019 07:00  --------------------------------------------------------  IN: 500 mL / OUT: 0 mL / NET: 500 mL    10 Aug 2019 07:01  -  10 Aug 2019 14:03  --------------------------------------------------------  IN: 360 mL / OUT: 0 mL / NET: 360 mL      Radiology (XR, CT, MR, U/S, TTE/REN):    < from: CT Head No Cont (08.03.19 @ 19:31) >  FINDINGS:    Right frontal approach  shunt catheter terminates in the foramen of   unremarkable. The ventricular system is unchanged with slitlike   appearance of the right lateral ventricle. Mild procedural related   hypodensity within the right frontal lobe along the tract of the shunt       There is no acute intracranial mass-effect, hemorrhage, midline shift, or   abnormal extra-axial fluid collection. Basal cisterns are patent. Bony   thinning along the anteroinferior sellar floor appears unchanged.    Paranasal sinuses andmastoid air cells are clear. Postprocedural changes   of the right frontal scalp related to recent  shunt catheter placement.   No calvarial fracture.    IMPRESSION:   Stable appearance of the lateral ventricles with  shunt catheter in   place.  No acute intracranial bleeding, mass effect, or shift.

## 2019-08-10 NOTE — PROGRESS NOTE ADULT - ASSESSMENT
· Assessment	  35 y/o R-handed Ecdorian M with h/o hypothyroidism (off meds), self-reported LAZARO presenting with 5 days of painless severe blurred vision (Right >Left) and reduced acuity of the right eye with associated B/L perioral numbness and R. arm paresthesias.  CTV performed yesterday but was a poor quality study.     Red hue both eyes-   NeuroSx f/up noted.  Opthal f/up noted: OP f/up with opthal.  Neuro f/up noted.  Reduce diamox 500 mg po BID.    LAZARO/Morbid obesity:  Pul f/up noted.  On BIPAP    DC planning.    Based on patients' ongoing issues with deconditioning and generalized weakness secondary to patient's diagnosis of (LAZARO , Pseudo tumor cerebri), patient will require a semi electric hospital bed. This is necessary to achieve positioning, elevation and the head of bed needs to be elevated at least 30 degrees most of the time. Bed pillows and wedges have been tried and ruled out.

## 2019-08-10 NOTE — PROGRESS NOTE ADULT - SUBJECTIVE AND OBJECTIVE BOX
Patient seen and examined at bedside.    --Anticoagulation--    T(C): 36.6 (08-10-19 @ 07:53), Max: 37.2 (08-09-19 @ 16:19)  HR: 99 (08-10-19 @ 07:53) (66 - 100)  BP: 124/85 (08-10-19 @ 07:53) (102/57 - 147/80)  RR: 19 (08-10-19 @ 07:53) (18 - 19)  SpO2: 95% (08-10-19 @ 07:53) (91% - 100%)  Wt(kg): --    Exam:  intact except for vision, Has light perception in R eye, and finger counting in L eye. Continues to have red tinted vision

## 2019-08-10 NOTE — PROGRESS NOTE ADULT - REASON FOR ADMISSION
Acute left eye visual acuity loss concern for pseudotumor cerebri and vision loss

## 2019-08-14 ENCOUNTER — APPOINTMENT (OUTPATIENT)
Dept: OPHTHALMOLOGY | Facility: CLINIC | Age: 34
End: 2019-08-14
Payer: COMMERCIAL

## 2019-08-14 ENCOUNTER — APPOINTMENT (OUTPATIENT)
Dept: NEUROSURGERY | Facility: CLINIC | Age: 34
End: 2019-08-14
Payer: COMMERCIAL

## 2019-08-14 ENCOUNTER — NON-APPOINTMENT (OUTPATIENT)
Age: 34
End: 2019-08-14

## 2019-08-14 VITALS
BODY MASS INDEX: 50.62 KG/M2 | HEIGHT: 66 IN | DIASTOLIC BLOOD PRESSURE: 82 MMHG | HEART RATE: 91 BPM | SYSTOLIC BLOOD PRESSURE: 138 MMHG | WEIGHT: 315 LBS

## 2019-08-14 DIAGNOSIS — Z72.0 TOBACCO USE: ICD-10-CM

## 2019-08-14 PROCEDURE — 99213 OFFICE O/P EST LOW 20 MIN: CPT

## 2019-08-14 PROCEDURE — 99024 POSTOP FOLLOW-UP VISIT: CPT

## 2019-08-14 NOTE — ASSESSMENT
[FreeTextEntry1] : This is a 35 yo male with pseudotumor cerebri who is s/p VPS (Certas, setting changed from 4 to 2 today). He continues to c/o blurry vision (R>L) with light sensitivity which causes gait/balance/performing ADLs disturbance. Previous LP site is within in normal without edema/discharge/tenderness. Neuro exam is otherwise unremarkable. I have recommended\par - Bactroban ointment twice a day for 2 weeks on surgical incision site for prophylactic infection\par - follow up with general surgery for post op check\par - refer to bariatric surgery (Dr. Lin) \par - follow up with PCP for blood pressure management (patient unable to get physical therapy due to recent elevated blood pressure 140/90's at home)\par - CTH wo in a month for shunt setting change\par - RTC after image

## 2019-08-14 NOTE — PHYSICAL EXAM
[General Appearance - Alert] : alert [General Appearance - In No Acute Distress] : in no acute distress [General Appearance - Well Nourished] : well nourished [Clean] : clean [Irregular] : irregular [Dry] : dry [Healing Well] : healing well [No Drainage] : without drainage [Normal Skin] : normal [Impaired Insight] : insight and judgment were intact [Oriented To Time, Place, And Person] : oriented to person, place, and time [Memory Recent] : recent memory was not impaired [Affect] : the affect was normal [Person] : oriented to person [Place] : oriented to place [Time] : oriented to time [Remote Intact] : remote memory intact [Short Term Intact] : short term memory intact [Registration Intact] : recent registration memory intact [Span Intact] : the attention span was normal [Concentration Intact] : normal concentrating ability [Fluency] : fluency intact [Comprehension] : comprehension intact [Current Events] : adequate knowledge of current events [Vocabulary] : adequate range of vocabulary [Past History] : adequate knowledge of personal past history [Cranial Nerves Oculomotor (III)] : extraocular motion intact [Cranial Nerves Trigeminal (V)] : facial sensation intact symmetrically [Cranial Nerves Facial (VII)] : face symmetrical [Cranial Nerves Glossopharyngeal (IX)] : tongue and palate midline [Cranial Nerves Accessory (XI - Cranial And Spinal)] : head turning and shoulder shrug symmetric [Cranial Nerves Vestibulocochlear (VIII)] : hearing was intact bilaterally [Cranial Nerves Hypoglossal (XII)] : there was no tongue deviation with protrusion [Motor Strength] : muscle strength was normal in all four extremities [5] : S1 flexor hallucis longus 5/5 [Sensation Tactile Decrease] : light touch was intact [Limited Balance] : the patient's balance was impaired [1+] : Patella left 1+ [No Visual Abnormalities] : no visible abnormailities [Normal] : normal [PERRL With Normal Accommodation] : pupils were equal in size, round, reactive to light, with normal accommodation [Extraocular Movements] : extraocular movements were intact [Outer Ear] : the ears and nose were normal in appearance [Hearing Threshold Finger Rub Not [place]] : hearing was normal [Examination Of The Oral Cavity] : the lips and gums were normal [] : no respiratory distress [Edema] : there was no peripheral edema [Exaggerated Use Of Accessory Muscles For Inspiration] : no accessory muscle use [No CVA Tenderness] : no ~M costovertebral angle tenderness [Abnormal Walk] : normal gait [Motor Tone] : muscle strength and tone were normal [Tender] : not tender [Erythema] : not erythematous [Warm] : not warm [Indurated] : not indurated [Fluctuant] : not fluctuant [Romberg's Sign] : Romberg's sign was negtive [FreeTextEntry1] : mild tenderness on right side mid back

## 2019-08-14 NOTE — REASON FOR VISIT
[Family Member] : family member [de-identified] : ventriculoperitoneal shunt placement (Certas @4) [de-identified] : 7/30/2019

## 2019-08-14 NOTE — REVIEW OF SYSTEMS
[As Noted in HPI] : as noted in HPI [Negative] : Endocrine [de-identified] : intermittent headache [FreeTextEntry3] : b/l blurry vision with light sensitivity (R>L) [FreeTextEntry9] : moderate back pain

## 2019-08-14 NOTE — HISTORY OF PRESENT ILLNESS
[FreeTextEntry1] : Mr. CHIQUIS BARAKAT is a 35 yo male with hypothyroidism, morbid obesity, self reported LAZARO who initially presented to [facility] ED on 7/20/19 for 5 days of b/l blurry/tunnel vision (R>L) with perioral numbness/tingling, pulsatile tinnitus, right arm paresthesias and occasional occipital headache. Initial CTA head/neck and CT venogram showed poor quality image and hypercoagulable work up showed multiple prolonged clotting time with polycythemia.  He was found to have severe papilledema and LP trial showed opening pressure of over 55 concerning pseudotumor cerebri. On 7/30/19, he underwent VPS placement (certas @ 4). Post operative hospital stay was uneventful. Optho f/u showed red hue in periphery with decreasing visual acuity likely r/t altered ICP and started Diamox with weight loss recommendation. He was discharged to home on 8/10/19. Today he presents for follow up and states his vision remains unchanged with light sensitivity and c/o moderate mid back pain (constant pressure pain without radicular pain/numbness/paresthesia) since the hospitalization but denies dizziness, n/v, fever/chills, seizure activity, cognitive changes, bowel/urinary incontinence, weakness. He ambulates with walker for balance 2/2 vision problem. Surgical incision appears to be healing well and staples/sutures are removed today without complications. Shunt setting checked today @ 4.

## 2019-08-19 ENCOUNTER — APPOINTMENT (OUTPATIENT)
Dept: PULMONOLOGY | Facility: CLINIC | Age: 34
End: 2019-08-19
Payer: COMMERCIAL

## 2019-08-19 VITALS
RESPIRATION RATE: 16 BRPM | SYSTOLIC BLOOD PRESSURE: 103 MMHG | DIASTOLIC BLOOD PRESSURE: 71 MMHG | HEART RATE: 90 BPM | OXYGEN SATURATION: 96 % | HEIGHT: 66 IN | TEMPERATURE: 98 F | WEIGHT: 315 LBS | BODY MASS INDEX: 50.62 KG/M2

## 2019-08-19 PROCEDURE — 99203 OFFICE O/P NEW LOW 30 MIN: CPT

## 2019-08-26 ENCOUNTER — APPOINTMENT (OUTPATIENT)
Dept: INTERNAL MEDICINE | Facility: CLINIC | Age: 34
End: 2019-08-26
Payer: MEDICAID

## 2019-08-26 ENCOUNTER — OUTPATIENT (OUTPATIENT)
Dept: OUTPATIENT SERVICES | Facility: HOSPITAL | Age: 34
LOS: 1 days | End: 2019-08-26
Payer: MEDICAID

## 2019-08-26 ENCOUNTER — RESULT CHARGE (OUTPATIENT)
Age: 34
End: 2019-08-26

## 2019-08-26 VITALS
HEIGHT: 66 IN | OXYGEN SATURATION: 96 % | DIASTOLIC BLOOD PRESSURE: 90 MMHG | SYSTOLIC BLOOD PRESSURE: 140 MMHG | HEART RATE: 84 BPM

## 2019-08-26 DIAGNOSIS — E66.01 MORBID (SEVERE) OBESITY DUE TO EXCESS CALORIES: ICD-10-CM

## 2019-08-26 DIAGNOSIS — Z86.39 PERSONAL HISTORY OF OTHER ENDOCRINE, NUTRITIONAL AND METABOLIC DISEASE: ICD-10-CM

## 2019-08-26 DIAGNOSIS — R30.0 DYSURIA: ICD-10-CM

## 2019-08-26 DIAGNOSIS — E03.9 HYPOTHYROIDISM, UNSPECIFIED: ICD-10-CM

## 2019-08-26 DIAGNOSIS — I10 ESSENTIAL (PRIMARY) HYPERTENSION: ICD-10-CM

## 2019-08-26 DIAGNOSIS — G93.2 BENIGN INTRACRANIAL HYPERTENSION: ICD-10-CM

## 2019-08-26 DIAGNOSIS — Z87.09 PERSONAL HISTORY OF OTHER DISEASES OF THE RESPIRATORY SYSTEM: ICD-10-CM

## 2019-08-26 DIAGNOSIS — Z83.438 FAMILY HISTORY OF OTHER DISORDER OF LIPOPROTEIN METABOLISM AND OTHER LIPIDEMIA: ICD-10-CM

## 2019-08-26 LAB
BILIRUB UR QL STRIP: NORMAL
CLARITY UR: CLEAR
COLLECTION METHOD: NORMAL
GLUCOSE UR-MCNC: NORMAL
HCG UR QL: 1 EU/DL
HGB UR QL STRIP.AUTO: NORMAL
KETONES UR-MCNC: NORMAL
LEUKOCYTE ESTERASE UR QL STRIP: NORMAL
NITRITE UR QL STRIP: NORMAL
PH UR STRIP: 7
PROT UR STRIP-MCNC: NORMAL
SP GR UR STRIP: 1.01

## 2019-08-26 PROCEDURE — 99214 OFFICE O/P EST MOD 30 MIN: CPT | Mod: GC

## 2019-08-26 PROCEDURE — 81003 URINALYSIS AUTO W/O SCOPE: CPT

## 2019-08-26 PROCEDURE — G0463: CPT | Mod: 25

## 2019-08-26 RX ORDER — POTASSIUM CHLORIDE 1500 MG/1
20 TABLET, FILM COATED, EXTENDED RELEASE ORAL DAILY
Qty: 3 | Refills: 0 | Status: COMPLETED | COMMUNITY
Start: 2019-06-04 | End: 2019-08-26

## 2019-08-26 RX ORDER — FUROSEMIDE 20 MG/1
20 TABLET ORAL DAILY
Qty: 3 | Refills: 0 | Status: COMPLETED | COMMUNITY
Start: 2019-06-04 | End: 2019-08-26

## 2019-08-26 NOTE — HISTORY OF PRESENT ILLNESS
[FreeTextEntry1] : 34 yom with morbid obesity BMI of 80.7, hospitalized between 7/21-8/10 by neurosurgery for increase IPCP, vision loss requiring  shunt.\par \par Patient is here today for evaluation of sleep disordered breathing with hypercapnea.\par Patient was initially seen at pulmonary in [facility], had evaluation, was sent to sleep study. Scheduling was delayed, and he was getting  progressively more edematous.\par He was in the sleep lab when he became dyspneic and his vision was getting more blurry, triggering transfer to hospital fro evaluation.  shunt was placed while hospitalized to manage psedotumor cerebri in addition to diuretics. His vision did not improve after placement of shunt.\par He continues to take diamox, which was started started in hospital for  pseudotumor cerebri and seizure prophylaxis.\par AVAPs was initiated in hospital to manage hypercapnic respiratory failure and he was discharged on trilogy.\par He reports regular use nightly.\par  His bedtime is  10pm, wake up time at 8-9 am. he has no problems initiating or maintaining sleep, he estimate avaps use 8-10 hrs every night.\par He uses uses full face.\par He reports feeling less tired during the day since initiation of avaps.\par His weight is stable over last year or so. he always had problems with weight, but it got much worse since diagnosis of hypothyroidism.\par ABG 2 weeks before discharge: 7.24/72/84/32/96%, HCO3- at discharge 23 ( with normal renal function, but on diamox)\par \par

## 2019-08-26 NOTE — END OF VISIT
[] : Resident [FreeTextEntry3] : 34M h/o morbid obesity, hypothyroidism, recently admitted with worsening vision and found to have IIH now s/p  shunt without significant improvement in vision; since discharge has been following with neurosurgery and ophthalmology; may not recover vision but started on acetazolamide. Here with his mother today who helps care for him. He is concerned about his ability to function with decreased vision, also reports 1d pain in tip of penis with urination. VS noted, borderline diastolic hypertension. no apparent distress.  examination shows no glans lesions or tenderness. Urethral meatus is normal. No CVAT. Urine dipstick without signs of infection. Discussed  hygiene and care, especially with likely increased urination with acetazolamide use; A1c was normal in the hospital. Referral to bariatric clinic at [facility], he is motivated. Pending PSG for dx LAZARO, may be able to avoid dx HTN and treat LAZARO. Encouraged f/u with neurology and referred; continue f/u with ophtho and NS.

## 2019-08-26 NOTE — HISTORY OF PRESENT ILLNESS
[Post-hospitalization from ___ Hospital] : Post-hospitalization from [unfilled] Hospital [Admitted on: ___] : The patient was admitted on [unfilled] [Discharged on ___] : discharged on [unfilled] [FreeTextEntry2] : 35 yo male with hypothyroidism, morbid obesity, self reported LAZARO who initially presented to [facility] ED on 7/20/19 for 5 days of b/l blurry/tunnel vision (R>L) with perioral numbness/tingling, pulsatile tinnitus, right arm paresthesias and occasional occipital headache. He was found to have severe papilledema and LP with opening pressure of 55. He was diagnosed with IIH and on 7/30/19, he underwent VPS placement (certas @ 4). His post operative course has been complicated by persistent vision changes including red hue in periphery with decreasing visual acuity. Per optho, this was though to be be secondary to altered ICP and was started on Diamox with weight loss recommendation. He was discharged to home on 8/10/19. He has subsequently followed up with Neurosurgery, Pulmonary, and ophthalmology. \par \par Was previously referred to bariatric surgery in [place].

## 2019-08-26 NOTE — PHYSICAL EXAM
[No Acute Distress] : no acute distress [Normal Oropharynx] : the oropharynx was normal [No Respiratory Distress] : no respiratory distress  [Clear to Auscultation] : lungs were clear to auscultation bilaterally [Regular Rhythm] : with a regular rhythm [Normal S1, S2] : normal S1 and S2 [No Murmur] : no murmur heard [Soft] : abdomen soft [Non Tender] : non-tender [No Masses] : no abdominal mass palpated [Normal Bowel Sounds] : normal bowel sounds [No CVA Tenderness] : no CVA  tenderness [No Spinal Tenderness] : no spinal tenderness [Alert and Oriented x3] : oriented to person, place, and time [Urethral Meatus] : meatus normal [de-identified] : limited peripheral vision on the R eye, limited peripheral vision on the L eye, EOMI, pupils dilated but reactive to light  [de-identified] : morbidly obese [de-identified] : mild peripheral edema in the LE bilaterally around the ankles  [de-identified] : obese abdomen [FreeTextEntry1] : no discharge or abnormal lesions

## 2019-08-26 NOTE — HEALTH RISK ASSESSMENT
[Monthly or less (1 pt)] : Monthly or less (1 point) [1 or 2 (0 pts)] : 1 or 2 (0 points) [Less than monthly (1 pt)] : Less than monthly (1 point) [Yes] : In the past 12 months have you used drugs other than those required for medical reasons? Yes [No falls in past year] : Patient reported no falls in the past year [0] : 2) Feeling down, depressed, or hopeless: Not at all (0) [With Family] : lives with family [Unemployed] : unemployed [Single] : single [] : No [de-identified] : Optholmology, Neurosurgery, Pulmonary [de-identified] : Cocaine, Oxycodone [[id number]] : 2

## 2019-08-26 NOTE — REVIEW OF SYSTEMS
[Dyspnea] : dyspnea [Hypertension] : ~T hypertension [Orthopnea] : orthopnea [Negative] : Psychiatric [Fever] : no fever [Chills] : no chills [Fatigue] : no fatigue [Poor Appetite] : normal appetite  [Recent Wt Gain (___ Lbs)] : no recent weight gain [Recent Wt Loss (___ Lbs)] : no recent weight loss [Postnasal Drip] : no postnasal drip [Sore Throat] : no sore throat [Cough] : no cough [Dry Mouth] : no dry mouth [Sputum] : not coughing up ~M sputum [Hemoptysis] : no hemoptysis [Pleuritic Pain] : no pleuritic pain [Chest Tightness] : no chest tightness [Wheezing] : no wheezing [Palpitations] : no palpitations [Immunocompromised] : no immunocompromised disease [Edema] : ~T edema was not present [de-identified] : alert

## 2019-08-26 NOTE — REVIEW OF SYSTEMS
[Vision Problems] : vision problems [Dysuria] : dysuria [Back Pain] : back pain [Unsteady Walk] : ataxia [Fever] : no fever [Sore Throat] : no sore throat [Chills] : no chills [Chest Pain] : no chest pain [Palpitations] : no palpitations [Orthopena] : no orthopnea [Shortness Of Breath] : no shortness of breath [Nausea] : no nausea [Abdominal Pain] : no abdominal pain [Vomiting] : no vomiting [Headache] : no headache [Depression] : no depression

## 2019-08-26 NOTE — PHYSICAL EXAM
[Well Groomed] : well groomed [General Appearance - In No Acute Distress] : no acute distress [Normal Conjunctiva] : the conjunctiva exhibited no abnormalities [Elongated Uvula] : elongated uvula [III] : III [Neck Appearance] : the appearance of the neck was normal [Neck Cervical Mass (___cm)] : no neck mass was observed [Neck Circumference: ___] : neck circumference is [unfilled] [Heart Sounds] : normal S1 and S2 [Heart Rate And Rhythm] : heart rate and rhythm were normal [Auscultation Breath Sounds / Voice Sounds] : lungs were clear to auscultation bilaterally [Respiration, Rhythm And Depth] : normal respiratory rhythm and effort [Nail Clubbing] : no clubbing of the fingernails [Cyanosis, Localized] : no localized cyanosis [Skin Color & Pigmentation] : normal skin color and pigmentation [Skin Turgor] : normal skin turgor [] : no rash [Motor Exam] : the motor exam was normal [Oriented To Time, Place, And Person] : oriented to person, place, and time [Impaired Insight] : insight and judgment were intact [Affect] : the affect was normal [Mood] : the mood was normal [Normal Oropharynx] : abnormal oropharynx [Low Lying Soft Palate] : no low lying soft palate [Erythema] : no erythema of the pharynx [FreeTextEntry1] : impaired vision and hearing

## 2019-08-26 NOTE — ASSESSMENT
[FreeTextEntry1] : 34 yom with pseudotumor cerebri on diamox and likely OHS is presenting to establish care.\par \par Patient history of hypercapnea in setting of morbid obesity, absence of know lung disease or other hypoventilating disease and likely LAZARO suggest presence of OHS.\par Will continue avaps, but set up for bilevel PAP titration to allow better flexibility. Patient will schedule in lab Bilevel titration with ETCO2 monitoring. He'll continue diamox for neurological symptoms/disorders.\par Will repeat abg to check for improvement of pCO2 and pH, clinically he is improving.\par Patient will schedule PFT's to rule out obstructive lung disease, although based on history it is very unlikely and it is  safe to say this is true OHS.\par OV after ordered test with Dr. Smith or any other sleep specialist/ provider.\par

## 2019-08-26 NOTE — COUNSELING
[Potential consequences of obesity discussed] : Potential consequences of obesity discussed [Benefits of weight loss discussed] : Benefits of weight loss discussed

## 2019-08-26 NOTE — ASSESSMENT
[FreeTextEntry1] : 35 yo male with hypothyroidism, morbid obesity, self reported LAZARO, and recent hospitalization for IIH s/p  shunt with residual visual loss and gait disturbance who presents today for a post discharge follow up and establishment of care. \par \par 1) IIH\par - patient has followed up with neurosurgery, repeat CT head in 1 month with follow up appointment, no symptoms at this time\par - neurology referral to be given\par - continue diamox \par \par 2) Vision loss\par - 2/2 to II, patient has followed with ophtho and per patient, they have told him it will take time for his deficits to resolved but their is a chance they may not\par - continue artifical tears\par \par 3) LAZARO\par - patient has seen pulmonology, uses [facility] at home and has sleep study pending \par  - f/u with pulm\par \par 4) Hypothyroidism\par - continue synthroid \par \par 5) Morbid Obesity\par - patient interested in bariatric surgery, has previously been referred to [facility] but did not complete evaluation\par - will refer to [facility] \par \par 6) Dysuria\par - negative UA, exam is normal\par - educated or proper hygiene and encouraged to wipe carefully\par - told to return to clinic if he developed worsening symptoms, fevers, or chills\par \par RTC in 2 months for follow up

## 2019-08-27 ENCOUNTER — APPOINTMENT (OUTPATIENT)
Dept: OPHTHALMOLOGY | Facility: CLINIC | Age: 34
End: 2019-08-27

## 2019-08-29 ENCOUNTER — OTHER (OUTPATIENT)
Age: 34
End: 2019-08-29

## 2019-09-09 ENCOUNTER — OUTPATIENT (OUTPATIENT)
Dept: OUTPATIENT SERVICES | Facility: HOSPITAL | Age: 34
LOS: 1 days | End: 2019-09-09
Payer: MEDICAID

## 2019-09-09 ENCOUNTER — APPOINTMENT (OUTPATIENT)
Dept: CT IMAGING | Facility: HOSPITAL | Age: 34
End: 2019-09-09
Payer: COMMERCIAL

## 2019-09-09 DIAGNOSIS — Z98.2 PRESENCE OF CEREBROSPINAL FLUID DRAINAGE DEVICE: ICD-10-CM

## 2019-09-09 DIAGNOSIS — G93.2 BENIGN INTRACRANIAL HYPERTENSION: ICD-10-CM

## 2019-09-09 PROCEDURE — 70450 CT HEAD/BRAIN W/O DYE: CPT | Mod: 26

## 2019-09-09 PROCEDURE — 70450 CT HEAD/BRAIN W/O DYE: CPT

## 2019-09-11 ENCOUNTER — APPOINTMENT (OUTPATIENT)
Dept: NEUROSURGERY | Facility: CLINIC | Age: 34
End: 2019-09-11
Payer: COMMERCIAL

## 2019-09-11 ENCOUNTER — APPOINTMENT (OUTPATIENT)
Dept: OPHTHALMOLOGY | Facility: CLINIC | Age: 34
End: 2019-09-11
Payer: COMMERCIAL

## 2019-09-11 ENCOUNTER — NON-APPOINTMENT (OUTPATIENT)
Age: 34
End: 2019-09-11

## 2019-09-11 PROCEDURE — 92012 INTRM OPH EXAM EST PATIENT: CPT

## 2019-09-11 PROCEDURE — 99024 POSTOP FOLLOW-UP VISIT: CPT

## 2019-09-12 ENCOUNTER — RX RENEWAL (OUTPATIENT)
Age: 34
End: 2019-09-12

## 2019-09-12 VITALS
TEMPERATURE: 97.9 F | SYSTOLIC BLOOD PRESSURE: 150 MMHG | DIASTOLIC BLOOD PRESSURE: 92 MMHG | RESPIRATION RATE: 20 BRPM | HEART RATE: 85 BPM | OXYGEN SATURATION: 95 %

## 2019-09-13 ENCOUNTER — RX RENEWAL (OUTPATIENT)
Age: 34
End: 2019-09-13

## 2019-09-16 NOTE — REVIEW OF SYSTEMS
[As Noted in HPI] : as noted in HPI [Negative] : Endocrine [de-identified] : intermittent headache [FreeTextEntry9] : moderate chronic back pain  [FreeTextEntry3] : b/l blurry vision with light sensitivity now worsening on L side

## 2019-09-16 NOTE — PHYSICAL EXAM
[General Appearance - Alert] : alert [General Appearance - In No Acute Distress] : in no acute distress [General Appearance - Well Nourished] : well nourished [Irregular] : irregular [Clean] : clean [Dry] : dry [Healing Well] : healing well [No Drainage] : without drainage [Normal Skin] : normal [Oriented To Time, Place, And Person] : oriented to person, place, and time [Impaired Insight] : insight and judgment were intact [Affect] : the affect was normal [Memory Recent] : recent memory was not impaired [Person] : oriented to person [Place] : oriented to place [Time] : oriented to time [Remote Intact] : remote memory intact [Short Term Intact] : short term memory intact [Registration Intact] : recent registration memory intact [Span Intact] : the attention span was normal [Concentration Intact] : normal concentrating ability [Fluency] : fluency intact [Comprehension] : comprehension intact [Current Events] : adequate knowledge of current events [Past History] : adequate knowledge of personal past history [Vocabulary] : adequate range of vocabulary [Cranial Nerves Oculomotor (III)] : extraocular motion intact [Cranial Nerves Trigeminal (V)] : facial sensation intact symmetrically [Cranial Nerves Facial (VII)] : face symmetrical [Cranial Nerves Vestibulocochlear (VIII)] : hearing was intact bilaterally [Cranial Nerves Glossopharyngeal (IX)] : tongue and palate midline [Cranial Nerves Accessory (XI - Cranial And Spinal)] : head turning and shoulder shrug symmetric [Cranial Nerves Hypoglossal (XII)] : there was no tongue deviation with protrusion [Motor Strength] : muscle strength was normal in all four extremities [Sensation Tactile Decrease] : light touch was intact [5] : S1 flexor hallucis longus 5/5 [Limited Balance] : the patient's balance was impaired [1+] : Patella left 1+ [No Visual Abnormalities] : no visible abnormailities [Normal] : normal [PERRL With Normal Accommodation] : pupils were equal in size, round, reactive to light, with normal accommodation [Extraocular Movements] : extraocular movements were intact [Outer Ear] : the ears and nose were normal in appearance [Examination Of The Oral Cavity] : the lips and gums were normal [Hearing Threshold Finger Rub Not Bates] : hearing was normal [] : no respiratory distress [Exaggerated Use Of Accessory Muscles For Inspiration] : no accessory muscle use [No CVA Tenderness] : no ~M costovertebral angle tenderness [Motor Tone] : muscle strength and tone were normal [Abnormal Walk] : normal gait [Erythema] : not erythematous [Warm] : not warm [Tender] : not tender [Indurated] : not indurated [Fluctuant] : not fluctuant [Romberg's Sign] : Romberg's sign was negtive [Able to toe walk] : the patient was not able to toe walk [Able to heel walk] : the patient was not able to heel walk [FreeTextEntry1] : mild tenderness on right side mid back

## 2019-09-16 NOTE — HISTORY OF PRESENT ILLNESS
[FreeTextEntry1] : Mr. CHIQUIS BARAKAT is a 33 yo male with hypothyroidism, morbid obesity (BMI 80), self reported LAZARO who initially presented to [facility] ED on 7/20/19 for 5 days of b/l blurry/tunnel vision (R>L) with perioral numbness/tingling, pulsatile tinnitus, right arm paresthesias and occasional occipital headache. Initial CTA head/neck and CT venogram showed poor quality image and hypercoagulable work up showed multiple prolonged clotting time with polycythemia.  He was found to have severe papilledema and LP trial showed opening pressure of over 55 concerning pseudotumor cerebri. On 7/30/19, he underwent VPS placement (certas @ 4). Post operative hospital stay was uneventful. Optho f/u showed red hue in periphery with decreasing visual acuity likely r/t altered ICP and started Diamox with weight loss recommendation. He was discharged to home on 8/10/19. \par Today he returns for follow up and states his left side vision has been progressively worse since the last office visit and intermittent occipital headache remained unchanged. He denies n/v, dizziness, abdominal pain, bowel/bladder dysfunction. Currently taking Diamox 500mg BID and scheduled to see Dr. Adkins today. He has not seen bariatric surgeon for weight loss d/t transportation issue. \par surgical incision appears to be healing well. Shunt pumps but refills sluggish and setting checked at 4 today.

## 2019-09-18 ENCOUNTER — APPOINTMENT (OUTPATIENT)
Dept: SURGERY | Facility: CLINIC | Age: 34
End: 2019-09-18
Payer: MEDICAID

## 2019-09-18 VITALS
WEIGHT: 315 LBS | HEART RATE: 104 BPM | SYSTOLIC BLOOD PRESSURE: 135 MMHG | OXYGEN SATURATION: 94 % | HEIGHT: 66 IN | BODY MASS INDEX: 50.62 KG/M2 | DIASTOLIC BLOOD PRESSURE: 87 MMHG

## 2019-09-18 PROCEDURE — 99203 OFFICE O/P NEW LOW 30 MIN: CPT

## 2019-09-18 NOTE — PLAN
[FreeTextEntry1] : We reviewed the plan for outpatient workup, as detailed below.\par -standard preoperative workup to include cardiology and pulmonology clearance, upper endoscopy with biopsy for H pylori, and preoperative fasting blood work, \par -patient to meet with psychologist and nutritionist for preoperative counseling regarding dietary goals, lifestyle changes, and postoperative expectations\par -we will assess the need for a medically-supervised diet, if required by the patient's insurer\par -educational seminars with bariatric coordinator and team\par  - patient was advised to loose weight before surgery

## 2019-09-18 NOTE — REASON FOR VISIT
[Initial Consult] : an initial consult for [Morbid Obesity (BMI>40)] : morbid obesity (bmi>40) [Parent] : parent

## 2019-09-18 NOTE — REVIEW OF SYSTEMS
[Recent Change In Weight] : ~T recent weight change [Vision Problems] : vision problems [Lower Ext Edema] : lower extremity edema [SOB on Exertion] : shortness of breath during exertion [Abdominal Pain] : abdominal pain [Vomiting] : vomiting [Diarrhea] : diarrhea [Dizziness] : dizziness [Negative] : Allergic/Immunologic [Patient Intake Form Reviewed] : Patient intake form was reviewed [Fever] : no fever [Chills] : no chills [Odynophagia] : no odynophagia [Dysphagia] : no dysphagia [Palpitations] : no palpitations [Chest Pain] : no chest pain [Shortness Of Breath] : no shortness of breath [Dysuria] : no dysuria [de-identified] : sometimes

## 2019-09-18 NOTE — ASSESSMENT
[FreeTextEntry1] : \par The patient has failed weight loss by non-operative means. The patient meets criteria for weight loss surgery, based on current BMI of 68 with obesity-related comorbidities of LAZARO . There are no contraindications to bariatric surgery. Risks, benefits, and alternatives to bariatric surgery were fully discussed, as well as expected outcomes. Complications including dehydration, nausea/vomiting, gastroesophageal reflux, perforation or leak, bowel obstruction, bleeding, venous thromboembolism, vitamin deficiency, and need for reoperation were discussed. All the patient's questions were answered and the patient would like to proceed with surgical planning.

## 2019-09-18 NOTE — HISTORY OF PRESENT ILLNESS
[de-identified] : Mr Anastacio Rg  presents for bariatric surgery consultation. Patient has a long-standing history of severe obesity refractory to multiple prior attempts at weight loss including conservative treatments of diet and exercise.  patient reports hospital admission in July for IIH and  shunt placement. patient also reports LAZARO. He did not have a sleep study for official diagnosis. Patient reports blindness since July and need of escort. Patient reports attending Bariatric workshop in [facility]. Patient has been obese since childhood, and the most weight that patient has been able to lose by any means is negligible. Previous weight loss efforts include: Calorie-counting, restricted intake. Patient has limited capacity for exercise due to excess weight and vision problems.  Patient has researched weight loss surgery extensively, and he feels that weight loss surgery is the only option at this point for effective and clinically meaningful weight loss. The patient is interested in undergoing sleeve gastrectomy.

## 2019-09-18 NOTE — PHYSICAL EXAM
[Obese, well nourished, in no acute distress] : obese, well nourished, in no acute distress [Normal] : affect appropriate [de-identified] : bl eye blindness  [de-identified] : short [de-identified] : large pannus extending below pubis. non-tender , soft no palpable masses no hernias  [de-identified] : right hand minor weakness

## 2019-09-19 LAB
ANION GAP SERPL CALC-SCNC: 11 MMOL/L
BUN SERPL-MCNC: 16 MG/DL
CALCIUM SERPL-MCNC: 9.2 MG/DL
CHLORIDE SERPL-SCNC: 111 MMOL/L
CO2 SERPL-SCNC: 20 MMOL/L
CREAT SERPL-MCNC: 0.82 MG/DL
GLUCOSE SERPL-MCNC: 98 MG/DL
POTASSIUM SERPL-SCNC: 3.7 MMOL/L
SODIUM SERPL-SCNC: 142 MMOL/L

## 2019-09-24 ENCOUNTER — OTHER (OUTPATIENT)
Age: 34
End: 2019-09-24

## 2019-10-02 ENCOUNTER — RX RENEWAL (OUTPATIENT)
Age: 34
End: 2019-10-02

## 2019-10-07 ENCOUNTER — APPOINTMENT (OUTPATIENT)
Dept: CT IMAGING | Facility: HOSPITAL | Age: 34
End: 2019-10-07
Payer: MEDICAID

## 2019-10-07 ENCOUNTER — OUTPATIENT (OUTPATIENT)
Dept: OUTPATIENT SERVICES | Facility: HOSPITAL | Age: 34
LOS: 1 days | End: 2019-10-07
Payer: MEDICAID

## 2019-10-07 DIAGNOSIS — G93.2 BENIGN INTRACRANIAL HYPERTENSION: ICD-10-CM

## 2019-10-07 DIAGNOSIS — Z98.2 PRESENCE OF CEREBROSPINAL FLUID DRAINAGE DEVICE: ICD-10-CM

## 2019-10-07 PROCEDURE — 70450 CT HEAD/BRAIN W/O DYE: CPT

## 2019-10-07 PROCEDURE — 70450 CT HEAD/BRAIN W/O DYE: CPT | Mod: 26

## 2019-10-08 ENCOUNTER — APPOINTMENT (OUTPATIENT)
Dept: OPHTHALMOLOGY | Facility: CLINIC | Age: 34
End: 2019-10-08
Payer: MEDICAID

## 2019-10-08 ENCOUNTER — NON-APPOINTMENT (OUTPATIENT)
Age: 34
End: 2019-10-08

## 2019-10-08 PROCEDURE — 92012 INTRM OPH EXAM EST PATIENT: CPT

## 2019-10-10 ENCOUNTER — APPOINTMENT (OUTPATIENT)
Dept: NEUROSURGERY | Facility: CLINIC | Age: 34
End: 2019-10-10
Payer: MEDICAID

## 2019-10-10 ENCOUNTER — INPATIENT (INPATIENT)
Facility: HOSPITAL | Age: 34
LOS: 6 days | Discharge: HOME | DRG: 32 | End: 2019-10-17
Attending: NEUROLOGICAL SURGERY | Admitting: NEUROLOGICAL SURGERY
Payer: MEDICAID

## 2019-10-10 VITALS
HEIGHT: 66 IN | OXYGEN SATURATION: 96 % | TEMPERATURE: 99 F | RESPIRATION RATE: 17 BRPM | WEIGHT: 315 LBS | HEART RATE: 95 BPM | DIASTOLIC BLOOD PRESSURE: 83 MMHG | SYSTOLIC BLOOD PRESSURE: 128 MMHG

## 2019-10-10 DIAGNOSIS — T85.618A BREAKDOWN (MECHANICAL) OF OTHER SPECIFIED INTERNAL PROSTHETIC DEVICES, IMPLANTS AND GRAFTS, INITIAL ENCOUNTER: ICD-10-CM

## 2019-10-10 DIAGNOSIS — Z87.898 PERSONAL HISTORY OF OTHER SPECIFIED CONDITIONS: ICD-10-CM

## 2019-10-10 LAB
ALBUMIN SERPL ELPH-MCNC: 3.9 G/DL — SIGNIFICANT CHANGE UP (ref 3.3–5)
ALP SERPL-CCNC: 118 U/L — SIGNIFICANT CHANGE UP (ref 40–120)
ALT FLD-CCNC: 24 U/L — SIGNIFICANT CHANGE UP (ref 10–45)
ANION GAP SERPL CALC-SCNC: 11 MMOL/L — SIGNIFICANT CHANGE UP (ref 5–17)
APTT BLD: 34.1 SEC — SIGNIFICANT CHANGE UP (ref 27.5–36.3)
AST SERPL-CCNC: 14 U/L — SIGNIFICANT CHANGE UP (ref 10–40)
BASOPHILS # BLD AUTO: 0.11 K/UL — SIGNIFICANT CHANGE UP (ref 0–0.2)
BASOPHILS NFR BLD AUTO: 0.9 % — SIGNIFICANT CHANGE UP (ref 0–2)
BILIRUB SERPL-MCNC: 0.4 MG/DL — SIGNIFICANT CHANGE UP (ref 0.2–1.2)
BLD GP AB SCN SERPL QL: NEGATIVE — SIGNIFICANT CHANGE UP
BUN SERPL-MCNC: 18 MG/DL — SIGNIFICANT CHANGE UP (ref 7–23)
CALCIUM SERPL-MCNC: 8.9 MG/DL — SIGNIFICANT CHANGE UP (ref 8.4–10.5)
CHLORIDE SERPL-SCNC: 110 MMOL/L — HIGH (ref 96–108)
CO2 SERPL-SCNC: 22 MMOL/L — SIGNIFICANT CHANGE UP (ref 22–31)
CREAT SERPL-MCNC: 0.84 MG/DL — SIGNIFICANT CHANGE UP (ref 0.5–1.3)
EOSINOPHIL # BLD AUTO: 0.51 K/UL — HIGH (ref 0–0.5)
EOSINOPHIL NFR BLD AUTO: 4.3 % — SIGNIFICANT CHANGE UP (ref 0–6)
GLUCOSE SERPL-MCNC: 101 MG/DL — HIGH (ref 70–99)
HCT VFR BLD CALC: 45.5 % — SIGNIFICANT CHANGE UP (ref 39–50)
HGB BLD-MCNC: 14.4 G/DL — SIGNIFICANT CHANGE UP (ref 13–17)
INR BLD: 1 RATIO — SIGNIFICANT CHANGE UP (ref 0.88–1.16)
LYMPHOCYTES # BLD AUTO: 1.73 K/UL — SIGNIFICANT CHANGE UP (ref 1–3.3)
LYMPHOCYTES # BLD AUTO: 14.6 % — SIGNIFICANT CHANGE UP (ref 13–44)
MCHC RBC-ENTMCNC: 25 PG — LOW (ref 27–34)
MCHC RBC-ENTMCNC: 31.6 GM/DL — LOW (ref 32–36)
MCV RBC AUTO: 79.1 FL — LOW (ref 80–100)
MONOCYTES # BLD AUTO: 0.71 K/UL — SIGNIFICANT CHANGE UP (ref 0–0.9)
MONOCYTES NFR BLD AUTO: 6 % — SIGNIFICANT CHANGE UP (ref 2–14)
NEUTROPHILS # BLD AUTO: 7.96 K/UL — HIGH (ref 1.8–7.4)
NEUTROPHILS NFR BLD AUTO: 66.4 % — SIGNIFICANT CHANGE UP (ref 43–77)
PLATELET # BLD AUTO: 291 K/UL — SIGNIFICANT CHANGE UP (ref 150–400)
POTASSIUM SERPL-MCNC: 3.6 MMOL/L — SIGNIFICANT CHANGE UP (ref 3.5–5.3)
POTASSIUM SERPL-SCNC: 3.6 MMOL/L — SIGNIFICANT CHANGE UP (ref 3.5–5.3)
PROT SERPL-MCNC: 7.8 G/DL — SIGNIFICANT CHANGE UP (ref 6–8.3)
PROTHROM AB SERPL-ACNC: 11.5 SEC — SIGNIFICANT CHANGE UP (ref 10–12.9)
RBC # BLD: 5.75 M/UL — SIGNIFICANT CHANGE UP (ref 4.2–5.8)
RBC # FLD: 25.7 % — HIGH (ref 10.3–14.5)
RH IG SCN BLD-IMP: POSITIVE — SIGNIFICANT CHANGE UP
SODIUM SERPL-SCNC: 143 MMOL/L — SIGNIFICANT CHANGE UP (ref 135–145)
WBC # BLD: 11.83 K/UL — HIGH (ref 3.8–10.5)
WBC # FLD AUTO: 11.83 K/UL — HIGH (ref 3.8–10.5)

## 2019-10-10 PROCEDURE — 93010 ELECTROCARDIOGRAM REPORT: CPT

## 2019-10-10 PROCEDURE — 70450 CT HEAD/BRAIN W/O DYE: CPT | Mod: 26

## 2019-10-10 PROCEDURE — 99285 EMERGENCY DEPT VISIT HI MDM: CPT

## 2019-10-10 PROCEDURE — 99024 POSTOP FOLLOW-UP VISIT: CPT

## 2019-10-10 RX ORDER — POLYETHYLENE GLYCOL 3350 17 G/17G
17 POWDER, FOR SOLUTION ORAL ONCE
Refills: 0 | Status: COMPLETED | OUTPATIENT
Start: 2019-10-10 | End: 2019-10-14

## 2019-10-10 RX ORDER — ACETAZOLAMIDE 250 MG/1
500 TABLET ORAL
Refills: 0 | Status: DISCONTINUED | OUTPATIENT
Start: 2019-10-10 | End: 2019-10-15

## 2019-10-10 RX ORDER — ONDANSETRON 8 MG/1
4 TABLET, FILM COATED ORAL EVERY 8 HOURS
Refills: 0 | Status: DISCONTINUED | OUTPATIENT
Start: 2019-10-10 | End: 2019-10-15

## 2019-10-10 RX ORDER — SENNA PLUS 8.6 MG/1
1 TABLET ORAL ONCE
Refills: 0 | Status: DISCONTINUED | OUTPATIENT
Start: 2019-10-10 | End: 2019-10-15

## 2019-10-10 RX ORDER — ALBUTEROL 90 UG/1
1 AEROSOL, METERED ORAL ONCE
Refills: 0 | Status: DISCONTINUED | OUTPATIENT
Start: 2019-10-10 | End: 2019-10-15

## 2019-10-10 RX ORDER — OXYCODONE HYDROCHLORIDE 5 MG/1
5 TABLET ORAL ONCE
Refills: 0 | Status: DISCONTINUED | OUTPATIENT
Start: 2019-10-10 | End: 2019-10-11

## 2019-10-10 RX ORDER — INFLUENZA VIRUS VACCINE 15; 15; 15; 15 UG/.5ML; UG/.5ML; UG/.5ML; UG/.5ML
0.5 SUSPENSION INTRAMUSCULAR ONCE
Refills: 0 | Status: DISCONTINUED | OUTPATIENT
Start: 2019-10-10 | End: 2019-10-17

## 2019-10-10 RX ORDER — LEVOTHYROXINE SODIUM 125 MCG
25 TABLET ORAL DAILY
Refills: 0 | Status: DISCONTINUED | OUTPATIENT
Start: 2019-10-10 | End: 2019-10-15

## 2019-10-10 RX ORDER — DOCUSATE SODIUM 100 MG
100 CAPSULE ORAL THREE TIMES A DAY
Refills: 0 | Status: DISCONTINUED | OUTPATIENT
Start: 2019-10-10 | End: 2019-10-15

## 2019-10-10 NOTE — ED PROVIDER NOTE - PHYSICAL EXAMINATION
A&Ox3, NAD. NCAT. PERRL, EOMI, difficulty to assess d/t blindness, Neck supple, no LAD. Lungs CTAB. +S1S2, RRR, No m/r/g. Abd soft, obeseNT/ND, +BS, no rebound or guarding. Extremities: cap refill <2, pulses in distal extremities 4+, no edema. Skin without rash. CN II-XII intact. Strength 5/5 UE/LE. Sensations intact throughout. Gait steady.

## 2019-10-10 NOTE — ED ADULT NURSE NOTE - OBJECTIVE STATEMENT
34 year old male presented to ED with c/o of nonfunctioning  shunt. hx of pseudotumor cerebri, requesting possible revision by neurosurgery via Dr. Cruz. pt denies CP, SOB, nausea/vomiting, numbness/tingling, fever, cough, chills, dizziness, headache, blurred vision, neuro intact. pt a&ox3, lung sounds clear, heart rate regular, abdomen soft nontender nondistended to palp. skin intact. IV in right forearm 20G and patent. Will continue to monitor and assess while offering support and reassurance.

## 2019-10-10 NOTE — ED PROVIDER NOTE - OBJECTIVE STATEMENT
35 yo male with pmh obesity, LAZARO, pseudotumor cerebri s/p shunt placement, BL blindness presenting from Dr. Cruz's office for concern of  shunt blockage. Pt was seeing Dr. Cruz for routine follow up, has minor headache but denies difficulty ambulating, weakness, slurred speech, numbness, tingling, paresthesias.

## 2019-10-10 NOTE — PATIENT PROFILE ADULT - STATED REASON FOR ADMISSION
"went for routine follow up with nsx jalen. She checked shunt and I guess it wasn't working so they will have to revise it and to come striaght to the ER. "

## 2019-10-10 NOTE — H&P ADULT - HISTORY OF PRESENT ILLNESS
34M s/p VPS insertion 7/30/19 for psuedotumor presents to ED after being sent here by Dr. Cruz for likely proximal shunt malfunction.    Patient denies any new neurologic symptoms, specifically denies HA, N, V.    Previous VPS incision is well healed.

## 2019-10-10 NOTE — H&P ADULT - ASSESSMENT
Ifrah Chaves  34M with PMHx psuedotumor s/p VPS presenting at request of Dr. Cruz for possible revision of nonfunctioning  shunt. Patient denies new neurologic symptoms. On exam: Patient is AAOx3, states he has no vision and has not had vision since previous Sx. Shunt pumps easily but does not refill. Full strength bilateral upper and lower extremities. Denies HA, N, V. Previous incision well healed.  - Admit to nsgy  - Preop labs  - CT Stereo  - Likely proximal malfunction but will notify gen surg in case need for distal revision  - On add on schedule for tomorrow

## 2019-10-10 NOTE — ED PROVIDER NOTE - ATTENDING CONTRIBUTION TO CARE
Attending MD Christopher:   I personally have seen and examined this patient.  Physician assistant note reviewed and agree on plan of care and except where noted.  See HPI, PE, and MDM for details.       Attending MD Christopher:    Gen:  NAD, morbidly obese   Neck: supple, no swelling, trachea midline  CV: heart with reg rhythm, no obvious murmur appreciated   Resp: CTAB, breathing comfortably  Abd: soft, NT, ND  Extremities: extremities warm to the touch, no peripheral edema   Msk: no extremity deformities or bony tenderness  Pysch: appropriate affect    Neuro: moves all extremities spontaneously, no gross motor or sensory deficits

## 2019-10-10 NOTE — ED ADULT NURSE NOTE - CHPI ED NUR SYMPTOMS NEG
no blurred vision/no change in level of consciousness/no confusion/no fever/no dizziness/no vomiting/no loss of consciousness/no nausea/no numbness/no weakness

## 2019-10-10 NOTE — ED PROVIDER NOTE - CLINICAL SUMMARY MEDICAL DECISION MAKING FREE TEXT BOX
Attending MD Christopher: 34M with ho pseudotumor cerebri sp  shunt presenting at request of Dr. Cruz of neurosurgery for reportedly nonfunctioning  shunt, here for possible revision. Patient denies any new neurologic symptoms. Neurosurgery consulted and will see patient. Further diagnostics and management will be per neurosurgery evaluation.

## 2019-10-11 DIAGNOSIS — G47.33 OBSTRUCTIVE SLEEP APNEA (ADULT) (PEDIATRIC): ICD-10-CM

## 2019-10-11 DIAGNOSIS — T85.618A BREAKDOWN (MECHANICAL) OF OTHER SPECIFIED INTERNAL PROSTHETIC DEVICES, IMPLANTS AND GRAFTS, INITIAL ENCOUNTER: ICD-10-CM

## 2019-10-11 DIAGNOSIS — Z29.9 ENCOUNTER FOR PROPHYLACTIC MEASURES, UNSPECIFIED: ICD-10-CM

## 2019-10-11 DIAGNOSIS — Z01.818 ENCOUNTER FOR OTHER PREPROCEDURAL EXAMINATION: ICD-10-CM

## 2019-10-11 DIAGNOSIS — E03.9 HYPOTHYROIDISM, UNSPECIFIED: ICD-10-CM

## 2019-10-11 PROCEDURE — 99024 POSTOP FOLLOW-UP VISIT: CPT

## 2019-10-11 PROCEDURE — 99223 1ST HOSP IP/OBS HIGH 75: CPT | Mod: GC

## 2019-10-11 RX ORDER — ACETAMINOPHEN 500 MG
650 TABLET ORAL EVERY 6 HOURS
Refills: 0 | Status: DISCONTINUED | OUTPATIENT
Start: 2019-10-11 | End: 2019-10-15

## 2019-10-11 RX ORDER — SODIUM CHLORIDE 9 MG/ML
3 INJECTION INTRAMUSCULAR; INTRAVENOUS; SUBCUTANEOUS EVERY 8 HOURS
Refills: 0 | Status: DISCONTINUED | OUTPATIENT
Start: 2019-10-11 | End: 2019-10-15

## 2019-10-11 RX ORDER — ENOXAPARIN SODIUM 100 MG/ML
40 INJECTION SUBCUTANEOUS AT BEDTIME
Refills: 0 | Status: DISCONTINUED | OUTPATIENT
Start: 2019-10-11 | End: 2019-10-13

## 2019-10-11 RX ORDER — OXYCODONE HYDROCHLORIDE 5 MG/1
5 TABLET ORAL EVERY 4 HOURS
Refills: 0 | Status: DISCONTINUED | OUTPATIENT
Start: 2019-10-11 | End: 2019-10-15

## 2019-10-11 RX ADMIN — ACETAZOLAMIDE 500 MILLIGRAM(S): 250 TABLET ORAL at 06:26

## 2019-10-11 RX ADMIN — ENOXAPARIN SODIUM 40 MILLIGRAM(S): 100 INJECTION SUBCUTANEOUS at 21:29

## 2019-10-11 RX ADMIN — OXYCODONE HYDROCHLORIDE 5 MILLIGRAM(S): 5 TABLET ORAL at 23:15

## 2019-10-11 RX ADMIN — Medication 100 MILLIGRAM(S): at 06:26

## 2019-10-11 RX ADMIN — SODIUM CHLORIDE 3 MILLILITER(S): 9 INJECTION INTRAMUSCULAR; INTRAVENOUS; SUBCUTANEOUS at 12:12

## 2019-10-11 RX ADMIN — SODIUM CHLORIDE 3 MILLILITER(S): 9 INJECTION INTRAMUSCULAR; INTRAVENOUS; SUBCUTANEOUS at 21:05

## 2019-10-11 RX ADMIN — Medication 25 MICROGRAM(S): at 06:26

## 2019-10-11 RX ADMIN — ACETAZOLAMIDE 500 MILLIGRAM(S): 250 TABLET ORAL at 17:54

## 2019-10-11 RX ADMIN — OXYCODONE HYDROCHLORIDE 5 MILLIGRAM(S): 5 TABLET ORAL at 22:44

## 2019-10-11 NOTE — CONSULT NOTE ADULT - SUBJECTIVE AND OBJECTIVE BOX
CHIQUIS BARAKAT  34y  Male      Patient is a 34y old  Male who presents with a chief complaint of VPS Malfunction (10 Oct 2019 17:02)    HPI:  Pt is a 34F with PMHx of hypothyroidism, morbid obesity, LAZARO/OHS on CPAP QHS, and malignant pseudotumor cerebri c/b blindness and now s/p  shunt on 7/30 who was sent in by Dr. Cruz for nonfunctioning  shunt. Medicine is consulted for pre-op clearance. Pt initially presented in 7/2019 after experiencing right sided vision loss. Pt now states he is blind in both eyes without any light perception. He reports strict compliance with his CPAP machine and uses it every night. He reports improvement in daytime somnolence and snoring, but endorses shortness of breath with minimal activity and is now mostly sedentary given morbid obesity and blindness. He reports occasional wheezing with activity. Memory and concentration are intact and at baseline. He takes diamox and levothyroxine, with which he is also strictly compliant.     Pt reports that his previous surgery went well without complication. He was able to be extubated and placed on BiPAP. He has no hx of adverse rxn to anesthesia or family hx of sudden cardiac death.       PAST MEDICAL/SURGICAL HISTORY  PAST MEDICAL & SURGICAL HISTORY:  LAZARO (obstructive sleep apnea)  Asthma      REVIEW OF SYSTEMS:  CONSTITUTIONAL: No fever, weight loss, or fatigue  EYES: blindness  ENMT:  No difficulty hearing, tinnitus, vertigo; No sinus or throat pain  NECK: No pain or stiffness  BREASTS: No pain, masses, or nipple discharge  RESPIRATORY: No cough, wheezing, chills or hemoptysis; No shortness of breath  CARDIOVASCULAR: No chest pain, palpitations, dizziness, or leg swelling  GASTROINTESTINAL: No abdominal or epigastric pain. No nausea, vomiting, or hematemesis; No diarrhea or constipation. No melena or hematochezia.  GENITOURINARY: No dysuria, frequency, hematuria, or incontinence  NEUROLOGICAL: No headaches, memory loss, loss of strength, numbness, or tremors  SKIN: No itching, burning, rashes, or lesions   LYMPH NODES: No enlarged glands  ENDOCRINE: No heat or cold intolerance; No hair loss  MUSCULOSKELETAL: +back pain, +b/l knee pain  PSYCHIATRIC: No depression, anxiety, mood swings, or difficulty sleeping  HEME/LYMPH: No easy bruising, or bleeding gums  ALLERY AND IMMUNOLOGIC: No hives or eczema    T(C): 36.4 (10-10-19 @ 23:51), Max: 37.3 (10-10-19 @ 15:12)  HR: 82 (10-10-19 @ 23:51) (80 - 95)  BP: 117/69 (10-10-19 @ 23:51) (112/79 - 128/83)  RR: 20 (10-10-19 @ 23:51) (16 - 20)  SpO2: 96% (10-10-19 @ 23:51) (95% - 99%)  Wt(kg): --Vital Signs Last 24 Hrs  T(C): 36.4 (10 Oct 2019 23:51), Max: 37.3 (10 Oct 2019 15:12)  T(F): 97.5 (10 Oct 2019 23:51), Max: 99.1 (10 Oct 2019 15:12)  HR: 82 (10 Oct 2019 23:51) (80 - 95)  BP: 117/69 (10 Oct 2019 23:51) (112/79 - 128/83)  BP(mean): --  RR: 20 (10 Oct 2019 23:51) (16 - 20)  SpO2: 96% (10 Oct 2019 23:51) (95% - 99%)    PHYSICAL EXAM:  GENERAL: NAD  HEAD:  Atraumatic, Normocephalic  EYES: EOMI, PERRLA, conjunctiva and sclera clear  ENMT: No tonsillar erythema, exudates, or enlargement; Moist mucous membranes, Good dentition, No lesions  NECK: Supple, No JVD, Normal thyroid  NERVOUS SYSTEM:  Alert & Oriented X3, Good concentration; Motor Strength 5/5 B/L upper and lower extremities; DTRs 2+ intact and symmetric  CHEST/LUNG: Clear to percussion bilaterally; No rales, rhonchi, wheezing, or rubs  HEART: Regular rate and rhythm; No murmurs, rubs, or gallops  ABDOMEN: Soft, Nontender, Nondistended; Bowel sounds present  EXTREMITIES:  2+ Peripheral Pulses, No clubbing, cyanosis, or edema  LYMPH: No lymphadenopathy noted  SKIN: No rashes or lesions    Consultant(s) Notes Reviewed:  [x ] YES  [ ] NO  Care Discussed with Consultants/Other Providers [ x] YES  [ ] NO    LABS:  CBC   10-10-19 @ 17:00  Hematcorit 45.5  Hemoglobin 14.4  Mean Cell Hemoglobin 25.0  Platelet Count-Automated 291  RBC Count 5.75  Red Cell Distrib Width 25.7  Wbc Count 11.83      BMP  10-10-19 @ 17:00  Anion Gap. Serum 11  Blood Urea Nitrogen,Serm 18  Calcium, Total Serum 8.9  Carbon Dioxide, Serum 22  Chloride, Serum 110  Creatinine, Serum 0.84  eGFR in  133  eGFR in Non Afican American 115  Gloucose, serum 101  Potassium, Serum 3.6  Sodium, Serum 143                  CMP  10-10-19 @ 17:00  Amber Aminotransferase(ALT/SGPT)24  Albumin, Serum 3.9  Alkaline Phosphatase, Serum 118  Anion Gap, Serum 11  Aspartate Aminotransferase (AST/SGOT)14  Bilirubin Total, Serum 0.4  Blood Urea Nitrogen, Serum 18  Calcium,Total Serum 8.9  Carbon Dioxide, Serum 22  Chloride, Serum 110  Creatinine, Serum 0.84  eGFR if  133  eGFR if Non African American 115  Glucose, Serum 101  Potassium, Serum 3.6  Protein Total, Serum 7.8  Sodium, Serum 143                          PT/INR  PT/INR  10-10-19 @ 17:00  INR 1.00  Prothrombin Time Comment --  Prothrobin Time, [id number]      Amylase/Lipase            RADIOLOGY & ADDITIONAL TESTS:    Imaging Personally Reviewed:  [ x ] YES  [ ] NO CHIQUIS BARAKAT  34y  Male      Patient is a 34y old  Male who presents with a chief complaint of VPS Malfunction (10 Oct 2019 17:02)    HPI:  Pt is a 34F with PMHx of hypothyroidism, morbid obesity, LAZARO/OHS on CPAP QHS, and malignant pseudotumor cerebri c/b blindness and now s/p  shunt on 7/30 who was sent in by Dr. Cruz for nonfunctioning  shunt. Medicine is consulted for pre-op clearance. Pt initially presented in 7/2019 after experiencing right sided vision loss. Pt now states he is blind in both eyes without any light perception. He reports strict compliance with his CPAP machine and uses it every night. He reports improvement in daytime somnolence and snoring, but endorses shortness of breath with minimal activity and is now mostly sedentary given morbid obesity and blindness. He reports occasional wheezing with activity. Memory and concentration are intact and at baseline. He takes diamox and levothyroxine, with which he is also strictly compliant.     Pt reports that his previous surgery went well without complication. He was able to be extubated and placed on BiPAP. He has no hx of adverse rxn to anesthesia or family hx of sudden cardiac death.       PAST MEDICAL/SURGICAL HISTORY  PAST MEDICAL & SURGICAL HISTORY:  LAZARO (obstructive sleep apnea)  Asthma      REVIEW OF SYSTEMS:  CONSTITUTIONAL: No fever, weight loss, or fatigue  EYES: blindness  ENMT:  No difficulty hearing, tinnitus, vertigo; No sinus or throat pain  NECK: No pain or stiffness  BREASTS: No pain, masses, or nipple discharge  RESPIRATORY: No cough, wheezing, chills or hemoptysis; No shortness of breath  CARDIOVASCULAR: No chest pain, palpitations, dizziness, or leg swelling  GASTROINTESTINAL: No abdominal or epigastric pain. No nausea, vomiting, or hematemesis; No diarrhea or constipation. No melena or hematochezia.  GENITOURINARY: No dysuria, frequency, hematuria, or incontinence  NEUROLOGICAL: No headaches, memory loss, loss of strength, numbness, or tremors  SKIN: No itching, burning, rashes, or lesions   LYMPH NODES: No enlarged glands  ENDOCRINE: No heat or cold intolerance; No hair loss  MUSCULOSKELETAL: +back pain, +b/l knee pain  PSYCHIATRIC: No depression, anxiety, mood swings, or difficulty sleeping  HEME/LYMPH: No easy bruising, or bleeding gums  ALLERY AND IMMUNOLOGIC: No hives or eczema    T(C): 36.4 (10-10-19 @ 23:51), Max: 37.3 (10-10-19 @ 15:12)  HR: 82 (10-10-19 @ 23:51) (80 - 95)  BP: 117/69 (10-10-19 @ 23:51) (112/79 - 128/83)  RR: 20 (10-10-19 @ 23:51) (16 - 20)  SpO2: 96% (10-10-19 @ 23:51) (95% - 99%)  Wt(kg): --Vital Signs Last 24 Hrs  T(C): 36.4 (10 Oct 2019 23:51), Max: 37.3 (10 Oct 2019 15:12)  T(F): 97.5 (10 Oct 2019 23:51), Max: 99.1 (10 Oct 2019 15:12)  HR: 82 (10 Oct 2019 23:51) (80 - 95)  BP: 117/69 (10 Oct 2019 23:51) (112/79 - 128/83)  BP(mean): --  RR: 20 (10 Oct 2019 23:51) (16 - 20)  SpO2: 96% (10 Oct 2019 23:51) (95% - 99%)    PHYSICAL EXAM:  GENERAL: NAD  HEAD:  Atraumatic, Normocephalic  EYES: EOMI, pupils sluggishly reactive to light  ENMT: No tonsillar erythema, exudates, or enlargement; Moist mucous membranes, Good dentition, No lesions  NECK: Supple, No JVD, Normal thyroid  NERVOUS SYSTEM:  Alert & Oriented X3, Good concentration; Motor Strength 5/5 B/L upper and lower extremities; DTRs 2+ intact and symmetric  CHEST/LUNG: clear to auscultation bilaterally  HEART: Regular rate and rhythm; No murmurs, rubs, or gallops  ABDOMEN: obese, nontender to palpation  EXTREMITIES:  2+ Peripheral Pulses, No clubbing, cyanosis, or edema  LYMPH: No lymphadenopathy noted  SKIN: No rashes or lesions    Consultant(s) Notes Reviewed:  [x ] YES  [ ] NO  Care Discussed with Consultants/Other Providers [ x] YES  [ ] NO    LABS:  CBC   10-10-19 @ 17:00  Hematcorit 45.5  Hemoglobin 14.4  Mean Cell Hemoglobin 25.0  Platelet Count-Automated 291  RBC Count 5.75  Red Cell Distrib Width 25.7  Wbc Count 11.83      BMP  10-10-19 @ 17:00  Anion Gap. Serum 11  Blood Urea Nitrogen,Serm 18  Calcium, Total Serum 8.9  Carbon Dioxide, Serum 22  Chloride, Serum 110  Creatinine, Serum 0.84  eGFR in  133  eGFR in Non Afican American 115  Gloucose, serum 101  Potassium, Serum 3.6  Sodium, Serum 143                  CMP  10-10-19 @ 17:00  Amber Aminotransferase(ALT/SGPT)24  Albumin, Serum 3.9  Alkaline Phosphatase, Serum 118  Anion Gap, Serum 11  Aspartate Aminotransferase (AST/SGOT)14  Bilirubin Total, Serum 0.4  Blood Urea Nitrogen, Serum 18  Calcium,Total Serum 8.9  Carbon Dioxide, Serum 22  Chloride, Serum 110  Creatinine, Serum 0.84  eGFR if  133  eGFR if Non African American 115  Glucose, Serum 101  Potassium, Serum 3.6  Protein Total, Serum 7.8  Sodium, Serum 143                          PT/INR  PT/INR  10-10-19 @ 17:00  INR 1.00  Prothrombin Time Comment --  Prothrobin Time, [id number]      Amylase/Lipase            RADIOLOGY & ADDITIONAL TESTS:    Imaging Personally Reviewed:  [ x ] YES  [ ] NO CHIQUIS BARAKAT  34y  Male    Patient is a 34y old  Male who presents with a chief complaint of VPS Malfunction (10 Oct 2019 17:02)    HPI:  Pt is a 34F with PMHx of hypothyroidism, morbid obesity, LAZARO/OHS on CPAP QHS, and malignant pseudotumor cerebri c/b blindness and now s/p  shunt on 7/30 who was sent in by Dr. Cruz for nonfunctioning  shunt. Pt may have had occasional slight headache Medicine is consulted for pre-op clearance. Pt initially presented in 7/2019 after experiencing right sided vision loss. Pt now states he is blind in both eyes without any light perception. He reports strict compliance with his CPAP machine and uses it every night. He reports improvement in daytime somnolence and snoring, but endorses shortness of breath with minimal activity and is now mostly sedentary given morbid obesity and blindness. He reports occasional wheezing with activity. Memory and concentration are intact and at baseline. He takes diamox and levothyroxine, with which he is also strictly compliant.     Pt reports that his previous surgery went well without complication. He was able to be extubated and placed on BiPAP. He has no hx of adverse rxn to anesthesia or family hx of sudden cardiac death.     PAST MEDICAL/SURGICAL HISTORY  PAST MEDICAL & SURGICAL HISTORY:  LAZARO (obstructive sleep apnea)  Asthma    REVIEW OF SYSTEMS:  CONSTITUTIONAL: No fever, weight loss, or fatigue  EYES: blindness  ENMT:  No difficulty hearing, tinnitus, vertigo; No sinus or throat pain  NECK: No pain or stiffness  BREASTS: No pain, masses, or nipple discharge  RESPIRATORY: No cough, wheezing, chills or hemoptysis; No shortness of breath  CARDIOVASCULAR: No chest pain, palpitations, dizziness, or leg swelling  GASTROINTESTINAL: No abdominal or epigastric pain. No nausea, vomiting, or hematemesis; No diarrhea or constipation. No melena or hematochezia.  GENITOURINARY: No dysuria, frequency, hematuria, or incontinence  NEUROLOGICAL: No headaches, memory loss, loss of strength, numbness, or tremors  SKIN: No itching, burning, rashes, or lesions   LYMPH NODES: No enlarged glands  ENDOCRINE: No heat or cold intolerance; No hair loss  MUSCULOSKELETAL: +back pain, +b/l knee pain  PSYCHIATRIC: No depression, anxiety, mood swings, or difficulty sleeping  HEME/LYMPH: No easy bruising, or bleeding gums  ALLERY AND IMMUNOLOGIC: No hives or eczema    T(C): 36.4 (10-10-19 @ 23:51), Max: 37.3 (10-10-19 @ 15:12)  HR: 82 (10-10-19 @ 23:51) (80 - 95)  BP: 117/69 (10-10-19 @ 23:51) (112/79 - 128/83)  RR: 20 (10-10-19 @ 23:51) (16 - 20)  SpO2: 96% (10-10-19 @ 23:51) (95% - 99%)  Wt(kg): --Vital Signs Last 24 Hrs  T(C): 36.4 (10 Oct 2019 23:51), Max: 37.3 (10 Oct 2019 15:12)  T(F): 97.5 (10 Oct 2019 23:51), Max: 99.1 (10 Oct 2019 15:12)  HR: 82 (10 Oct 2019 23:51) (80 - 95)  BP: 117/69 (10 Oct 2019 23:51) (112/79 - 128/83)  BP(mean): --  RR: 20 (10 Oct 2019 23:51) (16 - 20)  SpO2: 96% (10 Oct 2019 23:51) (95% - 99%)    PHYSICAL EXAM:  GENERAL: NAD  HEAD:  Atraumatic, Normocephalic  EYES: EOMI, pupils sluggishly reactive to light  ENMT: No tonsillar erythema, exudates, or enlargement; Moist mucous membranes, Good dentition, No lesions  NECK: Supple, No JVD, Normal thyroid  NERVOUS SYSTEM:  Alert & Oriented X3, Good concentration; Motor Strength 5/5 B/L upper and lower extremities; DTRs 2+ intact and symmetric  CHEST/LUNG: clear to auscultation bilaterally  HEART: Regular rate and rhythm; No murmurs, rubs, or gallops  ABDOMEN: obese, nontender to palpation  EXTREMITIES:  2+ Peripheral Pulses, No clubbing, cyanosis, or edema  LYMPH: No lymphadenopathy noted  SKIN: No rashes or lesions    Consultant(s) Notes Reviewed:  [x ] YES  [ ] NO  Care Discussed with Consultants/Other Providers [ x] YES  [ ] NO    LABS:  CBC   10-10-19 @ 17:00  Hematcorit 45.5  Hemoglobin 14.4  Mean Cell Hemoglobin 25.0  Platelet Count-Automated 291  RBC Count 5.75  Red Cell Distrib Width 25.7  Wbc Count 11.83    BMP  10-10-19 @ 17:00  Anion Gap. Serum 11  Blood Urea Nitrogen,Serm 18  Calcium, Total Serum 8.9  Carbon Dioxide, Serum 22  Chloride, Serum 110  Creatinine, Serum 0.84  eGFR in  133  eGFR in Non Afican American 115  Gloucose, serum 101  Potassium, Serum 3.6  Sodium, Serum 143    CMP  10-10-19 @ 17:00  Amber Aminotransferase(ALT/SGPT)24  Albumin, Serum 3.9  Alkaline Phosphatase, Serum 118  Anion Gap, Serum 11  Aspartate Aminotransferase (AST/SGOT)14  Bilirubin Total, Serum 0.4  Blood Urea Nitrogen, Serum 18  Calcium,Total Serum 8.9  Carbon Dioxide, Serum 22  Chloride, Serum 110  Creatinine, Serum 0.84  eGFR if  133  eGFR if Non African American 115  Glucose, Serum 101  Potassium, Serum 3.6  Protein Total, Serum 7.8  Sodium, Serum 143    PT/INR  PT/INR  10-10-19 @ 17:00  INR 1.00  Prothrombin Time Comment --  Prothrobin Time, [id number]    Amylase/Lipase  RADIOLOGY & ADDITIONAL TESTS:    Imaging Personally Reviewed:  [ x ] YES  [ ] NO    I have reviewed the labs and imaging. CHIQUIS BARAKAT  34y  Male  Patient is a 34y old  Male who presents with a chief complaint of VPS Malfunction    HPI:  Pt is a 34F with PMHx of hypothyroidism, morbid obesity, LAZARO/OHS on CPAP QHS, and malignant pseudotumor cerebri c/b blindness and now s/p  shunt on 7/30 who was sent in by Dr. Cruz for nonfunctioning  shunt. Pt may have had occasional slight headache Medicine is consulted for pre-op clearance. Pt initially presented in 7/2019 after experiencing right sided vision loss. Pt now states he is blind in both eyes without any light perception. He reports strict compliance with his CPAP machine and uses it every night. He reports improvement in daytime somnolence and snoring, but endorses shortness of breath with minimal activity and is now mostly sedentary given morbid obesity and blindness. He reports occasional wheezing with activity. Memory and concentration are intact and at baseline. He takes diamox and levothyroxine, with which he is also strictly compliant.     Pt reports that his previous surgery went well without complication. He was able to be extubated and placed on BiPAP. He has no hx of adverse rxn to anesthesia or family hx of sudden cardiac death.     PAST MEDICAL/SURGICAL HISTORY  PAST MEDICAL & SURGICAL HISTORY:  LAZARO (obstructive sleep apnea)  Asthma    REVIEW OF SYSTEMS:  CONSTITUTIONAL: No fever, weight loss, or fatigue  EYES: blindness  ENMT:  No difficulty hearing, tinnitus, vertigo; No sinus or throat pain  NECK: No pain or stiffness  BREASTS: No pain, masses, or nipple discharge  RESPIRATORY: No cough, wheezing, chills or hemoptysis; No shortness of breath  CARDIOVASCULAR: No chest pain, palpitations, dizziness, or leg swelling  GASTROINTESTINAL: No abdominal or epigastric pain. No nausea, vomiting, or hematemesis; No diarrhea or constipation. No melena or hematochezia.  GENITOURINARY: No dysuria, frequency, hematuria, or incontinence  NEUROLOGICAL: No headaches, memory loss, loss of strength, numbness, or tremors  SKIN: No itching, burning, rashes, or lesions   LYMPH NODES: No enlarged glands  ENDOCRINE: No heat or cold intolerance; No hair loss  MUSCULOSKELETAL: +back pain, +b/l knee pain  PSYCHIATRIC: No depression, anxiety, mood swings, or difficulty sleeping  HEME/LYMPH: No easy bruising, or bleeding gums  ALLERY AND IMMUNOLOGIC: No hives or eczema    T(C): 36.4 (10-10-19 @ 23:51), Max: 37.3 (10-10-19 @ 15:12)  HR: 82 (10-10-19 @ 23:51) (80 - 95)  BP: 117/69 (10-10-19 @ 23:51) (112/79 - 128/83)  RR: 20 (10-10-19 @ 23:51) (16 - 20)  SpO2: 96% (10-10-19 @ 23:51) (95% - 99%)  Wt(kg): --Vital Signs Last 24 Hrs  T(C): 36.4 (10 Oct 2019 23:51), Max: 37.3 (10 Oct 2019 15:12)  T(F): 97.5 (10 Oct 2019 23:51), Max: 99.1 (10 Oct 2019 15:12)  HR: 82 (10 Oct 2019 23:51) (80 - 95)  BP: 117/69 (10 Oct 2019 23:51) (112/79 - 128/83)  BP(mean): --  RR: 20 (10 Oct 2019 23:51) (16 - 20)  SpO2: 96% (10 Oct 2019 23:51) (95% - 99%)    PHYSICAL EXAM:  GENERAL: NAD  HEAD:  Atraumatic, Normocephalic  EYES: EOMI, pupils sluggishly reactive to light  ENMT: No tonsillar erythema, exudates, or enlargement; Moist mucous membranes, Good dentition, No lesions  NECK: Supple, No JVD, Normal thyroid  NERVOUS SYSTEM:  Alert & Oriented X3, Good concentration; Motor Strength 5/5 B/L upper and lower extremities; DTRs 2+ intact and symmetric  CHEST/LUNG: clear to auscultation bilaterally  HEART: Regular rate and rhythm; No murmurs, rubs, or gallops  ABDOMEN: obese, nontender to palpation  EXTREMITIES:  2+ Peripheral Pulses, No clubbing, cyanosis, or edema  LYMPH: No lymphadenopathy noted  SKIN: No rashes or lesions    Consultant(s) Notes Reviewed:  [x ] YES  [ ] NO  Care Discussed with Consultants/Other Providers [ x] YES  [ ] NO    LABS:  CBC   10-10-19 @ 17:00  Hematcorit 45.5  Hemoglobin 14.4  Mean Cell Hemoglobin 25.0  Platelet Count-Automated 291  RBC Count 5.75  Red Cell Distrib Width 25.7  Wbc Count 11.83    BMP  10-10-19 @ 17:00  Anion Gap. Serum 11  Blood Urea Nitrogen,Serm 18  Calcium, Total Serum 8.9  Carbon Dioxide, Serum 22  Chloride, Serum 110  Creatinine, Serum 0.84  eGFR in  133  eGFR in Non Afican American 115  Gloucose, serum 101  Potassium, Serum 3.6  Sodium, Serum 143    CMP  10-10-19 @ 17:00  Amber Aminotransferase(ALT/SGPT)24  Albumin, Serum 3.9  Alkaline Phosphatase, Serum 118  Anion Gap, Serum 11  Aspartate Aminotransferase (AST/SGOT)14  Bilirubin Total, Serum 0.4  Blood Urea Nitrogen, Serum 18  Calcium,Total Serum 8.9  Carbon Dioxide, Serum 22  Chloride, Serum 110  Creatinine, Serum 0.84  eGFR if  133  eGFR if Non African American 115  Glucose, Serum 101  Potassium, Serum 3.6  Protein Total, Serum 7.8  Sodium, Serum 143    PT/INR  PT/INR  10-10-19 @ 17:00  INR 1.00  Prothrombin Time Comment --  Prothrobin Time, [id number]    Amylase/Lipase  RADIOLOGY & ADDITIONAL TESTS:    Imaging Personally Reviewed:  [ x ] YES  [ ] NO    I have reviewed the labs and imaging.

## 2019-10-11 NOTE — REVIEW OF SYSTEMS
[As Noted in HPI] : as noted in HPI [Negative] : Musculoskeletal [de-identified] : intermittent headache [FreeTextEntry3] : b/l near blindness (no light perception)

## 2019-10-11 NOTE — PROGRESS NOTE ADULT - SUBJECTIVE AND OBJECTIVE BOX
CHIEF COMPLAINT: patient reports no change in vision, he has had no vision in either eye since last surgery, denies HAs N/V  OR cancelled for today as shunt slightly filling sluggishly at bedside with Dr Cruz.     Vital Signs Last 24 Hrs  T(C): 36.6 (11 Oct 2019 15:41), Max: 36.6 (11 Oct 2019 04:27)  T(F): 97.8 (11 Oct 2019 15:41), Max: 97.8 (11 Oct 2019 04:27)  HR: 91 (11 Oct 2019 15:41) (71 - 91)  BP: 133/70 (11 Oct 2019 15:41) (104/68 - 133/70)  BP(mean): --  RR: 18 (11 Oct 2019 15:41) (17 - 20)  SpO2: 98% (11 Oct 2019 15:41) (95% - 98%)    PHYSICAL EXAM:    General: No Acute Distress     Neurological: Awake, alert oriented to person, place and time, Following Commands, bilaterally blind (this is old), Face Symmetrical, Speech Fluent, Moving all extremities, Muscle Strength normal in all four extremities, No Drift, Sensation to Light Touch Intact    Pulmonary: Clear to Auscultation, No Rales, No Rhonchi, No Wheezes     Cardiovascular: S1, S2, Regular Rate and Rhythm     Gastrointestinal: Soft, Nontender, Nondistended     LABS:                        14.4   11.83 )-----------( 291      ( 10 Oct 2019 17:00 )             45.5    10-10    143  |  110<H>  |  18  ----------------------------<  101<H>  3.6   |  22  |  0.84    Ca    8.9      10 Oct 2019 17:00    TPro  7.8  /  Alb  3.9  /  TBili  0.4  /  DBili  x   /  AST  14  /  ALT  24  /  AlkPhos  118  10-10  PT/INR - ( 10 Oct 2019 17:00 )   PT: 11.5 sec;   INR: 1.00 ratio    PTT - ( 10 Oct 2019 17:00 )  PTT:34.1 sec    MEDICATIONS  (STANDING):  acetazolamide    Tablet 500 milliGRAM(s) Oral two times a day  docusate sodium 100 milliGRAM(s) Oral three times a day  influenza   Vaccine 0.5 milliLiter(s) IntraMuscular once  levothyroxine 25 MICROGram(s) Oral daily  polyethylene glycol 3350 17 Gram(s) Oral Once  senna 1 Tablet(s) Oral Once  sodium chloride 0.9% lock flush 3 milliLiter(s) IV Push every 8 hours    MEDICATIONS  (PRN):  acetaminophen   Tablet .. 650 milliGRAM(s) Oral every 6 hours PRN Temp greater or equal to 38C (100.4F), Mild Pain (1 - 3)  ALBUTerol    90 MICROgram(s) HFA Inhaler 1 Puff(s) Inhalation Once PRN bronchospasms  artificial  tears Solution 1 Drop(s) Both EYES daily PRN Dry Eyes  ondansetron Injectable 4 milliGRAM(s) IV Push every 8 hours PRN Nausea and/or Vomiting  oxyCODONE    IR 5 milliGRAM(s) Oral Once PRN Moderate Pain (4 - 6)    DIET: [] Regular [x] CCD [] Renal [] Puree [] Dysphagia [] Tube Feeds:     IMAGING:   < from: CT Head No Cont (10.10.19 @ 18:11) >  INTERPRETATION:  INDICATIONS:  pre op CT stereo  pre op CT stereo  .    TECHNIQUE:  Serial axial images were obtained from the skull base to the   vertex without intravenous contrast. Coronal and sagittal reformatted   images were obtained. Thin section images were obtained using   stereotactic technique.    COMPARISON EXAMINATION: 10/7/2019    FINDINGS:    VENTRICLES AND SULCI:  Unchanged. The ventricles are slitlike. A right   frontal approach intraventricular catheter is again seen, unchanged in   position with the tip in the region of the foramen of Monro. Low-density   changes surround the catheter within the right frontal lobe as seen   previously    INTRA-AXIAL:  Unchanged. No mass effect or hemorrhage.    EXTRA-AXIAL:  No mass or collection is seen.    VISUALIZED SINUSES:  Large left lamina papyracea dehiscence, likely   posttraumatic.    VISUALIZED MASTOIDS:  Clear.    CALVARIUM:  Right frontal tonya hole    MISCELLANEOUS:  None.      IMPRESSION:    Stable exam.    Right frontal approach intraventricular catheter in place.    < end of copied text >

## 2019-10-11 NOTE — CONSULT NOTE ADULT - ASSESSMENT
34F with PMHx of hypothyroidism, morbid obesity, LAZARO/OHS on CPAP QHS, and malignant pseudotumor cerebri c/b blindness and now s/p  shunt on 7/30 who was sent in by Dr. Cruz for nonfunctioning  shunt. Medicine consulted for pre-op clearance for  shunt replacement.

## 2019-10-11 NOTE — CHART NOTE - NSCHARTNOTEFT_GEN_A_CORE
CAPRINI SCORE [CLOT] Score on Admission for     AGE RELATED RISK FACTORS                                                       MOBILITY RELATED FACTORS  [ ] Age 41-60 years                                            (1 Point)                  [ ] Bed rest                                                        (1 Point)  [ ] Age: 61-74 years                                           (2 Points)                 [ ] Plaster cast                                                   (2 Points)  [ ] Age= 75 years                                              (3 Points)                 [ ] Bed bound for more than 72 hours                 (2 Points)    DISEASE RELATED RISK FACTORS                                               GENDER SPECIFIC FACTORS  [ ] Edema in the lower extremities                       (1 Point)                  [ ] Pregnancy                                                     (1 Point)  [ ] Varicose veins                                               (1 Point)                  [ ] Post-partum < 6 weeks                                   (1 Point)             [ x] BMI > 25 Kg/m2                                            (1 Point)                  [ ] Hormonal therapy  or oral contraception          (1 Point)                 [ ] Sepsis (in the previous month)                        (1 Point)            [ ] History of pregnancy complications                 (1 point)  [ ] Pneumonia or serious lung disease                                            [ ] Unexplained or recurrent                     (1 Point)           (in the previous month)                               (1 Point)  [ ] Abnormal pulmonary function test                     (1 Point)                 SURGERY RELATED RISK FACTORS (include planned surgeries)  [ ] Acute myocardial infarction                              (1 Point)                 [ ]  Section                                             (1 Point)  [ ] Congestive heart failure (in the previous month)  (1 Point)         [ ] Minor surgery                                                  (1 Point)   [ ] Inflammatory bowel disease                             (1 Point)                 [ ] Arthroscopic surgery                                        (2 Points)  [ ] Central venous access                                      (2 Points)                 [ ] General surgery lasting more than 45 minutes   (2 Points)       [ ] Stroke (in the previous month)                          (5 Points)               [ ] Elective arthroplasty                                         (5 Points)            [ ] current or past malignancy                              (2 Points)                                                                                                       HEMATOLOGY RELATED FACTORS                                                 TRAUMA RELATED RISK FACTORS  [ ] Prior episodes of VTE                                     (3 Points)                [ ] Fracture of the hip, pelvis, or leg                       (5 Points)  [ ] Positive family history for VTE                         (3 Points)             [ ] Acute spinal cord injury (in the previous month)  (5 Points)  [ ] Prothrombin 59287 A                                     (3 Points)                [ ] Paralysis  (less than 1 month)                             (5 Points)  [ ] Factor V Leiden                                             (3 Points)                  [ ] Multiple Trauma within 1 month                        (5 Points)  [ ] Lupus anticoagulants                                     (3 Points)                                                           [ ] Anticardiolipin antibodies                               (3 Points)                                                       [ ] High homocysteine in the blood                      (3 Points)                                             [ ] Other congenital or acquired thrombophilia      (3 Points)                                                [ ] Heparin induced thrombocytopenia                  (3 Points)                                          Total Score [     1     ]    Risk:  Very low 0   Low 1 to 2   Moderate 3 to 4   High =5       VTE Prophylaxis Recommendations:  [x ] mechanical pneumatic compression devices                                      [ ] contraindicated: _____________________  [ x] chemo prophylaxis                                                                                  [ ] contraindicated _____________________    **** HIGH LIKELIHOOD DVT PRESENT ON ADMISSION  [ ] (please order LE dopplers within 24 hours of admission)

## 2019-10-11 NOTE — CONSULT NOTE ADULT - ATTENDING COMMENTS
34 year old man with pseudotumor cerebi s/p VPS, morbid obesity, LAZARO/OHS on AVAPS at home who presents with  ?VPS malfunction, plan to repair in OR this admission. Pulmonary consulted for pre-op risk stratification and management of LAZARO/OHS    - AVAPS settings have been placed in the chart  - please use AVAPS while sleeping  - if patient does go to the OR he will need to be placed on AVAPS post surgery and kept under close observation until the effects of all sedative medications have worn off    please call with questions
I have reviewed the labs and imaging. I have edited the above note as appropriate and agree with above  34F with PMHx of hypothyroidism, morbid obesity, LAZARO/OHS on CPAP QHS, and malignant pseudotumor cerebri c/b blindness and now s/p  shunt on 7/30 who was sent in by Dr. Cruz for nonfunctioning  shunt with plan for shunt revision. Add on for procedure tomorrow. Pt is overall intermediate risk patient. Difficult to assess functional status given obesity and blindness. He endorses significant improvement in functional status compared to prior admission.  -Would likely not benefit from additional medical work up before needed procedure  -Pulmonology consult in AM  -Would start BIPAP for LAZARO, would also extubate to bipap after procedure but defer to pulmonology  -Cont. levothyroxine  -Cont. acetazolamide  -DVT PPx, as per neurosurgery

## 2019-10-11 NOTE — CONSULT NOTE ADULT - ASSESSMENT
This is a 35 y/o M with PMHx of IIH s/p VPS, morbid obesity, LAZARO/OHS on AVAPS/BIPAP at home who presents with VPS malfunction, plan to repair in OR this admission. Pulmonary consulted for pre-op risk stratification and management of LAZARO/OHS.    #LAZARO/OHS  -     -----------------------------------  Ang Bernard PGY-4  Pulmonary/Critical Care Fellow  Pager: [phone] ([facility]) [phone] (NS)  Pulmonary Spectra #[id number] This is a 35 y/o M with PMHx of IIH s/p VPS, morbid obesity, LAZARO/OHS on AVAPS/BIPAP at home who presents with VPS malfunction, plan to repair in OR this admission. Pulmonary consulted for pre-op risk stratification and management of LAZARO/OHS.    #LAZARO/OHS  - start patient on AVAPS: settings , Pmax 35, PShi 25, PSlo 15, EPAP 10  - patient has no acute respiratory complaints at this time  - patient is at low risk for pulmonary complications for planned VPS revision, if he is placed under general anesthesia he should be extubated to AVAPS with same settings as above  - attempt to keep net negative fluid status before planned procedure Monday    -----------------------------------  Ang Bernard PGY-4  Pulmonary/Critical Care Fellow  Pager: [phone] ([facility]) [phone] (NS)  Pulmonary Spectra #[id number]

## 2019-10-11 NOTE — CONSULT NOTE ADULT - PROBLEM SELECTOR RECOMMENDATION 9
Pt scheduled for  shunt replacement in AM. Pt tolerated  shunt placement in 7/2018 well without complication.  - Recommend continuing home CPAP QHS with home settings while inpatient  - REN done in 8/2018 was suboptimal study, but showed grossly normal LVSF  - Although RCRI is 0, given pt's poor functional status, body habitus, and poor exercise tolerance, pt is intermediate risk for a low risk procedure.  - close post-op monitoring due to hx of LAZARO/OHS Pt scheduled for  shunt revision in AM. Pt tolerated  shunt placement in 7/2018 well without complication. Had plan to extubate to bipap after previous procedure. Pt has been more compliant with CPAP since prior admission, while overall functional status still limited, pt endorses it is improved compared to prior admission.  - Recommend continuing home CPAP/BIPAP QHS while inpatient  - TTE done in 8/2018 was suboptimal study, but showed grossly normal LVSF  - Although RCRI is 0, given pt's poor functional status, body habitus, and poor exercise tolerance, pt is intermediate risk for a low risk procedure.  - Pt would likely not benefit from additional medicine testing and thus would not delay needed procedure. No absolute medical contraindication for shunt revision  -Would resume bipap 20/8 FI02 30% bipap inpatient at night and post procedure for now  -Would consult pulmonology in AM to assist in optimization for procedure

## 2019-10-11 NOTE — CONSULT NOTE ADULT - SUBJECTIVE AND OBJECTIVE BOX
CHIEF COMPLAINT: VPS malfunction    HPI:  Patient is a 35 y/o M with PMHx of IIH s/p VPS, LAZARO/OHS on AVAPS/BIPAP at home who presents from clinic for suspected VPS malfunction. Plan to repair VPS in OR, pulmonary consulted for pre-op risk stratification and assistance in LAZARO/OHS management.    Patient seen and examined at bedside.     PAST MEDICAL & SURGICAL HISTORY:  LAZARO (obstructive sleep apnea)  Asthma      FAMILY HISTORY:      SOCIAL HISTORY:  Smoking: [ ] Never Smoked [ ] Former Smoker (__ packs x ___ years) [ ] Current Smoker  (__ packs x ___ years)  Substance Use: [ ] Never Used [ ] Used ____  EtOH Use:  Marital Status: [ ] Single [ ]  [ ]  [ ]   Sexual History:   Occupation:  Recent Travel:  Country of Birth:  Advance Directives:    Allergies    No Known Allergies    Intolerances        HOME MEDICATIONS:  Home Medications:  docusate sodium 100 mg oral capsule: 1 cap(s) orally 3 times a day (10 Aug 2019 16:27)  ocular lubricant ophthalmic solution: 1 drop(s) to each affected eye once a day, As needed, Dry Eyes (10 Aug 2019 16:27)  ocular lubricant ophthalmic solution: 1 drop(s) to each affected eye 4 times a day (10 Aug 2019 16:27)  polyethylene glycol 3350 oral powder for reconstitution: 17 gram(s) orally once a day (10 Aug 2019 16:27)  senna oral tablet: 1 tab(s) orally once a day (10 Aug 2019 16:27)      REVIEW OF SYSTEMS:  Constitutional: [ ] negative [ ] fevers [ ] chills [ ] weight loss [ ] weight gain  HEENT: [ ] negative [ ] dry eyes [ ] eye irritation [ ] postnasal drip [ ] nasal congestion  CV: [ ] negative  [ ] chest pain [ ] orthopnea [ ] palpitations [ ] murmur  Resp: [ ] negative [ ] cough [ ] shortness of breath [ ] dyspnea [ ] wheezing [ ] sputum [ ] hemoptysis  GI: [ ] negative [ ] nausea [ ] vomiting [ ] diarrhea [ ] constipation [ ] abd pain [ ] dysphagia   : [ ] negative [ ] dysuria [ ] nocturia [ ] hematuria [ ] increased urinary frequency  Musculoskeletal: [ ] negative [ ] back pain [ ] myalgias [ ] arthralgias [ ] fracture  Skin: [ ] negative [ ] rash [ ] itch  Neurological: [ ] negative [ ] headache [ ] dizziness [ ] syncope [ ] weakness [ ] numbness  Psychiatric: [ ] negative [ ] anxiety [ ] depression  Endocrine: [ ] negative [ ] diabetes [ ] thyroid problem  Hematologic/Lymphatic: [ ] negative [ ] anemia [ ] bleeding problem  Allergic/Immunologic: [ ] negative [ ] itchy eyes [ ] nasal discharge [ ] hives [ ] angioedema  [ ] All other systems negative  [ ] Unable to assess ROS because ________    OBJECTIVE:  ICU Vital Signs Last 24 Hrs  T(C): 36.5 (11 Oct 2019 12:31), Max: 37.3 (10 Oct 2019 15:12)  T(F): 97.7 (11 Oct 2019 12:31), Max: 99.1 (10 Oct 2019 15:12)  HR: 86 (11 Oct 2019 12:31) (71 - 95)  BP: 106/75 (11 Oct 2019 12:31) (104/68 - 128/83)  BP(mean): --  ABP: --  ABP(mean): --  RR: 18 (11 Oct 2019 12:31) (16 - 20)  SpO2: 95% (11 Oct 2019 12:31) (95% - 99%)        CAPILLARY BLOOD GLUCOSE          PHYSICAL EXAM:  General:   HEENT:   Lymph Nodes:  Neck:   Respiratory:   Cardiovascular:   Abdomen:   Extremities:   Skin:   Neurological:  Psychiatry:    LINES:     HOSPITAL MEDICATIONS:  Standing Meds:  acetazolamide    Tablet 500 milliGRAM(s) Oral two times a day  docusate sodium 100 milliGRAM(s) Oral three times a day  influenza   Vaccine 0.5 milliLiter(s) IntraMuscular once  levothyroxine 25 MICROGram(s) Oral daily  polyethylene glycol 3350 17 Gram(s) Oral Once  senna 1 Tablet(s) Oral Once  sodium chloride 0.9% lock flush 3 milliLiter(s) IV Push every 8 hours      PRN Meds:  acetaminophen   Tablet .. 650 milliGRAM(s) Oral every 6 hours PRN  ALBUTerol    90 MICROgram(s) HFA Inhaler 1 Puff(s) Inhalation Once PRN  artificial  tears Solution 1 Drop(s) Both EYES daily PRN  ondansetron Injectable 4 milliGRAM(s) IV Push every 8 hours PRN  oxyCODONE    IR 5 milliGRAM(s) Oral Once PRN      LABS:                        14.4   11.83 )-----------( 291      ( 10 Oct 2019 17:00 )             45.5     Hgb Trend: 14.4<--  10-10    143  |  110<H>  |  18  ----------------------------<  101<H>  3.6   |  22  |  0.84    Ca    8.9      10 Oct 2019 17:00    TPro  7.8  /  Alb  3.9  /  TBili  0.4  /  DBili  x   /  AST  14  /  ALT  24  /  AlkPhos  118  10-10    Creatinine Trend: 0.84<--  PT/INR - ( 10 Oct 2019 17:00 )   PT: 11.5 sec;   INR: 1.00 ratio         PTT - ( 10 Oct 2019 17:00 )  PTT:34.1 sec          MICROBIOLOGY:       RADIOLOGY:  [ ] Reviewed and interpreted by me    PULMONARY FUNCTION TESTS:    EKG: CHIEF COMPLAINT: VPS malfunction    HPI:  Patient is a 35 y/o M with PMHx of IIH s/p VPS c/b blindness, morbid obesity, LAZARO/OHS on AVAPS/BIPAP at home, hypothyroidism who presents from clinic for suspected VPS malfunction. Plan to repair VPS in OR, pulmonary consulted for pre-op risk stratification and assistance in LAZARO/OHS management.    Patient seen and examined at bedside.     PAST MEDICAL & SURGICAL HISTORY:  LAZARO (obstructive sleep apnea)  Asthma  Idiopathic Intracranial Hypertension  Hypothyroidism    FAMILY HISTORY:      SOCIAL HISTORY:  Smoking: [ ] Never Smoked [ ] Former Smoker (__ packs x ___ years) [ ] Current Smoker  (__ packs x ___ years)  Substance Use: [ ] Never Used [ ] Used ____  EtOH Use:  Marital Status: [ ] Single [ ]  [ ]  [ ]   Sexual History:   Occupation:  Recent Travel:  Country of Birth:  Advance Directives:    Allergies    No Known Allergies    Intolerances      HOME MEDICATIONS:  Home Medications:  docusate sodium 100 mg oral capsule: 1 cap(s) orally 3 times a day (10 Aug 2019 16:27)  ocular lubricant ophthalmic solution: 1 drop(s) to each affected eye once a day, As needed, Dry Eyes (10 Aug 2019 16:27)  ocular lubricant ophthalmic solution: 1 drop(s) to each affected eye 4 times a day (10 Aug 2019 16:27)  polyethylene glycol 3350 oral powder for reconstitution: 17 gram(s) orally once a day (10 Aug 2019 16:27)  senna oral tablet: 1 tab(s) orally once a day (10 Aug 2019 16:27)      REVIEW OF SYSTEMS:  Constitutional: [ ] negative [ ] fevers [ ] chills [ ] weight loss [ ] weight gain  HEENT: [ ] negative [ ] dry eyes [ ] eye irritation [ ] postnasal drip [ ] nasal congestion  CV: [ ] negative  [ ] chest pain [ ] orthopnea [ ] palpitations [ ] murmur  Resp: [ ] negative [ ] cough [ ] shortness of breath [ ] dyspnea [ ] wheezing [ ] sputum [ ] hemoptysis  GI: [ ] negative [ ] nausea [ ] vomiting [ ] diarrhea [ ] constipation [ ] abd pain [ ] dysphagia   : [ ] negative [ ] dysuria [ ] nocturia [ ] hematuria [ ] increased urinary frequency  Musculoskeletal: [ ] negative [ ] back pain [ ] myalgias [ ] arthralgias [ ] fracture  Skin: [ ] negative [ ] rash [ ] itch  Neurological: [ ] negative [ ] headache [ ] dizziness [ ] syncope [ ] weakness [ ] numbness  Psychiatric: [ ] negative [ ] anxiety [ ] depression  Endocrine: [ ] negative [ ] diabetes [ ] thyroid problem  Hematologic/Lymphatic: [ ] negative [ ] anemia [ ] bleeding problem  Allergic/Immunologic: [ ] negative [ ] itchy eyes [ ] nasal discharge [ ] hives [ ] angioedema  [ ] All other systems negative  [ ] Unable to assess ROS because ________    OBJECTIVE:  ICU Vital Signs Last 24 Hrs  T(C): 36.5 (11 Oct 2019 12:31), Max: 37.3 (10 Oct 2019 15:12)  T(F): 97.7 (11 Oct 2019 12:31), Max: 99.1 (10 Oct 2019 15:12)  HR: 86 (11 Oct 2019 12:31) (71 - 95)  BP: 106/75 (11 Oct 2019 12:31) (104/68 - 128/83)  BP(mean): --  ABP: --  ABP(mean): --  RR: 18 (11 Oct 2019 12:31) (16 - 20)  SpO2: 95% (11 Oct 2019 12:31) (95% - 99%)      CAPILLARY BLOOD GLUCOSE      PHYSICAL EXAM:  General:   HEENT:   Neck:   Respiratory:   Cardiovascular:   Abdomen:   Extremities:   Skin:   Neurological:  Psychiatry:    LINES:     HOSPITAL MEDICATIONS:  Standing Meds:  acetazolamide    Tablet 500 milliGRAM(s) Oral two times a day  docusate sodium 100 milliGRAM(s) Oral three times a day  influenza   Vaccine 0.5 milliLiter(s) IntraMuscular once  levothyroxine 25 MICROGram(s) Oral daily  polyethylene glycol 3350 17 Gram(s) Oral Once  senna 1 Tablet(s) Oral Once  sodium chloride 0.9% lock flush 3 milliLiter(s) IV Push every 8 hours      PRN Meds:  acetaminophen   Tablet .. 650 milliGRAM(s) Oral every 6 hours PRN  ALBUTerol    90 MICROgram(s) HFA Inhaler 1 Puff(s) Inhalation Once PRN  artificial  tears Solution 1 Drop(s) Both EYES daily PRN  ondansetron Injectable 4 milliGRAM(s) IV Push every 8 hours PRN  oxyCODONE    IR 5 milliGRAM(s) Oral Once PRN      LABS:                        14.4   11.83 )-----------( 291      ( 10 Oct 2019 17:00 )             45.5     Hgb Trend: 14.4<--  10-10    143  |  110<H>  |  18  ----------------------------<  101<H>  3.6   |  22  |  0.84    Ca    8.9      10 Oct 2019 17:00    TPro  7.8  /  Alb  3.9  /  TBili  0.4  /  DBili  x   /  AST  14  /  ALT  24  /  AlkPhos  118  10-10    Creatinine Trend: 0.84<--  PT/INR - ( 10 Oct 2019 17:00 )   PT: 11.5 sec;   INR: 1.00 ratio         PTT - ( 10 Oct 2019 17:00 )  PTT:34.1 sec          MICROBIOLOGY:       RADIOLOGY:  [ ] Reviewed and interpreted by me    PULMONARY FUNCTION TESTS:    EKG: CHIEF COMPLAINT: VPS malfunction    HPI:  Patient is a 33 y/o M with PMHx of IIH s/p VPS c/b blindness, morbid obesity, LAZARO/OHS on AVAPS/BIPAP at home, hypothyroidism who presents from clinic for suspected VPS malfunction. Plan to repair VPS in OR, pulmonary consulted for pre-op risk stratification and assistance in LAZARO/OHS management.    Patient seen and examined at bedside. Reports he feels well, has mild headache that is worse when lying down. Denies any f/c, SOB, cough, chest pain, pleuritic chest pain, sick contacts or recent bouts of respiratory infections. Reports compliance with AVAPS machine at home.    PAST MEDICAL & SURGICAL HISTORY:  LAZARO (obstructive sleep apnea)  Asthma  Idiopathic Intracranial Hypertension  Hypothyroidism    FAMILY HISTORY:  Noncontributory    SOCIAL HISTORY:  Smoking: [x] Never Smoked [ ] Former Smoker (__ packs x ___ years) [ ] Current Smoker  (__ packs x ___ years)  Substance Use: [ ] Never Used [x] Used marijuana  EtOH Use: denies  Marital Status: [ ] Single [ ]  [ ]  [ ]     Allergies    No Known Allergies    Intolerances      HOME MEDICATIONS:  Home Medications:  docusate sodium 100 mg oral capsule: 1 cap(s) orally 3 times a day (10 Aug 2019 16:27)  ocular lubricant ophthalmic solution: 1 drop(s) to each affected eye once a day, As needed, Dry Eyes (10 Aug 2019 16:27)  ocular lubricant ophthalmic solution: 1 drop(s) to each affected eye 4 times a day (10 Aug 2019 16:27)  polyethylene glycol 3350 oral powder for reconstitution: 17 gram(s) orally once a day (10 Aug 2019 16:27)  senna oral tablet: 1 tab(s) orally once a day (10 Aug 2019 16:27)      REVIEW OF SYSTEMS:  Constitutional: [ ] negative [ ] fevers [ ] chills [ ] weight loss [ ] weight gain  HEENT: [ ] negative [ ] dry eyes [ ] eye irritation [ ] postnasal drip [ ] nasal congestion  CV: [ ] negative  [ ] chest pain [ ] orthopnea [ ] palpitations [ ] murmur  Resp: [ ] negative [ ] cough [ ] shortness of breath [ ] dyspnea [ ] wheezing [ ] sputum [ ] hemoptysis  GI: [ ] negative [ ] nausea [ ] vomiting [ ] diarrhea [ ] constipation [ ] abd pain [ ] dysphagia   : [ ] negative [ ] dysuria [ ] nocturia [ ] hematuria [ ] increased urinary frequency  Musculoskeletal: [ ] negative [ ] back pain [ ] myalgias [ ] arthralgias [ ] fracture  Skin: [ ] negative [ ] rash [ ] itch  Neurological: [ ] negative [x] headache [ ] dizziness [ ] syncope [ ] weakness [ ] numbness  Psychiatric: [ ] negative [ ] anxiety [ ] depression  Endocrine: [ ] negative [ ] diabetes [ ] thyroid problem  Hematologic/Lymphatic: [ ] negative [ ] anemia [ ] bleeding problem  Allergic/Immunologic: [ ] negative [ ] itchy eyes [ ] nasal discharge [ ] hives [ ] angioedema  [x] All other systems negative  [ ] Unable to assess ROS because ________    OBJECTIVE:  ICU Vital Signs Last 24 Hrs  T(C): 36.5 (11 Oct 2019 12:31), Max: 37.3 (10 Oct 2019 15:12)  T(F): 97.7 (11 Oct 2019 12:31), Max: 99.1 (10 Oct 2019 15:12)  HR: 86 (11 Oct 2019 12:31) (71 - 95)  BP: 106/75 (11 Oct 2019 12:31) (104/68 - 128/83)  BP(mean): --  ABP: --  ABP(mean): --  RR: 18 (11 Oct 2019 12:31) (16 - 20)  SpO2: 95% (11 Oct 2019 12:31) (95% - 99%)      CAPILLARY BLOOD GLUCOSE      PHYSICAL EXAM:  General: awake and alert, well appearing obese male sitting up in bed, NAD  HEENT: well healed surgical scar on skull, conjunctiva normal, MMM  Neck: supple  Respiratory: CTA b/l, no w/r/c appreciated, difficult to auscultate  Cardiovascular: S1 S2 present, RRR, no m/r/g appreciated, difficult to auscultate  Abdomen: soft, obese, NT/ND  Extremities: no c/c/e  Skin: no rashes or lesions noted  Neurological: AAOx3, blind  Psychiatry: calm, cooperative    LINES:     HOSPITAL MEDICATIONS:  Standing Meds:  acetazolamide    Tablet 500 milliGRAM(s) Oral two times a day  docusate sodium 100 milliGRAM(s) Oral three times a day  influenza   Vaccine 0.5 milliLiter(s) IntraMuscular once  levothyroxine 25 MICROGram(s) Oral daily  polyethylene glycol 3350 17 Gram(s) Oral Once  senna 1 Tablet(s) Oral Once  sodium chloride 0.9% lock flush 3 milliLiter(s) IV Push every 8 hours      PRN Meds:  acetaminophen   Tablet .. 650 milliGRAM(s) Oral every 6 hours PRN  ALBUTerol    90 MICROgram(s) HFA Inhaler 1 Puff(s) Inhalation Once PRN  artificial  tears Solution 1 Drop(s) Both EYES daily PRN  ondansetron Injectable 4 milliGRAM(s) IV Push every 8 hours PRN  oxyCODONE    IR 5 milliGRAM(s) Oral Once PRN      LABS:                        14.4   11.83 )-----------( 291      ( 10 Oct 2019 17:00 )             45.5     Hgb Trend: 14.4<--  10-10    143  |  110<H>  |  18  ----------------------------<  101<H>  3.6   |  22  |  0.84    Ca    8.9      10 Oct 2019 17:00    TPro  7.8  /  Alb  3.9  /  TBili  0.4  /  DBili  x   /  AST  14  /  ALT  24  /  AlkPhos  118  10-10    Creatinine Trend: 0.84<--  PT/INR - ( 10 Oct 2019 17:00 )   PT: 11.5 sec;   INR: 1.00 ratio         PTT - ( 10 Oct 2019 17:00 )  PTT:34.1 sec      MICROBIOLOGY:       RADIOLOGY:  [x] Reviewed and interpreted by me    PULMONARY FUNCTION TESTS: none on file

## 2019-10-11 NOTE — ASSESSMENT
[FreeTextEntry1] : This is a 33 yo male with pseudotumor cerebri who is s/p VPS ([employer], last setting @ 2). He c/o worsening b/l vision and now near blind. Recent repeat CTH shows stable shunt placement without subdural collection, no changes in ventricles. Shunt pumps but no refill today. I advised patient to go to [facility] ED today for possible revision of the malfunctioning shunt.

## 2019-10-11 NOTE — HISTORY OF PRESENT ILLNESS
[FreeTextEntry1] : Mr. CHIQUIS BARAKAT is a 33 yo male with hypothyroidism, morbid obesity (BMI 80), self reported LAZARO (on CPAP) who initially presented to [facility] ED on 7/20/19 for 5 days of b/l blurry/tunnel vision (R>L) with perioral numbness/tingling, pulsatile tinnitus, right arm paresthesias and occasional occipital headache. Initial CTA head/neck and CT venogram showed poor quality image and hypercoagulable work up showed multiple prolonged clotting time with polycythemia.  He was found to have severe papilledema and LP trial showed opening pressure of over 55 concerning pseudotumor cerebri. On 7/30/19, he underwent VPS placement (certas @ 4). Post operative hospital stay was uneventful. Optho f/u showed red hue in periphery with decreasing visual acuity likely r/t altered ICP and started Diamox with weight loss recommendation. He was discharged to home on 8/10/19. \par Today he returns for follow up and c/o worsening vision (now near blind in both eyes) since the last office visit. He was seen by Dr. Adkins 2 days ago and advised to taper Diamox down to 500mg BID. \par He continues to have intermittent mild headache but denies dizziness, weakness, seizure activity, n/v. He walks with cane and assist and assist to perform ADLs 2/2 visual deficits. Surgical incision appears to be healed well, shunt pumps but no refills.

## 2019-10-11 NOTE — CONSULT NOTE ADULT - PROBLEM SELECTOR RECOMMENDATION 2
Pt has been compliant with cpap at home recently. He states CPAP self adjusts setting. He is functionally blind.   -Would resume bipap at bedtime  -See above regarding pulmonology consult  -Will defer to pulm if pt should start symbicort and standing nebs

## 2019-10-11 NOTE — PHYSICAL EXAM
[General Appearance - In No Acute Distress] : in no acute distress [General Appearance - Alert] : alert [Irregular] : irregular [Clean] : clean [Dry] : dry [No Drainage] : without drainage [Well-Healed] : well-healed [Normal Skin] : normal [Oriented To Time, Place, And Person] : oriented to person, place, and time [Impaired Insight] : insight and judgment were intact [Affect] : the affect was normal [Memory Recent] : recent memory was not impaired [Person] : oriented to person [Time] : oriented to time [Place] : oriented to place [Short Term Intact] : short term memory intact [Remote Intact] : remote memory intact [Registration Intact] : recent registration memory intact [Span Intact] : the attention span was normal [Concentration Intact] : normal concentrating ability [Fluency] : fluency intact [Comprehension] : comprehension intact [Past History] : adequate knowledge of personal past history [Current Events] : adequate knowledge of current events [Vocabulary] : adequate range of vocabulary [Cranial Nerves Facial (VII)] : face symmetrical [Cranial Nerves Trigeminal (V)] : facial sensation intact symmetrically [Cranial Nerves Vestibulocochlear (VIII)] : hearing was intact bilaterally [Cranial Nerves Glossopharyngeal (IX)] : tongue and palate midline [Cranial Nerves Accessory (XI - Cranial And Spinal)] : head turning and shoulder shrug symmetric [Cranial Nerves Hypoglossal (XII)] : there was no tongue deviation with protrusion [Motor Strength] : muscle strength was normal in all four extremities [5] : S1 flexor hallucis longus 5/5 [Limited Balance] : the patient's balance was impaired [Sensation Tactile Decrease] : light touch was intact [1+] : Patella left 1+ [Normal] : normal [No Visual Abnormalities] : no visible abnormailities [Hearing Threshold Finger Rub Not Edwards] : hearing was normal [Examination Of The Oral Cavity] : the lips and gums were normal [] : no respiratory distress [Exaggerated Use Of Accessory Muscles For Inspiration] : no accessory muscle use [No Spinal Tenderness] : no spinal tenderness [Abnormal Walk] : normal gait [No CVA Tenderness] : no ~M costovertebral angle tenderness [Motor Tone] : muscle strength and tone were normal [Erythema] : not erythematous [Tender] : not tender [Warm] : not warm [Indurated] : not indurated [FreeTextEntry1] : b/l blindness

## 2019-10-12 LAB
APPEARANCE CSF: CLEAR — SIGNIFICANT CHANGE UP
APPEARANCE SPUN FLD: COLORLESS — SIGNIFICANT CHANGE UP
COLOR CSF: SIGNIFICANT CHANGE UP
EOSINOPHIL # CSF: 48 % — SIGNIFICANT CHANGE UP
GLUCOSE CSF-MCNC: 75 MG/DL — HIGH (ref 40–70)
LACTATE CSF-MCNC: 1.9 MMOL/L — SIGNIFICANT CHANGE UP (ref 1.1–2.4)
LYMPHOCYTES # CSF: 38 % — LOW (ref 40–80)
MONOS+MACROS NFR CSF: 10 % — LOW (ref 15–45)
NEUTROPHILS # CSF: 3 % — SIGNIFICANT CHANGE UP (ref 0–6)
NRBC NFR CSF: 21 /UL — HIGH (ref 0–5)
OTHER CELLS CSF MANUAL: 1 % — HIGH (ref 0–0)
PROT CSF-MCNC: 36 MG/DL — SIGNIFICANT CHANGE UP (ref 15–45)
RBC # CSF: 445 /UL — HIGH (ref 0–0)
TUBE TYPE: SIGNIFICANT CHANGE UP

## 2019-10-12 PROCEDURE — 70450 CT HEAD/BRAIN W/O DYE: CPT | Mod: 26

## 2019-10-12 PROCEDURE — 99024 POSTOP FOLLOW-UP VISIT: CPT

## 2019-10-12 PROCEDURE — 88108 CYTOPATH CONCENTRATE TECH: CPT | Mod: 26

## 2019-10-12 RX ADMIN — OXYCODONE HYDROCHLORIDE 5 MILLIGRAM(S): 5 TABLET ORAL at 20:35

## 2019-10-12 RX ADMIN — Medication 100 MILLIGRAM(S): at 21:36

## 2019-10-12 RX ADMIN — Medication 25 MICROGRAM(S): at 05:28

## 2019-10-12 RX ADMIN — OXYCODONE HYDROCHLORIDE 5 MILLIGRAM(S): 5 TABLET ORAL at 20:05

## 2019-10-12 RX ADMIN — OXYCODONE HYDROCHLORIDE 5 MILLIGRAM(S): 5 TABLET ORAL at 10:24

## 2019-10-12 RX ADMIN — ACETAZOLAMIDE 500 MILLIGRAM(S): 250 TABLET ORAL at 17:50

## 2019-10-12 RX ADMIN — SODIUM CHLORIDE 3 MILLILITER(S): 9 INJECTION INTRAMUSCULAR; INTRAVENOUS; SUBCUTANEOUS at 14:43

## 2019-10-12 RX ADMIN — SODIUM CHLORIDE 3 MILLILITER(S): 9 INJECTION INTRAMUSCULAR; INTRAVENOUS; SUBCUTANEOUS at 05:26

## 2019-10-12 RX ADMIN — ENOXAPARIN SODIUM 40 MILLIGRAM(S): 100 INJECTION SUBCUTANEOUS at 21:36

## 2019-10-12 RX ADMIN — SODIUM CHLORIDE 3 MILLILITER(S): 9 INJECTION INTRAMUSCULAR; INTRAVENOUS; SUBCUTANEOUS at 21:34

## 2019-10-12 RX ADMIN — ACETAZOLAMIDE 500 MILLIGRAM(S): 250 TABLET ORAL at 05:28

## 2019-10-12 NOTE — PROGRESS NOTE ADULT - SUBJECTIVE AND OBJECTIVE BOX
CHIEF COMPLAINT: patient reports no change in vision, he has had no vision in either eye since last surgery, denies HAs N/V  sitting in chair having breakfast    Vital Signs Last 24 Hrs  T(C): 36.8 (12 Oct 2019 12:30), Max: 36.8 (12 Oct 2019 12:30)  T(F): 98.2 (12 Oct 2019 12:30), Max: 98.2 (12 Oct 2019 12:30)  HR: 80 (12 Oct 2019 12:30) (61 - 99)  BP: 123/75 (12 Oct 2019 12:30) (108/72 - 133/70)  BP(mean): --  RR: 18 (12 Oct 2019 12:30) (18 - 20)  SpO2: 96% (12 Oct 2019 12:30) (96% - 100%)    PHYSICAL EXAM:    General: No Acute Distress     Neurological: Awake, alert oriented to person, place and time, Following Commands, bilaterally blind (this is old), Face Symmetrical, Speech Fluent, Moving all extremities, Muscle Strength normal in all four extremities, No Drift,   Sensation to Light Touch Intact    Pulmonary: Clear to Auscultation, No Rales, No Rhonchi, No Wheezes     Cardiovascular: S1, S2, Regular Rate and Rhythm     Gastrointestinal: Soft, Nontender, Nondistended     LABS:                        14.4   11.83 )-----------( 291      ( 10 Oct 2019 17:00 )             45.5    10-10    143  |  110<H>  |  18  ----------------------------<  101<H>  3.6   |  22  |  0.84    Ca    8.9      10 Oct 2019 17:00    TPro  7.8  /  Alb  3.9  /  TBili  0.4  /  DBili  x   /  AST  14  /  ALT  24  /  AlkPhos  118  10-10  PT/INR - ( 10 Oct 2019 17:00 )   PT: 11.5 sec;   INR: 1.00 ratio    PTT - ( 10 Oct 2019 17:00 )  PTT:34.1 sec    MEDICATIONS  (STANDING):  acetazolamide    Tablet 500 milliGRAM(s) Oral two times a day  docusate sodium 100 milliGRAM(s) Oral three times a day  influenza   Vaccine 0.5 milliLiter(s) IntraMuscular once  levothyroxine 25 MICROGram(s) Oral daily  polyethylene glycol 3350 17 Gram(s) Oral Once  senna 1 Tablet(s) Oral Once  sodium chloride 0.9% lock flush 3 milliLiter(s) IV Push every 8 hours    MEDICATIONS  (PRN):  acetaminophen   Tablet .. 650 milliGRAM(s) Oral every 6 hours PRN Temp greater or equal to 38C (100.4F), Mild Pain (1 - 3)  ALBUTerol 90 MICROgram(s) HFA Inhaler 1 Puff(s) Inhalation Once PRN bronchospasms  artificial  tears Solution 1 Drop(s) Both EYES daily PRN Dry Eyes  ondansetron Injectable 4 milliGRAM(s) IV Push every 8 hours PRN Nausea and/or Vomiting  oxyCODONE IR 5 milliGRAM(s) Oral Once PRN Moderate Pain (4 - 6)    DIET: [] Regular [x] CCD [] Renal [] Puree [] Dysphagia [] Tube Feeds:     IMAGING:   < from: CT Head No Cont (10.10.19 @ 18:11) >  INTERPRETATION:  INDICATIONS:  pre op CT stereo  pre op CT stereo  .    TECHNIQUE:  Serial axial images were obtained from the skull base to the   vertex without intravenous contrast. Coronal and sagittal reformatted   images were obtained. Thin section images were obtained using   stereotactic technique.    COMPARISON EXAMINATION: 10/7/2019    FINDINGS:    VENTRICLES AND SULCI:  Unchanged. The ventricles are slitlike. A right   frontal approach intraventricular catheter is again seen, unchanged in   position with the tip in the region of the foramen of Monro. Low-density   changes surround the catheter within the right frontal lobe as seen   previously    INTRA-AXIAL:  Unchanged. No mass effect or hemorrhage.    EXTRA-AXIAL:  No mass or collection is seen.    VISUALIZED SINUSES:  Large left lamina papyracea dehiscence, likely   posttraumatic.    VISUALIZED MASTOIDS:  Clear.    CALVARIUM:  Right frontal tonya hole    MISCELLANEOUS:  None.      IMPRESSION:    Stable exam.    Right frontal approach intraventricular catheter in place.    < end of copied text >

## 2019-10-13 LAB
ALBUMIN SERPL ELPH-MCNC: 3.6 G/DL — SIGNIFICANT CHANGE UP (ref 3.3–5)
ALP SERPL-CCNC: 108 U/L — SIGNIFICANT CHANGE UP (ref 40–120)
ALT FLD-CCNC: 28 U/L — SIGNIFICANT CHANGE UP (ref 10–45)
ANION GAP SERPL CALC-SCNC: 12 MMOL/L — SIGNIFICANT CHANGE UP (ref 5–17)
APTT BLD: 34.7 SEC — SIGNIFICANT CHANGE UP (ref 27.5–36.3)
AST SERPL-CCNC: 18 U/L — SIGNIFICANT CHANGE UP (ref 10–40)
BASOPHILS # BLD AUTO: 0.05 K/UL — SIGNIFICANT CHANGE UP (ref 0–0.2)
BASOPHILS NFR BLD AUTO: 0.5 % — SIGNIFICANT CHANGE UP (ref 0–2)
BILIRUB SERPL-MCNC: 0.8 MG/DL — SIGNIFICANT CHANGE UP (ref 0.2–1.2)
BLD GP AB SCN SERPL QL: NEGATIVE — SIGNIFICANT CHANGE UP
BUN SERPL-MCNC: 21 MG/DL — SIGNIFICANT CHANGE UP (ref 7–23)
CALCIUM SERPL-MCNC: 9.2 MG/DL — SIGNIFICANT CHANGE UP (ref 8.4–10.5)
CHLORIDE SERPL-SCNC: 106 MMOL/L — SIGNIFICANT CHANGE UP (ref 96–108)
CO2 SERPL-SCNC: 21 MMOL/L — LOW (ref 22–31)
CREAT SERPL-MCNC: 0.79 MG/DL — SIGNIFICANT CHANGE UP (ref 0.5–1.3)
EOSINOPHIL # BLD AUTO: 0.42 K/UL — SIGNIFICANT CHANGE UP (ref 0–0.5)
EOSINOPHIL NFR BLD AUTO: 4.2 % — SIGNIFICANT CHANGE UP (ref 0–6)
GLUCOSE SERPL-MCNC: 93 MG/DL — SIGNIFICANT CHANGE UP (ref 70–99)
GRAM STN FLD: SIGNIFICANT CHANGE UP
HCT VFR BLD CALC: 45.3 % — SIGNIFICANT CHANGE UP (ref 39–50)
HGB BLD-MCNC: 13.9 G/DL — SIGNIFICANT CHANGE UP (ref 13–17)
IMM GRANULOCYTES NFR BLD AUTO: 0.8 % — SIGNIFICANT CHANGE UP (ref 0–1.5)
INR BLD: 1.06 RATIO — SIGNIFICANT CHANGE UP (ref 0.88–1.16)
LYMPHOCYTES # BLD AUTO: 2.91 K/UL — SIGNIFICANT CHANGE UP (ref 1–3.3)
LYMPHOCYTES # BLD AUTO: 28.8 % — SIGNIFICANT CHANGE UP (ref 13–44)
MCHC RBC-ENTMCNC: 25.5 PG — LOW (ref 27–34)
MCHC RBC-ENTMCNC: 30.7 GM/DL — LOW (ref 32–36)
MCV RBC AUTO: 83 FL — SIGNIFICANT CHANGE UP (ref 80–100)
MONOCYTES # BLD AUTO: 0.7 K/UL — SIGNIFICANT CHANGE UP (ref 0–0.9)
MONOCYTES NFR BLD AUTO: 6.9 % — SIGNIFICANT CHANGE UP (ref 2–14)
NEUTROPHILS # BLD AUTO: 5.94 K/UL — SIGNIFICANT CHANGE UP (ref 1.8–7.4)
NEUTROPHILS NFR BLD AUTO: 58.8 % — SIGNIFICANT CHANGE UP (ref 43–77)
PLATELET # BLD AUTO: 230 K/UL — SIGNIFICANT CHANGE UP (ref 150–400)
POTASSIUM SERPL-MCNC: 3.7 MMOL/L — SIGNIFICANT CHANGE UP (ref 3.5–5.3)
POTASSIUM SERPL-SCNC: 3.7 MMOL/L — SIGNIFICANT CHANGE UP (ref 3.5–5.3)
PROT SERPL-MCNC: 7.2 G/DL — SIGNIFICANT CHANGE UP (ref 6–8.3)
PROTHROM AB SERPL-ACNC: 12.2 SEC — SIGNIFICANT CHANGE UP (ref 10–13.1)
RBC # BLD: 5.46 M/UL — SIGNIFICANT CHANGE UP (ref 4.2–5.8)
RBC # FLD: 25.4 % — HIGH (ref 10.3–14.5)
RH IG SCN BLD-IMP: POSITIVE — SIGNIFICANT CHANGE UP
SODIUM SERPL-SCNC: 139 MMOL/L — SIGNIFICANT CHANGE UP (ref 135–145)
SPECIMEN SOURCE: SIGNIFICANT CHANGE UP
WBC # BLD: 10.1 K/UL — SIGNIFICANT CHANGE UP (ref 3.8–10.5)
WBC # FLD AUTO: 10.1 K/UL — SIGNIFICANT CHANGE UP (ref 3.8–10.5)

## 2019-10-13 PROCEDURE — 74176 CT ABD & PELVIS W/O CONTRAST: CPT | Mod: 26

## 2019-10-13 PROCEDURE — 71250 CT THORAX DX C-: CPT | Mod: 26

## 2019-10-13 RX ADMIN — OXYCODONE HYDROCHLORIDE 5 MILLIGRAM(S): 5 TABLET ORAL at 01:00

## 2019-10-13 RX ADMIN — Medication 100 MILLIGRAM(S): at 18:12

## 2019-10-13 RX ADMIN — Medication 100 MILLIGRAM(S): at 21:00

## 2019-10-13 RX ADMIN — Medication 25 MICROGRAM(S): at 05:50

## 2019-10-13 RX ADMIN — ACETAZOLAMIDE 500 MILLIGRAM(S): 250 TABLET ORAL at 18:11

## 2019-10-13 RX ADMIN — OXYCODONE HYDROCHLORIDE 5 MILLIGRAM(S): 5 TABLET ORAL at 16:30

## 2019-10-13 RX ADMIN — OXYCODONE HYDROCHLORIDE 5 MILLIGRAM(S): 5 TABLET ORAL at 00:24

## 2019-10-13 RX ADMIN — SODIUM CHLORIDE 3 MILLILITER(S): 9 INJECTION INTRAMUSCULAR; INTRAVENOUS; SUBCUTANEOUS at 13:03

## 2019-10-13 RX ADMIN — ACETAZOLAMIDE 500 MILLIGRAM(S): 250 TABLET ORAL at 05:50

## 2019-10-13 RX ADMIN — OXYCODONE HYDROCHLORIDE 5 MILLIGRAM(S): 5 TABLET ORAL at 16:04

## 2019-10-13 RX ADMIN — OXYCODONE HYDROCHLORIDE 5 MILLIGRAM(S): 5 TABLET ORAL at 21:00

## 2019-10-13 RX ADMIN — OXYCODONE HYDROCHLORIDE 5 MILLIGRAM(S): 5 TABLET ORAL at 09:53

## 2019-10-13 RX ADMIN — OXYCODONE HYDROCHLORIDE 5 MILLIGRAM(S): 5 TABLET ORAL at 21:30

## 2019-10-13 RX ADMIN — SODIUM CHLORIDE 3 MILLILITER(S): 9 INJECTION INTRAMUSCULAR; INTRAVENOUS; SUBCUTANEOUS at 05:47

## 2019-10-13 RX ADMIN — SODIUM CHLORIDE 3 MILLILITER(S): 9 INJECTION INTRAMUSCULAR; INTRAVENOUS; SUBCUTANEOUS at 21:01

## 2019-10-13 NOTE — PROGRESS NOTE ADULT - SUBJECTIVE AND OBJECTIVE BOX
no acute events overnight.    Vital Signs Last 24 Hrs  T(C): 36.6 (13 Oct 2019 04:50), Max: 37 (12 Oct 2019 23:33)  T(F): 97.9 (13 Oct 2019 04:50), Max: 98.6 (12 Oct 2019 23:33)  HR: 75 (13 Oct 2019 04:52) (66 - 98)  BP: 125/84 (13 Oct 2019 04:50) (109/78 - 143/90)  BP(mean): --  RR: 17 (13 Oct 2019 04:52) (17 - 21)  SpO2: 100% (13 Oct 2019 04:52) (93% - 100%)  PHYSICAL EXAM:    General: No Acute Distress     Neurological: Awake, alert oriented to person, place and time, Following Commands, bilaterally blind (this is old), Face Symmetrical, Speech Fluent, Moving all extremities, Muscle Strength normal in all four extremities, No Drift,   Sensation to Light Touch Intact    Pulmonary: Clear to Auscultation, No Rales, No Rhonchi, No Wheezes     Cardiovascular: S1, S2, Regular Rate and Rhythm     Gastrointestinal: Soft, Nontender, Nondistended     LABS:    10-13    139  |  106  |  21  ----------------------------<  93  3.7   |  21<L>  |  0.79    Ca    9.2      13 Oct 2019 06:28    TPro  7.2  /  Alb  3.6  /  TBili  0.8  /  DBili  x   /  AST  18  /  ALT  28  /  AlkPhos  108  10-13    MEDICATIONS  (STANDING):  acetazolamide    Tablet 500 milliGRAM(s) Oral two times a day  docusate sodium 100 milliGRAM(s) Oral three times a day  influenza   Vaccine 0.5 milliLiter(s) IntraMuscular once  levothyroxine 25 MICROGram(s) Oral daily  polyethylene glycol 3350 17 Gram(s) Oral Once  senna 1 Tablet(s) Oral Once  sodium chloride 0.9% lock flush 3 milliLiter(s) IV Push every 8 hours    MEDICATIONS  (PRN):  acetaminophen   Tablet .. 650 milliGRAM(s) Oral every 6 hours PRN Temp greater or equal to 38C (100.4F), Mild Pain (1 - 3)  ALBUTerol 90 MICROgram(s) HFA Inhaler 1 Puff(s) Inhalation Once PRN bronchospasms  artificial  tears Solution 1 Drop(s) Both EYES daily PRN Dry Eyes  ondansetron Injectable 4 milliGRAM(s) IV Push every 8 hours PRN Nausea and/or Vomiting  oxyCODONE IR 5 milliGRAM(s) Oral Once PRN Moderate Pain (4 - 6)    DIET: [] Regular [x] CCD [] Renal [] Puree [] Dysphagia [] Tube Feeds:     IMAGING:   < from: CT Head No Cont (10.10.19 @ 18:11) >  INTERPRETATION:  INDICATIONS:  pre op CT stereo  pre op CT stereo  .    TECHNIQUE:  Serial axial images were obtained from the skull base to the   vertex without intravenous contrast. Coronal and sagittal reformatted   images were obtained. Thin section images were obtained using   stereotactic technique.    COMPARISON EXAMINATION: 10/7/2019    FINDINGS:    VENTRICLES AND SULCI:  Unchanged. The ventricles are slitlike. A right   frontal approach intraventricular catheter is again seen, unchanged in   position with the tip in the region of the foramen of Monro. Low-density   changes surround the catheter within the right frontal lobe as seen   previously    INTRA-AXIAL:  Unchanged. No mass effect or hemorrhage.    EXTRA-AXIAL:  No mass or collection is seen.    VISUALIZED SINUSES:  Large left lamina papyracea dehiscence, likely   posttraumatic.    VISUALIZED MASTOIDS:  Clear.    CALVARIUM:  Right frontal tonya hole    MISCELLANEOUS:  None.      IMPRESSION:    Stable exam.    Right frontal approach intraventricular catheter in place.    < end of copied text >

## 2019-10-13 NOTE — CONSULT NOTE ADULT - SUBJECTIVE AND OBJECTIVE BOX
GENERAL SURGERY CONSULT NOTE    Patient is a 34y old  Male who presents with a chief complaint of VPS Malfunction (13 Oct 2019 08:15), general surgery consulted for laparoscopy     HPI: 34M s/p VPS insertion 7/30/19 for psuedotumor presents to ED after being sent here by Dr. Cruz for likely proximal shunt malfunction. General surgery consulted for assisting with laparoscopy     10-points review of system performed with pertinent negative and positive findings documented in the HPI     PAST MEDICAL & SURGICAL HISTORY:  LAZARO (obstructive sleep apnea)  Asthma    FAMILY HISTORY: Family history not pertinent as reviewed with the patient and family    SOCIAL HISTORY: No pertinent social history    MEDICATIONS  (STANDING):  acetazolamide    Tablet 500 milliGRAM(s) Oral two times a day  docusate sodium 100 milliGRAM(s) Oral three times a day  influenza   Vaccine 0.5 milliLiter(s) IntraMuscular once  levothyroxine 25 MICROGram(s) Oral daily  polyethylene glycol 3350 17 Gram(s) Oral Once  senna 1 Tablet(s) Oral Once  sodium chloride 0.9% lock flush 3 milliLiter(s) IV Push every 8 hours    MEDICATIONS  (PRN):  acetaminophen   Tablet .. 650 milliGRAM(s) Oral every 6 hours PRN Temp greater or equal to 38C (100.4F), Mild Pain (1 - 3)  ALBUTerol    90 MICROgram(s) HFA Inhaler 1 Puff(s) Inhalation Once PRN bronchospasms  artificial  tears Solution 1 Drop(s) Both EYES daily PRN Dry Eyes  ondansetron Injectable 4 milliGRAM(s) IV Push every 8 hours PRN Nausea and/or Vomiting  oxyCODONE    IR 5 milliGRAM(s) Oral every 4 hours PRN Moderate Pain (4 - 6)    Allergies: No Known Allergies    Vital Signs Last 24 Hrs  T(C): 36.7 (13 Oct 2019 19:41), Max: 37.1 (13 Oct 2019 15:30)  T(F): 98.1 (13 Oct 2019 19:41), Max: 98.7 (13 Oct 2019 15:30)  HR: 97 (13 Oct 2019 19:41) (71 - 97)  BP: 143/91 (13 Oct 2019 19:41) (111/68 - 143/91)  BP(mean): --  RR: 18 (13 Oct 2019 19:41) (17 - 21)  SpO2: 97% (13 Oct 2019 19:41) (92% - 100%)    • Constitutional: obese   • ENMT: No oral lesions; no gross abnormalities  • Neck: No bruits; no thyromegaly or nodules   • Breasts: not examined  • Back: not examined   • Respiratory: unlabored breathing on room air   • Cardiovascular	: RRR with S1 and S2   • Gastrointestinal: large body habitus, prior laparoscopy port site incision scars noted   • Genitourinary: not examined   • Rectal: not examined   • Extremities: No cyanosis, clubbing or edema   • Neurological: Alert & oriented; blind at baseline   • Skin: No lesions; no rash  • Musculoskeletal: No joint pain, swelling or deformity; no limitation of movement  • Psychiatric: Affect and characteristics of appearance, verbalizations, behaviors are appropriate                         13.9   10.10 )-----------( 230      ( 13 Oct 2019 08:44 )             45.3     10-13    139  |  106  |  21  ----------------------------<  93  3.7   |  21<L>  |  0.79    Ca    9.2      13 Oct 2019 06:28    TPro  7.2  /  Alb  3.6  /  TBili  0.8  /  DBili  x   /  AST  18  /  ALT  28  /  AlkPhos  108  10-13    PT/INR - ( 13 Oct 2019 09:22 )   PT: 12.2 sec;   INR: 1.06 ratio      PTT - ( 13 Oct 2019 09:22 )  PTT:34.7 sec    IMAGING STUDIES:  EXAM:  CT BRAIN                        PROCEDURE DATE:  10/12/2019      IMPRESSION:  Right frontal approach ventriculostomy catheter in unchanged position.    Pericatheter gliosis/edema similar to the prior examination.    Air and contrast in the shunt reservoir, consistent with injection of   Omnipaque.    High-density material tracking along the ventriculostomy catheter   consistent with extravasated contrast.     Small amount of contrast is seen in the left occipital horn. Contrast   also fills the fourth ventricle.    No hydrocephalus GENERAL SURGERY CONSULT NOTE    Patient is a 34y old  Male who presents with a chief complaint of VPS Malfunction (13 Oct 2019 08:15), general surgery consulted for laparoscopy     HPI: 34M s/p VPS insertion 7/30/19 for psuedotumor presents to ED after being sent here by Dr. Cruz for likely proximal shunt malfunction. General surgery consulted for assisting with laparoscopy     10-points review of system performed with pertinent negative and positive findings documented in the HPI     PAST MEDICAL & SURGICAL HISTORY:  LAZARO (obstructive sleep apnea)  Asthma    FAMILY HISTORY: Family history not pertinent as reviewed with the patient and family    SOCIAL HISTORY: No pertinent social history    MEDICATIONS  (STANDING):  acetazolamide    Tablet 500 milliGRAM(s) Oral two times a day  docusate sodium 100 milliGRAM(s) Oral three times a day  influenza   Vaccine 0.5 milliLiter(s) IntraMuscular once  levothyroxine 25 MICROGram(s) Oral daily  polyethylene glycol 3350 17 Gram(s) Oral Once  senna 1 Tablet(s) Oral Once  sodium chloride 0.9% lock flush 3 milliLiter(s) IV Push every 8 hours    MEDICATIONS  (PRN):  acetaminophen   Tablet .. 650 milliGRAM(s) Oral every 6 hours PRN Temp greater or equal to 38C (100.4F), Mild Pain (1 - 3)  ALBUTerol    90 MICROgram(s) HFA Inhaler 1 Puff(s) Inhalation Once PRN bronchospasms  artificial  tears Solution 1 Drop(s) Both EYES daily PRN Dry Eyes  ondansetron Injectable 4 milliGRAM(s) IV Push every 8 hours PRN Nausea and/or Vomiting  oxyCODONE    IR 5 milliGRAM(s) Oral every 4 hours PRN Moderate Pain (4 - 6)    Allergies: No Known Allergies    Vital Signs Last 24 Hrs  T(C): 36.7 (13 Oct 2019 19:41), Max: 37.1 (13 Oct 2019 15:30)  T(F): 98.1 (13 Oct 2019 19:41), Max: 98.7 (13 Oct 2019 15:30)  HR: 97 (13 Oct 2019 19:41) (71 - 97)  BP: 143/91 (13 Oct 2019 19:41) (111/68 - 143/91)  BP(mean): --  RR: 18 (13 Oct 2019 19:41) (17 - 21)  SpO2: 97% (13 Oct 2019 19:41) (92% - 100%)    • Constitutional: obese   • ENMT: not examined   • Neck: trachea midline   • Breasts: not examined  • Back: not examined   • Respiratory: unlabored breathing on room air   • Cardiovascular	: RRR with S1 and S2   • Gastrointestinal: large body habitus, prior laparoscopy port site incision scars noted   • Genitourinary: not examined   • Rectal: not examined   • Extremities: No cyanosis, clubbing or edema   • Neurological: Alert & oriented; blind at baseline   • Skin: No lesions; no rash  • Musculoskeletal: No joint pain, swelling or deformity; no limitation of movement  • Psychiatric: Affect and characteristics of appearance, verbalizations, behaviors are appropriate                         13.9   10.10 )-----------( 230      ( 13 Oct 2019 08:44 )             45.3     10-13    139  |  106  |  21  ----------------------------<  93  3.7   |  21<L>  |  0.79    Ca    9.2      13 Oct 2019 06:28    TPro  7.2  /  Alb  3.6  /  TBili  0.8  /  DBili  x   /  AST  18  /  ALT  28  /  AlkPhos  108  10-13    PT/INR - ( 13 Oct 2019 09:22 )   PT: 12.2 sec;   INR: 1.06 ratio      PTT - ( 13 Oct 2019 09:22 )  PTT:34.7 sec    IMAGING STUDIES:  EXAM:  CT BRAIN                        PROCEDURE DATE:  10/12/2019      IMPRESSION:  Right frontal approach ventriculostomy catheter in unchanged position.    Pericatheter gliosis/edema similar to the prior examination.    Air and contrast in the shunt reservoir, consistent with injection of   Omnipaque.    High-density material tracking along the ventriculostomy catheter   consistent with extravasated contrast.     Small amount of contrast is seen in the left occipital horn. Contrast   also fills the fourth ventricle.    No hydrocephalus

## 2019-10-13 NOTE — CONSULT NOTE ADULT - ASSESSMENT
35 yo male s/p VPS insertion 7/30/19 for psuedotumor cerebri admitted for shunt malfunction, planning for shunt revision as an add-on case on 10/14, general surgery consulted for assistance with laparoscopy     - Consent for laparoscopy obtained at bedside, will stand by to help with the procedure  - Discussed with Dr. Dwyer

## 2019-10-14 ENCOUNTER — APPOINTMENT (OUTPATIENT)
Dept: PULMONOLOGY | Facility: CLINIC | Age: 34
End: 2019-10-14

## 2019-10-14 LAB
CSF COMMENTS: SIGNIFICANT CHANGE UP

## 2019-10-14 PROCEDURE — 99231 SBSQ HOSP IP/OBS SF/LOW 25: CPT | Mod: 57

## 2019-10-14 RX ORDER — SODIUM CHLORIDE 9 MG/ML
1000 INJECTION INTRAMUSCULAR; INTRAVENOUS; SUBCUTANEOUS
Refills: 0 | Status: DISCONTINUED | OUTPATIENT
Start: 2019-10-14 | End: 2019-10-15

## 2019-10-14 RX ADMIN — SODIUM CHLORIDE 3 MILLILITER(S): 9 INJECTION INTRAMUSCULAR; INTRAVENOUS; SUBCUTANEOUS at 13:14

## 2019-10-14 RX ADMIN — Medication 25 MICROGRAM(S): at 05:12

## 2019-10-14 RX ADMIN — Medication 100 MILLIGRAM(S): at 13:14

## 2019-10-14 RX ADMIN — Medication 100 MILLIGRAM(S): at 05:12

## 2019-10-14 RX ADMIN — ACETAZOLAMIDE 500 MILLIGRAM(S): 250 TABLET ORAL at 17:25

## 2019-10-14 RX ADMIN — SODIUM CHLORIDE 3 MILLILITER(S): 9 INJECTION INTRAMUSCULAR; INTRAVENOUS; SUBCUTANEOUS at 21:38

## 2019-10-14 RX ADMIN — POLYETHYLENE GLYCOL 3350 17 GRAM(S): 17 POWDER, FOR SOLUTION ORAL at 19:28

## 2019-10-14 RX ADMIN — Medication 100 MILLIGRAM(S): at 21:39

## 2019-10-14 RX ADMIN — OXYCODONE HYDROCHLORIDE 5 MILLIGRAM(S): 5 TABLET ORAL at 13:14

## 2019-10-14 RX ADMIN — SODIUM CHLORIDE 3 MILLILITER(S): 9 INJECTION INTRAMUSCULAR; INTRAVENOUS; SUBCUTANEOUS at 05:10

## 2019-10-14 RX ADMIN — ACETAZOLAMIDE 500 MILLIGRAM(S): 250 TABLET ORAL at 05:12

## 2019-10-14 RX ADMIN — OXYCODONE HYDROCHLORIDE 5 MILLIGRAM(S): 5 TABLET ORAL at 19:28

## 2019-10-14 RX ADMIN — OXYCODONE HYDROCHLORIDE 5 MILLIGRAM(S): 5 TABLET ORAL at 13:45

## 2019-10-14 RX ADMIN — OXYCODONE HYDROCHLORIDE 5 MILLIGRAM(S): 5 TABLET ORAL at 19:58

## 2019-10-14 NOTE — PROGRESS NOTE ADULT - SUBJECTIVE AND OBJECTIVE BOX
GENERAL SURGERY DAILY PROGRESS NOTE:     Subjective:  Pt seen and examined. No acute events overnight.     Objective:    MEDICATIONS  (STANDING):  acetazolamide    Tablet 500 milliGRAM(s) Oral two times a day  docusate sodium 100 milliGRAM(s) Oral three times a day  influenza   Vaccine 0.5 milliLiter(s) IntraMuscular once  levothyroxine 25 MICROGram(s) Oral daily  polyethylene glycol 3350 17 Gram(s) Oral Once  senna 1 Tablet(s) Oral Once  sodium chloride 0.9% lock flush 3 milliLiter(s) IV Push every 8 hours    MEDICATIONS  (PRN):  acetaminophen   Tablet .. 650 milliGRAM(s) Oral every 6 hours PRN Temp greater or equal to 38C (100.4F), Mild Pain (1 - 3)  ALBUTerol    90 MICROgram(s) HFA Inhaler 1 Puff(s) Inhalation Once PRN bronchospasms  artificial  tears Solution 1 Drop(s) Both EYES daily PRN Dry Eyes  ondansetron Injectable 4 milliGRAM(s) IV Push every 8 hours PRN Nausea and/or Vomiting  oxyCODONE    IR 5 milliGRAM(s) Oral every 4 hours PRN Moderate Pain (4 - 6)      Vital Signs Last 24 Hrs  T(C): 36.4 (14 Oct 2019 08:36), Max: 37.1 (13 Oct 2019 15:30)  T(F): 97.6 (14 Oct 2019 08:36), Max: 98.7 (13 Oct 2019 15:30)  HR: 76 (14 Oct 2019 08:36) (67 - 97)  BP: 108/73 (14 Oct 2019 08:36) (107/59 - 143/91)  BP(mean): --  RR: 18 (14 Oct 2019 08:36) (18 - 18)  SpO2: 100% (14 Oct 2019 08:36) (92% - 100%)    I&O's Detail    13 Oct 2019 07:01  -  14 Oct 2019 07:00  --------------------------------------------------------  IN:    Oral Fluid: 840 mL  Total IN: 840 mL    OUT:  Total OUT: 0 mL    Total NET: 840 mL    PHYSICAL EXAM  No focal neurologic deficits  Sclera nonicteric  NAD, awake and alert  Respirations nonlabored  Abdomen soft, nontender, nondistended  No guarding or rebound tenderness        Daily     Daily     LABS:                        13.9   10.10 )-----------( 230      ( 13 Oct 2019 08:44 )             45.3     10-13    139  |  106  |  21  ----------------------------<  93  3.7   |  21<L>  |  0.79    Ca    9.2      13 Oct 2019 06:28    TPro  7.2  /  Alb  3.6  /  TBili  0.8  /  DBili  x   /  AST  18  /  ALT  28  /  AlkPhos  108  10-13    PT/INR - ( 13 Oct 2019 09:22 )   PT: 12.2 sec;   INR: 1.06 ratio         PTT - ( 13 Oct 2019 09:22 )  PTT:34.7 sec      RADIOLOGY & ADDITIONAL STUDIES:

## 2019-10-14 NOTE — PROGRESS NOTE ADULT - SUBJECTIVE AND OBJECTIVE BOX
CHIEF COMPLAINT: patient reports no change in vision, he has had no vision in either eye since last surgery, denies HAs N/V  surgery postponed until tomorrow    Vital Signs Last 24 Hrs  T(C): 36.8 (14 Oct 2019 12:43), Max: 37.1 (13 Oct 2019 15:30)  T(F): 98.3 (14 Oct 2019 12:43), Max: 98.7 (13 Oct 2019 15:30)  HR: 68 (14 Oct 2019 12:43) (67 - 97)  BP: 128/83 (14 Oct 2019 12:43) (107/59 - 143/91)  BP(mean): --  RR: 18 (14 Oct 2019 12:43) (18 - 18)  SpO2: 99% (14 Oct 2019 12:43) (95% - 100%)    PHYSICAL EXAM:    General: No Acute Distress     Neurological: Awake, alert oriented to person, place and time, Following Commands, bilaterally blind (this is old), Face Symmetrical, Speech Fluent, Moving all extremities, Muscle Strength normal in all four extremities, No Drift,   Sensation to Light Touch Intact    Pulmonary: Clear to Auscultation, No Rales, No Rhonchi, No Wheezes     Cardiovascular: S1, S2, Regular Rate and Rhythm     Gastrointestinal: Soft, Nontender, Nondistended     LABS:                         13.9   10.10 )-----------( 230      ( 13 Oct 2019 08:44 )             45.3   10-13    139  |  106  |  21  ----------------------------<  93  3.7   |  21<L>  |  0.79    Ca    9.2      13 Oct 2019 06:28    TPro  7.2  /  Alb  3.6  /  TBili  0.8  /  DBili  x   /  AST  18  /  ALT  28  /  AlkPhos  108  10-13    MEDICATIONS  (STANDING):  acetazolamide    Tablet 500 milliGRAM(s) Oral two times a day  docusate sodium 100 milliGRAM(s) Oral three times a day  influenza   Vaccine 0.5 milliLiter(s) IntraMuscular once  levothyroxine 25 MICROGram(s) Oral daily  polyethylene glycol 3350 17 Gram(s) Oral Once  senna 1 Tablet(s) Oral Once  sodium chloride 0.9% lock flush 3 milliLiter(s) IV Push every 8 hours    MEDICATIONS  (PRN):  acetaminophen   Tablet .. 650 milliGRAM(s) Oral every 6 hours PRN Temp greater or equal to 38C (100.4F), Mild Pain (1 - 3)  ALBUTerol    90 MICROgram(s) HFA Inhaler 1 Puff(s) Inhalation Once PRN bronchospasms  artificial  tears Solution 1 Drop(s) Both EYES daily PRN Dry Eyes  ondansetron Injectable 4 milliGRAM(s) IV Push every 8 hours PRN Nausea and/or Vomiting  oxyCODONE    IR 5 milliGRAM(s) Oral every 4 hours PRN Moderate Pain (4 - 6)    DIET: [] Regular [x] CCD [] Renal [] Puree [] Dysphagia [] Tube Feeds:     IMAGING:   < from: CT Head No Cont (10.10.19 @ 18:11) >  INTERPRETATION:  INDICATIONS:  pre op CT stereo  pre op CT stereo  .    TECHNIQUE:  Serial axial images were obtained from the skull base to the   vertex without intravenous contrast. Coronal and sagittal reformatted   images were obtained. Thin section images were obtained using   stereotactic technique.    COMPARISON EXAMINATION: 10/7/2019    FINDINGS:    VENTRICLES AND SULCI:  Unchanged. The ventricles are slitlike. A right   frontal approach intraventricular catheter is again seen, unchanged in   position with the tip in the region of the foramen of Monro. Low-density   changes surround the catheter within the right frontal lobe as seen   previously    INTRA-AXIAL:  Unchanged. No mass effect or hemorrhage.    EXTRA-AXIAL:  No mass or collection is seen.    VISUALIZED SINUSES:  Large left lamina papyracea dehiscence, likely   posttraumatic.    VISUALIZED MASTOIDS:  Clear.    CALVARIUM:  Right frontal tonya hole    MISCELLANEOUS:  None.      IMPRESSION:    Stable exam.    Right frontal approach intraventricular catheter in place.    < end of copied text > CHIEF COMPLAINT: patient reports no change in vision, he has had no vision in either eye since last surgery, denies HAs N/V  surgery postponed until tomorrow    Vital Signs Last 24 Hrs  T(C): 36.8 (14 Oct 2019 12:43), Max: 37.1 (13 Oct 2019 15:30)  T(F): 98.3 (14 Oct 2019 12:43), Max: 98.7 (13 Oct 2019 15:30)  HR: 68 (14 Oct 2019 12:43) (67 - 97)  BP: 128/83 (14 Oct 2019 12:43) (107/59 - 143/91)  BP(mean): --  RR: 18 (14 Oct 2019 12:43) (18 - 18)  SpO2: 99% (14 Oct 2019 12:43) (95% - 100%)    PHYSICAL EXAM:    General: No Acute Distress     Neurological: Awake, alert oriented to person, place and time, Following Commands, bilaterally blind (this is old), Face Symmetrical, Speech Fluent, Moving all extremities, Muscle Strength normal in all four extremities, No Drift,   Sensation to Light Touch Intact    Pulmonary: Clear to Auscultation, No Rales, No Rhonchi, No Wheezes     Cardiovascular: S1, S2, Regular Rate and Rhythm     Gastrointestinal: Soft, Nontender, Nondistended     LABS:                         13.9   10.10 )-----------( 230      ( 13 Oct 2019 08:44 )             45.3   10-13    139  |  106  |  21  ----------------------------<  93  3.7   |  21<L>  |  0.79    Ca    9.2      13 Oct 2019 06:28    TPro  7.2  /  Alb  3.6  /  TBili  0.8  /  DBili  x   /  AST  18  /  ALT  28  /  AlkPhos  108  10-13    MEDICATIONS  (STANDING):  acetazolamide    Tablet 500 milliGRAM(s) Oral two times a day  docusate sodium 100 milliGRAM(s) Oral three times a day  influenza   Vaccine 0.5 milliLiter(s) IntraMuscular once  levothyroxine 25 MICROGram(s) Oral daily  polyethylene glycol 3350 17 Gram(s) Oral Once  senna 1 Tablet(s) Oral Once  sodium chloride 0.9% lock flush 3 milliLiter(s) IV Push every 8 hours    MEDICATIONS  (PRN):  acetaminophen   Tablet .. 650 milliGRAM(s) Oral every 6 hours PRN Temp greater or equal to 38C (100.4F), Mild Pain (1 - 3)  ALBUTerol    90 MICROgram(s) HFA Inhaler 1 Puff(s) Inhalation Once PRN bronchospasms  artificial  tears Solution 1 Drop(s) Both EYES daily PRN Dry Eyes  ondansetron Injectable 4 milliGRAM(s) IV Push every 8 hours PRN Nausea and/or Vomiting  oxyCODONE    IR 5 milliGRAM(s) Oral every 4 hours PRN Moderate Pain (4 - 6)    DIET: [] Regular [x] CCD [] Renal [] Puree [] Dysphagia [] Tube Feeds:     IMAGING:       < from: CT Head No Cont (10.10.19 @ 18:11) >  < from: CT Abdomen and Pelvis No Cont (10.13.19 @ 23:26) >  INTERPRETATION:  CLINICAL INFORMATION: Evaluate ventriculoperitoneal   shunt integrity. Preoperative study.    COMPARISON: CT angiography of the chest from 7/25/2019    PROCEDURE:   CT of the Chest, Abdomen and Pelvis was performed without intravenous   contrast.   Intravenous contrast: None.  Oral contrast: None.  Sagittal and coronal reformats were performed.    FINDINGS:  Study is limited by artifact.    CHEST:     LUNGS AND LARGE AIRWAYS: Patent central airways. No pulmonary nodules.   Trace left lower lobe linear atelectasis.  PLEURA: No pleural effusion. Bilateral apical focal pleural thickening,   which may represent scarring.  VESSELS: Within normal limits.  HEART: Heart size is normal. No pericardial effusion.  MEDIASTINUM AND EDWAR: Multiple periaortic and paratracheal lymph nodes,   all measuring less than 1 cm in short axis.  CHEST WALL AND LOWER NECK:  shunt catheter seen traversing the anterior   right chest wall without kinks or discontinuities.    ABDOMEN AND PELVIS:    LIVER: Within normal limits. Apparent heterogeneity is likely secondary   to artifact.  BILE DUCTS: Normal caliber.  GALLBLADDER: Within normal limits.  SPLEEN: Within normal limits. Apparent heterogeneity, likely secondary to   artifact.  PANCREAS: Within normal limits.  ADRENALS: Within normal limits.  KIDNEYS/URETERS: Within normal limits.    BLADDER: Within normal limits.  REPRODUCTIVE ORGANS: Prostate within normal limits.    BOWEL: No bowel obstruction. Appendix is normal.  PERITONEUM: Evaluation limited secondary to artifact.  VESSELS: Within normal limits.  RETROPERITONEUM/LYMPH NODES: No lymphadenopathy.    ABDOMINAL WALL:  shunt catheter in the right anterior abdominal wall;   at the level of the mid abdomen the catheter extends superficially to   lower evaluation is limited on this study before sharply turning and   descending into the abdominal cavity; there are several loops of the   shunt catheter in the abdominal cavity before it ultimately terminates in   the right lower quadrant of the abdomen.  BONES: Degenerative changes. Grade 1 retrolisthesis of L5 on S1.    IMPRESSION:      shunt catheter seen traversing the right anterior chest and abdominal   wall, before entering the right mid abdomen and looping before   terminating in the right lower quadrant of the abdomen; no kinks or   discontinuities are seen within its visualized course.      < end of copied text >  INTERPRETATION:  INDICATIONS:  pre op CT stereo  pre op CT stereo  .    TECHNIQUE:  Serial axial images were obtained from the skull base to the   vertex without intravenous contrast. Coronal and sagittal reformatted   images were obtained. Thin section images were obtained using   stereotactic technique.    COMPARISON EXAMINATION: 10/7/2019    FINDINGS:    VENTRICLES AND SULCI:  Unchanged. The ventricles are slitlike. A right   frontal approach intraventricular catheter is again seen, unchanged in   position with the tip in the region of the foramen of Monro. Low-density   changes surround the catheter within the right frontal lobe as seen   previously    INTRA-AXIAL:  Unchanged. No mass effect or hemorrhage.    EXTRA-AXIAL:  No mass or collection is seen.    VISUALIZED SINUSES:  Large left lamina papyracea dehiscence, likely   posttraumatic.    VISUALIZED MASTOIDS:  Clear.    CALVARIUM:  Right frontal tonya hole    MISCELLANEOUS:  None.      IMPRESSION:    Stable exam.    Right frontal approach intraventricular catheter in place.    < end of copied text >

## 2019-10-15 ENCOUNTER — APPOINTMENT (OUTPATIENT)
Dept: NEUROSURGERY | Facility: HOSPITAL | Age: 34
End: 2019-10-15
Payer: MEDICAID

## 2019-10-15 LAB
CULTURE RESULTS: NO GROWTH — SIGNIFICANT CHANGE UP
GRAM STN FLD: SIGNIFICANT CHANGE UP
SPECIMEN SOURCE: SIGNIFICANT CHANGE UP
SPECIMEN SOURCE: SIGNIFICANT CHANGE UP

## 2019-10-15 PROCEDURE — 62230 REPLACE/REVISE BRAIN SHUNT: CPT | Mod: 78

## 2019-10-15 PROCEDURE — 61781 SCAN PROC CRANIAL INTRA: CPT | Mod: 78

## 2019-10-15 PROCEDURE — 62225 REPLACE/IRRIGATE CATHETER: CPT | Mod: 78

## 2019-10-15 PROCEDURE — 70450 CT HEAD/BRAIN W/O DYE: CPT | Mod: 26

## 2019-10-15 RX ORDER — ACETAZOLAMIDE 250 MG/1
500 TABLET ORAL
Refills: 0 | Status: DISCONTINUED | OUTPATIENT
Start: 2019-10-15 | End: 2019-10-16

## 2019-10-15 RX ORDER — MORPHINE SULFATE 50 MG/1
4 CAPSULE, EXTENDED RELEASE ORAL EVERY 8 HOURS
Refills: 0 | Status: DISCONTINUED | OUTPATIENT
Start: 2019-10-15 | End: 2019-10-16

## 2019-10-15 RX ORDER — LEVOTHYROXINE SODIUM 125 MCG
25 TABLET ORAL DAILY
Refills: 0 | Status: DISCONTINUED | OUTPATIENT
Start: 2019-10-15 | End: 2019-10-17

## 2019-10-15 RX ORDER — DOCUSATE SODIUM 100 MG
100 CAPSULE ORAL THREE TIMES A DAY
Refills: 0 | Status: DISCONTINUED | OUTPATIENT
Start: 2019-10-15 | End: 2019-10-17

## 2019-10-15 RX ORDER — ONDANSETRON 8 MG/1
4 TABLET, FILM COATED ORAL ONCE
Refills: 0 | Status: COMPLETED | OUTPATIENT
Start: 2019-10-15 | End: 2019-10-15

## 2019-10-15 RX ORDER — SODIUM CHLORIDE 9 MG/ML
1000 INJECTION INTRAMUSCULAR; INTRAVENOUS; SUBCUTANEOUS
Refills: 0 | Status: DISCONTINUED | OUTPATIENT
Start: 2019-10-15 | End: 2019-10-16

## 2019-10-15 RX ORDER — ACETAMINOPHEN 500 MG
1000 TABLET ORAL ONCE
Refills: 0 | Status: COMPLETED | OUTPATIENT
Start: 2019-10-15 | End: 2019-10-16

## 2019-10-15 RX ORDER — HYDROMORPHONE HYDROCHLORIDE 2 MG/ML
0.5 INJECTION INTRAMUSCULAR; INTRAVENOUS; SUBCUTANEOUS
Refills: 0 | Status: DISCONTINUED | OUTPATIENT
Start: 2019-10-15 | End: 2019-10-15

## 2019-10-15 RX ORDER — SODIUM CHLORIDE 9 MG/ML
1000 INJECTION INTRAMUSCULAR; INTRAVENOUS; SUBCUTANEOUS
Refills: 0 | Status: DISCONTINUED | OUTPATIENT
Start: 2019-10-15 | End: 2019-10-15

## 2019-10-15 RX ORDER — ONDANSETRON 8 MG/1
4 TABLET, FILM COATED ORAL EVERY 8 HOURS
Refills: 0 | Status: DISCONTINUED | OUTPATIENT
Start: 2019-10-15 | End: 2019-10-17

## 2019-10-15 RX ORDER — SENNA PLUS 8.6 MG/1
1 TABLET ORAL ONCE
Refills: 0 | Status: COMPLETED | OUTPATIENT
Start: 2019-10-15 | End: 2019-10-16

## 2019-10-15 RX ORDER — MORPHINE SULFATE 50 MG/1
4 CAPSULE, EXTENDED RELEASE ORAL ONCE
Refills: 0 | Status: DISCONTINUED | OUTPATIENT
Start: 2019-10-15 | End: 2019-10-16

## 2019-10-15 RX ORDER — ACETAMINOPHEN 500 MG
650 TABLET ORAL EVERY 6 HOURS
Refills: 0 | Status: DISCONTINUED | OUTPATIENT
Start: 2019-10-15 | End: 2019-10-17

## 2019-10-15 RX ORDER — ALBUTEROL 90 UG/1
1 AEROSOL, METERED ORAL ONCE
Refills: 0 | Status: DISCONTINUED | OUTPATIENT
Start: 2019-10-15 | End: 2019-10-17

## 2019-10-15 RX ORDER — OXYCODONE HYDROCHLORIDE 5 MG/1
5 TABLET ORAL EVERY 4 HOURS
Refills: 0 | Status: DISCONTINUED | OUTPATIENT
Start: 2019-10-15 | End: 2019-10-15

## 2019-10-15 RX ADMIN — OXYCODONE HYDROCHLORIDE 5 MILLIGRAM(S): 5 TABLET ORAL at 16:00

## 2019-10-15 RX ADMIN — SODIUM CHLORIDE 75 MILLILITER(S): 9 INJECTION INTRAMUSCULAR; INTRAVENOUS; SUBCUTANEOUS at 00:45

## 2019-10-15 RX ADMIN — ONDANSETRON 4 MILLIGRAM(S): 8 TABLET, FILM COATED ORAL at 20:29

## 2019-10-15 RX ADMIN — HYDROMORPHONE HYDROCHLORIDE 0.5 MILLIGRAM(S): 2 INJECTION INTRAMUSCULAR; INTRAVENOUS; SUBCUTANEOUS at 16:15

## 2019-10-15 RX ADMIN — HYDROMORPHONE HYDROCHLORIDE 0.5 MILLIGRAM(S): 2 INJECTION INTRAMUSCULAR; INTRAVENOUS; SUBCUTANEOUS at 16:00

## 2019-10-15 RX ADMIN — ACETAZOLAMIDE 500 MILLIGRAM(S): 250 TABLET ORAL at 05:24

## 2019-10-15 RX ADMIN — Medication 100 MILLIGRAM(S): at 16:05

## 2019-10-15 RX ADMIN — HYDROMORPHONE HYDROCHLORIDE 0.5 MILLIGRAM(S): 2 INJECTION INTRAMUSCULAR; INTRAVENOUS; SUBCUTANEOUS at 19:30

## 2019-10-15 RX ADMIN — OXYCODONE HYDROCHLORIDE 5 MILLIGRAM(S): 5 TABLET ORAL at 22:03

## 2019-10-15 RX ADMIN — SODIUM CHLORIDE 75 MILLILITER(S): 9 INJECTION INTRAMUSCULAR; INTRAVENOUS; SUBCUTANEOUS at 20:30

## 2019-10-15 RX ADMIN — OXYCODONE HYDROCHLORIDE 5 MILLIGRAM(S): 5 TABLET ORAL at 16:30

## 2019-10-15 RX ADMIN — ONDANSETRON 4 MILLIGRAM(S): 8 TABLET, FILM COATED ORAL at 16:00

## 2019-10-15 RX ADMIN — Medication 100 MILLIGRAM(S): at 22:03

## 2019-10-15 RX ADMIN — SODIUM CHLORIDE 3 MILLILITER(S): 9 INJECTION INTRAMUSCULAR; INTRAVENOUS; SUBCUTANEOUS at 05:22

## 2019-10-15 RX ADMIN — ACETAZOLAMIDE 500 MILLIGRAM(S): 250 TABLET ORAL at 19:56

## 2019-10-15 RX ADMIN — OXYCODONE HYDROCHLORIDE 5 MILLIGRAM(S): 5 TABLET ORAL at 22:33

## 2019-10-15 RX ADMIN — SODIUM CHLORIDE 75 MILLILITER(S): 9 INJECTION INTRAMUSCULAR; INTRAVENOUS; SUBCUTANEOUS at 14:11

## 2019-10-15 RX ADMIN — OXYCODONE HYDROCHLORIDE 5 MILLIGRAM(S): 5 TABLET ORAL at 00:48

## 2019-10-15 RX ADMIN — HYDROMORPHONE HYDROCHLORIDE 0.5 MILLIGRAM(S): 2 INJECTION INTRAMUSCULAR; INTRAVENOUS; SUBCUTANEOUS at 19:00

## 2019-10-15 RX ADMIN — HYDROMORPHONE HYDROCHLORIDE 0.5 MILLIGRAM(S): 2 INJECTION INTRAMUSCULAR; INTRAVENOUS; SUBCUTANEOUS at 20:24

## 2019-10-15 RX ADMIN — OXYCODONE HYDROCHLORIDE 5 MILLIGRAM(S): 5 TABLET ORAL at 01:18

## 2019-10-15 RX ADMIN — HYDROMORPHONE HYDROCHLORIDE 0.5 MILLIGRAM(S): 2 INJECTION INTRAMUSCULAR; INTRAVENOUS; SUBCUTANEOUS at 20:54

## 2019-10-15 RX ADMIN — Medication 25 MICROGRAM(S): at 05:23

## 2019-10-15 RX ADMIN — Medication 100 MILLIGRAM(S): at 05:23

## 2019-10-15 NOTE — PROGRESS NOTE ADULT - SUBJECTIVE AND OBJECTIVE BOX
GENERAL SURGERY DAILY PROGRESS NOTE:     Subjective:  Pt seen and examined. No acute events overnight. Plan for  shunt revision today.     Objective:    MEDICATIONS  (STANDING):  influenza   Vaccine 0.5 milliLiter(s) IntraMuscular once    MEDICATIONS  (PRN):      Vital Signs Last 24 Hrs  T(C): 37.2 (15 Oct 2019 04:45), Max: 37.2 (15 Oct 2019 04:45)  T(F): 98.9 (15 Oct 2019 04:45), Max: 98.9 (15 Oct 2019 04:45)  HR: 79 (15 Oct 2019 09:00) (68 - 100)  BP: 105/57 (15 Oct 2019 04:45) (105/57 - 134/88)  BP(mean): --  RR: 18 (15 Oct 2019 04:45) (18 - 19)  SpO2: 97% (15 Oct 2019 09:00) (95% - 99%)    I&O's Detail    14 Oct 2019 07:01  -  15 Oct 2019 07:00  --------------------------------------------------------  IN:    Oral Fluid: 840 mL    sodium chloride 0.9%: 450 mL  Total IN: 1290 mL    OUT:  Total OUT: 0 mL    Total NET: 1290 mL    PHYSICAL EXAM  NAD, awake and alert  Respirations nonlabored  Abdomen obese, soft, nontender, nondistended  No guarding or rebound tenderness        Daily Height in cm: 167.64 (15 Oct 2019 09:00)    Daily     LABS:                RADIOLOGY & ADDITIONAL STUDIES:

## 2019-10-15 NOTE — BRIEF OPERATIVE NOTE - NSICDXBRIEFPROCEDURE_GEN_ALL_CORE_FT
PROCEDURES:  Creation, replacement, or removal of ventriculo-peritoneal shunt 15-Oct-2019 13:17:11  Alonso Harris

## 2019-10-16 LAB
HCT VFR BLD CALC: 40.6 % — SIGNIFICANT CHANGE UP (ref 39–50)
HGB BLD-MCNC: 13 G/DL — SIGNIFICANT CHANGE UP (ref 13–17)
MCHC RBC-ENTMCNC: 26.1 PG — LOW (ref 27–34)
MCHC RBC-ENTMCNC: 32 GM/DL — SIGNIFICANT CHANGE UP (ref 32–36)
MCV RBC AUTO: 81.5 FL — SIGNIFICANT CHANGE UP (ref 80–100)
NRBC # BLD: 0 /100 WBCS — SIGNIFICANT CHANGE UP (ref 0–0)
PLATELET # BLD AUTO: 228 K/UL — SIGNIFICANT CHANGE UP (ref 150–400)
RBC # BLD: 4.98 M/UL — SIGNIFICANT CHANGE UP (ref 4.2–5.8)
RBC # FLD: 24.2 % — HIGH (ref 10.3–14.5)
WBC # BLD: 12.03 K/UL — HIGH (ref 3.8–10.5)
WBC # FLD AUTO: 12.03 K/UL — HIGH (ref 3.8–10.5)

## 2019-10-16 RX ORDER — MORPHINE SULFATE 50 MG/1
4 CAPSULE, EXTENDED RELEASE ORAL EVERY 8 HOURS
Refills: 0 | Status: DISCONTINUED | OUTPATIENT
Start: 2019-10-16 | End: 2019-10-17

## 2019-10-16 RX ORDER — OXYCODONE HYDROCHLORIDE 5 MG/1
10 TABLET ORAL EVERY 4 HOURS
Refills: 0 | Status: DISCONTINUED | OUTPATIENT
Start: 2019-10-16 | End: 2019-10-17

## 2019-10-16 RX ORDER — OXYCODONE HYDROCHLORIDE 5 MG/1
15 TABLET ORAL EVERY 4 HOURS
Refills: 0 | Status: DISCONTINUED | OUTPATIENT
Start: 2019-10-16 | End: 2019-10-17

## 2019-10-16 RX ORDER — POLYETHYLENE GLYCOL 3350 17 G/17G
17 POWDER, FOR SOLUTION ORAL DAILY
Refills: 0 | Status: DISCONTINUED | OUTPATIENT
Start: 2019-10-16 | End: 2019-10-17

## 2019-10-16 RX ORDER — ENOXAPARIN SODIUM 100 MG/ML
40 INJECTION SUBCUTANEOUS AT BEDTIME
Refills: 0 | Status: DISCONTINUED | OUTPATIENT
Start: 2019-10-16 | End: 2019-10-17

## 2019-10-16 RX ADMIN — ONDANSETRON 4 MILLIGRAM(S): 8 TABLET, FILM COATED ORAL at 15:58

## 2019-10-16 RX ADMIN — MORPHINE SULFATE 4 MILLIGRAM(S): 50 CAPSULE, EXTENDED RELEASE ORAL at 17:51

## 2019-10-16 RX ADMIN — OXYCODONE HYDROCHLORIDE 15 MILLIGRAM(S): 5 TABLET ORAL at 09:36

## 2019-10-16 RX ADMIN — Medication 100 MILLIGRAM(S): at 05:46

## 2019-10-16 RX ADMIN — OXYCODONE HYDROCHLORIDE 15 MILLIGRAM(S): 5 TABLET ORAL at 10:00

## 2019-10-16 RX ADMIN — OXYCODONE HYDROCHLORIDE 15 MILLIGRAM(S): 5 TABLET ORAL at 14:15

## 2019-10-16 RX ADMIN — Medication 400 MILLIGRAM(S): at 00:05

## 2019-10-16 RX ADMIN — Medication 1000 MILLIGRAM(S): at 00:35

## 2019-10-16 RX ADMIN — ACETAZOLAMIDE 500 MILLIGRAM(S): 250 TABLET ORAL at 05:46

## 2019-10-16 RX ADMIN — OXYCODONE HYDROCHLORIDE 15 MILLIGRAM(S): 5 TABLET ORAL at 21:24

## 2019-10-16 RX ADMIN — MORPHINE SULFATE 4 MILLIGRAM(S): 50 CAPSULE, EXTENDED RELEASE ORAL at 05:56

## 2019-10-16 RX ADMIN — OXYCODONE HYDROCHLORIDE 15 MILLIGRAM(S): 5 TABLET ORAL at 21:54

## 2019-10-16 RX ADMIN — Medication 100 MILLIGRAM(S): at 21:24

## 2019-10-16 RX ADMIN — ENOXAPARIN SODIUM 40 MILLIGRAM(S): 100 INJECTION SUBCUTANEOUS at 21:24

## 2019-10-16 RX ADMIN — MORPHINE SULFATE 4 MILLIGRAM(S): 50 CAPSULE, EXTENDED RELEASE ORAL at 06:26

## 2019-10-16 RX ADMIN — OXYCODONE HYDROCHLORIDE 15 MILLIGRAM(S): 5 TABLET ORAL at 15:56

## 2019-10-16 RX ADMIN — MORPHINE SULFATE 4 MILLIGRAM(S): 50 CAPSULE, EXTENDED RELEASE ORAL at 18:10

## 2019-10-16 RX ADMIN — OXYCODONE HYDROCHLORIDE 15 MILLIGRAM(S): 5 TABLET ORAL at 03:15

## 2019-10-16 RX ADMIN — Medication 25 MICROGRAM(S): at 05:46

## 2019-10-16 RX ADMIN — Medication 100 MILLIGRAM(S): at 12:47

## 2019-10-16 RX ADMIN — ACETAZOLAMIDE 500 MILLIGRAM(S): 250 TABLET ORAL at 17:13

## 2019-10-16 RX ADMIN — OXYCODONE HYDROCHLORIDE 15 MILLIGRAM(S): 5 TABLET ORAL at 02:33

## 2019-10-16 RX ADMIN — SENNA PLUS 1 TABLET(S): 8.6 TABLET ORAL at 21:24

## 2019-10-16 NOTE — DIETITIAN INITIAL EVALUATION ADULT. - ADD RECOMMEND
Change diet to regular in setting of no history of DM. Obesity alert placed in chart; discussed with provider. Reinforce diet education PRN. Monitor tolerance to diet prescription, nutritional intake, weight trends, labs and skin integrity.

## 2019-10-16 NOTE — DIETITIAN INITIAL EVALUATION ADULT. - FACTORS AFF FOOD INTAKE
none/Denies nausea/vomiting, diarrhea or chewing/swallowing difficulties. Endorses constipation with last BM two days ago; reports is taking colace and his RN is aware.

## 2019-10-16 NOTE — PROGRESS NOTE ADULT - SUBJECTIVE AND OBJECTIVE BOX
PT seem and examined. Pt complains of a headache but denies all other complaints     Vital Signs Last 24 Hrs  T(C): 36.6 (16 Oct 2019 12:44), Max: 37.3 (15 Oct 2019 20:39)  T(F): 97.8 (16 Oct 2019 12:44), Max: 99.1 (15 Oct 2019 20:39)  HR: 71 (16 Oct 2019 12:44) (71 - 115)  BP: 124/78 (16 Oct 2019 12:44) (115/71 - 169/87)  BP(mean): 94 (15 Oct 2019 19:30) (94 - 122)  RR: 21 (16 Oct 2019 12:44) (12 - 24)  SpO2: 100% (16 Oct 2019 12:44) (95% - 100%)                       13.0   12.03 )-----------( 228      ( 16 Oct 2019 10:51 )             40.6      NEUROIMAGING: < from: CT Head No Cont (10.15.19 @ 18:25) >  IMPRESSION:  Right frontal ventricular catheter with its tip in the   region of the foramen of Dimitri. There is new right frontal ventricular   air suggesting recent manipulation of the catheter..    PHYSICAL EXAM:    General: No Acute Distress     Neurological: Awake, alert oriented to person, place and time, Following Commands, blind in both eyes (preop finding), EOMI, Face Symmetrical, Speech Fluent, Moving all extremities, Muscle Strength normal in all four extremities, No Drift, Sensation to Light Touch Intact    Pulmonary: Clear to Auscultation, No Rales, No Rhonchi, No Wheezes     Cardiovascular: S1, S2, Regular Rate and Rhythm     Gastrointestinal: Soft, Nontender, Nondistended     Incision: Dressing in place, clean dry and intact     MEDICATIONS:   Antibiotics:    Neuro:  acetaminophen   Tablet .. 650 milliGRAM(s) Oral every 6 hours PRN Temp greater or equal to 38C (100.4F), Mild Pain (1 - 3)  morphine  - Injectable 4 milliGRAM(s) IV Push every 8 hours PRN breakthrough  oxyCODONE    IR 10 milliGRAM(s) Oral every 4 hours PRN Moderate Pain (4 - 6)  oxyCODONE    IR 15 milliGRAM(s) Oral every 4 hours PRN Severe Pain (7 - 10)    Anticoagulation:    Cardiology:  acetazolamide    Tablet 500 milliGRAM(s) Oral two times a day    Endo:   levothyroxine 25 MICROGram(s) Oral daily    Pulm:  ALBUTerol    90 MICROgram(s) HFA Inhaler 1 Puff(s) Inhalation Once PRN    GI/:  docusate sodium 100 milliGRAM(s) Oral three times a day  senna 1 Tablet(s) Oral Once    Other:  artificial  tears Solution 1 Drop(s) Both EYES daily PRN Dry Eyes  influenza   Vaccine 0.5 milliLiter(s) IntraMuscular once

## 2019-10-16 NOTE — DIETITIAN INITIAL EVALUATION ADULT. - OTHER INFO
DIET HISTORY PTA: Reports good appetite and intake PTA. Reports recently has been making more healthful changes to his diet. States his mom has been assisting him prepare healthy meals, including lean proteins and more vegetables. Denies PTA micronutrient supplementation. NKFA. Pt denies history of T2DM.     WEIGHT HISTORY: Pt reports UBW of 430 pounds, reporting recent significant intentional weight loss of 100 pounds. Per review of last RD note, pt weighed 500 pounds (7/30). Current weight noted as 423 pounds (10/15), suggesting 77 pound (15%) weight loss in 3 months. Pt contributes some of this weight loss to fluid losses related to beginning diamox.    INTERVENTION: Reviewed weight loss nutrition therapy. Discussed benefits of weight loss, appropriate portion sizes, using MyPlate to create balanced meals, sources of fiber to aid with constipation, choosing lean proteins and healthy fats and including fruits/vegetables at each meal. Pt very  receptive to information and verbalized understanding. He is motivated to lose more weight. Provided written education titled Weight Loss Tips, MyPlate Planner. DIET HISTORY PTA: Reports good appetite and intake PTA. Reports recently has been making more healthful changes to his diet. States his mom has been assisting him prepare healthy meals, including lean proteins and more vegetables. Denies PTA micronutrient supplementation. NKFA. Pt denies history of DM; noted HgbA1c of 6.1%.     WEIGHT HISTORY: Pt reports UBW of 430 pounds, reporting recent significant intentional weight loss of 100 pounds. Per review of last RD note, pt weighed 500 pounds (7/30). Current weight noted as 423 pounds (10/15), suggesting 77 pound (15%) weight loss in 3 months. Pt contributes some of this weight loss to fluid losses related to beginning diamox.    INTERVENTION: Reviewed weight loss nutrition therapy. Discussed benefits of weight loss, appropriate portion sizes, using MyPlate to create balanced meals, sources of fiber to aid with constipation, choosing lean proteins and healthy fats and including fruits/vegetables at each meal. Pt very  receptive to information and verbalized understanding. He is motivated to lose more weight. Provided written education titled Weight Loss Tips, MyPlate Planner.

## 2019-10-16 NOTE — CHART NOTE - NSCHARTNOTEFT_GEN_A_CORE
Upon Nutritional Assessment by the Registered Dietitian your patient was determined to meet criteria / has evidence of the following diagnosis/diagnoses:          [ ]  Mild Protein Calorie Malnutrition        [ ]  Moderate Protein Calorie Malnutrition        [ ] Severe Protein Calorie Malnutrition        [ ] Unspecified Protein Calorie Malnutrition        [ ] Underweight / BMI <19        [ x] Morbid Obesity / BMI > 40      Findings as based on:  [ ] Comprehensive nutrition assessment   [ ] Nutrition Focused Physical Exam  [x ] Other: BMI 68.3 kg/m2       Nutrition Plan/Recommendations:    1) Recommend change diet to regular in setting of no history of DM.   2) Diet education on weight loss reviewed. Reinforce diet education PRN.   3) Monitor tolerance to diet prescription, nutritional intake, weight trends, labs and skin integrity      PROVIDER Section:     By signing this assessment you are acknowledging and agree with the diagnosis/diagnoses assigned by the Registered Dietitian    Comments:

## 2019-10-16 NOTE — DIETITIAN INITIAL EVALUATION ADULT. - REASON INDICATOR FOR ASSESSMENT
Pt seen for BMI>40/length of stay assessment. Information obtained from patient, patient's mother, and EMR.     Per chart, pt is a 34 year old male with PMH of asthma, LAZARO s/p VPS insertion 7/30 for pseudotumor. Now s/p VPS revision (POD#1).

## 2019-10-16 NOTE — DIETITIAN INITIAL EVALUATION ADULT. - PHYSICAL APPEARANCE
Per chart, no edema, surgical incision to head s/p VPS revision. Ht: 66 inches, Wt: 191.9kg (10/15), BMI: 68.3 kg/m2, IBW: 142 pounds (+/-10%), %IBW: 297%/obese

## 2019-10-17 ENCOUNTER — TRANSCRIPTION ENCOUNTER (OUTPATIENT)
Age: 34
End: 2019-10-17

## 2019-10-17 VITALS
HEART RATE: 95 BPM | OXYGEN SATURATION: 95 % | SYSTOLIC BLOOD PRESSURE: 137 MMHG | TEMPERATURE: 99 F | DIASTOLIC BLOOD PRESSURE: 82 MMHG | RESPIRATION RATE: 18 BRPM

## 2019-10-17 PROCEDURE — 99232 SBSQ HOSP IP/OBS MODERATE 35: CPT

## 2019-10-17 RX ORDER — ACETAMINOPHEN 500 MG
2 TABLET ORAL
Qty: 0 | Refills: 0 | DISCHARGE
Start: 2019-10-17

## 2019-10-17 RX ORDER — LEVOTHYROXINE SODIUM 125 MCG
1 TABLET ORAL
Qty: 0 | Refills: 0 | DISCHARGE
Start: 2019-10-17

## 2019-10-17 RX ORDER — OXYCODONE HYDROCHLORIDE 5 MG/1
1 TABLET ORAL
Qty: 30 | Refills: 0
Start: 2019-10-17

## 2019-10-17 RX ADMIN — Medication 100 MILLIGRAM(S): at 05:33

## 2019-10-17 RX ADMIN — Medication 25 MICROGRAM(S): at 05:33

## 2019-10-17 RX ADMIN — OXYCODONE HYDROCHLORIDE 15 MILLIGRAM(S): 5 TABLET ORAL at 05:33

## 2019-10-17 RX ADMIN — POLYETHYLENE GLYCOL 3350 17 GRAM(S): 17 POWDER, FOR SOLUTION ORAL at 14:27

## 2019-10-17 RX ADMIN — OXYCODONE HYDROCHLORIDE 15 MILLIGRAM(S): 5 TABLET ORAL at 06:03

## 2019-10-17 RX ADMIN — Medication 100 MILLIGRAM(S): at 14:27

## 2019-10-17 RX ADMIN — Medication 5 MILLIGRAM(S): at 05:33

## 2019-10-17 RX ADMIN — MORPHINE SULFATE 4 MILLIGRAM(S): 50 CAPSULE, EXTENDED RELEASE ORAL at 02:21

## 2019-10-17 RX ADMIN — MORPHINE SULFATE 4 MILLIGRAM(S): 50 CAPSULE, EXTENDED RELEASE ORAL at 01:51

## 2019-10-17 NOTE — DISCHARGE NOTE NURSING/CASE MANAGEMENT/SOCIAL WORK - PATIENT PORTAL LINK FT
You can access the FollowMyHealth Patient Portal offered by [facility] by registering at the following website: [email or web address]. By joining [facility]’s FollowMyHealth portal, you will also be able to view your health information using other applications (apps) compatible with our system.

## 2019-10-17 NOTE — DISCHARGE NOTE PROVIDER - CARE PROVIDER_API CALL
Alondra Cruz)  [facility] Neurosurgery  General  [address], [address]  [place] [address]  Phone: [phone]  Fax: [phone]  Follow Up Time:

## 2019-10-17 NOTE — PHYSICAL THERAPY INITIAL EVALUATION ADULT - PERTINENT HX OF CURRENT PROBLEM, REHAB EVAL
Pt is a 34 year old male with PMHx of asthma, blindness, obesity and LAZARO had shunt placed in 07/19 for pseudotumor cerebri. Presented for likely proximal shunt malfunction. Underwent proximal VPS revision on 10/15. CT head stable.

## 2019-10-17 NOTE — DISCHARGE NOTE PROVIDER - NSDCFUADDINST_GEN_ALL_CORE_FT
please notify physician if fevers, bleeding, swelling, pain not relieved by medication, increased sluggishness or irritability, increased nausea or vomiting, inability to tolerate foods or liquids, new or increasing weakness/numbness/tingling.   please do not engage in strenuous activity, heavy lifting, drive, or return to work or school until cleared by surgeon.   please keep incision clean and dry, do not submerge wound in water for prolonged periods of time, pat dry after showering, and do not use any creams or ointments to incision. Sutures/staples to be removed in Dr Cruz office.   You have a programmable shunt and if you have an MRI you should have your shunt reset within 24 hours, please keep a record of your settings. please notify physician if fevers, bleeding, swelling, pain not relieved by medication, increased sluggishness or irritability, increased nausea or vomiting, inability to tolerate foods or liquids, new or increasing weakness/numbness/tingling.   please do not engage in strenuous activity, heavy lifting, drive, or return to work or school until cleared by surgeon.   please keep incision clean and dry, do not submerge wound in water for prolonged periods of time, pat dry after showering, and do not use any creams or ointments to incision. Sutures/staples to be removed in Dr Cruz office.   You have a programmable shunt and if you have an MRI you should have your shunt reset within 24 hours, please keep a record of your settings.  On discharge Strata valve @ 1.0.

## 2019-10-17 NOTE — DISCHARGE NOTE PROVIDER - HOSPITAL COURSE
34M s/p VPS insertion 7/30/19 for psuedotumor presents to ED after being sent here by Dr. Cruz for likely proximal shunt malfunction.    Patient denies any new neurologic symptoms, specifically denies HA, N, V. Previous VPS incision is well healed. (10 Oct 2019 17:02)        Patient admitted, shunt valve not consistently pumping/refilling and found to be malfunctioning. On 10/15/19 s/p proximal shunt revision- valve now strata @ 1.0 for VPS Malfunction. He was seen in consultation by hospitalist medicine and pulmonary for optimization for OR. PT evaluated him and recommended         On the day of discharge he was medically and neurologically stable for discharge to home. 34M s/p VPS insertion 7/30/19 for psuedotumor presents to ED after being sent here by Dr. Cruz for likely proximal shunt malfunction.    Patient denies any new neurologic symptoms, specifically denies HA, N, V. Previous VPS incision is well healed. (10 Oct 2019 17:02)        Patient admitted, shunt valve not consistently pumping/refilling and found to be malfunctioning. On 10/15/19 s/p proximal shunt revision- valve now strata @ 1.0 for VPS Malfunction. He was seen in consultation by hospitalist medicine and pulmonary for optimization for OR. PT evaluated him and recommended outpatient PT.     On the day of discharge he was medically and neurologically stable for discharge to home.

## 2019-10-17 NOTE — PROGRESS NOTE ADULT - PROVIDER SPECIALTY LIST ADULT
Neurosurgery
Surgery
Surgery
Hospitalist

## 2019-10-17 NOTE — PHYSICAL THERAPY INITIAL EVALUATION ADULT - CRITERIA FOR SKILLED THERAPEUTIC INTERVENTIONS
impairments found/therapy frequency/anticipated discharge recommendation/rehab potential/predicted duration of therapy intervention/functional limitations in following categories/risk reduction/prevention

## 2019-10-17 NOTE — DISCHARGE NOTE PROVIDER - NSDCFUSCHEDAPPT_GEN_ALL_CORE_FT
CHIQUIS BARAKAT ; 11/04/2019 ; NPP Med Int [address] [facility]  CHIQUIS BARAKAT ; 11/16/2019 ; NPP Med SleepLab [address] [facility]  CHIQUIS BARAKAT ; 11/20/2019 ; NPP G[place]urg [address] [facility]  CHIQUIS BARAKAT ; 12/04/2019 ; NPP Med Int [address] [facility]  CHIQUIS BARAKAT ; 12/09/2019 ; NPP Cardio [address] [facility]  CHIQUIS BARAKAT ; 12/09/2019 ; NPP Ophthal [address] [facility]  CHIQUIS BARAKAT ; 01/03/2020 ; NPP Med Int [address] [facility]  CHIQUIS BARAKAT ; 01/07/2020 ; NPP Med [address] [facility] CHIQUIS BARAKAT ; 11/04/2019 ; NPP Med Int [address] [facility]  CHIQUIS BARAKAT ; 11/16/2019 ; NPP Med SleepLab [address] [facility]  CHIQUIS BARAKAT ; 11/20/2019 ; NPP [place] [address] [facility]  CHIQUIS BARAKAT ; 12/04/2019 ; NPP Med Int [address] [facility]  CHIQUIS BARAKAT ; 12/09/2019 ; NPP Cardio [address] [facility]  CHIQUIS BARAKAT ; 12/09/2019 ; NPP Ophthal [address] [facility]  CHIQUIS BARAKAT ; 01/03/2020 ; NPP Med Int [address] [facility]  CHIQUIS BARAKAT ; 01/07/2020 ; NPP Med [address] [facility] CHIQUIS BARAKAT ; 11/04/2019 ; NPP Med Int [address] [facility] [facility]  CHIQUIS BARAKAT ; 11/16/2019 ; NPP Med SleepLab [address] [facility]  CHIQUIS BARAKAT ; 11/20/2019 ; NPP [place] [address] 25 [facility]  CHIQUIS BARAKAT ; 12/04/2019 ; NPP Med Int [address] [facility]  CHIQUIS BARAKAT ; 12/09/2019 ; NPP Cardio [address] [facility]  CHIQUIS BARAKAT ; 12/09/2019 ; NPP Ophthal [address] [facility]  CHIQUIS BARAKAT ; 01/03/2020 ; NPP Med Int [address] [facility]  CHIQUIS BARAKAT ; 01/07/2020 ; NPP Med [address] 25 [facility]

## 2019-10-17 NOTE — DISCHARGE NOTE PROVIDER - REASON FOR ADMISSION
10/15/19 s/p proximal  shunt revision- valve is strata @ 1 for malfunctioning VPS. 10/15/19 s/p proximal shunt revision- valve now strata @ 1.0 for VPS Malfunction.

## 2019-10-17 NOTE — DISCHARGE NOTE PROVIDER - NSDCACTIVITY_GEN_ALL_CORE
No heavy lifting/straining/Bathing allowed/Do not make important decisions/Stairs allowed/Walking - Indoors allowed/Walking - Outdoors allowed/Showering allowed/Do not drive or operate machinery

## 2019-10-17 NOTE — PHYSICAL THERAPY INITIAL EVALUATION ADULT - ADDITIONAL COMMENTS
PTA pt lived in apt with mom (who assists with ADLs) and brothers, no steps to negotiate +elevator,  has   Bariatric bed, Bariatric walker, 3:1 commode, Bipap from [facility]. PTA pt lived in apt with mom (who assists with all ADLs) and brothers, no steps to negotiate +elevator,  has bariatric bed, bariatric walker, 3:1 commode, Bipap from [facility].

## 2019-10-17 NOTE — PROGRESS NOTE ADULT - ASSESSMENT
33 yo male s/p VPS insertion 7/30/19 for psuedotumor cerebri admitted for shunt malfunction, planning for shunt revision as an add-on case on 10/14, general surgery consulted for assistance with laparoscopy     - Consent for laparoscopy obtained at bedside, will stand by to help with the procedure - pt added on for surgery today  - please call if need assistance - case scheduled for today at 8:30 am    ACS, [id number]
35 yo male s/p VPS insertion 7/30/19 for psuedotumor cerebri admitted for shunt malfunction, planning for shunt revision as an add-on case on 10/14, general surgery consulted for assistance with laparoscopy     - Consent for laparoscopy obtained at bedside, will stand by to help with the procedure - pt added on for surgery today  - please call if needs assistance    ACS, [id number]
ASSESSMENT AND PLAN: 34 year old male with PMHx of asthma, obesity and LAZARO had shunt placed in 07/19 for pseudotumor cerebri. Presented for likely proximal shunt malfunction. Underwent proximal VPS revision on 10/15 and now hs strata@1.0 previously certas@1. Came in blind on exam. CT Head and neuro exam stable     NEURO: Pain continue oxycodone 10mg and 15mg for mod to severe pain and morphine for breakthrough pain   Continue diamox     PULM: LAZARO continue bipap and continuous pulse ox    Encouraged incentive spirometer     CV: SBP WNL     ENDO: Continue synthroid     HEME/ONC:                      DVT ppx: SQL started after read on CT head stable     RENAL: IVL     ID: afebrile     GI:  colace senna for GI ppx     DISCHARGE PLANNING: PT/OT  Will D/w Neurosurgeon     Assessment:  Please Check When Present   []  GCS  E   V  M     Heart Failure: []Acute, [] acute on chronic , []chronic  Heart Failure:  [] Diastolic (HFpEF), [] Systolic (HFrEF), []Combined (HFpEF and HFrEF), [] RHF, [] Pulm HTN, [] Other    [] NAN- improving [] ATN, [] AIN, [] other  [] CKD1, [] CKD2, [] CKD 3, [] CKD 4, [] CKD 5, []ESRD    Encephalopathy: [] Metabolic, [] Hepatic, [] toxic, [] Neurological, [] Other    Abnormal Nutritional Status: [] malnutrition (see nutrition note), [ ]underweight: BMI < 19, [] morbid obesity: BMI >40, [] Cachexia    [] Sepsis  [] hypovolemic shock,[] cardiogenic shock, [] hemorrhagic shock, [] neurogenic shock  [] Acute Respiratory Failure  []Cerebral edema, [] Brain compression/ herniation,   [] Functional quadriplegia  [] Acute blood loss anemia
HPI:  34M s/p VPS insertion 7/30/19 for psuedotumor presents to ED after being sent here by Dr. Cruz for likely proximal shunt malfunction.    Patient denies any new neurologic symptoms, specifically denies HA, N, V.    Previous VPS incision is well healed. (10 Oct 2019 17:02)    PAST MEDICAL & SURGICAL HISTORY:  LAZARO (obstructive sleep apnea)  Asthma    PLAN:  Neuro: continue diamox and pain control, OR cancelled for today, possible shunt revision next week.   Respiratory: patient instructed on incentive spirometer  Endocrine: HGA1 c 6.1, will monitor               DVT ppx: [] SQH [x] SQL and venodynes bilaterally  Renal: IVL  ID: afebrile  GI: bowel regimen  PT/OT: ambulating independently  pulmonary called for optimization prior to possible OR next week     Will discuss with Dr Cruz  Spectralink # [id number]    Assessment:  Please Check When Present   []  GCS  E   V  M     Heart Failure: []Acute, [] acute on chronic , []chronic  Heart Failure:  [] Diastolic (HFpEF), [] Systolic (HFrEF), []Combined (HFpEF and HFrEF), [] RHF, [] Pulm HTN, [] Other    [] NAN, [] ATN, [] AIN, [] other  [] CKD1, [] CKD2, [] CKD 3, [] CKD 4, [] CKD 5, []ESRD    Encephalopathy: [] Metabolic, [] Hepatic, [] toxic, [] Neurological, [] Other    Abnormal Nutritional Status: [] malnutrition (see nutrition note), [ ]underweight: BMI < 19, [] morbid obesity: BMI >40, [] Cachexia    [] Sepsis  [] hypovolemic shock,[] cardiogenic shock, [] hemorrhagic shock, [] neurogenic shock  [] Acute Respiratory Failure  []Cerebral edema, [] Brain compression/ herniation,   [] Functional quadriplegia  [] Acute blood loss anemia
HPI:  34M s/p VPS insertion 7/30/19 for psuedotumor presents to ED after being sent here by Dr. Cruz for likely proximal shunt malfunction.    Patient denies any new neurologic symptoms, specifically denies HA, N, V.    Previous VPS incision is well healed. (10 Oct 2019 17:02)    PAST MEDICAL & SURGICAL HISTORY:  LAZARO (obstructive sleep apnea)  Asthma    PLAN:  Neuro: continue diamox and pain control, OR cancelled today, shunt revision tomorrow.   Respiratory: patient instructed on incentive spirometer  Endocrine: HGA1 c 6.1               DVT ppx: [] SQH [x] SQL and venodynes bilaterally  Renal: IVL  ID: afebrile  GI: bowel regimen  PT/OT: ambulating independently  Hospitalist medicine and pulmonary saw for optimization prior to OR tomorrow.   NPO @ midnight    Will discuss with Dr Cruz  Spectralink # [id number]    Assessment:  Please Check When Present   []  GCS  E   V  M     Heart Failure: []Acute, [] acute on chronic , []chronic  Heart Failure:  [] Diastolic (HFpEF), [] Systolic (HFrEF), []Combined (HFpEF and HFrEF), [] RHF, [] Pulm HTN, [] Other    [] NAN, [] ATN, [] AIN, [] other  [] CKD1, [] CKD2, [] CKD 3, [] CKD 4, [] CKD 5, []ESRD    Encephalopathy: [] Metabolic, [] Hepatic, [] toxic, [] Neurological, [] Other    Abnormal Nutritional Status: [] malnutrition (see nutrition note), [ ]underweight: BMI < 19, [] morbid obesity: BMI >40, [] Cachexia    [] Sepsis  [] hypovolemic shock,[] cardiogenic shock, [] hemorrhagic shock, [] neurogenic shock  [] Acute Respiratory Failure  []Cerebral edema, [] Brain compression/ herniation,   [] Functional quadriplegia  [] Acute blood loss anemia
HPI:  34M s/p VPS insertion 7/30/19 for psuedotumor presents to ED after being sent here by Dr. Cruz for likely proximal shunt malfunction.    Patient denies any new neurologic symptoms, specifically denies HA, N, V.    Previous VPS incision is well healed. (10 Oct 2019 17:02)    PAST MEDICAL & SURGICAL HISTORY:  LAZARO (obstructive sleep apnea)  Asthma    PLAN:  Neuro: continue diamox and pain control, OR cancelled yesterday, possible shunt revision next week.   Respiratory: patient instructed on incentive spirometer  Endocrine: HGA1 c 6.1               DVT ppx: [] SQH [x] SQL and venodynes bilaterally  Renal: IVL  ID: afebrile  GI: bowel regimen  PT/OT: ambulating independently  Hospitalist medicine and pulmonary called for optimization prior to possible OR next week   shunt tap/ventriculogram today    Will discuss with Dr Cruz  Spectralink # [id number]    Assessment:  Please Check When Present   []  GCS  E   V  M     Heart Failure: []Acute, [] acute on chronic , []chronic  Heart Failure:  [] Diastolic (HFpEF), [] Systolic (HFrEF), []Combined (HFpEF and HFrEF), [] RHF, [] Pulm HTN, [] Other    [] NAN, [] ATN, [] AIN, [] other  [] CKD1, [] CKD2, [] CKD 3, [] CKD 4, [] CKD 5, []ESRD    Encephalopathy: [] Metabolic, [] Hepatic, [] toxic, [] Neurological, [] Other    Abnormal Nutritional Status: [] malnutrition (see nutrition note), [ ]underweight: BMI < 19, [] morbid obesity: BMI >40, [] Cachexia    [] Sepsis  [] hypovolemic shock,[] cardiogenic shock, [] hemorrhagic shock, [] neurogenic shock  [] Acute Respiratory Failure  []Cerebral edema, [] Brain compression/ herniation,   [] Functional quadriplegia  [] Acute blood loss anemia
HPI:  34M s/p VPS insertion 7/30/19 for psuedotumor presents to ED after being sent here by Dr. Cruz for likely proximal shunt malfunction.    Patient denies any new neurologic symptoms, specifically denies HA, N, V.    Previous VPS incision is well healed. (10 Oct 2019 17:02)    PAST MEDICAL & SURGICAL HISTORY:  LAZARO (obstructive sleep apnea)  Asthma    PLAN:  Neuro: continue diamox and pain control, tentative shunt revision tomorrow 10/14  Respiratory: patient instructed on incentive spirometer  Endocrine: HGA1 c 6.1               DVT ppx: [] SQH [x] SQL and venodynes bilaterally  Renal: IVL  ID: afebrile  GI: bowel regimen  PT/OT: ambulating independently  Hospitalist medicine and pulmonary called for optimization prior to possible OR next week     Will discuss with Dr Cruz
HPI:  34M s/p VPS insertion 7/30/19 for psuedotumor presents to ED after being sent here by Dr. Cruz for likely proximal shunt malfunction.    Patient denies any new neurologic symptoms, specifically denies HA, N, V.    Previous VPS incision is well healed. (10 Oct 2019 17:02)    PAST MEDICAL & SURGICAL HISTORY:  LAZARO (obstructive sleep apnea)  Asthma    PROCEDURE: 10/15/19 s/p proximal shunt revision- valve now strata @ 1.0 for VPS Malfunction.    PLAN:  Neuro: pain meds PRN  Respiratory: patient instructed on incentive spirometer  Endocrine: continue synthroid             DVT ppx: [] SQH [x] SQL and venodynes bilaterally  Renal: IVL  ID: afebrile  GI: bowel regimen  PT/OT: outpatient PT  d/c IVL and d/c home today  Discussed with patient and family wound care, follow up plans, activity, and medications. Questions answered, and they verbalized understanding.   [facility]    Will discuss with Dr Cruz   Spectralink # [id number]    Assessment:  Please Check When Present   []  GCS  E   V  M     Heart Failure: []Acute, [] acute on chronic , []chronic  Heart Failure:  [] Diastolic (HFpEF), [] Systolic (HFrEF), []Combined (HFpEF and HFrEF), [] RHF, [] Pulm HTN, [] Other    [] NAN, [] ATN, [] AIN, [] other  [] CKD1, [] CKD2, [] CKD 3, [] CKD 4, [] CKD 5, []ESRD    Encephalopathy: [] Metabolic, [] Hepatic, [] toxic, [] Neurological, [] Other    Abnormal Nutritional Status: [] malnutrition (see nutrition note), [ ]underweight: BMI < 19, [] morbid obesity: BMI >40, [] Cachexia    [] Sepsis  [] hypovolemic shock,[] cardiogenic shock, [] hemorrhagic shock, [] neurogenic shock  [] Acute Respiratory Failure  []Cerebral edema, [] Brain compression/ herniation,   [] Functional quadriplegia  [] Acute blood loss anemia
34M s/p VPS insertion 7/30/19 for psuedotumor admitted for shunt malfunction.    S/p shunt revision on 10/15/19. Doing well neurologically.     1. Shunt malfunction- above.     2. Pseudotumor cerebri    3. Morbid obesity- Nutritional counseling. Outpt f/u.     4. Hypothyroidism- Continue Synthroid.       Plan for discharge today.

## 2019-10-17 NOTE — PROGRESS NOTE ADULT - SUBJECTIVE AND OBJECTIVE BOX
CHIEF COMPLAINT: patient anxious to go home, reports pain controlled with current meds    OVERNIGHT EVENTS:     Vital Signs Last 24 Hrs  T(C): 37 (17 Oct 2019 16:04), Max: 37 (17 Oct 2019 16:04)  T(F): 98.6 (17 Oct 2019 16:04), Max: 98.6 (17 Oct 2019 16:04)  HR: 95 (17 Oct 2019 16:04) (74 - 95)  BP: 137/82 (17 Oct 2019 16:04) (124/70 - 148/69)  BP(mean): --  RR: 18 (17 Oct 2019 16:04) (18 - 22)  SpO2: 95% (17 Oct 2019 16:04) (92% - 100%)    PHYSICAL EXAM:    General: No Acute Distress     Neurological: Awake, alert oriented to person, place and time, Following Commands, legally blind bilateral, face Symmetrical, Speech Fluent, Moving all extremities, Muscle Strength normal in all four extremities, No Drift, Sensation to Light Touch Intact    Pulmonary: Clear to Auscultation, No Rales, No Rhonchi, No Wheezes     Cardiovascular: S1, S2, Regular Rate and Rhythm     Gastrointestinal: Soft, Nontender, Nondistended     Incision: +sutures to head    LABS:                        13.0   12.03 )-----------( 228      ( 16 Oct 2019 10:51 )             40.6              10-16 @ 07:01  -  10-17 @ 07:00  --------------------------------------------------------  IN: 360 mL / OUT: 950 mL / NET: -590 mL    10-17 @ 07:01  -  10-17 @ 16:34  --------------------------------------------------------  IN: 660 mL / OUT: 0 mL / NET: 660 mL        MEDICATIONS:  Anticoagulation:  enoxaparin Injectable 40 milliGRAM(s) SubCutaneous at bedtime    Endo:  levothyroxine 25 MICROGram(s) Oral daily    Neuro:  acetaminophen   Tablet .. 650 milliGRAM(s) Oral every 6 hours PRN Temp greater or equal to 38C (100.4F), Mild Pain (1 - 3)  ondansetron Injectable 4 milliGRAM(s) IV Push every 8 hours PRN Nausea and/or Vomiting  oxyCODONE    IR 10 milliGRAM(s) Oral every 4 hours PRN Moderate Pain (4 - 6)  oxyCODONE    IR 15 milliGRAM(s) Oral every 4 hours PRN Severe Pain (7 - 10)    Pulm:  ALBUTerol    90 MICROgram(s) HFA Inhaler 1 Puff(s) Inhalation Once PRN bronchospasms    GI/:  bisacodyl 5 milliGRAM(s) Oral every 12 hours PRN Constipation  docusate sodium 100 milliGRAM(s) Oral three times a day  polyethylene glycol 3350 17 Gram(s) Oral daily    Other:   artificial  tears Solution 1 Drop(s) Both EYES daily PRN Dry Eyes  influenza   Vaccine 0.5 milliLiter(s) IntraMuscular once    DIET: [] Regular [x] CCD [] Renal [] Puree [] Dysphagia [] Tube Feeds:     IMAGING:   < from: CT Head No Cont (10.15.19 @ 18:25) >  TECHNIQUE:  Serial axial images were obtained from the skull base to the   vertex without intravenous contrast.    COMPARISON EXAMINATION: 10/12/2019    FINDINGS:    VENTRICLES AND SULCI: Right frontal ventricular catheter with its tip in   the right foramen of Dimitri. Air is now appreciated in the frontal horn of   the right lateral ventricle not seen on the prior.  INTRA-AXIAL:  No mass, blood or abnormal attenuation is seen.  EXTRA-AXIAL:  No mass or collection is seen.  VISUALIZED SINUSES:  Clear.  VISUALIZED MASTOIDS:  Clear.  CALVARIUM: Right frontal tonya hole  MISCELLANEOUS: Suspicion of a partially empty sella.    IMPRESSION:  Right frontal ventricular catheter with its tip in the   region of the foramen of Dimitri. There is new right frontal ventricular   air suggesting recent manipulation of the catheter..      < end of copied text >

## 2019-10-17 NOTE — PROGRESS NOTE ADULT - SUBJECTIVE AND OBJECTIVE BOX
Patient is a 34y old  Male who presents with a chief complaint of 10/15/19 s/p proximal shunt revision- valve now strata @ 1.0 for VPS Malfunction. (17 Oct 2019 10:07)    HPI: Pt sitting up in chair. Feels well. Denies headache or dizziness.     Vital Signs Last 24 Hrs  T(C): 36.5 (17 Oct 2019 12:05), Max: 37 (16 Oct 2019 15:59)  T(F): 97.7 (17 Oct 2019 12:05), Max: 98.6 (16 Oct 2019 15:59)  HR: 74 (17 Oct 2019 12:05) (74 - 95)  BP: 138/83 (17 Oct 2019 12:05) (124/70 - 148/69)  BP(mean): --  RR: 19 (17 Oct 2019 12:05) (18 - 22)  SpO2: 98% (17 Oct 2019 12:05) (92% - 100%)                          13.0   12.03 )-----------( 228      ( 16 Oct 2019 10:51 )             40.6     MEDICATIONS  (STANDING):  docusate sodium 100 milliGRAM(s) Oral three times a day  enoxaparin Injectable 40 milliGRAM(s) SubCutaneous at bedtime  influenza   Vaccine 0.5 milliLiter(s) IntraMuscular once  levothyroxine 25 MICROGram(s) Oral daily  polyethylene glycol 3350 17 Gram(s) Oral daily    MEDICATIONS  (PRN):  acetaminophen   Tablet .. 650 milliGRAM(s) Oral every 6 hours PRN Temp greater or equal to 38C (100.4F), Mild Pain (1 - 3)  ALBUTerol    90 MICROgram(s) HFA Inhaler 1 Puff(s) Inhalation Once PRN bronchospasms  artificial  tears Solution 1 Drop(s) Both EYES daily PRN Dry Eyes  bisacodyl 5 milliGRAM(s) Oral every 12 hours PRN Constipation  ondansetron Injectable 4 milliGRAM(s) IV Push every 8 hours PRN Nausea and/or Vomiting  oxyCODONE    IR 10 milliGRAM(s) Oral every 4 hours PRN Moderate Pain (4 - 6)  oxyCODONE    IR 15 milliGRAM(s) Oral every 4 hours PRN Severe Pain (7 - 10)

## 2019-10-17 NOTE — DISCHARGE NOTE PROVIDER - NSDCCPCAREPLAN_GEN_ALL_CORE_FT
PRINCIPAL DISCHARGE DIAGNOSIS  Diagnosis: Shunt malfunction, initial encounter  Assessment and Plan of Treatment: 10/15/19 s/p proximal shunt revision- valve now strata @ 1.0 for VPS Malfunction.   Dr Cruz- neurosurgeon- please call office for appointment in 10-14 days. Suture/staple removal at that time.   Primary medical doctor and pulmonary doctor within 2 weeks.      SECONDARY DISCHARGE DIAGNOSES  Diagnosis: LAZARO (obstructive sleep apnea)  Assessment and Plan of Treatment: Please follow up with primary medical doctor and pulmonary doctor within 2 weeks.    Diagnosis: Hypothyroidism, unspecified type  Assessment and Plan of Treatment: Please continue medications and follow up with your primary care physician within 2 weeks. PRINCIPAL DISCHARGE DIAGNOSIS  Diagnosis: Shunt malfunction, initial encounter  Assessment and Plan of Treatment: 10/15/19 s/p proximal shunt revision- valve now strata @ 1.0 for VPS Malfunction.   Dr rCuz- neurosurgeon- please call office for appointment in 10-14 days. Suture/staple removal at that time.   Primary medical doctor and pulmonary doctor within 2 weeks.      SECONDARY DISCHARGE DIAGNOSES  Diagnosis: LAZARO (obstructive sleep apnea)  Assessment and Plan of Treatment: Please follow up with primary medical doctor and pulmonary doctor within 2 weeks.    Diagnosis: Hypothyroidism, unspecified type  Assessment and Plan of Treatment: Please continue medications and follow up with your primary care physician within 2 weeks.

## 2019-10-24 ENCOUNTER — APPOINTMENT (OUTPATIENT)
Dept: NEUROSURGERY | Facility: CLINIC | Age: 34
End: 2019-10-24
Payer: MEDICAID

## 2019-10-24 VITALS
RESPIRATION RATE: 20 BRPM | OXYGEN SATURATION: 99 % | TEMPERATURE: 97.8 F | DIASTOLIC BLOOD PRESSURE: 76 MMHG | HEART RATE: 94 BPM | SYSTOLIC BLOOD PRESSURE: 135 MMHG

## 2019-10-24 PROCEDURE — 99024 POSTOP FOLLOW-UP VISIT: CPT

## 2019-10-29 ENCOUNTER — OTHER (OUTPATIENT)
Age: 34
End: 2019-10-29

## 2019-10-29 LAB
CULTURE RESULTS: SIGNIFICANT CHANGE UP
SPECIMEN SOURCE: SIGNIFICANT CHANGE UP

## 2019-10-31 PROCEDURE — 87070 CULTURE OTHR SPECIMN AEROBIC: CPT

## 2019-10-31 PROCEDURE — 86850 RBC ANTIBODY SCREEN: CPT

## 2019-10-31 PROCEDURE — 85014 HEMATOCRIT: CPT

## 2019-10-31 PROCEDURE — 89051 BODY FLUID CELL COUNT: CPT

## 2019-10-31 PROCEDURE — C1889: CPT

## 2019-10-31 PROCEDURE — 85610 PROTHROMBIN TIME: CPT

## 2019-10-31 PROCEDURE — 83605 ASSAY OF LACTIC ACID: CPT

## 2019-10-31 PROCEDURE — 97162 PT EVAL MOD COMPLEX 30 MIN: CPT

## 2019-10-31 PROCEDURE — 82945 GLUCOSE OTHER FLUID: CPT

## 2019-10-31 PROCEDURE — 84132 ASSAY OF SERUM POTASSIUM: CPT

## 2019-10-31 PROCEDURE — 86901 BLOOD TYPING SEROLOGIC RH(D): CPT

## 2019-10-31 PROCEDURE — 85730 THROMBOPLASTIN TIME PARTIAL: CPT

## 2019-10-31 PROCEDURE — 82947 ASSAY GLUCOSE BLOOD QUANT: CPT

## 2019-10-31 PROCEDURE — 84295 ASSAY OF SERUM SODIUM: CPT

## 2019-10-31 PROCEDURE — 74176 CT ABD & PELVIS W/O CONTRAST: CPT

## 2019-10-31 PROCEDURE — 71250 CT THORAX DX C-: CPT

## 2019-10-31 PROCEDURE — 99285 EMERGENCY DEPT VISIT HI MDM: CPT

## 2019-10-31 PROCEDURE — C1713: CPT

## 2019-10-31 PROCEDURE — 94660 CPAP INITIATION&MGMT: CPT

## 2019-10-31 PROCEDURE — 82330 ASSAY OF CALCIUM: CPT

## 2019-10-31 PROCEDURE — 87205 SMEAR GRAM STAIN: CPT

## 2019-10-31 PROCEDURE — 86900 BLOOD TYPING SEROLOGIC ABO: CPT

## 2019-10-31 PROCEDURE — 85027 COMPLETE CBC AUTOMATED: CPT

## 2019-10-31 PROCEDURE — 80053 COMPREHEN METABOLIC PANEL: CPT

## 2019-10-31 PROCEDURE — 82565 ASSAY OF CREATININE: CPT

## 2019-10-31 PROCEDURE — 84157 ASSAY OF PROTEIN OTHER: CPT

## 2019-10-31 PROCEDURE — 82803 BLOOD GASES ANY COMBINATION: CPT

## 2019-10-31 PROCEDURE — 82435 ASSAY OF BLOOD CHLORIDE: CPT

## 2019-10-31 PROCEDURE — 93005 ELECTROCARDIOGRAM TRACING: CPT

## 2019-10-31 PROCEDURE — 70450 CT HEAD/BRAIN W/O DYE: CPT

## 2019-11-01 ENCOUNTER — OUTPATIENT (OUTPATIENT)
Dept: OUTPATIENT SERVICES | Facility: HOSPITAL | Age: 34
LOS: 1 days | End: 2019-11-01
Payer: MEDICAID

## 2019-11-01 ENCOUNTER — APPOINTMENT (OUTPATIENT)
Dept: NEUROSURGERY | Facility: CLINIC | Age: 34
End: 2019-11-01
Payer: MEDICAID

## 2019-11-01 VITALS — DIASTOLIC BLOOD PRESSURE: 73 MMHG | SYSTOLIC BLOOD PRESSURE: 120 MMHG | HEART RATE: 89 BPM

## 2019-11-01 DIAGNOSIS — Z87.09 PERSONAL HISTORY OF OTHER DISEASES OF THE RESPIRATORY SYSTEM: ICD-10-CM

## 2019-11-01 DIAGNOSIS — Z56.0 UNEMPLOYMENT, UNSPECIFIED: ICD-10-CM

## 2019-11-01 DIAGNOSIS — Z83.438 FAMILY HISTORY OF OTHER DISORDER OF LIPOPROTEIN METABOLISM AND OTHER LIPIDEMIA: ICD-10-CM

## 2019-11-01 DIAGNOSIS — Z83.49 FAMILY HISTORY OF OTHER ENDOCRINE, NUTRITIONAL AND METABOLIC DISEASES: ICD-10-CM

## 2019-11-01 DIAGNOSIS — Z87.891 PERSONAL HISTORY OF NICOTINE DEPENDENCE: ICD-10-CM

## 2019-11-01 PROCEDURE — 99024 POSTOP FOLLOW-UP VISIT: CPT

## 2019-11-01 SDOH — ECONOMIC STABILITY - INCOME SECURITY: UNEMPLOYMENT, UNSPECIFIED: Z56.0

## 2019-11-04 ENCOUNTER — OUTPATIENT (OUTPATIENT)
Dept: OUTPATIENT SERVICES | Facility: HOSPITAL | Age: 34
LOS: 1 days | End: 2019-11-04
Payer: MEDICAID

## 2019-11-04 ENCOUNTER — APPOINTMENT (OUTPATIENT)
Dept: INTERNAL MEDICINE | Facility: CLINIC | Age: 34
End: 2019-11-04
Payer: MEDICAID

## 2019-11-04 VITALS
SYSTOLIC BLOOD PRESSURE: 128 MMHG | WEIGHT: 315 LBS | HEART RATE: 78 BPM | OXYGEN SATURATION: 96 % | BODY MASS INDEX: 69.75 KG/M2 | DIASTOLIC BLOOD PRESSURE: 78 MMHG

## 2019-11-04 DIAGNOSIS — Z87.898 PERSONAL HISTORY OF OTHER SPECIFIED CONDITIONS: ICD-10-CM

## 2019-11-04 DIAGNOSIS — E87.6 HYPOKALEMIA: ICD-10-CM

## 2019-11-04 DIAGNOSIS — I10 ESSENTIAL (PRIMARY) HYPERTENSION: ICD-10-CM

## 2019-11-04 PROBLEM — Z83.438 FAMILY HISTORY OF HYPERLIPIDEMIA: Status: ACTIVE | Noted: 2019-08-26

## 2019-11-04 PROBLEM — Z56.0 UNEMPLOYED: Status: ACTIVE | Noted: 2019-09-18

## 2019-11-04 PROBLEM — Z87.891 FORMER SMOKER: Status: ACTIVE | Noted: 2019-04-12

## 2019-11-04 PROBLEM — Z87.09 HISTORY OF ASTHMA: Status: RESOLVED | Noted: 2019-04-12 | Resolved: 2019-11-04

## 2019-11-04 LAB — HBA1C MFR BLD HPLC: 5

## 2019-11-04 PROCEDURE — G0463: CPT | Mod: 25

## 2019-11-04 PROCEDURE — 90688 IIV4 VACCINE SPLT 0.5 ML IM: CPT

## 2019-11-04 PROCEDURE — 83036 HEMOGLOBIN GLYCOSYLATED A1C: CPT

## 2019-11-04 PROCEDURE — G0008: CPT

## 2019-11-04 PROCEDURE — 99213 OFFICE O/P EST LOW 20 MIN: CPT | Mod: GE

## 2019-11-04 RX ORDER — SOFT LENS DISINFECTANT
SOLUTION, NON-ORAL MISCELLANEOUS
Qty: 1 | Refills: 0 | Status: COMPLETED | COMMUNITY
Start: 2019-06-04 | End: 2019-11-04

## 2019-11-04 RX ORDER — ALBUTEROL SULFATE 2.5 MG/3ML
(2.5 MG/3ML) SOLUTION RESPIRATORY (INHALATION)
Qty: 1 | Refills: 3 | Status: COMPLETED | COMMUNITY
Start: 2019-06-04 | End: 2019-11-04

## 2019-11-04 NOTE — HISTORY OF PRESENT ILLNESS
[de-identified] : 33 yo male with hypothyroidism, morbid obesity, self reported LAZARO, and recent hospitalization for IIH s/p  shunt with residual visual loss and gait disturbance who presents today for a follow up visit.\par \par Since his last visit, the patient followed up with neurosurgery. About 3 weeks ago he presented to Dr. Cruz's office and complaining of worsening vision and headaches. Was sent to the ER and found that shunt was not working so he underwent a revision surgery. Has followed up with neurosurgery since revision and had staples removed. Continues to take diamox at this time. Unfortunately patient is now completely blind s/p repeat surgery. \par \par Patient also underwent initial evaluation at [facility] for weight loss surgery and was considered to be a good candidate. Patient has appointments scheduled with cardiology, pulmonology, GI. Already saw nutritionist and psychiatrist. Patient needs to see PCP monthly for 6 months for "weight checks" and today is his first visit for weight check. Also requesting flu shot.

## 2019-11-04 NOTE — ASSESSMENT
[FreeTextEntry1] : 35 yo male with hypothyroidism, morbid obesity, self reported LAZARO, and recent hospitalization for IIH s/p  shunt with residual visual loss and gait disturbance who presents today for a follow up visit.\par \par 1) HCM\par - flu shot given today\par - TFTs from July --> repeat in December/January\par \par 2) IIH s/p shunt complicated by acquired blindness\par - s/p revision surgery 3 weeks ago with blindness\par - f/u with neurosurgery in 3 months\par - patients plans to use [employer] for assistance with new blindness \par - continue diamox\par \par 3) Hypothyroidism\par - given recent surgery, check TFTs again in Dec/Jan\par - continue synthroid \par \par 4) Bariatric Surgery Evaluation\par - patient started process for bariatric surgery \par - will need monthly check ins for weight monitoring --> patient must be roomed in Room 20 or alternate weight management clinic room so that patient can be weighed (confirmed with neurosurgeon that scale is okay to be used with patient's shunt)\par - MUST BE WEIGHED AT EVERY VISIT --> MD must sign packet that patient will bring with him\par - cards, pulm, and GI appointments already made --> follow up with any additional recommendations from them \par - psychiatry and nutrition already on board \par - patient's aunt helps coordinate all appointments (Ruth)\par \par 5) Pre-diabetes\par - A1c 5.0 today (down from 6.1)\par - reinforced healthy behaviors, no need for further testing at this time \par \par RTC in 1 month for weight check\par Discussed with Dr. López

## 2019-11-04 NOTE — REVIEW OF SYSTEMS
[Vision Problems] : vision problems [Back Pain] : back pain [Unsteady Walk] : ataxia [Fever] : no fever [Chills] : no chills [Chest Pain] : no chest pain [Palpitations] : no palpitations [Shortness Of Breath] : no shortness of breath [Cough] : no cough [Abdominal Pain] : no abdominal pain [Nausea] : no nausea [Vomiting] : no vomiting [Skin Rash] : no skin rash [Headache] : no headache [Depression] : no depression

## 2019-11-04 NOTE — PHYSICAL EXAM
[No Acute Distress] : no acute distress [EOMI] : extraocular movements intact [Normal Oropharynx] : the oropharynx was normal [No Respiratory Distress] : no respiratory distress  [No Accessory Muscle Use] : no accessory muscle use [Clear to Auscultation] : lungs were clear to auscultation bilaterally [Normal Rate] : normal rate  [Regular Rhythm] : with a regular rhythm [Normal S1, S2] : normal S1 and S2 [Soft] : abdomen soft [No HSM] : no HSM [Alert and Oriented x3] : oriented to person, place, and time [de-identified] : morbidly obese [de-identified] : + blind, ambulating with cane and assistance

## 2019-11-05 NOTE — HISTORY OF PRESENT ILLNESS
[FreeTextEntry1] : Mr. CHIQUIS BARAKAT is a 35 yo male with hypothyroidism, morbid obesity (BMI 80), self reported LAZARO (on CPAP) who initially presented to [facility] ED on 7/20/19 for 5 days of b/l blurry/tunnel vision (R>L) with perioral numbness/tingling, pulsatile tinnitus, right arm paresthesias and occasional occipital headache. Initial CTA head/neck and CT venogram showed poor quality image and hypercoagulable work up showed multiple prolonged clotting time with polycythemia.  He was found to have severe papilledema and LP trial showed opening pressure of over 55 concerning pseudotumor cerebri. On 7/30/19, he underwent VPS placement (certas @ 4). Post operative hospital stay was uneventful. Optho f/u showed red hue in periphery with decreasing visual acuity likely r/t altered ICP and started Diamox with weight loss recommendation. He was discharged to home on 8/10/19. \par Unfortunately, he c/o progressively worsening vision (near blind in both eyes) despite of increased Diamox tx (1500mg daily) and shunt setting change (dialed down from 4 to 2). Upon his follow up visit on 10/10/19, shunt did not refill concerning malfunctioning shunt. He was readmitted to the hospital on the same day and underwent revision of proximal shunt on 10/15/19. Postoperative hospital stay was uneventful and he was discharged to home on 10/17/19. \par Today he returns for follow up and denies any new onset of headache/dizziness, n/v, weakness. Surgical incision appears to be healing well. Nylon sutures were removed today without complications.

## 2019-11-05 NOTE — PHYSICAL EXAM
[General Appearance - Alert] : alert [General Appearance - In No Acute Distress] : in no acute distress [Irregular] : irregular [Clean] : clean [Dry] : dry [No Drainage] : without drainage [Normal Skin] : normal [Oriented To Time, Place, And Person] : oriented to person, place, and time [Impaired Insight] : insight and judgment were intact [Affect] : the affect was normal [Memory Recent] : recent memory was not impaired [Person] : oriented to person [Place] : oriented to place [Time] : oriented to time [Short Term Intact] : short term memory intact [Remote Intact] : remote memory intact [Registration Intact] : recent registration memory intact [Span Intact] : the attention span was normal [Concentration Intact] : normal concentrating ability [Fluency] : fluency intact [Comprehension] : comprehension intact [Current Events] : adequate knowledge of current events [Past History] : adequate knowledge of personal past history [Vocabulary] : adequate range of vocabulary [Cranial Nerves Trigeminal (V)] : facial sensation intact symmetrically [Cranial Nerves Facial (VII)] : face symmetrical [Cranial Nerves Vestibulocochlear (VIII)] : hearing was intact bilaterally [Cranial Nerves Glossopharyngeal (IX)] : tongue and palate midline [Cranial Nerves Accessory (XI - Cranial And Spinal)] : head turning and shoulder shrug symmetric [Cranial Nerves Hypoglossal (XII)] : there was no tongue deviation with protrusion [Motor Strength] : muscle strength was normal in all four extremities [5] : S1 flexor hallucis longus 5/5 [Sensation Tactile Decrease] : light touch was intact [Limited Balance] : the patient's balance was impaired [1+] : Patella left 1+ [No Visual Abnormalities] : no visible abnormailities [Normal] : normal [Hearing Threshold Finger Rub Not Ray] : hearing was normal [Examination Of The Oral Cavity] : the lips and gums were normal [] : no respiratory distress [Exaggerated Use Of Accessory Muscles For Inspiration] : no accessory muscle use [No CVA Tenderness] : no ~M costovertebral angle tenderness [No Spinal Tenderness] : no spinal tenderness [Abnormal Walk] : normal gait [Motor Tone] : muscle strength and tone were normal [Healing Well] : healing well [Erythema] : erythematous [Tender] : not tender [Warm] : not warm [Indurated] : not indurated [FreeTextEntry1] : b/l blindness

## 2019-11-05 NOTE — ASSESSMENT
[FreeTextEntry1] : This is a 35 yo male with pseudotumor cerebri who is s/p VPS (Certas, last setting @ 2) and subsequent revision for malfunctioning pump (Strata @ 1.0). He reports mild improvement of his vision and denies any other neurological symptoms. Neuro exam is unremarkable. I have recommended\par - f/u with Dr. Adkins as scheduled (continue Diamox 1000mg daily until see him for further management plan)\par - CTH wo in 3 months\par - RTC after image

## 2019-11-05 NOTE — ASSESSMENT
[FreeTextEntry1] : This is a 35 yo male with pseudotumor cerebri who is s/p VPS (Certas, last setting @ 2) then subsequent revision for malfunctioning pump (Strata @ 1.0). He reports mild improvement of his vision and denies any other neurological symptoms. Neuro exam is unremarkable. I have recommended\par - Bactroban ointment BID for 2 weeks prophylactic wound infection\par - f/u with Dr. Adkins as scheduled (continue Diamox 1000mg daily until see him for further management plan)\par - RTC in one week for suture removal

## 2019-11-05 NOTE — HISTORY OF PRESENT ILLNESS
[FreeTextEntry1] : Mr. CHIQUIS BARAKAT is a 33 yo male with hypothyroidism, morbid obesity (BMI 80), self reported LAZARO (on CPAP) who initially presented to [facility] ED on 7/20/19 for 5 days of b/l blurry/tunnel vision (R>L) with perioral numbness/tingling, pulsatile tinnitus, right arm paresthesias and occasional occipital headache. Initial CTA head/neck and CT venogram showed poor quality image and hypercoagulable work up showed multiple prolonged clotting time with polycythemia.  He was found to have severe papilledema and LP trial showed opening pressure of over 55 concerning pseudotumor cerebri. On 7/30/19, he underwent VPS placement (certas @ 4). Post operative hospital stay was uneventful. Optho f/u showed red hue in periphery with decreasing visual acuity likely r/t altered ICP and started Diamox with weight loss recommendation. He was discharged to home on 8/10/19. \par Unfortunately, he c/o progressively worsening vision (near blind in both eyes) despite of increased Diamox tx (1500mg daily) and shunt setting change (dialed down from 4 to 2). Upon his follow up visit on 10/10/19, shunt did not refill concerning malfunctioning shunt. He was readmitted to the hospital on the same day and underwent revision of proximal shunt on 10/15/19. Postoperative hospital stay was uneventful and he was discharged to home on 10/17/19. \par Today he returns for follow up and states he is doing well at home. Patient reports that his vision slightly improved (able to see some light but unable to see objects). Currently taking diamox 1000mg daily. He denies headache, dizziness, n/v, fever/chills, weakness, or any other focal neuro deficits. \par Surgical incision appears to be healing well with minor redness around incision site without tenderness/discharge. Nylon sutures are intact.

## 2019-11-05 NOTE — REVIEW OF SYSTEMS
[As Noted in HPI] : as noted in HPI [Negative] : Neurological [FreeTextEntry3] : b/l near blindness (slight light perception)

## 2019-11-05 NOTE — PHYSICAL EXAM
[General Appearance - Alert] : alert [General Appearance - In No Acute Distress] : in no acute distress [Irregular] : irregular [Clean] : clean [Dry] : dry [Healing Well] : healing well [No Drainage] : without drainage [Normal Skin] : normal [Erythema] : erythematous [Oriented To Time, Place, And Person] : oriented to person, place, and time [Impaired Insight] : insight and judgment were intact [Affect] : the affect was normal [Memory Recent] : recent memory was not impaired [Person] : oriented to person [Place] : oriented to place [Time] : oriented to time [Short Term Intact] : short term memory intact [Remote Intact] : remote memory intact [Registration Intact] : recent registration memory intact [Span Intact] : the attention span was normal [Concentration Intact] : normal concentrating ability [Fluency] : fluency intact [Comprehension] : comprehension intact [Current Events] : adequate knowledge of current events [Past History] : adequate knowledge of personal past history [Vocabulary] : adequate range of vocabulary [Cranial Nerves Trigeminal (V)] : facial sensation intact symmetrically [Cranial Nerves Facial (VII)] : face symmetrical [Cranial Nerves Vestibulocochlear (VIII)] : hearing was intact bilaterally [Cranial Nerves Glossopharyngeal (IX)] : tongue and palate midline [Cranial Nerves Accessory (XI - Cranial And Spinal)] : head turning and shoulder shrug symmetric [Cranial Nerves Hypoglossal (XII)] : there was no tongue deviation with protrusion [Motor Strength] : muscle strength was normal in all four extremities [5] : S1 flexor hallucis longus 5/5 [Sensation Tactile Decrease] : light touch was intact [Limited Balance] : the patient's balance was impaired [1+] : Patella left 1+ [No Visual Abnormalities] : no visible abnormailities [Normal] : normal [Hearing Threshold Finger Rub Not Glenn] : hearing was normal [Examination Of The Oral Cavity] : the lips and gums were normal [] : no respiratory distress [Exaggerated Use Of Accessory Muscles For Inspiration] : no accessory muscle use [No CVA Tenderness] : no ~M costovertebral angle tenderness [No Spinal Tenderness] : no spinal tenderness [Abnormal Walk] : normal gait [Motor Tone] : muscle strength and tone were normal [Tender] : not tender [Warm] : not warm [Indurated] : not indurated [FreeTextEntry1] : b/l blindness

## 2019-11-05 NOTE — REASON FOR VISIT
[Parent] : parent [de-identified] : revision of proximal shunt pump (Strata @ 1.0) for malfunction VPS [de-identified] : 10/15/2019

## 2019-11-05 NOTE — REASON FOR VISIT
[Parent] : parent [de-identified] : one week follow up for wound check and possible suture removal

## 2019-11-05 NOTE — REVIEW OF SYSTEMS
[As Noted in HPI] : as noted in HPI [Negative] : Endocrine [FreeTextEntry3] : b/l near blindness (slight light perception)

## 2019-11-06 DIAGNOSIS — Z23 ENCOUNTER FOR IMMUNIZATION: ICD-10-CM

## 2019-11-06 DIAGNOSIS — H54.7 UNSPECIFIED VISUAL LOSS: ICD-10-CM

## 2019-11-06 DIAGNOSIS — E66.01 MORBID (SEVERE) OBESITY DUE TO EXCESS CALORIES: ICD-10-CM

## 2019-11-06 DIAGNOSIS — E03.9 HYPOTHYROIDISM, UNSPECIFIED: ICD-10-CM

## 2019-11-07 ENCOUNTER — APPOINTMENT (OUTPATIENT)
Dept: NEUROSURGERY | Facility: CLINIC | Age: 34
End: 2019-11-07

## 2019-11-13 ENCOUNTER — APPOINTMENT (OUTPATIENT)
Dept: PULMONOLOGY | Facility: CLINIC | Age: 34
End: 2019-11-13
Payer: MEDICAID

## 2019-11-13 PROCEDURE — 94060 EVALUATION OF WHEEZING: CPT

## 2019-11-13 PROCEDURE — 94726 PLETHYSMOGRAPHY LUNG VOLUMES: CPT

## 2019-11-13 PROCEDURE — 94729 DIFFUSING CAPACITY: CPT

## 2019-11-13 PROCEDURE — 82803 BLOOD GASES ANY COMBINATION: CPT

## 2019-11-13 PROCEDURE — ZZZZZ: CPT

## 2019-11-13 PROCEDURE — 36600 WITHDRAWAL OF ARTERIAL BLOOD: CPT | Mod: 59

## 2019-11-16 ENCOUNTER — OUTPATIENT (OUTPATIENT)
Dept: OUTPATIENT SERVICES | Facility: HOSPITAL | Age: 34
LOS: 1 days | End: 2019-11-16
Payer: MEDICAID

## 2019-11-16 ENCOUNTER — APPOINTMENT (OUTPATIENT)
Dept: SLEEP CENTER | Facility: CLINIC | Age: 34
End: 2019-11-16
Payer: MEDICAID

## 2019-11-16 PROCEDURE — 95811 POLYSOM 6/>YRS CPAP 4/> PARM: CPT | Mod: 26

## 2019-11-16 PROCEDURE — 95811 POLYSOM 6/>YRS CPAP 4/> PARM: CPT

## 2019-11-18 DIAGNOSIS — G47.33 OBSTRUCTIVE SLEEP APNEA (ADULT) (PEDIATRIC): ICD-10-CM

## 2019-11-20 ENCOUNTER — APPOINTMENT (OUTPATIENT)
Dept: SURGERY | Facility: CLINIC | Age: 34
End: 2019-11-20
Payer: MEDICAID

## 2019-11-20 VITALS
WEIGHT: 315 LBS | DIASTOLIC BLOOD PRESSURE: 80 MMHG | BODY MASS INDEX: 50.62 KG/M2 | SYSTOLIC BLOOD PRESSURE: 118 MMHG | HEART RATE: 74 BPM | HEIGHT: 66 IN | OXYGEN SATURATION: 95 %

## 2019-11-20 PROCEDURE — 99213 OFFICE O/P EST LOW 20 MIN: CPT

## 2019-11-20 NOTE — PHYSICAL EXAM
[Obese, well nourished, in no acute distress] : obese, well nourished, in no acute distress [Normal] : affect appropriate [de-identified] : Obese, protuberant. Soft, nontender, nondistended, no rebound or guarding.

## 2019-11-20 NOTE — ASSESSMENT
[FreeTextEntry1] : Patient remains an excellent candidate for weight loss surgery, given the  presence/risk of developing or worsening of  multiple obesity-related comorbidities, and remains committed to proceeding with weight loss surgery. \par \par Patient is in the process of obtaining  the required pre-operative evaluations.  \par \par \par \par

## 2019-11-20 NOTE — HISTORY OF PRESENT ILLNESS
[de-identified] : Patient is here for monthly pre-operative follow-up and  weight management program in preparation for bariatric surgery.  Patient is working on eating smaller portions and becoming more mindful, but he does continue to struggle.  \par Patient has made a concerted effort to eat more balanced and nutritionally sound meals, which is difficult for him. He was not successful with journaling his intake.  \par Patient continues to struggle with weight loss despite continuous concerted efforts since the prior visit.  \par Patient remains interested in a sleeve gastrectomy.    \par Patient reports no interval changes to overall health status or medical history and has no additional complaints at this time.  \par \par Patient has completed the following evaluations: 1st visit with nutritionist. Psych completed.  Cardiology, pulmonary and GI scheduled.  \par \par

## 2019-11-20 NOTE — PLAN
[FreeTextEntry1] : Patient will obtain any outstanding evaluations in short order. Once again the pre-op requirements/evaluations and any available results were reviewed. \par \par We will collect and review any available/additional results and records of the multi-disciplinary evaluation.  I encouraged patient to bring copies of all completed testing/evaluations to the next office visit with me. \par \par I encouraged the patient to continue a diet and exercise program to promote optimum health for the surgical procedure and post-op course.\par \par Patient  voiced understanding of these behavioral recommendations and agrees to practice them with the understanding that success with these behaviors will be assessed at future visits. \par \par Patient’s questions and concerns addressed to patient's satisfaction and patient verbalized an understanding of the information discussed.\par

## 2019-11-20 NOTE — CONSULT LETTER
[Dear  ___] : Dear  [unfilled], [Consult Letter:] : I had the pleasure of evaluating your patient, [unfilled]. [Please see my note below.] : Please see my note below. [Consult Closing:] : Thank you very much for allowing me to participate in the care of this patient.  If you have any questions, please do not hesitate to contact me. [Sincerely,] : Sincerely, [FreeTextEntry3] : Sriram

## 2019-11-20 NOTE — REVIEW OF SYSTEMS
[Vision Problems] : vision problems [Recent Change In Weight] : ~T recent weight change [SOB on Exertion] : shortness of breath during exertion [Joint Stiffness] : joint stiffness [Joint Pain] : joint pain [Negative] : Heme/Lymph [Fever] : no fever [Chills] : no chills [Dysphagia] : no dysphagia [Chest Pain] : no chest pain [Shortness Of Breath] : no shortness of breath [Palpitations] : no palpitations [Muscle Pain] : no muscle pain [Wheezing] : no wheezing

## 2019-12-01 PROCEDURE — G9005: CPT

## 2019-12-04 ENCOUNTER — APPOINTMENT (OUTPATIENT)
Dept: INTERNAL MEDICINE | Facility: CLINIC | Age: 34
End: 2019-12-04
Payer: MEDICAID

## 2019-12-04 ENCOUNTER — APPOINTMENT (OUTPATIENT)
Dept: PULMONOLOGY | Facility: CLINIC | Age: 34
End: 2019-12-04

## 2019-12-04 ENCOUNTER — APPOINTMENT (OUTPATIENT)
Dept: PULMONOLOGY | Facility: CLINIC | Age: 34
End: 2019-12-04
Payer: MEDICAID

## 2019-12-04 ENCOUNTER — OUTPATIENT (OUTPATIENT)
Dept: OUTPATIENT SERVICES | Facility: HOSPITAL | Age: 34
LOS: 1 days | End: 2019-12-04
Payer: MEDICAID

## 2019-12-04 VITALS
SYSTOLIC BLOOD PRESSURE: 110 MMHG | DIASTOLIC BLOOD PRESSURE: 76 MMHG | TEMPERATURE: 97.6 F | OXYGEN SATURATION: 93 % | RESPIRATION RATE: 17 BRPM | HEART RATE: 78 BPM

## 2019-12-04 VITALS
DIASTOLIC BLOOD PRESSURE: 80 MMHG | HEIGHT: 66 IN | BODY MASS INDEX: 50.62 KG/M2 | SYSTOLIC BLOOD PRESSURE: 120 MMHG | WEIGHT: 315 LBS

## 2019-12-04 DIAGNOSIS — I10 ESSENTIAL (PRIMARY) HYPERTENSION: ICD-10-CM

## 2019-12-04 PROCEDURE — 99212 OFFICE O/P EST SF 10 MIN: CPT | Mod: GE

## 2019-12-04 PROCEDURE — G0463: CPT

## 2019-12-04 PROCEDURE — 99203 OFFICE O/P NEW LOW 30 MIN: CPT | Mod: GC

## 2019-12-04 NOTE — ASSESSMENT
[FreeTextEntry1] : 34M with morbid obesity (BMI 70), obesity hypoventilation syndrome, chronic hypercapnea, admission for hypercapnic respiratory failure at [facility] 4/2019 discharged on trilogy, pseudotumor cerebri (dx 7/2019) on acetazolamide 1000 mg daily  s/p  shunt 7/2019 but with residual blindness presents for follow up of obesity hypoventilation syndrome. PFTs 11/2019 with air trapping and ABG and sleep study on insufficient settings of BIPAP 12/8 demonstrating persistent hypercapnea. He has not been using her trilogy non-invasive ventilator due to frequent alarms. We contacted [facility] to obtain and adjust his settings and alarms. Once we can ensure his noninvasive ventilator is set properly, we can reassess and proceed with pulmonary clearance for bariatric surgery. \par \par Discussed with Dr. Posadas\par \par \par \par \par Sleep study was performed on BIPAP 12/8 which is likely insufficient settings given his body habitus

## 2019-12-04 NOTE — REVIEW OF SYSTEMS
[Difficulty Maintaining Sleep] : difficulty maintaining sleep [As Noted in HPI] : as noted in HPI [Snoring] : snoring [Witnessed Apneas] : demonstrated ~M apnea [Nonrestorative Sleep] : nonrestorative sleep [Negative] : Sleep Disorder

## 2019-12-04 NOTE — PHYSICAL EXAM
[General Appearance - In No Acute Distress] : no acute distress [Elongated Uvula] : elongated uvula [III] : III [Thyroid Diffuse Enlargement] : the thyroid was not enlarged [Neck Appearance] : the appearance of the neck was normal [Heart Rate And Rhythm] : heart rate and rhythm were normal [] : no respiratory distress [Respiration, Rhythm And Depth] : normal respiratory rhythm and effort [Auscultation Breath Sounds / Voice Sounds] : lungs were clear to auscultation bilaterally [Abdomen Tenderness] : non-tender [Abdomen Soft] : soft [Bowel Sounds] : normal bowel sounds [Abnormal Walk] : normal gait [Nail Clubbing] : no clubbing of the fingernails [Cyanosis, Localized] : no localized cyanosis [Skin Lesions] : no skin lesions [Skin Turgor] : normal skin turgor [Motor Exam] : the motor exam was normal [Affect] : the affect was normal [Mood] : the mood was normal [Oriented To Time, Place, And Person] : oriented to person, place, and time [FreeTextEntry2] : no pedal edema [FreeTextEntry1] : bilateral blindness, CN otherwise intact

## 2019-12-04 NOTE — HISTORY OF PRESENT ILLNESS
[FreeTextEntry1] : 34M with morbid obesity (BMI 70), obesity hypoventilation syndrome, chronic hypercapnea, admission for hypercapnic respiratory failure at [facility] 4/2019 discharged on trilogy, pseudotumor cerebri (dx 7/2019) on acetazolamide 1000 mg daily  s/p  shunt 7/2019 but with residual blindness presents for follow up of obesity hypoventilation syndrome. Pt reports that he has not been using his trilogy lately because the machine alarm goes off all night. He feels a little more tired since he stopped using the machine but does not fall asleep during the day. He snores at night and has multiple nighttime awakenings. He has dyspnea on exertion which is unchanged. No cough or fever.\par \par Pt recently had a sleep study 11/16/19 which was performed on settings of 12/8 with ET PCO2 in the 50s and persistent nocturnal hypoventilation. He also had PFTs 11/13/19 which showed air trapping and decreased ERV. ABG 11/13/19 pH 7.31, pCO2 47, pO2 71, HCO3 22.6.  He is undergoing evaluation for bariatric surgery.

## 2019-12-04 NOTE — REASON FOR VISIT
[Follow-Up] : a follow-up visit [FreeTextEntry2] : Obesity Hyperventilation Syndrome with Chronic Hypercapnea

## 2019-12-05 LAB — TSH SERPL-ACNC: 2.64 UIU/ML

## 2019-12-09 ENCOUNTER — APPOINTMENT (OUTPATIENT)
Dept: CARDIOLOGY | Facility: CLINIC | Age: 34
End: 2019-12-09

## 2019-12-09 DIAGNOSIS — Z76.89 PERSONS ENCOUNTERING HEALTH SERVICES IN OTHER SPECIFIED CIRCUMSTANCES: ICD-10-CM

## 2019-12-09 NOTE — PHYSICAL EXAM
[Normal] : no rash [de-identified] : Blind, gait abnormality  [de-identified] : morbidly obese

## 2019-12-09 NOTE — ASSESSMENT
[FreeTextEntry1] : 35 yo male with hypothyroidism, morbid obesity, self reported LAZARO, and recent hospitalization for IIH s/p  shunt with residual visual loss and gait disturbance who presents today for a follow up visit.\par \par RTC in 1 month for weight check. Needs room 20 for digital scale. Must weight patient\par \par Discussed with Dr. Corrigan\par Rafy Begum MD\par Internal Medicine PGY2

## 2019-12-09 NOTE — END OF VISIT
[FreeTextEntry3] : 34 year old man history of hypothyroidism, self reported LAZARO, IIH s/p  shunt s/p revision c/b complete blindness here for follow up. Last visit 11/4/2019- undergoing bariatric surgery evaluation. Documented as good candidate. Needs months appt for weight check. Patient with no complaints today. \par \par VS reviewed\par PE as above\par Medications reviewed\par \par A/P \par Morbid Obesity\par Weight stable. Diet counseling given.\par Follows with Nutrition and Bariatric Surgery.\par Pending appt with Pulmonary and Cardiology\par  [] : Resident

## 2019-12-09 NOTE — REVIEW OF SYSTEMS
[Vision Problems] : vision problems [Negative] : Heme/Lymph [FreeTextEntry3] : Blind no new vision problems

## 2019-12-09 NOTE — HISTORY OF PRESENT ILLNESS
[Parent] : parent [de-identified] : 35 yo male with hypothyroidism, morbid obesity, self reported LAZARO, and recent hospitalization for IIH s/p  shunt with residual visual loss and gait disturbance who presents today for a follow up visit and weight check pending bariatric surgery. He has gained 2 lbs over the past month, he attributes this to thanksgiving. He plans to being physical therapy and adhere to his dietary changes more prudently over the course of the next 6 months. \par \par  [FreeTextEntry1] : Pre bariatric surgery weight check

## 2019-12-18 ENCOUNTER — APPOINTMENT (OUTPATIENT)
Dept: SURGERY | Facility: CLINIC | Age: 34
End: 2019-12-18
Payer: MEDICAID

## 2019-12-18 VITALS
DIASTOLIC BLOOD PRESSURE: 85 MMHG | HEIGHT: 66 IN | HEART RATE: 97 BPM | SYSTOLIC BLOOD PRESSURE: 124 MMHG | TEMPERATURE: 98.4 F

## 2019-12-18 VITALS — BODY MASS INDEX: 69.4 KG/M2 | WEIGHT: 315 LBS

## 2019-12-18 PROCEDURE — 99213 OFFICE O/P EST LOW 20 MIN: CPT

## 2019-12-18 NOTE — PHYSICAL EXAM
[Obese, well nourished, in no acute distress] : obese, well nourished, in no acute distress [Normal] : normal appearance, no rash, nodules, vesicles, ulcers, erythema [de-identified] : bl eye blindness  [de-identified] : short [de-identified] : large pannus extending below pubis. non-tender , soft no palpable masses no hernias  [de-identified] : right hand minor weakness

## 2019-12-18 NOTE — REVIEW OF SYSTEMS
[Recent Change In Weight] : ~T recent weight change [Vision Problems] : vision problems [Negative] : Allergic/Immunologic

## 2019-12-18 NOTE — ASSESSMENT
[FreeTextEntry1] : Patient remains an excellent candidate for weight loss surgery, given the presence/risk of developing or worsening of multiple obesity-related comorbidities, and remains committed to proceeding with weight loss surgery.\par \par Patient is in the process of obtaining the required pre-operative evaluations, which for this patient include: \par Psych\par GI\par  Cardiology\par Pulmonary -repeat Sleep study

## 2019-12-18 NOTE — HISTORY OF PRESENT ILLNESS
[de-identified] : Mr. CHIQUIS BARAKAT  is here for monthly pre-operative follow-up and weight management program in preparation for bariatric surgery. he  is making good food choices, working on eating smaller portions and becoming more mindful. Mr. BARAKAT  has made a concerted effort to eat more balanced and nutritionally sound meals, and to engage in some level of physical activity on a regular basis. he  continues to struggle with weight loss despite continuous concerted efforts since the prior visit.Patient has completed the following evaluations:  Pulmonary,   Patient has also seen our bariatric program coordinator and nutritionist. he has attended our bariatric information session.   Pulmonary specialist has advised the patient to have a repeat sleep study. he is scheduled for early February.  patient states there have been some issues with his insurance plan and he had to reapply. his Insurance is active as of December 1, 2019. \par   [de-identified] : Patient reports no interval changes to her overall health status or medical history

## 2019-12-18 NOTE — PLAN
[FreeTextEntry1] : Patient will obtain any outstanding evaluations in short order. Once again the pre-op requirements/evaluations and any available results were reviewed.  \par We will collect and review any available/additional results and records of the multi-disciplinary evaluation. I encouraged patient to bring copies of all completed testing/evaluations to the next office visit with me. \par I encouraged the patient to continue a diet and exercise program to promote optimum health for the surgical procedure and post-op course. Patient voiced understanding of these behavioral recommendations and agrees to practice them with the understanding that success with these behaviors will be assessed at future visits. \par \par Patient’s questions and concerns addressed to patient's satisfaction and patient verbalized an understanding of the information discussed. \par  \par

## 2019-12-23 DIAGNOSIS — E03.9 HYPOTHYROIDISM, UNSPECIFIED: ICD-10-CM

## 2019-12-23 DIAGNOSIS — E66.01 MORBID (SEVERE) OBESITY DUE TO EXCESS CALORIES: ICD-10-CM

## 2020-01-01 ENCOUNTER — OUTPATIENT (OUTPATIENT)
Dept: OUTPATIENT SERVICES | Facility: HOSPITAL | Age: 35
LOS: 1 days | End: 2020-01-01
Payer: MEDICAID

## 2020-01-03 ENCOUNTER — APPOINTMENT (OUTPATIENT)
Dept: INTERNAL MEDICINE | Facility: CLINIC | Age: 35
End: 2020-01-03
Payer: MEDICAID

## 2020-01-03 ENCOUNTER — OUTPATIENT (OUTPATIENT)
Dept: OUTPATIENT SERVICES | Facility: HOSPITAL | Age: 35
LOS: 1 days | End: 2020-01-03
Payer: MEDICAID

## 2020-01-03 VITALS
WEIGHT: 315 LBS | SYSTOLIC BLOOD PRESSURE: 126 MMHG | BODY MASS INDEX: 50.62 KG/M2 | HEIGHT: 66 IN | DIASTOLIC BLOOD PRESSURE: 72 MMHG

## 2020-01-03 DIAGNOSIS — I10 ESSENTIAL (PRIMARY) HYPERTENSION: ICD-10-CM

## 2020-01-03 PROCEDURE — G0463: CPT

## 2020-01-03 PROCEDURE — 99212 OFFICE O/P EST SF 10 MIN: CPT | Mod: GE

## 2020-01-06 NOTE — PHYSICAL EXAM
[No Acute Distress] : no acute distress [Normal Sclera/Conjunctiva] : normal sclera/conjunctiva [No Respiratory Distress] : no respiratory distress  [Normal Outer Ear/Nose] : the outer ears and nose were normal in appearance [No Accessory Muscle Use] : no accessory muscle use [Normal Rate] : normal rate  [Regular Rhythm] : with a regular rhythm [No Rash] : no rash [Normal Affect] : the affect was normal [de-identified] : Morbid obesity [de-identified] : Decreased breath sounds

## 2020-01-06 NOTE — ASSESSMENT
[FreeTextEntry1] : Forms completed for home care services: copy mailed to [facility] agency and copy faxed as per pt request

## 2020-01-06 NOTE — REVIEW OF SYSTEMS
[Negative] : Constitutional [Discharge] : no discharge [Pain] : no pain [Chest Pain] : no chest pain [Palpitations] : no palpitations [Wheezing] : no wheezing [Cough] : no cough [Dysuria] : no dysuria [Skin Rash] : no skin rash

## 2020-01-06 NOTE — HISTORY OF PRESENT ILLNESS
[de-identified] : Mr. CHIQUIS BARAKAT is a 34 year old male with history of hypothyroidism,  morbid obesity (BMI 70), obesity hypoventilation syndrome, chronic hypercapnia on trilogy, pseudotumor cerebri (dx 7/2019) on acetazolamide 1000 mg daily s/p  shunt 7/2019 but with residual blindness presents for follow up. \par Pt reports he has been doing well without any recent illnesses. He reports following his diet has been challenging with the holidays and has been eating pork/paella and not always having regular meals. He is still trying to increase fruits and vegetables in his diet, and has been walking as able for exercise.

## 2020-01-07 ENCOUNTER — APPOINTMENT (OUTPATIENT)
Dept: INTERNAL MEDICINE | Facility: CLINIC | Age: 35
End: 2020-01-07
Payer: MEDICAID

## 2020-01-07 ENCOUNTER — LABORATORY RESULT (OUTPATIENT)
Age: 35
End: 2020-01-07

## 2020-01-07 VITALS
DIASTOLIC BLOOD PRESSURE: 76 MMHG | WEIGHT: 315 LBS | OXYGEN SATURATION: 94 % | HEIGHT: 66 IN | HEART RATE: 95 BPM | SYSTOLIC BLOOD PRESSURE: 129 MMHG | BODY MASS INDEX: 50.62 KG/M2 | TEMPERATURE: 98 F

## 2020-01-07 PROCEDURE — 83014 H PYLORI DRUG ADMIN: CPT

## 2020-01-07 PROCEDURE — 99204 OFFICE O/P NEW MOD 45 MIN: CPT | Mod: 25

## 2020-01-07 NOTE — HISTORY OF PRESENT ILLNESS
[Nausea] : denies nausea [Heartburn] : denies heartburn [Diarrhea] : denies diarrhea [Vomiting] : denies vomiting [Constipation] : denies constipation [Yellow Skin Or Eyes (Jaundice)] : denies jaundice [Abdominal Pain] : denies abdominal pain [Rectal Pain] : denies rectal pain [Abdominal Swelling] : denies abdominal swelling [Abdominal Surgery] : abdominal surgery [Good Compliance] : good compliance with treatment [Wt Loss ___ Lbs] : no recent weight loss [Wt Gain ___ Lbs] : no recent weight gain [GERD] : no gastroesophageal reflux disease [Hiatus Hernia] : no hiatus hernia [Cholelithiasis] : no cholelithiasis [Pancreatitis] : no pancreatitis [Peptic Ulcer Disease] : no peptic ulcer disease [Kidney Stone] : no kidney stone [Inflammatory Bowel Disease] : no inflammatory bowel disease [Irritable Bowel Syndrome] : no irritable bowel syndrome [Malignancy] : no malignancy [Diverticulitis] : no diverticulitis [Alcohol Abuse] : no alcohol abuse [Appendectomy] : no appendectomy [Cholecystectomy] : no cholecystectomy [de-identified] : Pt is a 34 yr old man who is accompanied by his mother  who was referred by Dr Phillips for evaluation prior to bariatric surgery.   he has hx of hypertension, thyroid disorder , and sleep apnea and recently was hospitalized for pseudotumor cerebri and had  shunt and became legally blind.  he had hypercapnic respiratory failure  on 4/19 and discharged on trilogy .  he will need another sleep study on Feb 8th before he is cleared for surgery.   [_________] : Performed [unfilled] [de-identified] : LUNGS AND LARGE AIRWAYS: Patent central airways. No pulmonary nodules. Trace \par left lower lobe linear atelectasis. \par PLEURA: No pleural effusion. Bilateral apical focal pleural thickening, \par which may represent scarring. \par VESSELS: Within normal limits. \par HEART: Heart size is normal. No pericardial effusion. \par MEDIASTINUM AND EDWAR: Multiple periaortic and paratracheal lymph nodes, all \par measuring less than 1 cm in short axis. \par CHEST WALL AND LOWER NECK:  shunt catheter seen traversing the anterior \par right chest wall without kinks or discontinuities. \par \par ABDOMEN AND PELVIS: \par \par LIVER: Within normal limits. Apparent heterogeneity is likely secondary to \par artifact. \par BILE DUCTS: Normal caliber. \par GALLBLADDER: Within normal limits. \par SPLEEN: Within normal limits. Apparent heterogeneity, likely secondary to \par artifact. \par PANCREAS: Within normal limits. \par ADRENALS: Within normal limits. \par KIDNEYS/URETERS: Within normal limits. \par \par BLADDER: Within normal limits. \par REPRODUCTIVE ORGANS: Prostate within normal limits. \par \par BOWEL: No bowel obstruction. Appendix is normal. \par PERITONEUM: Evaluation limited secondary to artifact. \par VESSELS: Within normal limits. \par RETROPERITONEUM/LYMPH NODES: No lymphadenopathy. \par ABDOMINAL WALL:  shunt catheter in the right anterior abdominal wall; at \par the level of the mid abdomen the catheter extends superficially to lower \par evaluation is limited on this study before sharply turning and descending \par into the abdominal cavity; there are several loops of the shunt catheter in \par the abdominal cavity before it ultimately terminates in the right lower \par quadrant of the abdomen. \par BONES: Degenerative changes. Grade 1 retrolisthesis of L5 on S1. \par \par IMPRESSION: \par \par  shunt catheter seen traversing the right anterior chest and abdominal \par wall, before entering the right mid abdomen and looping before terminating \par in the right lower quadrant of the abdomen; no kinks or discontinuities are \par seen within its visualized course. \par \par  [de-identified] : LUNGS AND LARGE AIRWAYS: Patent central airways. No pulmonary nodules. Trace \par left lower lobe linear atelectasis. \par PLEURA: No pleural effusion. Bilateral apical focal pleural thickening, \par which may represent scarring. \par VESSELS: Within normal limits. \par HEART: Heart size is normal. No pericardial effusion. \par MEDIASTINUM AND EDWAR: Multiple periaortic and paratracheal lymph nodes, all \par measuring less than 1 cm in short axis. \par CHEST WALL AND LOWER NECK:  shunt catheter seen traversing the anterior \par right chest wall without kinks or discontinuities. \par \par ABDOMEN AND PELVIS: \par \par LIVER: Within normal limits. Apparent heterogeneity is likely secondary to \par artifact. \par BILE DUCTS: Normal caliber. \par GALLBLADDER: Within normal limits. \par SPLEEN: Within normal limits. Apparent heterogeneity, likely secondary to \par artifact. \par PANCREAS: Within normal limits. \par ADRENALS: Within normal limits. \par KIDNEYS/URETERS: Within normal limits. \par \par BLADDER: Within normal limits. \par REPRODUCTIVE ORGANS: Prostate within normal limits. \par \par BOWEL: No bowel obstruction. Appendix is normal. \par PERITONEUM: Evaluation limited secondary to artifact. \par VESSELS: Within normal limits. \par RETROPERITONEUM/LYMPH NODES: No lymphadenopathy. \par ABDOMINAL WALL:  shunt catheter in the right anterior abdominal wall; at \par the level of the mid abdomen the catheter extends superficially to lower \par evaluation is limited on this study before sharply turning and descending \par into the abdominal cavity; there are several loops of the shunt catheter in \par the abdominal cavity before it ultimately terminates in the right lower \par quadrant of the abdomen. \par BONES: Degenerative changes. Grade 1 retrolisthesis of L5 on S1. \par \par IMPRESSION: \par \par  shunt catheter seen traversing the right anterior chest and abdominal \par wall, before entering the right mid abdomen and looping before terminating \par in the right lower quadrant of the abdomen; no kinks or discontinuities are \par seen within its visualized course. \par \par LUNGS AND LARGE AIRWAYS: Patent central airways. No pulmonary nodules. Trace \par left lower lobe linear atelectasis. \par PLEURA: No pleural effusion. Bilateral apical focal pleural thickening, \par which may represent scarring. \par VESSELS: Within normal limits. \par HEART: Heart size is normal. No pericardial effusion. \par MEDIASTINUM AND EDWAR: Multiple periaortic and paratracheal lymph nodes, all \par measuring less than 1 cm in short axis. \par CHEST WALL AND LOWER NECK:  shunt catheter seen traversing the anterior \par right chest wall without kinks or discontinuities. \par \par ABDOMEN AND PELVIS: \par \par LIVER: Within normal limits. Apparent heterogeneity is likely secondary to \par artifact. \par BILE DUCTS: Normal caliber. \par GALLBLADDER: Within normal limits. \par SPLEEN: Within normal limits. Apparent heterogeneity, likely secondary to \par artifact. \par PANCREAS: Within normal limits. \par ADRENALS: Within normal limits. \par KIDNEYS/URETERS: Within normal limits. \par \par BLADDER: Within normal limits. \par REPRODUCTIVE ORGANS: Prostate within normal limits. \par \par BOWEL: No bowel obstruction. Appendix is normal. \par PERITONEUM: Evaluation limited secondary to artifact. \par VESSELS: Within normal limits. \par RETROPERITONEUM/LYMPH NODES: No lymphadenopathy. \par ABDOMINAL WALL:  shunt catheter in the right anterior abdominal wall; at \par the level of the mid abdomen the catheter extends superficially to lower \par evaluation is limited on this study before sharply turning and descending \par into the abdominal cavity; there are several loops of the shunt catheter in \par the abdominal cavity before it ultimately terminates in the right lower \par quadrant of the abdomen. \par BONES: Degenerative changes. Grade 1 retrolisthesis of L5 on S1. \par \par IMPRESSION: \par \par  shunt catheter seen traversing the right anterior chest and abdominal \par wall, before entering the right mid abdomen and looping before terminating \par in the right lower quadrant of the abdomen; no kinks or discontinuities are \par seen within its visualized course. \par \par

## 2020-01-07 NOTE — ASSESSMENT
[FreeTextEntry1] : bmi 71  risks of obesity and need for diet and eating healthy Obesity is a complex disorder involving an excessive amount of body fat. Obesity isn't just a cosmetic concern. It increases your risk of diseases and health problems, such as heart disease, diabetes and high blood pressure.\par \par Being extremely obese means you are especially likely to have health problems related to your weight.\par \par \par The good news is that even modest weight loss can improve or prevent the health problems associated with obesity. Dietary changes, increased physical activity and behavior changes can help you lose weight. Prescription medications and weight-loss surgery are additional options for treating obesity.\par \par \par \par \par Symptoms\par \par Obesity is diagnosed when your body mass index (BMI) is 30 or higher. Your body mass index is calculated by dividing your weight in kilograms (kg) by your height in meters (m) squared. \par \par \par BMI\par \par Weight status\par \par \par Below 18.5 Underweight \par 18.5-24.9 Normal \par 25.0-29.9 Overweight \par 30.0-34.9 Obese (Class I) \par 35.0-39.9 Obese (Class II) \par 40.0 and higher Extreme obesity (Class III) \par \par For most people, BMI provides a reasonable estimate of body fat. However, BMI doesn't directly measure body fat, so some people, such as muscular athletes, may have a BMI in the obese category even though they don't have excess body fat. Ask your doctor if your BMI is a problem. \par \par When to see a doctor\par \par If you think you may be obese, and especially if you're concerned about weight-related health problems, see your doctor or health care provider. You and your provider can evaluate your health risks and discuss your weight-loss options. \par \par Request an Appointment at [facility]\par \par Causes\par \par Although there are genetic, behavioral and hormonal influences on body weight, obesity occurs when you take in more calories than you burn through exercise and normal daily activities. Your body stores these excess calories as fat.\par \par Obesity can sometimes be traced to a medical cause, such as Prader-Willi syndrome, Cushing's syndrome, and other diseases and conditions. However, these disorders are rare and, in general, the principal causes of obesity are:\par •Inactivity. If you're not very active, you don't burn as many calories. With a sedentary lifestyle, you can easily take in more calories every day than you use through exercise and normal daily activities.\par •Unhealthy diet and eating habits. Weight gain is inevitable if you regularly eat more calories than you burn. And most Americans' diets are too high in calories and are full of fast food and high-calorie beverages.\par \par Risk factors\par \par Obesity usually results from a combination of causes and contributing factors, including:\par •Genetics. Your genes may affect the amount of body fat you store, and where that fat is distributed. Genetics may also play a role in how efficiently your body converts food into energy and how your body burns calories during exercise.\par •Family lifestyle. Obesity tends to run in families. If one or both of your parents are obese, your risk of being obese is increased. That's not just because of genetics. Family members tend to share similar eating and activity habits.\par •Inactivity. If you're not very active, you don't burn as many calories. With a sedentary lifestyle, you can easily take in more calories every day than you burn through exercise and routine daily activities. Having medical problems, such as arthritis, can lead to decreased activity, which contributes to weight gain.\par •Unhealthy diet. A diet that's high in calories, lacking in fruits and vegetables, full of fast food, and laden with high-calorie beverages and oversized portions contributes to weight gain.\par •Medical problems. In some people, obesity can be traced to a medical cause, such as Prader-Willi syndrome, Cushing's syndrome and other conditions. Medical problems, such as arthritis, also can lead to decreased activity, which may result in weight gain.\par •Certain medications. Some medications can lead to weight gain if you don't compensate through diet or activity. These medications include some antidepressants, anti-seizure medications, diabetes medications, antipsychotic medications, steroids and beta blockers.\par •Social and economic issues. Research has linked social and economic factors to obesity. Avoiding obesity is difficult if you don't have safe areas to exercise. Similarly, you may not have been taught healthy ways of cooking, or you may not have money to buy healthier foods. In addition, the people you spend time with may influence your weight — you're more likely to become obese if you have obese friends or relatives.\par •Age. Obesity can occur at any age, even in young children. But as you age, hormonal changes and a less active lifestyle increase your risk of obesity. In addition, the amount of muscle in your body tends to decrease with age. This lower muscle mass leads to a decrease in metabolism. These changes also reduce calorie needs, and can make it harder to keep off excess weight. If you don't consciously control what you eat and become more physically active as you age, you'll likely gain weight.\par •Pregnancy. During pregnancy, a woman's weight necessarily increases. Some women find this weight difficult to lose after the baby is born. This weight gain may contribute to the development of obesity in women.\par •Quitting smoking. Quitting smoking is often associated with weight gain. And for some, it can lead to enough weight gain that the person becomes obese. In the long run, however, quitting smoking is still a greater benefit to your health than continuing to smoke.\par •Lack of sleep. Not getting enough sleep or getting too much sleep can cause changes in hormones that increase your appetite. You may also crave foods high in calories and carbohydrates, which can contribute to weight gain.\par \par Even if you have one or more of these risk factors, it doesn't mean that you're destined to become obese. You can counteract most risk factors through diet, physical activity and exercise, and behavior changes.\par \par Complications\par \par If you're obese, you're more likely to develop a number of potentially serious health problems, including:\par •High triglycerides and low high-density lipoprotein (HDL) cholesterol\par •Type 2 diabetes\par •High blood pressure\par •Metabolic syndrome — a combination of high blood sugar, high blood pressure, high triglycerides and low HDL cholesterol\par •Heart disease\par •Stroke\par •Cancer, including cancer of the uterus, cervix, endometrium, ovaries, breast, colon, rectum, esophagus, liver, gallbladder, pancreas, kidney and prostate\par •Breathing disorders, including sleep apnea, a potentially serious sleep disorder in which breathing repeatedly stops and starts\par •Gallbladder disease\par •Gynecological problems, such as infertility and irregular periods\par •Erectile dysfunction and sexual health issues\par •Nonalcoholic fatty liver disease, a condition in which fat builds up in the liver and can cause inflammation or scarring\par •Osteoarthritis\par \par Quality of life\par \par When you're obese, your overall quality of life may be diminished. You may not be able to do things you used to do, such as participating in enjoyable activities. You may avoid public places. Obese people may even encounter discrimination.\par \par Other weight-related issues that may affect your quality of life include:\par •Depression\par •Disability\par •Sexual problems\par •Shame and guilt\par •Social isolation\par •Lower work achievement\par \par Prevention\par \par Whether you're at risk of becoming obese, currently overweight or at a healthy weight, you can take steps to prevent unhealthy weight gain and related health problems. Not surprisingly, the steps to prevent weight gain are the same as the steps to lose weight: daily exercise, a healthy diet, and a long-term commitment to watch what you eat and drink.\par •Exercise regularly. You need to get 150 to 300 minutes of moderate-intensity activity a week to prevent weight gain. Moderately intense physical activities include fast walking and swimming.\par •Follow a healthy eating plan. Focus on low-calorie, nutrient-dense foods, such as fruits, veg\par He needs fu with pulmonary and repeat sleep study  and cardiac clearance before he can have the egd  . I will tentatively schedule him for March and will discuss with pulmonary if pt will need to have intubation for the egd with anesthesia and i or setting.  I discussed the risks of the procedure and anesthesia.   He will continue monthly weight ins with his pcp and will also have fecal occult testing and h pylori testing.   he was explained the procedures egd , anesthesia   risks and complications alternatives , prep and post procedure care.  I have answered all his questions.   \par

## 2020-01-07 NOTE — PHYSICAL EXAM
[Well Developed] : well developed [Well Nourished] : well nourished [PERRL With Normal Accommodation] : pupils were equal in size, round, and reactive to light [Normal Verbal Skills] : the patient had normal verbal communication skills [Sclera] : the sclera and conjunctiva were normal [Normal Right Ear] : Right ear: the external ear, canal and tympanic membrane were normal [Normal Left Eye] : Left eye: normal [Normal Right Eye] : Right eye: normal [Normal Nasopharynx] : Nasopharynx: the nasal turbinates, mucosa, adenoids, eustachian tubes were normal [Normal Nose] : Nose: the external nose, nasal mucosa, and septum were normal [Normal Left Ear] : Left ear: the external ear, canal and tympanic membrane were normal [Normal Lips/Teeth/Gums] : Lips, teeth and gums were normal [Normal Oropharynx] : Oropharynx: the oral mucosa, hard and soft palates, tongue, tonsils, and posterior pharynx were normal [Normal Neck] : Neck: Supple, no masses or thyromegaly [Normal Appearance] : was normal in appearance [Normal Larynx] : Larynx: the epiglottis and vocal cords were normal [Neck Supple] : was supple [Enlarged Diffusely] : was not enlarged [Rate ___] : at [unfilled] breaths per minute [Normal Rhythm/Effort] : normal respiratory rhythm and effort [Clear Bilaterally] : the lungs were clear to auscultation bilaterally [Normal to Percussion] : the lungs were normal to percussion [5th Left ICS - MCL] : palpated at the 5th LICS in the midclavicular line [Normal Rate] : normal [Heart Rate ___] : [unfilled] bpm [Rhythm Regular] : regular [Normal S1] : normal S1 [Normal S2] : normal S2 [S3] : no S3 [No Murmur] : no murmurs heard [S4] : no S4 [Lt] : no varicose veins of the left leg [No Pitting Edema] : no pitting edema present [Rt] : no varicose veins of the right leg [Bruit] : no bruit heard [Right Carotid Bruit] : no bruit heard over the right carotid [Left Carotid Bruit] : no bruit heard over the left carotid [Right Femoral Bruit] : no bruit heard over the right femoral artery [Left Femoral Bruit] : no bruit heard over the left femoral artery [No Abnormalities] : the abdominal aorta was not enlarged and no bruit was heard [2+] : left 2+ [No Discharge] : no discharge [Examination Of The Breasts] : a normal appearance [Obese] : obese [Scar] : a scar was noted [Soft, Nontender] : the abdomen was soft and nontender [RUQ] : in the right upper quadrant [None] : no CVA tenderness [Patient Refused] : rectal exam was refused by the patient [FreeTextEntry1] : will do stool sample  [Cervical Lymph Nodes Enlarged Anterior Bilaterally] : anterior cervical [Cervical Lymph Nodes Enlarged Posterior Bilaterally] : posterior cervical [Axillary Lymph Nodes Enlarged Bilaterally] : axillary [Supraclavicular Lymph Nodes Enlarged Bilaterally] : supraclavicular [Normal Kyphosis] : normal kyphosis [Normal Lordosis] : normal lordosis [No Visual Abnormalities] : no visible abnormalities [No Scoliosis] : no scoliosis [No Masses] : no masses [No Tenderness to Palpation] : no spine tenderness on palpation [Full ROM] : full ROM [No Pain with ROM] : no pain with motion in any direction [Intact] : all reflexes within normal limits bilaterally [Normal] : motor strength was normal in all muscle groups [Involuntary Movements] : no involuntary movements were seen [Normal Station and Gait] : the gait and station were normal [Normal Motor Tone] : the muscle tone was normal [Normal Scalp] : inspection of the scalp showed no abnormalities [Examination Of The Hair] : texture and distribution of hair was normal [Cranial Nerves] : cranial nerves 2-12 were intact [Sensation] : the sensory exam was normal to light touch and pinprick [Deep Tendon Reflexes (DTR)] : deep tendon reflexes were 2+ and symmetric [Motor Exam] : the motor exam was normal [Impaired Insight] : insight and judgment were intact [No Focal Deficits] : no focal deficits [Oriented To Time, Place, And Person] : oriented to person, place, and time [Affect] : the affect was normal

## 2020-01-07 NOTE — REVIEW OF SYSTEMS
[Confused] : no confusion [Eyesight Problems] : eyesight problems [Joint Pain] : joint pain [Convulsions] : no convulsions [Fainting] : no fainting [Dizziness] : no dizziness [Limb Weakness] : no limb weakness [Difficulty Walking] : no difficulty walking [Negative] : Heme/Lymph

## 2020-01-08 ENCOUNTER — APPOINTMENT (OUTPATIENT)
Dept: PULMONOLOGY | Facility: CLINIC | Age: 35
End: 2020-01-08

## 2020-01-08 LAB
25(OH)D3 SERPL-MCNC: 16.8 NG/ML
ALBUMIN SERPL ELPH-MCNC: 4.4 G/DL
ALP BLD-CCNC: 119 U/L
ALT SERPL-CCNC: 27 U/L
ANION GAP SERPL CALC-SCNC: 19 MMOL/L
APPEARANCE: ABNORMAL
APTT BLD: 35.7 SEC
AST SERPL-CCNC: 18 U/L
BASOPHILS # BLD AUTO: 0.1 K/UL
BASOPHILS NFR BLD AUTO: 0.8 %
BILIRUB SERPL-MCNC: 0.7 MG/DL
BILIRUBIN URINE: NEGATIVE
BLOOD URINE: NORMAL
BUN SERPL-MCNC: 16 MG/DL
CALCIUM SERPL-MCNC: 9.7 MG/DL
CHLORIDE SERPL-SCNC: 102 MMOL/L
CHOLEST SERPL-MCNC: 181 MG/DL
CHOLEST/HDLC SERPL: 5.8 RATIO
CO2 SERPL-SCNC: 22 MMOL/L
COLOR: YELLOW
CREAT SERPL-MCNC: 0.75 MG/DL
EOSINOPHIL # BLD AUTO: 0.5 K/UL
EOSINOPHIL NFR BLD AUTO: 4.2 %
ESTIMATED AVERAGE GLUCOSE: 103 MG/DL
FERRITIN SERPL-MCNC: 211 NG/ML
FOLATE SERPL-MCNC: 10 NG/ML
FRUCTOSAMINE SERPL-MCNC: 204 UMOL/L
GLUCOSE QUALITATIVE U: NEGATIVE
GLUCOSE SERPL-MCNC: 85 MG/DL
HBA1C MFR BLD HPLC: 5.2 %
HBV CORE IGG+IGM SER QL: NONREACTIVE
HBV SURFACE AB SER QL: NONREACTIVE
HBV SURFACE AG SER QL: NONREACTIVE
HCT VFR BLD CALC: 52.5 %
HCV AB SER QL: NONREACTIVE
HCV S/CO RATIO: 0.14 S/CO
HDLC SERPL-MCNC: 31 MG/DL
HEPATITIS A IGG ANTIBODY: REACTIVE
HGB BLD-MCNC: 16.3 G/DL
HIV1+2 AB SPEC QL IA.RAPID: NONREACTIVE
IMM GRANULOCYTES NFR BLD AUTO: 0.7 %
INR PPP: 1.03 RATIO
IRON SATN MFR SERPL: 24 %
IRON SERPL-MCNC: 72 UG/DL
KETONES URINE: NEGATIVE
LDLC SERPL CALC-MCNC: 102 MG/DL
LEUKOCYTE ESTERASE URINE: ABNORMAL
LYMPHOCYTES # BLD AUTO: 3.2 K/UL
LYMPHOCYTES NFR BLD AUTO: 26.6 %
MAGNESIUM SERPL-MCNC: 2.2 MG/DL
MAN DIFF?: NORMAL
MCHC RBC-ENTMCNC: 28.7 PG
MCHC RBC-ENTMCNC: 31 GM/DL
MCV RBC AUTO: 92.4 FL
MONOCYTES # BLD AUTO: 0.67 K/UL
MONOCYTES NFR BLD AUTO: 5.6 %
NEUTROPHILS # BLD AUTO: 7.47 K/UL
NEUTROPHILS NFR BLD AUTO: 62.1 %
NITRITE URINE: NEGATIVE
PH URINE: 6
PLATELET # BLD AUTO: 276 K/UL
POTASSIUM SERPL-SCNC: 4.1 MMOL/L
PROT SERPL-MCNC: 7.8 G/DL
PROTEIN URINE: ABNORMAL
PT BLD: 11.7 SEC
RBC # BLD: 5.68 M/UL
RBC # FLD: 12.8 %
SODIUM SERPL-SCNC: 143 MMOL/L
SPECIFIC GRAVITY URINE: 1.03
T PALLIDUM AB SER QL IA: NEGATIVE
TIBC SERPL-MCNC: 305 UG/DL
TRIGL SERPL-MCNC: 238 MG/DL
TSH SERPL-ACNC: 2.03 UIU/ML
UIBC SERPL-MCNC: 233 UG/DL
UREA BREATH TEST QL: NEGATIVE
UROBILINOGEN URINE: NORMAL
VIT B12 SERPL-MCNC: 603 PG/ML
WBC # FLD AUTO: 12.02 K/UL

## 2020-01-10 LAB — SELENIUM SERPL-MCNC: 148 UG/L

## 2020-01-13 DIAGNOSIS — E66.01 MORBID (SEVERE) OBESITY DUE TO EXCESS CALORIES: ICD-10-CM

## 2020-01-13 LAB
M TB IFN-G BLD-IMP: NEGATIVE
QUANTIFERON TB PLUS MITOGEN MINUS NIL: >10 IU/ML
QUANTIFERON TB PLUS NIL: 0.19 IU/ML
QUANTIFERON TB PLUS TB1 MINUS NIL: -0.06 IU/ML
QUANTIFERON TB PLUS TB2 MINUS NIL: -0.02 IU/ML
VIT B1 SERPL-MCNC: 196.2 NMOL/L
VIT B2 SERPL-MCNC: 199 UG/L
VIT B6 SERPL-MCNC: 5.3 UG/L
VIT C SERPL-MCNC: 0.3 MG/DL

## 2020-01-14 ENCOUNTER — NON-APPOINTMENT (OUTPATIENT)
Age: 35
End: 2020-01-14

## 2020-01-14 ENCOUNTER — APPOINTMENT (OUTPATIENT)
Dept: OPHTHALMOLOGY | Facility: CLINIC | Age: 35
End: 2020-01-14
Payer: MEDICAID

## 2020-01-14 PROCEDURE — 92012 INTRM OPH EXAM EST PATIENT: CPT

## 2020-01-21 LAB — HEMOCCULT STL QL IA: NEGATIVE

## 2020-01-24 NOTE — ED PROVIDER NOTE - DATE/TIME 2
Please make an appointment to [facility] (phone: [phone] as soon as possible in order to establish primary care and with your liver specialist, Dr. Higginbotham, [facility] (phone: [phone]) for further evaluation and recommendations.  Please take the prescribed medications tonight.  If your symptoms worsen despite taking the prescribed medications please come back to the emergency department.  
21-Jul-2019 07:40

## 2020-02-03 ENCOUNTER — OUTPATIENT (OUTPATIENT)
Dept: OUTPATIENT SERVICES | Facility: HOSPITAL | Age: 35
LOS: 1 days | End: 2020-02-03
Payer: MEDICAID

## 2020-02-03 ENCOUNTER — APPOINTMENT (OUTPATIENT)
Dept: INTERNAL MEDICINE | Facility: CLINIC | Age: 35
End: 2020-02-03
Payer: MEDICAID

## 2020-02-03 VITALS
BODY MASS INDEX: 50.62 KG/M2 | OXYGEN SATURATION: 95 % | HEIGHT: 66 IN | HEART RATE: 86 BPM | DIASTOLIC BLOOD PRESSURE: 80 MMHG | SYSTOLIC BLOOD PRESSURE: 132 MMHG | WEIGHT: 315 LBS

## 2020-02-03 DIAGNOSIS — Z86.69 PERSONAL HISTORY OF OTHER DISEASES OF THE NERVOUS SYSTEM AND SENSE ORGANS: ICD-10-CM

## 2020-02-03 DIAGNOSIS — I10 ESSENTIAL (PRIMARY) HYPERTENSION: ICD-10-CM

## 2020-02-03 DIAGNOSIS — N39.0 URINARY TRACT INFECTION, SITE NOT SPECIFIED: ICD-10-CM

## 2020-02-03 DIAGNOSIS — R21 RASH AND OTHER NONSPECIFIC SKIN ERUPTION: ICD-10-CM

## 2020-02-03 DIAGNOSIS — Z86.39 PERSONAL HISTORY OF OTHER ENDOCRINE, NUTRITIONAL AND METABOLIC DISEASE: ICD-10-CM

## 2020-02-03 PROCEDURE — G0463: CPT

## 2020-02-03 PROCEDURE — 99213 OFFICE O/P EST LOW 20 MIN: CPT | Mod: GE

## 2020-02-03 RX ORDER — MUPIROCIN 20 MG/G
2 OINTMENT TOPICAL TWICE DAILY
Qty: 1 | Refills: 1 | Status: DISCONTINUED | COMMUNITY
Start: 2019-08-14 | End: 2020-02-03

## 2020-02-03 RX ORDER — ACETAZOLAMIDE 500 MG/1
500 CAPSULE, EXTENDED RELEASE ORAL DAILY
Refills: 0 | Status: DISCONTINUED | COMMUNITY
End: 2020-02-03

## 2020-02-03 RX ORDER — CIPROFLOXACIN HYDROCHLORIDE 250 MG/1
250 TABLET, FILM COATED ORAL
Qty: 10 | Refills: 0 | Status: DISCONTINUED | COMMUNITY
Start: 2020-01-08 | End: 2020-02-03

## 2020-02-04 PROBLEM — Z86.69 HISTORY OF SLEEP APNEA: Status: RESOLVED | Noted: 2019-09-18 | Resolved: 2020-02-04

## 2020-02-04 PROBLEM — Z86.39 HISTORY OF THYROID DISORDER: Status: RESOLVED | Noted: 2019-09-18 | Resolved: 2020-02-04

## 2020-02-04 PROBLEM — N39.0 ACUTE LOWER UTI: Status: RESOLVED | Noted: 2020-01-08 | Resolved: 2020-02-04

## 2020-02-04 PROBLEM — R21 SKIN RASH: Status: ACTIVE | Noted: 2020-02-04

## 2020-02-04 NOTE — HISTORY OF PRESENT ILLNESS
[FreeTextEntry1] : Follow-up [de-identified] : 34M with history of hypothyroidism, morbid obesity, obesity hypoventilation syndrome, and pseudotumor cerebri (diagnosed 7/2019) s/p  shunt placement c/b legal blindness who presents for follow-up. \par \par Has been doing very well since last visit. Has been making an effort to get out of the house daily to visit friends and get some exercise. Having meals at home and not eating fast-food. Has lost 12 lbs since last visit. \par \par States vision has gotten a little better. \par \par Noticed dry skin on his forearms and is asking if there is a certain type of cream to use.

## 2020-02-04 NOTE — PHYSICAL EXAM
[Well-Appearing] : well-appearing [No Acute Distress] : no acute distress [Normal Sclera/Conjunctiva] : normal sclera/conjunctiva [PERRL] : pupils equal round and reactive to light [EOMI] : extraocular movements intact [Supple] : supple [No Lymphadenopathy] : no lymphadenopathy [Thyroid Normal, No Nodules] : the thyroid was normal and there were no nodules present [No Accessory Muscle Use] : no accessory muscle use [Pedal Pulses Present] : the pedal pulses are present [Soft] : abdomen soft [No Edema] : there was no peripheral edema [Non Tender] : non-tender [Normal] : affect was normal and insight and judgment were intact [de-identified] : + [de-identified] : Exam limited due to body habitus but lungs clear to auscultation bilaterally,  [de-identified] : Patches of hyperpigmented, lichenified skin along dorsal aspect of forearms/elbows bilaterally

## 2020-02-04 NOTE — REVIEW OF SYSTEMS
[Vision Problems] : vision problems [Dyspnea on Exertion] : dyspnea on exertion [Back Pain] : back pain [Fever] : no fever [Chills] : no chills [Fatigue] : no fatigue [Hearing Loss] : no hearing loss [Chest Pain] : no chest pain [Palpitations] : no palpitations [Lower Ext Edema] : no lower extremity edema [Wheezing] : no wheezing [Cough] : no cough [Abdominal Pain] : no abdominal pain [Nausea] : no nausea [Constipation] : no constipation [Diarrhea] : no diarrhea [Vomiting] : no vomiting [Melena] : no melena [Hematuria] : no hematuria [Dysuria] : no dysuria [Frequency] : no frequency [Joint Pain] : no joint pain [Headache] : no headache [Dizziness] : no dizziness [Insomnia] : no insomnia [Depression] : no depression [Anxiety] : no anxiety

## 2020-02-05 DIAGNOSIS — R21 RASH AND OTHER NONSPECIFIC SKIN ERUPTION: ICD-10-CM

## 2020-02-05 DIAGNOSIS — E66.01 MORBID (SEVERE) OBESITY DUE TO EXCESS CALORIES: ICD-10-CM

## 2020-02-05 DIAGNOSIS — E03.9 HYPOTHYROIDISM, UNSPECIFIED: ICD-10-CM

## 2020-02-05 DIAGNOSIS — E66.2 MORBID (SEVERE) OBESITY WITH ALVEOLAR HYPOVENTILATION: ICD-10-CM

## 2020-02-05 DIAGNOSIS — E55.9 VITAMIN D DEFICIENCY, UNSPECIFIED: ICD-10-CM

## 2020-02-05 DIAGNOSIS — Z71.89 OTHER SPECIFIED COUNSELING: ICD-10-CM

## 2020-02-08 ENCOUNTER — APPOINTMENT (OUTPATIENT)
Dept: SLEEP CENTER | Facility: CLINIC | Age: 35
End: 2020-02-08
Payer: MEDICAID

## 2020-02-08 ENCOUNTER — OUTPATIENT (OUTPATIENT)
Dept: OUTPATIENT SERVICES | Facility: HOSPITAL | Age: 35
LOS: 1 days | End: 2020-02-08
Payer: MEDICAID

## 2020-02-08 PROCEDURE — 95811 POLYSOM 6/>YRS CPAP 4/> PARM: CPT

## 2020-02-08 PROCEDURE — 95811 POLYSOM 6/>YRS CPAP 4/> PARM: CPT | Mod: 26

## 2020-02-10 DIAGNOSIS — G47.33 OBSTRUCTIVE SLEEP APNEA (ADULT) (PEDIATRIC): ICD-10-CM

## 2020-02-26 ENCOUNTER — APPOINTMENT (OUTPATIENT)
Dept: PULMONOLOGY | Facility: CLINIC | Age: 35
End: 2020-02-26
Payer: MEDICAID

## 2020-02-26 ENCOUNTER — APPOINTMENT (OUTPATIENT)
Dept: NEUROSURGERY | Facility: CLINIC | Age: 35
End: 2020-02-26
Payer: MEDICAID

## 2020-02-26 VITALS
WEIGHT: 315 LBS | BODY MASS INDEX: 50.62 KG/M2 | HEIGHT: 66 IN | SYSTOLIC BLOOD PRESSURE: 139 MMHG | DIASTOLIC BLOOD PRESSURE: 87 MMHG | HEART RATE: 90 BPM

## 2020-02-26 VITALS
HEART RATE: 89 BPM | TEMPERATURE: 98 F | DIASTOLIC BLOOD PRESSURE: 74 MMHG | RESPIRATION RATE: 17 BRPM | SYSTOLIC BLOOD PRESSURE: 111 MMHG | OXYGEN SATURATION: 98 %

## 2020-02-26 PROCEDURE — 99213 OFFICE O/P EST LOW 20 MIN: CPT | Mod: GC

## 2020-02-26 PROCEDURE — 99213 OFFICE O/P EST LOW 20 MIN: CPT

## 2020-02-27 NOTE — REASON FOR VISIT
[Shortness of Breath] : shortness of breath [Follow-Up] : a follow-up visit [Family Member] : family member

## 2020-02-27 NOTE — HISTORY OF PRESENT ILLNESS
[FreeTextEntry1] : 34M with morbid obesity (BMI 70), obesity hypoventilation syndrome, chronic hypercapnia, admission for hypercapnic respiratory failure at [facility] 4/2019 discharged on trilogy, pseudotumor cerebri (dx 7/2019) s/p  shunt 7/2019 but with residual blindness presents for follow up of obesity hypoventilation syndrome and preoperative risk assessment for bariatric surgery. Pt says he has been doing well and denies cough or shortness of breath. He has unchanged dyspnea on exertion. He reports adherence with his trilogy non-invasive ventilator, says he wakes up 1-2x nightly and does not feel sleepy during the day. He reports that he recently underwent a sleep study (likely for titration). Pt says he is also planned for endoscopy, was recently seen by GI. \par \par \par

## 2020-03-02 ENCOUNTER — OUTPATIENT (OUTPATIENT)
Dept: OUTPATIENT SERVICES | Facility: HOSPITAL | Age: 35
LOS: 1 days | End: 2020-03-02
Payer: MEDICAID

## 2020-03-02 ENCOUNTER — APPOINTMENT (OUTPATIENT)
Dept: INTERNAL MEDICINE | Facility: CLINIC | Age: 35
End: 2020-03-02
Payer: MEDICAID

## 2020-03-02 VITALS
DIASTOLIC BLOOD PRESSURE: 80 MMHG | HEART RATE: 79 BPM | SYSTOLIC BLOOD PRESSURE: 120 MMHG | WEIGHT: 315 LBS | HEIGHT: 66 IN | BODY MASS INDEX: 50.62 KG/M2 | OXYGEN SATURATION: 96 %

## 2020-03-02 VITALS — BODY MASS INDEX: 71.34 KG/M2 | WEIGHT: 315 LBS

## 2020-03-02 DIAGNOSIS — I10 ESSENTIAL (PRIMARY) HYPERTENSION: ICD-10-CM

## 2020-03-02 PROCEDURE — 99213 OFFICE O/P EST LOW 20 MIN: CPT | Mod: GE

## 2020-03-02 PROCEDURE — G0463: CPT

## 2020-03-04 NOTE — ASSESSMENT
[FreeTextEntry1] : 35 M hypothyroidism, morbid obesity (BMI 70s), OHS/LAZARO on CPAP, and pseudotumor cerebri s/p VPS placement (7/30/19) and revision (10/2019), who presents for monthly follow up visit for bariatric surgery evaluation\par \par #Morbid obesity\par - Today visit 5 out of 6\par - Gain of 14 lbs currently 442 lbs\par - advised patient to replace morning cereal with hard boiled eggs or to consider baked chicken breast for lunch instead of sandwich which can be prepared ahead of time given time constraint of patients mother who is currently working. \par - cardiology appt pending\par - preop endoscopy later this month\par - already seen by pulm, BH, GI\par \par #Pseudotumor Cerebri\par - s/p  shunt in 07/2019 followed by revision in 10/2019\par - now off diamox per last NSx eval\par - currently with residual blindness with mild improvement\par \par #Hypothyroidism\par - TSH 2s at goal in 02/2020\par - c/w taking synthroid 25 mcg\par \par #OHS/LAZARO\par - seen by pulm, recommending continuing trilogy AVAPS\par \par #Pruritic patches on Dorsum \par DDX: Psoriasis, Polycythemia, Cryoglobulinemia\par - Hep panel neg in 2020\par - c/w moisturizing cream\par - start low dose hydrocortisone cream\par - if no resolution consider repeat CBC (r/o polycythemia),\par \par #HCM\par - flu: 2019\par - Tdap temporarily contraindicated due to neurological deficits related to pseudotumor cerebri\par \par RTC in 1 month

## 2020-03-04 NOTE — HISTORY OF PRESENT ILLNESS
[FreeTextEntry1] : Follow up [de-identified] : 35 M hypothyroidism, morbid obesity (BMI 70s), OHS/LAZARO on CPAP, and pseudotumor cerebri s/p VPS placement (7/30/19) and revision (10/2019). Since last revision in 10/2019, he has been doing well. Vision has otherwise been stable. Patient currently wishing to pursue gastric bypass surgery. Patient was recently being evaluated by multiple providers for pre-operative clearance, including GI, pulm, behavioral health.  Saw neurosx on 2/26 off diamox and no further adjustment to VPS. Some improvement of vision however still low clarity. Denies fevers, chills, headaches, back pain. \par \par Exercise: Walking everyday x) 60 mins. \par Meals: 1 bowel cereal, lunch sandwich with cold cuts\par \par

## 2020-03-04 NOTE — REVIEW OF SYSTEMS
[Vision Problems] : vision problems [Fever] : no fever [Chills] : no chills [Hearing Loss] : no hearing loss [Sore Throat] : no sore throat [Chest Pain] : no chest pain [Palpitations] : no palpitations [Lower Ext Edema] : no lower extremity edema [Shortness Of Breath] : no shortness of breath [Cough] : no cough [Abdominal Pain] : no abdominal pain [Nausea] : no nausea [Constipation] : no constipation [Heartburn] : no heartburn [Dysuria] : no dysuria [Hesitancy] : no hesitancy [Frequency] : no frequency [Joint Pain] : no joint pain [Back Pain] : no back pain [Joint Swelling] : no joint swelling [Skin Rash] : no skin rash [Memory Loss] : no memory loss

## 2020-03-04 NOTE — PHYSICAL EXAM
[No Acute Distress] : no acute distress [No Edema] : there was no peripheral edema [Soft] : abdomen soft [Non Tender] : non-tender [Non-distended] : non-distended [Normal Bowel Sounds] : normal bowel sounds [de-identified] : obese; no rash in pannus fold [de-identified] : Dry hyperigmented macules on dorsal aspects of hands overlying to R 2nd PIP; pruritic no pus fluctuance or TTP [de-identified] : + impaired visual field

## 2020-03-04 NOTE — HISTORY OF PRESENT ILLNESS
[FreeTextEntry1] : Mr. CHIQUIS BARAKAT is a 33 yo male with hypothyroidism, morbid obesity (BMI 80), self reported LAZARO (on CPAP) who initially presented to [facility] ED on 7/20/19 for 5 days of b/l blurry/tunnel vision (R>L) with perioral numbness/tingling, pulsatile tinnitus, right arm paresthesias and occasional occipital headache. Initial CTA head/neck and CT venogram showed poor quality image and hypercoagulable work up showed multiple prolonged clotting time with polycythemia.  He was found to have severe papilledema and LP trial showed opening pressure of over 55 concerning pseudotumor cerebri. On 7/30/19, he underwent VPS placement (certas @ 4). Post operative hospital stay was uneventful. Optho f/u showed red hue in periphery with decreasing visual acuity likely r/t altered ICP and started Diamox with weight loss recommendation. He was discharged to home on 8/10/19. \par Unfortunately, he c/o progressively worsening vision (near blind in both eyes) despite of increased Diamox tx (1500mg daily) and shunt setting change (dialed down from 4 to 2). Upon his follow up visit on 10/10/19, shunt did not refill concerning malfunctioning shunt. He was readmitted to the hospital on the same day and underwent revision of proximal shunt on 10/15/19. Postoperative hospital stay was uneventful and he was discharged to home on 10/17/19. Today he returns for follow up and states he is doing well at home. Scheduled for bypass in May pending medical clearances. Diamox stopped.

## 2020-03-09 ENCOUNTER — APPOINTMENT (OUTPATIENT)
Dept: INTERNAL MEDICINE | Facility: CLINIC | Age: 35
End: 2020-03-09
Payer: MEDICAID

## 2020-03-09 ENCOUNTER — LABORATORY RESULT (OUTPATIENT)
Age: 35
End: 2020-03-09

## 2020-03-09 VITALS
HEART RATE: 87 BPM | TEMPERATURE: 98.4 F | DIASTOLIC BLOOD PRESSURE: 82 MMHG | SYSTOLIC BLOOD PRESSURE: 127 MMHG | HEIGHT: 66 IN | BODY MASS INDEX: 50.62 KG/M2 | OXYGEN SATURATION: 93 % | WEIGHT: 315 LBS

## 2020-03-09 VITALS
SYSTOLIC BLOOD PRESSURE: 127 MMHG | HEIGHT: 66 IN | HEART RATE: 87 BPM | DIASTOLIC BLOOD PRESSURE: 82 MMHG | RESPIRATION RATE: 16 BRPM | TEMPERATURE: 98.4 F | WEIGHT: 315 LBS | BODY MASS INDEX: 50.62 KG/M2 | OXYGEN SATURATION: 98 %

## 2020-03-09 VITALS
TEMPERATURE: 98.4 F | WEIGHT: 315 LBS | SYSTOLIC BLOOD PRESSURE: 127 MMHG | OXYGEN SATURATION: 93 % | BODY MASS INDEX: 50.62 KG/M2 | DIASTOLIC BLOOD PRESSURE: 82 MMHG | HEART RATE: 87 BPM | HEIGHT: 66 IN

## 2020-03-09 PROCEDURE — 93000 ELECTROCARDIOGRAM COMPLETE: CPT

## 2020-03-09 PROCEDURE — 99215 OFFICE O/P EST HI 40 MIN: CPT | Mod: 25

## 2020-03-09 NOTE — RESULTS/DATA
[ECG Reviewed] : reviewed [Normal] : The 12 - lead ECG is normal [NSR] : normal sinus rhythm [Ventricular Rate___] : ventricular rate is [unfilled] beats per minute [P Waves Normal] : the P wave is normal [ECG Intervals TN.] : TN interval is normal [OR Interval___] : [unfilled] seconds [Normal QRS] : the QRS is normal [QRS Interval___] : QRS interval: [unfilled] seconds [ECG Axis] : the QRS axis is normal [Normal ST Segments] : the ST segments are normal [ECG T. Waves] : normal

## 2020-03-10 ENCOUNTER — LABORATORY RESULT (OUTPATIENT)
Age: 35
End: 2020-03-10

## 2020-03-10 DIAGNOSIS — G93.2 BENIGN INTRACRANIAL HYPERTENSION: ICD-10-CM

## 2020-03-10 DIAGNOSIS — H54.7 UNSPECIFIED VISUAL LOSS: ICD-10-CM

## 2020-03-10 DIAGNOSIS — E03.9 HYPOTHYROIDISM, UNSPECIFIED: ICD-10-CM

## 2020-03-10 LAB
ALBUMIN SERPL ELPH-MCNC: 4 G/DL
ALP BLD-CCNC: 102 U/L
ALT SERPL-CCNC: 19 U/L
ANION GAP SERPL CALC-SCNC: 13 MMOL/L
APPEARANCE: ABNORMAL
AST SERPL-CCNC: 13 U/L
BASOPHILS # BLD AUTO: 0.09 K/UL
BASOPHILS NFR BLD AUTO: 0.8 %
BILIRUB SERPL-MCNC: 0.5 MG/DL
BILIRUBIN URINE: NEGATIVE
BLOOD URINE: NEGATIVE
BUN SERPL-MCNC: 13 MG/DL
CALCIUM SERPL-MCNC: 9.4 MG/DL
CHLORIDE SERPL-SCNC: 103 MMOL/L
CO2 SERPL-SCNC: 25 MMOL/L
COLOR: YELLOW
CREAT SERPL-MCNC: 0.66 MG/DL
EOSINOPHIL # BLD AUTO: 0.48 K/UL
EOSINOPHIL NFR BLD AUTO: 4.1 %
GLUCOSE QUALITATIVE U: NEGATIVE
GLUCOSE SERPL-MCNC: 88 MG/DL
HCT VFR BLD CALC: 48.8 %
HGB BLD-MCNC: 15.3 G/DL
IMM GRANULOCYTES NFR BLD AUTO: 0.7 %
KETONES URINE: NEGATIVE
LEUKOCYTE ESTERASE URINE: ABNORMAL
LYMPHOCYTES # BLD AUTO: 3.34 K/UL
LYMPHOCYTES NFR BLD AUTO: 28.2 %
MAN DIFF?: NORMAL
MCHC RBC-ENTMCNC: 28.7 PG
MCHC RBC-ENTMCNC: 31.4 GM/DL
MCV RBC AUTO: 91.6 FL
MONOCYTES # BLD AUTO: 0.59 K/UL
MONOCYTES NFR BLD AUTO: 5 %
NEUTROPHILS # BLD AUTO: 7.25 K/UL
NEUTROPHILS NFR BLD AUTO: 61.2 %
NITRITE URINE: NEGATIVE
PH URINE: 6.5
PLATELET # BLD AUTO: 268 K/UL
POTASSIUM SERPL-SCNC: 4.1 MMOL/L
PROT SERPL-MCNC: 7.1 G/DL
PROTEIN URINE: ABNORMAL
RBC # BLD: 5.33 M/UL
RBC # FLD: 13.4 %
SODIUM SERPL-SCNC: 141 MMOL/L
SPECIFIC GRAVITY URINE: 1.03
UROBILINOGEN URINE: NORMAL
WBC # FLD AUTO: 11.83 K/UL

## 2020-03-10 NOTE — PHYSICAL EXAM
[Well Developed] : well developed [Well Nourished] : well nourished [Normal Voice Quality] : was normal [Normal Sclera/Conjunctiva] : normal sclera/conjunctiva [PERRL] : pupils equal round and reactive to light [Normal Oropharynx] : the oropharynx was normal [No JVD] : no jugular venous distention [Supple] : supple [Rate ___] : at [unfilled] breaths per minute [Normal Rhythm/Effort] : normal respiratory rhythm and effort [Clear Bilaterally] : the lungs were clear to auscultation bilaterally [Normal to Percussion] : the lungs were normal to percussion [5th Left ICS - MCL] : palpated at the 5th LICS in the midclavicular line [Heart Rate ___] : [unfilled] bpm [Rhythm Regular] : regular [Normal Rate] : normal [Normal S1] : normal S1 [Normal S2] : normal S2 [No Murmur] : no murmurs heard [No Pitting Edema] : no pitting edema present [2+] : left 2+ [No Abnormalities] : the abdominal aorta was not enlarged and no bruit was heard [Obese] : obese [Soft, Nontender] : the abdomen was soft and nontender [None] : no CVA tenderness [Normal Posterior Cervical Nodes] : no posterior cervical lymphadenopathy [Normal Anterior Cervical Nodes] : no anterior cervical lymphadenopathy [Normal Station and Gait] : the gait and station were normal [Normal Motor Tone] : the muscle tone was normal [Involuntary Movements] : no involuntary movements were seen [Normal] : the deep tendon reflexes were normal [Normal Mental Status] : the patient's orientation, memory, attention, language and fund of knowledge were normal [Appropriate] : appropriate [Normal Verbal Skills] : a deficiency in verbal communication skills was noted [Normal Nonverbal Skills] : deficient nonverbal communication skills were noted [Rt] : no varicose veins of the right leg [Lt] : no varicose veins of the left leg [Bruit] : no bruit heard [S3] : no S3 [S4] : no S4 [Right Carotid Bruit] : no bruit heard over the right carotid [Left Carotid Bruit] : no bruit heard over the left carotid [Right Femoral Bruit] : no bruit heard over the right femoral artery [Left Femoral Bruit] : no bruit heard over the left femoral artery [Impaired judgment] : intact judgment [Impaired Insight] : intact insight

## 2020-03-10 NOTE — ASSESSMENT
[High Risk Surgery - Intraperitoneal, Intrathoracic or Supringuinal Vascular Procedures] : High Risk Surgery - Intraperitoneal, Intrathoracic or Supringuinal Vascular Procedures - No (0) [Ischemic Heart Disease] : Ischemic Heart Disease - No (0) [Congestive Heart Failure] : Congestive Heart Failure - No (0) [Insulin-dependent Diabetic (1 Point)] : Insulin-dependent Diabetic - No (0) [0] : 0 , RCRI Class: I, Risk of Post-Op Cardiac Complications: 3.9%, 95% CI for Risk Estimate: 2.8% - 5.4% [As per surgery] : as per surgery [FreeTextEntry4] : Pt has no hypoxemia and HCO3 is normal suggesting improvement of hypercapnia with trilogy AVAPs-AE. His PFT is unremarkable, shows normal lung function aside from suggestion of air trapping and decreased ERV. His pulmonary function is currently optimized for bariatric surgery and endoscopy. After surgery, he should be extubated to noninvasive ventilator support, bipap 18/10. \par He had repeated sleep study on 2/8/20 and his frequency of sleep disordered breathing was reduced to near normal limits with bilevel seting of 24/16 cm elizabeth  mild o2 desay persisted  with bilevel.  It was suggested that he may benefit fromNIV with AVAPS-AE .   Pt is having a low risk procedure and is low risks for cardiac adverse outcome and cri is 0.4% .  he was explained the procedure egd and  anesthesia   risks and complications alternatives , prep and post procedure care.  I have answered all his questions.  he will have blood testing today    The risks, benefits, and alternatives were explained in detail to the patient. Risks including but not limited to bleeding, perforation, adverse reaction to medications, cardiopulmonary compromise and their attendant sequalae explained. Sequelae including but not limited to need for surgery, , prolonged hospital stay, placement of drainage tubes, blood transfusions, disability, morbidity and mortality was explained. \par It has been made clear to the patient that although egd  , no test is 100% accurate. He/she indicated understanding of the above and wishes to proceed. \par All questions and concerns have been addressed. \par \par \par \par  [FreeTextEntry7] : none prior  npo after 8-m

## 2020-03-10 NOTE — HISTORY OF PRESENT ILLNESS
[No Pertinent Cardiac History] : no history of aortic stenosis, atrial fibrillation, coronary artery disease, recent myocardial infarction, or implantable device/pacemaker [Sleep Apnea] : sleep apnea [No Adverse Anesthesia Reaction] : no adverse anesthesia reaction in self or family member [(Patient denies any chest pain, claudication, dyspnea on exertion, orthopnea, palpitations or syncope)] : Patient denies any chest pain, claudication, dyspnea on exertion, orthopnea, palpitations or syncope [Smoker] : not a smoker [Chronic Anticoagulation] : no chronic anticoagulation [Chronic Kidney Disease] : no chronic kidney disease [Diabetes] : no diabetes [FreeTextEntry1] : egd [FreeTextEntry2] : 3/27/20 [FreeTextEntry3] : Blanca  [FreeTextEntry4] : Pt is a 34 yr old man accompanied by his mother  for evaluation for bariatric surgery.   he has hx of hpn, thyroid disorder, sleep apnea and pseudotumor cerebri.  he had a  shunt and became legally blind.  he has hx of hypercapnic respiratory failure in 4/19  and discharged on trilogy.  he had repeated sleep study done He is doing well with trilogy non invasive ventilator .  He is to have egd done for evaluation for  bariatric surgery.   [FreeTextEntry7] : echo  grossly normal left ventricular systolic function

## 2020-03-10 NOTE — PLAN
[FreeTextEntry1] : pt will be cleared after I reviewed labs and call pulmonary to determine if pt can go forward with egd  without intubation.   I reviewed his labs and is cleared for procedure and waiting for pulmonary response

## 2020-03-11 ENCOUNTER — APPOINTMENT (OUTPATIENT)
Dept: SURGERY | Facility: CLINIC | Age: 35
End: 2020-03-11
Payer: MEDICAID

## 2020-03-11 VITALS
BODY MASS INDEX: 50.62 KG/M2 | TEMPERATURE: 98.7 F | DIASTOLIC BLOOD PRESSURE: 80 MMHG | HEART RATE: 79 BPM | WEIGHT: 315 LBS | HEIGHT: 66 IN | SYSTOLIC BLOOD PRESSURE: 124 MMHG

## 2020-03-11 PROCEDURE — 99213 OFFICE O/P EST LOW 20 MIN: CPT

## 2020-03-11 NOTE — PLAN
[FreeTextEntry1] : I have had a lengthy conversation with the patient and have discussed my impressions and treatment plans with the patient. Risks and benefits of the planned surgery were discussed in depth especially leak, sepsis, and death, reflux, inability to lose desired amount of weight or regain of weight, need for other procedure or re-operation, among others. All surgical options were discussed including non-surgical treatments. \par \par The patient is interested in a sleeve gastrectomy for weight loss. \par \par The weight loss surgery education packet as well as the nutrition education packet have been reviewed and given to the patient, and I provided and reviewed detailed documents addressing these same topics. I have provided literature about the procedures and encouraged the patient to further research the surgeries, and patient feels well informed. I also provided patient with detailed information regarding the pre-operative requirements with respect to testing, medical, nutritional and psychological evaluations and documentation of same. \par Also discussed was importance of taking supplements and attending regular follow-up. I reviewed importance of behavioral modification and follow-up in order to optimize outcomes and avoid complications. The patient is aware of the expected length of stay and discharge plan for a sleeve gastrectomy. I encouraged the patient to maintain a diet and exercise program to promote optimum health for the surgical procedure and post-op course. \par Patient agrees to begin a multi-disciplinary evaluation as discussed and provide required documentation and progress. I informed patient that once all testing and evaluations (as deemed necessary) are completed, reviewed, and documentation received, this information to justify medical necessity for weight loss surgery will be submitted to insurance for pre-certification.  Patient will begin the pre-operative process with a visit to PCP, for whom detailed information regarding the necessary evaluations and documentation of obesity-related medical care was given to patient. Patient will also make appointment for consultation with a nutritionist as well as a psychologist. \par The patient had the opportunity to ask pertinent questions which were answered. All of patient’s questions and concerns were addressed to patient’s satisfaction, and patient verbalized an understanding of the information discussed\par

## 2020-03-11 NOTE — ASSESSMENT
[FreeTextEntry1] : Patient remains an excellent candidate for weight loss surgery, given the presence/risk of developing or worsening of multiple obesity-related comorbidities, and remains committed to proceeding with weight loss surgery.\par \par Patient is in the process of obtaining the required pre-operative evaluations, which for this patient include:\par  GI\par  Cardiology\par

## 2020-03-11 NOTE — REVIEW OF SYSTEMS
[Patient Intake Form Reviewed] : Patient intake form was reviewed [Negative] : Allergic/Immunologic [FreeTextEntry3] : blind

## 2020-03-11 NOTE — PHYSICAL EXAM
[Obese, well nourished, in no acute distress] : obese, well nourished, in no acute distress [Normal] : grossly intact [de-identified] : Obese, protuberant. Soft, nontender, nondistended, no rebound or guarding.

## 2020-03-11 NOTE — HISTORY OF PRESENT ILLNESS
[de-identified] : Mr. CHIQUIS BARAKAT  is here for monthly pre-operative follow-up and weight management program in preparation for bariatric surgery. he  is making good food choices, working on eating smaller portions and becoming more mindful. Mr. BARAKAT  has made a concerted effort to eat more balanced and nutritionally sound meals, and to engage in some level of physical activity on a regular basis. he  continues to struggle with weight loss despite continuous concerted efforts since the prior visit.\par Patient has completed the following evaluations:  Pulmonary, Psych. GI,  She has EGD scheduled for  03/27/2020. Patient   was cleared by pulmonary with recommendations for BIPAP settings after the surgery .  Patient has also seen our bariatric program coordinator and nutritionist. he has attended our bariatric information session.   \par   [de-identified] : Patient reports no interval changes to her overall health status or medical history

## 2020-03-27 ENCOUNTER — APPOINTMENT (OUTPATIENT)
Dept: INTERNAL MEDICINE | Facility: HOSPITAL | Age: 35
End: 2020-03-27

## 2020-03-30 ENCOUNTER — APPOINTMENT (OUTPATIENT)
Dept: ULTRASOUND IMAGING | Facility: HOSPITAL | Age: 35
End: 2020-03-30

## 2020-04-03 ENCOUNTER — APPOINTMENT (OUTPATIENT)
Dept: INTERNAL MEDICINE | Facility: CLINIC | Age: 35
End: 2020-04-03

## 2020-04-24 ENCOUNTER — OUTPATIENT (OUTPATIENT)
Dept: OUTPATIENT SERVICES | Facility: HOSPITAL | Age: 35
LOS: 1 days | End: 2020-04-24
Payer: MEDICAID

## 2020-04-24 ENCOUNTER — APPOINTMENT (OUTPATIENT)
Dept: INTERNAL MEDICINE | Facility: CLINIC | Age: 35
End: 2020-04-24

## 2020-04-24 DIAGNOSIS — E66.9 OBESITY, UNSPECIFIED: ICD-10-CM

## 2020-04-24 DIAGNOSIS — I10 ESSENTIAL (PRIMARY) HYPERTENSION: ICD-10-CM

## 2020-04-24 PROCEDURE — G0463: CPT

## 2020-04-24 RX ORDER — AMOXICILLIN 500 MG/1
500 CAPSULE ORAL TWICE DAILY
Qty: 20 | Refills: 0 | Status: DISCONTINUED | COMMUNITY
Start: 2020-03-12 | End: 2020-04-24

## 2020-05-01 ENCOUNTER — APPOINTMENT (OUTPATIENT)
Dept: INTERNAL MEDICINE | Facility: CLINIC | Age: 35
End: 2020-05-01

## 2020-05-01 ENCOUNTER — OUTPATIENT (OUTPATIENT)
Dept: OUTPATIENT SERVICES | Facility: HOSPITAL | Age: 35
LOS: 1 days | End: 2020-05-01
Payer: MEDICAID

## 2020-05-01 DIAGNOSIS — I10 ESSENTIAL (PRIMARY) HYPERTENSION: ICD-10-CM

## 2020-05-01 DIAGNOSIS — R07.9 CHEST PAIN, UNSPECIFIED: ICD-10-CM

## 2020-05-01 PROCEDURE — G0463: CPT

## 2020-05-05 ENCOUNTER — APPOINTMENT (OUTPATIENT)
Dept: INTERNAL MEDICINE | Facility: CLINIC | Age: 35
End: 2020-05-05

## 2020-05-13 ENCOUNTER — APPOINTMENT (OUTPATIENT)
Dept: SURGERY | Facility: CLINIC | Age: 35
End: 2020-05-13
Payer: MEDICAID

## 2020-05-13 VITALS — WEIGHT: 315 LBS | BODY MASS INDEX: 70.37 KG/M2

## 2020-05-13 PROCEDURE — 99213 OFFICE O/P EST LOW 20 MIN: CPT | Mod: 95

## 2020-05-13 NOTE — PHYSICAL EXAM
[Obese, well nourished, in no acute distress] : obese, well nourished, in no acute distress [Normal] : grossly intact [de-identified] : His breathing is nonlabored, he is not tachypneic. [de-identified] : Obese, protuberant. Soft, nontender, nondistended, no rebound or guarding.

## 2020-05-13 NOTE — HISTORY OF PRESENT ILLNESS
[de-identified] : Patient reports no interval changes to her overall health status or medical history  [de-identified] : Mr. CHIQUIS BARAKAT  is here for monthly pre-operative follow-up and weight management program in preparation for bariatric surgery. he  is making good food choices, working on eating smaller portions and becoming more mindful. He has also tried to increase his physical activity. He recently purchased a punching bag which has been using regularly and has lost about 5 pounds. Mr. BARAKAT  has made a concerted effort to eat more balanced and nutritionally sound meals, and to engage in some level of physical activity on a regular basis.\par Patient has completed the following evaluations:  Pulmonary, Psych. His previously scheduled endoscopy was postponed due to the current COVID  crisis. Patient   was cleared by pulmonary with recommendations for BIPAP settings after the surgery .  He also has not been able to see his cardiologist due to the current crisis.Patient has also seen our bariatric program coordinator and nutritionist. he has attended our bariatric information session.   \par

## 2020-05-13 NOTE — PLAN
[FreeTextEntry1] : I have had a lengthy conversation with the patient and have discussed my impressions and treatment plans with the patient. Risks and benefits of the planned surgery were discussed in depth especially leak, sepsis, and death, reflux, inability to lose desired amount of weight or regain of weight, need for other procedure or re-operation, among others. All surgical options were discussed including non-surgical treatments. \par \par The patient is interested in a sleeve gastrectomy for weight loss. \par \par The weight loss surgery education packet as well as the nutrition education packet have been reviewed and given to the patient, and I provided and reviewed detailed documents addressing these same topics. I have provided literature about the procedures and encouraged the patient to further research the surgeries, and patient feels well informed. I also provided patient with detailed information regarding the pre-operative requirements with respect to testing, medical, nutritional and psychological evaluations and documentation of same. \par Also discussed was importance of taking supplements and attending regular follow-up. I reviewed importance of behavioral modification and follow-up in order to optimize outcomes and avoid complications. The patient is aware of the expected length of stay and discharge plan for a sleeve gastrectomy. I encouraged the patient to maintain a diet and exercise program to promote optimum health for the surgical procedure and post-op course. \par Patient agrees to begin a multi-disciplinary evaluation as discussed and provide required documentation and progress. I informed patient that once all testing and evaluations (as deemed necessary) are completed, reviewed, and documentation received, this information to justify medical necessity for weight loss surgery will be submitted to insurance for pre-certification.  Patient will begin the pre-operative process with a visit to PCP, for whom detailed information regarding the necessary evaluations and documentation of obesity-related medical care was given to patient. Patient will also make appointment for consultation with a nutritionist as well as a psychologist. \par The patient had the opportunity to ask pertinent questions which were answered. All of patient’s questions and concerns were addressed to patient’s satisfaction, and patient verbalized an understanding of the information discussed\par  \par I will see patient once again in one month.

## 2020-05-13 NOTE — CONSULT LETTER
[Consult Letter:] : I had the pleasure of evaluating your patient, [unfilled]. [Dear  ___] : Dear  [unfilled], [Consult Closing:] : Thank you very much for allowing me to participate in the care of this patient.  If you have any questions, please do not hesitate to contact me. [Sincerely,] : Sincerely, [Please see my note below.] : Please see my note below. [FreeTextEntry3] : Sriram

## 2020-05-13 NOTE — REASON FOR VISIT
[Home] : at home, [unfilled] , at the time of the visit. [Medical Office: ([facility])___] : at the medical office located in  [Patient] : the patient [Self] : self [Follow-Up Visit] : a follow-up visit for [Morbid Obesity (BMI<40)] : morbid obesity (bmi<40)

## 2020-06-01 ENCOUNTER — APPOINTMENT (OUTPATIENT)
Dept: INTERNAL MEDICINE | Facility: CLINIC | Age: 35
End: 2020-06-01

## 2020-06-04 ENCOUNTER — OUTPATIENT (OUTPATIENT)
Dept: OUTPATIENT SERVICES | Facility: HOSPITAL | Age: 35
LOS: 1 days | End: 2020-06-04
Payer: MEDICAID

## 2020-06-04 ENCOUNTER — APPOINTMENT (OUTPATIENT)
Dept: INTERNAL MEDICINE | Facility: CLINIC | Age: 35
End: 2020-06-04

## 2020-06-04 DIAGNOSIS — M25.50 PAIN IN UNSPECIFIED JOINT: ICD-10-CM

## 2020-06-04 DIAGNOSIS — M54.5 LOW BACK PAIN: ICD-10-CM

## 2020-06-04 PROCEDURE — G0463: CPT

## 2020-06-05 DIAGNOSIS — I10 ESSENTIAL (PRIMARY) HYPERTENSION: ICD-10-CM

## 2020-06-10 ENCOUNTER — APPOINTMENT (OUTPATIENT)
Dept: SURGERY | Facility: CLINIC | Age: 35
End: 2020-06-10
Payer: MEDICAID

## 2020-06-10 VITALS — BODY MASS INDEX: 70.37 KG/M2 | WEIGHT: 315 LBS

## 2020-06-10 PROCEDURE — 99213 OFFICE O/P EST LOW 20 MIN: CPT | Mod: 95

## 2020-06-10 NOTE — PHYSICAL EXAM
[Obese, well nourished, in no acute distress] : obese, well nourished, in no acute distress [Normal] : affect appropriate [de-identified] : His breathing is nonlabored, he is not tachypneic.

## 2020-06-10 NOTE — REASON FOR VISIT
[Home] : at home, [unfilled] , at the time of the visit. [Medical Office: ([facility])___] : at the medical office located in  [Family Member] : family member [Verbal consent obtained from patient] : the patient, [unfilled] [Follow-Up Visit] : a follow-up visit for [Morbid Obesity (BMI<40)] : morbid obesity (bmi<40)

## 2020-06-10 NOTE — HISTORY OF PRESENT ILLNESS
[de-identified] : Mr. CHIQUIS BARAKAT  is here for monthly pre-operative follow-up and weight management program in preparation for bariatric surgery. he  is making good food choices, working on eating smaller portions and becoming more mindful. He has also tried to increase his physical activity, but this has been very difficult due to the quarantine.   He does feel that his weight has been stable.   Mr. BARAKAT  has made a concerted effort to eat more balanced and nutritionally sound meals, and to engage in some level of physical activity on a regular basis.\par Patient has completed the following evaluations:  Pulmonary, Psych. His previously scheduled endoscopy was postponed due to the current COVID  crisis. Patient   was cleared by pulmonary with recommendations for BIPAP settings after the surgery .  He also has not been able to see his cardiologist due to the current crisis.Patient has also seen our bariatric program coordinator and nutritionist. he has attended our bariatric information session.   \par   [de-identified] : Patient reports no interval changes to her overall health status or medical history

## 2020-06-15 ENCOUNTER — APPOINTMENT (OUTPATIENT)
Dept: INTERNAL MEDICINE | Facility: CLINIC | Age: 35
End: 2020-06-15
Payer: MEDICAID

## 2020-06-15 ENCOUNTER — APPOINTMENT (OUTPATIENT)
Dept: CARDIOLOGY | Facility: CLINIC | Age: 35
End: 2020-06-15
Payer: MEDICAID

## 2020-06-15 VITALS
TEMPERATURE: 99.5 F | SYSTOLIC BLOOD PRESSURE: 147 MMHG | DIASTOLIC BLOOD PRESSURE: 82 MMHG | OXYGEN SATURATION: 96 % | WEIGHT: 315 LBS | BODY MASS INDEX: 50.62 KG/M2 | HEART RATE: 88 BPM | RESPIRATION RATE: 16 BRPM | HEIGHT: 66 IN

## 2020-06-15 VITALS
TEMPERATURE: 99.5 F | DIASTOLIC BLOOD PRESSURE: 82 MMHG | HEART RATE: 88 BPM | WEIGHT: 315 LBS | OXYGEN SATURATION: 96 % | BODY MASS INDEX: 50.62 KG/M2 | SYSTOLIC BLOOD PRESSURE: 147 MMHG | HEIGHT: 66 IN

## 2020-06-15 PROCEDURE — 99205 OFFICE O/P NEW HI 60 MIN: CPT

## 2020-06-15 PROCEDURE — 99214 OFFICE O/P EST MOD 30 MIN: CPT

## 2020-06-15 PROCEDURE — 93000 ELECTROCARDIOGRAM COMPLETE: CPT

## 2020-06-15 NOTE — ASSESSMENT
[FreeTextEntry1] : Bariactric surg status  he will need completion of his  cardiac evaluation and neurosurgeon before cleared for procedure I explained risk of procedure and alternatives and  anesthesia.  he will return to me for clearance since he states it is easier for his mom to bring him here.   Pt is having a low risk procedure and is high risk for cardiac adverse outcome .  he was explained the procedure egd and anesthesia   risks and complications alternatives , prep and post procedure care.  I have answered all his questions.  he will have blood testing today  week of procedure and will return  after he has completed his cardiac and  neurosurgery evaluation.  .\par

## 2020-06-15 NOTE — PHYSICAL EXAM
[Well Developed] : well developed [PERRL With Normal Accommodation] : pupils were equal in size, round, and reactive to light [Well Nourished] : well nourished [Oropharynx] : the oropharynx was normal [Normal Rate] : the respiratory rate was normal [Rate ___] : at [unfilled] breaths per minute [Normal Rhythm/Effort] : normal respiratory rhythm and effort [Clear Bilaterally] : the lungs were clear to auscultation bilaterally [Normal to Percussion] : the lungs were normal to percussion [Normal] : palpation of the chest was normal [Heart Rate And Rhythm] : heart rate was normal and rhythm regular [Heart Sounds] : normal S1 and S2 [Murmurs] : no murmurs [Full Pulse] : the pedal pulses are present [Heart Sounds Pericardial Friction Rub] : no pericardial rub [Heart Sounds Gallop] : no gallops [Obese] : obese [Edema] : there was no peripheral edema [Soft, Nontender] : the abdomen was soft and nontender [None] : no hernias were palpable [Cervical Lymph Nodes Enlarged Anterior Bilaterally] : anterior cervical [Cervical Lymph Nodes Enlarged Posterior Bilaterally] : posterior cervical [No CVA Tenderness] : no ~M costovertebral angle tenderness [Nail Clubbing] : no clubbing  or cyanosis of the fingernails [Abnormal Walk] : normal gait [Musculoskeletal - Swelling] : no joint swelling seen [Motor Tone] : muscle strength and tone were normal [Skin Color & Pigmentation] : normal skin color and pigmentation [Skin Turgor] : normal skin turgor [Motor Exam] : the motor exam was normal [] : no rash [Affect] : the affect was normal [Impaired Insight] : insight and judgment were intact [Oriented To Time, Place, And Person] : oriented to person, place, and time

## 2020-06-15 NOTE — PHYSICAL EXAM
[Well Developed] : well developed [PERRL With Normal Accommodation] : pupils were equal in size, round, and reactive to light [Well Nourished] : well nourished [Oropharynx] : the oropharynx was normal [Rate ___] : at [unfilled] breaths per minute [Normal Rate] : the respiratory rate was normal [Normal Rhythm/Effort] : normal respiratory rhythm and effort [Normal to Percussion] : the lungs were normal to percussion [Clear Bilaterally] : the lungs were clear to auscultation bilaterally [Normal] : palpation of the chest was normal [Heart Rate And Rhythm] : heart rate was normal and rhythm regular [Heart Sounds] : normal S1 and S2 [Heart Sounds Gallop] : no gallops [Heart Sounds Pericardial Friction Rub] : no pericardial rub [Murmurs] : no murmurs [Full Pulse] : the pedal pulses are present [Obese] : obese [Edema] : there was no peripheral edema [Soft, Nontender] : the abdomen was soft and nontender [None] : no hernias were palpable [Cervical Lymph Nodes Enlarged Posterior Bilaterally] : posterior cervical [Cervical Lymph Nodes Enlarged Anterior Bilaterally] : anterior cervical [Abnormal Walk] : normal gait [Nail Clubbing] : no clubbing  or cyanosis of the fingernails [No CVA Tenderness] : no ~M costovertebral angle tenderness [Motor Tone] : muscle strength and tone were normal [Musculoskeletal - Swelling] : no joint swelling seen [Skin Color & Pigmentation] : normal skin color and pigmentation [Skin Turgor] : normal skin turgor [] : no rash [Motor Exam] : the motor exam was normal [Impaired Insight] : insight and judgment were intact [Affect] : the affect was normal [Oriented To Time, Place, And Person] : oriented to person, place, and time

## 2020-06-15 NOTE — RESULTS/DATA
[ECG Reviewed] : reviewed [NSR] : normal sinus rhythm [AZ Interval___] : [unfilled] seconds [Normal QRS] : the QRS is normal [QRS Interval___] : QRS interval: [unfilled] seconds [QTc Interval___] : QTc interval: [unfilled] [ECG Axis] : the QRS axis is normal [Normal ST Segments] : the ST segments are normal [ECG T. Waves] : normal

## 2020-06-15 NOTE — RESULTS/DATA
[ECG Reviewed] : reviewed [NSR] : normal sinus rhythm [DC Interval___] : [unfilled] seconds [Normal QRS] : the QRS is normal [QRS Interval___] : QRS interval: [unfilled] seconds [QTc Interval___] : QTc interval: [unfilled] [ECG Axis] : the QRS axis is normal [Normal ST Segments] : the ST segments are normal [ECG T. Waves] : normal

## 2020-06-16 LAB
SARS-COV-2 IGG SERPL IA-ACNC: 14.5 INDEX
SARS-COV-2 IGG SERPL QL IA: POSITIVE

## 2020-06-17 ENCOUNTER — APPOINTMENT (OUTPATIENT)
Dept: NEUROSURGERY | Facility: CLINIC | Age: 35
End: 2020-06-17
Payer: MEDICAID

## 2020-06-17 PROCEDURE — 99213 OFFICE O/P EST LOW 20 MIN: CPT | Mod: 95

## 2020-06-17 NOTE — HISTORY OF PRESENT ILLNESS
[Sleep Apnea] : sleep apnea [No Adverse Anesthesia Reaction] : no adverse anesthesia reaction in self or family member [Heartburn] : denies heartburn [Nausea] : denies nausea [Vomiting] : denies vomiting [Diarrhea] : denies diarrhea [Constipation] : denies constipation [Abdominal Pain] : denies abdominal pain [Yellow Skin Or Eyes (Jaundice)] : denies jaundice [Abdominal Swelling] : denies abdominal swelling [Rectal Pain] : denies rectal pain [Wt Loss ___ Lbs] : recent [unfilled] ~Upound(s) weight loss [de-identified] : Pt is a 34 yr old man who  is to have bariactric surgery . he has hx of hypertension, thyroid disorder and sleep apnea and was recently hospitalized for pseudotumor cerebri and had  shunt and became legally blind.  he has hypercapnic respiratory failure on 4/19  and discharged on trilogy.  he had recommendation recently of  AVAPS AE recommended and is on and doing well.  He is to  have egd for bariactric  surgery prep  with Dr Phillips.  he has seen cardiologist today.   Pt is accompanied by his mom.  he has not been cleared by [facility] [place] due to chest discomfort and is  to have  evaluation and also has been having headaches and will see neurosurgeon.  Once completed and cleared by his pcp he will go forward with egd.  He is scheduled for July 10 and will need clearance  with pcr covid 3 days prior to procedure.   [Chronic Anticoagulation] : no chronic anticoagulation [Chronic Kidney Disease] : no chronic kidney disease [FreeTextEntry1] : egd [Diabetes] : no diabetes [FreeTextEntry2] : 6/19/20 [FreeTextEntry4] : Pt is a 34 yr old man who  is to have bariactric surgery . he has hx of hypertension, thyroid disorder and sleep apnea and was recently hospitalized for pseudotumor cerebri and had  shunt and became legally blind.  he has hypercapnic respiratory failure on 4/19  and discharged on trilogy.  he had recommendation recently of  AVAPS AE recommended and is on and doing well.  He is to  have egd for bariactric  surgery prep  with Dr Phillips.  he has seen cardiologist today.   [FreeTextEntry3] : Blanca  [FreeTextEntry7] : echo  technically difficult   grossly normal left ventricular systolic function.

## 2020-06-17 NOTE — HISTORY OF PRESENT ILLNESS
[Sleep Apnea] : sleep apnea [No Adverse Anesthesia Reaction] : no adverse anesthesia reaction in self or family member [Heartburn] : denies heartburn [Nausea] : denies nausea [Vomiting] : denies vomiting [Constipation] : denies constipation [Diarrhea] : denies diarrhea [Abdominal Pain] : denies abdominal pain [Yellow Skin Or Eyes (Jaundice)] : denies jaundice [Abdominal Swelling] : denies abdominal swelling [Rectal Pain] : denies rectal pain [Wt Loss ___ Lbs] : recent [unfilled] ~Upound(s) weight loss [de-identified] : Pt is a 34 yr old man who  is to have bariactric surgery . he has hx of hypertension, thyroid disorder and sleep apnea and was recently hospitalized for pseudotumor cerebri and had  shunt and became legally blind.  he has hypercapnic respiratory failure on 4/19  and discharged on trilogy.  he had recommendation recently of  AVAPS AE recommended and is on and doing well.  He is to  have egd for bariactric  surgery prep  with Dr Phillips.  he has seen cardiologist today.   Pt is accompanied by his mom.  he has not been cleared by [facility] [place] due to chest discomfort and is  to have  evaluation and also has been having headaches and will see neurosurgeon.  Once completed and cleared by his pcp he will go forward with egd.  He is scheduled for July 10 and will need clearance  with pcr covid 3 days prior to procedure.   [Chronic Anticoagulation] : no chronic anticoagulation [Diabetes] : no diabetes [FreeTextEntry1] : egd [Chronic Kidney Disease] : no chronic kidney disease [FreeTextEntry2] : 6/19/20 [FreeTextEntry3] : Blanca  [FreeTextEntry4] : Pt is a 34 yr old man who  is to have bariactric surgery . he has hx of hypertension, thyroid disorder and sleep apnea and was recently hospitalized for pseudotumor cerebri and had  shunt and became legally blind.  he has hypercapnic respiratory failure on 4/19  and discharged on trilogy.  he had recommendation recently of  AVAPS AE recommended and is on and doing well.  He is to  have egd for bariactric  surgery prep  with Dr Phillips.  he has seen cardiologist today.   [FreeTextEntry7] : echo  technically difficult   grossly normal left ventricular systolic function.

## 2020-06-18 NOTE — HISTORY OF PRESENT ILLNESS
[FreeTextEntry1] : This visit was conducted via two-way telehealth video conference platform. Consent was obtained. The patient, Ifrah Merinogerry, was at home and the neurosurgeon, Dr. Alondra Cruz was in a [facility] office, [address] [place] [address]. The patient, Ifrah Freedtaiwo, and Dr. Cruz were present for the entire visit. No physical exam was performed given the virtual nature of the visit.\par \par Patient is a 34 yo male with hypothyroidism, morbid obesity (BMI 80), self reported LAZARO (on CPAP) who initially presented to [facility] ED on 7/20/19 for visual loss. He was found to have severe papilledema and LP trial showed opening pressure of over 55 concerning pseudotumor cerebri. On 7/30/19, he underwent VPS placement (certas @ 4).  Today he returns for follow up and states he is doing well at home. He is hoping to undergo gastric bypass surgery. \par \par He is neurologically stable.

## 2020-06-18 NOTE — ASSESSMENT
[FreeTextEntry1] : 34 yo M s/p VPS placement for pseudotumor. Patient is legally blind and has not had significant improvement in his vision since shunt placement. Currently he is scheduled for bariatric surgery sometime this summer. No contraindication for gastric bypass or general anesthesia from a neurosurgical perspective.

## 2020-06-18 NOTE — ASSESSMENT
[FreeTextEntry1] : 35M PMHx legal blindness in both eyes, morbid obesity (BMI 70s) being evaluated for bariatric surgery, OHS/LAZARO on CPAP, pseudotumor cerebri s/p VPS placement (7/2019) with revision (10/2019) presents for telephone follow-up visit; chief complaint is persistent back pain radiating to left leg with associated numbness and radiating to neck.\par \par #Back pain\par - Longstanding back pain (2-3 years) with characteristics of nerve involvement (radiating to left leg with intermittent numbness)\par - Ddx: nerve impingement, slipped disc, vertebral compression fracture\par - Patient denies urinary or fecal physiological dysfunction, saddle anesthesia or other neurological deficits\par - Will order MRI of lumbar spine without contrast to assess for nerve pathology\par - Will reorder physical therapy script so patient can return to PT\par \par #Left arm pain\par - Per patient, sustained during exercise session in May (doing pushups)\par - Likely due to muscle strain\par - Physical therapy as above with Tylenol PRN\par - Advised to call clinic if symptoms continue; contingency is to evaluate for fracture or dislocation\par \par #Morbid obesity\par - BMI 70+\par - Currently being evaluated for bariatric surgery\par - Advised to continue physical exercise/PT as tolerated; patient's aim is to lose 30 pounds prior to tentative surgery\par - Bariatric surgery following patient; also requires follow-up with nutritionist and psychologist as requirement for pre-op evaluation\par \par #Pseudotumor Cerebri\par - VPS placed 7/2019, revised 10/2019\par - Patient endorses intermittent headaches which he states is associated with the shunt\par - Advised to follow-up with Neurosurgery; per note from 2/2020, CT head ordered but not performed, likely because of COVID-19 pandemic\par \par #Legal blindness\par - Patient states that he feels blindness is due to pseudotumor cerebri\par - Advised to follow with Dr. Jim Adkins\par \par 53 minutes spent on encounter, including discussion with patient to acquire above history, discussion of plan with Attending, and conveyance of plan and contingencies with patient.\par \par Phone consent obtained after explanation of risks of this modality of communication, particularly confidentiality and billing, which the patient has acknowledged and accepted the risks.\par \par Case discussed with Dr. Dolly Valentine.\par \par Noni Delong II, PGY-1\par [employer], [employer]

## 2020-06-18 NOTE — HISTORY OF PRESENT ILLNESS
[Verbal consent obtained from patient] : the patient, [unfilled] [FreeTextEntry1] : Patient presents for telephone visit for follow-up. [de-identified] : 35M PMHx legal blindness in both eyes, morbid obesity (BMI 70s), OHS/LAZARO on CPAP, pseudotumor cerebri s/p VPS placement (7/2019) with revision (10/2019) presents for telephone follow-up visit. Patient's last interaction with Internal Medicine clinic was a telephone call on 5/1/2020, in which patient was complaining of left arm and chest pain (determined to most likely be musculoskeletal in nature) after an exercise session. Patient also noted a knot in left breast which he was advised to monitor. Patient also stated that he was concerned about his shunt and was advised to follow-up with Neurosurgery. Patient is also undergoing evaluation by Bariatric Surgery for sleeve gastrectomy.\par \par For this telephone call, patient is demonstrating complaints about longstanding low back and left leg pain. Patient states that the pain started approximately 2-3 years ago; patient does not recall any inciting event. He always suspected that it was a pinched nerve but this was never officially diagnosed. He says that the pain started to get worse in September and has been lingering ever since. The low back pain is described as a sharp 8/10 pain that radiates down left leg to thigh; he also states that he has occasional left leg numbness. He says that he has been doing squats, yoga, and stretching and this has helped somewhat, but his pain remains persistent and has compromised his mobility to the point that he is having slight difficulty walking. Patient also endorses that starting in April, this back pain has started to radiate to the back of his neck; this pain is relieved by straightening of the neck. \par \par Additionally, patient also indicates left arm/elbow pain due to injury sustained due to a combination of overexertion doing push-ups one month ago (noted above) and sleeping in a suboptimal position. Patient states he was exercising in order to lose 30 pounds in preparation for bariatric surgery; since he has experience the left arm pain he states that he has limited his exercise and physical exertion (he only does stretching and no longer does push-ups or uses his punching bag). The pain is described as a sharp 7/10 pain located at the left elbow and radiating to the shoulder. Patient has been going to physical therapy and his last session was in January/February; since then, patient has been unable to go due to transportation issues that he says has since resolved.\par \par For pain relief, patient states that he uses Tylenol very sparsely; he does not use Motrin/Advil or any other NSAIDs.\par \par Otherwise, patient reports occasional headaches that he attributes to the shunt. He denies fever, lightheadedness/dizziness, SOB, ABD pain, constipation/diarrhea, nausea/vomiting, difficulty with urination or defecation, numbness underneath privates/buttocks, pain on urination, leg swelling. Patient states that he had a minor fall 2 weeks ago in which he fell on his buttocks because a computer chair rolled form underneath him. He denies sustaining any significant injury from that incident.\par \par Eye doctor is Jim Adkins, who he last saw in February.

## 2020-06-18 NOTE — REVIEW OF SYSTEMS
[Vision Problems] : vision problems [Back Pain] : back pain [Joint Pain] : joint pain [Headache] : headache [Fever] : no fever [Chills] : no chills [Fatigue] : no fatigue [Hearing Loss] : no hearing loss [Sore Throat] : no sore throat [Chest Pain] : no chest pain [Palpitations] : no palpitations [Shortness Of Breath] : no shortness of breath [Cough] : no cough [Abdominal Pain] : no abdominal pain [Nausea] : no nausea [Constipation] : no constipation [Diarrhea] : no diarrhea [Vomiting] : no vomiting [Dysuria] : no dysuria [FreeTextEntry3] : Longstanding legal blindness [FreeTextEntry9] : Left elbow pain

## 2020-06-27 NOTE — PROGRESS NOTE ADULT - SUBJECTIVE AND OBJECTIVE BOX
Interval History/Significant Events: Patient seen and examined this AM. NO acute events overnight. He remains anuric with only 10 mL of UOP overnight. Otherwise leukocytosis improving. Cultures remain without growth to date. Will obtain repeat TTE and wean sedation today to assess patient's mentation. Wife at bedside and informed of plan of care. All questions answered.    Review of Systems   Unable to perform ROS: Intubated     Objective:     Vital Signs (Most Recent):  Temp: 98.6 °F (37 °C) (06/27/20 1505)  Pulse: 70 (06/27/20 1600)  Resp: (!) 22 (06/27/20 1600)  BP: 99/63 (06/27/20 1600)  SpO2: 96 % (06/27/20 1600) Vital Signs (24h Range):  Temp:  [97 °F (36.1 °C)-99.1 °F (37.3 °C)] 98.6 °F (37 °C)  Pulse:  [69-82] 70  Resp:  [8-26] 22  SpO2:  [91 %-97 %] 96 %  BP: ()/(57-73) 99/63  Arterial Line BP: (107-117)/(48-54) 114/50   Weight: 109.5 kg (241 lb 6.5 oz)  Body mass index is 35.65 kg/m².      Intake/Output Summary (Last 24 hours) at 6/27/2020 1703  Last data filed at 6/27/2020 1600  Gross per 24 hour   Intake 4738.62 ml   Output 3997 ml   Net 741.62 ml       Physical Exam  Vitals signs and nursing note reviewed.   Constitutional:       Appearance: He is morbidly obese. He is ill-appearing. He is not diaphoretic.      Interventions: He is sedated and intubated.   HENT:      Head: Normocephalic and atraumatic.      Nose: Nose normal. No congestion.      Mouth/Throat:      Mouth: Mucous membranes are dry.   Eyes:      General: No scleral icterus.     Extraocular Movements: Extraocular movements intact.      Pupils: Pupils are equal, round, and reactive to light.   Neck:      Musculoskeletal: Normal range of motion and neck supple.      Comments: CVC line to right IJ.  Cardiovascular:      Rate and Rhythm: Normal rate.      Pulses: Normal pulses.      Heart sounds: No murmur. No friction rub. No gallop.    Pulmonary:      Effort: He is intubated.      Breath sounds: No wheezing, rhonchi or rales.       Comments: Transmitted ventilatory sounds. Well healed sternotomy scar to chest. Left-sided port palpated over left chest wall. CVC to right subclavian.   Abdominal:      General: Bowel sounds are normal. There is no distension.      Comments: Obese/protuberant abdomen. Midline incision present without evidence of erythema, induration, or discharge. Ostomy with brown liquid stool to right abdominal wall.    Musculoskeletal:         General: Swelling (bilateral upper extremities) present.      Right lower leg: Edema present.      Left lower leg: Edema present.      Comments: Scar to right lower extremity from prior vein harvest.   Lymphadenopathy:      Cervical: No cervical adenopathy.   Skin:     Coloration: Skin is pale.      Findings: Bruising (upper extremities bilaterally) present.         Vents:  Vent Mode: A/C (06/27/20 1526)  Ventilator Initiated: Yes (06/26/20 1743)  Set Rate: 22 BPM (06/27/20 1526)  Vt Set: 420 mL (06/27/20 1526)  PEEP/CPAP: 10 cmH20 (06/27/20 1526)  Oxygen Concentration (%): 40 (06/27/20 1600)  Peak Airway Pressure: 31 cmH2O (06/27/20 1526)  Plateau Pressure: 20 cmH20 (06/27/20 1526)  Total Ve: 10.3 mL (06/27/20 1526)  F/VT Ratio<105 (RSBI): (!) 60.32 (06/27/20 1526)  Lines/Drains/Airways     Central Venous Catheter Line                 Port A Cath Single Lumen 05/02/18 2105 left subclavian 786 days    Percutaneous Central Line Insertion/Assessment - Triple Lumen  06/24/20 1836 right internal jugular 2 days         Hemodialysis Catheter 06/26/20 0932 1 day    Permacath 06/26/20 0932 1 day          Drain                 Colostomy 05/03/18 0715  days         Colostomy 06/24/20 RLQ 3 days         NG/OG Tube 06/24/20 1103 [place] sump 16 Fr. Right nostril 3 days         Urethral Catheter 06/26/20 1932 less than 1 day          Airway                 Airway - Non-Surgical 06/24/20 1959 Endotracheal Tube-Hi/Lo 2 days          Arterial Line                 Arterial Line 06/24/20 1852 Right  Patient seen and examined at bedside.    --Anticoagulation--    T(C): 36.6 (07-30-19 @ 22:49), Max: 36.6 (07-30-19 @ 22:49)  HR: 100 (07-31-19 @ 03:07) (78 - 101)  BP: 133/87 (07-30-19 @ 22:49) (115/58 - 173/95)  RR: 20 (07-30-19 @ 22:49) (15 - 22)  SpO2: 96% (07-31-19 @ 03:07) (93% - 100%)  Wt(kg): --    Exam:  intact except for vision, Has light perception in R eye, and finger counting in L eye. Radial 2 days          Peripheral Intravenous Line                 Midline Catheter Insertion/Assessment  - Single Lumen (RETIRED) 04/03/20 1312 Right basilic vein (medial side of arm) other (see comments) 85 days         Peripheral IV - Double Lumen 06/24/20 1455 18 G Left;Posterior Hand 3 days         Peripheral IV - Single Lumen 06/24/20 0740 20 G Left Antecubital 3 days              Significant Labs:    CBC/Anemia Profile:  Recent Labs   Lab 06/26/20  0739 06/26/20  1558 06/26/20  1801 06/27/20  0315   WBC 11.40  --  11.55 11.32   HGB 10.8*  --  10.0* 9.5*   HCT 32.4* 31* 31.8* 29.7*   PLT 97*  --  69* 58*   MCV 78*  --  81* 81*   RDW 18.5*  --  18.1* 17.9*        Chemistries:  Recent Labs   Lab 06/26/20  0324 06/26/20  1801 06/26/20  2203 06/27/20  0315 06/27/20  1450   * 135* 132* 136 138   K 4.8 4.8 4.8 4.6 4.5   CL 98* 97 95 95 101   CO2 15* 21* 23 29 24   BUN 43* 43* 36* 25* 12   CREATININE 5.4* 5.1* 4.3* 3.1* 1.8*   CALCIUM 7.2* 7.0* 7.1* 7.1* 7.5*   ALBUMIN 2.0* 1.7* 1.6* 1.6* 1.6*   PROT 4.6* 5.1*  --  5.3*  --    BILITOT 1.2 1.0  --  0.9  --    ALKPHOS 72 76  --  83  --    * 188*  --  176*  --    * 188*  --  145*  --    MG 1.5*  --  1.8 1.7 2.0   PHOS  --   --  5.4* 4.1 3.0     Significant Imaging:  I have reviewed all pertinent imaging results/findings within the past 24 hours.

## 2020-06-29 ENCOUNTER — OUTPATIENT (OUTPATIENT)
Dept: OUTPATIENT SERVICES | Facility: HOSPITAL | Age: 35
LOS: 1 days | End: 2020-06-29
Payer: MEDICAID

## 2020-06-29 ENCOUNTER — APPOINTMENT (OUTPATIENT)
Dept: CT IMAGING | Facility: HOSPITAL | Age: 35
End: 2020-06-29
Payer: MEDICAID

## 2020-06-29 DIAGNOSIS — Z98.2 PRESENCE OF CEREBROSPINAL FLUID DRAINAGE DEVICE: ICD-10-CM

## 2020-06-29 DIAGNOSIS — R07.9 CHEST PAIN, UNSPECIFIED: ICD-10-CM

## 2020-06-29 PROCEDURE — 70450 CT HEAD/BRAIN W/O DYE: CPT

## 2020-06-29 PROCEDURE — 93306 TTE W/DOPPLER COMPLETE: CPT | Mod: 26

## 2020-06-29 PROCEDURE — 93306 TTE W/DOPPLER COMPLETE: CPT

## 2020-06-29 PROCEDURE — 70450 CT HEAD/BRAIN W/O DYE: CPT | Mod: 26

## 2020-07-06 ENCOUNTER — APPOINTMENT (OUTPATIENT)
Dept: INTERNAL MEDICINE | Facility: CLINIC | Age: 35
End: 2020-07-06
Payer: MEDICAID

## 2020-07-06 VITALS
OXYGEN SATURATION: 97 % | BODY MASS INDEX: 50.62 KG/M2 | HEIGHT: 66 IN | WEIGHT: 315 LBS | DIASTOLIC BLOOD PRESSURE: 88 MMHG | SYSTOLIC BLOOD PRESSURE: 137 MMHG | HEART RATE: 90 BPM | TEMPERATURE: 98.1 F

## 2020-07-06 PROCEDURE — 99215 OFFICE O/P EST HI 40 MIN: CPT

## 2020-07-06 RX ORDER — HYDROCORTISONE 0.5 G/100G
0.5 CREAM TOPICAL TWICE DAILY
Qty: 1 | Refills: 0 | Status: COMPLETED | COMMUNITY
Start: 2020-03-02 | End: 2020-07-06

## 2020-07-06 NOTE — RESULTS/DATA
[] : results reviewed [ECG Reviewed] : reviewed [Normal] : The 12 - lead ECG is normal [NSR] : normal sinus rhythm [Ventricular Rate___] : ventricular rate is [unfilled] beats per minute [P Waves Normal] : the P wave is normal [ECG Intervals RI.] : RI interval is normal [Normal QRS] : the QRS is normal [NH Interval___] : [unfilled] seconds [ECG Axis] : the QRS axis is normal [QRS Interval___] : QRS interval: [unfilled] seconds [QTc Interval___] : QTc interval: [unfilled] [Normal ST Segments] : the ST segments are normal [ECG T. Waves] : normal

## 2020-07-07 ENCOUNTER — APPOINTMENT (OUTPATIENT)
Dept: DISASTER EMERGENCY | Facility: CLINIC | Age: 35
End: 2020-07-07

## 2020-07-08 LAB — SARS-COV-2 N GENE NPH QL NAA+PROBE: NOT DETECTED

## 2020-07-08 NOTE — ADDENDUM
[FreeTextEntry1] : pt has been cleared for procedure by cardiologist echo was normal oraml diastolic function normal right ventricular function Pt is cleared for procedure  if covid pcr is negative  covid pcr is negative pt is cleared for procedure.

## 2020-07-08 NOTE — ASSESSMENT
[High Risk Surgery - Intraperitoneal, Intrathoracic or Supringuinal Vascular Procedures] : High Risk Surgery - Intraperitoneal, Intrathoracic or Supringuinal Vascular Procedures - No (0) [Ischemic Heart Disease] : Ischemic Heart Disease - No (0) [Congestive Heart Failure] : Congestive Heart Failure - No (0) [Prior Cerebrovascular Accident or TIA] : Prior Cerebrovascular Accident or TIA - No (0) [Creatinine >= 2mg/dL (1 Point)] : Creatinine >= 2mg/dL - No (0) [Insulin-dependent Diabetic (1 Point)] : Insulin-dependent Diabetic - No (0) [0] : 0 , RCRI Class: I, Risk of Post-Op Cardiac Complications: 3.9%, 95% CI for Risk Estimate: 2.8% - 5.4% [As per surgery] : as per surgery [FreeTextEntry4] : Pt is having a low risk procedure and is to have clearance by cardiologist .  he was explained the procedure egd  anesthesia   risks and complications alternatives , prep and post procedure care.  I have answered all his questions.   he has been cleared by neurosurgeon .  he understands complications.    .\par The risks, benefits, and alternatives were explained in detail to the patient. Risks including but not limited to bleeding, perforation, adverse reaction to medications, cardiopulmonary compromise and their attendant sequalae explained. Sequelae including but not limited to need for surgery, , prolonged hospital stay, placement of drainage tubes, blood transfusions, disability, morbidity and mortality was explained. \par It has been made clear to the patient that although egd is still considered the best test to screen for bariactric surgery, no test is 100% accurate. He/she indicated understanding of the above and wishes to proceed. \par All questions and concerns have been addressed. \par  leep apnea is associated with adverse clinical consequences which an affect most organ systems. Cardiovascular disease risk includes arrhythmias, atrial fibrillation, hypertension, coronary artery disease, and stroke. Metabolic disorders include diabetes type 2, non-alcoholic fatty liver disease. Mood disorder especially depression; and cognitive decline especially in the elderly. Associations with chronic reflux/Rojas’s esophagus some but not all inclusive. \par -Reasons include arousal consistent with hypopnea; respiratory events most prominent in REM sleep or supine position; therefore sleep staging and body position are important for accurate diagnosis and estimation of AHI. \par  pt will be cleared for procedure after SDr Montenegro has reported his echo and no furhter testing is needed.  and pcr is back. \par \par \par  [FreeTextEntry7] : none

## 2020-07-08 NOTE — PHYSICAL EXAM
[Normal Voice Quality] : was normal [Well Developed] : well developed [PERRL] : pupils equal round and reactive to light [Normal Verbal Skills] : the patient had normal verbal communication skills [Normal Nonverbal Skills] : normal nonverbal communication skills were demonstrated [Normal Oropharynx] : the oropharynx was normal [Supple] : supple [Rate ___] : at [unfilled] breaths per minute [Normal Rhythm/Effort] : normal respiratory rhythm and effort [Clear Bilaterally] : the lungs were clear to auscultation bilaterally [Normal to Percussion] : the lungs were normal to percussion [5th Left ICS - MCL] : palpated at the 5th LICS in the midclavicular line [Heart Rate ___] : [unfilled] bpm [Normal Rate] : normal [Normal S1] : normal S1 [Normal S2] : normal S2 [No Murmur] : no murmurs heard [No Pitting Edema] : no pitting edema present [2+] : left 2+ [No Abnormalities] : the abdominal aorta was not enlarged and no bruit was heard [None] : no CVA tenderness [Obese] : obese [Normal Anterior Cervical Nodes] : no anterior cervical lymphadenopathy [Normal Posterior Cervical Nodes] : no posterior cervical lymphadenopathy [Normal Station and Gait] : the gait and station were normal [No CVA Tenderness] : no CVA  tenderness [Normal Motor Tone] : the muscle tone was normal [Involuntary Movements] : no involuntary movements were seen [Normal] : affect was normal and insight and judgment were intact [Rt] : no varicose veins of the right leg [Lt] : no varicose veins of the left leg [Bruit] : no bruit heard [S3] : no S3 [S4] : no S4 [Right Carotid Bruit] : no bruit heard over the right carotid [Left Carotid Bruit] : no bruit heard over the left carotid [Right Femoral Bruit] : no bruit heard over the right femoral artery [Left Femoral Bruit] : no bruit heard over the left femoral artery [Abnormal Color] : normal color and pigmentation [Skin Lesions 1] : no skin lesions were observed [Skin Turgor Decreased] : normal skin turgor

## 2020-07-08 NOTE — HISTORY OF PRESENT ILLNESS
[Sleep Apnea] : sleep apnea [No Adverse Anesthesia Reaction] : no adverse anesthesia reaction in self or family member [(Patient denies any chest pain, claudication, dyspnea on exertion, orthopnea, palpitations or syncope)] : Patient denies any chest pain, claudication, dyspnea on exertion, orthopnea, palpitations or syncope [Chronic Anticoagulation] : no chronic anticoagulation [Chronic Kidney Disease] : no chronic kidney disease [Diabetes] : no diabetes [FreeTextEntry1] : egd  [FreeTextEntry2] : 7/10/20 [FreeTextEntry3] : Blanca  [FreeTextEntry4] : Pt is a 34 yr old man who is to have bariactric surgery. he has hx of hypertension, thyroid disorder and sleep apnea and was recently hospitalized for pseudotumor cerebri and had  shunt and became legally blind. he has hypercapnic respiratory failure on 4/19 and discharged on trilogy. he had recommendation recently of AVAPS AE recommended and is on and doing well. He is to have egd for bariactric surgery prep with Dr Phillips. he has seen cardiologist today. Pt is accompanied by his mom. he has not been cleared by Dr Montenegro as of today and had echio and I will obtain reports  and also has been having headaches and saw neurosurgeon and cleared for surgery and procedure. p he will go forward with egd. He is scheduled for July 10 and with pcr covid 3 days prior to procedure.  he has not gone to his pcp  for clearance since his test was cnaceled.  \par  [FreeTextEntry7] : echo pending results

## 2020-07-10 ENCOUNTER — APPOINTMENT (OUTPATIENT)
Dept: INTERNAL MEDICINE | Facility: HOSPITAL | Age: 35
End: 2020-07-10

## 2020-07-10 ENCOUNTER — OUTPATIENT (OUTPATIENT)
Dept: OUTPATIENT SERVICES | Facility: HOSPITAL | Age: 35
LOS: 1 days | End: 2020-07-10
Payer: MEDICAID

## 2020-07-10 DIAGNOSIS — K21.9 GASTRO-ESOPHAGEAL REFLUX DISEASE WITHOUT ESOPHAGITIS: ICD-10-CM

## 2020-07-10 PROCEDURE — 43235 EGD DIAGNOSTIC BRUSH WASH: CPT

## 2020-07-10 PROCEDURE — 43239 EGD BIOPSY SINGLE/MULTIPLE: CPT

## 2020-07-10 NOTE — CHART NOTE - NSCHARTNOTEFT_GEN_A_CORE
Esophagogastroduodenoscopy Report      Indication: Bariactric surgery preparation  Referring MD: Dr. Rios  Anesthesia: MAC  Consent:  Informed consent was obtained from the patient after providing any opportunity for questions  Procedure: The gastroscope was gently passed through the incisoral orifice into the oral cavity and under direct visualization the esophagus was intubated. The endoscope was passed down the esophagus, through the stomach and into proximal jejunum. Color, texture, mucosa and anatomy of the esophagus, stomach, and duodenum were carefully examined with the scope. The patient tolerated the procedure well. After completion of the examination, the patient was transferred to the recovery room.   Preparation: NPO   Findings:  Scope 8765    Oropharynx	Normal    Esophagus	Normal.     EG-junction	small hiatal hernia noted 3cm     Cardia:	Normal.    Body:	Normal     Antrum:	mild erythema     Pylorus:	Normal.    Duodenal Bulb:	Normal     2nd portion:	Normal    3rd portion:	Normal.    EBL:0      Impression:  small hiatal hernia, mild gastritis    Plan: 1- PPI to be started       Time Started:  8P:18am                                   Time Ended: 8:23 am      Attending: Chris SAWYERCP    Chris Spear M.D., F.A.C.P.

## 2020-07-22 ENCOUNTER — APPOINTMENT (OUTPATIENT)
Dept: SURGERY | Facility: CLINIC | Age: 35
End: 2020-07-22
Payer: MEDICAID

## 2020-07-22 VITALS
BODY MASS INDEX: 50.62 KG/M2 | HEART RATE: 85 BPM | SYSTOLIC BLOOD PRESSURE: 130 MMHG | DIASTOLIC BLOOD PRESSURE: 83 MMHG | HEIGHT: 66 IN | TEMPERATURE: 97.3 F | WEIGHT: 315 LBS

## 2020-07-22 PROCEDURE — 99213 OFFICE O/P EST LOW 20 MIN: CPT

## 2020-07-22 NOTE — CONSULT LETTER
[Dear  ___] : Dear  [unfilled], [Please see my note below.] : Please see my note below. [Consult Letter:] : I had the pleasure of evaluating your patient, [unfilled]. [Sincerely,] : Sincerely, [Consult Closing:] : Thank you very much for allowing me to participate in the care of this patient.  If you have any questions, please do not hesitate to contact me. [FreeTextEntry3] : Sriram

## 2020-07-22 NOTE — PHYSICAL EXAM
[Obese, well nourished, in no acute distress] : obese, well nourished, in no acute distress [Normal] : affect appropriate [de-identified] : His breathing is nonlabored, he is not tachypneic. [de-identified] : Obese, protuberant. Soft, nontender, nondistended, no rebound or guarding. [de-identified] : No jaundice

## 2020-07-22 NOTE — HISTORY OF PRESENT ILLNESS
[de-identified] : Patient is here for monthly pre-operative follow-up in preparation for bariatric surgery. Patient is making good food choices, working on eating smaller portions and becoming more mindful. Patient has made a concerted effort to eat more balanced and nutritionally sound meals, and to engage in some level of physical activity on a regular basis. he states he was able to loose some weight  but had gained it back during quarantine  due to the COVID pandemic.   Patient continues to struggle with weight loss despite continuous concerted efforts since the prior visit. Patient attended our weight loss seminar, and has completed all of the required pre-operative testing and evaluations, and is ready to proceed with a sleeve  gastrectomy. \par Patient has struggled with pre-operative weight loss due to the COVID crisis, and he understands that this is imperative for his moving forward with and success from weight loss surgery.   [de-identified] : He states he had his blood tested and he has antibodies for COVID-19. he states he experienced short term cough and lose of taste and smell.

## 2020-07-22 NOTE — ASSESSMENT
[FreeTextEntry1] : Patient remains an excellent candidate for weight loss surgery, given the  presence/risk of developing or worsening of  multiple obesity-related comorbidities, and remains committed to proceeding with weight loss surgery. \par \par Patient has completed the required pre-operative evaluations, and is ready to proceed with surgical scheduling once he is able to achieve some  pre-operative weight loss.\par

## 2020-07-22 NOTE — REVIEW OF SYSTEMS
[Recent Change In Weight] : ~T recent weight change [SOB on Exertion] : shortness of breath during exertion [Joint Pain] : joint pain [Negative] : Heme/Lymph [Fever] : no fever [Odynophagia] : no odynophagia [Chills] : no chills [Dysphagia] : no dysphagia [Shortness Of Breath] : no shortness of breath [Chest Pain] : no chest pain [Palpitations] : no palpitations [Cough] : no cough [Wheezing] : no wheezing [Fainting] : no fainting [Dizziness] : no dizziness

## 2020-07-22 NOTE — PLAN
[FreeTextEntry1] : Patient will obtain any outstanding evaluations in short order. Once again the pre-op requirements/evaluations and any available results were reviewed. \par \par We will collect and review any available/additional results and records of the multi-disciplinary evaluation.  I encouraged patient to bring copies of all completed testing/evaluations to the next office visit with me. \par \par I encouraged the patient to continue a diet and exercise program to promote optimum health for the surgical procedure and post-op course.\par \par Patient  voiced understanding of these behavioral recommendations and agrees to practice them with the understanding that success with these behaviors will be assessed at future visits. \par \par We will review all available pre-operative evaluations and documentation, and submit to insurance provider for pre-authorization.  \par \par Patient’s questions and concerns addressed to patient's satisfaction and patient verbalized an understanding of the information discussed.\par \par He will be seeing our program nutritionist today so that he can get back on track with pre-operative weight loss.

## 2020-07-28 NOTE — ASSESSMENT
LMTCB   [FreeTextEntry1] : This is a 35 yo male with pseudotumor cerebri who is s/p VPS ([employer], setting changed from 4 to 2 today). He continues to c/o blurry vision now it is worsening on L side with light sensitivity which causes gait/balance/performing ADLs disturbance. Repeat CTH shows stable. \par - increase Diamox to 500mg TID (Dr. Adkins aware this plan)\par - repeat CTH wo in a month for post shunt setting change\par - f/u bariatric surgeon as soon as possible\par - RTC after image

## 2020-08-19 ENCOUNTER — APPOINTMENT (OUTPATIENT)
Dept: SURGERY | Facility: CLINIC | Age: 35
End: 2020-08-19
Payer: MEDICAID

## 2020-08-19 VITALS
TEMPERATURE: 97.5 F | HEART RATE: 97 BPM | HEIGHT: 66 IN | SYSTOLIC BLOOD PRESSURE: 131 MMHG | DIASTOLIC BLOOD PRESSURE: 89 MMHG

## 2020-08-19 VITALS — BODY MASS INDEX: 69.4 KG/M2 | WEIGHT: 315 LBS

## 2020-08-19 PROCEDURE — 99213 OFFICE O/P EST LOW 20 MIN: CPT

## 2020-08-19 NOTE — ASSESSMENT
[FreeTextEntry1] : Patient remains an excellent candidate for weight loss surgery, given the  presence/risk of developing or worsening of  multiple obesity-related comorbidities, and remains committed to proceeding with weight loss surgery. \par \par Patient has completed the required pre-operative evaluations, and is ready to proceed with surgical scheduling once he is able to achieve some  pre-operative weight loss.\par \par He has made some good interval progress with pre-operative weight loss, which I would like him to continue.

## 2020-08-19 NOTE — PLAN
[FreeTextEntry1] : \par I encouraged the patient to continue a diet and exercise program to promote optimum health for the surgical procedure and post-op course.\par \par Patient  voiced understanding of these behavioral recommendations and agrees to practice them with the understanding that success with these behaviors will be assessed at future visits. \par \par Patient’s questions and concerns addressed to patient's satisfaction and patient verbalized an understanding of the information discussed.\par \par He will be seeing our program nutritionist today so that he can continue with pre-operative weight loss.

## 2020-08-19 NOTE — REVIEW OF SYSTEMS
[Patient Intake Form Reviewed] : Patient intake form was reviewed [Recent Change In Weight] : ~T recent weight change [SOB on Exertion] : shortness of breath during exertion [Joint Pain] : joint pain [Negative] : Allergic/Immunologic [Fever] : no fever [Chills] : no chills [Dysphagia] : no dysphagia [Odynophagia] : no odynophagia [Palpitations] : no palpitations [Chest Pain] : no chest pain [Wheezing] : no wheezing [Shortness Of Breath] : no shortness of breath [Dizziness] : no dizziness [Cough] : no cough [Fainting] : no fainting

## 2020-08-19 NOTE — HISTORY OF PRESENT ILLNESS
[de-identified] : Patient is here for monthly pre-operative follow-up in preparation for bariatric surgery. Patient is making good food choices, working on eating smaller portions and becoming more mindful, incorporating the recommendations of our nutritionist. Patient has made a concerted effort to eat more balanced and nutritionally sound meals, and to engage in some level of physical activity on a regular basis.  Patient continues to struggle with weight loss despite continuous concerted efforts since the prior visit. Patient attended our weight loss seminar, and has completed all of the required pre-operative testing and evaluations, and is ready to proceed with a sleeve  gastrectomy. \par Patient has struggled with pre-operative weight loss due to the COVID crisis, and he understands that this is imperative for his moving forward with and success from weight loss surgery.   [de-identified] : Patient reports no interval changes to medications, medical history or overall health status.\par

## 2020-08-19 NOTE — PHYSICAL EXAM
[Obese, well nourished, in no acute distress] : obese, well nourished, in no acute distress [Normal] : affect appropriate [de-identified] : His breathing is nonlabored, he is not tachypneic. [de-identified] : Obese, protuberant. Soft, nontender, nondistended, no rebound or guarding. [de-identified] : No jaundice

## 2020-09-14 NOTE — OCCUPATIONAL THERAPY INITIAL EVALUATION ADULT - WEIGHT-BEARING RESTRICTIONS: CHAIR/BED,  REHAB EVAL
PEC paper work scanned into system faxed to [facility] and 's office. Spoke with Teet[place] at [facility].   weight-bearing as tolerated

## 2020-09-23 ENCOUNTER — APPOINTMENT (OUTPATIENT)
Dept: SURGERY | Facility: CLINIC | Age: 35
End: 2020-09-23

## 2020-09-23 ENCOUNTER — APPOINTMENT (OUTPATIENT)
Dept: SURGERY | Facility: CLINIC | Age: 35
End: 2020-09-23
Payer: MEDICAID

## 2020-09-23 ENCOUNTER — APPOINTMENT (OUTPATIENT)
Dept: BARIATRICS | Facility: CLINIC | Age: 35
End: 2020-09-23

## 2020-09-23 VITALS
HEIGHT: 66 IN | HEART RATE: 85 BPM | SYSTOLIC BLOOD PRESSURE: 137 MMHG | DIASTOLIC BLOOD PRESSURE: 88 MMHG | TEMPERATURE: 98.3 F

## 2020-09-23 VITALS — WEIGHT: 315 LBS | BODY MASS INDEX: 69.24 KG/M2

## 2020-09-23 PROCEDURE — 99213 OFFICE O/P EST LOW 20 MIN: CPT

## 2020-09-23 NOTE — HISTORY OF PRESENT ILLNESS
[de-identified] : Patient is here for monthly pre-operative follow-up in preparation for bariatric surgery. Patient is making good food choices, working on eating smaller portions and becoming more mindful, incorporating the recommendations of our nutritionist. Patient has made a concerted effort to eat more balanced and nutritionally sound meals, and to engage in some level of physical activity on a regular basis.  Patient continues to struggle with weight loss despite continuous concerted efforts since the prior visit.  \par He continues to be motivated to make the necessary pre-operative progress, and understands that this is important for his overall course.   [de-identified] : Patient reports no interval changes to medications, medical history or overall health status.\par

## 2020-09-23 NOTE — REVIEW OF SYSTEMS
[SOB on Exertion] : shortness of breath during exertion [Joint Pain] : joint pain [Negative] : Allergic/Immunologic [Fever] : no fever [Chills] : no chills [Dysphagia] : no dysphagia [Chest Pain] : no chest pain [Palpitations] : no palpitations [Shortness Of Breath] : no shortness of breath [Wheezing] : no wheezing [Cough] : no cough [Abdominal Pain] : no abdominal pain [Reflux/Heartburn] : no reflex/heartburn [Dysuria] : no dysuria [Skin Rash] : no skin rash

## 2020-09-23 NOTE — PHYSICAL EXAM
[Obese, well nourished, in no acute distress] : obese, well nourished, in no acute distress [Normal] : affect appropriate [de-identified] : His breathing is nonlabored, he is not tachypneic. [de-identified] : Obese, protuberant. Soft, nontender, nondistended, no rebound or guarding. [de-identified] : No jaundice

## 2020-09-23 NOTE — PLAN
[FreeTextEntry1] : Patient will continue to follow the recommendations of our nutritionist, and he feels strongly that he will be successful in continuing to make some progress toward pre-op weight loss. \par Patients questions and concerns were addressed to his satisfaction. \par

## 2020-09-29 ENCOUNTER — EMERGENCY (EMERGENCY)
Facility: HOSPITAL | Age: 35
LOS: 1 days | Discharge: HOME | End: 2020-09-29
Attending: EMERGENCY MEDICINE
Payer: MEDICAID

## 2020-09-29 VITALS
HEART RATE: 102 BPM | TEMPERATURE: 99 F | OXYGEN SATURATION: 95 % | RESPIRATION RATE: 18 BRPM | DIASTOLIC BLOOD PRESSURE: 88 MMHG | WEIGHT: 315 LBS | HEIGHT: 66 IN | SYSTOLIC BLOOD PRESSURE: 131 MMHG

## 2020-09-29 VITALS
RESPIRATION RATE: 19 BRPM | OXYGEN SATURATION: 95 % | HEART RATE: 78 BPM | DIASTOLIC BLOOD PRESSURE: 66 MMHG | TEMPERATURE: 98 F | SYSTOLIC BLOOD PRESSURE: 105 MMHG

## 2020-09-29 DIAGNOSIS — Z98.2 PRESENCE OF CEREBROSPINAL FLUID DRAINAGE DEVICE: Chronic | ICD-10-CM

## 2020-09-29 LAB
ALBUMIN SERPL ELPH-MCNC: 3.6 G/DL — SIGNIFICANT CHANGE UP (ref 3.5–5)
ALP SERPL-CCNC: 106 U/L — SIGNIFICANT CHANGE UP (ref 40–120)
ALT FLD-CCNC: 34 U/L DA — SIGNIFICANT CHANGE UP (ref 10–60)
ANION GAP SERPL CALC-SCNC: 6 MMOL/L — SIGNIFICANT CHANGE UP (ref 5–17)
AST SERPL-CCNC: 12 U/L — SIGNIFICANT CHANGE UP (ref 10–40)
BASOPHILS # BLD AUTO: 0.06 K/UL — SIGNIFICANT CHANGE UP (ref 0–0.2)
BASOPHILS NFR BLD AUTO: 0.5 % — SIGNIFICANT CHANGE UP (ref 0–2)
BILIRUB SERPL-MCNC: 0.5 MG/DL — SIGNIFICANT CHANGE UP (ref 0.2–1.2)
BUN SERPL-MCNC: 15 MG/DL — SIGNIFICANT CHANGE UP (ref 7–18)
CALCIUM SERPL-MCNC: 9.2 MG/DL — SIGNIFICANT CHANGE UP (ref 8.4–10.5)
CHLORIDE SERPL-SCNC: 106 MMOL/L — SIGNIFICANT CHANGE UP (ref 96–108)
CK SERPL-CCNC: 75 U/L — SIGNIFICANT CHANGE UP (ref 35–232)
CO2 SERPL-SCNC: 28 MMOL/L — SIGNIFICANT CHANGE UP (ref 22–31)
CREAT SERPL-MCNC: 0.86 MG/DL — SIGNIFICANT CHANGE UP (ref 0.5–1.3)
D DIMER BLD IA.RAPID-MCNC: 196 NG/ML DDU — SIGNIFICANT CHANGE UP
EOSINOPHIL # BLD AUTO: 0.19 K/UL — SIGNIFICANT CHANGE UP (ref 0–0.5)
EOSINOPHIL NFR BLD AUTO: 1.6 % — SIGNIFICANT CHANGE UP (ref 0–6)
GLUCOSE SERPL-MCNC: 93 MG/DL — SIGNIFICANT CHANGE UP (ref 70–99)
HCT VFR BLD CALC: 46.4 % — SIGNIFICANT CHANGE UP (ref 39–50)
HGB BLD-MCNC: 15.5 G/DL — SIGNIFICANT CHANGE UP (ref 13–17)
IMM GRANULOCYTES NFR BLD AUTO: 0.5 % — SIGNIFICANT CHANGE UP (ref 0–1.5)
LYMPHOCYTES # BLD AUTO: 2.98 K/UL — SIGNIFICANT CHANGE UP (ref 1–3.3)
LYMPHOCYTES # BLD AUTO: 24.5 % — SIGNIFICANT CHANGE UP (ref 13–44)
MAGNESIUM SERPL-MCNC: 2.4 MG/DL — SIGNIFICANT CHANGE UP (ref 1.6–2.6)
MCHC RBC-ENTMCNC: 28.4 PG — SIGNIFICANT CHANGE UP (ref 27–34)
MCHC RBC-ENTMCNC: 33.4 GM/DL — SIGNIFICANT CHANGE UP (ref 32–36)
MCV RBC AUTO: 85.1 FL — SIGNIFICANT CHANGE UP (ref 80–100)
MONOCYTES # BLD AUTO: 0.81 K/UL — SIGNIFICANT CHANGE UP (ref 0–0.9)
MONOCYTES NFR BLD AUTO: 6.7 % — SIGNIFICANT CHANGE UP (ref 2–14)
NEUTROPHILS # BLD AUTO: 8.07 K/UL — HIGH (ref 1.8–7.4)
NEUTROPHILS NFR BLD AUTO: 66.2 % — SIGNIFICANT CHANGE UP (ref 43–77)
NRBC # BLD: 0 /100 WBCS — SIGNIFICANT CHANGE UP (ref 0–0)
PLATELET # BLD AUTO: 263 K/UL — SIGNIFICANT CHANGE UP (ref 150–400)
POTASSIUM SERPL-MCNC: 3.7 MMOL/L — SIGNIFICANT CHANGE UP (ref 3.5–5.3)
POTASSIUM SERPL-SCNC: 3.7 MMOL/L — SIGNIFICANT CHANGE UP (ref 3.5–5.3)
PROT SERPL-MCNC: 8.1 G/DL — SIGNIFICANT CHANGE UP (ref 6–8.3)
RBC # BLD: 5.45 M/UL — SIGNIFICANT CHANGE UP (ref 4.2–5.8)
RBC # FLD: 13.9 % — SIGNIFICANT CHANGE UP (ref 10.3–14.5)
SODIUM SERPL-SCNC: 140 MMOL/L — SIGNIFICANT CHANGE UP (ref 135–145)
TROPONIN I SERPL-MCNC: <0.015 NG/ML — SIGNIFICANT CHANGE UP (ref 0–0.04)
TROPONIN I SERPL-MCNC: <0.015 NG/ML — SIGNIFICANT CHANGE UP (ref 0–0.04)
WBC # BLD: 12.17 K/UL — HIGH (ref 3.8–10.5)
WBC # FLD AUTO: 12.17 K/UL — HIGH (ref 3.8–10.5)

## 2020-09-29 PROCEDURE — 93005 ELECTROCARDIOGRAM TRACING: CPT

## 2020-09-29 PROCEDURE — 36415 COLL VENOUS BLD VENIPUNCTURE: CPT

## 2020-09-29 PROCEDURE — 99283 EMERGENCY DEPT VISIT LOW MDM: CPT | Mod: 25

## 2020-09-29 PROCEDURE — 80053 COMPREHEN METABOLIC PANEL: CPT

## 2020-09-29 PROCEDURE — 71046 X-RAY EXAM CHEST 2 VIEWS: CPT

## 2020-09-29 PROCEDURE — 84484 ASSAY OF TROPONIN QUANT: CPT

## 2020-09-29 PROCEDURE — 99284 EMERGENCY DEPT VISIT MOD MDM: CPT

## 2020-09-29 PROCEDURE — 85025 COMPLETE CBC W/AUTO DIFF WBC: CPT

## 2020-09-29 PROCEDURE — 71046 X-RAY EXAM CHEST 2 VIEWS: CPT | Mod: 26

## 2020-09-29 PROCEDURE — 85379 FIBRIN DEGRADATION QUANT: CPT

## 2020-09-29 PROCEDURE — 82550 ASSAY OF CK (CPK): CPT

## 2020-09-29 PROCEDURE — 83735 ASSAY OF MAGNESIUM: CPT

## 2020-09-29 RX ORDER — ASPIRIN/CALCIUM CARB/MAGNESIUM 324 MG
162 TABLET ORAL ONCE
Refills: 0 | Status: COMPLETED | OUTPATIENT
Start: 2020-09-29 | End: 2020-09-29

## 2020-09-29 RX ADMIN — Medication 162 MILLIGRAM(S): at 17:44

## 2020-09-29 NOTE — ED PROVIDER NOTE - PROGRESS NOTE DETAILS
Pt's feeling much better, no sob,2 sets Trop-neg, Pt claims he is in the process of getting bariatric surgery.  Will have care coordinator refer pt to cardio within this week.

## 2020-09-29 NOTE — ED PROVIDER NOTE - PATIENT PORTAL LINK FT
You can access the FollowMyHealth Patient Portal offered by [facility] by registering at the following website: [email or web address]. By joining [employer]’s FollowMyHealth portal, you will also be able to view your health information using other applications (apps) compatible with our system.

## 2020-09-29 NOTE — ED PROVIDER NOTE - OBJECTIVE STATEMENT
35 y.o. male c/o palpitations, constant SS & Lt sided CP under breast sharp/burning, Lt arm numbness ~1 hr. ago. sob, lightheadedness, no diaphoresis, n/v, Pt had similar episode 1 week ago.  Seen by  on call, given Flu vaccine.  Pt was working out 3-4 mos. ago.  No coughing, fever 35 y.o. male c/o palpitations, constant SS & Lt sided CP under breast sharp/burning, Lt arm numbness ~1 hr. ago. sob, lightheadedness, no diaphoresis, n/v, Pt had similar episode on & off for past few mos.  Pt claims this started few mos ago when he did push up, he felt he tore his chest muscle.  Pt seen by  on call, given Flu vaccine.  No coughing, fever

## 2020-10-05 ENCOUNTER — APPOINTMENT (OUTPATIENT)
Dept: CARDIOLOGY | Facility: CLINIC | Age: 35
End: 2020-10-05
Payer: MEDICAID

## 2020-10-05 VITALS
TEMPERATURE: 97.9 F | RESPIRATION RATE: 17 BRPM | WEIGHT: 315 LBS | DIASTOLIC BLOOD PRESSURE: 104 MMHG | SYSTOLIC BLOOD PRESSURE: 159 MMHG | OXYGEN SATURATION: 96 % | BODY MASS INDEX: 50.62 KG/M2 | HEART RATE: 79 BPM | HEIGHT: 66 IN

## 2020-10-05 PROCEDURE — 99215 OFFICE O/P EST HI 40 MIN: CPT

## 2020-10-06 NOTE — REVIEW OF SYSTEMS
[Recent Weight Gain (___ Lbs)] : recent [unfilled] ~Ulb weight gain [Chest  Pressure] : chest pressure [Chest Pain] : chest pain

## 2020-10-06 NOTE — REASON FOR VISIT
[FreeTextEntry1] : Dear Nikhil Phillips and Blanca:\par \par I had the pleasure re-evaluating your patient Mr. Ifrah Chaves, who presents for urgent follow-up cardiovascular evaluation after recent presentation to [facility] ED with symptoms of atypical chest discomfort.  He had complained as he does now in the office of having several discrete areas of chest area discomfort affecting multiple points in his right and left breast.  Of note, he has multiple folds of adipose tissue on his torso and it would appear that much of his discomfort is within the soft tissue.\par \par As you know, he is a 35-yo man with morbid obesity (BMI 68-69), blindness due to pseudotumor cerebri s/p  shunt, obesity hypoventilation syndrome/chronic hypercapnia/LAZARO, and hypothyroidism.  He has noted having atypical symptoms of right- and left-sided chest pressure over the past several months, not necessarily related to exertion, but at times can be induced with ambulation. He attributes his symptoms to deconditioning and weight. He does also state that he does not have a FH of early CAD.  An echocardiogram from 8/5/19 demonstrated grossly normal LV systolic function; the RV was not well visualized.\par \par 12-lead ECG demonstrates SR, normal axis, normal intervals. Physical examination is remarkable for morbid obesity.\par \par At this time, will arrange for coronary CT angiogram to assess for his symptoms of chest pain.  He is unable to undergo pharmacologic nuclear stress testing because of his significant morbid obesity.  He also cannot ambulate because of blindness.  \par \par Nikhil Phillips and Blanca, thank you again for entrusting his care with me.\par \par Warmest regards,\par Alec Montenegro\par

## 2020-10-06 NOTE — PHYSICAL EXAM
[FreeTextEntry1] : Blind [Normal Oral Mucosa] : normal oral mucosa [No Oral Pallor] : no oral pallor [No Oral Cyanosis] : no oral cyanosis [Normal Jugular Venous A Waves Present] : normal jugular venous A waves present [Normal Jugular Venous V Waves Present] : normal jugular venous V waves present [No Jugular Venous Rivero A Waves] : no jugular venous rivero A waves [Respiration, Rhythm And Depth] : normal respiratory rhythm and effort [Exaggerated Use Of Accessory Muscles For Inspiration] : no accessory muscle use [Auscultation Breath Sounds / Voice Sounds] : lungs were clear to auscultation bilaterally [Heart Rate And Rhythm] : heart rate and rhythm were normal [Heart Sounds] : normal S1 and S2 [Murmurs] : no murmurs present [Abdomen Soft] : soft [Abdomen Tenderness] : non-tender [Abdomen Mass (___ Cm)] : no abdominal mass palpated [Abnormal Walk] : normal gait [Gait - Sufficient For Exercise Testing] : the gait was sufficient for exercise testing [Nail Clubbing] : no clubbing of the fingernails [Cyanosis, Localized] : no localized cyanosis [Petechial Hemorrhages (___cm)] : no petechial hemorrhages [Skin Color & Pigmentation] : normal skin color and pigmentation [] : no rash [No Venous Stasis] : no venous stasis [Skin Lesions] : no skin lesions [No Skin Ulcers] : no skin ulcer [No Xanthoma] : no  xanthoma was observed [Oriented To Time, Place, And Person] : oriented to person, place, and time [Affect] : the affect was normal [Mood] : the mood was normal [No Anxiety] : not feeling anxious

## 2020-10-07 PROBLEM — G91.9 HYDROCEPHALUS, UNSPECIFIED: Chronic | Status: ACTIVE | Noted: 2020-09-29

## 2020-10-13 ENCOUNTER — INPATIENT (INPATIENT)
Facility: HOSPITAL | Age: 35
LOS: 2 days | Discharge: HOME | DRG: 287 | End: 2020-10-16
Attending: INTERNAL MEDICINE | Admitting: HOSPITALIST
Payer: MEDICAID

## 2020-10-13 VITALS
SYSTOLIC BLOOD PRESSURE: 142 MMHG | HEIGHT: 66 IN | OXYGEN SATURATION: 100 % | DIASTOLIC BLOOD PRESSURE: 90 MMHG | TEMPERATURE: 98 F | HEART RATE: 88 BPM | WEIGHT: 315 LBS

## 2020-10-13 DIAGNOSIS — Z98.2 PRESENCE OF CEREBROSPINAL FLUID DRAINAGE DEVICE: Chronic | ICD-10-CM

## 2020-10-13 DIAGNOSIS — R07.9 CHEST PAIN, UNSPECIFIED: ICD-10-CM

## 2020-10-13 LAB
ALBUMIN SERPL ELPH-MCNC: 4.1 G/DL — SIGNIFICANT CHANGE UP (ref 3.3–5)
ALP SERPL-CCNC: 90 U/L — SIGNIFICANT CHANGE UP (ref 40–120)
ALT FLD-CCNC: 29 U/L — SIGNIFICANT CHANGE UP (ref 10–45)
ANION GAP SERPL CALC-SCNC: 11 MMOL/L — SIGNIFICANT CHANGE UP (ref 5–17)
AST SERPL-CCNC: 23 U/L — SIGNIFICANT CHANGE UP (ref 10–40)
BASOPHILS # BLD AUTO: 0.08 K/UL — SIGNIFICANT CHANGE UP (ref 0–0.2)
BASOPHILS NFR BLD AUTO: 0.6 % — SIGNIFICANT CHANGE UP (ref 0–2)
BILIRUB SERPL-MCNC: 0.4 MG/DL — SIGNIFICANT CHANGE UP (ref 0.2–1.2)
BUN SERPL-MCNC: 18 MG/DL — SIGNIFICANT CHANGE UP (ref 7–23)
CALCIUM SERPL-MCNC: 9.5 MG/DL — SIGNIFICANT CHANGE UP (ref 8.4–10.5)
CHLORIDE SERPL-SCNC: 103 MMOL/L — SIGNIFICANT CHANGE UP (ref 96–108)
CO2 SERPL-SCNC: 24 MMOL/L — SIGNIFICANT CHANGE UP (ref 22–31)
CREAT SERPL-MCNC: 0.75 MG/DL — SIGNIFICANT CHANGE UP (ref 0.5–1.3)
D DIMER BLD IA.RAPID-MCNC: 205 NG/ML DDU — SIGNIFICANT CHANGE UP
EOSINOPHIL # BLD AUTO: 0.43 K/UL — SIGNIFICANT CHANGE UP (ref 0–0.5)
EOSINOPHIL NFR BLD AUTO: 3.4 % — SIGNIFICANT CHANGE UP (ref 0–6)
GLUCOSE SERPL-MCNC: 93 MG/DL — SIGNIFICANT CHANGE UP (ref 70–99)
HCT VFR BLD CALC: 49.2 % — SIGNIFICANT CHANGE UP (ref 39–50)
HGB BLD-MCNC: 16 G/DL — SIGNIFICANT CHANGE UP (ref 13–17)
IMM GRANULOCYTES NFR BLD AUTO: 0.5 % — SIGNIFICANT CHANGE UP (ref 0–1.5)
LIDOCAIN IGE QN: 27 U/L — SIGNIFICANT CHANGE UP (ref 7–60)
LYMPHOCYTES # BLD AUTO: 25.8 % — SIGNIFICANT CHANGE UP (ref 13–44)
LYMPHOCYTES # BLD AUTO: 3.24 K/UL — SIGNIFICANT CHANGE UP (ref 1–3.3)
MAGNESIUM SERPL-MCNC: 2.2 MG/DL — SIGNIFICANT CHANGE UP (ref 1.6–2.6)
MCHC RBC-ENTMCNC: 27.8 PG — SIGNIFICANT CHANGE UP (ref 27–34)
MCHC RBC-ENTMCNC: 32.5 GM/DL — SIGNIFICANT CHANGE UP (ref 32–36)
MCV RBC AUTO: 85.6 FL — SIGNIFICANT CHANGE UP (ref 80–100)
MONOCYTES # BLD AUTO: 0.93 K/UL — HIGH (ref 0–0.9)
MONOCYTES NFR BLD AUTO: 7.4 % — SIGNIFICANT CHANGE UP (ref 2–14)
NEUTROPHILS # BLD AUTO: 7.81 K/UL — HIGH (ref 1.8–7.4)
NEUTROPHILS NFR BLD AUTO: 62.3 % — SIGNIFICANT CHANGE UP (ref 43–77)
NRBC # BLD: 0 /100 WBCS — SIGNIFICANT CHANGE UP (ref 0–0)
PHOSPHATE SERPL-MCNC: 3.3 MG/DL — SIGNIFICANT CHANGE UP (ref 2.5–4.5)
PLATELET # BLD AUTO: 247 K/UL — SIGNIFICANT CHANGE UP (ref 150–400)
POTASSIUM SERPL-MCNC: 4.3 MMOL/L — SIGNIFICANT CHANGE UP (ref 3.5–5.3)
POTASSIUM SERPL-SCNC: 4.3 MMOL/L — SIGNIFICANT CHANGE UP (ref 3.5–5.3)
PROT SERPL-MCNC: 7.6 G/DL — SIGNIFICANT CHANGE UP (ref 6–8.3)
RBC # BLD: 5.75 M/UL — SIGNIFICANT CHANGE UP (ref 4.2–5.8)
RBC # FLD: 13.6 % — SIGNIFICANT CHANGE UP (ref 10.3–14.5)
SODIUM SERPL-SCNC: 138 MMOL/L — SIGNIFICANT CHANGE UP (ref 135–145)
TROPONIN T, HIGH SENSITIVITY RESULT: <6 NG/L — SIGNIFICANT CHANGE UP (ref 0–51)
TSH SERPL-MCNC: 2.67 UIU/ML — SIGNIFICANT CHANGE UP (ref 0.27–4.2)
WBC # BLD: 12.55 K/UL — HIGH (ref 3.8–10.5)
WBC # FLD AUTO: 12.55 K/UL — HIGH (ref 3.8–10.5)

## 2020-10-13 PROCEDURE — 99285 EMERGENCY DEPT VISIT HI MDM: CPT

## 2020-10-13 PROCEDURE — 70250 X-RAY EXAM OF SKULL: CPT | Mod: 26

## 2020-10-13 PROCEDURE — 93010 ELECTROCARDIOGRAM REPORT: CPT

## 2020-10-13 PROCEDURE — 74018 RADEX ABDOMEN 1 VIEW: CPT | Mod: 26

## 2020-10-13 PROCEDURE — 71045 X-RAY EXAM CHEST 1 VIEW: CPT | Mod: 26

## 2020-10-13 RX ORDER — ACETAMINOPHEN 500 MG
650 TABLET ORAL ONCE
Refills: 0 | Status: COMPLETED | OUTPATIENT
Start: 2020-10-13 | End: 2020-10-13

## 2020-10-13 RX ORDER — LIDOCAINE 4 G/100G
1 CREAM TOPICAL ONCE
Refills: 0 | Status: COMPLETED | OUTPATIENT
Start: 2020-10-13 | End: 2020-10-13

## 2020-10-13 RX ADMIN — Medication 650 MILLIGRAM(S): at 18:35

## 2020-10-13 RX ADMIN — LIDOCAINE 1 PATCH: 4 CREAM TOPICAL at 18:35

## 2020-10-13 NOTE — ED PROVIDER NOTE - NS ED ROS FT
GENERAL: No fever or chills, //             EYES: no change in vision, //             HEENT: no trouble swallowing or speaking, //             CARDIAC: chest pain, //              PULMONARY: no cough or SOB, //             GI: no abdominal pain, no nausea or no vomiting, no diarrhea or constipation, //             : No changes in urination,  //            SKIN: no rashes,  //            NEURO: no headache,  //             MSK: No joint pain otherwise as HPI or negative. ~Loc Wilks DO PGY3

## 2020-10-13 NOTE — ED ADULT NURSE NOTE - OBJECTIVE STATEMENT
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 he has a h/o high bp but has not had follow up. he states when he presses his left breast area he feels a lump and it swells and there is pain, when he ices it he says the swelling goes down. on assessment a and o x 3,lungs clear abd soft non tender no swelling in extremities, the patient is very obese and walks with a cane for sight. can feel a lump under breast. denies n/v/ fevers. pt also started a work out regime and said he felt something pop in that chest wall area. no other issues.

## 2020-10-13 NOTE — ED PROVIDER NOTE - PHYSICAL EXAMINATION
General: nad   HEENT: EOMI, PERRLA, normal mucosa, normal oropharynx, no lesions on the lips or on oral mucosa, normal external ear  Neck: supple, no lymphadenopathy, full range of motion, no nuchal rigidity  CV: RRR, normal S1 and S2 with no murmur, capillary refill less than two seconds  Resp: lungs CTA b/l, good aeration bilaterally, symmetric chest wall   Abd: non-distended, soft, non-tender  : no CVA tenderness  MSK: full range of motion, no cyanosis, no edema, no clubbing, no immobility, ttp left of manubrium c/w sx  Neuro: CN II-XII grossly intact, muscle strength 5/5 in all extremities  Skin: no rashes, skin intact

## 2020-10-13 NOTE — ED PROVIDER NOTE - ATTENDING CONTRIBUTION TO CARE
RGUJRAL 34yo male hx pseudotumor cerebri s/p b/l eye blindness and  shunt 2019, Hypothyroid presents with chest pain x 2-3 weeks. States pain started when he was exercising. Pain is on the left side, constant. Denies SOB, + palp intermittently. Denies HA, n/v. Denies any abdominal pain, cough, uri, f/c.   Pt is being screened for bariatric surgery, has a CT chest ? coronary scheduled this week with his cardiologist.  No prior stress test. No recent travel.  On exam, Patient is awake, alert and oriented x 3.  Patient is well appearing and in no acute distress. Obese.  NCAT  Neck is supple  Lungs are CTA B/L,+S1S2 no murmurs, +mild ttp to left chest.  Abdomen:Soft nd/nt+bs no rebound or guarding.  Extremity no edema or calf tender.  Skin with no rash.  EKG reviewed, check labs, d dimer, trop. Re eval.

## 2020-10-13 NOTE — ED PROVIDER NOTE - PROGRESS NOTE DETAILS
Pt reassessed, states he still has chest pain. Pt not candidate for cdu, will admit to hosptialist. [email or web address]

## 2020-10-13 NOTE — ED PROVIDER NOTE - OBJECTIVE STATEMENT
36 yo m pmh hydrocephalus s/p  shunt, blindness, obesity, pw cp. pt reports sx began a few weeks ago when he started a new exercise regimen including push ups. states he feels the area is "inflamed". reports sx are better w/ ice packs. today ran out of ice packs. has not used apap for sx or any pain meds. was eval at OSH one week prior and DC. pt states has had difficulty obtaining appt due to pandemic. denies family hx cad. denies fam hx dvt/pe. sx non pleuritic, non exer.

## 2020-10-13 NOTE — ED ADULT NURSE REASSESSMENT NOTE - NS ED NURSE REASSESS COMMENT FT1
Report received from DONNA Green. Pt HIRO+Fatou, MARQUITA, presented to ED c/o chest pain. Pt aware of plan of care, pending x-ray.

## 2020-10-13 NOTE — ED PROVIDER NOTE - CLINICAL SUMMARY MEDICAL DECISION MAKING FREE TEXT BOX
lula pgy3: 34 yo m bibems for chest wall pain, sx worse w/ movement and reproducible w/ palpation. likely costochondritis. sx not c/w acs. pt reporting palpitations w/ sx, will assess for electrolyte abnormalities, acute ischemia. perc negative, however will check dimer. likely dc w/ outpatient fu. control sx.

## 2020-10-13 NOTE — ED ADULT NURSE REASSESSMENT NOTE - NS ED NURSE REASSESS COMMENT FT1
Pt A+Ox3, VSS, covid swab collected and sent to lab. Pt aware of plan of care, admitted to telemetry for chest pain, pending admission bed.

## 2020-10-14 DIAGNOSIS — G93.2 BENIGN INTRACRANIAL HYPERTENSION: ICD-10-CM

## 2020-10-14 DIAGNOSIS — R07.9 CHEST PAIN, UNSPECIFIED: ICD-10-CM

## 2020-10-14 DIAGNOSIS — R00.2 PALPITATIONS: ICD-10-CM

## 2020-10-14 DIAGNOSIS — I10 ESSENTIAL (PRIMARY) HYPERTENSION: ICD-10-CM

## 2020-10-14 LAB
A1C WITH ESTIMATED AVERAGE GLUCOSE RESULT: 5.5 % — SIGNIFICANT CHANGE UP (ref 4–5.6)
ALBUMIN SERPL ELPH-MCNC: 3.9 G/DL — SIGNIFICANT CHANGE UP (ref 3.3–5)
ALP SERPL-CCNC: 79 U/L — SIGNIFICANT CHANGE UP (ref 40–120)
ALT FLD-CCNC: 26 U/L — SIGNIFICANT CHANGE UP (ref 10–45)
ANION GAP SERPL CALC-SCNC: 8 MMOL/L — SIGNIFICANT CHANGE UP (ref 5–17)
APTT BLD: 32.7 SEC — SIGNIFICANT CHANGE UP (ref 27.5–35.5)
AST SERPL-CCNC: 17 U/L — SIGNIFICANT CHANGE UP (ref 10–40)
BASOPHILS # BLD AUTO: 0.07 K/UL — SIGNIFICANT CHANGE UP (ref 0–0.2)
BASOPHILS NFR BLD AUTO: 0.8 % — SIGNIFICANT CHANGE UP (ref 0–2)
BILIRUB SERPL-MCNC: 0.7 MG/DL — SIGNIFICANT CHANGE UP (ref 0.2–1.2)
BUN SERPL-MCNC: 15 MG/DL — SIGNIFICANT CHANGE UP (ref 7–23)
CALCIUM SERPL-MCNC: 9 MG/DL — SIGNIFICANT CHANGE UP (ref 8.4–10.5)
CHLORIDE SERPL-SCNC: 104 MMOL/L — SIGNIFICANT CHANGE UP (ref 96–108)
CHOLEST SERPL-MCNC: 137 MG/DL — SIGNIFICANT CHANGE UP (ref 10–199)
CK SERPL-CCNC: 51 U/L — SIGNIFICANT CHANGE UP (ref 30–200)
CO2 SERPL-SCNC: 26 MMOL/L — SIGNIFICANT CHANGE UP (ref 22–31)
CREAT SERPL-MCNC: 0.7 MG/DL — SIGNIFICANT CHANGE UP (ref 0.5–1.3)
EOSINOPHIL # BLD AUTO: 0.37 K/UL — SIGNIFICANT CHANGE UP (ref 0–0.5)
EOSINOPHIL NFR BLD AUTO: 4 % — SIGNIFICANT CHANGE UP (ref 0–6)
ESTIMATED AVERAGE GLUCOSE: 111 MG/DL — SIGNIFICANT CHANGE UP (ref 68–114)
GLUCOSE SERPL-MCNC: 94 MG/DL — SIGNIFICANT CHANGE UP (ref 70–99)
HCT VFR BLD CALC: 45.9 % — SIGNIFICANT CHANGE UP (ref 39–50)
HDLC SERPL-MCNC: 31 MG/DL — LOW
HGB BLD-MCNC: 15 G/DL — SIGNIFICANT CHANGE UP (ref 13–17)
IMM GRANULOCYTES NFR BLD AUTO: 0.6 % — SIGNIFICANT CHANGE UP (ref 0–1.5)
INR BLD: 1.08 RATIO — SIGNIFICANT CHANGE UP (ref 0.88–1.16)
LIPID PNL WITH DIRECT LDL SERPL: 68 MG/DL — SIGNIFICANT CHANGE UP
LYMPHOCYTES # BLD AUTO: 2.84 K/UL — SIGNIFICANT CHANGE UP (ref 1–3.3)
LYMPHOCYTES # BLD AUTO: 30.6 % — SIGNIFICANT CHANGE UP (ref 13–44)
MAGNESIUM SERPL-MCNC: 2.2 MG/DL — SIGNIFICANT CHANGE UP (ref 1.6–2.6)
MCHC RBC-ENTMCNC: 28.2 PG — SIGNIFICANT CHANGE UP (ref 27–34)
MCHC RBC-ENTMCNC: 32.7 GM/DL — SIGNIFICANT CHANGE UP (ref 32–36)
MCV RBC AUTO: 86.3 FL — SIGNIFICANT CHANGE UP (ref 80–100)
MONOCYTES # BLD AUTO: 0.69 K/UL — SIGNIFICANT CHANGE UP (ref 0–0.9)
MONOCYTES NFR BLD AUTO: 7.4 % — SIGNIFICANT CHANGE UP (ref 2–14)
NEUTROPHILS # BLD AUTO: 5.25 K/UL — SIGNIFICANT CHANGE UP (ref 1.8–7.4)
NEUTROPHILS NFR BLD AUTO: 56.6 % — SIGNIFICANT CHANGE UP (ref 43–77)
NRBC # BLD: 0 /100 WBCS — SIGNIFICANT CHANGE UP (ref 0–0)
PHOSPHATE SERPL-MCNC: 3.5 MG/DL — SIGNIFICANT CHANGE UP (ref 2.5–4.5)
PLATELET # BLD AUTO: 208 K/UL — SIGNIFICANT CHANGE UP (ref 150–400)
POTASSIUM SERPL-MCNC: 3.8 MMOL/L — SIGNIFICANT CHANGE UP (ref 3.5–5.3)
POTASSIUM SERPL-SCNC: 3.8 MMOL/L — SIGNIFICANT CHANGE UP (ref 3.5–5.3)
PROT SERPL-MCNC: 7 G/DL — SIGNIFICANT CHANGE UP (ref 6–8.3)
PROTHROM AB SERPL-ACNC: 12.9 SEC — SIGNIFICANT CHANGE UP (ref 10.6–13.6)
RBC # BLD: 5.32 M/UL — SIGNIFICANT CHANGE UP (ref 4.2–5.8)
RBC # FLD: 13.5 % — SIGNIFICANT CHANGE UP (ref 10.3–14.5)
SARS-COV-2 IGG SERPL QL IA: POSITIVE
SARS-COV-2 IGM SERPL IA-ACNC: 66.5 AU/ML — HIGH
SARS-COV-2 RNA SPEC QL NAA+PROBE: SIGNIFICANT CHANGE UP
SODIUM SERPL-SCNC: 138 MMOL/L — SIGNIFICANT CHANGE UP (ref 135–145)
TOTAL CHOLESTEROL/HDL RATIO MEASUREMENT: 4.4 RATIO — SIGNIFICANT CHANGE UP (ref 3.4–9.6)
TRIGL SERPL-MCNC: 191 MG/DL — HIGH (ref 10–149)
TROPONIN T, HIGH SENSITIVITY RESULT: <6 NG/L — SIGNIFICANT CHANGE UP (ref 0–51)
TSH SERPL-MCNC: 4.03 UIU/ML — SIGNIFICANT CHANGE UP (ref 0.27–4.2)
WBC # BLD: 9.28 K/UL — SIGNIFICANT CHANGE UP (ref 3.8–10.5)
WBC # FLD AUTO: 9.28 K/UL — SIGNIFICANT CHANGE UP (ref 3.8–10.5)

## 2020-10-14 PROCEDURE — 99223 1ST HOSP IP/OBS HIGH 75: CPT

## 2020-10-14 RX ORDER — HYDRALAZINE HCL 50 MG
10 TABLET ORAL EVERY 6 HOURS
Refills: 0 | Status: DISCONTINUED | OUTPATIENT
Start: 2020-10-14 | End: 2020-10-16

## 2020-10-14 RX ORDER — ASPIRIN/CALCIUM CARB/MAGNESIUM 324 MG
325 TABLET ORAL ONCE
Refills: 0 | Status: COMPLETED | OUTPATIENT
Start: 2020-10-14 | End: 2020-10-14

## 2020-10-14 RX ORDER — METOPROLOL TARTRATE 50 MG
50 TABLET ORAL DAILY
Refills: 0 | Status: DISCONTINUED | OUTPATIENT
Start: 2020-10-14 | End: 2020-10-16

## 2020-10-14 RX ORDER — LEVOTHYROXINE SODIUM 125 MCG
25 TABLET ORAL DAILY
Refills: 0 | Status: DISCONTINUED | OUTPATIENT
Start: 2020-10-14 | End: 2020-10-16

## 2020-10-14 RX ADMIN — Medication 50 MILLIGRAM(S): at 05:40

## 2020-10-14 RX ADMIN — Medication 325 MILLIGRAM(S): at 04:20

## 2020-10-14 RX ADMIN — Medication 25 MICROGRAM(S): at 05:40

## 2020-10-14 RX ADMIN — LIDOCAINE 1 PATCH: 4 CREAM TOPICAL at 10:30

## 2020-10-14 NOTE — CONSULT NOTE ADULT - SUBJECTIVE AND OBJECTIVE BOX
Patient is a 35y old  Male who presents with a chief complaint of CP (14 Oct 2020 03:23)    HPI: 35M c hx MO, pseudotumor cerebri c/b b/l blindness s/p  shunt, LAZARO, recent ED visit @ [facility] for CP, pw CP.    Pt states a few months ago, attempted to do a pushup and thinks he tore a muscle in his left chest. Since then, he's had persistent left-sided chest pain that intermittently radiates to his neck, worse when he lies on his left side, and worse with palpation. Pt denies the CP is worse with exertion or deep breaths. 2 weeks ago, pt reported having an episode of palpitations at the same time as the chest pain, and went to [facility], where CE were neg x2, and pt was deemed to have a musculoskeletal source of his chest pain. Since then, pt has had 2-3 more episodes of the palpitations. Pt is also currently being evaluated for gastric bypass, and his cardiologist wanted him to get a CT coronary. Yesterday, pt was having chest pain again, and wanted to make an appointment with his PMD, but his call went to an answering service that sent him an ambulance, and due to his high blood pressure and chest pain, was brought into the hospital. Pt denies SOB. Other ROS as below.    VS: Tm 98.1, P 88, /90, R 20, 98% RA    In the ED, given lidocaine patch, Tylenol    Patient seen in ED 10/14 AM. denied CP, SOB or palpitations at the time.    Additional information:  -CP began 3 months ago after working out. has worsened ("more constant") over past 3 weeks  -chest pain alleviated by ice packs  -patient endorses history of anxiety and panic attacks  -has been taking metoprolol succinate 50 mg qD since prescribed by [facility] ~2 weeks ago  -he states highest blood pressure that he took at home was 160s/90s    PAST MEDICAL & SURGICAL HISTORY:  Pseudotumor cerebri c/b b/l blindness  Hydrocephalus S/P  shunt    LAZARO (obstructive sleep apnea)  Hypothyroidism    MEDICATIONS  (STANDING):  levothyroxine 25 MICROGram(s) Oral daily  metoprolol succinate ER 50 milliGRAM(s) Oral daily    MEDICATIONS  (PRN):  artificial tears (preservative free) Ophthalmic Solution 1 Drop(s) Both EYES four times a day PRN Dry Eyes  hydrALAZINE 10 milliGRAM(s) Oral every 6 hours PRN Systolic blood pressure > 160    FAMILY HISTORY:  No pertinent family history in first degree relatives    Allergic/Immunologic:	  SOCIAL HISTORY:  Lives with mother. Former cigarette smoker (2-3 years, 2 cigarettes per day). Current marijuana smoker. Denies other drug use or alcohol use.    REVIEW OF SYSTEMS:  CONSTITUTIONAL: No weakness, fevers or chills  NECK: No pain  RESPIRATORY: No cough, wheezing, shortness of breath  CARDIOVASCULAR: No chest pain or palpitations  GASTROINTESTINAL: No abdominal or epigastric pain. No nausea, vomiting, or hematemesis; No diarrhea or constipation  GENITOURINARY: No dysuria, frequency  NEUROLOGICAL: No numbness or weakness  SKIN: No rashes    Vital Signs Last 24 Hrs  T(C): 36.5 (14 Oct 2020 09:09), Max: 36.7 (13 Oct 2020 18:00)  T(F): 97.7 (14 Oct 2020 09:09), Max: 98.1 (13 Oct 2020 18:00)  HR: 73 (14 Oct 2020 09:09) (65 - 88)  BP: 146/96 (14 Oct 2020 09:09) (105/57 - 167/99)  BP(mean): 68 (14 Oct 2020 05:30) (68 - 72)  RR: 18 (14 Oct 2020 09:09) (14 - 20)  SpO2: 100% (14 Oct 2020 09:09) (94% - 100%)    PHYSICAL EXAM:  GENERAL: Obese male, resting comfortably, NAD  SKIN: No rashes  HEENT: NCAT, PERRL, MMM  LUNGS: CTAB  CHEST: distant heart sounds, RRR, +S1S2. Tenderness to palpation over sternum.  ABDOMEN: obese abdomen, +BS, soft NTND  EXTREMITIES: no peripheral edema, distal pulses intact  NEURO: alert and oriented, MAEx4, no gross focal neurologic deficits  PSYCH: normal mood and affect    LABS:                        15.0   9.28  )-----------( 208      ( 14 Oct 2020 05:47 )             45.9     10-14    138  |  104  |  15  ----------------------------<  94  3.8   |  26  |  0.70    Ca    9.0      14 Oct 2020 04:29  Phos  3.5     10-14  Mg     2.2     10-14    TPro  7.0  /  Alb  3.9  /  TBili  0.7  /  DBili  x   /  AST  17  /  ALT  26  /  AlkPhos  79  10-14    PT/INR - ( 14 Oct 2020 04:29 )   PT: 12.9 sec;   INR: 1.08 ratio    PTT - ( 14 Oct 2020 04:29 )  PTT:32.7 sec    HbA1C 5.5    Chol 137  HDL 31 LDL 68    CARDIAC MARKERS (14 Oct 2020)  hsT <6 --> <6  creatine kinase 75 --> 51  Troponin I <0.015 --> <0.015    ECG: Normal sinus rhythm (10/13)  Vent rate 88,  ms, QRS 94, QTc 459    RADIOLOGY & ADDITIONAL STUDIES:  XR shunt series 10/13  IMPRESSION:  There is no kink or discontinuity of  shunt within the confines of this exam. Patient is a 35y old  Male who presents with a chief complaint of CP (14 Oct 2020 03:23)    HPI: 35M PMH morbid obesity, pseudotumor cerebri c/b b/l blindness s/p  shunt, LAZARO, recent ED visit @ [facility] for CP, pw CP.    3 months ago, patient was working out; attempted to do push up and thinks he tore muscle in left chest. After this, intermittent left sided chest pain sharp, at times radiating to left neck. Denies worsening with exertion, shortness of breath.     3 months ago, patient attempted to do a pushup and thinks he tore a muscle in his left chest. Since then, persistent sharp, left-sided chest pain that intermittently radiates to his neck, worse when he lies on his left side, worse with palpation. Alleviated by ice. Pain temporarily resolved but 3 weeks ago, it returned as episodic left-sided chest pain, this time with pressure-like quality, 5-6X/day.    Denies CP worse with exertion or deep breaths, palpitations,      Pt denies the CP is worse with exertion or deep breaths. 2 weeks ago, pt reported having an episode of palpitations at the same time as the chest pain, and went to [facility], where CE were neg x2, and pt was deemed to have a musculoskeletal source of his chest pain. Since then, pt has had 2-3 more episodes of the palpitations. Pt is also currently being evaluated for gastric bypass, and his cardiologist wanted him to get a CT coronary. Yesterday, pt was having chest pain again, and wanted to make an appointment with his PMD, but his call went to an answering service that sent him an ambulance, and due to his high blood pressure and chest pain, was brought into the hospital. Pt denies SOB. Other ROS as below.    VS: Tm 98.1, P 88, /90, R 20, 98% RA    In the ED, given lidocaine patch, Tylenol    Patient seen in ED 10/14 AM. denied CP, SOB or palpitations at the time.    Additional information:  -CP began 3 months ago after working out. has worsened ("more constant") over past 3 weeks  -chest pain alleviated by ice packs  -patient endorses history of anxiety and panic attacks  -has been taking metoprolol succinate 50 mg qD since prescribed by [facility] ~2 weeks ago  -he states highest blood pressure that he took at home was 160s/90s    PAST MEDICAL & SURGICAL HISTORY:  Pseudotumor cerebri c/b b/l blindness  Hydrocephalus S/P  shunt    LAZARO (obstructive sleep apnea)  Hypothyroidism    MEDICATIONS  (STANDING):  levothyroxine 25 MICROGram(s) Oral daily  metoprolol succinate ER 50 milliGRAM(s) Oral daily    MEDICATIONS  (PRN):  artificial tears (preservative free) Ophthalmic Solution 1 Drop(s) Both EYES four times a day PRN Dry Eyes  hydrALAZINE 10 milliGRAM(s) Oral every 6 hours PRN Systolic blood pressure > 160    FAMILY HISTORY:  No pertinent family history in first degree relatives    Allergic/Immunologic:	  SOCIAL HISTORY:  Lives with mother. Former cigarette smoker (2-3 years, 2 cigarettes per day). Current marijuana smoker. Denies other drug use or alcohol use.    REVIEW OF SYSTEMS:  CONSTITUTIONAL: No weakness, fevers or chills  NECK: No pain  RESPIRATORY: No cough, wheezing, shortness of breath  CARDIOVASCULAR: No chest pain or palpitations  GASTROINTESTINAL: No abdominal or epigastric pain. No nausea, vomiting, or hematemesis; No diarrhea or constipation  GENITOURINARY: No dysuria, frequency  NEUROLOGICAL: No numbness or weakness  SKIN: No rashes    Vital Signs Last 24 Hrs  T(C): 36.5 (14 Oct 2020 09:09), Max: 36.7 (13 Oct 2020 18:00)  T(F): 97.7 (14 Oct 2020 09:09), Max: 98.1 (13 Oct 2020 18:00)  HR: 73 (14 Oct 2020 09:09) (65 - 88)  BP: 146/96 (14 Oct 2020 09:09) (105/57 - 167/99)  BP(mean): 68 (14 Oct 2020 05:30) (68 - 72)  RR: 18 (14 Oct 2020 09:09) (14 - 20)  SpO2: 100% (14 Oct 2020 09:09) (94% - 100%)    PHYSICAL EXAM:  GENERAL: Obese male, resting comfortably, NAD  SKIN: No rashes  HEENT: NCAT, PERRL, MMM  LUNGS: CTAB  CHEST: distant heart sounds, RRR, +S1S2. Tenderness to palpation over sternum.  ABDOMEN: obese abdomen, +BS, soft NTND  EXTREMITIES: no peripheral edema, distal pulses intact  NEURO: alert and oriented, MAEx4, no gross focal neurologic deficits  PSYCH: normal mood and affect    LABS:                        15.0   9.28  )-----------( 208      ( 14 Oct 2020 05:47 )             45.9     10-14    138  |  104  |  15  ----------------------------<  94  3.8   |  26  |  0.70    Ca    9.0      14 Oct 2020 04:29  Phos  3.5     10-14  Mg     2.2     10-14    TPro  7.0  /  Alb  3.9  /  TBili  0.7  /  DBili  x   /  AST  17  /  ALT  26  /  AlkPhos  79  10-14    PT/INR - ( 14 Oct 2020 04:29 )   PT: 12.9 sec;   INR: 1.08 ratio    PTT - ( 14 Oct 2020 04:29 )  PTT:32.7 sec    HbA1C 5.5    Chol 137  HDL 31 LDL 68    CARDIAC MARKERS (14 Oct 2020)  hsT <6 --> <6  creatine kinase 75 --> 51  Troponin I <0.015 --> <0.015    ECG: Normal sinus rhythm (10/13)  Vent rate 88,  ms, QRS 94, QTc 459    RADIOLOGY & ADDITIONAL STUDIES:  XR shunt series 10/13  IMPRESSION:  There is no kink or discontinuity of  shunt within the confines of this exam. Patient is a 35y old  Male who presents with a chief complaint of chest pain (14 Oct 2020 03:23)    HPI: 35M PMH morbid obesity, pseudotumor cerebri c/b b/l blindness s/p  shunt, LAZARO, recent ED visit @ [facility] for chest pain, presents with chest pain.    3 months ago, patient attempted to do a push-up and thinks he tore a muscle in his left chest. Afterwards, he had constant, sharp, left-sided chest pain, 10/10, that intermittently radiated to left neck. 3 weeks ago, pain became episodic with a different quality (pressure-like, 5-6X/day, remains severe (10/10)). 2 weeks ago, an episode of palpitations with the chest pain prompted him to go to [facility] (CE negative, pain thought to be musculoskeletal). At that time he was started on metoprolol succinate 50 qD for HTN. Yesterday, patient's chest pain along with high blood pressure (160s/90s) prompted him to come to the ED.    Chest pain is aggravated by movement and palpation, and is alleviated by ice. Does not worsen with exertion or deep breaths.    Of note, patient is currently being evaluated for gastric bypass surgery. Cardiologist wanted him to get a CT coronary, but patient is unable to obtain this test or nuclear stress test due to his body weight.    In the ED VS: T 98.1, P 88, /90, 100% RA. He was given Lidocaine patch and Tylenol, and later aspirin 325.    Patient seen in ED 10/14 AM. Asymptomatic at the time; denied CP, SOB or palpitations.    PAST MEDICAL & SURGICAL HISTORY:  Pseudotumor cerebri c/b b/l blindness  Hydrocephalus S/P  shunt    LAZARO (obstructive sleep apnea)  Hypothyroidism    MEDICATIONS  (STANDING):  levothyroxine 25 MICROGram(s) Oral daily  metoprolol succinate ER 50 milliGRAM(s) Oral daily    MEDICATIONS  (PRN):  artificial tears (preservative free) Ophthalmic Solution 1 Drop(s) Both EYES four times a day PRN Dry Eyes  hydrALAZINE 10 milliGRAM(s) Oral every 6 hours PRN Systolic blood pressure > 160    FAMILY HISTORY:  No pertinent family history in first degree relatives    Allergic/Immunologic:	  SOCIAL HISTORY:  Lives with mother. Former cigarette smoker (2-3 years, 2 cigarettes per day). Current marijuana smoker. Denies other drug use or alcohol use.    REVIEW OF SYSTEMS:  CONSTITUTIONAL: No weakness, fevers or chills  NECK: No pain  RESPIRATORY: No cough, wheezing, shortness of breath  CARDIOVASCULAR: No chest pain or palpitations  GASTROINTESTINAL: No abdominal or epigastric pain. No nausea, vomiting, or hematemesis; No diarrhea or constipation  GENITOURINARY: No dysuria, frequency  NEUROLOGICAL: No numbness or weakness  SKIN: No rashes    Vital Signs Last 24 Hrs  T(C): 36.5 (14 Oct 2020 09:09), Max: 36.7 (13 Oct 2020 18:00)  T(F): 97.7 (14 Oct 2020 09:09), Max: 98.1 (13 Oct 2020 18:00)  HR: 73 (14 Oct 2020 09:09) (65 - 88)  BP: 146/96 (14 Oct 2020 09:09) (105/57 - 167/99)  BP(mean): 68 (14 Oct 2020 05:30) (68 - 72)  RR: 18 (14 Oct 2020 09:09) (14 - 20)  SpO2: 100% (14 Oct 2020 09:09) (94% - 100%)    PHYSICAL EXAM:  GENERAL: Obese male, resting comfortably, alert and conversant, NAD  SKIN: No rashes  HEENT: NCAT, PERRL, MMM  LUNGS: CTAB  CHEST: distant heart sounds, RRR, +S1S2. Tenderness to palpation over central/left chest.  ABDOMEN: obese abdomen, +BS, soft NTND  EXTREMITIES: no peripheral edema, distal pulses intact  NEURO: alert and oriented, MAEx4, no gross focal neurologic deficits  PSYCH: normal mood and affect    LABS:             15.0   9.28  )-----------( 208      ( 14 Oct 2020 05:47 )             45.9     10-14    138  |  104  |  15  ----------------------------<  94  3.8   |  26  |  0.70    Ca    9.0      14 Oct 2020 04:29  Phos  3.5     10-14  Mg     2.2     10-14    TPro  7.0  /  Alb  3.9  /  TBili  0.7  /  DBili  x   /  AST  17  /  ALT  26  /  AlkPhos  79  10-14    PT/INR - ( 14 Oct 2020 04:29 )   PT: 12.9 sec;   INR: 1.08 ratio    PTT - ( 14 Oct 2020 04:29 )  PTT:32.7 sec    HbA1C 5.5    Chol 137  HDL 31 LDL 68    CARDIAC MARKERS (14 Oct 2020)  hsT <6 --> <6  creatine kinase 75 --> 51  Troponin I <0.015 --> <0.015    ECG: Normal sinus rhythm (10/13)  Vent rate 88,  ms, QRS 94, QTc 459    RADIOLOGY & ADDITIONAL STUDIES:  XR shunt series 10/13  IMPRESSION:  There is no kink or discontinuity of  shunt within the confines of this exam. Patient is a 35y old  Male who presents with a chief complaint of chest pain (14 Oct 2020 03:23)    HPI: 35M PMH morbid obesity, pseudotumor cerebri c/b b/l blindness s/p  shunt, LAZARO, recent ED visit @ [facility] for chest pain, presents with chest pain.    3 months ago, patient attempted to do a push-up and thinks he tore a muscle in his left chest. Afterwards, he had constant, sharp, left-sided chest pain, 10/10, that intermittently radiated to left neck. 3 weeks ago, pain became episodic with a different quality (pressure-like, 5-6X/day, remains severe (10/10)). 2 weeks ago, an episode of palpitations with the chest pain prompted him to go to [facility] (CE negative, pain thought to be musculoskeletal). At that time he was started on metoprolol succinate 50 qD for HTN. Yesterday, patient's chest pain along with high blood pressure (160s/90s) prompted him to come to the ED.    Chest pain is aggravated by movement and palpation, and is alleviated by ice. Does not worsen with exertion or deep breaths.    Of note, patient is currently being evaluated for gastric bypass surgery. Cardiologist wanted him to get a CT coronary, but patient is unable to obtain this test or nuclear stress test due to his body weight.    In the ED VS: T 98.1, P 88, /90, 100% RA. He was given Lidocaine patch and Tylenol, and later aspirin 325.    Patient seen in ED 10/14 AM. Asymptomatic at the time; denied CP, SOB or palpitations.    PAST MEDICAL & SURGICAL HISTORY:  Pseudotumor cerebri c/b b/l blindness  Hydrocephalus S/P  shunt    LAZARO (obstructive sleep apnea)  Hypothyroidism    MEDICATIONS  (STANDING):  levothyroxine 25 MICROGram(s) Oral daily  metoprolol succinate ER 50 milliGRAM(s) Oral daily    MEDICATIONS  (PRN):  artificial tears (preservative free) Ophthalmic Solution 1 Drop(s) Both EYES four times a day PRN Dry Eyes  hydrALAZINE 10 milliGRAM(s) Oral every 6 hours PRN Systolic blood pressure > 160    FAMILY HISTORY:  No pertinent family history in first degree relatives    Allergic/Immunologic:	  SOCIAL HISTORY:  Legally blind, lives with mother who assists him. Former cigarette smoker (2-3 years, 2 cigarettes per day). Active marijuana smoker (last 2 months ago). Denies other drug use or alcohol use.    REVIEW OF SYSTEMS:  CONSTITUTIONAL: No weakness, fevers or chills  NECK: No pain  RESPIRATORY: No cough, wheezing, shortness of breath  CARDIOVASCULAR: No chest pain or palpitations  GASTROINTESTINAL: No abdominal or epigastric pain. No nausea, vomiting, or hematemesis; No diarrhea or constipation  GENITOURINARY: No dysuria, frequency  NEUROLOGICAL: No numbness or weakness  SKIN: No rashes    Vital Signs Last 24 Hrs  T(C): 36.5 (14 Oct 2020 09:09), Max: 36.7 (13 Oct 2020 18:00)  T(F): 97.7 (14 Oct 2020 09:09), Max: 98.1 (13 Oct 2020 18:00)  HR: 73 (14 Oct 2020 09:09) (65 - 88)  BP: 146/96 (14 Oct 2020 09:09) (105/57 - 167/99)  BP(mean): 68 (14 Oct 2020 05:30) (68 - 72)  RR: 18 (14 Oct 2020 09:09) (14 - 20)  SpO2: 100% (14 Oct 2020 09:09) (94% - 100%)    PHYSICAL EXAM:  GENERAL: Obese male, resting comfortably, alert and conversant, NAD  SKIN: No rashes  HEENT: NCAT, PERRL, MMM  LUNGS: CTAB  CHEST: distant heart sounds, RRR, +S1S2. Tenderness to palpation over central/left chest.  ABDOMEN: obese abdomen, +BS, soft NTND  EXTREMITIES: no peripheral edema, distal pulses intact  NEURO: alert and oriented, MAEx4, no gross focal neurologic deficits  PSYCH: normal mood and affect    LABS:             15.0   9.28  )-----------( 208      ( 14 Oct 2020 05:47 )             45.9     10-14    138  |  104  |  15  ----------------------------<  94  3.8   |  26  |  0.70    Ca    9.0      14 Oct 2020 04:29  Phos  3.5     10-14  Mg     2.2     10-14    TPro  7.0  /  Alb  3.9  /  TBili  0.7  /  DBili  x   /  AST  17  /  ALT  26  /  AlkPhos  79  10-14    PT/INR - ( 14 Oct 2020 04:29 )   PT: 12.9 sec;   INR: 1.08 ratio    PTT - ( 14 Oct 2020 04:29 )  PTT:32.7 sec    HbA1C 5.5    Chol 137  HDL 31 LDL 68    CARDIAC MARKERS (14 Oct 2020)  hsT <6 --> <6  creatine kinase 75 --> 51  Troponin I <0.015 --> <0.015    ECG: Normal sinus rhythm (10/13)  Vent rate 88,  ms, QRS 94, QTc 459    RADIOLOGY & ADDITIONAL STUDIES:  XR shunt series 10/13  IMPRESSION:  There is no kink or discontinuity of  shunt within the confines of this exam.

## 2020-10-14 NOTE — H&P ADULT - NSHPLABSRESULTS_GEN_ALL_CORE
Personally reviewed old records.  Personally reviewed labs.  Personally reviewed imaging.                        16.0   12.55 )-----------( 247      ( 13 Oct 2020 18:43 )             49.2       10-13    138  |  103  |  18  ----------------------------<  93  4.3   |  24  |  0.75    Ca    9.5      13 Oct 2020 18:43  Phos  3.3     10-13  Mg     2.2     10-13    TPro  7.6  /  Alb  4.1  /  TBili  0.4  /  DBili  x   /  AST  23  /  ALT  29  /  AlkPhos  90  10-13            LIVER FUNCTIONS - ( 13 Oct 2020 18:43 )  Alb: 4.1 g/dL / Pro: 7.6 g/dL / ALK PHOS: 90 U/L / ALT: 29 U/L / AST: 23 U/L / GGT: x

## 2020-10-14 NOTE — H&P ADULT - PROBLEM SELECTOR PLAN 1
- will give ASA load  - tele  - will order CT coronary. per ED pt may be too large for CT, so pt can get 2d stress test otherwise.  - [place] [address] Exp Problem Focused - Mod. Complex

## 2020-10-14 NOTE — H&P ADULT - ASSESSMENT
35M c hx MO, pseudotumor cerebri c/b b/l blindness s/p  shunt, LAZARO, recent ED visit @ [facility] for CP, pw CP.

## 2020-10-14 NOTE — H&P ADULT - NSHPREVIEWOFSYSTEMS_GEN_ALL_CORE
REVIEW OF SYSTEMS:  CONSTITUTIONAL: No weakness. No fevers. No chills. No rigors. No weight loss. No night sweats. No poor appetite.  EYES: +blurry vision. No eye pain.  ENT: No hearing difficulty. No vertigo. No dysphagia. No sore throat. No Sinusitis/rhinorrhea.   NECK: +pain. No stiffness/rigidity.  CARDIAC: +chest pain. +palpitations. No lightheadedness. No syncope.  RESPIRATORY: No cough. No SOB. No hemoptysis.  GASTROINTESTINAL: No abdominal pain. No nausea. No vomiting. No hematemesis. No diarrhea. No constipation. No melena. No hematochezia.  GENITOURINARY: No dysuria. No frequency. No hesitancy. No hematuria. No oliguria.  NEUROLOGICAL: No numbness/tingling. No focal weakness. No urinary or fecal incontinence. No headache. No unsteady gait.  BACK: No back pain. No flank pain.  EXTREMITIES: No lower extremity edema. Full ROM. No joint pain.  SKIN: No rashes. No itching. No other lesions.  PSYCHIATRIC: No depression. No anxiety. No SI/HI.  ALLERGIC: No lip swelling. No hives.  All other review of systems is negative unless indicated above.  Unless indicated above, unable to assess ROS 2/2

## 2020-10-14 NOTE — CHART NOTE - NSCHARTNOTEFT_GEN_A_CORE
[facility] Division of Hospital Medicine  Rosemary Arora MD  Pager (M-F, 8A-5P): [phone]  Other Times:  [phone]    Patient is a 35y old  Male who presents with a chief complaint of CP (14 Oct 2020 13:24)      SUBJECTIVE / OVERNIGHT EVENTS: Seen and examined at bedside. No acute events.    ADDITIONAL REVIEW OF SYSTEMS:  Chest: Tender L breast cysts    MEDICATIONS  (STANDING):  levothyroxine 25 MICROGram(s) Oral daily  metoprolol succinate ER 50 milliGRAM(s) Oral daily    MEDICATIONS  (PRN):  artificial tears (preservative free) Ophthalmic Solution 1 Drop(s) Both EYES four times a day PRN Dry Eyes  hydrALAZINE 10 milliGRAM(s) Oral every 6 hours PRN Systolic blood pressure > 160      PHYSICAL EXAM:  Vital Signs Last 24 Hrs  T(C): 36.5 (14 Oct 2020 09:09), Max: 36.7 (13 Oct 2020 18:00)  T(F): 97.7 (14 Oct 2020 09:09), Max: 98.1 (13 Oct 2020 18:00)  HR: 73 (14 Oct 2020 09:09) (65 - 88)  BP: 146/96 (14 Oct 2020 09:09) (105/57 - 167/99)  BP(mean): 68 (14 Oct 2020 05:30) (68 - 72)  RR: 18 (14 Oct 2020 09:09) (14 - 20)  SpO2: 100% (14 Oct 2020 09:09) (94% - 100%)    CONSTITUTIONAL: NAD, morbidly obese  EYES: PERRLA; conjunctiva and sclera clear  ENMT: Moist oral mucosa, no pharyngeal erythema  NECK: Supple, no palpable masses  RESPIRATORY: Normal respiratory effort; lungs are clear to auscultation bilaterally  Breast: Tender Cyst palpated  CARDIOVASCULAR: Regular rate and rhythm, normal S1 and S2, no murmur/rub/gallop; No lower extremity edema; Peripheral pulses are 2+ bilaterally; reproducible chest pain  ABDOMEN: Nontender to palpation, normoactive bowel sounds, no rebound/guarding; No hepatosplenomegaly  MUSCULOSKELETAL:  Normal gait; no clubbing or cyanosis of digits; no joint swelling or tenderness to palpation  PSYCH: A+O to person, place, and time; affect appropriate  NEUROLOGY: CN 2-12 are intact and symmetric; no gross sensory deficits   SKIN: No rashes; no palpable lesions    LABS:                        15.0   9.28  )-----------( 208      ( 14 Oct 2020 05:47 )             45.9     10-14    138  |  104  |  15  ----------------------------<  94  3.8   |  26  |  0.70    Ca    9.0      14 Oct 2020 04:29  Phos  3.5     10-14  Mg     2.2     10-14    TPro  7.0  /  Alb  3.9  /  TBili  0.7  /  DBili  x   /  AST  17  /  ALT  26  /  AlkPhos  79  10-14    PT/INR - ( 14 Oct 2020 04:29 )   PT: 12.9 sec;   INR: 1.08 ratio         PTT - ( 14 Oct 2020 04:29 )  PTT:32.7 sec  CARDIAC MARKERS ( 14 Oct 2020 04:29 )  x     / x     / 51 U/L / x     / x        Assessment: 	  35M morbidly obese, hx of pseudotumor cerebri c/b b/l blindness s/p  shunt, LAZARO, p/w chest pain     Problem/Plan - 1:  ·  Problem: Chest pain, unspecified type.  Plan: ACS ruled out with negative trops/EKG  - pt unable to undergo CTA or stress test due to size  - cardio consult  - no events on tele     Problem/Plan - 2:  ·  Problem: Palpitation.  Plan: - tele  - f/u tsh.      Problem/Plan - 3:  ·  Problem: Essential hypertension.  Plan: - labetalol prn.      Problem/Plan - 4:  ·  Problem: Pseudotumor cerebri.  Plan: - s/p  shunt  - no headache  - shuntogram with no kinks  - f/u NSG as outpt.      Problem/Plan - 5:  ·  Problem: L breast cysts.  Plan: outpatient breast U/S, f/u at breast clinic    Dispo: stable for d/c to home with outpatient f/u if cleared by cardio

## 2020-10-14 NOTE — H&P ADULT - HISTORY OF PRESENT ILLNESS
35M c hx MO, pseudotumor cerebri c/b b/l blindness s/p  shunt, LAZARO, recent ED visit @ [facility] for CP, pw CP.    Pt states a few months ago, attempted to do a pushup and thinks he tore a muscle in his left chest. Since then, he's had persistent left sided chest pain that intermittently radiates to his neck, worse when he lies on his left side, and worse with palpation. Pt denies the CP is worse with exertion or deep breaths. 2 weeks ago, pt reported having an episode of palpitations at the same time as the chest pain, and went to [facility], where CE were neg x2, and pt was deemed to have a musculoskeletal source of his chest pain. Since then, pt has had 2-3 more episodes of the palpitations. Pt is also currently being evaluated for gastric bypass, and his cardiologist wanted him to get a CT coronary. Yesterday, pt was having chest pain again, and wanted to make an appointment with his PMD, but his call went to an answering service that sent him an ambulance, and due to his high blood pressure and chest pain, was brought into the hospital. Pt denies SOB. Other ROS as below.    VS: Tm 98.1, P 88, /90, R 20, 98% RA  In the ed, givin lido patch, tylenol

## 2020-10-14 NOTE — ED ADULT NURSE REASSESSMENT NOTE - NS ED NURSE REASSESS COMMENT FT1
Pt A+Ox3, VSS, blood work collected and sent to lab, medications administered. Pt aware of plan of care, admitted to telemetry for chest pain, pending CT and admission bed.

## 2020-10-14 NOTE — CONSULT NOTE ADULT - ATTENDING COMMENTS
Although atypical CP, change in character and RFs despite the young age.   As above, other modalitiies would be unable to provide clear resolution of the coronaries  Proceed w [facility]

## 2020-10-14 NOTE — CONSULT NOTE ADULT - ASSESSMENT
35M PMH morbid obesity, pseudotumor cerebri c/b b/l blindness s/p  shunt, LAZARO, hypothyroidism, presents with chest pain x 3 months that has changed in quality x 3 weeks. ACS ruled out; cardiac enzymes and EKG negative. Patient currently being evaluated for gastric bypass surgery.    Plan:  Chest pain is most likely musculoskeletal in origin, given nature of onset of symptoms, tenderness to palpation on exam, alleviation by ice. However, given its persistence, severity, and recent change in quality, recommend further evaluation, especially in setting of upcoming major surgery. As patient cannot obtain CT coronary angiogram or nuclear stress test due to body habitus, recommend cardiac catheterization. 35M PMH morbid obesity, pseudotumor cerebri c/b b/l blindness s/p  shunt, LAZARO, hypothyroidism, presents with chest pain x 3 months that has changed in quality x 3 weeks. ACS ruled out; cardiac enzymes and EKG negative. Patient currently being evaluated for gastric bypass surgery.    Plan:  #Chest pain  Chest pain is most likely musculoskeletal in origin, given nature of onset of symptoms, tenderness to palpation on exam, alleviation by ice. However, given its persistence, severity, and recent change in quality, recommend further evaluation, especially in setting of upcoming major surgery. As patient cannot obtain CT coronary angiogram or nuclear stress test due to body habitus, recommend cardiac catheterization.    #HTN  C/w metoprolol, PRN hydralazine

## 2020-10-14 NOTE — ED ADULT NURSE REASSESSMENT NOTE - NS ED NURSE REASSESS COMMENT FT1
Pt A+Ox3, VSS, medication administered, blood work collected and sent to lab. Pt aware of plan of care, pending admission bed.

## 2020-10-14 NOTE — H&P ADULT - NSHPPHYSICALEXAM_GEN_ALL_CORE
PHYSICAL EXAM:   GENERAL: Alert. Not confused. No acute distress. Not thin. Not cachectic. +obese.  HEAD:  Atraumatic. Normocephalic.  EYES: EOMI. PERRLA. Normal conjunctiva/sclera.  ENT: Neck supple. No JVD. Moist oral mucosa. Not edentulous. No thrush.  LYMPH: Normal supraclavicular/cervical lymph nodes.   CARDIAC: Not tachy, Not theodore. Regular rhythm. Not irregularly irregular. S1. S2. No murmur. No rub. No distant heart sounds.  LUNG/CHEST: CTAB. BS equal bilaterally. No wheezes. No rales. No rhonchi.  ABDOMEN: Soft. No tenderness. No distension. No fluid wave. Normal bowel sounds.  BACK: No midline/vertebral tenderness. No flank tenderness.  VASCULAR: +2 b/l radial or ulnar pulses. Palpable DP pulses.  EXTREMITIES:  No clubbing. No cyanosis. No edema. Moving all 4.  NEUROLOGY: A&Ox3. Non-focal exam. Cranial nerves intact. Normal speech. Sensation intact.  PSYCH: Normal behavior. Normal affect.  SKIN: No jaundice. No erythema. No rash/lesion.  Vascular Access:     ICU Vital Signs Last 24 Hrs  T(C): 36.5 (14 Oct 2020 00:04), Max: 36.7 (13 Oct 2020 18:00)  T(F): 97.7 (14 Oct 2020 00:04), Max: 98.1 (13 Oct 2020 18:00)  HR: 75 (14 Oct 2020 00:04) (75 - 88)  BP: 130/72 (14 Oct 2020 00:04) (130/72 - 167/99)  BP(mean): --  ABP: --  ABP(mean): --  RR: 14 (14 Oct 2020 00:04) (14 - 20)  SpO2: 99% (14 Oct 2020 00:04) (98% - 100%)      I&O's Summary

## 2020-10-14 NOTE — H&P ADULT - NSHPSOCIALHISTORY_GEN_ALL_CORE
Social History:    Marital Status: (  ) , ( x ) Single, (  ) , (  ) , (  )   # of Children: 0  Lives with: (  ) alone, (  ) children, (  ) spouse, ( x ) parents, (  ) siblings, (  ) friends, (  ) other:   Occupation:     Substance Use/Illicit Drugs: smoked marijuana 2 months ago  Tobacco Usage: (  ) never smoked, ( x ) former smoker, (  ) current smoker and Total Pack-Years: 3 pk yrs  Last Alcohol Usage/Frequency/Amount/Withdrawal/Hx of Abuse:  few months ago  Foreign travel:   Animal exposure:

## 2020-10-15 LAB
ANION GAP SERPL CALC-SCNC: 10 MMOL/L — SIGNIFICANT CHANGE UP (ref 5–17)
BUN SERPL-MCNC: 16 MG/DL — SIGNIFICANT CHANGE UP (ref 7–23)
CALCIUM SERPL-MCNC: 9.4 MG/DL — SIGNIFICANT CHANGE UP (ref 8.4–10.5)
CHLORIDE SERPL-SCNC: 102 MMOL/L — SIGNIFICANT CHANGE UP (ref 96–108)
CK MB BLD-MCNC: 2.2 % — SIGNIFICANT CHANGE UP (ref 0–3.5)
CK MB CFR SERPL CALC: 1 NG/ML — SIGNIFICANT CHANGE UP (ref 0–6.7)
CK SERPL-CCNC: 45 U/L — SIGNIFICANT CHANGE UP (ref 30–200)
CO2 SERPL-SCNC: 28 MMOL/L — SIGNIFICANT CHANGE UP (ref 22–31)
CREAT SERPL-MCNC: 0.76 MG/DL — SIGNIFICANT CHANGE UP (ref 0.5–1.3)
GLUCOSE SERPL-MCNC: 83 MG/DL — SIGNIFICANT CHANGE UP (ref 70–99)
HCT VFR BLD CALC: 48 % — SIGNIFICANT CHANGE UP (ref 39–50)
HGB BLD-MCNC: 15.9 G/DL — SIGNIFICANT CHANGE UP (ref 13–17)
MCHC RBC-ENTMCNC: 28.4 PG — SIGNIFICANT CHANGE UP (ref 27–34)
MCHC RBC-ENTMCNC: 33.1 GM/DL — SIGNIFICANT CHANGE UP (ref 32–36)
MCV RBC AUTO: 85.7 FL — SIGNIFICANT CHANGE UP (ref 80–100)
NRBC # BLD: 0 /100 WBCS — SIGNIFICANT CHANGE UP (ref 0–0)
PLATELET # BLD AUTO: 213 K/UL — SIGNIFICANT CHANGE UP (ref 150–400)
POTASSIUM SERPL-MCNC: 3.6 MMOL/L — SIGNIFICANT CHANGE UP (ref 3.5–5.3)
POTASSIUM SERPL-SCNC: 3.6 MMOL/L — SIGNIFICANT CHANGE UP (ref 3.5–5.3)
RBC # BLD: 5.6 M/UL — SIGNIFICANT CHANGE UP (ref 4.2–5.8)
RBC # FLD: 13.6 % — SIGNIFICANT CHANGE UP (ref 10.3–14.5)
SODIUM SERPL-SCNC: 140 MMOL/L — SIGNIFICANT CHANGE UP (ref 135–145)
TROPONIN T, HIGH SENSITIVITY RESULT: <6 NG/L — SIGNIFICANT CHANGE UP (ref 0–51)
WBC # BLD: 10.66 K/UL — HIGH (ref 3.8–10.5)
WBC # FLD AUTO: 10.66 K/UL — HIGH (ref 3.8–10.5)

## 2020-10-15 PROCEDURE — 99233 SBSQ HOSP IP/OBS HIGH 50: CPT

## 2020-10-15 PROCEDURE — 93010 ELECTROCARDIOGRAM REPORT: CPT

## 2020-10-15 PROCEDURE — 93458 L HRT ARTERY/VENTRICLE ANGIO: CPT | Mod: 26

## 2020-10-15 PROCEDURE — 99152 MOD SED SAME PHYS/QHP 5/>YRS: CPT

## 2020-10-15 PROCEDURE — 78580 LUNG PERFUSION IMAGING: CPT | Mod: 26

## 2020-10-15 PROCEDURE — 71046 X-RAY EXAM CHEST 2 VIEWS: CPT | Mod: 26

## 2020-10-15 RX ORDER — ACETAMINOPHEN 500 MG
650 TABLET ORAL ONCE
Refills: 0 | Status: COMPLETED | OUTPATIENT
Start: 2020-10-15 | End: 2020-10-15

## 2020-10-15 RX ADMIN — Medication 25 MICROGRAM(S): at 05:20

## 2020-10-15 RX ADMIN — Medication 50 MILLIGRAM(S): at 05:21

## 2020-10-15 RX ADMIN — Medication 650 MILLIGRAM(S): at 18:35

## 2020-10-15 NOTE — PROGRESS NOTE ADULT - ASSESSMENT
35M PMH morbid obesity, pseudotumor cerebri c/b b/l blindness s/p  shunt, LAZARO, hypothyroidism, presents with chest pain x 3 months that has changed in quality x 3 weeks. ACS ruled out; cardiac enzymes and EKG negative. Patient currently being evaluated for gastric bypass surgery.    Plan:  #Chest pain  Chest pain is most likely musculoskeletal in origin, given nature of onset of symptoms, tenderness to palpation on exam, alleviation by ice. However, given its persistence, severity, and recent change in quality, recommend further evaluation, especially in setting of upcoming major surgery. As patient cannot obtain CT coronary angiogram or nuclear stress test due to body habitus, recommend cardiac catheterization. Team discussed procedure with patient and he is agreeable. To be scheduled.    #HTN  SBP 100s-150s past 24h  C/w metoprolol, PRN hydralazine

## 2020-10-15 NOTE — PROGRESS NOTE ADULT - SUBJECTIVE AND OBJECTIVE BOX
[facility] Division of Hospital Medicine  Rosemary Arora MD  Pager (M-F, 8A-5P): [phone]  Other Times:  [phone]    Patient is a 35y old  Male who presents with a chief complaint of CP (15 Oct 2020 10:47)      SUBJECTIVE / OVERNIGHT EVENTS: No acute events. Still endorsing reproducible L sided pain. Planned for [facility] today.  ADDITIONAL REVIEW OF SYSTEMS: Negative    MEDICATIONS  (STANDING):  levothyroxine 25 MICROGram(s) Oral daily  metoprolol succinate ER 50 milliGRAM(s) Oral daily    MEDICATIONS  (PRN):  artificial tears (preservative free) Ophthalmic Solution 1 Drop(s) Both EYES four times a day PRN Dry Eyes  hydrALAZINE 10 milliGRAM(s) Oral every 6 hours PRN Systolic blood pressure > 160    PHYSICAL EXAM:  Vital Signs Last 24 Hrs  T(C): 36.4 (15 Oct 2020 13:45), Max: 36.8 (14 Oct 2020 17:00)  T(F): 97.5 (15 Oct 2020 13:45), Max: 98.2 (14 Oct 2020 17:00)  HR: 81 (15 Oct 2020 13:45) (63 - 83)  BP: 134/76 (15 Oct 2020 13:45) (101/67 - 155/92)  BP(mean): --  RR: 24 (15 Oct 2020 13:45) (18 - 24)  SpO2: 95% (15 Oct 2020 13:45) (95% - 98%)    CONSTITUTIONAL: NAD, morbidly obese  EYES: PERRLA; conjunctiva and sclera clear  ENMT: Moist oral mucosa, no pharyngeal injection or exudates; normal dentition  NECK: Supple, no palpable masses; no thyromegaly  RESPIRATORY: Normal respiratory effort; lungs are clear to auscultation bilaterally  CARDIOVASCULAR: Regular rate and rhythm, normal S1 and S2, no murmur/rub/gallop; No lower extremity edema; Peripheral pulses are 2+ bilaterally  ABDOMEN: Nontender to palpation, normoactive bowel sounds, no rebound/guarding; No hepatosplenomegaly  MUSCULOSKELETAL: no joint swelling or tenderness to palpation  PSYCH: A+O to person, place, and time; affect appropriate  NEUROLOGY: CN 2-12 are intact and symmetric; no gross sensory deficits   SKIN: No rashes; no palpable lesions    LABS:                        15.9   10.66 )-----------( 213      ( 15 Oct 2020 06:38 )             48.0     10-15    140  |  102  |  16  ----------------------------<  83  3.6   |  28  |  0.76    Ca    9.4      15 Oct 2020 06:38  Phos  3.5     10-14  Mg     2.2     10-14    TPro  7.0  /  Alb  3.9  /  TBili  0.7  /  DBili  x   /  AST  17  /  ALT  26  /  AlkPhos  79  10-14    PT/INR - ( 14 Oct 2020 04:29 )   PT: 12.9 sec;   INR: 1.08 ratio         PTT - ( 14 Oct 2020 04:29 )  PTT:32.7 sec  CARDIAC MARKERS ( 15 Oct 2020 12:30 )  x     / x     / 45 U/L / x     / 1.0 ng/mL  CARDIAC MARKERS ( 14 Oct 2020 04:29 )  x     / x     / 51 U/L / x     / x                RADIOLOGY & ADDITIONAL TESTS:  Results Reviewed:   Imaging Personally Reviewed:  Electrocardiogram Personally Reviewed:    COORDINATION OF CARE:  Care Discussed with Consultants/Other Providers [Y/N]:  Prior or Outpatient Records Reviewed [Y/N]:

## 2020-10-15 NOTE — PROGRESS NOTE ADULT - SUBJECTIVE AND OBJECTIVE BOX
PROGRESS NOTE:   Machelle Jimenez MD   Internal Medicine PGY-1  [place] [id number] / [phone]    Patient is a 35y old  Male who presents with a chief complaint of CP (14 Oct 2020 13:24)    SUBJECTIVE / OVERNIGHT EVENTS:  -no acute events overnight  -patient seen in AM, resting comfortably. complaining of left-sided chest "muscle pain," 3/10 in severity, improved w/ ice pack. otherwise without complaints.  -pending heart catheterization. Discussed w/ patient; he is agreeable to the procedure    ADDITIONAL REVIEW OF SYSTEMS: no shortness of breath or palpitations    MEDICATIONS  (STANDING):  levothyroxine 25 MICROGram(s) Oral daily  metoprolol succinate ER 50 milliGRAM(s) Oral daily    MEDICATIONS  (PRN):  artificial tears (preservative free) Ophthalmic Solution 1 Drop(s) Both EYES four times a day PRN Dry Eyes  hydrALAZINE 10 milliGRAM(s) Oral every 6 hours PRN Systolic blood pressure > 160    PHYSICAL EXAM:  Vital Signs Last 24 Hrs  T(C): 36.6 (15 Oct 2020 09:18), Max: 36.8 (14 Oct 2020 17:00)  T(F): 97.8 (15 Oct 2020 09:18), Max: 98.2 (14 Oct 2020 17:00)  HR: 63 (15 Oct 2020 09:18) (63 - 74)  BP: 101/67 (15 Oct 2020 09:18) (101/67 - 155/92)  RR: 18 (15 Oct 2020 09:18) (18 - 18)  SpO2: 98% (15 Oct 2020 09:18) (96% - 98%)    PHYSICAL EXAM:  GENERAL: obese male, resting comfortably, alert and conversant, NAD  SKIN: no rashes  HEENT: NCAT  LUNGS: CTAB  CHEST: Distant heart sounds, RRR, +S1S2. tenderness to palpation over central/left chest.  ABDOMEN: obese abdomen, +BS, soft NTND  EXTREMITIES: no peripheral edema  NEURO: alert and oriented, MAEx4, no gross focal neurologic deficits  PSYCH: normal mood and affect    LABS:             15.9   10.66 )-----------( 213      ( 15 Oct 2020 06:38 )             48.0     10-15    140  |  102  |  16  ----------------------------<  83  3.6   |  28  |  0.76    Ca    9.4      15 Oct 2020 06:38  Phos  3.5     10-14  Mg     2.2     10-14    TPro  7.0  /  Alb  3.9  /  TBili  0.7  /  DBili  x   /  AST  17  /  ALT  26  /  AlkPhos  79  10-14    PT/INR - ( 14 Oct 2020 04:29 )   PT: 12.9 sec;   INR: 1.08 ratio       PTT - ( 14 Oct 2020 04:29 )  PTT:32.7 sec  CARDIAC MARKERS ( 14 Oct 2020 04:29 )  x     / x     / 51 U/L / x     / x    CARDIAC MARKERS (10/14)  hsT <6 --> <6  creatine kinase 75 --> 51  Troponin I <0.015 --> <0.015    ECG: Normal sinus rhythm (10/13)  Vent rate 88,  ms, QRS 94, QTc 459

## 2020-10-15 NOTE — PROGRESS NOTE ADULT - ASSESSMENT
35M morbidly obese, hx of pseudotumor cerebri c/b b/l blindness s/p  shunt, LAZARO, p/w chest pain     Problem/Plan - 1:  ·  Problem: Chest pain, unspecified type.  Plan: ACS ruled out with negative trops/EKG  - pt unable to undergo CTA or stress test due to size  - cardio recs appreciated: planned for [facility] today  - no events on tele     Problem/Plan - 2:  ·  Problem: Palpitation.  Plan: - tele  - TSH wnl     Problem/Plan - 3:  ·  Problem: Essential hypertension.  Plan: - labetalol prn.      Problem/Plan - 4:  ·  Problem: Pseudotumor cerebri.  Plan: - s/p  shunt  - no headache  - shuntogram with no kinks  - f/u NSG as outpt.      Problem/Plan - 5:  ·  Problem: L breast cysts.  Plan: outpatient breast U/S, f/u at breast clinic

## 2020-10-16 ENCOUNTER — TRANSCRIPTION ENCOUNTER (OUTPATIENT)
Age: 35
End: 2020-10-16

## 2020-10-16 VITALS
OXYGEN SATURATION: 97 % | SYSTOLIC BLOOD PRESSURE: 144 MMHG | TEMPERATURE: 98 F | RESPIRATION RATE: 17 BRPM | DIASTOLIC BLOOD PRESSURE: 95 MMHG | HEART RATE: 104 BPM

## 2020-10-16 PROCEDURE — 99239 HOSP IP/OBS DSCHRG MGMT >30: CPT

## 2020-10-16 RX ORDER — HYDRALAZINE HCL 50 MG
1 TABLET ORAL
Qty: 120 | Refills: 0
Start: 2020-10-16 | End: 2020-11-14

## 2020-10-16 RX ORDER — LIDOCAINE 4 G/100G
1 CREAM TOPICAL
Qty: 30 | Refills: 0
Start: 2020-10-16 | End: 2020-11-14

## 2020-10-16 RX ORDER — CALCIUM CARBONATE 500(1250)
1 TABLET ORAL ONCE
Refills: 0 | Status: COMPLETED | OUTPATIENT
Start: 2020-10-16 | End: 2020-10-16

## 2020-10-16 RX ORDER — LIDOCAINE 4 G/100G
1 CREAM TOPICAL DAILY
Refills: 0 | Status: DISCONTINUED | OUTPATIENT
Start: 2020-10-16 | End: 2020-10-16

## 2020-10-16 RX ORDER — ONDANSETRON 8 MG/1
4 TABLET, FILM COATED ORAL ONCE
Refills: 0 | Status: COMPLETED | OUTPATIENT
Start: 2020-10-16 | End: 2020-10-16

## 2020-10-16 RX ORDER — ACETAMINOPHEN 500 MG
650 TABLET ORAL ONCE
Refills: 0 | Status: COMPLETED | OUTPATIENT
Start: 2020-10-16 | End: 2020-10-16

## 2020-10-16 RX ADMIN — LIDOCAINE 1 PATCH: 4 CREAM TOPICAL at 11:29

## 2020-10-16 RX ADMIN — Medication 50 MILLIGRAM(S): at 05:34

## 2020-10-16 RX ADMIN — Medication 650 MILLIGRAM(S): at 02:43

## 2020-10-16 RX ADMIN — Medication 1 TABLET(S): at 05:34

## 2020-10-16 RX ADMIN — ONDANSETRON 4 MILLIGRAM(S): 8 TABLET, FILM COATED ORAL at 12:59

## 2020-10-16 RX ADMIN — Medication 650 MILLIGRAM(S): at 03:46

## 2020-10-16 RX ADMIN — Medication 25 MICROGRAM(S): at 05:34

## 2020-10-16 NOTE — DISCHARGE NOTE NURSING/CASE MANAGEMENT/SOCIAL WORK - PATIENT PORTAL LINK FT
You can access the FollowMyHealth Patient Portal offered by [facility] by registering at the following website: [email or web address]. By joining [facility]’s FollowMyHealth portal, you will also be able to view your health information using other applications (apps) compatible with our system.

## 2020-10-16 NOTE — DISCHARGE NOTE PROVIDER - NSDCCAREPROVSEEN_GEN_ALL_CORE_FT
Trey Way  [facility] Cardiology, Consults Teaching Sv  [facility] Medicine, Advance PracticeTeKevin Palmer

## 2020-10-16 NOTE — DISCHARGE NOTE PROVIDER - NSDCMRMEDTOKEN_GEN_ALL_CORE_FT
levothyroxine 25 mcg (0.025 mg) oral tablet: 1 tab(s) orally once a day  metoprolol succinate 50 mg oral tablet, extended release: 1 tab(s) orally once a day  ocular lubricant ophthalmic solution: 1 drop(s) to each affected eye once a day, As needed, Dry Eyes   hydrALAZINE 10 mg oral tablet: 1 tab(s) orally every 6 hours, As needed, Systolic blood pressure &gt; 160  levothyroxine 25 mcg (0.025 mg) oral tablet: 1 tab(s) orally once a day  metoprolol succinate 50 mg oral tablet, extended release: 1 tab(s) orally once a day  ocular lubricant ophthalmic solution: 1 drop(s) to each affected eye once a day, As needed, Dry Eyes  Salonpas Maximum Strength 4% topical film: Apply topically to affected area once a day

## 2020-10-16 NOTE — DISCHARGE NOTE PROVIDER - PROVIDER TOKENS
PROVIDER:[TOKEN:[[id number]],FOLLOWUP:[1 week]],PROVIDER:[TOKEN:[[id number]],FOLLOWUP:[Routine]]

## 2020-10-16 NOTE — DIETITIAN INITIAL EVALUATION ADULT. - ADD RECOMMEND
1. Continue DASH/TLC therapeutic diet at this time to promote general heart health, diet is calorie controlled in-patient 2. Provide/review nutrition education as indicated 3. Will continue to monitor nutrient intake, wt, labs, f/u per protocol

## 2020-10-16 NOTE — PROGRESS NOTE ADULT - ASSESSMENT
35M morbidly obese, hx of pseudotumor cerebri c/b b/l blindness s/p  shunt, LAZARO, p/w chest pain     Problem/Plan - 1:  ·  Problem: Chest pain, unspecified type.  Plan: ACS ruled out with negative trops/EKG  - pt unable to undergo CTA or stress test due to size  - cardio recs appreciated: s/p LHC with clean coronaries  - V/Q scan with no perfusion defects  - no events on tele  - tylenol PRN     Problem/Plan - 2:  ·  Problem: Palpitation.  Plan: - tele  - TSH wnl     Problem/Plan - 3:  ·  Problem: Essential hypertension.  Plan: - labetalol prn.      Problem/Plan - 4:  ·  Problem: Pseudotumor cerebri.  Plan: - s/p  shunt  - no headache  - shuntogram with no kinks  - f/u NSG as outpt.      Problem/Plan - 5:  ·  Problem: L breast cysts.  Plan: outpatient breast U/S, f/u at breast clinic     Problem/Plan - 6:  ·  Problem: Morbid obesity.  Plan: low carb diet; might benefit from outpatient bariatric surgery eval    DVT ppx: SCDs

## 2020-10-16 NOTE — DIETITIAN INITIAL EVALUATION ADULT. - OTHER INFO
No h/o DM to note, pt noted with decreased in HgbA1c from 6.1% in 7/2019 -> 5.5% on current admission.  Nutrition Supplements PTA: multivitamin, vitamin B12, vitamin D3  NKFA reported.    Weight history: 500 lbs (7/30/19) -> 423 lbs (10/15/19) intentional wt loss noted per RD note from 10/2019. Pt current dosing wt 419 lbs indicative of additional 4 lb wt loss x 1 year.    Pt reports good appetite and po intake on current admission.  Pt denies chewing/swallowing difficulties.  Pt has no c/o nausea, vomiting, diarrhea, or constipation.     Nutrition education deferred given pt c/o heart racing, however pt does accept written materials at this time.

## 2020-10-16 NOTE — DIETITIAN INITIAL EVALUATION ADULT. - ORAL INTAKE PTA/DIET HISTORY
Pt endorses trying to lose weight PTA. He was following up with an RD as outpatient and undergoing evaluation for gastric bypass surgery. Pt does not have date for procedure yet, states COVID-19 pandemic delayed process.  Pt reports eating 3 meals daily PTA, general, healthful meal pattern with no snacks. His mother prepares his meals at home. Extensive diet recall deferred as pt c/o "my heart is racing a little."- RN aware.

## 2020-10-16 NOTE — DIETITIAN INITIAL EVALUATION ADULT. - REASON FOR ADMISSION
Per chart, pt is "35M morbidly obese, hx of pseudotumor cerebri c/b b/l blindness s/p  shunt, LAZARO, p/w chest pain." Pt s/p LHC yesterday.

## 2020-10-16 NOTE — DISCHARGE NOTE PROVIDER - HOSPITAL COURSE
35M morbidly obese, hx of pseudotumor cerebri c/b b/l blindness s/p  shunt, LAZARO, p/w chest pain     Problem/Plan - 1:  ·  Problem: Chest pain, unspecified type.  Plan: ACS ruled out with negative trops/EKG  - pt unable to undergo CTA or stress test due to size  - cardio recs appreciated: planned for [facility] today  - no events on tele     Problem/Plan - 2:  ·  Problem: Palpitation.  Plan: - tele  - TSH wnl     Problem/Plan - 3:  ·  Problem: Essential hypertension.  Plan: - labetalol prn.      Problem/Plan - 4:  ·  Problem: Pseudotumor cerebri.  Plan: - s/p  shunt  - no headache  - shuntogram with no kinks  - f/u NSG as outpt.      Problem/Plan - 5:  ·  Problem: L breast cysts.  Plan: outpatient breast U/S, f/u at breast clinic       35M morbidly obese, hx of pseudotumor cerebri c/b b/l blindness s/p  shunt, LAZARO, p/w chest pain     Problem/Plan - 1: Chest pain, unspecified type.   - ACS ruled out with negative trops/EKG  - pt unable to undergo CTA or stress test due to size  - cardio recs appreciated: planned for [facility] today  - no events on tele     Problem/Plan - 2:  Palpitation.      - TSH wnl     Problem/Plan - 3:  Essential hypertension.    - continue Toprol XL 50 mg po daily   - Continue Hydralazine 10 mg po q 6 hr prn systolic BP > 160      Problem/Plan - 4:  Pseudotumor cerebri.     - s/p  shunt  - no headache  - shuntogram with no kinks  - f/u NSG as outpt.      Problem/Plan - 5:  L breast cysts.    -  outpatient breast U/S, f/u at breast clinic     Problem/Plan - 6: Morbid obesity.    - low carb diet; might benefit from outpatient bariatric surgery eval

## 2020-10-16 NOTE — DIETITIAN INITIAL EVALUATION ADULT. - LITERATURE/VIDEOS GIVEN
Weight Loss Tips, Weight Management Cooking Tips, Tips for increased fruit and vegetable intake from the Nutrition Care Manual

## 2020-10-16 NOTE — PROGRESS NOTE ADULT - SUBJECTIVE AND OBJECTIVE BOX
[facility] Division of Hospital Medicine  Rosemary Arora MD  Pager (M-F, 8A-5P): [phone]  Other Times:  [phone]    Patient is a 35y old  Male who presents with a chief complaint of CP (15 Oct 2020 10:47)      SUBJECTIVE / OVERNIGHT EVENTS: No acute events.   ADDITIONAL REVIEW OF SYSTEMS: Negative    MEDICATIONS  (STANDING):  levothyroxine 25 MICROGram(s) Oral daily  metoprolol succinate ER 50 milliGRAM(s) Oral daily    MEDICATIONS  (PRN):  artificial tears (preservative free) Ophthalmic Solution 1 Drop(s) Both EYES four times a day PRN Dry Eyes  hydrALAZINE 10 milliGRAM(s) Oral every 6 hours PRN Systolic blood pressure > 160    PHYSICAL EXAM:  T(C): 36.5 (10-16-20 @ 04:18), Max: 37 (10-15-20 @ 20:29)  T(F): 97.7 (10-16-20 @ 04:18), Max: 98.6 (10-15-20 @ 20:29)  HR: 79 (10-16-20 @ 04:18) (79 - 100)  BP: 123/81 (10-16-20 @ 04:18) (123/71 - 159/84)  RR: 17 (10-16-20 @ 04:18) (17 - 24)  SpO2: 96% (10-16-20 @ 04:18) (94% - 98%)  Wt(kg): --    CONSTITUTIONAL: NAD, morbidly obese  EYES: PERRLA; conjunctiva and sclera clear  ENMT: Moist oral mucosa, no pharyngeal injection or exudates; normal dentition  NECK: Supple, no palpable masses; no thyromegaly  RESPIRATORY: Normal respiratory effort; lungs are clear to auscultation bilaterally  CARDIOVASCULAR: Regular rate and rhythm, normal S1 and S2, no murmur/rub/gallop; No lower extremity edema; Peripheral pulses are 2+ bilaterally  ABDOMEN: Nontender to palpation, normoactive bowel sounds, no rebound/guarding; No hepatosplenomegaly  MUSCULOSKELETAL: no joint swelling or tenderness to palpation  PSYCH: A+O to person, place, and time; affect appropriate  NEUROLOGY: CN 2-12 are intact and symmetric; no gross sensory deficits   SKIN: No rashes; no palpable lesions    LABS/RADIOLOGY RESULTS:                          15.9   10.66 )-----------( 213      ( 15 Oct 2020 06:38 )             48.0   10-15    140  |  102  |  16  ----------------------------<  83  3.6   |  28  |  0.76    Ca    9.4      15 Oct 2020 06:38    Blood Cultures

## 2020-10-16 NOTE — DIETITIAN INITIAL EVALUATION ADULT. - PERTINENT MEDS FT
MEDICATIONS  (STANDING):  levothyroxine 25 MICROGram(s) Oral daily  lidocaine   Patch 1 Patch Transdermal daily  metoprolol succinate ER 50 milliGRAM(s) Oral daily    MEDICATIONS  (PRN):  artificial tears (preservative free) Ophthalmic Solution 1 Drop(s) Both EYES four times a day PRN Dry Eyes  hydrALAZINE 10 milliGRAM(s) Oral every 6 hours PRN Systolic blood pressure > 160

## 2020-10-16 NOTE — PROGRESS NOTE ADULT - ATTENDING COMMENTS
Stable for discharge to home today with outpatient followup
Agree with plan as outlined above.  Ok to go  Weight reduction necessary

## 2020-10-16 NOTE — PROGRESS NOTE ADULT - NUTRITIONAL ASSESSMENT
This patient has been assessed with a concern for Malnutrition and has been determined to have a diagnosis/diagnoses of Morbid obesity/BMI > 40.    This patient is being managed with:   Diet DASH/TLC-  Sodium & Cholesterol Restricted  Entered: Oct 16 2020  6:34AM

## 2020-10-16 NOTE — DISCHARGE NOTE PROVIDER - NSDCCPCAREPLAN_GEN_ALL_CORE_FT
PRINCIPAL DISCHARGE DIAGNOSIS  Diagnosis: Chest pain  Assessment and Plan of Treatment: ACS ruled out due to negative Troponins nad EKG   Outpatient Cardiac CT scheduled for 10/26 at 11am   you may call Becky from [facility]  [phone]  Follow up with cardiology outpatient      SECONDARY DISCHARGE DIAGNOSES  Diagnosis: Pseudotumor cerebri  Assessment and Plan of Treatment: s/p shuntogram - negative for kinks   Follow up with Neurosurgery outpatient    Diagnosis: Palpitation  Assessment and Plan of Treatment: Thyroid function studies within normal limits   no events on Telemetry    Diagnosis: Essential hypertension  Assessment and Plan of Treatment: Follow up with your medical doctor to establish long term blood pressure treatment goals.

## 2020-10-16 NOTE — DISCHARGE NOTE PROVIDER - CARE PROVIDER_API CALL
Alec Montenegro  CARDIOLOGY  [address] [address] [place] [address], [place] [address]  Phone: [phone]  Fax: [phone]  Follow Up Time: 1 week    Chris Rios  GASTROENTEROLOGY  [address] [place] [address]  Phone: [phone]  Fax: [phone]  Follow Up Time: Routine

## 2020-10-16 NOTE — DISCHARGE NOTE PROVIDER - NSDCFUSCHEDAPPT_GEN_ALL_CORE_FT
CHIQUIS BARAKAT R ; 10/22/2020 ; [facility]  CHIQUIS BARAKAT R ; 10/26/2020 ; [facility] [address] CHIQUIS Bridges ; [facility]/2020 ; [facility]  CHIQUIS BARAKAT R ; 10/28/2020 ; [facility] [place] [address] [facility]

## 2020-10-16 NOTE — DIETITIAN INITIAL EVALUATION ADULT. - PROBLEM SELECTOR PLAN 1
- will give ASA load  - tele  - will order CT coronary. per ED pt may be too large for CT, so pt can get 2d stress test otherwise.  - trend CE

## 2020-10-21 ENCOUNTER — APPOINTMENT (OUTPATIENT)
Dept: INTERNAL MEDICINE | Facility: CLINIC | Age: 35
End: 2020-10-21
Payer: MEDICAID

## 2020-10-21 VITALS
TEMPERATURE: 98.1 F | HEART RATE: 97 BPM | SYSTOLIC BLOOD PRESSURE: 125 MMHG | OXYGEN SATURATION: 97 % | BODY MASS INDEX: 50.62 KG/M2 | WEIGHT: 315 LBS | HEIGHT: 66 IN | DIASTOLIC BLOOD PRESSURE: 73 MMHG

## 2020-10-21 DIAGNOSIS — N63.0 UNSPECIFIED LUMP IN UNSPECIFIED BREAST: ICD-10-CM

## 2020-10-21 DIAGNOSIS — Z86.39 PERSONAL HISTORY OF OTHER ENDOCRINE, NUTRITIONAL AND METABOLIC DISEASE: ICD-10-CM

## 2020-10-21 LAB
ANION GAP SERPL CALC-SCNC: 13 MMOL/L
BUN SERPL-MCNC: 12 MG/DL
CALCIUM SERPL-MCNC: 9.9 MG/DL
CHLORIDE SERPL-SCNC: 100 MMOL/L
CO2 SERPL-SCNC: 26 MMOL/L
CREAT SERPL-MCNC: 0.74 MG/DL
GLUCOSE SERPL-MCNC: 92 MG/DL
POTASSIUM SERPL-SCNC: 4.1 MMOL/L
SODIUM SERPL-SCNC: 139 MMOL/L

## 2020-10-21 PROCEDURE — 99496 TRANSJ CARE MGMT HIGH F2F 7D: CPT

## 2020-10-21 NOTE — HISTORY OF PRESENT ILLNESS
[Heartburn] : denies heartburn [Nausea] : denies nausea [Vomiting] : denies vomiting [Diarrhea] : denies diarrhea [Constipation] : denies constipation [Yellow Skin Or Eyes (Jaundice)] : denies jaundice [Abdominal Pain] : denies abdominal pain [Abdominal Swelling] : denies abdominal swelling [Rectal Pain] : denies rectal pain [GERD] : no gastroesophageal reflux disease [de-identified] : Pt is a 35 yr old man who is morbidly obese, and has pseudotumor cerebri and blindness spt  shunt who was admitted to  hospital for chest pain.  Pt states he developed chest pain after he was working out and felt a tear  and continued to work out and flet more pain and it became unbearable and radiated to his arm left and right.  Three months later the pain worsened and felt as if he  was getting a heart attack.  He was admitted and troponin was negative  He had egd and is scheduling his  bariatric surgery .   he doesn’t have nausea or vomiting  or diarrhea . he should fu with his pcp.  he has not had us of abdomen.  He is to have ct heart and coronaries

## 2020-10-21 NOTE — ASSESSMENT
[FreeTextEntry1] :  recent hospitalization He should fu with pcp and also dR Montenegro  for chest pain \par 2.  dyspepsia - discussed Rule of 2's; pt should avoid eating too much; too fast; too spicy; too lousy; less than two hours before bed \par -Things to avoid including overeating, spicy foods, tight clothing, eating within three hours of bed, this list is not all inclusive. \par -For treatment of reflux, possible options discussed including diet control, H2 blockers, PPIs, as well as coating motility agents discussed as treatment options. Timing of meals and proximity of last meal to sleep were discussed. If symptoms persist, a formal gastrointestinal evaluation is needed. \par \par 3 hiatal hernia - A hiatal hernia occurs when the upper part of your stomach bulges through the large muscle  your abdomen and chest (diaphragm).\par \par Your diaphragm has a small opening (hiatus) through which your food tube (esophagus) passes before connecting to your stomach. In a hiatal hernia, the stomach pushes up through that opening and into your chest.\par \par \par A small hiatal hernia usually doesn't cause problems. You may never know you have one unless your doctor discovers it when checking for another condition.\par \par But a large hiatal hernia can allow food and acid to back up into your esophagus, leading to heartburn. Self-care measures or medications can usually relieve these symptoms. A very large hiatal hernia might require surgery.\par \par \par \par \par Symptoms\par \par Most small hiatal hernias cause no signs or symptoms. But larger hiatal hernias can cause:\par •Heartburn\par •Regurgitation of food or liquids into the mouth\par •Backflow of stomach acid into the esophagus (acid reflux)\par •Difficulty swallowing\par •Chest or abdominal pain\par •Shortness of breath\par •Vomiting of blood or passing of black stools, which may indicate gastrointestinal bleeding\par \par When to see a doctor\par \par See your doctor if you have any persistent signs or symptoms that worry you.\par \par Request an Appointment at [facility]\par \par Causes\par \par A hiatal hernia occurs when weakened muscle tissue allows your stomach to bulge up through your diaphragm. It's not always clear why this happens. But a hiatal hernia might be caused by:\par •Age-related changes in your diaphragm\par •Injury to the area, for example, after trauma or certain types of surgery\par •Being born with an unusually large hiatus\par •Persistent and intense pressure on the surrounding muscles, such as while coughing, vomiting, straining during a bowel movement, exercising or lifting heavy objects\par \par Risk factors\par \par Hiatal hernia is most common in people who are:\par •Age 50 or older\par •Obese\par 4.  obesity -  risks of obesity and need for diet and eating healthy Obesity is a complex disorder involving an excessive amount of body fat. Obesity isn't just a cosmetic concern. It increases your risk of diseases and health problems, such as heart disease, diabetes and high blood pressure.\par \par Being extremely obese means you are especially likely to have health problems related to your weight.\par \par \par The good news is that even modest weight loss can improve or prevent the health problems associated with obesity. Dietary changes, increased physical activity and behavior changes can help you lose weight. Prescription medications and weight-loss surgery are additional options for treating obesity.\par \par \par \par \par Symptoms\par \par Obesity is diagnosed when your body mass index (BMI) is 30 or higher. Your body mass index is calculated by dividing your weight in kilograms (kg) by your height in meters (m) squared. \par \par \par BMI\par \par Weight status\par \par \par Below 18.5 Underweight \par 18.5-24.9 Normal \par 25.0-29.9 Overweight \par 30.0-34.9 Obese (Class I) \par 35.0-39.9 Obese (Class II) \par 40.0 and higher Extreme obesity (Class III) \par \par For most people, BMI provides a reasonable estimate of body fat. However, BMI doesn't directly measure body fat, so some people, such as muscular athletes, may have a BMI in the obese category even though they don't have excess body fat. Ask your doctor if your BMI is a problem. \par \par When to see a doctor\par \par If you think you may be obese, and especially if you're concerned about weight-related health problems, see your doctor or health care provider. You and your provider can evaluate your health risks and discuss your weight-loss options. \par \par Request an Appointment at [facility]\par \par Causes\par \par Although there are genetic, behavioral and hormonal influences on body weight, obesity occurs when you take in more calories than you burn through exercise and normal daily activities. Your body stores these excess calories as fat.\par \par Obesity can sometimes be traced to a medical cause, such as Prader-Willi syndrome, Cushing's syndrome, and other diseases and conditions. However, these disorders are rare and, in general, the principal causes of obesity are:\par •Inactivity. If you're not very active, you don't burn as many calories. With a sedentary lifestyle, you can easily take in more calories every day than you use through exercise and normal daily activities.\par •Unhealthy diet and eating habits. Weight gain is inevitable if you regularly eat more calories than you burn. And most Americans' diets are too high in calories and are full of fast food and high-calorie beverages.\par \par Risk factors\par \par Obesity usually results from a combination of causes and contributing factors, including:\par •Genetics. Your genes may affect the amount of body fat you store, and where that fat is distributed. Genetics may also play a role in how efficiently your body converts food into energy and how your body burns calories during exercise.\par •Family lifestyle. Obesity tends to run in families. If one or both of your parents are obese, your risk of being obese is increased. That's not just because of genetics. Family members tend to share similar eating and activity habits.\par •Inactivity. If you're not very active, you don't burn as many calories. With a sedentary lifestyle, you can easily take in more calories every day than you burn through exercise and routine daily activities. Having medical problems, such as arthritis, can lead to decreased activity, which contributes to weight gain.\par •Unhealthy diet. A diet that's high in calories, lacking in fruits and vegetables, full of fast food, and laden with high-calorie beverages and oversized portions contributes to weight gain.\par •Medical problems. In some people, obesity can be traced to a medical cause, such as Prader-Willi syndrome, Cushing's syndrome and other conditions. Medical problems, such as arthritis, also can lead to decreased activity, which may result in weight gain.\par •Certain medications. Some medications can lead to weight gain if you don't compensate through diet or activity. These medications include some antidepressants, anti-seizure medications, diabetes medications, antipsychotic medications, steroids and beta blockers.\par •Social and economic issues. Research has linked social and economic factors to obesity. Avoiding obesity is difficult if you don't have safe areas to exercise. Similarly, you may not have been taught healthy ways of cooking, or you may not have money to buy healthier foods. In addition, the people you spend time with may influence your weight — you're more likely to become obese if you have obese friends or relatives.\par •Age. Obesity can occur at any age, even in young children. But as you age, hormonal changes and a less active lifestyle increase your risk of obesity. In addition, the amount of muscle in your body tends to decrease with age. This lower muscle mass leads to a decrease in metabolism. These changes also reduce calorie needs, and can make it harder to keep off excess weight. If you don't consciously control what you eat and become more physically active as you age, you'll likely gain weight.\par •Pregnancy. During pregnancy, a woman's weight necessarily increases. Some women find this weight difficult to lose after the baby is born. This weight gain may contribute to the development of obesity in women.\par •Quitting smoking. Quitting smoking is often associated with weight gain. And for some, it can lead to enough weight gain that the person becomes obese. In the long run, however, quitting smoking is still a greater benefit to your health than continuing to smoke.\par •Lack of sleep. Not getting enough sleep or getting too much sleep can cause changes in hormones that increase your appetite. You may also crave foods high in calories and carbohydrates, which can contribute to weight gain.\par \par Even if you have one or more of these risk factors, it doesn't mean that you're destined to become obese. You can counteract most risk factors through diet, physical activity and exercise, and behavior changes.\par \par Complications\par \par If you're obese, you're more likely to develop a number of potentially serious health problems, including:\par •High triglycerides and low high-density lipoprotein (HDL) cholesterol\par •Type 2 diabetes\par •High blood pressure\par •Metabolic syndrome — a combination of high blood sugar, high blood pressure, high triglycerides and low HDL cholesterol\par •Heart disease\par •Stroke\par •Cancer, including cancer of the uterus, cervix, endometrium, ovaries, breast, colon, rectum, esophagus, liver, gallbladder, pancreas, kidney and prostate\par •Breathing disorders, including sleep apnea, a potentially serious sleep disorder in which breathing repeatedly stops and starts\par •Gallbladder disease\par •Gynecological problems, such as infertility and irregular periods\par •Erectile dysfunction and sexual health issues\par •Nonalcoholic fatty liver disease, a condition in which fat builds up in the liver and can cause inflammation or scarring\par •Osteoarthritis\par \par Quality of life\par \par When you're obese, your overall quality of life may be diminished. You may not be able to do things you used to do, such as participating in enjoyable activities. You may avoid public places. Obese people may even encounter discrimination.\par \par Other weight-related issues that may affect your quality of life include:\par •Depression\par •Disability\par •Sexual problems\par •Shame and guilt\par •Social isolation\par •Lower work achievement\par \par Prevention\par \par Whether you're at risk of becoming obese, currently overweight or at a healthy weight, you can take steps to prevent unhealthy weight gain and related health problems. Not surprisingly, the steps to prevent weight gain are the same as the steps to lose weight: daily exercise, a healthy diet, and a long-term commitment to watch what you eat and drink.\par •Exercise regularly. You need to get 150 to 300 minutes of moderate-intensity activity a week to prevent weight gain. Moderately intense physical activities include fast walking and swimming.\par •Follow a healthy eating plan. Focus on low-calorie, nutrient-dense foods, such as fruits, veg\par 5 breast nodule  us of breast His pain could also be due to  tear of muscle fibers although I don’t feel any gaps  or on exam any abnormalities with elevation of his arm.    \par He needs to have ct of coronaries.  to make sure he doesn’t have cad. He had pulmonary scan for pe and it was negative.    He should not exercise right now.,

## 2020-10-21 NOTE — PHYSICAL EXAM
[Well Developed] : well developed [Well Nourished] : well nourished [PERRL With Normal Accommodation] : pupils were equal in size, round, and reactive to light [Oropharynx] : the oropharynx was normal [Rate ___] : at [unfilled] breaths per minute [Normal Rhythm/Effort] : normal respiratory rhythm and effort [Clear Bilaterally] : the lungs were clear to auscultation bilaterally [Normal to Percussion] : the lungs were normal to percussion [5th Left ICS - MCL] : palpated at the 5th LICS in the midclavicular line [Normal Rate] : normal [Heart Rate ___] : [unfilled] bpm [Rhythm Regular] : regular [Normal S1] : normal S1 [Normal S2] : normal S2 [No Murmur] : no murmurs heard [No Pitting Edema] : no pitting edema present [2+] : left 2+ [No Abnormalities] : the abdominal aorta was not enlarged and no bruit was heard [Full Pulse] : the pedal pulses are present [Edema] : there was no peripheral edema [Examination Of The Breasts] : a normal appearance [No Discharge] : no discharge [Obese] : obese [Normal] : normal [Soft, Nontender] : the abdomen was soft and nontender [No Mass] : no masses were palpated [Cervical Lymph Nodes Enlarged Posterior Bilaterally] : posterior cervical [Cervical Lymph Nodes Enlarged Anterior Bilaterally] : anterior cervical [Supraclavicular Lymph Nodes Enlarged Bilaterally] : supraclavicular [Femoral Lymph Nodes Enlarged Bilaterally] : femoral [Inguinal Lymph Nodes Enlarged Bilaterally] : inguinal [No CVA Tenderness] : no ~M costovertebral angle tenderness [Abnormal Walk] : normal gait [Nail Clubbing] : no clubbing  or cyanosis of the fingernails [Musculoskeletal - Swelling] : no joint swelling seen [Motor Tone] : muscle strength and tone were normal [Skin Color & Pigmentation] : normal skin color and pigmentation [Skin Turgor] : normal skin turgor [] : no rash [Skin Lesions] : no skin lesions [Oriented To Time, Place, And Person] : oriented to person, place, and time [S3] : no S3 [S4] : no S4 [Rt] : no varicose veins of the right leg [Lt] : no varicose veins of the left leg [Right Carotid Bruit] : no bruit heard over the right carotid [Left Carotid Bruit] : no bruit heard over the left carotid [Right Femoral Bruit] : no bruit heard over the right femoral artery [Left Femoral Bruit] : no bruit heard over the left femoral artery [Bruit] : no bruit heard [Axillary Lymph Nodes Enlarged Bilaterally] : no enlarged nodes [FreeTextEntry1] : left breast a nodule palpated at 9 ock 5 cm fn   where pt has pain.  examined sitting and supine

## 2020-10-22 ENCOUNTER — APPOINTMENT (OUTPATIENT)
Dept: INTERNAL MEDICINE | Facility: CLINIC | Age: 35
End: 2020-10-22

## 2020-10-26 ENCOUNTER — OUTPATIENT (OUTPATIENT)
Dept: OUTPATIENT SERVICES | Facility: HOSPITAL | Age: 35
LOS: 1 days | End: 2020-10-26
Payer: MEDICAID

## 2020-10-26 ENCOUNTER — APPOINTMENT (OUTPATIENT)
Dept: CARDIOLOGY | Facility: CLINIC | Age: 35
End: 2020-10-26

## 2020-10-26 ENCOUNTER — APPOINTMENT (OUTPATIENT)
Dept: INTERNAL MEDICINE | Facility: CLINIC | Age: 35
End: 2020-10-26

## 2020-10-26 ENCOUNTER — TRANSCRIPTION ENCOUNTER (OUTPATIENT)
Age: 35
End: 2020-10-26

## 2020-10-26 DIAGNOSIS — E66.01 MORBID (SEVERE) OBESITY DUE TO EXCESS CALORIES: ICD-10-CM

## 2020-10-26 DIAGNOSIS — Z98.2 PRESENCE OF CEREBROSPINAL FLUID DRAINAGE DEVICE: Chronic | ICD-10-CM

## 2020-10-26 DIAGNOSIS — R07.89 OTHER CHEST PAIN: ICD-10-CM

## 2020-10-26 PROCEDURE — G0463: CPT

## 2020-10-27 NOTE — PLAN
[FreeTextEntry1] : #L Breast Pain\par -cardiac etiology ruled out in ED\par -known breast cysts. \par -has breast ultrasound scheduled, as well as cardiac followup. \par -Reiterated to patient that he should follow up with these scheduled appointments. \par -will send referral for breast clinic. \par \par

## 2020-10-27 NOTE — HISTORY OF PRESENT ILLNESS
[FreeTextEntry8] : The patient is a 35 year old male, PMH blindness, pseudotumor cerebri w  shut, presenting for acute telephone visit. The patient is endorsing left breast pain, as well as a lump on the left breast .On review of prior records, the patient was in the ED last week for the same issue. He had a cardiac workup, and no acute cardiac etiology was found. He was referred to breast clinic, and is scheduled for a breast ultrasound in a few weeks. \par The patient  is endorsing left breast pain which began three months ago, when he began doing upper body exercises, including lifting weights and using a punching bag. The pain is worse when he exerts himself, and he feels relief with rest and with icy hot pads. He does not feel relief with tylenol, and is not supposed to use motrin (he was unsure why). He was recently diagnosed with a hiatal hernia from GI, and is feeling a lot of anxiety about this. He also feels significant anxiety that this still may be cardiac in etiology, despite negative workup thus far. \par He went to have a cardiac catscan today and was told he didn't need it due to inpatient cath.

## 2020-10-27 NOTE — ASSESSMENT
[FreeTextEntry1] : Patient presenting with left breast pain after being seen in the ED for similar issue. Cardiac etiology ruled out in the ED (LHC with clean coronary arteries, unable to  obtain CTA due to body habitus) \par \par Tried to call patient back with Dr. Mendez at 2:49 pm, patient didn't answer the phone x2.

## 2020-10-27 NOTE — END OF VISIT
[] : Resident [FreeTextEntry3] : unable to speak with pt on phone ...NA\par plan as previously outlined by ED\par f/u cardiology

## 2020-10-27 NOTE — REVIEW OF SYSTEMS
[Chest Pain] : chest pain [Palpitations] : palpitations [Abdominal Pain] : abdominal pain [Heartburn] : heartburn [Joint Pain] : joint pain [Muscle Pain] : muscle pain [Negative] : Constitutional [Claudication] : no  leg claudication [Lower Ext Edema] : no lower extremity edema [Orthopena] : no orthopnea [Shortness Of Breath] : no shortness of breath [Wheezing] : no wheezing [Cough] : no cough [Dyspnea on Exertion] : not dyspnea on exertion [Nausea] : no nausea [Constipation] : no constipation [Diarrhea] : no diarrhea

## 2020-10-28 ENCOUNTER — APPOINTMENT (OUTPATIENT)
Dept: SURGERY | Facility: CLINIC | Age: 35
End: 2020-10-28
Payer: MEDICAID

## 2020-10-28 VITALS
HEART RATE: 78 BPM | WEIGHT: 315 LBS | BODY MASS INDEX: 50.62 KG/M2 | HEIGHT: 66 IN | DIASTOLIC BLOOD PRESSURE: 85 MMHG | SYSTOLIC BLOOD PRESSURE: 142 MMHG

## 2020-10-28 PROCEDURE — 99213 OFFICE O/P EST LOW 20 MIN: CPT

## 2020-10-28 PROCEDURE — 99072 ADDL SUPL MATRL&STAF TM PHE: CPT

## 2020-10-28 NOTE — HISTORY OF PRESENT ILLNESS
[de-identified] : Mr. CHIQUIS BARAKAT  is here for monthly pre-operative follow-up and weight management program in preparation for bariatric surgery. he  is making good food choices, working on eating smaller portions and becoming more mindful. Mr. BARAKAT  has made a concerted effort to eat more balanced and nutritionally sound meals, and to engage in some level of physical activity on a regular basis. he  continues to struggle with weight loss despite continuous concerted efforts since the prior visit. He lost 10 lbs since his last visit. \par \par Patient has completed the following evaluations: Cardiology, Pulmonary, Psych. GI,   Patient has also seen our bariatric program coordinator and nutritionist. he has attended our bariatric information session.   \par \par He has a follow up appointment with our nutritionist November 12th. \par \par   [de-identified] : Patient was recently admitted to [facility] with for chest pain. Cardiac work up was negative. EGD showed large  hiatal hernia.

## 2020-10-28 NOTE — REVIEW OF SYSTEMS
[Recent Change In Weight] : ~T recent weight change [Negative] : Allergic/Immunologic [SOB on Exertion] : shortness of breath during exertion [Chest Pain] : no chest pain [Palpitations] : no palpitations [Shortness Of Breath] : no shortness of breath [Cough] : no cough [Abdominal Pain] : no abdominal pain [Vomiting] : no vomiting [Constipation] : no constipation [Reflux/Heartburn] : no reflex/heartburn

## 2020-10-28 NOTE — PHYSICAL EXAM
[Obese, well nourished, in no acute distress] : obese, well nourished, in no acute distress [Normal] : grossly intact [de-identified] : His breathing is nonlabored, he is not tachypneic. [de-identified] : Obese, protuberant. Soft, nontender, nondistended, no rebound or guarding. [de-identified] : No jaundice

## 2020-10-28 NOTE — ASSESSMENT
[FreeTextEntry1] : Patient is here for monthly pre-operative follow-up in preparation for bariatric surgery. Patient is making good food choices, working on eating smaller portions and becoming more mindful. Patient has made a concerted effort to eat more balanced and nutritionally sound meals, and to engage in some level of physical activity on a regular basis. Patient continues to struggle with weight loss despite continuous concerted efforts since the prior visit.  Patient attended our weight loss seminar, and has completed all of the required pre-operative testing and evaluations, and is ready to proceed with a sleeve gastrectomy.

## 2020-10-28 NOTE — PLAN
[FreeTextEntry1] : Patient will obtain any outstanding evaluations in short order. Once again the pre-op requirements/evaluations and any available results were reviewed. \par \par We will collect and review any available/additional results and records of the multi-disciplinary evaluation.  I encouraged patient to bring copies of all completed testing/evaluations to the next office visit with me. \par \par I encouraged the patient to continue a diet and exercise program to promote optimum health for the surgical procedure and post-op course.\par \par Patient  voiced understanding of these behavioral recommendations and agrees to practice them with the understanding that success with these behaviors will be assessed at future visits. \par \par We will review all available pre-operative evaluations and documentation, and submit to insurance provider for pre-authorization.  \par \par We will plan to proceed with surgery,  pending any outstanding medical clearances and insurance authorization. \par \par Informed consent for weight loss surgery procedure was given to patient, and will be reviewed and the pre-operative visit.  \par \par Patient’s questions and concerns addressed to patient's satisfaction and patient verbalized an understanding of the information discussed.\par

## 2020-10-29 PROCEDURE — C1894: CPT

## 2020-10-29 PROCEDURE — 80048 BASIC METABOLIC PNL TOTAL CA: CPT

## 2020-10-29 PROCEDURE — 71046 X-RAY EXAM CHEST 2 VIEWS: CPT

## 2020-10-29 PROCEDURE — 70250 X-RAY EXAM OF SKULL: CPT

## 2020-10-29 PROCEDURE — A9540: CPT

## 2020-10-29 PROCEDURE — 84100 ASSAY OF PHOSPHORUS: CPT

## 2020-10-29 PROCEDURE — 85379 FIBRIN DEGRADATION QUANT: CPT

## 2020-10-29 PROCEDURE — 84443 ASSAY THYROID STIM HORMONE: CPT

## 2020-10-29 PROCEDURE — 71045 X-RAY EXAM CHEST 1 VIEW: CPT

## 2020-10-29 PROCEDURE — 93005 ELECTROCARDIOGRAM TRACING: CPT

## 2020-10-29 PROCEDURE — 82553 CREATINE MB FRACTION: CPT

## 2020-10-29 PROCEDURE — 80061 LIPID PANEL: CPT

## 2020-10-29 PROCEDURE — 85025 COMPLETE CBC W/AUTO DIFF WBC: CPT

## 2020-10-29 PROCEDURE — 84484 ASSAY OF TROPONIN QUANT: CPT

## 2020-10-29 PROCEDURE — 86769 SARS-COV-2 COVID-19 ANTIBODY: CPT

## 2020-10-29 PROCEDURE — 82550 ASSAY OF CK (CPK): CPT

## 2020-10-29 PROCEDURE — 99285 EMERGENCY DEPT VISIT HI MDM: CPT

## 2020-10-29 PROCEDURE — 83690 ASSAY OF LIPASE: CPT

## 2020-10-29 PROCEDURE — C1887: CPT

## 2020-10-29 PROCEDURE — 87635 SARS-COV-2 COVID-19 AMP PRB: CPT

## 2020-10-29 PROCEDURE — 85730 THROMBOPLASTIN TIME PARTIAL: CPT

## 2020-10-29 PROCEDURE — 83036 HEMOGLOBIN GLYCOSYLATED A1C: CPT

## 2020-10-29 PROCEDURE — 85027 COMPLETE CBC AUTOMATED: CPT

## 2020-10-29 PROCEDURE — C1769: CPT

## 2020-10-29 PROCEDURE — 83735 ASSAY OF MAGNESIUM: CPT

## 2020-10-29 PROCEDURE — 80053 COMPREHEN METABOLIC PANEL: CPT

## 2020-10-29 PROCEDURE — 78580 LUNG PERFUSION IMAGING: CPT

## 2020-10-29 PROCEDURE — 85610 PROTHROMBIN TIME: CPT

## 2020-10-29 PROCEDURE — 93458 L HRT ARTERY/VENTRICLE ANGIO: CPT

## 2020-10-29 PROCEDURE — 99152 MOD SED SAME PHYS/QHP 5/>YRS: CPT

## 2020-10-29 PROCEDURE — 74018 RADEX ABDOMEN 1 VIEW: CPT

## 2020-11-04 ENCOUNTER — APPOINTMENT (OUTPATIENT)
Dept: INTERNAL MEDICINE | Facility: CLINIC | Age: 35
End: 2020-11-04
Payer: MEDICAID

## 2020-11-04 PROCEDURE — 99442: CPT

## 2020-11-04 RX ORDER — PANTOPRAZOLE 40 MG/1
40 TABLET, DELAYED RELEASE ORAL
Qty: 1 | Refills: 2 | Status: COMPLETED | COMMUNITY
Start: 2020-10-21 | End: 2020-11-04

## 2020-11-05 LAB
25(OH)D3 SERPL-MCNC: 23.8 NG/ML
ALBUMIN SERPL ELPH-MCNC: 4.2 G/DL
ALP BLD-CCNC: 96 U/L
ALT SERPL-CCNC: 28 U/L
ANION GAP SERPL CALC-SCNC: 15 MMOL/L
APPEARANCE: CLEAR
AST SERPL-CCNC: 15 U/L
BASOPHILS # BLD AUTO: 0.05 K/UL
BASOPHILS NFR BLD AUTO: 0.5 %
BILIRUB SERPL-MCNC: 0.6 MG/DL
BILIRUBIN URINE: NEGATIVE
BLOOD URINE: NEGATIVE
BUN SERPL-MCNC: 13 MG/DL
CALCIUM SERPL-MCNC: 9.3 MG/DL
CHLORIDE SERPL-SCNC: 100 MMOL/L
CO2 SERPL-SCNC: 25 MMOL/L
COLOR: NORMAL
CREAT SERPL-MCNC: 0.74 MG/DL
EOSINOPHIL # BLD AUTO: 0.47 K/UL
EOSINOPHIL NFR BLD AUTO: 4.3 %
GLUCOSE QUALITATIVE U: NEGATIVE
GLUCOSE SERPL-MCNC: 80 MG/DL
HCT VFR BLD CALC: 47.8 %
HGB BLD-MCNC: 15 G/DL
IGA SER QL IEP: 650 MG/DL
IMM GRANULOCYTES NFR BLD AUTO: 0.4 %
KETONES URINE: NEGATIVE
LEUKOCYTE ESTERASE URINE: NEGATIVE
LYMPHOCYTES # BLD AUTO: 3.17 K/UL
LYMPHOCYTES NFR BLD AUTO: 28.7 %
MAN DIFF?: NORMAL
MCHC RBC-ENTMCNC: 27.9 PG
MCHC RBC-ENTMCNC: 31.4 GM/DL
MCV RBC AUTO: 88.8 FL
MONOCYTES # BLD AUTO: 0.71 K/UL
MONOCYTES NFR BLD AUTO: 6.4 %
NEUTROPHILS # BLD AUTO: 6.61 K/UL
NEUTROPHILS NFR BLD AUTO: 59.7 %
NITRITE URINE: NEGATIVE
PH URINE: 6
PLATELET # BLD AUTO: 267 K/UL
POTASSIUM SERPL-SCNC: 4.1 MMOL/L
PROT SERPL-MCNC: 7.1 G/DL
PROTEIN URINE: NEGATIVE
RBC # BLD: 5.38 M/UL
RBC # FLD: 14.1 %
SODIUM SERPL-SCNC: 140 MMOL/L
SPECIFIC GRAVITY URINE: 1.02
TSH SERPL-ACNC: 2.06 UIU/ML
TTG IGA SER IA-ACNC: 5 U/ML
TTG IGA SER-ACNC: ABNORMAL
TTG IGG SER IA-ACNC: 8.4 U/ML
TTG IGG SER IA-ACNC: ABNORMAL
UROBILINOGEN URINE: NORMAL
WBC # FLD AUTO: 11.05 K/UL

## 2020-11-11 ENCOUNTER — APPOINTMENT (OUTPATIENT)
Dept: ULTRASOUND IMAGING | Facility: HOSPITAL | Age: 35
End: 2020-11-11
Payer: MEDICAID

## 2020-11-11 ENCOUNTER — OUTPATIENT (OUTPATIENT)
Dept: OUTPATIENT SERVICES | Facility: HOSPITAL | Age: 35
LOS: 1 days | End: 2020-11-11
Payer: MEDICAID

## 2020-11-11 DIAGNOSIS — N63.0 UNSPECIFIED LUMP IN UNSPECIFIED BREAST: ICD-10-CM

## 2020-11-11 DIAGNOSIS — Z98.2 PRESENCE OF CEREBROSPINAL FLUID DRAINAGE DEVICE: Chronic | ICD-10-CM

## 2020-11-11 DIAGNOSIS — E66.01 MORBID (SEVERE) OBESITY DUE TO EXCESS CALORIES: ICD-10-CM

## 2020-11-11 PROCEDURE — 76700 US EXAM ABDOM COMPLETE: CPT

## 2020-11-11 PROCEDURE — 76700 US EXAM ABDOM COMPLETE: CPT | Mod: 26

## 2020-11-12 ENCOUNTER — APPOINTMENT (OUTPATIENT)
Dept: BARIATRICS | Facility: CLINIC | Age: 35
End: 2020-11-12

## 2020-11-12 VITALS — WEIGHT: 315 LBS | BODY MASS INDEX: 66.92 KG/M2

## 2020-11-16 ENCOUNTER — APPOINTMENT (OUTPATIENT)
Dept: CARDIOLOGY | Facility: CLINIC | Age: 35
End: 2020-11-16
Payer: MEDICAID

## 2020-11-16 VITALS
RESPIRATION RATE: 19 BRPM | DIASTOLIC BLOOD PRESSURE: 85 MMHG | TEMPERATURE: 98.5 F | HEIGHT: 66 IN | SYSTOLIC BLOOD PRESSURE: 133 MMHG | BODY MASS INDEX: 50.62 KG/M2 | WEIGHT: 315 LBS | HEART RATE: 77 BPM | OXYGEN SATURATION: 98 %

## 2020-11-16 PROCEDURE — 99214 OFFICE O/P EST MOD 30 MIN: CPT

## 2020-11-18 ENCOUNTER — APPOINTMENT (OUTPATIENT)
Dept: SURGERY | Facility: CLINIC | Age: 35
End: 2020-11-18
Payer: MEDICAID

## 2020-11-18 VITALS
BODY MASS INDEX: 50.62 KG/M2 | SYSTOLIC BLOOD PRESSURE: 134 MMHG | DIASTOLIC BLOOD PRESSURE: 81 MMHG | HEIGHT: 66 IN | HEART RATE: 76 BPM | WEIGHT: 315 LBS

## 2020-11-18 PROCEDURE — 99213 OFFICE O/P EST LOW 20 MIN: CPT

## 2020-11-18 NOTE — PHYSICAL EXAM
[Obese, well nourished, in no acute distress] : obese, well nourished, in no acute distress [Normal] : affect appropriate [de-identified] : His breathing is nonlabored, he is not tachypneic. [de-identified] : Obese, protuberant. Soft, nontender, nondistended, no rebound or guarding. [de-identified] : No jaundice

## 2020-11-18 NOTE — PLAN
[FreeTextEntry1] : I have had a lengthy and detailed conversation with the patient and have discussed my impressions and treatment plans with the patient. \par Informed consent and potential risks , benefits  and alternatives to the planned surgery were discussed in depth, including but not limited to:  bleeding, infection,  Leak; esophageal, gastric, hepatic or intestinal injury; reflux, inability to lose desired amount of weight or regain of weight, need for other procedure or re-operation, port site or incisional hernia, sepsis, death, among others. All surgical options were discussed including non-surgical treatments. The patient remains interested in a sleeve gastrectomy for weight loss. \par The weight loss surgery education packet as well as the nutrition education packet have been reviewed and given to the patient previously, and I also provided patient with detailed information regarding the post-operative instructions and expectations.  \par Also discussed was importance of taking supplements and attending regular follow-up. I reviewed importance of behavioral modification and follow-up in order to optimize outcomes and avoid complications. \par The patient is aware of the expected length of stay and discharge plan for the planned procedure. I encouraged the patient to maintain a diet and exercise program to promote optimum health for the surgical procedure and post-op course. \par I informed patient that all testing and evaluations and documentation have been received and reviewed.  This information to justify medical necessity for weight loss surgery will be submitted to insurance for pre-certification. \par \par We will plan to proceed with surgery at a mutually agreed-upon date, pending any required insurance pre-certification or pre-approval.  \par Patient agrees to obtain any outstanding necessary pre-operative evaluations and clearances that may be required for pre-surgical optimization.  Patient will also start the pre-operative diet at least two weeks prior to the scheduled surgery, and understands that failure to do so may result in an inability to perform the surgery as scheduled. \par \par The patient had the opportunity to ask pertinent questions, and all of patient’s questions and concerns were addressed to patient’s satisfaction.  Patient verbalized an understanding of the information discussed, and wishes to proceed with surgery. Informed consent for sleeve gastrectomy reviewed with and signed by patient.\par \par

## 2020-11-18 NOTE — ASSESSMENT
[FreeTextEntry1] : Patient with BMI of 67 which places patient in the morbidly obese category. This has directly contributed to the patient's current medical conditions as well as, a decreased quality of life. Patient has very little chance of successfully losing and maintaining a significant amount of weight with non-surgical management, and would benefit from surgical intervention. Patient meets the criteria for weight loss surgery as defined in the NIH consensus statement, surgery is medically necessary. \par Given patient’s current BMI and obesity-related comorbidities, I feel the patient remains a candidate for and would benefit from weight loss surgery, as all prior attempts by non-surgical means have been futile, and given multiple obesity-related comorbidities.  Patient has put a great deal of thought and consideration into weight loss surgery, and remains  committed to proceeding with a sleeve gastrectomy\par \par \par

## 2020-11-18 NOTE — REVIEW OF SYSTEMS
[Recent Change In Weight] : ~T recent weight change [SOB on Exertion] : shortness of breath during exertion [Negative] : Allergic/Immunologic [Chest Pain] : no chest pain [Palpitations] : no palpitations [Shortness Of Breath] : no shortness of breath [Cough] : no cough [Abdominal Pain] : no abdominal pain [Vomiting] : no vomiting [Constipation] : no constipation [Reflux/Heartburn] : no reflex/heartburn

## 2020-11-18 NOTE — HISTORY OF PRESENT ILLNESS
[de-identified] : Patient is here for review and discussion of informed consent in preparation for planned sleeve gastrectomy. \par Patient has attended our weight loss seminar, and has completed all of the required pre-operative testing and evaluations, and is ready to proceed with a sleeve gastrectomy. \par \par Patient is aware of the pre-op liquid diet for 2 weeks and has attending the pre-op class and has completed the pre-testing and follow with a visit to PCP for medical clearance. \par \par Patient has a long-standing history of severe obesity refractory to multiple prior attempts at weight loss including conservative treatments of diet and exercise. In addition, patient suffers from obesity-related co-morbidities.  \par \par Patient reports no interval changes to overall health status or medical history, and has no complaints at this time. \par \par

## 2020-11-20 ENCOUNTER — NON-APPOINTMENT (OUTPATIENT)
Age: 35
End: 2020-11-20

## 2020-12-02 ENCOUNTER — APPOINTMENT (OUTPATIENT)
Dept: INTERNAL MEDICINE | Facility: CLINIC | Age: 35
End: 2020-12-02
Payer: MEDICAID

## 2020-12-02 ENCOUNTER — RX RENEWAL (OUTPATIENT)
Age: 35
End: 2020-12-02

## 2020-12-02 VITALS — BODY MASS INDEX: 66.34 KG/M2 | WEIGHT: 315 LBS

## 2020-12-02 VITALS
WEIGHT: 315 LBS | TEMPERATURE: 97.6 F | SYSTOLIC BLOOD PRESSURE: 92 MMHG | HEART RATE: 77 BPM | BODY MASS INDEX: 50.62 KG/M2 | HEIGHT: 66 IN | OXYGEN SATURATION: 97 % | DIASTOLIC BLOOD PRESSURE: 53 MMHG

## 2020-12-02 DIAGNOSIS — K44.9 DIAPHRAGMATIC HERNIA W/OUT OBSTRUCTION OR GANGRENE: ICD-10-CM

## 2020-12-02 DIAGNOSIS — N39.0 URINARY TRACT INFECTION, SITE NOT SPECIFIED: ICD-10-CM

## 2020-12-02 PROCEDURE — 99202 OFFICE O/P NEW SF 15 MIN: CPT

## 2020-12-02 PROCEDURE — 99072 ADDL SUPL MATRL&STAF TM PHE: CPT

## 2020-12-02 NOTE — ASSESSMENT
[FreeTextEntry1] : 1 dyspepsia discussed Rule of 2's; pt should avoid eating too much; too fast; too spicy; too lousy; less than two hours before bed \par -Things to avoid including overeating, spicy foods, tight clothing, eating within three hours of bed, this list is not all inclusive. \par -For treatment of reflux, possible options discussed including diet control, H2 blockers, PPIs, as well as coating motility agents discussed as treatment options. Timing of meals and proximity of last meal to sleep were discussed. If symptoms persist, a formal gastrointestinal evaluation is needed. \par \par he didn’t feel well on PPI and will try h2 blocker instead and discussed rasing his bed  to 45 degree angle.  \par 2 celiac ab positive  I will test him for  hla today  if negative he doesn’t have celiac disease . I will do allergy testing.  he will for now stop eating gluten containing foods.  \par 3. gallstones -   cholelithiasis low fat diet We discussed what this is and  symptoms of  cholecystitis .  watching  fat intake and is eating healthy.  If a gallstone lodges in a duct and causes a blockage, the resulting signs and symptoms may include: Sudden and rapidly intensifying pain in the upper right portion of your abdomen. Sudden and rapidly intensifying pain in the center of your abdomen, just below your breastbone. Back pain between your shoulder blades.Gallstones may form if bile contains too much cholesterol, too much bilirubin, or not enough bile salts. Researchers do not fully understand why these changes in bile occur. Gallstones also may form if the gallbladder does not empty completely or often enough.If your gallstones aren't causing symptoms, there's no need for you to have surgery. You'll only need it if a stone goes into, or blocks, one of your bile ducts Foods to avoid if you have been diagnosed with gallstones include fatty foods such as:\par \par •Fried foods (fried chicken, French fries, potato chips)\par •High fat dairy products (milk, butter, cheese, ice cream)\par •Fatty meats (beef, pork)\par •Processed meats (chambers, ham, sausage)\par •Alcohol.\par Several studies3,4,5 show that habitual coffee consumption is associated with a reduced risk of developing gallbladder disease. ... An increase in plasma cholecystokinin* levels, resulting in gallbladder contraction, has been reported after drinking both caffeinated and decaffeinated coffee Apple cider vinegar with apple juice. Some people believe that apple juice softens gallstones, allowing them to be excreted from the body with ease. ... Although there is limited evidence to suggest that apple cider vinegar does have some health benefits, no studies support its use as a treatment for gallstones\par 4. Fatty Liver: The patient denies any jaundice or pruritus. The patient denies any alcohol use. The patient denies taking large doses of nonsteroidal anti-inflammatory drugs or acetaminophen. The findings are suggestive of fatty liver. The patient and I had a long discussion regarding the risks of fatty liver progressing to cirrhosis. The patient was told of the possible increased risk of developing liver failure, cirrhosis, ascites, GI bleeding secondary to varices, hepatic encephalopathy, bleeding tendencies and liver cancer. The patient was told of the importance of follow-up. The patient was advised to follow up every 6 months for blood work and imaging studies. The patient agreed and will follow up. The patient was advised to lose weight. I recommend a trial of vitamin E supplementation for the fatty liver. If the liver enzymes remain elevated, the patient may require a trial of Pioglitazone for the fatty liver. I recommend avoid alcohol and hepato-toxic agents. The patient was also advised to avoid NSAIDs, Acetaminophen and any other hepatotoxic drugs. The patient was also advised not to share needles, razors, scissors, nail clippers, etc.. The patient is to continue close follow-up in our office for blood work and exams. If the liver enzymes remain elevated, the patient may require a CT guided liver biopsy to assess the liver parenchyma and for possible treatment. We had a long discussion regarding the risks and benefits of the procedure. The patient was told of the risks of bleeding, perforation, infections, emergency surgery and missing lesions. The patient agreed and will follow-up to reassess the symptoms.\par \par he will have fibroscan although I am unsure if it will be able to be accurately done due to his abdominal girth .  \par 5.  tinea corpis  on left side of his chest  will start anti fungal \par 6 hiatal hernia - A hiatal hernia occurs when the upper part of your stomach bulges through the large muscle  your abdomen and chest (diaphragm).\par \par Your diaphragm has a small opening (hiatus) through which your food tube (esophagus) passes before connecting to your stomach. In a hiatal hernia, the stomach pushes up through that opening and into your chest.\par \par \par A small hiatal hernia usually doesn't cause problems. You may never know you have one unless your doctor discovers it when checking for another condition.\par \par But a large hiatal hernia can allow food and acid to back up into your esophagus, leading to heartburn. Self-care measures or medications can usually relieve these symptoms. A very large hiatal hernia might require surgery.\par \par \par \par \par Symptoms\par \par Most small hiatal hernias cause no signs or symptoms. But larger hiatal hernias can cause:\par •Heartburn\par •Regurgitation of food or liquids into the mouth\par •Backflow of stomach acid into the esophagus (acid reflux)\par •Difficulty swallowing\par •Chest or abdominal pain\par •Shortness of breath\par •Vomiting of blood or passing of black stools, which may indicate gastrointestinal bleeding\par \par When to see a doctor\par \par See your doctor if you have any persistent signs or symptoms that worry you.\par \par Request an Appointment at [facility]\par \par Causes\par \par A hiatal hernia occurs when weakened muscle tissue allows your stomach to bulge up through your diaphragm. It's not always clear why this happens. But a hiatal hernia might be caused by:\par •Age-related changes in your diaphragm\par •Injury to the area, for example, after trauma or certain types of surgery\par •Being born with an unusually large hiatus\par •Persistent and intense pressure on the surrounding muscles, such as while coughing, vomiting, straining during a bowel movement, exercising or lifting heavy objects\par \par Risk factors\par \par Hiatal hernia is most common in people who are:\par •Age 50 or older\par •Obese\par 7 hypotension watch his bp if continues to be low will adjust his medications.  he is seeing his pcp on  12/28 and will have him also check his bp.

## 2020-12-02 NOTE — PHYSICAL EXAM
[General Appearance - Alert] : alert [General Appearance - In No Acute Distress] : in no acute distress [PERRL With Normal Accommodation] : pupils were equal in size, round, and reactive to light [Oropharynx] : the oropharynx was normal [] : no respiratory distress [Auscultation Breath Sounds / Voice Sounds] : lungs were clear to auscultation bilaterally [Apical Impulse] : the apical impulse was normal [Heart Rate And Rhythm] : heart rate was normal and rhythm regular [Heart Sounds] : normal S1 and S2 [Heart Sounds Gallop] : no gallops [Edema] : there was no peripheral edema [Obese] : obese [Soft, Nontender] : the abdomen was soft and nontender [None] : no CVA tenderness [Cervical Lymph Nodes Enlarged Posterior Bilaterally] : posterior cervical [Cervical Lymph Nodes Enlarged Anterior Bilaterally] : anterior cervical [No CVA Tenderness] : no ~M costovertebral angle tenderness [Abnormal Walk] : normal gait [Nail Clubbing] : no clubbing  or cyanosis of the fingernails [Motor Tone] : muscle strength and tone were normal [Skin Color & Pigmentation] : normal skin color and pigmentation [Skin Turgor] : normal skin turgor [Motor Exam] : the motor exam was normal [Oriented To Time, Place, And Person] : oriented to person, place, and time [Impaired Insight] : insight and judgment were intact [Affect] : the affect was normal

## 2020-12-02 NOTE — HISTORY OF PRESENT ILLNESS
[Nausea] : denies nausea [Vomiting] : denies vomiting [Diarrhea] : denies diarrhea [Constipation] : denies constipation [Yellow Skin Or Eyes (Jaundice)] : denies jaundice [Abdominal Swelling] : denies abdominal swelling [Rectal Pain] : denies rectal pain [Wt Gain ___ Lbs] : no recent weight gain [Wt Loss ___ Lbs] : recent [unfilled] ~Upound(s) weight loss [Heartburn] : heartburn [Abdominal Pain] : abdominal pain [Hiatus Hernia] : hiatus hernia [Peptic Ulcer Disease] : no peptic ulcer disease [Pancreatitis] : no pancreatitis [Cholelithiasis] : cholelithiasis [Kidney Stone] : no kidney stone [Inflammatory Bowel Disease] : no inflammatory bowel disease [Irritable Bowel Syndrome] : no irritable bowel syndrome [Diverticulitis] : no diverticulitis [Malignancy] : no malignancy [de-identified] : Pt is scheduled for bariatric surgery on jan 11 . He had us of abd and has multiple gallstones  and  hepatomegaly and steatosis .  he will have fibroscan ordered.  he presently does have abd pain in right side radiating to back but no  nausea or vomiting . the gb wall was not thickened.  he has discussed the findings with the surgeon.  he has been avoiding fried foods and trying to exercise doing knee and leg exercise and walking more.   He has been cleared for  surgery by cardiologist.  he had chest pain and it is felt its due to a muscle tear.  he was doing pushups, and using dumbbells and punching bags without taking breaks.  He is using benegay .  he has acid reflux after eats intermittently  and occurred yesterday. he had eaten sauce .  He also had elevated transglutaminase  weakly positive and  will do hla  for celiac .

## 2020-12-03 LAB — IGA SER QL IEP: 606 MG/DL

## 2020-12-06 LAB
TTG IGA SER IA-ACNC: 7.6 U/ML
TTG IGA SER-ACNC: ABNORMAL
TTG IGG SER IA-ACNC: 9.1 U/ML
TTG IGG SER IA-ACNC: POSITIVE

## 2020-12-08 LAB
ANNOTATION COMMENT IMP: NORMAL
HLA-DQ2: NEGATIVE
HLA-DQ8 QL: POSITIVE
REF LAB TEST METHOD: NORMAL

## 2020-12-14 ENCOUNTER — APPOINTMENT (OUTPATIENT)
Dept: HEPATOLOGY | Facility: CLINIC | Age: 35
End: 2020-12-14
Payer: MEDICAID

## 2020-12-14 PROCEDURE — 91200 LIVER ELASTOGRAPHY: CPT

## 2020-12-14 PROCEDURE — 99072 ADDL SUPL MATRL&STAF TM PHE: CPT

## 2020-12-17 ENCOUNTER — NON-APPOINTMENT (OUTPATIENT)
Age: 35
End: 2020-12-17

## 2020-12-29 ENCOUNTER — OUTPATIENT (OUTPATIENT)
Dept: OUTPATIENT SERVICES | Facility: HOSPITAL | Age: 35
LOS: 1 days | End: 2020-12-29
Payer: MEDICAID

## 2020-12-29 ENCOUNTER — APPOINTMENT (OUTPATIENT)
Dept: INTERNAL MEDICINE | Facility: CLINIC | Age: 35
End: 2020-12-29
Payer: MEDICAID

## 2020-12-29 ENCOUNTER — LABORATORY RESULT (OUTPATIENT)
Age: 35
End: 2020-12-29

## 2020-12-29 ENCOUNTER — NON-APPOINTMENT (OUTPATIENT)
Age: 35
End: 2020-12-29

## 2020-12-29 VITALS
SYSTOLIC BLOOD PRESSURE: 102 MMHG | DIASTOLIC BLOOD PRESSURE: 70 MMHG | WEIGHT: 315 LBS | BODY MASS INDEX: 50.62 KG/M2 | HEIGHT: 66 IN

## 2020-12-29 DIAGNOSIS — Z98.2 PRESENCE OF CEREBROSPINAL FLUID DRAINAGE DEVICE: Chronic | ICD-10-CM

## 2020-12-29 DIAGNOSIS — I10 ESSENTIAL (PRIMARY) HYPERTENSION: ICD-10-CM

## 2020-12-29 PROCEDURE — G0463: CPT

## 2020-12-29 PROCEDURE — 99214 OFFICE O/P EST MOD 30 MIN: CPT | Mod: GC

## 2020-12-31 DIAGNOSIS — I10 ESSENTIAL (PRIMARY) HYPERTENSION: ICD-10-CM

## 2020-12-31 DIAGNOSIS — E66.01 MORBID (SEVERE) OBESITY DUE TO EXCESS CALORIES: ICD-10-CM

## 2020-12-31 DIAGNOSIS — Z01.818 ENCOUNTER FOR OTHER PREPROCEDURAL EXAMINATION: ICD-10-CM

## 2020-12-31 DIAGNOSIS — E55.9 VITAMIN D DEFICIENCY, UNSPECIFIED: ICD-10-CM

## 2020-12-31 DIAGNOSIS — R07.89 OTHER CHEST PAIN: ICD-10-CM

## 2020-12-31 DIAGNOSIS — H54.7 UNSPECIFIED VISUAL LOSS: ICD-10-CM

## 2020-12-31 DIAGNOSIS — Z98.84 BARIATRIC SURGERY STATUS: ICD-10-CM

## 2020-12-31 LAB
ALBUMIN SERPL ELPH-MCNC: 4.4 G/DL
ALP BLD-CCNC: 89 U/L
ALT SERPL-CCNC: 24 U/L
ANION GAP SERPL CALC-SCNC: 12 MMOL/L
APTT BLD: 36.5 SEC
AST SERPL-CCNC: 14 U/L
BILIRUB SERPL-MCNC: 0.9 MG/DL
BUN SERPL-MCNC: 11 MG/DL
CALCIUM SERPL-MCNC: 9.6 MG/DL
CHLORIDE SERPL-SCNC: 101 MMOL/L
CO2 SERPL-SCNC: 26 MMOL/L
CREAT SERPL-MCNC: 0.88 MG/DL
GLUCOSE SERPL-MCNC: 79 MG/DL
INR PPP: 1.04 RATIO
POTASSIUM SERPL-SCNC: 4.2 MMOL/L
PROT SERPL-MCNC: 7.4 G/DL
PT BLD: 12.2 SEC
SODIUM SERPL-SCNC: 139 MMOL/L

## 2020-12-31 NOTE — REVIEW OF SYSTEMS
[Vision Problems] : vision problems [Heartburn] : heartburn [Negative] : Psychiatric [Fever] : no fever [Chills] : no chills [Fatigue] : no fatigue [Night Sweats] : no night sweats [Discharge] : no discharge [Pain] : no pain [Abdominal Pain] : no abdominal pain [Nausea] : no nausea [Constipation] : no constipation [Diarrhea] : no diarrhea [Vomiting] : no vomiting [Melena] : no melena [FreeTextEntry2] : + intentional weight loss

## 2020-12-31 NOTE — ASSESSMENT
[Patient Optimized for Surgery] : Patient optimized for surgery [No Further Testing Recommended] : no further testing recommended [FreeTextEntry4] : # Preop for Bariatric Surgery\par - Patient is moderate to high risk for surgery given morbid obesity and LAZARO for moderate risk surgery\par - Patient does not need further testing from the medicine standpoint\par - Will follow up with GI regarding any need for further testing\par - No further cardiac workup needed, chest discomfort is musculoskeletal.  Patient was seen by cardiology and given clearance to proceed with planned surgery without further intervention.\par - 12/29/20 CBC, CMP, coags wnl\par -10/15/20 CXR normal\par - Advised patient to avoid NSAIDs 1 week prior to surgery\par - Will get COVID testing on 1/8/21 as per surgery\par - Patient to notify anesthesia regarding CPAP use at night\par \par # Vitamin D deficiency\par - currently on D3 [id number] once a week as well as daily vitamin D supplement\par - will f/u Vitamin D levels to determine need for weekly Vit D\par \par Seen and discussed with Dr. Jackson [FreeTextEntry7] :  Advised patient to avoid NSAIDs 1 week prior to surgery.  Patient to notify anesthesia regarding CPAP use at night.

## 2020-12-31 NOTE — HISTORY OF PRESENT ILLNESS
[No Pertinent Cardiac History] : no history of aortic stenosis, atrial fibrillation, coronary artery disease, recent myocardial infarction, or implantable device/pacemaker [Sleep Apnea] : sleep apnea [No Adverse Anesthesia Reaction] : no adverse anesthesia reaction in self or family member [Parent] : parent [(Patient denies any chest pain, claudication, dyspnea on exertion, orthopnea, palpitations or syncope)] : Patient denies any chest pain, claudication, dyspnea on exertion, orthopnea, palpitations or syncope [Moderate (4-6 METs)] : Moderate (4-6 METs) [Asthma] : no asthma [COPD] : no COPD [Smoker] : not a smoker [Chronic Anticoagulation] : no chronic anticoagulation [Chronic Kidney Disease] : no chronic kidney disease [Diabetes] : no diabetes [FreeTextEntry2] : 1/11/21 [FreeTextEntry3] : Dr Sriram Phillips [FreeTextEntry4] : 35 w/ PMH pseudotumor cerebri s/p  shunt, complicated by blindness, obesity hypoventilation syndrome/LAZARO, hypothyroid,  presents for preop clearance for bariatric surgery. Patient has been doing generally well, no complaints today except that he needs more assistance at home with his daily activities due to his blindness. He needs assistance with activities of daily living including cleaning at home, dressing, showering, cooking, etc. \par \par # Chest discomfort- Patient has been having chest pain in the L side of chest, around the sternum area and breast area since April. He says he was told it was from a muscle tear from doing pushups and lifting weights. Pain is nonradiating and improves with salonpas. If pain gets worse, he uses Bengay ointment which resolves his symptoms. He also has this area of inflammation/thickened breast tissue on the L breast, for which he is pending an ultrasound. Patient denies any active chest pain at this time. No dyspnea on exertion. He can walk a few blocks and a fight of stairs without getting shortness of breath.  Patient was seen by cardiology and given clearance to proceed with planned surgery without further intervention.\par \par # Acid reflux- Patient occasionally gets acid reflux. He avoids fried foods and tries to watch his diet. He denies any nausea, vomiting, or abdominal pain. No diarrhea or constipation. \par \par # Sleep apnea- Patient has sleep apnea for which he uses BIPAP/CPAP at night. No issues with his sleeping. \par \par Otherwise patient is doing well. No recent fevers, chills. No sick contacts. He is scheduled for COVID testing on 1/8/21 prior to his surgery. He has had anesthesia in the past, no reaction to anesthesia. [FreeTextEntry5] : CPAP at night (Trilogy) [FreeTextEntry7] : Echo from June 2020 showed normal biventricular systolic function.\par LHC showed no obstructive CAD\par ECG demonstrates normal SR, normal axis

## 2020-12-31 NOTE — PHYSICAL EXAM
[No Acute Distress] : no acute distress [Well Nourished] : well nourished [Well Developed] : well developed [Well-Appearing] : well-appearing [Normal Sclera/Conjunctiva] : normal sclera/conjunctiva [Normal Outer Ear/Nose] : the outer ears and nose were normal in appearance [No JVD] : no jugular venous distention [No Respiratory Distress] : no respiratory distress  [No Accessory Muscle Use] : no accessory muscle use [Clear to Auscultation] : lungs were clear to auscultation bilaterally [Normal Rate] : normal rate  [Regular Rhythm] : with a regular rhythm [Normal S1, S2] : normal S1 and S2 [No Murmur] : no murmur heard [Pedal Pulses Present] : the pedal pulses are present [No Edema] : there was no peripheral edema [No Masses] : no palpable masses [Soft] : abdomen soft [Non Tender] : non-tender [Normal Bowel Sounds] : normal bowel sounds [No Rash] : no rash [No Focal Deficits] : no focal deficits [Speech Grossly Normal] : speech grossly normal [de-identified] : distant breath sounds most likely due to body habitus, no crackles  [de-identified] : large abdomen [de-identified] : + mild tenderness to palpation of the L side of the sternum with some tickened tissue

## 2021-01-04 ENCOUNTER — NON-APPOINTMENT (OUTPATIENT)
Age: 36
End: 2021-01-04

## 2021-01-05 ENCOUNTER — OUTPATIENT (OUTPATIENT)
Dept: OUTPATIENT SERVICES | Facility: HOSPITAL | Age: 36
LOS: 1 days | End: 2021-01-05
Payer: MEDICAID

## 2021-01-05 ENCOUNTER — APPOINTMENT (OUTPATIENT)
Dept: ULTRASOUND IMAGING | Facility: HOSPITAL | Age: 36
End: 2021-01-05
Payer: MEDICAID

## 2021-01-05 ENCOUNTER — RESULT REVIEW (OUTPATIENT)
Age: 36
End: 2021-01-05

## 2021-01-05 ENCOUNTER — APPOINTMENT (OUTPATIENT)
Dept: ULTRASOUND IMAGING | Facility: HOSPITAL | Age: 36
End: 2021-01-05

## 2021-01-05 VITALS
OXYGEN SATURATION: 100 % | SYSTOLIC BLOOD PRESSURE: 136 MMHG | DIASTOLIC BLOOD PRESSURE: 90 MMHG | RESPIRATION RATE: 18 BRPM | HEART RATE: 77 BPM | TEMPERATURE: 98 F | WEIGHT: 315 LBS | HEIGHT: 66 IN

## 2021-01-05 DIAGNOSIS — Z98.2 PRESENCE OF CEREBROSPINAL FLUID DRAINAGE DEVICE: Chronic | ICD-10-CM

## 2021-01-05 DIAGNOSIS — E03.9 HYPOTHYROIDISM, UNSPECIFIED: ICD-10-CM

## 2021-01-05 DIAGNOSIS — I10 ESSENTIAL (PRIMARY) HYPERTENSION: ICD-10-CM

## 2021-01-05 DIAGNOSIS — N63.0 UNSPECIFIED LUMP IN UNSPECIFIED BREAST: ICD-10-CM

## 2021-01-05 DIAGNOSIS — E66.01 MORBID (SEVERE) OBESITY DUE TO EXCESS CALORIES: ICD-10-CM

## 2021-01-05 DIAGNOSIS — Z29.9 ENCOUNTER FOR PROPHYLACTIC MEASURES, UNSPECIFIED: ICD-10-CM

## 2021-01-05 DIAGNOSIS — Z01.818 ENCOUNTER FOR OTHER PREPROCEDURAL EXAMINATION: ICD-10-CM

## 2021-01-05 LAB — BLD GP AB SCN SERPL QL: SIGNIFICANT CHANGE UP

## 2021-01-05 PROCEDURE — G0463: CPT

## 2021-01-05 PROCEDURE — 76642 ULTRASOUND BREAST LIMITED: CPT

## 2021-01-05 PROCEDURE — 76642 ULTRASOUND BREAST LIMITED: CPT | Mod: 26,LT

## 2021-01-05 NOTE — H&P PST ADULT - NSICDXPROBLEM_GEN_ALL_CORE_FT
PROBLEM DIAGNOSES  Problem: Need for prophylactic measure  Assessment and Plan: CAPRINI SCORE [CLOT]  AGE RELATED RISK FACTORS                                                       MOBILITY RELATED FACTORS  [ ] Age 41-60 years                                            (1 Point)                  [ ] Bed rest                                                        (1 Point)  [ ] Age: 61-74 years                                           (2 Points)                 [ ] Plaster cast                                                   (2 Points)  [ ] Age= 75 years                                              (3 Points)                 [ ] Bed bound for more than 72 hours                 (2 Points)  DISEASE RELATED RISK FACTORS                                               GENDER SPECIFIC FACTORS  [ ] Edema in the lower extremities                       (1 Point)                  [ ] Pregnancy                                                     (1 Point)  [ ] Varicose veins                                               (1 Point)                  [ ] Post-partum < 6 weeks                                   (1 Point)             [x ] BMI > 25 Kg/m2                                            (1 Point)                  [ ] Hormonal therapy  or oral contraception          (1 Point)                 [ ] Sepsis (in the previous month)                        (1 Point)                  [ ] History of pregnancy complications                 (1 point)  [ ] Pneumonia or serious lung disease                                               [ ] Unexplained or recurrent                     (1 Point)           (in the previous month)                               (1 Point)  [ ] Abnormal pulmonary function test                     (1 Point)                 SURGERY RELATED RISK FACTORS  [ ] Acute myocardial infarction                              (1 Point)                 [ ]  Section                                             (1 Point)  [ ] Congestive heart failure (in the previous month)  (1 Point)               [ ] Minor surgery                                                  (1 Point)   [ ] Inflammatory bowel disease                             (1 Point)                 [ ] Arthroscopic surgery                                        (2 Points)  [ ] Central venous access                                      (2 Points)                [ ] General surgery lasting more than 45 minutes   (2 Points)       [ ] Stroke (in the previous month)                          (5 Points)               [ ] Elective arthroplasty                                         (5 Points)                                                                                                                                               HEMATOLOGY RELATED FACTORS                                                 TRAUMA RELATED RISK FACTORS  [ ] Prior episodes of VTE                                     (3 Points)                [ ] Fracture of the hip, pelvis, or leg                       (5 Points)  [ ] Positive family history for VTE                         (3 Points)                 [ ] Acute spinal cord injury (in the previous month)  (5 Points)  [ ] Prothrombin 63234 A                                     (3 Points)                 [ ] Paralysis  (less than 1 month)                             (5 Points)  [ ] Factor V Leiden                                             (3 Points)                  [ ] Multiple Trauma within 1 month                        (5 Points)  [ ] Lupus anticoagulants                                     (3 Points)                                                           [ ] Anticardiolipin antibodies                               (3 Points)                                                       [ ] High homocysteine in the blood                      (3 Points)                                             [ ] Other congenital or acquired thrombophilia      (3 Points)                                                [ ] Heparin induced thrombocytopenia                  (3 Points)                                      Total Score [1 ]  Caprini Score 0 - 2:  Low Risk, No VTE Prophylaxis required for most patients, encourage ambulation  Caprini Score 3 - 6:  At Risk, pharmacologic VTE prophylaxis is indicated for most patients (in the absence of a contraindication)  Caprini Score Greater than or = 7:  High Risk, pharmacologic VTE prophylaxis is indicated for most patients (in the absence of a contraindication)      Problem: Morbid (severe) obesity due to excess calories  Assessment and Plan:     Problem: Hypothyroid  Assessment and Plan: Continue your medication as prescribed    Problem: Hypertension  Assessment and Plan: Continue your medication as prescribed       PROBLEM DIAGNOSES  Problem: Need for prophylactic measure  Assessment and Plan: CAPRINI SCORE [CLOT]  AGE RELATED RISK FACTORS                                                       MOBILITY RELATED FACTORS  [ ] Age 41-60 years                                            (1 Point)                  [ ] Bed rest                                                        (1 Point)  [ ] Age: 61-74 years                                           (2 Points)                 [ ] Plaster cast                                                   (2 Points)  [ ] Age= 75 years                                              (3 Points)                 [ ] Bed bound for more than 72 hours                 (2 Points)  DISEASE RELATED RISK FACTORS                                               GENDER SPECIFIC FACTORS  [ ] Edema in the lower extremities                       (1 Point)                  [ ] Pregnancy                                                     (1 Point)  [ ] Varicose veins                                               (1 Point)                  [ ] Post-partum < 6 weeks                                   (1 Point)             [x ] BMI > 25 Kg/m2                                            (1 Point)                  [ ] Hormonal therapy  or oral contraception          (1 Point)                 [ ] Sepsis (in the previous month)                        (1 Point)                  [ ] History of pregnancy complications                 (1 point)  [ ] Pneumonia or serious lung disease                                               [ ] Unexplained or recurrent                     (1 Point)           (in the previous month)                               (1 Point)  [ ] Abnormal pulmonary function test                     (1 Point)                 SURGERY RELATED RISK FACTORS  [ ] Acute myocardial infarction                              (1 Point)                 [ ]  Section                                             (1 Point)  [ ] Congestive heart failure (in the previous month)  (1 Point)               [ ] Minor surgery                                                  (1 Point)   [ ] Inflammatory bowel disease                             (1 Point)                 [ ] Arthroscopic surgery                                        (2 Points)  [ ] Central venous access                                      (2 Points)                [x ] General surgery lasting more than 45 minutes   (2 Points)       [ ] Stroke (in the previous month)                          (5 Points)               [ ] Elective arthroplasty                                         (5 Points)                                                                                                                                               HEMATOLOGY RELATED FACTORS                                                 TRAUMA RELATED RISK FACTORS  [ ] Prior episodes of VTE                                     (3 Points)                [ ] Fracture of the hip, pelvis, or leg                       (5 Points)  [ ] Positive family history for VTE                         (3 Points)                 [ ] Acute spinal cord injury (in the previous month)  (5 Points)  [ ] Prothrombin 10748 A                                     (3 Points)                 [ ] Paralysis  (less than 1 month)                             (5 Points)  [ ] Factor V Leiden                                             (3 Points)                  [ ] Multiple Trauma within 1 month                        (5 Points)  [ ] Lupus anticoagulants                                     (3 Points)                                                           [ ] Anticardiolipin antibodies                               (3 Points)                                                       [ ] High homocysteine in the blood                      (3 Points)                                             [ ] Other congenital or acquired thrombophilia      (3 Points)                                                [ ] Heparin induced thrombocytopenia                  (3 Points)                                      Total Score [4 ]  Caprini Score 0 - 2:  Low Risk, No VTE Prophylaxis required for most patients, encourage ambulation  Caprini Score 3 - 6:  At Risk, pharmacologic VTE prophylaxis is indicated for most patients (in the absence of a contraindication)  Caprini Score Greater than or = 7:  High Risk, pharmacologic VTE prophylaxis is indicated for most patients (in the absence of a contraindication)      Problem: Morbid (severe) obesity due to excess calories  Assessment and Plan:     Problem: Hypothyroid  Assessment and Plan: Continue your medication as prescribed    Problem: Hypertension  Assessment and Plan: Continue your medication as prescribed

## 2021-01-05 NOTE — H&P PST ADULT - ASSESSMENT
34 yo male is scheduled for : Laparoscopic Sleeve Gastrectomy, on 1/11/21                          (1 Point)                 [ ] Arthroscopic surgery                                        (2 Points)  [ ] Central venous access                                      (2 Points)                [ ] General surgery lasting more than 45 minutes   (2 Points)       [ ] Stroke (in the previous month)                          (5 Points)               [ ] Elective arthroplasty                                         (5 Points)                                                                                                                                               HEMATOLOGY RELATED FACTORS                                                 TRAUMA RELATED RISK FACTORS  [ ] Prior episodes of VTE                                     (3 Points)                [ ] Fracture of the hip, pelvis, or leg                       (5 Points)  [ ] Positive family history for VTE                         (3 Points)                 [ ] Acute spinal cord injury (in the previous month)  (5 Points)  [ ] Prothrombin 38822 A                                     (3 Points)                 [ ] Paralysis  (less than 1 month)                             (5 Points)  [ ] Factor V Leiden                                             (3 Points)                  [ ] Multiple Trauma within 1 month                        (5 Points)  [ ] Lupus anticoagulants                                     (3 Points)                                                           [ ] Anticardiolipin antibodies                               (3 Points)                                                       [ ] High homocysteine in the blood                      (3 Points)                                             [ ] Other congenital or acquired thrombophilia      (3 Points)                                                [ ] Heparin induced thrombocytopenia                  (3 Points)                                           34 yo male is scheduled for : Laparoscopic Sleeve Gastrectomy, on 1/11/21

## 2021-01-05 NOTE — H&P PST ADULT - RS GEN PE MLT RESP DETAILS PC
respirations non-labored/clear to auscultation bilaterally respirations non-labored/clear to auscultation bilaterally/no chest wall tenderness

## 2021-01-05 NOTE — H&P PST ADULT - HISTORY OF PRESENT ILLNESS
34 yo male with history of Morbid Obesity, Vitamin D deficiency, HTN, Hypothyroidism, LAZARO (with CPAP), Asthma (never been intubated, last hospitalization 5/2018 ), reports the above.   He is scheduled for : Laparoscopic Sleeve Gastrectomy, on 1/11/21

## 2021-01-05 NOTE — H&P PST ADULT - NSICDXPASTMEDICALHX_GEN_ALL_CORE_FT
PAST MEDICAL HISTORY:  Asthma last hospitalization 2018    Fatty liver     Gallstone     H/O heartburn     H/O hiatal hernia     Hydrocephalus     Hypertension     LAZARO (obstructive sleep apnea)

## 2021-01-05 NOTE — H&P PST ADULT - ATTENDING COMMENTS
I have reviewed the history and physical – recorded 1 to 30 days prior the procedure – and I have re-examined the patient (so as to update any changes in the patient's health status), and I state that it is still appropriate with my corrections and/or amendments as documented.     Patient reports no interval changes to overall health status or medical history since our previous encounter.      I have had a lengthy conversation with the patient and have discussed my impressions and treatment plans with the patient.  Detailed informed consent and potential risks , benefits and alternatives to the planned surgery were once again reinforced and reviewed, including but not limited to:  bleeding, infection, esophageal/gastric/intestinal or other visceral or solid organ injury; leak, sepsis, death, reflux, inability to perform the intended procedure, inability to lose desired amount of weight, regain of weight; port site or incisional hernia, need for other procedure or re-operation, perioperative myocardial infarction, stroke, deep vein thrombosis, pulmonary embolus, pneumonia and death.  All surgical options were discussed including non-surgical treatments. The patient remains interested in a sleeve gastrectomy for weight loss.     The weight loss surgery education packet as well as the nutrition education packet have been reviewed and given to the patient previously, and I also provided patient with detailed information regarding the post-operative instructions and expectations.  The patient is aware of the expected immediate post operative course, length of stay and discharge plan for the sleeve gastrectomy procedure.   The patient had the opportunity to ask pertinent questions, and all of patient’s questions and concerns were addressed to patient’s satisfaction.  Patient verbalized an understanding of the information discussed, and wishes to proceed with surgery. Informed consent signed.

## 2021-01-05 NOTE — H&P PST ADULT - CARDIOVASCULAR COMMENTS
(+) HTN.  Unknown etiology chest pain (+) HTN.  Unknown etiology chest pain; as per patient, it is from pulling the muscle in the left chest, during exercise

## 2021-01-08 ENCOUNTER — NON-APPOINTMENT (OUTPATIENT)
Age: 36
End: 2021-01-08

## 2021-01-08 ENCOUNTER — APPOINTMENT (OUTPATIENT)
Dept: DISASTER EMERGENCY | Facility: CLINIC | Age: 36
End: 2021-01-08

## 2021-01-10 ENCOUNTER — TRANSCRIPTION ENCOUNTER (OUTPATIENT)
Age: 36
End: 2021-01-10

## 2021-01-10 LAB — SARS-COV-2 N GENE NPH QL NAA+PROBE: NOT DETECTED

## 2021-01-11 ENCOUNTER — INPATIENT (INPATIENT)
Facility: HOSPITAL | Age: 36
LOS: 0 days | Discharge: HOME | DRG: 621 | End: 2021-01-12
Attending: SURGERY | Admitting: SURGERY
Payer: MEDICAID

## 2021-01-11 ENCOUNTER — RESULT REVIEW (OUTPATIENT)
Age: 36
End: 2021-01-11

## 2021-01-11 ENCOUNTER — NON-APPOINTMENT (OUTPATIENT)
Age: 36
End: 2021-01-11

## 2021-01-11 ENCOUNTER — APPOINTMENT (OUTPATIENT)
Dept: SURGERY | Facility: HOSPITAL | Age: 36
End: 2021-01-11

## 2021-01-11 VITALS
HEART RATE: 77 BPM | RESPIRATION RATE: 18 BRPM | TEMPERATURE: 98 F | HEIGHT: 66 IN | WEIGHT: 315 LBS | SYSTOLIC BLOOD PRESSURE: 136 MMHG | OXYGEN SATURATION: 100 % | DIASTOLIC BLOOD PRESSURE: 90 MMHG

## 2021-01-11 DIAGNOSIS — Z98.2 PRESENCE OF CEREBROSPINAL FLUID DRAINAGE DEVICE: Chronic | ICD-10-CM

## 2021-01-11 DIAGNOSIS — R10.84 GENERALIZED ABDOMINAL PAIN: ICD-10-CM

## 2021-01-11 DIAGNOSIS — Z98.84 BARIATRIC SURGERY STATUS: ICD-10-CM

## 2021-01-11 DIAGNOSIS — J45.909 UNSPECIFIED ASTHMA, UNCOMPLICATED: ICD-10-CM

## 2021-01-11 DIAGNOSIS — G47.33 OBSTRUCTIVE SLEEP APNEA (ADULT) (PEDIATRIC): ICD-10-CM

## 2021-01-11 DIAGNOSIS — E66.01 MORBID (SEVERE) OBESITY DUE TO EXCESS CALORIES: ICD-10-CM

## 2021-01-11 LAB
BLD GP AB SCN SERPL QL: SIGNIFICANT CHANGE UP
CK MB BLD-MCNC: <1.4 % — SIGNIFICANT CHANGE UP (ref 0–3.5)
CK MB CFR SERPL CALC: <1 NG/ML — SIGNIFICANT CHANGE UP (ref 0–3.6)
CK SERPL-CCNC: 72 U/L — SIGNIFICANT CHANGE UP (ref 35–232)
LACTATE SERPL-SCNC: 2.2 MMOL/L — HIGH (ref 0.7–2)
TROPONIN I SERPL-MCNC: <0.015 NG/ML — SIGNIFICANT CHANGE UP (ref 0–0.04)

## 2021-01-11 PROCEDURE — 88312 SPECIAL STAINS GROUP 1: CPT | Mod: 26

## 2021-01-11 PROCEDURE — 99221 1ST HOSP IP/OBS SF/LOW 40: CPT

## 2021-01-11 PROCEDURE — 88307 TISSUE EXAM BY PATHOLOGIST: CPT | Mod: 26

## 2021-01-11 PROCEDURE — 43775 LAP SLEEVE GASTRECTOMY: CPT

## 2021-01-11 RX ORDER — HYDROMORPHONE HYDROCHLORIDE 2 MG/ML
0.5 INJECTION INTRAMUSCULAR; INTRAVENOUS; SUBCUTANEOUS
Refills: 0 | Status: DISCONTINUED | OUTPATIENT
Start: 2021-01-11 | End: 2021-01-11

## 2021-01-11 RX ORDER — GABAPENTIN 400 MG/1
300 CAPSULE ORAL ONCE
Refills: 0 | Status: COMPLETED | OUTPATIENT
Start: 2021-01-11 | End: 2021-01-11

## 2021-01-11 RX ORDER — METOPROLOL TARTRATE 50 MG
5 TABLET ORAL EVERY 6 HOURS
Refills: 0 | Status: DISCONTINUED | OUTPATIENT
Start: 2021-01-11 | End: 2021-01-12

## 2021-01-11 RX ORDER — METOPROLOL TARTRATE 50 MG
5 TABLET ORAL EVERY 6 HOURS
Refills: 0 | Status: DISCONTINUED | OUTPATIENT
Start: 2021-01-11 | End: 2021-01-11

## 2021-01-11 RX ORDER — ACETAMINOPHEN 500 MG
1000 TABLET ORAL ONCE
Refills: 0 | Status: COMPLETED | OUTPATIENT
Start: 2021-01-12 | End: 2021-01-12

## 2021-01-11 RX ORDER — ONDANSETRON 8 MG/1
4 TABLET, FILM COATED ORAL EVERY 6 HOURS
Refills: 0 | Status: DISCONTINUED | OUTPATIENT
Start: 2021-01-11 | End: 2021-01-12

## 2021-01-11 RX ORDER — SCOPALAMINE 1 MG/3D
1 PATCH, EXTENDED RELEASE TRANSDERMAL ONCE
Refills: 0 | Status: COMPLETED | OUTPATIENT
Start: 2021-01-11 | End: 2021-01-11

## 2021-01-11 RX ORDER — METOCLOPRAMIDE HCL 10 MG
10 TABLET ORAL
Refills: 0 | Status: DISCONTINUED | OUTPATIENT
Start: 2021-01-11 | End: 2021-01-12

## 2021-01-11 RX ORDER — ENOXAPARIN SODIUM 100 MG/ML
40 INJECTION SUBCUTANEOUS EVERY 12 HOURS
Refills: 0 | Status: DISCONTINUED | OUTPATIENT
Start: 2021-01-12 | End: 2021-01-12

## 2021-01-11 RX ORDER — ACETAMINOPHEN 500 MG
1000 TABLET ORAL ONCE
Refills: 0 | Status: COMPLETED | OUTPATIENT
Start: 2021-01-11 | End: 2021-01-11

## 2021-01-11 RX ORDER — SODIUM CHLORIDE 9 MG/ML
1000 INJECTION, SOLUTION INTRAVENOUS
Refills: 0 | Status: DISCONTINUED | OUTPATIENT
Start: 2021-01-11 | End: 2021-01-11

## 2021-01-11 RX ORDER — SODIUM CHLORIDE 9 MG/ML
3 INJECTION INTRAMUSCULAR; INTRAVENOUS; SUBCUTANEOUS EVERY 8 HOURS
Refills: 0 | Status: DISCONTINUED | OUTPATIENT
Start: 2021-01-11 | End: 2021-01-11

## 2021-01-11 RX ORDER — PANTOPRAZOLE SODIUM 20 MG/1
40 TABLET, DELAYED RELEASE ORAL ONCE
Refills: 0 | Status: COMPLETED | OUTPATIENT
Start: 2021-01-11 | End: 2021-01-11

## 2021-01-11 RX ORDER — LEVOTHYROXINE SODIUM 125 MCG
12.5 TABLET ORAL AT BEDTIME
Refills: 0 | Status: DISCONTINUED | OUTPATIENT
Start: 2021-01-12 | End: 2021-01-12

## 2021-01-11 RX ORDER — GABAPENTIN 400 MG/1
100 CAPSULE ORAL EVERY 8 HOURS
Refills: 0 | Status: DISCONTINUED | OUTPATIENT
Start: 2021-01-11 | End: 2021-01-12

## 2021-01-11 RX ORDER — SODIUM CHLORIDE 9 MG/ML
1000 INJECTION INTRAMUSCULAR; INTRAVENOUS; SUBCUTANEOUS
Refills: 0 | Status: DISCONTINUED | OUTPATIENT
Start: 2021-01-11 | End: 2021-01-12

## 2021-01-11 RX ORDER — ENOXAPARIN SODIUM 100 MG/ML
40 INJECTION SUBCUTANEOUS ONCE
Refills: 0 | Status: COMPLETED | OUTPATIENT
Start: 2021-01-11 | End: 2021-01-11

## 2021-01-11 RX ORDER — PANTOPRAZOLE SODIUM 20 MG/1
40 TABLET, DELAYED RELEASE ORAL DAILY
Refills: 0 | Status: DISCONTINUED | OUTPATIENT
Start: 2021-01-12 | End: 2021-01-12

## 2021-01-11 RX ORDER — CEFAZOLIN SODIUM 1 G
3000 VIAL (EA) INJECTION EVERY 8 HOURS
Refills: 0 | Status: COMPLETED | OUTPATIENT
Start: 2021-01-11 | End: 2021-01-12

## 2021-01-11 RX ADMIN — Medication 200 MILLIGRAM(S): at 17:50

## 2021-01-11 RX ADMIN — SODIUM CHLORIDE 150 MILLILITER(S): 9 INJECTION INTRAMUSCULAR; INTRAVENOUS; SUBCUTANEOUS at 17:51

## 2021-01-11 RX ADMIN — SCOPALAMINE 1 PATCH: 1 PATCH, EXTENDED RELEASE TRANSDERMAL at 06:33

## 2021-01-11 RX ADMIN — GABAPENTIN 100 MILLIGRAM(S): 400 CAPSULE ORAL at 21:56

## 2021-01-11 RX ADMIN — HYDROMORPHONE HYDROCHLORIDE 0.5 MILLIGRAM(S): 2 INJECTION INTRAMUSCULAR; INTRAVENOUS; SUBCUTANEOUS at 11:14

## 2021-01-11 RX ADMIN — ENOXAPARIN SODIUM 40 MILLIGRAM(S): 100 INJECTION SUBCUTANEOUS at 06:32

## 2021-01-11 RX ADMIN — GABAPENTIN 300 MILLIGRAM(S): 400 CAPSULE ORAL at 06:32

## 2021-01-11 RX ADMIN — PANTOPRAZOLE SODIUM 40 MILLIGRAM(S): 20 TABLET, DELAYED RELEASE ORAL at 17:50

## 2021-01-11 RX ADMIN — Medication 10 MILLIGRAM(S): at 16:06

## 2021-01-11 RX ADMIN — ONDANSETRON 4 MILLIGRAM(S): 8 TABLET, FILM COATED ORAL at 17:50

## 2021-01-11 RX ADMIN — Medication 400 MILLIGRAM(S): at 16:06

## 2021-01-11 RX ADMIN — SCOPALAMINE 1 PATCH: 1 PATCH, EXTENDED RELEASE TRANSDERMAL at 20:15

## 2021-01-11 RX ADMIN — Medication 10 MILLIGRAM(S): at 21:54

## 2021-01-11 RX ADMIN — Medication 400 MILLIGRAM(S): at 21:51

## 2021-01-11 RX ADMIN — ONDANSETRON 4 MILLIGRAM(S): 8 TABLET, FILM COATED ORAL at 11:59

## 2021-01-11 RX ADMIN — SODIUM CHLORIDE 3 MILLILITER(S): 9 INJECTION INTRAMUSCULAR; INTRAVENOUS; SUBCUTANEOUS at 06:30

## 2021-01-11 NOTE — BRIEF OPERATIVE NOTE - OPERATION/FINDINGS
No hiatal hernia seen.  Longitudinal sleeve gastrectomy formed over 36Fr [id number], with staple line reinforcement and negative leak test.

## 2021-01-11 NOTE — CONSULT NOTE ADULT - PROBLEM SELECTOR RECOMMENDATION 9
Pt with abdominal pain which is somatic and neuropathic in nature due to lap sleeve gastrectomy 1/11, POD#0.    Maximize non-opioid pain recommendations   Non-opioid pain recommendations   - Hold NSAIDs for at least 24 hrs   - Continue Acetaminophen 1 gram IV q 6 hours for 24 hours only. Monitor LFTs  - Continue Gabapentin 100mg po q 8 hours. Monitor renal function.   Bowel Regimen  -Per primary team due to abdominal surgery  Mild pain   - Non-pharmacological pain treatment recommendations  - Warm/ Cool packs PRN   - Repositioning, imagery, relaxation, distraction.  - Physical therapy OOB if no contraindications   Recommendations discussed with primary team and RN

## 2021-01-11 NOTE — CONSULT NOTE ADULT - SUBJECTIVE AND OBJECTIVE BOX
Source of information: CHIQUIS BARAKAT, Chart review  Patient language: English  : n/a    HPI:  34 yo male with history of Morbid Obesity, Vitamin D deficiency, HTN, Hypothyroidism, LAZARO (with CPAP), Asthma (never been intubated, last hospitalization 5/2018 ), reports the above.   He is scheduled for : Laparoscopic Sleeve Gastrectomy, on 1/11/21 (05 Jan 2021 09:50)      Patient is a 35y old  Male who presents with a chief complaint of I have too many health complications, I need to decrease my weight. Pt s/p lap sleeve gastrectomy 1/11, POD #0. Pt seen and examined at bedside. Reports pain score 9/10 and not tolerable SCALE USED: (1-10 VNRS). Pt describes pain as aching, non- radiating, alleviated by pain medication, exacerbated by movement. Pt has not yet started bariatric diet. Denies lethargy, vomiting, constipation, itchiness. Pt reports nausea. Patient stated goal for pain control: to be able to take deep breaths, get out of bed to chair and ambulate with tolerable pain control. Pt denies taking medications for pain at home.     PAST MEDICAL & SURGICAL HISTORY:  Gallstone    H/O hiatal hernia    H/O heartburn    Fatty liver    Hypertension    Hydrocephalus    LAZARO (obstructive sleep apnea)    Asthma  last hospitalization 2018    S/P  shunt  , Right side of head        FAMILY HISTORY:  FH: hyperlipidemia  , in mother        Social History:   [X ] Denies ETOH use, illicit drug use and smoking    Allergies    No Known Allergies    MEDICATIONS  (STANDING):  acetaminophen  IVPB .. 1000 milliGRAM(s) IV Intermittent once  metoclopramide Injectable 10 milliGRAM(s) IV Push <User Schedule>  ondansetron Injectable 4 milliGRAM(s) IV Push every 6 hours    Vital Signs Last 24 Hrs  T(C): 37.1 (11 Jan 2021 15:56), Max: 37.1 (11 Jan 2021 15:56)  T(F): 98.8 (11 Jan 2021 15:56), Max: 98.8 (11 Jan 2021 15:56)  HR: 96 (11 Jan 2021 15:56) (67 - 99)  BP: 147/82 (11 Jan 2021 15:56) (121/66 - 153/82)  BP(mean): 103 (11 Jan 2021 15:05) (80 - 116)  RR: 18 (11 Jan 2021 15:56) (15 - 25)  SpO2: 100% (11 Jan 2021 15:56) (96% - 100%)    LABS: Reviewed.    CAPILLARY BLOOD GLUCOSE        COVID-19 PCR: NotDetec (14 Oct 2020 00:16)    ORT Score -   Family Hx of substance abuse	Female	      Male  Alcohol 	                                           1                     3  Illegal drugs	                                   2                     3  Rx drugs                                           4 	                  4  Personal Hx of substance abuse		  Alcohol 	                                          3	                  3  Illegal drugs                                     4	                  4  Rx drugs                                            5 	                  5  Age between 16- 45 years	           1                     1  hx preadolescent sexual abuse	   3 	                  0  Psychological disease		  ADD, OCD, bipolar, schizophrenia   2	          2  Depression                                           1 	          1  Total: 1    a score of 3 or lower indicates low risk for opioid abuse		  a score of 4-7 indicates moderate risk for opioid abuse		  a score of 8 or higher indicates high risk for opioid abuse  	    REVIEW OF SYSTEMS:  CONSTITUTIONAL: No fever or fatigue  RESPIRATORY: No cough, wheezing, chills or hemoptysis; No shortness of breath  CARDIOVASCULAR: No chest pain, palpitations, dizziness, or leg swelling  GASTROINTESTINAL: + abdominal pain. + nausea, no vomiting; No diarrhea or constipation.   GENITOURINARY: No dysuria, frequency, hematuria, retention or incontinence  MUSCULOSKELETAL: No joint pain or swelling; No muscle, back, or extremity pain, no upper or lower motor strength weakness, no saddle anesthesia, bowel/bladder incontinence  NEURO: No numbness/ tingling in b/l LE   PSYCHIATRIC: No depression, anxiety, mood swings, or difficulty sleeping    PHYSICAL EXAM:  GENERAL:  Alert & Oriented X3, NAD, Good concentration  CHEST/LUNG: Clear to auscultation bilaterally; No rales, rhonchi, wheezing, or rubs  HEART: Regular rate and rhythm; No murmurs, rubs, or gallops  ABDOMEN: Soft, appropriately tender, Nondistended; No bowel sounds present, + lap sleeve site dressing c/d/i   EXTREMITIES:  2+ Peripheral Pulses, No cyanosis, or edema  MUSCULOSKELETAL: Motor Strength 5/5 B/L upper and lower extremities; moves all extremities equally against gravity   SKIN: No rashes or lesions    Risk factors associated with adverse outcomes related to opioid treatment  [ ]  Concurrent benzodiazepine use  [ ]  History/ Active substance use or alcohol use disorder  [ ] Psychiatric co-morbidity  [ ] Sleep apnea  [ ] COPD  [ ] BMI> 35  [ ] Liver dysfunction  [ ] Renal dysfunction  [ ] CHF  [ ] Smoker  [ ]  Age > 60 years    [X ]  [place]  Reviewed and Copied to Chart. See below.    Plan of care and goal oriented pain management treatment options were discussed with patient and /or primary care giver; all questions and concerns were addressed and care was aligned with patient's wishes.    Educated patient on goal oriented pain management treatment options

## 2021-01-11 NOTE — CONSULT NOTE ADULT - ASSESSMENT
Confidential Drug Utilization Report  Search Terms: Ifrah Chaves, 1985   Search Date: 01/11/2021 16:12:15 PM   The Drug Utilization Report below displays all of the controlled substance prescriptions, if any, that your patient has filled in the last twelve months. The information displayed on this report is compiled from pharmacy submissions to the Department, and accurately reflects the information as submitted by the pharmacies.  This report was requested by: Cindy Estrada | Reference #: [id number]   There are no results for the search terms that you entered.

## 2021-01-12 ENCOUNTER — TRANSCRIPTION ENCOUNTER (OUTPATIENT)
Age: 36
End: 2021-01-12

## 2021-01-12 VITALS
OXYGEN SATURATION: 97 % | SYSTOLIC BLOOD PRESSURE: 162 MMHG | TEMPERATURE: 98 F | DIASTOLIC BLOOD PRESSURE: 76 MMHG | HEART RATE: 74 BPM | RESPIRATION RATE: 18 BRPM

## 2021-01-12 PROCEDURE — 36415 COLL VENOUS BLD VENIPUNCTURE: CPT

## 2021-01-12 PROCEDURE — 86901 BLOOD TYPING SEROLOGIC RH(D): CPT

## 2021-01-12 PROCEDURE — C1889: CPT

## 2021-01-12 PROCEDURE — 83605 ASSAY OF LACTIC ACID: CPT

## 2021-01-12 PROCEDURE — 86850 RBC ANTIBODY SCREEN: CPT

## 2021-01-12 PROCEDURE — 93005 ELECTROCARDIOGRAM TRACING: CPT

## 2021-01-12 PROCEDURE — 88312 SPECIAL STAINS GROUP 1: CPT

## 2021-01-12 PROCEDURE — 88307 TISSUE EXAM BY PATHOLOGIST: CPT

## 2021-01-12 PROCEDURE — 99231 SBSQ HOSP IP/OBS SF/LOW 25: CPT

## 2021-01-12 PROCEDURE — 86900 BLOOD TYPING SEROLOGIC ABO: CPT

## 2021-01-12 PROCEDURE — 82550 ASSAY OF CK (CPK): CPT

## 2021-01-12 PROCEDURE — 82553 CREATINE MB FRACTION: CPT

## 2021-01-12 PROCEDURE — 84484 ASSAY OF TROPONIN QUANT: CPT

## 2021-01-12 RX ORDER — METOPROLOL TARTRATE 50 MG
1 TABLET ORAL
Qty: 0 | Refills: 0 | DISCHARGE

## 2021-01-12 RX ORDER — GABAPENTIN 400 MG/1
100 CAPSULE ORAL THREE TIMES A DAY
Refills: 0 | Status: DISCONTINUED | OUTPATIENT
Start: 2021-01-12 | End: 2021-01-12

## 2021-01-12 RX ORDER — GABAPENTIN 400 MG/1
2 CAPSULE ORAL
Qty: 18 | Refills: 0
Start: 2021-01-12 | End: 2021-01-14

## 2021-01-12 RX ORDER — ACETAMINOPHEN 500 MG
1000 TABLET ORAL ONCE
Refills: 0 | Status: DISCONTINUED | OUTPATIENT
Start: 2021-01-12 | End: 2021-01-12

## 2021-01-12 RX ORDER — ACETAMINOPHEN 500 MG
30 TABLET ORAL
Qty: 450 | Refills: 0
Start: 2021-01-12 | End: 2021-01-16

## 2021-01-12 RX ORDER — ONDANSETRON 8 MG/1
1 TABLET, FILM COATED ORAL
Qty: 90 | Refills: 0
Start: 2021-01-12 | End: 2021-02-10

## 2021-01-12 RX ORDER — OMEPRAZOLE 10 MG/1
1 CAPSULE, DELAYED RELEASE ORAL
Qty: 30 | Refills: 0
Start: 2021-01-12 | End: 2021-02-10

## 2021-01-12 RX ORDER — METOPROLOL TARTRATE 50 MG
1 TABLET ORAL
Qty: 60 | Refills: 0
Start: 2021-01-12 | End: 2021-02-10

## 2021-01-12 RX ORDER — LEVOTHYROXINE SODIUM 125 MCG
25 TABLET ORAL DAILY
Refills: 0 | Status: DISCONTINUED | OUTPATIENT
Start: 2021-01-12 | End: 2021-01-12

## 2021-01-12 RX ORDER — ACETAMINOPHEN 500 MG
1000 TABLET ORAL ONCE
Refills: 0 | Status: COMPLETED | OUTPATIENT
Start: 2021-01-12 | End: 2021-01-12

## 2021-01-12 RX ADMIN — Medication 25 MICROGRAM(S): at 14:21

## 2021-01-12 RX ADMIN — ENOXAPARIN SODIUM 40 MILLIGRAM(S): 100 INJECTION SUBCUTANEOUS at 05:23

## 2021-01-12 RX ADMIN — Medication 400 MILLIGRAM(S): at 09:06

## 2021-01-12 RX ADMIN — Medication 10 MILLIGRAM(S): at 14:22

## 2021-01-12 RX ADMIN — Medication 10 MILLIGRAM(S): at 09:06

## 2021-01-12 RX ADMIN — ONDANSETRON 4 MILLIGRAM(S): 8 TABLET, FILM COATED ORAL at 11:43

## 2021-01-12 RX ADMIN — ONDANSETRON 4 MILLIGRAM(S): 8 TABLET, FILM COATED ORAL at 01:03

## 2021-01-12 RX ADMIN — ONDANSETRON 4 MILLIGRAM(S): 8 TABLET, FILM COATED ORAL at 05:23

## 2021-01-12 RX ADMIN — Medication 5 MILLIGRAM(S): at 05:23

## 2021-01-12 RX ADMIN — GABAPENTIN 100 MILLIGRAM(S): 400 CAPSULE ORAL at 05:22

## 2021-01-12 RX ADMIN — SCOPALAMINE 1 PATCH: 1 PATCH, EXTENDED RELEASE TRANSDERMAL at 09:20

## 2021-01-12 RX ADMIN — PANTOPRAZOLE SODIUM 40 MILLIGRAM(S): 20 TABLET, DELAYED RELEASE ORAL at 11:43

## 2021-01-12 RX ADMIN — Medication 5 MILLIGRAM(S): at 11:43

## 2021-01-12 RX ADMIN — Medication 5 MILLIGRAM(S): at 01:01

## 2021-01-12 RX ADMIN — Medication 400 MILLIGRAM(S): at 06:53

## 2021-01-12 RX ADMIN — Medication 200 MILLIGRAM(S): at 02:37

## 2021-01-12 NOTE — DISCHARGE NOTE PROVIDER - HOSPITAL COURSE
34 yo male with history of Morbid Obesity, Vitamin D deficiency, HTN, Hypothyroidism, LAZARO (with CPAP), Asthma (never been intubated, last hospitalization 5/2018 ), reports the above.  Patient is now s/p Laparoscopic Sleeve Gastrectomy, on 1/11/21. Patient tolerated procedure well. Diet was advanced to clears and tolerated. patient for d/c home on clears to advanced to fulls until follow up.

## 2021-01-12 NOTE — DISCHARGE NOTE PROVIDER - NSDCCPCAREPLAN_GEN_ALL_CORE_FT
PRINCIPAL DISCHARGE DIAGNOSIS  Diagnosis: S/P laparoscopic sleeve gastrectomy  Assessment and Plan of Treatment: Please follow up with Dr. Phillips within 1 week   No heavy lifting or straining   Clear liquid diet to full liquid until follow up

## 2021-01-12 NOTE — CHART NOTE - NSCHARTNOTEFT_GEN_A_CORE
Focus note:   Admit with morbid obesity (BMI 64.1). S/p sleeve gastrectomy 1/11. Discussed with RN, pt tolerating clear liquid diet (bariatric). Pt has   discharge instruction sheet and Bariatric Surgery Nutrition Guidelines. Reviewed diet protocols, emphasis on  adequate protein/fluid intake. Discussed support groups, exercise, sleep and follow-up with JAY ARORA to monitor. NAZANIN carbajal

## 2021-01-12 NOTE — PROGRESS NOTE ADULT - PROBLEM SELECTOR PLAN 1
Pt with abdominal pain which is somatic and neuropathic in nature due to lap sleeve gastrectomy 1/11, POD#1.    Maximize non-opioid pain recommendations   Non-opioid pain recommendations   - Hold NSAIDs for at least 24 hrs   - Continue Acetaminophen 1 gram IV q 6 hours for 24 hours only. Monitor LFTs  - Continue Gabapentin 100mg po q 8 hours. Monitor renal function.   Bowel Regimen  -Per primary team due to abdominal surgery  Mild pain   - Non-pharmacological pain treatment recommendations  - Warm/ Cool packs PRN   - Repositioning, imagery, relaxation, distraction.  - Physical therapy OOB if no contraindications   Recommendations discussed with primary team and RN.

## 2021-01-12 NOTE — DISCHARGE NOTE NURSING/CASE MANAGEMENT/SOCIAL WORK - PATIENT PORTAL LINK FT
You can access the FollowMyHealth Patient Portal offered by [facility] by registering at the following website: [email or web address]. By joining [facility]s FollowMyHealth portal, you will also be able to view your health information using other applications (apps) compatible with our system.

## 2021-01-12 NOTE — DISCHARGE NOTE PROVIDER - NSDCFUSCHEDAPPT_GEN_ALL_CORE_FT
CHIQUIS BARAKAT ; 01/20/2021 ; NPP [place] [address] [facility]  CHIQUIS BARAKAT ; 01/20/2021 ; NPP Bariatric [address] [facility]  CHIQUIS BARAKAT ; 01/25/2021 ; NPP Med Int [address] [facility]  CHIQUIS BARAKAT ; 02/17/2021 ; NPP [place] [address] [facility]  CHIQUIS BARAKAT ; 02/17/2021 ; NPP Bariatric [address] [facility]  CHIQUIS BARAKAT ; 02/22/2021 ; NPP Cardio [address] [facility]  CHIQUIS BARAKAT ; 03/01/2021 ; NPP Otolaryng  [place]  CHIQUIS BARAKAT ; 03/03/2021 ; NPP Med [address] [facility]

## 2021-01-12 NOTE — PROGRESS NOTE ADULT - ATTENDING COMMENTS
Patient seen and examined by me, and discussed with the surgical team.    Subjective:  Patient reports no abdominal pain this morning.  Patient has no nausea or emesis, and pain is well-controlled. Patient has been able to tolerate sips of clear liquids as per protocol.  Patient denies chest pain, shortness of breath and has been able to ambulate and void without difficulty.       Objective:  Appears comfortable, in no distress, and is sitting up in bed.    HR 70's regular.    Abdomen soft, non-tender, non-distended.  Dressings intact with some sanguineous staining, but otherwise clean and dry.  Incisions without erythema.    Lungs clear and respiratory effort non-labored.  Proper use of incentive spirometer confirmed.      Assessment/Plan:  Patient doing well POD # 1 s/p sleeve gastrectomy.  Patient has no nausea/emesis, vitals are stable and normal, pain is well controlled.     Patient may continue with bariatric clear liquids as per protocol since patient is without nausea and is hemodynamically stable.      If able to tolerate clear liquids, remains clinically stable, and has no pain, patient may be discharged to home.    Post-op manual previously given to and reviewed with patient, including post-operative diet/progression, activity, wound care, precautions and follow-up instructions.      All of patient's questions and concerns addressed to patient's satisfaction and patient verbalized an understanding of the information discussed.

## 2021-01-12 NOTE — PHARMACOTHERAPY INTERVENTION NOTE - COMMENTS
12.5 mcg IV Push daily changed to 25 mcg oral daily (6am) now/routinE-pt.is on NG tube feedings and Protonis supension given via NG tube 12.5 mcg IV Push daily changed to 25 mcg oral daily (6am) now/routinE-pt.is on clear liqud diet and Protonix supension given orally

## 2021-01-12 NOTE — PROGRESS NOTE ADULT - SUBJECTIVE AND OBJECTIVE BOX
Source of information: CHIQUIS BARAKAT, Chart review  Patient language: English  : n/a    HPI:  36 yo male with history of Morbid Obesity, Vitamin D deficiency, HTN, Hypothyroidism, LAZARO (with CPAP), Asthma (never been intubated, last hospitalization 5/2018 ), reports the above.   He is scheduled for : Laparoscopic Sleeve Gastrectomy, on 1/11/21 (05 Jan 2021 09:50)      Patient is a 35y old  Male who presents with a chief complaint of I have too many health complications, I need to decrease my weight. Pt s/p lap sleeve gastrectomy 1/11, POD #1. Pt seen and examined at bedside. Reports pain score 3/10 and tolerable SCALE USED: (1-10 VNRS). Pt describes pain as aching, non- radiating, alleviated by pain medication, exacerbated by movement. Pt tolerating bariatric diet. Denies lethargy, nausea, vomiting, constipation, itchiness. Pt has not yet passed flatus. Patient stated goal for pain control: to be able to take deep breaths, get out of bed to chair and ambulate with tolerable pain control. Pt denies taking medications for pain at home.     PAST MEDICAL & SURGICAL HISTORY:  Gallstone    H/O hiatal hernia    H/O heartburn    Fatty liver    Hypertension    Hydrocephalus    LAZARO (obstructive sleep apnea)    Asthma  last hospitalization 2018    S/P  shunt  , Right side of head        FAMILY HISTORY:  FH: hyperlipidemia  , in mother        Social History:   [X ] Denies ETOH use, illicit drug use and smoking    Allergies    No Known Allergies        ORT Score -   Family Hx of substance abuse	Female	      Male  Alcohol 	                                           1                     3  Illegal drugs	                                   2                     3  Rx drugs                                           4 	                  4  Personal Hx of substance abuse		  Alcohol 	                                          3	                  3  Illegal drugs                                     4	                  4  Rx drugs                                            5 	                  5  Age between 16- 45 years	           1                     1  hx preadolescent sexual abuse	   3 	                  0  Psychological disease		  ADD, OCD, bipolar, schizophrenia   2	          2  Depression                                           1 	          1  Total: 1    a score of 3 or lower indicates low risk for opioid abuse		  a score of 4-7 indicates moderate risk for opioid abuse		  a score of 8 or higher indicates high risk for opioid abuse  	    REVIEW OF SYSTEMS:  CONSTITUTIONAL: No fever + fatigue  RESPIRATORY: No cough, wheezing, chills or hemoptysis; No shortness of breath  CARDIOVASCULAR: No chest pain, palpitations, dizziness, or leg swelling  GASTROINTESTINAL: + abdominal pain. No nausea, no vomiting; No diarrhea or constipation.   GENITOURINARY: No dysuria, frequency, hematuria, retention or incontinence  MUSCULOSKELETAL: No joint pain or swelling; No muscle, back, or extremity pain, no upper or lower motor strength weakness, no saddle anesthesia, bowel/bladder incontinence  NEURO: No numbness/ tingling in b/l LE   PSYCHIATRIC: No depression, anxiety, mood swings, or difficulty sleeping    PHYSICAL EXAM:  GENERAL:  Alert & Oriented X3, NAD, sleepy  CHEST/LUNG: Clear to auscultation bilaterally; No rales, rhonchi, wheezing, or rubs  HEART: Regular rate and rhythm; No murmurs, rubs, or gallops  ABDOMEN: Soft, appropriately tender, Nondistended; No bowel sounds present, + lap sleeve site dressing c/d/i   EXTREMITIES:  2+ Peripheral Pulses, No cyanosis, or edema  MUSCULOSKELETAL: Motor Strength 5/5 B/L upper and lower extremities; moves all extremities equally against gravity   SKIN: No rashes or lesions    Risk factors associated with adverse outcomes related to opioid treatment  [ ]  Concurrent benzodiazepine use  [ ]  History/ Active substance use or alcohol use disorder  [ ] Psychiatric co-morbidity  [X ] Sleep apnea  [ ] COPD  [X ] BMI> 35  [ ] Liver dysfunction  [ ] Renal dysfunction  [ ] CHF  [ ] Smoker  [ ]  Age > 60 years    [X ]  [place]  Reviewed and Copied to Chart. See below.    Plan of care and goal oriented pain management treatment options were discussed with patient and /or primary care giver; all questions and concerns were addressed and care was aligned with patient's wishes.    Educated patient on goal oriented pain management treatment options

## 2021-01-12 NOTE — PROGRESS NOTE ADULT - ASSESSMENT
Confidential Drug Utilization Report  Search Terms: Ifrah Chaves, 1985   Search Date: 01/11/2021 16:12:15 PM   The Drug Utilization Report below displays all of the controlled substance prescriptions, if any, that your patient has filled in the last twelve months. The information displayed on this report is compiled from pharmacy submissions to the Department, and accurately reflects the information as submitted by the pharmacies.  This report was requested by: Cindy Estrada | Reference #: [id number]   There are no results for the search terms that you entered.

## 2021-01-12 NOTE — DISCHARGE NOTE PROVIDER - CARE PROVIDER_API CALL
Sriram Phillips)  Surgery  [address] [address] [place] [address]  Phone: [phone]  Fax: [phone]  Follow Up Time: 1 week

## 2021-01-12 NOTE — PROGRESS NOTE ADULT - SUBJECTIVE AND OBJECTIVE BOX
35 F POD #1 from Sleeve Gastrectomy, patient doing well post operatively. Hemodynamically stable post operatively,. Pain has been well controlled, was out of bed to chair.    Vital Signs Last 24 Hrs  T(C): 36.8 (12 Jan 2021 00:00), Max: 37.1 (11 Jan 2021 15:56)  T(F): 98.3 (12 Jan 2021 00:00), Max: 98.8 (11 Jan 2021 15:56)  HR: 77 (12 Jan 2021 04:30) (77 - 99)  BP: 172/88 (12 Jan 2021 04:30) (129/68 - 172/88)  BP(mean): 103 (11 Jan 2021 15:05) (80 - 116)  RR: 18 (12 Jan 2021 04:30) (15 - 25)  SpO2: 100% (12 Jan 2021 04:30) (96% - 100%)    No acute distress, resting comfortably in bed  non labored breathing, saturating well on room air  appropiately tender post surgical, non tender,  non distended, incisional port sites are dry and intact.   Full ROM x 4   GCS 15     A/P: 35 Y M s/p Sleeve gastrectomy   -advance to clears today   -OOBTC  and ambulation  -hemodynamic monitoring   -pain control   -likely d/c today   35 F POD #1 from laparoscopic Sleeve Gastrectomy, patient doing well post operatively. Hemodynamically stable post operatively,. Pain has been well controlled, was out of bed to chair.    Vital Signs Last 24 Hrs  T(C): 36.8 (12 Jan 2021 00:00), Max: 37.1 (11 Jan 2021 15:56)  T(F): 98.3 (12 Jan 2021 00:00), Max: 98.8 (11 Jan 2021 15:56)  HR: 77 (12 Jan 2021 04:30) (77 - 99)  BP: 172/88 (12 Jan 2021 04:30) (129/68 - 172/88)  BP(mean): 103 (11 Jan 2021 15:05) (80 - 116)  RR: 18 (12 Jan 2021 04:30) (15 - 25)  SpO2: 100% (12 Jan 2021 04:30) (96% - 100%)    No acute distress, resting comfortably in bed  non labored breathing, saturating well on room air  appropiately tender post surgical, non tender,  non distended, incisional port sites are dry and intact.   Full ROM x 4   GCS 15     A/P: 35 Y M s/p Sleeve gastrectomy   -advance to clears today   -OOBTC  and ambulation  -hemodynamic monitoring   -pain control   -likely d/c today

## 2021-01-12 NOTE — DISCHARGE NOTE PROVIDER - NSDCADMDATE_GEN_ALL_CORE_FT
11-Jan-2021 03:27
Asthma    Bipolar disorder, currently in remission    Depression    Hypoparathyroidism    Hypothyroid    Obesity    Pre-diabetes    PTSD (post-traumatic stress disorder)    Seizures

## 2021-01-12 NOTE — DISCHARGE NOTE PROVIDER - NSDCMRMEDTOKEN_GEN_ALL_CORE_FT
gabapentin 250 mg/5 mL oral solution: 2 milliliter(s) orally every 8 hours  levothyroxine 25 mcg (0.025 mg) oral tablet: 1 tab(s) orally once a day  Metoprolol Tartrate 25 mg oral tablet: 1 tab(s) orally 2 times a day please crush  Multiple Vitamins oral tablet: 1 tab(s) orally once a day  omeprazole 40 mg oral delayed release capsule: 1 cap(s) orally once a day   Vitamin B12 1000 mcg oral tablet: 1 tab(s) orally once a day  Vitamin D3 1000 intl units (25 mcg) oral tablet: 1 tab(s) orally once a day  Zofran 4 mg oral tablet: 1 tab(s) orally every 8 hours, As Needed -for nausea

## 2021-01-13 ENCOUNTER — NON-APPOINTMENT (OUTPATIENT)
Age: 36
End: 2021-01-13

## 2021-01-14 LAB — SURGICAL PATHOLOGY STUDY: SIGNIFICANT CHANGE UP

## 2021-01-20 ENCOUNTER — APPOINTMENT (OUTPATIENT)
Dept: BARIATRICS | Facility: CLINIC | Age: 36
End: 2021-01-20

## 2021-01-20 ENCOUNTER — APPOINTMENT (OUTPATIENT)
Dept: SURGERY | Facility: CLINIC | Age: 36
End: 2021-01-20
Payer: MEDICAID

## 2021-01-20 VITALS
HEIGHT: 66 IN | BODY MASS INDEX: 50.62 KG/M2 | TEMPERATURE: 98.1 F | WEIGHT: 315 LBS | SYSTOLIC BLOOD PRESSURE: 134 MMHG | DIASTOLIC BLOOD PRESSURE: 84 MMHG | HEART RATE: 85 BPM

## 2021-01-20 PROBLEM — K76.0 FATTY (CHANGE OF) LIVER, NOT ELSEWHERE CLASSIFIED: Chronic | Status: ACTIVE | Noted: 2021-01-05

## 2021-01-20 PROBLEM — Z87.898 PERSONAL HISTORY OF OTHER SPECIFIED CONDITIONS: Chronic | Status: ACTIVE | Noted: 2021-01-05

## 2021-01-20 PROBLEM — J45.909 UNSPECIFIED ASTHMA, UNCOMPLICATED: Chronic | Status: ACTIVE | Noted: 2019-03-30

## 2021-01-20 PROBLEM — K80.20 CALCULUS OF GALLBLADDER WITHOUT CHOLECYSTITIS WITHOUT OBSTRUCTION: Chronic | Status: ACTIVE | Noted: 2021-01-05

## 2021-01-20 PROBLEM — I10 ESSENTIAL (PRIMARY) HYPERTENSION: Chronic | Status: ACTIVE | Noted: 2021-01-05

## 2021-01-20 PROBLEM — Z87.19 PERSONAL HISTORY OF OTHER DISEASES OF THE DIGESTIVE SYSTEM: Chronic | Status: ACTIVE | Noted: 2021-01-05

## 2021-01-20 PROCEDURE — 99024 POSTOP FOLLOW-UP VISIT: CPT

## 2021-01-20 NOTE — HISTORY OF PRESENT ILLNESS
[Procedure: ___] : Procedure performed: [unfilled]  [Date of Surgery: ___] : Date of Surgery:   [unfilled] [Surgeon Name:   ___] : Surgeon Name: Dr. NELSON [Pre-Op Weight ___] : Pre-op weight was [unfilled] lbs [de-identified] : Mr. BARAKAT  is s/p Laparoscopic sleeve gastrectomy on 01/11/2021.   Patient feels well, denies fever, chills, nausea or emesis, and has been tolerating liquid diet  without difficulty. he denied nausea, emesis or reflux. Patient has no chest pain or shortness of breath. Patient had no complaints at this time. he is reporting appropriate rapid and prolonged satiety. Patient reports good bowel movements. he reports compliance with protein intake. he reports drinking small amount of liquids.   he denies hospital visits after the surgery.  He feels well overall and is very happy with his progress so far.\par

## 2021-01-20 NOTE — CONSULT LETTER
[Dear  ___] : Dear  [unfilled], [Courtesy Letter:] : I had the pleasure of seeing your patient, [unfilled], in my office today. [Please see my note below.] : Please see my note below. [Consult Closing:] : Thank you very much for allowing me to participate in the care of this patient.  If you have any questions, please do not hesitate to contact me. [Sincerely,] : Sincerely, [FreeTextEntry3] : Sriram

## 2021-01-20 NOTE — PHYSICAL EXAM
[Obese, well nourished, in no acute distress] : obese, well nourished, in no acute distress [Normal] : grossly intact [de-identified] : His breathing is nonlabored, he is not tachypneic. [de-identified] : Obese, protuberant. Soft, nontender, nondistended. No masses, rebound or guarding. Incisions well-approximated, without erythema or drainage.  No hernias palpable. [de-identified] : No jaundice

## 2021-01-20 NOTE — REVIEW OF SYSTEMS
[Negative] : Allergic/Immunologic [Patient Intake Form Reviewed] : Patient intake form was reviewed [Fever] : no fever [Chills] : no chills [Fatigue] : no fatigue [Dysphagia] : no dysphagia [Odynophagia] : no odynophagia [Chest Pain] : no chest pain [Palpitations] : no palpitations [Shortness Of Breath] : no shortness of breath [Wheezing] : no wheezing [Cough] : no cough [Abdominal Pain] : no abdominal pain [Vomiting] : no vomiting [Constipation] : no constipation [Reflux/Heartburn] : no reflex/heartburn [Hernia] : no hernia [Dysuria] : no dysuria

## 2021-01-20 NOTE — PLAN
[FreeTextEntry1] : I reviewed importance of behavioral modification and follow-up in order to optimize outcomes and avoid complications. Post-operative nutrition again reviewed and reinforced, including the importance of taking supplements, making healthy food choices. The importance of maintaining regular exercise/physical activity to maximize progress also reinforced. Recommend patient to see nutritionist and attend support groups. Patient's questions and concerns addressed to patient's satisfaction. \par He will return to see me in 1 month.

## 2021-01-20 NOTE — ASSESSMENT
[FreeTextEntry1] : Mr. BARAKAT is doing well, with excellent post-operative recovery. All surgical incisions are healing well and as expected. There is no evidence of infection or complication, and he is progressing as expected. Post-operative wound care, activity, restrictions and precautions reinforced. Patient instructed to refrain from any heavy lifting greater than 10-15 pounds for at least 4 weeks post-operatively. pathology results were discussed in details.  Patient's questions and concerns addressed to patient's satisfaction.

## 2021-01-20 NOTE — DATA REVIEWED
[FreeTextEntry1] : MANIGAYATHRICHIQUIS ALCALA                       2\par \par \par \par Surgical Final Report\par \par \par \par \par Final Diagnosis\par Stomach; sleeve gastrectomy:\par Portion of stomach with predominantly oxyntic mucosa showing mild\par chronic inflammation\par -H. Pylori are not identified with Cresyl Violet stain\par \par Verified by: Sharmin Koch M.D.\par (Electronic Signature)\par Reported on: 01/14/21 11:18 EST, [facility],\par [address] [place] [address]\par Phone: [phone]   Fax: [phone]\par _________________________________________________________________\par \par Clinical History\par margin obesity due to excess calories

## 2021-01-25 ENCOUNTER — APPOINTMENT (OUTPATIENT)
Dept: INTERNAL MEDICINE | Facility: CLINIC | Age: 36
End: 2021-01-25

## 2021-02-05 ENCOUNTER — RX RENEWAL (OUTPATIENT)
Age: 36
End: 2021-02-05

## 2021-02-17 ENCOUNTER — APPOINTMENT (OUTPATIENT)
Dept: BARIATRICS | Facility: CLINIC | Age: 36
End: 2021-02-17

## 2021-02-17 ENCOUNTER — APPOINTMENT (OUTPATIENT)
Dept: SURGERY | Facility: CLINIC | Age: 36
End: 2021-02-17
Payer: MEDICAID

## 2021-02-17 VITALS
BODY MASS INDEX: 50.62 KG/M2 | WEIGHT: 315 LBS | HEIGHT: 66 IN | HEART RATE: 68 BPM | SYSTOLIC BLOOD PRESSURE: 126 MMHG | DIASTOLIC BLOOD PRESSURE: 80 MMHG | TEMPERATURE: 98.2 F

## 2021-02-17 PROCEDURE — 99024 POSTOP FOLLOW-UP VISIT: CPT

## 2021-02-17 NOTE — ASSESSMENT
[FreeTextEntry1] : Patient with history of severe obesity s/p sleeve gastrectomy. \par \par Patient is doing well since the prior visit, with excellent interval and overall  progress.  Patient is tolerating an appropriate portion controlled and nutritionally balanced diet without difficulty.  \par \par

## 2021-02-17 NOTE — PLAN
[FreeTextEntry1] : I reviewed importance of behavioral modification and follow-up in order to optimize outcomes and avoid complications. Post-operative nutrition again reviewed and reinforced, including the importance of taking supplements, making healthy food choices.  \par The importance of maintaining regular exercise/physical activity to maximize progress also reinforced. \par \par Patient to see nutritionist today. \par \par Patient's questions and concerns addressed to patient's satisfaction. \par \par He will return to see me in one month.

## 2021-02-17 NOTE — REASON FOR VISIT
[Post Operative Visit] : a post operative visit for [S/P Bariatric Surgery] : s/p bariatric surgery [Parent] : parent

## 2021-02-17 NOTE — PHYSICAL EXAM
[Obese, well nourished, in no acute distress] : obese, well nourished, in no acute distress [Normal] : grossly intact [de-identified] : His breathing is nonlabored, he is not tachypneic. [de-identified] : Obese, protuberant. Soft, nontender, nondistended. No masses, rebound or guarding. Incisions well-healed.  No hernias palpable. [de-identified] : No jaundice

## 2021-02-17 NOTE — REVIEW OF SYSTEMS
[Constipation] : constipation [Negative] : Allergic/Immunologic [Fever] : no fever [Chills] : no chills [Fatigue] : no fatigue [Dysphagia] : no dysphagia [Odynophagia] : no odynophagia [Chest Pain] : no chest pain [Palpitations] : no palpitations [Shortness Of Breath] : no shortness of breath [Wheezing] : no wheezing [Cough] : no cough [Abdominal Pain] : no abdominal pain [Reflux/Heartburn] : no reflex/heartburn [Vomiting] : no vomiting [Dysuria] : no dysuria

## 2021-02-17 NOTE — HISTORY OF PRESENT ILLNESS
[de-identified] : Mr. BARAKAT  is s/p Laparoscopic sleeve gastrectomy on 01/11/2021.   Patient feels well, denies fever, chills, nausea or emesis, and has been tolerating solid foods   without difficulty. he denied nausea, emesis or reflux. Patient has no chest pain or shortness of breath. Patient had no complaints at this time. he is reporting appropriate rapid and prolonged satiety. Patient reports occasional constipations.  he reports compliance with protein intake and vitamin supplements . he reports drinking small amount of liquids.   he denies hospital visits after the surgery. \par

## 2021-02-19 ENCOUNTER — NON-APPOINTMENT (OUTPATIENT)
Age: 36
End: 2021-02-19

## 2021-02-22 ENCOUNTER — APPOINTMENT (OUTPATIENT)
Dept: CARDIOLOGY | Facility: CLINIC | Age: 36
End: 2021-02-22
Payer: MEDICAID

## 2021-02-22 ENCOUNTER — NON-APPOINTMENT (OUTPATIENT)
Age: 36
End: 2021-02-22

## 2021-02-22 VITALS
HEIGHT: 66 IN | WEIGHT: 315 LBS | BODY MASS INDEX: 50.62 KG/M2 | SYSTOLIC BLOOD PRESSURE: 132 MMHG | DIASTOLIC BLOOD PRESSURE: 81 MMHG | RESPIRATION RATE: 16 BRPM | TEMPERATURE: 97.8 F | HEART RATE: 67 BPM | OXYGEN SATURATION: 96 %

## 2021-02-22 PROCEDURE — 93000 ELECTROCARDIOGRAM COMPLETE: CPT

## 2021-02-22 PROCEDURE — 99072 ADDL SUPL MATRL&STAF TM PHE: CPT

## 2021-02-22 PROCEDURE — 99214 OFFICE O/P EST MOD 30 MIN: CPT

## 2021-02-22 RX ORDER — HYDRALAZINE HYDROCHLORIDE 10 MG/1
10 TABLET ORAL EVERY 6 HOURS
Refills: 0 | Status: DISCONTINUED | COMMUNITY
End: 2021-02-22

## 2021-02-23 ENCOUNTER — NON-APPOINTMENT (OUTPATIENT)
Age: 36
End: 2021-02-23

## 2021-03-01 ENCOUNTER — APPOINTMENT (OUTPATIENT)
Dept: OTOLARYNGOLOGY | Facility: CLINIC | Age: 36
End: 2021-03-01

## 2021-03-02 ENCOUNTER — APPOINTMENT (OUTPATIENT)
Dept: OPHTHALMOLOGY | Facility: CLINIC | Age: 36
End: 2021-03-02

## 2021-03-03 ENCOUNTER — RESULT REVIEW (OUTPATIENT)
Age: 36
End: 2021-03-03

## 2021-03-03 ENCOUNTER — APPOINTMENT (OUTPATIENT)
Dept: INTERNAL MEDICINE | Facility: CLINIC | Age: 36
End: 2021-03-03
Payer: MEDICAID

## 2021-03-03 VITALS
WEIGHT: 315 LBS | HEART RATE: 61 BPM | OXYGEN SATURATION: 96 % | BODY MASS INDEX: 50.62 KG/M2 | TEMPERATURE: 97.7 F | HEIGHT: 66 IN | SYSTOLIC BLOOD PRESSURE: 131 MMHG | DIASTOLIC BLOOD PRESSURE: 77 MMHG

## 2021-03-03 DIAGNOSIS — R19.7 DIARRHEA, UNSPECIFIED: ICD-10-CM

## 2021-03-03 DIAGNOSIS — Z01.810 ENCOUNTER FOR PREPROCEDURAL CARDIOVASCULAR EXAMINATION: ICD-10-CM

## 2021-03-03 DIAGNOSIS — Z01.818 ENCOUNTER FOR OTHER PREPROCEDURAL EXAMINATION: ICD-10-CM

## 2021-03-03 PROCEDURE — 99072 ADDL SUPL MATRL&STAF TM PHE: CPT

## 2021-03-03 PROCEDURE — 99214 OFFICE O/P EST MOD 30 MIN: CPT

## 2021-03-03 RX ORDER — OMEPRAZOLE 40 MG/1
40 CAPSULE, DELAYED RELEASE ORAL DAILY
Refills: 0 | Status: COMPLETED | COMMUNITY
End: 2021-03-03

## 2021-03-03 RX ORDER — ECONAZOLE NITRATE 10 MG/G
1 CREAM TOPICAL TWICE DAILY
Qty: 1 | Refills: 3 | Status: COMPLETED | COMMUNITY
Start: 2020-12-02 | End: 2021-03-03

## 2021-03-03 RX ORDER — CHOLECALCIFEROL (VITAMIN D3) 1250 MCG
1.25 MG CAPSULE ORAL
Qty: 4 | Refills: 2 | Status: COMPLETED | COMMUNITY
Start: 2020-01-08 | End: 2021-03-03

## 2021-03-03 RX ORDER — ONDANSETRON HYDROCHLORIDE 4 MG/1
4 TABLET, FILM COATED ORAL
Refills: 0 | Status: COMPLETED | COMMUNITY
End: 2021-03-03

## 2021-03-03 RX ORDER — GABAPENTIN 250 MG/5ML
250 SOLUTION ORAL EVERY 8 HOURS
Qty: 30 | Refills: 0 | Status: COMPLETED | COMMUNITY
Start: 2021-01-14 | End: 2021-03-03

## 2021-03-03 RX ORDER — GABAPENTIN 250 MG/5ML
250 SOLUTION ORAL
Refills: 0 | Status: COMPLETED | COMMUNITY
End: 2021-03-03

## 2021-03-03 NOTE — PHYSICAL EXAM
[General Appearance - Alert] : alert [General Appearance - In No Acute Distress] : in no acute distress [Sclera] : the sclera and conjunctiva were normal [Extraocular Movements] : extraocular movements were intact [Outer Ear] : the ears and nose were normal in appearance [Oropharynx] : the oropharynx was normal [Neck Appearance] : the appearance of the neck was normal [Neck Cervical Mass (___cm)] : no neck mass was observed [Jugular Venous Distention Increased] : there was no jugular-venous distention [Thyroid Diffuse Enlargement] : the thyroid was not enlarged [Thyroid Nodule] : there were no palpable thyroid nodules [Auscultation Breath Sounds / Voice Sounds] : lungs were clear to auscultation bilaterally [Heart Rate And Rhythm] : heart rate was normal and rhythm regular [Heart Sounds] : normal S1 and S2 [Heart Sounds Gallop] : no gallops [Murmurs] : no murmurs [Heart Sounds Pericardial Friction Rub] : no pericardial rub [Full Pulse] : the pedal pulses are present [Edema] : there was no peripheral edema [Obese] : obese [Normal] : normal [No Mass] : no masses were palpated [None] : no CVA tenderness [No CVA Tenderness] : no ~M costovertebral angle tenderness [No Spinal Tenderness] : no spinal tenderness [Abnormal Walk] : normal gait [Nail Clubbing] : no clubbing  or cyanosis of the fingernails [Musculoskeletal - Swelling] : no joint swelling seen [Motor Tone] : muscle strength and tone were normal [Skin Color & Pigmentation] : normal skin color and pigmentation [Skin Turgor] : normal skin turgor [] : no rash [Deep Tendon Reflexes (DTR)] : deep tendon reflexes were 2+ and symmetric [Sensation] : the sensory exam was normal to light touch and pinprick [No Focal Deficits] : no focal deficits [Oriented To Time, Place, And Person] : oriented to person, place, and time [Impaired Insight] : insight and judgment were intact [Affect] : the affect was normal [FreeTextEntry1] : surgical scars noted on abdomen

## 2021-03-03 NOTE — HISTORY OF PRESENT ILLNESS
[Heartburn] : denies heartburn [Nausea] : denies nausea [Vomiting] : denies vomiting [Diarrhea] : denies diarrhea [Constipation] : denies constipation [Yellow Skin Or Eyes (Jaundice)] : denies jaundice [Abdominal Pain] : denies abdominal pain [Abdominal Swelling] : denies abdominal swelling [Rectal Pain] : denies rectal pain [Wt Loss ___ Lbs] : recent [unfilled] ~Upound(s) weight loss [Hiatus Hernia] : hiatus hernia [Cholelithiasis] : cholelithiasis [Abdominal Surgery] : abdominal surgery [_________] : Performed [unfilled] [GERD] : no gastroesophageal reflux disease [Peptic Ulcer Disease] : no peptic ulcer disease [Pancreatitis] : no pancreatitis [Kidney Stone] : no kidney stone [Inflammatory Bowel Disease] : no inflammatory bowel disease [Irritable Bowel Syndrome] : no irritable bowel syndrome [Diverticulitis] : no diverticulitis [Alcohol Abuse] : no alcohol abuse [Malignancy] : no malignancy [Appendectomy] : no appendectomy [Cholecystectomy] : no cholecystectomy [de-identified] : Patient is a 36yoM, w/ PMH of Celiac disease, hypothyroidism, HTN, and s/p bariatric surgery, presents to the office for post-bariatric surgery. Patient reports no complications of surgery. He states that he is tolerating regular food now. He has lost 8lb since last visit. He continues to complains of left intermittent chest pain, unrelated to activities. He denies nausea, vomiting, abdominal pain, diarrhea, and constipation. Patient is blind, thus he does not know if stool is bloody or dark.  he is complaining of pain in his left shoulder as well.  it is a level 8/10 and started 6 yrs ago.   [de-identified] : birad 1

## 2021-03-03 NOTE — ASSESSMENT
[FreeTextEntry1] : 1. s/p bariatric surgery. Currently tolerating regular food. He has lost 44 lbs since surgery.  He is feeling well.  \par 2 celiac  he is avoiding gluten containing foods.   gluten-free diet is a diet that doesn't contain any gluten. Gluten is a protein that is found in wheat, rye, barley, and (sometimes) oats. Many foods, such as breads, pasta, pizza, cereals, and crackers, have gluten in them. People who are on a gluten-free diet should not eat any foods with gluten.\par \par \par Who should be on a gluten-free diet?\par People with a condition called celiac disease should be on a gluten-free diet. Celiac disease is a condition that affects the body's ability to break down certain foods. People with celiac disease get sick if they eat foods with gluten. They need to be on a strict gluten-free diet for their whole life.\par \par If you think you have celiac disease, don't start a gluten-free diet until after you are tested for the disease. That's because what you eat can affect your test results.\par \par More and more, though, people without celiac disease are eating a gluten-free diet. They might have heard that this diet can help them lose weight or feel better. It's true that a gluten-free diet can be healthy. But it also sometimes keeps people from getting all the nutrition they need. Also, some gluten-free products have a lot of sugar and calories. This can make it harder for some people to stay at a healthy weight. If you are thinking about being on a gluten-free diet, ask your doctor or nurse if it's a good choice for you.\par \par \par How do I get started?\par To get started, you will work with a dietitian (food expert) or other professional who has experience with a gluten-free diet. They will:\par \par ?Teach you which foods are fine to eat and which foods you should avoid\par \par ?Help you plan balanced meals so that you get the nutrition you need\par \par ?Give you gluten-free recipes\par \par ?Help you find gluten-free substitutes for your favorite foods (such as pasta or cookies)\par \par \par You can get advice and help from other people, too. Ask your doctor or nurse if there is a local support group for people with celiac disease. Or you can look at websites, such as www.celiac.org, www.nationalceliac.org, and www.gikids.org.\par \par It can be hard to learn how to manage a gluten-free diet, especially at first. But it usually gets easier with practice and over time.\par \par \par Which foods can I eat?\par Foods that are gluten free and fine to eat include the following (table 1):\par \par ?Rice, corn, potatoes, quinoa, millet, buckwheat, and soybeans\par \par ?Special flours, pasta, and other products made from these foods and labeled "gluten free"\par \par ?Fruits and vegetables\par \par ?Meat and eggs\par \par ?Wine and distilled alcoholic drinks, such as rum, tequila, vodka, and whiskey\par \par \par Milk, cheese, and other dairy foods are also gluten free. But many people with celiac disease have trouble digesting these foods, especially at first. Doctors usually recommend that people with celiac disease avoid eating dairy products, at least for a short time, while their intestines are healing.\par \par \par Which foods should I not eat?\par You need to avoid all foods made from or with wheat, rye, and barley (table 2). Ask your doctor or dietitian if you can eat oats.\par \par Many types of foods contain or might contain gluten, such as:\par \par ?Flour, breads, crackers, muffins, and baking mixes\par \par ?Pasta, pastries, and cereals\par \par ?Some sauces, spreads, spices, condiments, and salad dressings\par \par ?Processed meats and meat substitutes (like vegetarian burgers)\par \par ?Beers, ales, lagers, and malt vinegars\par \par \par To know exactly which foods you can eat, you will have to read ingredient labels. Foods that are labeled "gluten free" or say they are made or processed in a "gluten-free facility" are fine to eat. Foods that contain wheat are not fine to eat. If you are unsure whether a food is gluten free, call the company. Their phone number should be on the package.\par \par Some medicines (both prescription and over-the-counter) and vitamin supplements contain a small amount of gluten. But you can still take most types of pills if you have celiac disease. Check with your doctor or nurse if you are not sure.\par \par \par Will I need to take vitamins?\par You might. Celiac disease can keep your digestive system from normally absorbing the nutrients in foods. To get all the nutrients you need, your doctor or nurse might recommend that you take a daily vitamin.\par \par \par Can I eat out?\par Yes. Many restaurants now have gluten-free menus or foods. But always let the restaurant know you can't have gluten. That way, they can be extra careful when they cook your food.\par 3. Fatty Liver: The patient denies any jaundice or pruritus. The patient denies any alcohol use. The patient denies taking large doses of nonsteroidal anti-inflammatory drugs or acetaminophen. The findings are suggestive of fatty liver. The patient and I had a long discussion regarding the risks of fatty liver progressing to cirrhosis. The patient was told of the possible increased risk of developing liver failure, cirrhosis, ascites, GI bleeding secondary to varices, hepatic encephalopathy, bleeding tendencies and liver cancer. The patient was told of the importance of follow-up. The patient was advised to follow up every 6 months for blood work and imaging studies. The patient agreed and will follow up. The patient was advised to lose weight. I recommend a trial of vitamin E supplementation for the fatty liver. If the liver enzymes remain elevated, the patient may require a trial of Pioglitazone for the fatty liver. I recommend avoid alcohol and hepato-toxic agents. The patient was also advised to avoid NSAIDs, Acetaminophen and any other hepatotoxic drugs. The patient was also advised not to share needles, razors, scissors, nail clippers, etc.. The patient is to continue close follow-up in our office for blood work and exams. If the liver enzymes remain elevated, the patient may require a CT guided liver biopsy to assess the liver parenchyma and for possible treatment. We had a long discussion regarding the risks and benefits of the procedure. The patient was told of the risks of bleeding, perforation, infections, emergency surgery and missing lesions. The patient agreed and will follow-up to reassess the symptoms Patient has been counseled on life style interventions, including but not limited to: weight loss (3-5% loss of body weight might improve fat in the liver, while 7-10% needed for potential improvement of other components, including inflammation and scarring of the liver, called fibrosis); healthy diet, avoiding added sugars, sodas, avoiding saturated fats, limiting sodium, avoiding alcohol; and on the importance of regular exercise (> 150 min/week moderate intensity aerobic exercise with at least 2x/week muscle strengthening or exercise as tolerated). \par - I explained to patient the natural Hx of NAFLD. \par \par 4 breast pain  us of breast negative.  he has had cardiac evaluation  and told its not his heart by Dr Montenegro.  \par 5 shoulder pain   refer for xray and also ortho for evaluation \par

## 2021-03-04 ENCOUNTER — RX RENEWAL (OUTPATIENT)
Age: 36
End: 2021-03-04

## 2021-03-04 LAB
25(OH)D3 SERPL-MCNC: 35.2 NG/ML
CRP SERPL-MCNC: 6 MG/L
ERYTHROCYTE [SEDIMENTATION RATE] IN BLOOD BY WESTERGREN METHOD: 27 MM/HR
IGA SER QL IEP: 619 MG/DL
RHEUMATOID FACT SER QL: 15 IU/ML

## 2021-03-07 LAB
ANA SER IF-ACNC: NEGATIVE
CCP AB SER IA-ACNC: <8 UNITS
RF+CCP IGG SER-IMP: NEGATIVE
TTG IGA SER IA-ACNC: 4.8 U/ML
TTG IGA SER-ACNC: ABNORMAL
TTG IGG SER IA-ACNC: 10.9 U/ML
TTG IGG SER IA-ACNC: POSITIVE

## 2021-03-17 ENCOUNTER — APPOINTMENT (OUTPATIENT)
Dept: INTERNAL MEDICINE | Facility: CLINIC | Age: 36
End: 2021-03-17
Payer: MEDICAID

## 2021-03-17 VITALS
BODY MASS INDEX: 50.62 KG/M2 | OXYGEN SATURATION: 98 % | DIASTOLIC BLOOD PRESSURE: 76 MMHG | HEART RATE: 70 BPM | WEIGHT: 315 LBS | TEMPERATURE: 97.8 F | SYSTOLIC BLOOD PRESSURE: 119 MMHG | HEIGHT: 66 IN

## 2021-03-17 DIAGNOSIS — Z09 ENCOUNTER FOR FOLLOW-UP EXAMINATION AFTER COMPLETED TREATMENT FOR CONDITIONS OTHER THAN MALIGNANT NEOPLASM: ICD-10-CM

## 2021-03-17 PROCEDURE — 99213 OFFICE O/P EST LOW 20 MIN: CPT | Mod: 25

## 2021-03-17 PROCEDURE — 99385 PREV VISIT NEW AGE 18-39: CPT | Mod: 25

## 2021-03-17 PROCEDURE — 99072 ADDL SUPL MATRL&STAF TM PHE: CPT

## 2021-03-17 NOTE — PLAN
[FreeTextEntry1] : pt cant have anti inflammatory medication due to shunt  he has been given gabapentin but he doesn’t take it due to sedation.
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
## 2021-03-17 NOTE — PHYSICAL EXAM
[Well Developed] : well developed [Well Nourished] : well nourished [Normal Voice Quality] : was normal [Normal Verbal Skills] : the patient had normal verbal communication skills [Normal Nonverbal Skills] : normal nonverbal communication skills were demonstrated [Conjunctiva] : the conjunctiva were normal in both eyes [PERRL] : pupils were equal in size, round, and reactive to light [EOM Intact] : extraocular movements were intact [Normal Appearance] : was normal in appearance [Neck Supple] : was supple [Enlarged Diffusely] : was not enlarged [JVP Elevated ___cm] : the JVP was not elevated [Rate ___] : at [unfilled] breaths per minute [Normal Rhythm/Effort] : normal respiratory rhythm and effort [Clear Bilaterally] : the lungs were clear to auscultation bilaterally [Normal to Percussion] : the lungs were normal to percussion [5th Left ICS - MCL] : palpated at the 5th LICS in the midclavicular line [Heart Rate ___] : [unfilled] bpm [Rhythm Regular] : regular [Normal Rate] : normal [Normal S1] : normal S1 [Normal S2] : normal S2 [S3] : no S3 [S4] : no S4 [No Murmur] : no murmurs heard [No Pitting Edema] : no pitting edema present [Rt] : no varicose veins of the right leg [Lt] : no varicose veins of the left leg [Right Carotid Bruit] : no bruit heard over the right carotid [Left Carotid Bruit] : no bruit heard over the left carotid [Right Femoral Bruit] : no bruit heard over the right femoral artery [Left Femoral Bruit] : no bruit heard over the left femoral artery [2+] : left 2+ [No Abnormalities] : the abdominal aorta was not enlarged and no bruit was heard [Bruit] : no bruit heard [Examination Of The Breasts] : a normal appearance [Obese] : obese [Soft, Nontender] : the abdomen was soft and nontender [None] : no CVA tenderness [Penis Abnormality] : normal uncircumcised penis [Urinary Bladder Findings] : the bladder was normal on palpation [Scrotum] : the scrotum was normal [Testes Tenderness] : no tenderness of the testes [Testes Mass (___cm)] : there were no testicular masses [Postauricular Lymph Nodes Enlarged Bilaterally] : nodes not enlarged [Preauricular Lymph Nodes Enlarged Bilaterally] : nodes not enlarged [Submandibular Lymph Nodes Enlarged Bilaterally] : nodes not enlarged [Suboccipital Lymph Nodes Enlarged Bilaterally] : nodes not enlarged [Submental Lymph Nodes Enlarged] : nodes not enlarged [Cervical Lymph Nodes Enlarged Posterior Bilaterally] : nodes not enlarged [Cervical Lymph Nodes Enlarged Anterior Bilaterally] : nodes not enlarged [Supraclavicular Lymph Nodes Enlarged Bilaterally] : nodes not enlarged [Axillary Lymph Nodes Enlarged Bilaterally] : nodes not enlarged [Epitrochlear Lymph Nodes Enlarged Bilaterally] : nodes not enlarged [Femoral Lymph Nodes Enlarged Bilaterally] : nodes not enlarged [Inguinal Lymph Nodes Enlarged Bilaterally] : nodes not enlarged [No Lymphangitis] : no lymphangitis observed [Normal Kyphosis] : normal kyphosis [No Visual Abnormalities] : no visible abnormalities [Normal Lordosis] : normal lordosis [No Scoliosis] : no scoliosis [No Tenderness to Palpation] : no spine tenderness on palpation [No Masses] : no masses [No Pain with ROM] : no pain with motion in any direction [Intact] : all reflexes within normal limits bilaterally [Normal Station and Gait] : the gait and station were normal [Normal Motor Tone] : the muscle tone was normal [Involuntary Movements] : no involuntary movements were seen [Normal Scalp] : inspection of the scalp showed no abnormalities [Examination Of The Hair] : texture and distribution of hair was normal [Abnormal Color] : normal color and pigmentation [Complexion Medium] : medium complexion [Skin Lesions 1] : no skin lesions were observed [Tattoo - Single] : no tattoos observed [Skin Turgor Decreased] : normal skin turgor [Normal] : normal texture and mobility [Normal Mental Status] : the patient's orientation, memory, attention, language and fund of knowledge were normal [Appropriate] : appropriate [Impaired judgment] : intact judgment [Impaired Insight] : intact insight [de-identified] : teeth ffair repair tongue normal

## 2021-03-17 NOTE — COUNSELING
[Fall prevention counseling provided] : Fall prevention counseling provided [Adequate lighting] : Adequate lighting [No throw rugs] : No throw rugs [Use proper foot wear] : Use proper foot wear [Use recommended devices] : Use recommended devices [Sleep ___ hours/day] : Sleep [unfilled] hours/day [Engage in a relaxing activity] : Engage in a relaxing activity [Plan in advance] : Plan in advance [Potential consequences of obesity discussed] : Potential consequences of obesity discussed [Benefits of weight loss discussed] : Benefits of weight loss discussed [Structured Weight Management Program suggested:] : Structured weight management program suggested [Encouraged to maintain food diary] : Encouraged to maintain food diary [Encouraged to increase physical activity] : Encouraged to increase physical activity [Encouraged to use exercise tracking device] : Encouraged to use exercise tracking device [Target Wt Loss Goal ___] : Weight Loss Goals: Target weight loss goal [unfilled] lbs [Weigh Self Weekly] : weigh self weekly [Decrease Portions] : decrease portions [____ min/wk Activity] : [unfilled] min/wk activity [Keep Food Diary] : keep food diary [FreeTextEntry1] : high protein  [FreeTextEntry2] : ideal [id number] he is 89 bmi 56

## 2021-03-17 NOTE — ASSESSMENT
[FreeTextEntry1] : health  He needs dtap vaccine , and is getting covid vaccine and will hold off till vaccine  is done.   He needs eye exam and is up to date with dental .  \par 2 bmi 57  risks of obesity and need for diet and eating healthy Obesity is a complex disorder involving an excessive amount of body fat. Obesity isn't just a cosmetic concern. It increases your risk of diseases and health problems, such as heart disease, diabetes and high blood pressure.\par \par Being extremely obese means you are especially likely to have health problems related to your weight.\par \par \par The good news is that even modest weight loss can improve or prevent the health problems associated with obesity. Dietary changes, increased physical activity and behavior changes can help you lose weight. Prescription medications and weight-loss surgery are additional options for treating obesity.\par \par \par \par \par Symptoms\par \par Obesity is diagnosed when your body mass index (BMI) is 30 or higher. Your body mass index is calculated by dividing your weight in kilograms (kg) by your height in meters (m) squared. \par \par \par BMI\par \par Weight status\par \par \par Below 18.5 Underweight \par 18.5-24.9 Normal \par 25.0-29.9 Overweight \par 30.0-34.9 Obese (Class I) \par 35.0-39.9 Obese (Class II) \par 40.0 and higher Extreme obesity (Class III) \par \par For most people, BMI provides a reasonable estimate of body fat. However, BMI doesn't directly measure body fat, so some people, such as muscular athletes, may have a BMI in the obese category even though they don't have excess body fat. Ask your doctor if your BMI is a problem. \par \par When to see a doctor\par \par If you think you may be obese, and especially if you're concerned about weight-related health problems, see your doctor or health care provider. You and your provider can evaluate your health risks and discuss your weight-loss options. \par \par \par \par Causes\par \par Although there are genetic, behavioral and hormonal influences on body weight, obesity occurs when you take in more calories than you burn through exercise and normal daily activities. Your body stores these excess calories as fat.\par \par Obesity can sometimes be traced to a medical cause, such as Prader-Willi syndrome, Cushing's syndrome, and other diseases and conditions. However, these disorders are rare and, in general, the principal causes of obesity are:\par •Inactivity. If you're not very active, you don't burn as many calories. With a sedentary lifestyle, you can easily take in more calories every day than you use through exercise and normal daily activities.\par •Unhealthy diet and eating habits. Weight gain is inevitable if you regularly eat more calories than you burn. And most Americans' diets are too high in calories and are full of fast food and high-calorie beverages.\par \par Risk factors\par \par Obesity usually results from a combination of causes and contributing factors, including:\par •Genetics. Your genes may affect the amount of body fat you store, and where that fat is distributed. Genetics may also play a role in how efficiently your body converts food into energy and how your body burns calories during exercise.\par •Family lifestyle. Obesity tends to run in families. If one or both of your parents are obese, your risk of being obese is increased. That's not just because of genetics. Family members tend to share similar eating and activity habits.\par •Inactivity. If you're not very active, you don't burn as many calories. With a sedentary lifestyle, you can easily take in more calories every day than you burn through exercise and routine daily activities. Having medical problems, such as arthritis, can lead to decreased activity, which contributes to weight gain.\par •Unhealthy diet. A diet that's high in calories, lacking in fruits and vegetables, full of fast food, and laden with high-calorie beverages and oversized portions contributes to weight gain.\par •Medical problems. In some people, obesity can be traced to a medical cause, such as Prader-Willi syndrome, Cushing's syndrome and other conditions. Medical problems, such as arthritis, also can lead to decreased activity, which may result in weight gain.\par •Certain medications. Some medications can lead to weight gain if you don't compensate through diet or activity. These medications include some antidepressants, anti-seizure medications, diabetes medications, antipsychotic medications, steroids and beta blockers.\par •Social and economic issues. Research has linked social and economic factors to obesity. Avoiding obesity is difficult if you don't have safe areas to exercise. Similarly, you may not have been taught healthy ways of cooking, or you may not have money to buy healthier foods. In addition, the people you spend time with may influence your weight — you're more likely to become obese if you have obese friends or relatives.\par •Age. Obesity can occur at any age, even in young children. But as you age, hormonal changes and a less active lifestyle increase your risk of obesity. In addition, the amount of muscle in your body tends to decrease with age. This lower muscle mass leads to a decrease in metabolism. These changes also reduce calorie needs, and can make it harder to keep off excess weight. If you don't consciously control what you eat and become more physically active as you age, you'll likely gain weight.\par •Pregnancy. During pregnancy, a woman's weight necessarily increases. Some women find this weight difficult to lose after the baby is born. This weight gain may contribute to the development of obesity in women.\par •Quitting smoking. Quitting smoking is often associated with weight gain. And for some, it can lead to enough weight gain that the person becomes obese. In the long run, however, quitting smoking is still a greater benefit to your health than continuing to smoke.\par •Lack of sleep. Not getting enough sleep or getting too much sleep can cause changes in hormones that increase your appetite. You may also crave foods high in calories and carbohydrates, which can contribute to weight gain.\par \par Even if you have one or more of these risk factors, it doesn't mean that you're destined to become obese. You can counteract most risk factors through diet, physical activity and exercise, and behavior changes.\par \par Complications\par \par If you're obese, you're more likely to develop a number of potentially serious health problems, including:\par •High triglycerides and low high-density lipoprotein (HDL) cholesterol\par •Type 2 diabetes\par •High blood pressure\par •Metabolic syndrome — a combination of high blood sugar, high blood pressure, high triglycerides and low HDL cholesterol\par •Heart disease\par •Stroke\par •Cancer, including cancer of the uterus, cervix, endometrium, ovaries, breast, colon, rectum, esophagus, liver, gallbladder, pancreas, kidney and prostate\par •Breathing disorders, including sleep apnea, a potentially serious sleep disorder in which breathing repeatedly stops and starts\par •Gallbladder disease\par •Gynecological problems, such as infertility and irregular periods\par •Erectile dysfunction and sexual health issues\par •Nonalcoholic fatty liver disease, a condition in which fat builds up in the liver and can cause inflammation or scarring\par •Osteoarthritis\par \par Quality of life\par \par When you're obese, your overall quality of life may be diminished. You may not be able to do things you used to do, such as participating in enjoyable activities. You may avoid public places. Obese people may even encounter discrimination.\par \par Other weight-related issues that may affect your quality of life include:\par •Depression\par •Disability\par •Sexual problems\par •Shame and guilt\par •Social isolation\par •Lower work achievement\par \par Prevention\par \par Whether you're at risk of becoming obese, currently overweight or at a healthy weight, you can take steps to prevent unhealthy weight gain and related health problems. Not surprisingly, the steps to prevent weight gain are the same as the steps to lose weight: daily exercise, a healthy diet, and a long-term commitment to watch what you eat and drink.\par •Exercise regularly. You need to get 150 to 300 minutes of moderate-intensity activity a week to prevent weight gain. Moderately intense physical activities include fast walking and swimming.\par •Follow a healthy eating plan. Focus on low-calorie, nutrient-dense foods, such as fruits, veg\par 3 back pain   he will have referral to  neurology and nerve conduction testing and need of mri of back   xrays of lumbar spine and thoracic and cervical spine .  Unless acute low back pain is caused by a serious medical condition (which is uncommon), it typically resolves fairly quickly, even if there is a bulging or herniated disc.\par Still, low back pain can make it hard to do your usual activities, and it can be frustrating to feel like you just have to wait for it to get better. Below are some simple things you can do that may help relieve your pain.\par Remaining active — Many people are afraid that they will hurt their back further or delay recovery by remaining active. However, remaining active, to the extent that you are able, is one of the best things you can do for your back.\par If you have severe pain, you may need to rest your back for a day or so. It may be most comfortable to lie on your back with a pillow under your knees and your head and shoulders elevated. For sleeping, you may want to lie on your side with your upper knee bent and a pillow between your knees. However, prolonged bed rest is not recommended. Studies have shown that people with low back pain recover faster when they remain active. Movement helps to relieve muscle spasms and prevents loss of muscle strength.\par While you should avoid strenuous activities and sports while you are in pain, it is fine to continue doing regular day-to-day activities and light exercises, such as walking. If certain activities cause your back to hurt too much, try something else instead.\par Heat — Using a heating pad or heated wrap can help with low back pain during the first few weeks. It is not clear if cold helps as well, but some people may find that it relieves pain temporarily.\par Modifications at work — Most experts recommend that people with low back pain continue to work so long as it is possible to avoid prolonged standing or sitting and heavy lifting. If your job does not allow you to sit or stand comfortably, you may need to take some time off work while you recover. While standing at work, stepping on a block of wood with one foot (and periodically alternating the foot on the block) may be helpful.\par Pain medications — You can try taking an over-the-counter medication to help relieve pain. Nonsteroidal antiinflammatory drugs (NSAIDs), such as ibuprofen (sample brand names: Advil, Motrin) and naproxen (brand name: Aleve), may work better than acetaminophen (brand name: Tylenol) for low back pain.\par If you do take pain medication, it may be more effective to take a dose on a regular basis for three to five days, rather than using the medication only when your pain becomes unbearable. Muscle relaxants (eg, cyclobenzaprine [brand name: Flexeril]) are prescription medications; while these may help relieve back pain, they can cause drowsiness and are probably no better than ibuprofen in relieving pain. Your health care provider can talk to you about whether muscle relaxants might help in your situation. They may be helpful before bedtime when used for a short time, ie, a week or two. People who need to be alert, such as while driving or operating machinery, should not use muscle relaxants.\par Opioids (drugs derived from morphine) are not recommended for most people with back pain. In rare situations, a health care provider might prescribe them for a few days if a person has severe pain that is not responding to other treatments, but they are generally not much more effective than NSAIDs. In addition, opioids have a relatively high risk of side effects and the potential to cause harm, including the risk of dependency and abuse.\par Exercise — Starting a new exercise program immediately after a new episode of low back pain will not speed recovery from the acute episode. However, there is evidence that exercise is beneficial in people with chronic back pain. Spinal manipulation — "Spinal manipulation" is a technique sometimes used by chiropractors, physical therapists, osteopaths, massage therapists, and others to relieve back pain. It involves moving the joints of the spine beyond the normal range of motion. Studies suggest that spinal manipulation may provide modest pain relief and improved function, and it generally appears to be safe if performed by an experienced professional. If you are interested in trying this approach, talk with your health care provider about how to integrate it into your treatment plan.\par Acupuncture — Acupuncture involves inserting very fine needles into specific points, as determined by traditional Chinese maps of the body's flow of energy. There is not consistent evidence that acupuncture is effective for people with acute low back pain; however, some people find it helpful.\par Massage — There is no evidence that massage is effective in treating acute low back pain. However, you may find that it is generally relaxing and helps you feel better temporarily.\par Psychological therapy — In some cases, mental health issues can contribute to low back pain. Psychological therapy has mostly been studied in the context of treating longer-term (chronic) back pain; however, it may be beneficial for some people with acute pain as well. Other treatments — You may have heard of other treatments that claim to relieve back pain. Unfortunately, most of these have not been proven to work or are only effective in specific situations. Examples include:\par ?Injections – Some clinicians recommend injections of a local anesthetic (numbing medication) into the soft tissues of the back, although it is not clear if these injections are effective. The areas targeted by these injections are called "trigger points." Trigger-point injections may be of benefit in some people with chronic back pain, but they are typically not recommended for treating acute pain.\par Injections of a glucocorticoid (steroid) medication are sometimes recommended for people with chronic low back pain with sciatica or radiculopathy The injection is given into the epidural space, which is located below the spinal cord. Epidural glucocorticoid injections do appear to improve pain slightly at two and six weeks after the injection, but not at 3, 6, or 12 months after the injection. There is no evidence that epidural steroid injections are helpful for people with back pain without sciatica.\par ?Corsets and braces – While wearable supportive garments may claim to help relieve or prevent low back pain, these are typically not effective.\par ?Traction – Traction involves the use of weights to realign or pull the spinal column into alignment. Clinical studies have shown no benefit from traction in the treatment of acute back pain.\par ?Switching to a firmer mattress – People often wonder if sleeping on a firmer mattress can help prevent or treat low back pain. Small studies have suggested that using a less firm mattress may actually be more likely to relieve pain; however, there is not enough evidence to support switching to a specific type of sleeping surface for this reason.\par ?Methods involving energy or electricity – Other interventions include ultrasound, interferential therapy, shortwave diathermy, transcutaneous electrical nerve stimulation, and low-level laser therapy, all of which involve applying energy to the skin's surface. None of these interventions have been proven to be effective, particularly during the first four to six weeks of an episode of back pain.\par CHRONIC LOW BACK PAIN TREATMENTWhile most people recover completely from an episode of acute low back pain, some people do go on to have longer-term pain. Chronic pain is typical\par 4 celiac disease  continue gluten free diet   \par 5 hypertension well controlled  \par DIETARY SALT (SODIUM); DASH DIET AND BLOOD PRESSURE:\par To decrease the sodium in your diet: \par · Use fresh vegetables and foods as much as possible.\par · Avoid canned and processed foods. Cured meats such as chambers, ham, and sausages are high in salt.\par · Try using different herbs and spices in your cooking instead of salt.\par · In restaurants, avoid foods with sauces, cheese, and cured meats. Ask for low-sodium choices.\par To get more potassium in your diet, eat:\par · Bananas, fresh or dried apricots, peaches, citrus fruits, melons\par · Cauliflower, broccoli, tomatoes, carrots, raw spinach, beet greens, potatoes\par To get more magnesium in your diet, eat:\par · Whole grain foods, leafy green vegetables, dried fruits\par • Fish and seafood, poultry \par To get more calcium in your diet, eat:\par · Nonfat milk, yogurt, and low-fat cheeses \par · Protem and sardines\par · Cooked dried beans\par · Broccoli, kale, and bok chris\par · Tofu or soybean curd\par DASH stands for "dietary approaches to stop hypertension." The DASH diet is low in total and saturated fat. It is rich in fruits, vegetables, and low-fat dairy foods. The diet allows you to get natural fiber, calcium, and magnesium from food. It prevents or lowers high blood pressure. It can also help lower cholesterol in your blood. \par Don't change how you eat all at once. It's much more likely that you'll succeed if you make only one or two small changes at a time. Wait until those changes are a habit, then make a couple more changes. Some good starting steps include: \par Add one serving of vegetables to your meals at lunch and dinner. This is an easy way to help you get more vegetables in your diet. \par Have a piece of fruit as an afternoon or after-dinner snack. One glass of juice at breakfast is not enough fruit in your diet. \par Use half your usual amount of butter, margarine, or salad dressing. \par Buy nonfat salad dressing or nonfat sour cream.\par Follow this guide to select your menu of meals. The number of calories we want you to eat each day will tell you how many servings you can choose from each food group.\par Calories: 1600 2100 2600 3100 Servings Grains 6 7 ½ 10 ½ 12 ½ Vegetables 	 4 4 ½ 5 6 Fruit 4 5 5 6 Dairy (low-fat) 2 ½ 3 3 3 ½ Meat, poultry, fish ½ 1 ½ 2 2 ½ Nuts, seeds ½ ½ ½ 1 Fats and sweets 1 ½ 2 ½ 3 4\par Grains and grain products like breads and cereals provide energy, fiber and vitamins. Whole grains have more of these nutrients. One serving equals one of the following:\par Bagel, 1/2 medium; Barley, cooked 1/2 cup; Biscuit, country style 1 medium; Bread, whole wheat, white 1 slice; Cereals, cold or cooked, 1/2 cup; Cornbread, 1 medium piece; Crackers, jasmin, 2; Crackers, saltine, 4; Dinner roll, 1medium; English muffin, ½; Hamburger bun, ½; Muffin, 1 medium; Pancake, 1 medium; Pasta, 1/2 cup cooked; Zully, 1/2 large or 1 small; Popcorn, 1 cup popped; Pretzels, 1 ounce; Rice, white, brown, or wild, 1/2 cup cooked; Tortillas, corn or flour, 1 medium; Waffle, 1 medium; Wheat germ, 1/4 cup; \par Vegetables are rich sources of potassium, magnesium, and fiber. One serving is 1/2 cup of any of the following cooked vegetables:\par Asparagus, Beans (green, yellow), Beets, Broccoli, Davey Sprouts, Carrots, Cauliflower, [place], chicory, mustard and turnip (and other) greens, Corn, Kale, Lima beans, Mixed vegetables, Okra, Parsnips, Peas, green, Potatoes (1/2 medium or 1/2 cup mashed), Pumpkin, Rutabaga, Spaghetti or tomato sauce, Spinach, Squash (zucchini or yellow), Stewed tomatoes, Succotash, Sweet potatoes, Turnips, Yam \par Raw vegetables: Carrots,1/2 cup; Celery, 1/2 cup; Lettuce (mikayla, loose-leaf, green-leaf), 1 cup; Peppers, 1/2 cup; Spinach, 1 cup; Tomato, 1/2\par Fruits and fruit juices are important sources of potassium and magnesium. Fruits also contain fiber and are low in sodium and fat. One serving equals:\par Any fruit juice, # cup (6 ounces); Canned or frozen fruit, ½ cup (includes applesauce); Dried fruit, ¼ cup; \par Fresh fruit:\par Apple, 1 medium; Apricots, 2 medium; Banana, 1 medium; Berries, 1/2 cup; Melon, 1 wedge, or 1/2 cup; Cherries, 10; Grapefruit, 1/2; Grapes, 15; Kiwi, 1 medium; [place], 1/2 small; Nectarine, 1 medium; Orange, 1 medium; Peach, 1 medium; Pear, 1 medium; Pineapple, 1/2 cup; Plums, 2 medium; Tangerine, 1 large\par Dairy foods provide protein and calcium. Use low-fat or nonfat dairy products to cut down on fat. One serving equals:\par Skim milk, 1 cup (8 ounces); 1% low fat milk, 1 cup (8 ounces); 2% low fat milk, 1 cup (8 ounces) nonfat dry milk powder (1/3 cup); Low-fat cottage cheese, 1 cup (8 ounces); Parmesan cheese, 1 tablespoon; Mozzarella cheese, part skim, 1/4 cup (1 ounce); Low-fat cheddar cheese, 11/2 ounces; Ricotta cheese, part skim milk or nonfat, 1/4 cup (11/2 ounces); Other low fat or nonfat cheeses (11/2 oz.); Low-fat or nonfat yogurt, fruit-flavored or plain, 1 cup (8 ounces)\par Low-fat or nonfat frozen yogurt, 1/2 cup (4 ounces); Note: People who can't digest dairy products can try taking lactase enzyme pills or drops (available at drug and grocery stores) when they eat dairy. There is also milk available with the enzyme already added. Or you can buy lactose-free milk.\par Meat, poultry, and fish are good sources of protein and magnesium. One serving equals:\par Lean meat including beef, veal, or pork, 3 ounces cooked; Skinless, white meat poultry including turkey, chicken, 3 ounces; Fish and shellfish, 3 ounces cooked; Low-fat luncheon meats, 1 ounce; Egg, 1 medium; Tofu, 3 ounces\par Note: Three ounces of cooked meat is about the size of a deck of cards.\par Nuts, seeds, and legumes are rich sources of energy, magnesium, potassium, protein and fiber. Nuts and seeds are also high in fat, so portions should be small.\par Almonds, 1/3 cup; Beans such as kidney, hemphill, and navy, 1/2 cup cooked; Chickpeas and lentils, 1/2 cup cooked; Cashews, 1/3 cup; Filberts, 1/3 cup; Mixed nuts, 1/3 cup; Peanut butter, 2 tablespoons; Peanuts, 1/3 cup; Sesame seeds, 2 tablespoons; Sunflower seeds, 2 tablespoons; Tofu, regular, 3 ounces; Walnuts, 1/3 cup \par Following the above diet will give you about 27% fat in your diet. The goal is to have 30% or less of the calories you eat each day be from fat. To meet that goal, do not eat more than 2-3 servings daily of added fat. Also try to limit sweets. One serving equals:\par Butter or margarine, 1 teaspoon; Mayonnaise, 1 teaspoon; Low-fat mayonnaise, 1 tablespoon; Salad dressing, 1 tablespoon; Low-fat salad dressing, 2 tablespoons; Oil, 1 teaspoon (use olive, canola, safflower, or other vegetable oils); Candy, hard, 3 pieces; Jelly or jam, 1 tablespoon); Jell-O, 1/2 cup; Jelly beans, 1/2 ounce; Maple syrup, 1 tablespoon; Popsicle, 1; Sherbet or nonfat or low-fat frozen yogurt, 1/2 cup; Sugar, 1 tablespoon; Sugared lemonade or fruit punch, 1 cup (8 ounces); Note: Try diet fruit-flavored gelatin or frozen, canned, or fresh fruit for dessert.\par \par Small amounts of alcohol may have benefits to the heart and blood pressure. However, excess use of alcohol can cause damage to the brain, liver and other organs. It can lead to high blood pressure. Drinking more than two drinks (15 ml) every day can raise your blood pressure. 15 ml of alcohol equals: \par • one 12-ounce bottle of beer \par • a half glass (5 ounces) of wine \par • 1 ounce (one shot) of 100 proof hard liquor\par \par 6 chest discomfort he was seeing cardiologist   and no cause He was vaping daily for several months and stopped 8 months ago.  I will refer to pulmonary and wants to see someone in our facility \par 7 breast discomfort  us of breast negative

## 2021-03-17 NOTE — HEALTH RISK ASSESSMENT
[Fair] : ~his/her~ current health as fair  [Very Good] : ~his/her~  mood as very good [No] : No [No falls in past year] : Patient reported no falls in the past year [0] : 2) Feeling down, depressed, or hopeless: Not at all (0) [HIV test declined] : HIV test declined [Hepatitis C test offered] : Hepatitis C test offered [Handling Complex Tasks] : difficulty handling complex tasks [None] : None [With Family] : lives with family [# of Members in Household ___] :  household currently consist of [unfilled] member(s) [On disability] : on disability [High School] : high school [Single] : single [# Of Children ___] : has [unfilled] children [Feels Safe at Home] : Feels safe at home [Fully functional (bathing, dressing, toileting, transferring, walking, feeding)] : Fully functional (bathing, dressing, toileting, transferring, walking, feeding) [Smoke Detector] : smoke detector [Carbon Monoxide Detector] : carbon monoxide detector [Seat Belt] :  uses seat belt [Name: ___] : Health Care Proxy's Name: [unfilled]  [Relationship: ___] : Relationship: [unfilled] [Aggressive treatment] : aggressive treatment [FreeTextEntry1] : none  [] : No [de-identified] : Dr Montenegro cardiology  [de-identified] : walks  [de-identified] : high protein diet  [[id number]] : 0 [Change in mental status noted] : No change in mental status noted [Language] : denies difficulty with language [Behavior] : denies difficulty with behavior [Learning/Retaining New Information] : denies difficulty learning/retaining new information [Reasoning] : denies difficulty with reasoning [Spatial Ability and Orientation] : denies difficulty with spatial ability and orientation [Sexually Active] : not sexually active [Reports changes in hearing] : Reports no changes in hearing [Reports changes in vision] : Reports no changes in vision [Reports changes in dental health] : Reports no changes in dental health [Guns at Home] : no guns at home [Safety elements used in home] : no safety elements used in home [Sunscreen] : does not use sunscreen [Travel to Developing Areas] : does not  travel to developing areas [TB Exposure] : is not being exposed to tuberculosis [Caregiver Concerns] : does not have caregiver concerns [HepatitisCDate] : 1/20 [de-identified] : has problems preparing meals, housekeeping laundry and using tranportation and managing medications, and finances [de-identified] : legally blind last eye exam  has appt March  [de-identified] : last dental 6months ago  [AdvancecareDate] : 3/21

## 2021-03-22 ENCOUNTER — OUTPATIENT (OUTPATIENT)
Dept: OUTPATIENT SERVICES | Facility: HOSPITAL | Age: 36
LOS: 1 days | End: 2021-03-22
Payer: MEDICAID

## 2021-03-22 DIAGNOSIS — Z98.2 PRESENCE OF CEREBROSPINAL FLUID DRAINAGE DEVICE: Chronic | ICD-10-CM

## 2021-03-22 DIAGNOSIS — M25.512 PAIN IN LEFT SHOULDER: ICD-10-CM

## 2021-03-22 PROCEDURE — 73030 X-RAY EXAM OF SHOULDER: CPT

## 2021-03-22 PROCEDURE — 73030 X-RAY EXAM OF SHOULDER: CPT | Mod: 26,LT

## 2021-03-24 ENCOUNTER — APPOINTMENT (OUTPATIENT)
Dept: BARIATRICS | Facility: CLINIC | Age: 36
End: 2021-03-24

## 2021-03-24 ENCOUNTER — APPOINTMENT (OUTPATIENT)
Dept: SURGERY | Facility: CLINIC | Age: 36
End: 2021-03-24
Payer: MEDICAID

## 2021-03-24 VITALS
DIASTOLIC BLOOD PRESSURE: 87 MMHG | HEIGHT: 66 IN | TEMPERATURE: 97.2 F | BODY MASS INDEX: 50.62 KG/M2 | WEIGHT: 315 LBS | HEART RATE: 71 BPM | SYSTOLIC BLOOD PRESSURE: 138 MMHG

## 2021-03-24 PROCEDURE — 99024 POSTOP FOLLOW-UP VISIT: CPT

## 2021-03-24 NOTE — REVIEW OF SYSTEMS
[Negative] : Allergic/Immunologic [Constipation] : constipation [Fever] : no fever [Chills] : no chills [Fatigue] : no fatigue [Dysphagia] : no dysphagia [Odynophagia] : no odynophagia [Chest Pain] : no chest pain [Palpitations] : no palpitations [Shortness Of Breath] : no shortness of breath [Wheezing] : no wheezing [Cough] : no cough [Abdominal Pain] : no abdominal pain [Vomiting] : no vomiting [Reflux/Heartburn] : no reflex/heartburn [Dysuria] : no dysuria

## 2021-03-24 NOTE — PHYSICAL EXAM
[Obese, well nourished, in no acute distress] : obese, well nourished, in no acute distress [Normal] : grossly intact [de-identified] : bl eye blindness  [de-identified] : short [de-identified] : His breathing is nonlabored, he is not tachypneic. [de-identified] : Obese, protuberant. Soft, nontender, nondistended. No masses, rebound or guarding. Incisions well-healed.  No hernias palpable. [de-identified] : No jaundice [de-identified] : right hand minor weakness

## 2021-03-24 NOTE — ASSESSMENT
[FreeTextEntry1] : Mr. BARAKAT is doing well, with excellent post-operative recovery. All surgical incisions are healing well and as expected. There is no evidence of infection or complication, and he is progressing as expected.   Patient's questions and concerns addressed to patient's satisfaction. \par \par  \par

## 2021-03-24 NOTE — PLAN
[FreeTextEntry1] : I reviewed importance of behavioral modification and follow-up in order to optimize outcomes and avoid complications. Post-operative nutrition again reviewed and reinforced, including the importance of taking supplements, making healthy food choices. The importance of maintaining regular exercise/physical activity to maximize progress also reinforced. Recommend patient to see nutritionist and attend support groups. patient will follow up in one month or if needed.   Patient's questions and concerns addressed to patient's satisfaction.

## 2021-03-24 NOTE — HISTORY OF PRESENT ILLNESS
[Procedure: ___] : Procedure performed: [unfilled]  [Date of Surgery: ___] : Date of Surgery:   [unfilled] [Surgeon Name:   ___] : Surgeon Name: Dr. NELSON [Pre-Op Weight ___] : Pre-op weight was [unfilled] lbs [de-identified] : Mr. BARAKAT  is s/p Laparoscopic sleeve gastrectomy on 01/11/2021.   Patient feels well, denies fever, chills, nausea or emesis, and has been tolerating solid foods   without difficulty. he denied nausea, emesis or reflux. Patient has no chest pain or shortness of breath. Patient had no complaints at this time. he is reporting appropriate rapid and prolonged satiety. Patient reports occasional constipations.  he reports compliance with protein intake and vitamin supplements . he reports drinking small amount of liquids.   he denies hospital visits after the surgery. \par He is very pleased with his progress so far.  He has been going on extended walks with his mom and feels his energy is much improved.\par  [de-identified] : Patient reports no interval changes to medications, medical history or overall health status.\par

## 2021-03-29 ENCOUNTER — APPOINTMENT (OUTPATIENT)
Dept: PULMONOLOGY | Facility: CLINIC | Age: 36
End: 2021-03-29
Payer: MEDICAID

## 2021-03-29 VITALS
RESPIRATION RATE: 16 BRPM | HEART RATE: 77 BPM | TEMPERATURE: 97 F | OXYGEN SATURATION: 95 % | DIASTOLIC BLOOD PRESSURE: 81 MMHG | SYSTOLIC BLOOD PRESSURE: 130 MMHG

## 2021-03-29 PROCEDURE — 99072 ADDL SUPL MATRL&STAF TM PHE: CPT

## 2021-03-29 PROCEDURE — 99203 OFFICE O/P NEW LOW 30 MIN: CPT

## 2021-03-30 NOTE — PHYSICAL EXAM
[No Acute Distress] : no acute distress [Normal Appearance] : normal appearance [Normal Rate/Rhythm] : normal rate/rhythm [Normal S1, S2] : normal s1, s2 [No Murmurs] : no murmurs [No Resp Distress] : no resp distress [Clear to Auscultation Bilaterally] : clear to auscultation bilaterally [Benign] : benign [Not Tender] : not tender [Normal Bowel Sounds] : normal bowel sounds [No Clubbing] : no clubbing [No Edema] : no edema [No Focal Deficits] : no focal deficits [Oriented x3] : oriented x3 [Normal Affect] : normal affect [No Abnormalities] : no abnormalities [Soft] : soft [TextBox_80] : tenderness along left side of sternum, no mass

## 2021-03-30 NOTE — REVIEW OF SYSTEMS
[GERD] : gerd [Abdominal Pain] : abdominal pain [Arthralgias] : arthralgias [Fever] : no fever [Nasal Congestion] : no nasal congestion [Cough] : no cough [Dyspnea] : no dyspnea [Hay Fever] : no hay fever

## 2021-03-30 NOTE — DISCUSSION/SUMMARY
[FreeTextEntry1] : Chest wall pain:  This may be musculoskeletal in origin, probable rib or costal cartilage.  However,the prior CT scan in 2019 noted multiple subcentimeter mediastinal lymph nodes which will need further characterization. \par \par For now, I have recommended trial of analgesic, acetaminophen with warm compress\par \par He will avoid any lifting and stress to the area, avoid sleeping on left side\par \par I will obtain CT chest with contrast.\par \par  \par He will return for follow up. \par \par Lucas Emerson MD \par \par \par

## 2021-03-30 NOTE — HISTORY OF PRESENT ILLNESS
[Obstructive Sleep Apnea] : obstructive sleep apnea [Snoring] : snoring [TextBox_4] : 36 year old patient presents for evaluation of left sided chest wall pain.  He has elevated BMI and states that pain is in adipose tissue of breast and surrounding area. An US of the area was negative. He notes some swelling intermittently. It is associated with pain.  He uses topical anesthetic.  He denies dyspnea.  He has undergone bariatric surgery and has lost some weight.\par \par The problem started 12 months ago.\par \par Of note, in 2019,he underwent CT coronary angiogram.  This demonstrated multiple periaortic and paratracheal lymph nodes, all \par measuring less than 1 cm in short axis. \par \par \par Primary doctor is Dr Rios. \par \par HCP is his aunt Ruth Henry [phone]\par \par \par PSH:\par bariatric, gastric sleeve\par \par  shunt, pseudotumor cerebri\par \par \par \par PMH:\par \par hiatus hernia\par \par gallstones\par \par HTN\par fatty liver disease\par \par Sleep apnea:  he has CPAP but is not using it\par \par SH:\par \par \par \par former smoker\par \par \par smoked for 20  years\par \par stopped smoking 8 months ago\par \par ETOH:  occasional\par \par \par Occupation: no working\par \par No exposure to chemicals, dust, asbestos, mold\par \par \par ALLERGY:\par \par NKDA\par \par environmental/seasonal allergy: none\par \par \par \par Review of Systems:\par \par No rash, skin problems\par No dry eyes\par no mouth ulcers\par no sinusitis, sinus infections, nasal obstruction\par has hiatus hernia\par no dry mouth\par \par has back pain\par \par \par no asthma\par no pneumonia\par no wheeze\par no lung cancer\par \par no CAD, he had negative angiogram\par no MI\par no chest pain\par no murmur\par no CHF\par \par no edema\par \par no peptic ulcer or gastritis\par \par \par no Diabetes\par no thyroid disease\par no hyperlipidemia\par \par \par no bleeding\par \par no DVT or PE\par \par no kidney disease\par \par no stroke\par no seizure\par \par \par \par \par \par \par \par \par \par \par \par

## 2021-04-06 NOTE — PROGRESS NOTE ADULT - REASON FOR ADMISSION
10/15/19 s/p proximal shunt revision- valve now strata @ 1.0 for VPS Malfunction.
VPS Malfunction
normal

## 2021-04-07 ENCOUNTER — APPOINTMENT (OUTPATIENT)
Dept: ORTHOPEDIC SURGERY | Facility: CLINIC | Age: 36
End: 2021-04-07
Payer: MEDICAID

## 2021-04-07 VITALS — HEIGHT: 66 IN | BODY MASS INDEX: 50.62 KG/M2 | WEIGHT: 315 LBS

## 2021-04-07 DIAGNOSIS — M19.019 PRIMARY OSTEOARTHRITIS, UNSPECIFIED SHOULDER: ICD-10-CM

## 2021-04-07 DIAGNOSIS — M75.42 IMPINGEMENT SYNDROME OF LEFT SHOULDER: ICD-10-CM

## 2021-04-07 PROCEDURE — 99214 OFFICE O/P EST MOD 30 MIN: CPT | Mod: 25

## 2021-04-07 PROCEDURE — 73030 X-RAY EXAM OF SHOULDER: CPT | Mod: LT

## 2021-04-07 PROCEDURE — 99072 ADDL SUPL MATRL&STAF TM PHE: CPT

## 2021-04-07 PROCEDURE — 20611 DRAIN/INJ JOINT/BURSA W/US: CPT | Mod: LT

## 2021-04-07 NOTE — DISCUSSION/SUMMARY
[de-identified] : Left shoulder rotator cuff syndrome pseudosubluxation\par Possible history of prior distal clavicle fracture is healed\par \par I discussed my findings on history, exam and radiology.\par \par I reviewed the anatomy and function of the shoulder rotator cuff muscles and tendons, biceps tendon and labrum. Given the current findings for the patient, I recommend proceeding with non-operative management of the shoulder consisting of the following:\par \par Patient education about the shoulder motions causing pain and possibly injury for activity modification\par \par Ice or warm compress\par \par Physical therapy prescription with shoulder rotator cuff strengthening, mila-scapular stabilization, ROM stretching, mobilization, modalities, HEP\par \par Unable to prescribe NSAIDs do to shunt\par \par Procedure Note:\par \par Verbal consent was obtained for an arthrocentesis and intra-articular corticosteroid injection of the LEFT ankle, after the risks and benefits were discussed with the patient. Potential adverse effects were discussed including but not limited to bleeding, skin/joint infection, local skin reactions including bleaching, bruising, stiffness, soreness, vasovagal episodes, transient hyperglycemia, avascular necrosis, pseudo-septic type reactions, post injection joint pain, allergic reaction to product or anesthetic and other rare but potential adverse effects along with benefits including decreased pain and improved stability prior to obtaining verbal informed consent. It was also discussed that for some patients the treatment is ineffective and there are no guarantees that the patient will experience improvement as the result of the injection. In rare occasions the injection can cause worsening of pain.\par \par The use of a Sonosite 15-6 MHz linear transducer with live ultrasound guidance of the ankle was necessary given the patient's BMI and local body habitus overlying and obscuring the accurate identification of normal body bony anatomy used to identify the injection site and the depth of soft tissue envelope necessitating a longer than normal needle to reach the joint space, and to confirm the location of the needle tip and intra-articular delivery of the medication. Without the use of live ultrasound guidance the injection would have been more difficult and place the patient's neurovascular structures at risk from the longer needle needed to traverse the soft tissue envelope.\par \par Patient placed in supine position. The anteriormedial injection site was identified using the ultrasound probe to identify the Emir's notch and joint fat longitudinally. The injection site was marked and prepped with a ChloraPrep swab and anesthetized with ethylchloride skin anesthesia. Using sterile technique a 22g 1 1/2 in needle with 2 cc total of 0.5cc 1% lidocaine without epinephrine, 0.5cc 0.25% Marcaine without epinephrine and 1cc of 40mg/mL Kenalog was passed through the injection site towards Emir's notch under live ultrasound guidance and noted to penetrate the joint capsule. The medication was injected without resistance under live ultrasound visualization and noted to flow into the visualized joint space. The injection site was sterilely dressed, there was minimal blood loss. The patient tolerated this procedure without any complications done by myself. Images were recorded and saved.\par \par The patient has been advised that if they notice any worsening of symptoms or any problems to contact me and seek care from a qualified medical professional. The patient was instructed to ice the knee and take NSAID medication on an as needed basis if the patient feels discomfort.\par \par \par The patient verifies their understanding the the visit, diagnosis and plan. They agree with the treatment plan and will contact the office with any questions or problems.\par \par FU after PT completion if unimproved

## 2021-04-07 NOTE — PHYSICAL EXAM
[de-identified] : Physical Examination\par General: well nourished, in no acute distress, alert and oriented to person, place and time\par Psychiatric: normal mood and affect, no abnormal movements or speech patterns\par Eyes: vision zero + glasses\par Throat: no thyromegaly\par Lymph: no enlarged nodes, no lymphedema in extremity\par Respiratory: no wheezing, no shortness of breath with ambulation\par Cardiac: no cardiac leg swelling, 2+ peripheral pulses\par Neurology: normal gross sensation in extremities to light touch\par Abdomen: soft, non-tender, tympanic, no masses\par \par Musculoskeletal Examination\par Cervical spine	Full painless range of motion and negative Spurling's test\par \par Shoulder			Right			Left\par Appearance\par      Skin/Swelling/Deformity	normal			normal\par      Scapular Winging		-			-\par Range of Motion\par      Forward Flexion		170 / 170		170 / 170\par      Abduction			170 / 170		170 / 170\par      External Rotation		45			45\par      Internal Rotation		T10			T10\par      SAbd Ext Rotation		90			90\par      SAbd Int Rotation		80			80\par      Painful Arc			-			+\par      Crepitus			-			-\par Palpation\par      Clavicle			-			-\par      AC Joint			-			-\par      Posterior Acromion		-			+\par      Levator Scapula		-			-\par      Lateral Bursa			-			-\par      Impingement Area		-			-\par      Biceps Tendon		-			-\par      Anterior Capsule		-			-\par left shoulder tender infrapec abdominal chest wall region\par Strength Examination\par      Supraspinatous 		5+ / 0			5+ / 0\par      Infraspinatous			5+ / 0			5+ / 0\par      Subscapularis			5+ / 0			5+ / 0\par      Belly Press			5+ / 0			5+ / 0\par      Lift Off			-			-\par      Drop-Arm			-			-\par Special Examination\par      Biceps Landing's		-			-\par      Impingement Neer		-			-\par      Impingement Hawking		-			-\par \par Sensation\par      Axillary			normal			normal\par      LatAntCubBrach 		normal			normal\par      Median 			normal			normal\par      Ulnar 			normal			normal\par      Radial 			normal			normal\par Motor\par      AIN 				normal			normal\par      Ulnar 			normal			normal\par      Radial 			normal			normal\par      PIN 				normal			normal\par Pulses\par      Radial			2+			2+ [de-identified] : 4 views of the affected Left shoulder (AP, Glenoid, Y-View, Axillary)\par were ordered, obtained and evaluated by myself today and\par demonstrate:\par normal bony calcification without dislocation and no fracture\par 	Arch	2B\par 	AC Joint	mild Arthrosis\par 	GH Joint	no Arthrosis, inf pseudosublux\par 	Calcifications	none

## 2021-04-12 ENCOUNTER — APPOINTMENT (OUTPATIENT)
Dept: INTERNAL MEDICINE | Facility: CLINIC | Age: 36
End: 2021-04-12

## 2021-04-21 ENCOUNTER — APPOINTMENT (OUTPATIENT)
Dept: SURGERY | Facility: CLINIC | Age: 36
End: 2021-04-21
Payer: MEDICAID

## 2021-04-21 VITALS
HEART RATE: 65 BPM | BODY MASS INDEX: 50.62 KG/M2 | DIASTOLIC BLOOD PRESSURE: 75 MMHG | WEIGHT: 315 LBS | SYSTOLIC BLOOD PRESSURE: 115 MMHG | HEIGHT: 66 IN

## 2021-04-21 PROCEDURE — 99072 ADDL SUPL MATRL&STAF TM PHE: CPT

## 2021-04-21 PROCEDURE — 99213 OFFICE O/P EST LOW 20 MIN: CPT

## 2021-04-21 NOTE — HISTORY OF PRESENT ILLNESS
[de-identified] : Mr. BARAKAT  is s/p Laparoscopic sleeve gastrectomy on 01/11/2021.   \par \par Patient feels well overall and has no complaints at this time.  He states that he continues to have left sided chest discomfort and swelling.  He has seen a cardiologist and also had steroid injections for left shoulder pain. \par He is doing well with good interval progress and weight loss.  He is making good food choices, eating small portions and has rapid and prolonged satiety. Denies any complaints of fever, pain, diarrhea, heartburn, reflux, or vomiting. Normal bowel function. Normal urination.\par Compliant with supplements. \par Patient is engaging in regular exercise. \par He also thinks that he may need to have his CPAP settings adjusted, as he feels he is getting in too much pressure when he puts the mask on, and has not been using it for the past few weeks.\par \par Patient's medications, allergies, past medical, surgical, social and family histories were reviewed and updated as appropriate.  \par \par

## 2021-04-21 NOTE — REVIEW OF SYSTEMS
[Constipation] : constipation [Negative] : Allergic/Immunologic [Fever] : no fever [Chills] : no chills [Fatigue] : no fatigue [Dysphagia] : no dysphagia [Odynophagia] : no odynophagia [Chest Pain] : no chest pain [Palpitations] : no palpitations [Shortness Of Breath] : no shortness of breath [Wheezing] : no wheezing [Cough] : no cough [Abdominal Pain] : no abdominal pain [Vomiting] : no vomiting [Reflux/Heartburn] : no reflex/heartburn [Dysuria] : no dysuria

## 2021-04-21 NOTE — PLAN
[FreeTextEntry1] : I reviewed importance of behavioral modification and follow-up in order to optimize outcomes and avoid complications. Post-operative nutrition again reviewed and reinforced, including the importance of taking supplements, making healthy food choices.  \par The importance of maintaining regular exercise/physical activity to maximize progress also reinforced. \par \par Recommend patient to see nutritionist and attend support groups.\par \par I recommend that he make a follow-up appointment with pulmonologist, Dr. Emerson to discuss possible CPAP changes.\par \par I also gave him a referral for thoracic surgery consultation with Dr. Meri Myles.\par \par Patient's questions and concerns addressed to patient's satisfaction. \par \par He will return to see me in 3 months.

## 2021-04-21 NOTE — PHYSICAL EXAM
[Obese, well nourished, in no acute distress] : obese, well nourished, in no acute distress [Normal] : affect appropriate [de-identified] : bl eye blindness  [de-identified] : short [de-identified] : His breathing is nonlabored, he is not tachypneic. [de-identified] : Obese, protuberant. Soft, nontender, nondistended. No masses, rebound or guarding. Incisions well-healed.  No hernias palpable. [de-identified] : No jaundice [de-identified] : right hand minor weakness

## 2021-05-25 LAB
ALBUMIN SERPL ELPH-MCNC: 4.2 G/DL
ALP BLD-CCNC: 109 U/L
ALT SERPL-CCNC: 18 U/L
ANION GAP SERPL CALC-SCNC: 12 MMOL/L
AST SERPL-CCNC: 13 U/L
BILIRUB SERPL-MCNC: 1 MG/DL
BUN SERPL-MCNC: 18 MG/DL
CALCIUM SERPL-MCNC: 9.4 MG/DL
CHLORIDE SERPL-SCNC: 105 MMOL/L
CO2 SERPL-SCNC: 24 MMOL/L
CREAT SERPL-MCNC: 0.74 MG/DL
GLUCOSE SERPL-MCNC: 83 MG/DL
POTASSIUM SERPL-SCNC: 3.7 MMOL/L
PROT SERPL-MCNC: 6.9 G/DL
SODIUM SERPL-SCNC: 141 MMOL/L

## 2021-06-04 ENCOUNTER — OUTPATIENT (OUTPATIENT)
Dept: OUTPATIENT SERVICES | Facility: HOSPITAL | Age: 36
LOS: 1 days | End: 2021-06-04
Payer: MEDICAID

## 2021-06-04 ENCOUNTER — APPOINTMENT (OUTPATIENT)
Dept: CT IMAGING | Facility: HOSPITAL | Age: 36
End: 2021-06-04
Payer: MEDICAID

## 2021-06-04 ENCOUNTER — RESULT REVIEW (OUTPATIENT)
Age: 36
End: 2021-06-04

## 2021-06-04 DIAGNOSIS — Z98.2 PRESENCE OF CEREBROSPINAL FLUID DRAINAGE DEVICE: Chronic | ICD-10-CM

## 2021-06-04 DIAGNOSIS — R59.0 LOCALIZED ENLARGED LYMPH NODES: ICD-10-CM

## 2021-06-04 PROCEDURE — 71260 CT THORAX DX C+: CPT | Mod: 26

## 2021-06-04 PROCEDURE — 71260 CT THORAX DX C+: CPT

## 2021-06-07 ENCOUNTER — NON-APPOINTMENT (OUTPATIENT)
Age: 36
End: 2021-06-07

## 2021-06-17 ENCOUNTER — APPOINTMENT (OUTPATIENT)
Dept: INTERNAL MEDICINE | Facility: CLINIC | Age: 36
End: 2021-06-17
Payer: MEDICAID

## 2021-06-17 VITALS
BODY MASS INDEX: 49.5 KG/M2 | OXYGEN SATURATION: 98 % | HEIGHT: 66 IN | SYSTOLIC BLOOD PRESSURE: 121 MMHG | HEART RATE: 63 BPM | WEIGHT: 308 LBS | DIASTOLIC BLOOD PRESSURE: 83 MMHG | TEMPERATURE: 97.3 F

## 2021-06-17 DIAGNOSIS — M79.10 MYALGIA, UNSPECIFIED SITE: ICD-10-CM

## 2021-06-17 DIAGNOSIS — Z01.818 ENCOUNTER FOR OTHER PREPROCEDURAL EXAMINATION: ICD-10-CM

## 2021-06-17 DIAGNOSIS — R76.8 OTHER SPECIFIED ABNORMAL IMMUNOLOGICAL FINDINGS IN SERUM: ICD-10-CM

## 2021-06-17 PROCEDURE — 99214 OFFICE O/P EST MOD 30 MIN: CPT

## 2021-06-17 NOTE — HEALTH RISK ASSESSMENT
[] : No [No] : In the past 12 months have you used drugs other than those required for medical reasons? No [No falls in past year] : Patient reported no falls in the past year [de-identified] : walks  [de-identified] : healthy reduced calorie

## 2021-06-17 NOTE — PHYSICAL EXAM
[Normal Sclera/Conjunctiva] : normal sclera/conjunctiva [PERRL] : pupils equal round and reactive to light [Normal Oropharynx] : the oropharynx was normal [No JVD] : no jugular venous distention [Supple] : supple [Rate ___] : at [unfilled] breaths per minute [Normal Rhythm/Effort] : normal respiratory rhythm and effort [Clear Bilaterally] : the lungs were clear to auscultation bilaterally [Normal to Percussion] : the lungs were normal to percussion [Normal Rate] : normal rate  [Regular Rhythm] : with a regular rhythm [Normal S1, S2] : normal S1 and S2 [No Edema] : there was no peripheral edema [No Extremity Clubbing/Cyanosis] : no extremity clubbing/cyanosis [No Discharge] : no discharge [Obese] : obese [Soft, Nontender] : the abdomen was soft and nontender [Normal Posterior Cervical Nodes] : no posterior cervical lymphadenopathy [Normal Anterior Cervical Nodes] : no anterior cervical lymphadenopathy [No CVA Tenderness] : no CVA  tenderness [No Spinal Tenderness] : no spinal tenderness [Normal Station and Gait] : the gait and station were normal [Normal Motor Tone] : the muscle tone was normal [None] : no muscle rigidity was observed [Involuntary Movements] : no involuntary movements were seen [Coordination Grossly Intact] : coordination grossly intact [Normal] : affect was normal and insight and judgment were intact [de-identified] : scar fibroma on right calf

## 2021-06-17 NOTE — HISTORY OF PRESENT ILLNESS
[Parent] : parent [FreeTextEntry1] : chest discomfort    bariactric surg  [de-identified] : Pt had bariactric surgery and has lost 132 lbs total from 440 to 308 and is making healthy choices and would like to start to exercise.  he states he has chest pain and  has s pain in left and  radiates to left  side and lasts the enitre day and feels as if its inflamed and feels its the us breast and had evaluation with breast sonogram and wand negative  he had ct of chest and has increased ln  size  1cm  prevascular  and is borderline He has seen the cardiologist  in Feb .  he states he was heavy lifting and using a punching bag and thinks he might have torn a muscle and felt a tear  in 4/20  since then he has had the pain.  He has it daily .   He saw ortho who states he needs to see pulmonary or thoracic  .   he has stopped vaping  for 8 months.  He is to have pt for his left shoulder.  He has lower back pain  he doesn’t have numbness or weakness in his arms  or legs no paresthesia.

## 2021-06-17 NOTE — ASSESSMENT
[FreeTextEntry1] : 1 sleep apnea   He states he has not been using cpap   and would like to be retitrated.   he was told to wait due to his recent surgery.  leep apnea is associated with adverse clinical consequences which an affect most organ systems. Cardiovascular disease risk includes arrhythmias, atrial fibrillation, hypertension, coronary artery disease, and stroke. Metabolic disorders include diabetes type 2, non-alcoholic fatty liver disease. Mood disorder especially depression; and cognitive decline especially in the elderly. Associations with chronic reflux/Rojas’s esophagus some but not all inclusive. \par -Reasons include arousal consistent with hypopnea; respiratory events most prominent in REM sleep or supine position; therefore sleep staging and body position are important for accurate diagnosis and estimation of AHI. \par \par  he states the machine blow hard and might need to titrate h is settings .he ahs an appt with pulmonary and will discuss. \par 2 hpn -  well controlled  continue weight loss  and low salt diet \par 3 chest wall pain I am unsure of the cause and could be muscular and had positive rf and sed rate and will refer to rheumatologist and will do  myoglobulin and aldolase levels  and cpk .  \par 4 celiac He needs to have gluten free diet and better control of his gluten intake.  \par 5 shoulder pain he is to start pt.  \par 6 hypothyroid -  recheck his tsh level \par 7 Unless acute low back pain is caused by a serious medical condition (which is uncommon), it typically resolves fairly quickly, even if there is a bulging or herniated disc.\par Still, low back pain can make it hard to do your usual activities, and it can be frustrating to feel like you just have to wait for it to get better. Below are some simple things you can do that may help relieve your pain.\par Remaining active — Many people are afraid that they will hurt their back further or delay recovery by remaining active. However, remaining active, to the extent that you are able, is one of the best things you can do for your back.\par If you have severe pain, you may need to rest your back for a day or so. It may be most comfortable to lie on your back with a pillow under your knees and your head and shoulders elevated. For sleeping, you may want to lie on your side with your upper knee bent and a pillow between your knees. However, prolonged bed rest is not recommended. Studies have shown that people with low back pain recover faster when they remain active. Movement helps to relieve muscle spasms and prevents loss of muscle strength.\par While you should avoid strenuous activities and sports while you are in pain, it is fine to continue doing regular day-to-day activities and light exercises, such as walking. If certain activities cause your back to hurt too much, try something else instead.\par Heat — Using a heating pad or heated wrap can help with low back pain during the first few weeks. It is not clear if cold helps as well, but some people may find that it relieves pain temporarily.\par Modifications at work — Most experts recommend that people with low back pain continue to work so long as it is possible to avoid prolonged standing or sitting and heavy lifting. If your job does not allow you to sit or stand comfortably, you may need to take some time off work while you recover. While standing at work, stepping on a block of wood with one foot (and periodically alternating the foot on the block) may be helpful.\par Pain medications — You can try taking an over-the-counter medication to help relieve pain. Nonsteroidal antiinflammatory drugs (NSAIDs), such as ibuprofen (sample brand names: Advil, Motrin) and naproxen (brand name: Aleve), may work better than acetaminophen (brand name: Tylenol) for low back pain.\par If you do take pain medication, it may be more effective to take a dose on a regular basis for three to five days, rather than using the medication only when your pain becomes unbearable. Muscle relaxants (eg, cyclobenzaprine [brand name: Flexeril]) are prescription medications; while these may help relieve back pain, they can cause drowsiness and are probably no better than ibuprofen in relieving pain. Your health care provider can talk to you about whether muscle relaxants might help in your situation. They may be helpful before bedtime when used for a short time, ie, a week or two. People who need to be alert, such as while driving or operating machinery, should not use muscle relaxants.\par Opioids (drugs derived from morphine) are not recommended for most people with back pain. In rare situations, a health care provider might prescribe them for a few days if a person has severe pain that is not responding to other treatments, but they are generally not much more effective than NSAIDs. In addition, opioids have a relatively high risk of side effects and the potential to cause harm, including the risk of dependency and abuse.\par Exercise — Starting a new exercise program immediately after a new episode of low back pain will not speed recovery from the acute episode. However, there is evidence that exercise is beneficial in people with chronic back pain. Spinal manipulation — "Spinal manipulation" is a technique sometimes used by chiropractors, physical therapists, osteopaths, massage therapists, and others to relieve back pain. It involves moving the joints of the spine beyond the normal range of motion. Studies suggest that spinal manipulation may provide modest pain relief and improved function, and it generally appears to be safe if performed by an experienced professional. If you are interested in trying this approach, talk with your health care provider about how to integrate it into your treatment plan.\par Acupuncture — Acupuncture involves inserting very fine needles into specific points, as determined by traditional Chinese maps of the body's flow of energy. There is not consistent evidence that acupuncture is effective for people with acute low back pain; however, some people find it helpful.\par Massage — There is no evidence that massage is effective in treating acute low back pain. However, you may find that it is generally relaxing and helps you feel better temporarily.\par Psychological therapy — In some cases, mental health issues can contribute to low back pain. Psychological therapy has mostly been studied in the context of treating longer-term (chronic) back pain; however, it may be beneficial for some people with acute pain as well. Other treatments — You may have heard of other treatments that claim to relieve back pain. Unfortunately, most of these have not been proven to work or are only effective in specific situations. Examples include:\par ?Injections – Some clinicians recommend injections of a local anesthetic (numbing medication) into the soft tissues of the back, although it is not clear if these injections are effective. The areas targeted by these injections are called "trigger points." Trigger-point injections may be of benefit in some people with chronic back pain, but they are typically not recommended for treating acute pain.\par Injections of a glucocorticoid (steroid) medication are sometimes recommended for people with chronic low back pain with sciatica or radiculopathy The injection is given into the epidural space, which is located below the spinal cord. Epidural glucocorticoid injections do appear to improve pain slightly at two and six weeks after the injection, but not at 3, 6, or 12 months after the injection. There is no evidence that epidural steroid injections are helpful for people with back pain without sciatica.\par ?Corsets and braces – While wearable supportive garments may claim to help relieve or prevent low back pain, these are typically not effective.\par ?Traction – Traction involves the use of weights to realign or pull the spinal column into alignment. Clinical studies have shown no benefit from traction in the treatment of acute back pain.\par ?Switching to a firmer mattress – People often wonder if sleeping on a firmer mattress can help prevent or treat low back pain. Small studies have suggested that using a less firm mattress may actually be more likely to relieve pain; however, there is not enough evidence to support switching to a specific type of sleeping surface for this reason.\par ?Methods involving energy or electricity – Other interventions include ultrasound, interferential therapy, shortwave diathermy, transcutaneous electrical nerve stimulation, and low-level laser therapy, all of which involve applying energy to the skin's surface. None of these interventions have been proven to be effective, particularly during the first four to six weeks of an episode of back pain.\par CHRONIC LOW BACK PAIN TREATMENTWhile most people recover completely from an episode of acute low back pain, some people do go on to have longer-term pain. Chronic pain is typica\par 8 rotator cuff syndrome.  Many treatments for rotator cuff tendinopathy exist, but few are supported by strong scientific evidence. Below is a discussion of the general management of rotator cuff tendinopathy. Our suggested approach to the management of suspected rotator cuff injury is described below; a general discussion of treatments for tendinopathy is provided separately. (See 'Approach to management' below and "Overview of the management of overuse (persistent) tendinopathy".)\par \par Acute treatment\par \par Basic management — Initial therapy for rotator cuff tendinopathy consists of ice, rest, and nonsteroidal antiinflammatory drugs (NSAIDs) [6,11]:\par \par ?Cryotherapy – Despite a dearth of scientific research to support its use, cryotherapy is generally believed to decrease acute swelling and inflammation and to provide some analgesia [18]. Ice may be especially effective when tendinopathy is associated with surrounding inflammation [21].\par \par \par ?Rest – Rest means avoiding activities that aggravate symptoms, including all overhead activities.\par \par \par ?NSAIDs – For acute injuries, we give a short course (ie, 7 to 10 days) of scheduled NSAID therapy. Thereafter, patients may use an NSAID for occasional analgesia if they find the medication effective.\par \par \par The use of NSAIDs for tendinopathy remains controversial [18,21]. During the period of acute injury, debate continues about whether blocking the inflammatory response inhibits healing. In the treatment of chronic tendinopathy, it is unclear what benefit NSAIDs provide without evidence of an inflammatory process\par Well-performed clinical trials of physical therapy programs for rotator cuff tendinopathy are scarce [39]; rehabilitation programs are based on the available evidence and the clinical experience of the treating physician and therapist. We typically prescribe the exercises described below.\par \par ?Range of motion exercises can help to prevent shoulder stiffness and the complications of adhesive capsulitis. Generally, full range of motion should be achieved prior to strengthening exercises [6].\par \par \par ?Stretching and strengthening of the muscles of the rotator cuff are basic components of physical therapy. A systematic review of physiotherapy interventions for shoulder pain found that such exercises are effective for short term recovery and long term function [38]. The combination of mobilization (ie, not keeping the arm in a sling) and exercise showed greater benefit than exercise alone.\par \par \par ?Rotator cuff rehabilitation that focuses on the restoration of proper muscle activation and appropriate strength balance among individual muscles of the rotator cuff is important [40,41]. Exercises to strengthen the scapular stabilizers and better integrate their function with the rotator cuff are emphasized. The restoration of range of motion, strength, and coordination (ie, kinetic chain restoration) marks completion of a rehabilitation program.\par \par \par ?Eccentric exercise is the application of a load (ie, muscular contraction) during the lengthening of a muscle. Several studies suggest that eccentric exercise stimulates healing and provides effective rehabilitation of tendinopathy. Preliminary trials in rotator cuff disease suggest that this approach is beneficial, but further study is needed [41,42]. Eccentric exercise and other rehabilitation techniques using heavy loads are discussed separately. (See "Overview of the management of overuse (persistent) tendinopathy", section on 'Heavy load resistance training'.)\par \par \par ?Manual therapy, which may consist of joint mobilization, soft tissue massage and manipulation, and neurodynamic interventions, may decrease pain, but it is unclear whether this is associated with improved function [43].\par \par \par ?Aerobic fitness should be maintained by the athlete throughout the rehabilitation process and beyond.\par \par \par ?Once rehabilitation is complete, the need for ongoing exercises to prevent recurrence and maintain fitness should be emphasized [40]. Many sports place greater demands on the shoulder than activities of daily living. Rehabilitation and maintenance programs should incorporate exercises that simulate the specific demands of the athlete's sport. As examples, a  may perform a service-type motion using a five pound dumbbell, while a pitcher may perform a throwing motion using a three pound dumbbell.\par

## 2021-06-20 LAB
25(OH)D3 SERPL-MCNC: 24.5 NG/ML
FOLATE SERPL-MCNC: 2.3 NG/ML
IGA SER QL IEP: 573 MG/DL
MAGNESIUM SERPL-MCNC: 2.3 MG/DL
MYOGLOBIN SERPL-MCNC: <21 NG/ML
TTG IGA SER IA-ACNC: 4.9 U/ML
TTG IGA SER-ACNC: ABNORMAL
TTG IGG SER IA-ACNC: 7.2 U/ML
TTG IGG SER IA-ACNC: ABNORMAL
VIT B12 SERPL-MCNC: 863 PG/ML

## 2021-06-21 LAB
ALDOLASE SERPL-CCNC: 6.7 U/L
CK BB SERPL ELPH-CCNC: 0 % (ref 0–?)
CK MB CFR SERPL ELPH: 0 %
CK MM SERPL ELPH-CCNC: 100 %
CREATINE KINASE,TOTAL,SERUM: 22 U/L
MACRO TYPE 1: 0 %
MACRO TYPE 2: 0 %
SELENIUM SERPL-MCNC: 135 UG/L

## 2021-06-22 LAB — VIT B2 SERPL-MCNC: 181 UG/L

## 2021-06-25 LAB — VIT B6 SERPL-MCNC: 3.9 UG/L

## 2021-07-06 ENCOUNTER — APPOINTMENT (OUTPATIENT)
Dept: INTERNAL MEDICINE | Facility: CLINIC | Age: 36
End: 2021-07-06
Payer: MEDICAID

## 2021-07-06 VITALS
BODY MASS INDEX: 48.21 KG/M2 | DIASTOLIC BLOOD PRESSURE: 74 MMHG | HEIGHT: 66 IN | TEMPERATURE: 97.2 F | SYSTOLIC BLOOD PRESSURE: 119 MMHG | HEART RATE: 62 BPM | OXYGEN SATURATION: 98 % | WEIGHT: 300 LBS

## 2021-07-06 PROCEDURE — 99214 OFFICE O/P EST MOD 30 MIN: CPT

## 2021-07-06 NOTE — PHYSICAL EXAM
[General Appearance - Alert] : alert [General Appearance - In No Acute Distress] : in no acute distress [PERRL With Normal Accommodation] : pupils were equal in size, round, and reactive to light [Oropharynx] : the oropharynx was normal [Auscultation Breath Sounds / Voice Sounds] : lungs were clear to auscultation bilaterally [Apical Impulse] : the apical impulse was normal [Heart Rate And Rhythm] : heart rate was normal and rhythm regular [Heart Sounds] : normal S1 and S2 [Heart Sounds Gallop] : no gallops [Heart Sounds Pericardial Friction Rub] : no pericardial rub [Full Pulse] : the pedal pulses are present [Edema] : there was no peripheral edema [Bowel Sounds] : normal bowel sounds [Abdomen Soft] : soft [Abdomen Tenderness] : non-tender [Abdomen Mass (___ Cm)] : no abdominal mass palpated [FreeTextEntry1] : obese  [Cervical Lymph Nodes Enlarged Posterior Bilaterally] : posterior cervical [Cervical Lymph Nodes Enlarged Anterior Bilaterally] : anterior cervical [No CVA Tenderness] : no ~M costovertebral angle tenderness [Nail Clubbing] : no clubbing  or cyanosis of the fingernails [Musculoskeletal - Swelling] : no joint swelling seen [Skin Color & Pigmentation] : normal skin color and pigmentation [Skin Turgor] : normal skin turgor [] : no rash [Oriented To Time, Place, And Person] : oriented to person, place, and time [Impaired Insight] : insight and judgment were intact [Affect] : the affect was normal [Mood] : the mood was normal

## 2021-07-06 NOTE — ASSESSMENT
[FreeTextEntry1] : 1Celaic He needs better control and needs to take b6 and folic acid and vitD this could be caused by celiac and his bariactric surgery.  \par 2 b6 def.  Vitamin B6 consists of pyridoxine, pyridoxamine, pyridoxal, and the phosphorylated derivative of each of these compounds. Overt deficiency of vitamin B6 is probably rare, and the primary manifestations are dermatitis, glossitis, and microcytic anemia. Vitamin B6 toxicity manifests with a peripheral neuropathy, dermatoses, photosensitivity, dizziness, and nausea.\par \par Sources — Pyridoxine and pyridoxamine are predominantly found in plant foods; pyridoxal is most commonly derived from animal foods. Meats, whole grains, vegetables, and nuts are the best sources. Cooking, food processing, and storage can reduce vitamin B6 availability by 10 to 50 percent [103,104].\par \par Biochemistry\par \par ?Chemistry – Forms of vitamin B6 include pyridoxine, pyridoxal, and pyridoxamine, as well as 5' phosphates, which are the active metabolites (figure 1). These forms have similar biologic activities once they are converted into pyridoxine 5-phosphate by a hepatic-dependent process.\par \par \par ?Metabolism – Pyridoxine is converted in the liver to its active form, pyridoxine 5-phosphate. This is then catabolized into 4-pyridoxic acid, which is excreted in the urine and can be used as a marker of pyridoxine sufficiency, as outlined below.\par \par \par ?Actions – Pyridoxal phosphate is used for Balwinder base formation during the transamination of amino acids, a key step in gluconeogenesis. Pyridoxal phosphate is also involved in decarboxylation of amino acids, a key reaction in the conversion of tryptophan to niacin, heme synthesis, sphingolipid biosynthesis, neurotransmitter synthesis, immune function [105], and steroid hormone modulation. It is also a key enzyme cofactor in the transsulfuration pathway by which homocysteine is converted into cystathionine and its subsequent conversion to cysteine [106].\par \par \par Measurement — The following methods can be used to assess for vitamin B6 insufficiency:\par \par ?The mean plasma pyridoxal-5-phosphate (PLP) concentration can be measured (this is often reported as "pyridoxine" or "vitamin B6"). PLP concentrations 20 to 30 nmol/L (4.9 to 7.4 ng/mL) are generally considered marginal and >30 nmol/L (>7.4 ng/mL) are sufficient [3,107].\par \par \par ?Erythrocyte transaminase activity, with and without PLP added, has been used as a functional test of pyridoxine status and may be a more accurate reflection of vitamin B6 status in critically ill patients [107].\par \par \par ?Urinary 4-pyridoxic acid excretion greater than 3.0 mmol/day can be used as an indicator of adequate short-term vitamin B6 status (this is often reported as "urinary pyridoxic acid") [107].\par \par \par ?Xanthurenic acid is a metabolite of tryptophan, and is elevated in the setting of vitamin B6 insufficiency. Urinary excretion of xanthurenic acid is normally less than 65 mmol/day following a 2 g tryptophan load [107]. Excretion of xanthurenic acid above this threshold suggests abnormal tryptophan metabolism due to vitamin B6 insufficiency.\par \par \par Deficiency — Overt deficiencies of vitamin B6 are probably rare. Marginal deficiencies may be more common, manifested as nonspecific stomatitis, glossitis, cheilosis, irritability, confusion, and depression, and possibly peripheral neuropathy (table 3) [3,108]. Severe deficiency is associated with seborrheic dermatitis, microcytic anemia, and seizures [3]. A number of genetic syndromes affecting PLP-dependent enzymes such as homocystinuria, cystathioninuria, and xanthurenic aciduria mimic vitamin B6 deficiency. An inborn error of pyridoxine metabolism is responsible for pyridoxine-dependent epilepsy, which presents with medically refractory  seizures. (See "Treatment of  seizures", section on 'Pyridoxine or PLP responsive seizures'.)\par \par Depressed concentrations of PLP have been reported in asthma, diabetes, alcoholism, heart disease, pregnancy, breast cancer, Hodgkin lymphoma, and sickle-cell anemia [109]. Certain drugs are associated with vitamin B6 insufficiency because they interfere with pyridoxine metabolism, including isoniazid, penicillamine, hydralazine, and levodopa/carbidopa [108,110]. Cystathionine synthase is a PLP-dependent enzyme, which produces cystathionine from serine and homocysteine. As a result, vitamin B6 insufficiency can lead to elevations in plasma homocysteine concentrations, a risk factor for the development of atherosclerosis and venous thromboembolism \par 3. folic acid def Prevention of folate deficiency — Folate deficiency has become uncommon in individuals residing in countries that provide routine fortification of grains and cereals with folic acid. However, individuals with the following conditions may be at increased risk of developing folate deficiency:\par \par ?Gastrointestinal disorders that prevent absorption of dietary folates in the duodenum (eg, bariatric surgery)\par \par ?Severe malnutrition, restrictive diets, or reduced oral intake\par \par ?Chronic excessive alcohol use, which may be associated with chronic malnutrition and increased metabolic needs\par \par ?Reduced intake of green leafy vegetables if residing in a country where cereals and grains are not routinely supplemented with folic acid\par \par ?Chronic hemolytic anemia with increased red blood cell turnover\par \par ?Other conditions associated with high cellular turnover such as severe eczema\par \par \par In these cases, oral folic acid at a dose of 1 mg daily is typically sufficient to prevent deficiency from developing.\par \par Folate is also required during early embryogenesis for neural tube formation, and routine folic acid supplementation is used during pregnancy to reduce the risk of neural tube defects. This subject is discussed in detail separately. (See "Folic acid supplementation in pregnancy".)\par \par Goat milk is low in folate, and infants fed exclusively goat milk may not receive adequate folic acid [35,36]. Some powdered goat milk is supplemented with folic acid but use of a commercial infant formula is preferable. (See "Dietary recommendations for toddlers, , and school-age children".)\par \par Antimetabolites such as methotrexate act by reducing intracellular folates and cause a predictable megaloblastic anemia. In many cases, when these drugs are used to treat nonmalignant conditions, a source of folate is provided (eg, folic acid, folinic acid). Disease-specific prescribing information should be followed. Folinic acid (leucovorin) rescue after high-dose methotrexate for acute lymphoblastic leukemia and other hematologic malignancies is discussed separately. (See "Major side effects of low-dose methotrexate" and "Therapeutic use and toxicity of high-dose methotrexate".)\par \par Additional discussion of pathophysiology and other less common conditions that predispose to folate deficiency are discussed separately. (See "Causes and pathophysiology of vitamin B12 and folate deficiencies".)\par \par Treatment of folate deficiency — Folate deficiency is typically treated with oral folic acid (1 to 5 mg daily) [1]. This dose is usually sufficient even if malabsorption is present, because it is considerably in excess of the 200 mcg (0.2 mg) recommended dietary allowance (table 2). (See "Vitamin supplementation in disease prevention", section on 'Folic acid'.)\par \par For those with a reversible cause of deficiency, therapy is generally given for one to four months or until there is laboratory evidence of hematologic recovery. For those with a chronic cause of folate deficiency, therapy may be given indefinitely.\par \par Intravenous folic acid may be appropriate in certain settings, such as individuals who are unable to take an oral medication (eg, due to vomiting or obtundation) or those who have severe or symptomatic anemia due to folate deficiency and hence have a more urgent need for rapid correction.\par \par It is important to be aware that administration of folic acid can partially reverse some of the hematologic abnormalities associated with vitamin B12 deficiency; however, the neurologic manifestations of vitamin B12 deficiency are not treated by folic acid. Thus, administration of folic acid to an individual with vitamin B12 deficiency can potentially mask untreated vitamin B12 deficiency or even worsen the neurologic complications (the latter for reasons that are not entirely clear) [37]. Because of this, testing for (and treatment of) vitamin B12 deficiency may be appropriate in certain patients being treated with folic acid:\par \par ?Test for vitamin B12 deficiency in individuals with suspected folate deficiency, those with folate deficiency whose anemia and/or macrocytosis does not resolve with folic acid treatment, and/or those who develop new neurologic symptoms upon treatment with folic acid.\par \par \par ?Administer vitamin B12 to individuals with megaloblastic anemia who are being treated with folic acid before results of vitamin B12 testing are available.\par \par \par ?Administer vitamin B12 to individuals with folate deficiency who develop neurologic symptoms after treatment with folic acid. Ideally, testing for vitamin B12 deficiency is also sent, but administration of vitamin B12 should not be delayed while awaiting the results.\par \par \par Some experts advocate repeat testing for vitamin B12 deficiency in patients receiving long-term folic acid, especially if hematologic (eg, macrocytic anemia, increasing levels of serum lactate dehydrogenase) and/or neurologic worsening occur [38].\par \par Individuals without documented folate deficiency — With the exception of the preventive use of folic acid in certain populations (see 'Prevention of folate deficiency' above), we do not advocate routine administration of folic acid to individuals without documented deficiency.\par \par The use (or avoidance) of folic acid supplementation to individuals without folate deficiency to reduce the risks of cancer and heart disease are discussed separately. (See "Vitamin supplementation in disease prevention", section on 'Folic acid'.)\par 4 hpn well controlled continue  present medication.  \par 5 . dizziness can be due to his vitamin def especially b6  but also due to his medication  metoprolol .  \par 6 orthostatic hypotension  this too could be cause of his dizziness and will count to  10 sec before going from lying down to standing position.  \par 7 chest pain he has not seen cardiologist and will make appt.  it has improved his work up for  muscle disorder was negative  so far .  He states the pain is now an ache.

## 2021-07-06 NOTE — HISTORY OF PRESENT ILLNESS
[Heartburn] : denies heartburn [Nausea] : denies nausea [Vomiting] : denies vomiting [Diarrhea] : denies diarrhea [Constipation] : denies constipation [Yellow Skin Or Eyes (Jaundice)] : denies jaundice [Abdominal Pain] : denies abdominal pain [Abdominal Swelling] : denies abdominal swelling [Rectal Pain] : denies rectal pain [Wt Loss ___ Lbs] : recent [unfilled] ~Upound(s) weight loss [GERD] : no gastroesophageal reflux disease [Hiatus Hernia] : no hiatus hernia [Peptic Ulcer Disease] : no peptic ulcer disease [Pancreatitis] : no pancreatitis [Cholelithiasis] : no cholelithiasis [Kidney Stone] : no kidney stone [Inflammatory Bowel Disease] : no inflammatory bowel disease [Irritable Bowel Syndrome] : no irritable bowel syndrome [Diverticulitis] : no diverticulitis [Alcohol Abuse] : no alcohol abuse [Malignancy] : no malignancy [Abdominal Surgery] : no abdominal surgery [Appendectomy] : no appendectomy [Cholecystectomy] : no cholecystectomy [Good Compliance] : good compliance with treatment [de-identified] : P tis accompanied by his mother .  Pt has low folic acid 2.3 and he has not taken his folic acid and we discussed what the importance of taking this and def causes.  He has low b6 and didn’t take it.  he has lost 8 lbs.  he states he is sleeping on floor since his hospital bed has not arrived and when he tries to get up he feels as if he is going to faint.  he cant see   He still has chest discomfort and had palpitations.  he had normal cpk and myoglobulin and aldolase.

## 2021-07-12 ENCOUNTER — APPOINTMENT (OUTPATIENT)
Dept: NEUROLOGY | Facility: CLINIC | Age: 36
End: 2021-07-12
Payer: MEDICAID

## 2021-07-12 VITALS
DIASTOLIC BLOOD PRESSURE: 78 MMHG | SYSTOLIC BLOOD PRESSURE: 118 MMHG | HEIGHT: 66 IN | BODY MASS INDEX: 47.89 KG/M2 | WEIGHT: 298 LBS | HEART RATE: 59 BPM

## 2021-07-12 PROCEDURE — 99214 OFFICE O/P EST MOD 30 MIN: CPT

## 2021-07-12 NOTE — REASON FOR VISIT
[Initial Evaluation] : an initial evaluation [Other: _____] : [unfilled] [FreeTextEntry1] : Referred by Dr. Rios

## 2021-07-12 NOTE — ASSESSMENT
[FreeTextEntry1] : Assessment/Plan:\par  36 year old male with a hx of HTN, GERD, hypothyroidism, Obesity s/p bariatric surgery (1/11/2021), hx of Pseudotumor cerebri s/p  shunt 2019 (followed by Dr. Cruz), legally, LAZARO (not using cpap) blind is referred to neurology for evaluation of back pain. He describes a long standing hx of lower back pain, aggravated by prolonged sitting and standing. No hx of radicular pains or numbness/weakness in legs. No falls or injuries. Motor, sensory exam and reflexes intact in lower extremities. Will plan to refer him to [facility] Physical therapy. Fall precautions. Counselled on weight loss. \par \par Available imaging studies and/or blood work up were reviewed. A detailed chart review was completed prior to visit.\par \par The above plan was discussed with CHIQUIS BARAKAT in great detail.  CHIQUIS BARAKAT verbalized understanding and agrees with plan as detailed above. Patient was provided education and counselling on current diagnosis/symptoms, diagnostic work up, treatment options and potential side effects of any prescribed therapy/therapies. He was advised to call our clinic at [phone] for any new or worsening symptoms, or with any questions or concerns. In case of acute onset of neurological symptoms or worsening presentation, patient was advised to present to nearest emergency room for further evaluation. CHIQUIS BARAKAT expressed understanding and all his questions/concerns were addressed.\par \par Andressa Vail M.D\par

## 2021-07-12 NOTE — HISTORY OF PRESENT ILLNESS
[FreeTextEntry1] : HPI (initial visit Jul 12, 2021)- 35 yo male with a hx of HTN, GERD, hypothyroidism, Obesity s/p bariatric surgery (1/11/2021), hx of Pseudotumor cerebri s/p  shunt 2019 (followed by Dr. Cruz), legally, LAZARO (not using cpap) blind is referred to neurology for evaluation of back pain. \par \par He describes constant lower back pain for years. It is the mid-lower back pain. Sitting or standing for long period of the time exacerbate the pain. No shooting pains in legs. no numbness or weakness in legs. "I think it is my weight". He lost 104 pounds after Bariatric surgery in 1/2021. The back pain did not improve. He has not done physical therapy. No falls or injuries to the back. No bowel/bladder dysfunction. \par

## 2021-07-12 NOTE — PHYSICAL EXAM
[FreeTextEntry1] : PHYSICAL EXAM\par Constitutional: Alert, no acute distress, Obese\par Neck: Full range of motion\par Psychiatric: appropriate affect and mood\par Pulmonary: No respiratory distress, stable on room air\par \par NEUROLOGICAL EXAM\par Mental status: The patient is alert, attentive, and oriented.\par Speech/language: Clear and fluent with good repetition, comprehension, and naming\par Cranial nerves:\par CN II: Legally blind. Can only see some shadows OD and slight light perception OS. Pupils reactive (3 mm OD and 5 mm OD)\par CN III, IV, VI: Dysconjugate primary gaze- right eye exotropia, right beating nystagmus, no ptosis\par CN V: Facial sensation is intact to pinprick in all 3 divisions bilaterally.\par CN VII: Face is symmetric with normal eye closure and smile.\par CN VII: Hearing is normal to rubbing fingers\par CN IX, X: Palate elevates symmetrically. Phonation is normal.\par CN XI: Head turning and shoulder shrug are intact\par CN XII: Tongue is midline with normal movements and no atrophy.\par Motor: There is no pronator drift of out-stretched arms. Muscle bulk and tone are normal. Strength is full bilaterally. 5/5 muscle power at bilat: Deltoid, Biceps, Triceps, Wrist ext, Finger abd, Hip flex, Hip ext, Knee flex, Knee ext, Ankle flex, Ankle ext\par Reflexes: Reflexes are 2+ and symmetric at the biceps, knees, and ankles. Plantar responses are flexor.\par Sensory: Intact sensations to light touch in upper and lower extremities\par Gait/Stance: Posture is normal. Gait is steady with normal steps. Using a walking stick. \par \par \par \par \par

## 2021-07-26 ENCOUNTER — NON-APPOINTMENT (OUTPATIENT)
Age: 36
End: 2021-07-26

## 2021-07-26 ENCOUNTER — APPOINTMENT (OUTPATIENT)
Dept: CARDIOLOGY | Facility: CLINIC | Age: 36
End: 2021-07-26
Payer: MEDICAID

## 2021-07-26 VITALS
HEIGHT: 66 IN | DIASTOLIC BLOOD PRESSURE: 61 MMHG | TEMPERATURE: 97.3 F | BODY MASS INDEX: 46.93 KG/M2 | OXYGEN SATURATION: 95 % | WEIGHT: 292 LBS | SYSTOLIC BLOOD PRESSURE: 123 MMHG | RESPIRATION RATE: 16 BRPM | HEART RATE: 63 BPM

## 2021-07-26 PROCEDURE — 99214 OFFICE O/P EST MOD 30 MIN: CPT

## 2021-07-26 PROCEDURE — 93000 ELECTROCARDIOGRAM COMPLETE: CPT

## 2021-07-27 ENCOUNTER — APPOINTMENT (OUTPATIENT)
Dept: PULMONOLOGY | Facility: CLINIC | Age: 36
End: 2021-07-27
Payer: MEDICAID

## 2021-07-27 VITALS
DIASTOLIC BLOOD PRESSURE: 68 MMHG | TEMPERATURE: 97 F | OXYGEN SATURATION: 96 % | HEART RATE: 66 BPM | SYSTOLIC BLOOD PRESSURE: 102 MMHG | RESPIRATION RATE: 16 BRPM

## 2021-07-27 PROCEDURE — 99213 OFFICE O/P EST LOW 20 MIN: CPT

## 2021-07-27 NOTE — HISTORY OF PRESENT ILLNESS
[Obstructive Sleep Apnea] : obstructive sleep apnea [Snoring] : snoring [TextBox_4] : 36 year old patient presented for evaluation of left sided chest wall pain.  He has elevated BMI and states that pain is in adipose tissue of breast and surrounding area. An US of the area was negative. \par Of note, in 2019,he underwent CT coronary angiogram.  This demonstrated multiple periaortic and paratracheal lymph nodes, all \par measuring less than 1 cm in short axis. \par \par He underwent repeat CT of the chest on June 4, 2021 that did not demonstrate any rib, sternum or soft tissue abnormality.  Lungs were normal.  There was a borderline prevascular Lymph node 1.0 cm\par \par \par \par He is breathing well.  He still notes mild chest discomfort.\par \par He reports history of obstructive sleep apnea. He has not obtained CPAP equipment. \par \par PSH:\par bariatric, gastric sleeve\par \par  shunt, pseudotumor cerebri\par \par \par \par PMH:\par \par hiatus hernia\par \par gallstones\par \par HTN\par fatty liver disease\par \par Sleep apnea:  he has CPAP but is not using it\par \par SH:\par \par \par \par former smoker\par \par \par smoked for 20  years\par \par stopped smoking 8 months ago\par \par ETOH:  occasional\par \par \par Occupation: no working\par \par No exposure to chemicals, dust, asbestos, mold\par \par \par ALLERGY:\par \par NKDA\par \par environmental/seasonal allergy: none\par \par \par \par Review of Systems:\par \par No rash, skin problems\par No dry eyes\par no mouth ulcers\par no sinusitis, sinus infections, nasal obstruction\par has hiatus hernia\par no dry mouth\par \par has back pain\par \par \par no asthma\par no pneumonia\par no wheeze\par no lung cancer\par \par no CAD, he had negative angiogram\par no MI\par no chest pain\par no murmur\par no CHF\par \par no edema\par \par no peptic ulcer or gastritis\par \par \par no Diabetes\par no thyroid disease\par no hyperlipidemia\par \par \par no bleeding\par \par no DVT or PE\par \par no kidney disease\par \par no stroke\par no seizure\par \par \par \par \par \par \par \par \par \par \par \par

## 2021-07-27 NOTE — PHYSICAL EXAM
[No Acute Distress] : no acute distress [Normal Appearance] : normal appearance [Normal Rate/Rhythm] : normal rate/rhythm [Normal S1, S2] : normal s1, s2 [No Murmurs] : no murmurs [No Resp Distress] : no resp distress [Clear to Auscultation Bilaterally] : clear to auscultation bilaterally [No Abnormalities] : no abnormalities [Benign] : benign [Not Tender] : not tender [Soft] : soft [Normal Bowel Sounds] : normal bowel sounds [No Clubbing] : no clubbing [No Edema] : no edema [No Focal Deficits] : no focal deficits [Oriented x3] : oriented x3 [Normal Affect] : normal affect [TextBox_80] : tenderness along left side of sternum, no mass

## 2021-07-27 NOTE — DISCUSSION/SUMMARY
[FreeTextEntry1] : Chest wall pain: This may be musculoskeletal in origin, probable rib or costal cartilage. The repeat CT scan of the chest in June 2021 did not show significant lymphadenopathy nor any active pathology. \par \par For now, I have recommended trial of analgesic, acetaminophen with warm compress and rest. \par \par He will undergo repeat sleep study for CPAP titration. \par  \par He will return for follow up. \par \par Lucas Emerson MD

## 2021-07-28 ENCOUNTER — APPOINTMENT (OUTPATIENT)
Dept: INTERNAL MEDICINE | Facility: CLINIC | Age: 36
End: 2021-07-28
Payer: MEDICAID

## 2021-07-28 VITALS
HEIGHT: 66 IN | SYSTOLIC BLOOD PRESSURE: 106 MMHG | DIASTOLIC BLOOD PRESSURE: 75 MMHG | OXYGEN SATURATION: 98 % | HEART RATE: 55 BPM | TEMPERATURE: 98.2 F | BODY MASS INDEX: 47.57 KG/M2 | WEIGHT: 296 LBS

## 2021-07-28 VITALS — WEIGHT: 292 LBS | BODY MASS INDEX: 46.93 KG/M2 | HEIGHT: 66 IN

## 2021-07-28 DIAGNOSIS — E55.9 VITAMIN D DEFICIENCY, UNSPECIFIED: ICD-10-CM

## 2021-07-28 DIAGNOSIS — M75.22 BICIPITAL TENDINITIS, LEFT SHOULDER: ICD-10-CM

## 2021-07-28 PROCEDURE — 99214 OFFICE O/P EST MOD 30 MIN: CPT

## 2021-07-28 NOTE — PHYSICAL EXAM
[Normal Sclera/Conjunctiva] : normal sclera/conjunctiva [Normal Oropharynx] : the oropharynx was normal [No Edema] : there was no peripheral edema [No Extremity Clubbing/Cyanosis] : no extremity clubbing/cyanosis [Normal] : affect was normal and insight and judgment were intact [de-identified] : large pannus  [de-identified] : walks with  help for guidance  due to his  blindness.

## 2021-07-28 NOTE — HISTORY OF PRESENT ILLNESS
[Family Member] : family member [FreeTextEntry1] : fu  pt is accompanied by his mother  [de-identified] : Pt was here for  preop and then decided to cancel his dental cleaning  and will see another dentist.    Pt is feeling well and the folic acid and b6 is helping him feel much better.   His bp is well controlled.   He has seen cardiologist and pulmonary  for his chest discomfort and it is felt it is musculoskeletal and not related to his heart or lungs.  he also saw neurologist  and is to start pt  .   Pt has lost 6 lbs  and is walking with help and watching his diet .    he has decreased bp and has been on metoprolol and will decrease to 25 mg and  moniotr .

## 2021-07-28 NOTE — HEALTH RISK ASSESSMENT
[No] : In the past 12 months have you used drugs other than those required for medical reasons? No [No falls in past year] : Patient reported no falls in the past year [0] : 2) Feeling down, depressed, or hopeless: Not at all (0) [] : No [de-identified] : Dr [facility] Cardiology, Dr Emerson Pulmonary , Dr Radha tracy   neurology.   [de-identified] : walks  [de-identified] : reduced chasity  [[id number]] : 0

## 2021-07-28 NOTE — COUNSELING
[Fall prevention counseling provided] : Fall prevention counseling provided [Adequate lighting] : Adequate lighting [No throw rugs] : No throw rugs [Use proper foot wear] : Use proper foot wear [Use recommended devices] : Use recommended devices [Sleep ___ hours/day] : Sleep [unfilled] hours/day [Engage in a relaxing activity] : Engage in a relaxing activity [Plan in advance] : Plan in advance [Potential consequences of obesity discussed] : Potential consequences of obesity discussed [Benefits of weight loss discussed] : Benefits of weight loss discussed [Encouraged to maintain food diary] : Encouraged to maintain food diary [Encouraged to increase physical activity] : Encouraged to increase physical activity [____ min/wk Activity] : [unfilled] min/wk activity

## 2021-07-28 NOTE — ASSESSMENT
[FreeTextEntry1] :   Pt is legally blind and needs help with  getting to doctors appointments, laundry ,cooking shopping  dressing and showering .  he cant take public transportation.  he needs help with taking his medication .  He needs help with managing fiances , feeding  .  He presently only has a few hours a day and needs more assistance and should extend his assistance to 7 days a week for 8 hr  a day.  He will need help until he has gone to [facility]  to learn how to live with blindness  and will need to learn brail and go to college in the future and to become independent .  \par 2 chest pain   he continues to have musculoskeletal pain and is going to start Pt.  \par 3 celiac disease He is  not well controlled and I discussed gluten free diet . I discussed with his mother about cooking gluten free.  He had folic acid and b6 def and likely due to his celiac disease.  he is supplementing his diet and will have his mother reevaluate his diet .    He will see the nutritionist again and his mother will go with him so she fully understands what a gluten free diet is .  \par 4.  pseudotumor cerebri he has  shunt and is stable .  \par 5 folic acid and b6 def will retest today  .  \par 6 biceps tendinitis  fu with Dr Jones .  \par 7 bmi 47 continue weight loss  and try to walk more    Weight loss, exercise, and diet control were discussed and are highly encouraged. Treatment options were given such as, aqua therapy, and contacting a nutritionist. Recommended to use the elliptical, stationary bike, less use of treadmill. Mindful eating was explained to the patient Obesity is associated with worsening asthma, shortness of breath, and potential for cardiac disease, diabetes, and other underlying medical conditions.\par \par \par   8 hpn  well controlled and on low side will adjust dose of his medications

## 2021-07-29 LAB
25(OH)D3 SERPL-MCNC: 34.8 NG/ML
FOLATE SERPL-MCNC: 3.1 NG/ML
TSH SERPL-ACNC: 1.74 UIU/ML
VIT B12 SERPL-MCNC: 813 PG/ML

## 2021-08-03 LAB — VIT B6 SERPL-MCNC: 59.3 UG/L

## 2021-08-03 RX ORDER — PYRIDOXINE HCL (VITAMIN B6) 50 MG
50 TABLET ORAL
Qty: 90 | Refills: 2 | Status: COMPLETED | COMMUNITY
Start: 2021-06-25 | End: 2021-08-03

## 2021-08-04 ENCOUNTER — APPOINTMENT (OUTPATIENT)
Dept: SURGERY | Facility: CLINIC | Age: 36
End: 2021-08-04
Payer: MEDICAID

## 2021-08-04 VITALS
OXYGEN SATURATION: 99 % | DIASTOLIC BLOOD PRESSURE: 78 MMHG | HEART RATE: 68 BPM | HEIGHT: 66 IN | BODY MASS INDEX: 46.61 KG/M2 | TEMPERATURE: 98 F | SYSTOLIC BLOOD PRESSURE: 116 MMHG | WEIGHT: 290 LBS

## 2021-08-04 PROCEDURE — 99213 OFFICE O/P EST LOW 20 MIN: CPT

## 2021-08-04 NOTE — HISTORY OF PRESENT ILLNESS
[Procedure: ___] : Procedure performed: [unfilled]  [Date of Surgery: ___] : Date of Surgery:   [unfilled] [Surgeon Name:   ___] : Surgeon Name: Dr. NELSON [Pre-Op Weight ___] : Pre-op weight was [unfilled] lbs [de-identified] : Mr. BARAKAT  is s/p Laparoscopic sleeve gastrectomy on 01/11/2021.  Mr. CHIQUIS BARAKAT  returns with the chief complaint of having right upper quadrant and epigastric pain for 15 months.  Pain is  radiating to the back. He reports no nausea or vomiting and no history of jaundice, acholia or choluria.   Appetite is good and weight is stable.   He   has no family history of biliary tract disease.  He had an abdominal sonogram on  11/11/2020 which revealed multiple Gb stones. CBD is normal.   \par \par He continues to have epigastric and RUQ pain, and wishes to have his gallbladder removed.  \par

## 2021-08-04 NOTE — REVIEW OF SYSTEMS
[Recent Change In Weight] : ~T recent weight change [Abdominal Pain] : abdominal pain [Negative] : Allergic/Immunologic [Skin Rash] : skin rash [Fever] : no fever [Chills] : no chills [Fatigue] : no fatigue [Dysphagia] : no dysphagia [Odynophagia] : no odynophagia [Chest Pain] : no chest pain [Palpitations] : no palpitations [Shortness Of Breath] : no shortness of breath [Wheezing] : no wheezing [Cough] : no cough [Vomiting] : no vomiting [Constipation] : no constipation [Reflux/Heartburn] : no reflex/heartburn [Hernia] : no hernia [Dysuria] : no dysuria [Incontinence] : no incontinence [de-identified] : Sagging skin and occasional irritation in skin folds.

## 2021-08-04 NOTE — PHYSICAL EXAM
[Obese, well nourished, in no acute distress] : obese, well nourished, in no acute distress [Normal] : grossly intact [de-identified] : bl eye blindness  [de-identified] : short [de-identified] : His breathing is nonlabored, he is not tachypneic. [de-identified] : Obese, protuberant. Soft, nontender, nondistended. No masses, rebound or guarding. Incisions well-healed.  No hernias palpable.  Large sagging abdominal pannus.   [de-identified] : No jaundice [de-identified] : right hand minor weakness

## 2021-08-04 NOTE — ASSESSMENT
[FreeTextEntry1] : Symptomatic gallstones confirmed on imaging, and history and symptoms consistent with symptomatic cholelithiasis.\par

## 2021-08-04 NOTE — PLAN
[FreeTextEntry1] : Due to recurring symptoms, a robotic-assisted laparoscopic, possibly open cholecystectomy was recommended.  I also offered patient option of completing a full GI workup (including but not limited to further testing, imaging, MRI, endoscopy or other testing as recommended by a GI consultant), and patient stated a preference to proceed with surgery at this time due to persistence of symptoms.  The procedure, risks, benefits and alternatives to surgery, including the option of no surgery, were discussed with the patient. These included but not limited to bleeding, infection, intestinal, gastric, hepatic or biliary injury (e.g. Injury to bile ducts), biloma or leak, non-resolution of the pain, conversion to open procedure, port site or incisional hernia,  need for reoperation or other procedure, and death. The patient understands these risks and wishes to proceed with surgery.\par We will plan to proceed with surgery at the next available date, pending any required insurance pre-certification or pre-approval.  \par Patient agrees to obtain any necessary pre-operative evaluations and clearances that may be required for pre-surgical optimization.  \par Patient instructed to maintain a fat-free diet, and to seek immediate medical attention with any acute change or worsening of symptoms, including but not limited to abdominal pain, fever, chills, nausea, vomiting, or yellowing of the skin.\par Patient’s questions and concerns addressed to patient’s satisfaction. \par Patient verbalized an understanding of the information discussed.  \par \par

## 2021-08-04 NOTE — DATA REVIEWED
[FreeTextEntry1] : \par \par EXAM: US ABDOMEN COMPLETE \par \par PROCEDURE DATE: 11/11/2020 \par INTERPRETATION: CLINICAL INFORMATION: Morbid obesity; assess for hepatic steatosis.\par \par COMPARISON: CT abdomen/pelvis 10/13/2019\par \par TECHNIQUE: Sonography of the abdomen.\par \par FINDINGS:\par \par Liver: Increased in size measuring 21.7 cm. Increased echotexture of the hepatic parenchyma suggests hepatic steatosis. No focal hepatic masses are identified within the well-visualized portions of the hepatic parenchyma.\par Bile ducts: Normal caliber. Common bile duct measures 4 mm.\par Gallbladder: Multiple gallstones. No gallbladder wall thickening or pericholecystic fluid.\par Pancreas: Not visualized, likely obscured by overlying bowel gas and patient body habitus.\par Spleen: 12.6 cm. Within normal limits.\par Right kidney: 11.7 cm. No hydronephrosis. No renal calculi. No space-occupying lesions.\par Left kidney: 12.6 cm. No hydronephrosis. No renal calculi. No space-occupying lesions.\par Ascites: None.\par Aorta and IVC: Visualized portions are within normal limits.\par \par IMPRESSION:\par The liver is increased in size measuring 21.7 cm. Increased echotexture of the hepatic parenchyma suggests hepatic steatosis. Multiple gallstones. No gallbladder wall thickening or pericholecystic fluid.

## 2021-08-06 ENCOUNTER — APPOINTMENT (OUTPATIENT)
Dept: RHEUMATOLOGY | Facility: CLINIC | Age: 36
End: 2021-08-06
Payer: MEDICAID

## 2021-08-06 VITALS
TEMPERATURE: 97.8 F | BODY MASS INDEX: 46.61 KG/M2 | RESPIRATION RATE: 16 BRPM | HEIGHT: 66 IN | SYSTOLIC BLOOD PRESSURE: 122 MMHG | HEART RATE: 80 BPM | WEIGHT: 290 LBS | OXYGEN SATURATION: 99 % | DIASTOLIC BLOOD PRESSURE: 81 MMHG

## 2021-08-06 DIAGNOSIS — M75.52 BURSITIS OF LEFT SHOULDER: ICD-10-CM

## 2021-08-06 PROCEDURE — 99204 OFFICE O/P NEW MOD 45 MIN: CPT

## 2021-08-08 PROBLEM — M75.52 BURSITIS OF LEFT SHOULDER: Status: ACTIVE | Noted: 2021-08-08

## 2021-08-08 NOTE — HISTORY OF PRESENT ILLNESS
[FreeTextEntry1] : patient with long standing hx of back pain - from neck to lumbar\par left shoulder and left rib cage pain that is severe - uses salonpas patches with significant relief - but temporary\par had chest ct  and cardiac work-up that was normal\par back pain is worse when standing - has difficulty lying on his back due to pain\par no paresthesias\par has some knee pain as well.\par no weakness\par Patient previous weight was about 500 lbs \par \par Does not take any tylenol or advil\par \par Denies any fevers, chill, rashes, weight loss,fatigue,  hair loss, dry eyes or mouth, mouth sores, Raynaud's. chest pain or SOB, GI or , numbness/tingling\par \par

## 2021-08-08 NOTE — PHYSICAL EXAM
[General Appearance - Alert] : alert [General Appearance - In No Acute Distress] : in no acute distress [Neck Appearance] : the appearance of the neck was normal [Neck Cervical Mass (___cm)] : no neck mass was observed [Jugular Venous Distention Increased] : there was no jugular-venous distention [Thyroid Diffuse Enlargement] : the thyroid was not enlarged [Thyroid Nodule] : there were no palpable thyroid nodules [Auscultation Breath Sounds / Voice Sounds] : lungs were clear to auscultation bilaterally [Heart Rate And Rhythm] : heart rate was normal and rhythm regular [Heart Sounds] : normal S1 and S2 [Heart Sounds Gallop] : no gallops [Murmurs] : no murmurs [Heart Sounds Pericardial Friction Rub] : no pericardial rub [Full Pulse] : the pedal pulses are present [Edema] : there was no peripheral edema [Bowel Sounds] : normal bowel sounds [Abdomen Soft] : soft [Abdomen Tenderness] : non-tender [Abdomen Mass (___ Cm)] : no abdominal mass palpated [Cervical Lymph Nodes Enlarged Posterior Bilaterally] : posterior cervical [Cervical Lymph Nodes Enlarged Anterior Bilaterally] : anterior cervical [Supraclavicular Lymph Nodes Enlarged Bilaterally] : supraclavicular [No CVA Tenderness] : no ~M costovertebral angle tenderness [No Spinal Tenderness] : no spinal tenderness [Abnormal Walk] : normal gait [Nail Clubbing] : no clubbing  or cyanosis of the fingernails [Musculoskeletal - Swelling] : no joint swelling seen [Motor Tone] : muscle strength and tone were normal [FreeTextEntry1] : all joints with full ROM - no synovitis - bilateral knee with crepitus [Skin Color & Pigmentation] : normal skin color and pigmentation [Skin Turgor] : normal skin turgor [] : no rash [Oriented To Time, Place, And Person] : oriented to person, place, and time [Impaired Insight] : insight and judgment were intact [Affect] : the affect was normal

## 2021-08-08 NOTE — ASSESSMENT
[FreeTextEntry1] : 36 year-old male with back pain from cervical to lumbar - no paresthesias -\par ongoing left shoulder pain radiating to the left rib cage \par \par basic rheum work-up\par x-rays of entire spine\par US of the left shoulder an rib cage\par start methocarbamol\par cannot take any NSAIDS due to gastric bypass\par can use diclofenac gel as needed for pain\par \par patient to call in 1 week to discuss results and next steps\par

## 2021-08-13 NOTE — PATIENT PROFILE ADULT - NSPROEXTENSIONSOFSELF_GEN_A_NUR
impairments found/rehab potential/therapy frequency/anticipated equipment needs at discharge/anticipated discharge recommendation
none

## 2021-08-19 ENCOUNTER — RESULT REVIEW (OUTPATIENT)
Age: 36
End: 2021-08-19

## 2021-08-19 ENCOUNTER — APPOINTMENT (OUTPATIENT)
Dept: RADIOLOGY | Facility: CLINIC | Age: 36
End: 2021-08-19
Payer: MEDICAID

## 2021-08-19 ENCOUNTER — OUTPATIENT (OUTPATIENT)
Dept: OUTPATIENT SERVICES | Facility: HOSPITAL | Age: 36
LOS: 1 days | End: 2021-08-19
Payer: MEDICAID

## 2021-08-19 ENCOUNTER — APPOINTMENT (OUTPATIENT)
Dept: ULTRASOUND IMAGING | Facility: CLINIC | Age: 36
End: 2021-08-19
Payer: MEDICAID

## 2021-08-19 DIAGNOSIS — M54.9 DORSALGIA, UNSPECIFIED: ICD-10-CM

## 2021-08-19 DIAGNOSIS — Z98.2 PRESENCE OF CEREBROSPINAL FLUID DRAINAGE DEVICE: Chronic | ICD-10-CM

## 2021-08-19 PROCEDURE — 72082 X-RAY EXAM ENTIRE SPI 2/3 VW: CPT | Mod: 26

## 2021-08-19 PROCEDURE — 72070 X-RAY EXAM THORAC SPINE 2VWS: CPT | Mod: 26

## 2021-08-19 PROCEDURE — 76881 US COMPL JOINT R-T W/IMG: CPT | Mod: 26,LT

## 2021-08-19 PROCEDURE — 72040 X-RAY EXAM NECK SPINE 2-3 VW: CPT

## 2021-08-19 PROCEDURE — 72100 X-RAY EXAM L-S SPINE 2/3 VWS: CPT | Mod: 26

## 2021-08-19 PROCEDURE — 72070 X-RAY EXAM THORAC SPINE 2VWS: CPT

## 2021-08-19 PROCEDURE — 76881 US COMPL JOINT R-T W/IMG: CPT

## 2021-08-19 PROCEDURE — 72040 X-RAY EXAM NECK SPINE 2-3 VW: CPT | Mod: 26

## 2021-08-19 PROCEDURE — 72100 X-RAY EXAM L-S SPINE 2/3 VWS: CPT

## 2021-08-24 ENCOUNTER — APPOINTMENT (OUTPATIENT)
Dept: INTERNAL MEDICINE | Facility: CLINIC | Age: 36
End: 2021-08-24
Payer: MEDICAID

## 2021-08-24 VITALS
HEART RATE: 57 BPM | BODY MASS INDEX: 46.12 KG/M2 | WEIGHT: 287 LBS | DIASTOLIC BLOOD PRESSURE: 66 MMHG | HEIGHT: 66 IN | TEMPERATURE: 97.3 F | SYSTOLIC BLOOD PRESSURE: 108 MMHG

## 2021-08-24 PROCEDURE — 99214 OFFICE O/P EST MOD 30 MIN: CPT

## 2021-08-24 RX ORDER — METHOCARBAMOL 500 MG/1
500 TABLET, FILM COATED ORAL
Qty: 10 | Refills: 0 | Status: COMPLETED | COMMUNITY
Start: 2021-08-06 | End: 2021-08-24

## 2021-08-24 RX ORDER — DICLOFENAC SODIUM 1% 10 MG/G
1 GEL TOPICAL DAILY
Qty: 1 | Refills: 4 | Status: COMPLETED | COMMUNITY
Start: 2021-08-06 | End: 2021-08-24

## 2021-08-24 NOTE — COUNSELING
[Fall prevention counseling provided] : fall prevention  [Sleep ___ hours/day] : Sleep [unfilled] hours/day [Engage in a relaxing activity] : Engage in a relaxing activity [Plan in advance] : Plan in advance [Adequate lighting] : Adequate lighting [No throw rugs] : No throw rugs [Use recommended devices] : Use recommended devices

## 2021-08-25 LAB
ABO + RH PNL BLD: NORMAL
ALBUMIN SERPL ELPH-MCNC: 4.1 G/DL
ALP BLD-CCNC: 102 U/L
ALT SERPL-CCNC: 18 U/L
ANION GAP SERPL CALC-SCNC: 12 MMOL/L
APPEARANCE: CLEAR
APTT BLD: 38.1 SEC
AST SERPL-CCNC: 14 U/L
BASOPHILS # BLD AUTO: 0.07 K/UL
BASOPHILS NFR BLD AUTO: 0.9 %
BILIRUB SERPL-MCNC: 0.9 MG/DL
BILIRUBIN URINE: NEGATIVE
BLOOD URINE: NEGATIVE
BUN SERPL-MCNC: 11 MG/DL
CALCIUM SERPL-MCNC: 9.4 MG/DL
CHLORIDE SERPL-SCNC: 104 MMOL/L
CO2 SERPL-SCNC: 25 MMOL/L
COLOR: YELLOW
CREAT SERPL-MCNC: 0.67 MG/DL
EOSINOPHIL # BLD AUTO: 0.33 K/UL
EOSINOPHIL NFR BLD AUTO: 4.2 %
FOLATE SERPL-MCNC: 3.8 NG/ML
GLUCOSE QUALITATIVE U: NEGATIVE
GLUCOSE SERPL-MCNC: 72 MG/DL
HCT VFR BLD CALC: 48.7 %
HGB BLD-MCNC: 14.8 G/DL
IGA SER QL IEP: 494 MG/DL
IMM GRANULOCYTES NFR BLD AUTO: 0.3 %
INR PPP: 1.05 RATIO
KETONES URINE: NEGATIVE
LEUKOCYTE ESTERASE URINE: NEGATIVE
LYMPHOCYTES # BLD AUTO: 2.71 K/UL
LYMPHOCYTES NFR BLD AUTO: 34.1 %
MAN DIFF?: NORMAL
MCHC RBC-ENTMCNC: 28.3 PG
MCHC RBC-ENTMCNC: 30.4 GM/DL
MCV RBC AUTO: 93.1 FL
MONOCYTES # BLD AUTO: 0.49 K/UL
MONOCYTES NFR BLD AUTO: 6.2 %
NEUTROPHILS # BLD AUTO: 4.33 K/UL
NEUTROPHILS NFR BLD AUTO: 54.3 %
NITRITE URINE: NEGATIVE
PH URINE: 5.5
PLATELET # BLD AUTO: 242 K/UL
POTASSIUM SERPL-SCNC: 4 MMOL/L
PROT SERPL-MCNC: 6.6 G/DL
PROTEIN URINE: NORMAL
PT BLD: 12.4 SEC
RBC # BLD: 5.23 M/UL
RBC # FLD: 14.4 %
SODIUM SERPL-SCNC: 142 MMOL/L
SPECIFIC GRAVITY URINE: >=1.03
TTG IGA SER IA-ACNC: 4.8 U/ML
TTG IGA SER-ACNC: ABNORMAL
TTG IGG SER IA-ACNC: 6.6 U/ML
TTG IGG SER IA-ACNC: ABNORMAL
UROBILINOGEN URINE: NORMAL
VIT B12 SERPL-MCNC: 876 PG/ML
WBC # FLD AUTO: 7.95 K/UL

## 2021-08-25 NOTE — END OF VISIT
[>50% of the face to face encounter time was spent on counseling and/or coordination of care for ___] : Greater than 50% of the face to face encounter time was spent on counseling and/or coordination of care for [unfilled] [FreeTextEntry3] : residents present

## 2021-08-25 NOTE — RESULTS/DATA
[ECG Reviewed] : reviewed [NSR] : normal sinus rhythm [Ventricular Rate___] : ventricular rate is [unfilled] beats per minute [P Waves Normal] : the P wave is normal [ECG Intervals NH.] : NH interval is normal [CO Interval___] : [unfilled] seconds [Normal QRS] : the QRS is normal [QRS Interval___] : QRS interval: [unfilled] seconds [ECG Axis] : the QRS axis is normal [QTc Interval___] : QTc interval: [unfilled] [Normal ST Segments] : the ST segments are normal [ECG T. Waves] : normal [] : results reviewed [de-identified] : ct chest 6/18/21  negative  for lung disease

## 2021-08-25 NOTE — PHYSICAL EXAM
[Well Developed] : well developed [Well Nourished] : well nourished [Normal Sclera/Conjunctiva] : normal sclera/conjunctiva [PERRL] : pupils equal round and reactive to light [Normal Oropharynx] : the oropharynx was normal [No JVD] : no jugular venous distention [Supple] : supple [Rate ___] : at [unfilled] breaths per minute [Normal Rhythm/Effort] : normal respiratory rhythm and effort [Clear Bilaterally] : the lungs were clear to auscultation bilaterally [Normal to Percussion] : the lungs were normal to percussion [Heart Rate ___] : [unfilled] bpm [Rhythm Regular] : regular [Normal Rate] : normal [Normal S1] : normal S1 [Normal S2] : normal S2 [No Murmur] : no murmurs heard [No Pitting Edema] : no pitting edema present [Rt] : varicose veins of the right leg noted [Lt] : varicose veins of the left leg noted [2+] : left 2+ [No Abnormalities] : the abdominal aorta was not enlarged and no bruit was heard [Obese] : obese [No Spinal Tenderness] : no spinal tenderness [Normal Station and Gait] : the gait and station were normal [Normal Motor Tone] : the muscle tone was normal [Involuntary Movements] : no involuntary movements were seen [No Rash] : no rash [No Skin Lesions] : no skin lesions [Coordination Grossly Intact] : coordination grossly intact [Normal] : affect was normal and insight and judgment were intact [S3] : no S3 [S4] : no S4 [Right Carotid Bruit] : no bruit heard over the right carotid [Left Carotid Bruit] : no bruit heard over the left carotid [Left Femoral Bruit] : no bruit heard over the left femoral artery [Right Femoral Bruit] : no bruit heard over the right femoral artery [Bruit] : no bruit heard [de-identified] : blind walks with assistance

## 2021-08-25 NOTE — HISTORY OF PRESENT ILLNESS
[No Pertinent Cardiac History] : no history of aortic stenosis, atrial fibrillation, coronary artery disease, recent myocardial infarction, or implantable device/pacemaker [Sleep Apnea] : sleep apnea [(Patient denies any chest pain, claudication, dyspnea on exertion, orthopnea, palpitations or syncope)] : Patient denies any chest pain, claudication, dyspnea on exertion, orthopnea, palpitations or syncope [Parent] : parent [Family Member] : no family member with adverse anesthesia reaction/sudden death [Self] : no previous adverse anesthesia reaction [Chronic Anticoagulation] : no chronic anticoagulation [Chronic Kidney Disease] : no chronic kidney disease [Diabetes] : no diabetes [FreeTextEntry1] : robotic assisted laparoscopic possible open cholecystectomy  [FreeTextEntry3] : Dr Phillips  [FreeTextEntry2] : 8/30/21 [FreeTextEntry4] : Pt is a 36 yr old  man with  hypertension, thyroid disorder  sleep apnea , pseudotumor cerebri with  shunt   and legally blind  .  he has hx of hypercapnic respiratory failure on 4/19, and has had bariatric surgery and lost  over 226 lbs .  he has abd pain in RUQ and found to have gallstones and is  here for medical clearance  for surgery.  he presently not taking cpap due to his weight loss and the pressure was to elevated and is to be  re evaluated.   [FreeTextEntry7] : Workup prior to surgery included LHC performed at [facility] which did not demonstrate the presence of obstructive CAD. Of note, his last echocardiogram from June 2020 also demonstrated normal biventricular systolic function

## 2021-08-25 NOTE — ASSESSMENT
[High Risk Surgery - Intraperitoneal, Intrathoracic or Supringuinal Vascular Procedures] : High Risk Surgery - Intraperitoneal, Intrathoracic or Supringuinal Vascular Procedures - No (0) [Ischemic Heart Disease] : Ischemic Heart Disease - No (0) [Congestive Heart Failure] : Congestive Heart Failure - No (0) [Prior Cerebrovascular Accident or TIA] : Prior Cerebrovascular Accident or TIA - No (0) [Creatinine >= 2mg/dL (1 Point)] : Creatinine >= 2mg/dL - No (0) [Insulin-dependent Diabetic (1 Point)] : Insulin-dependent Diabetic - No (0) [0] : 0 , RCRI Class: I, Risk of Post-Op Cardiac Complications: 3.9%, 95% CI for Risk Estimate: 2.8% - 5.4% [Modify medications prior to procedure] : Modify medications prior to procedure [As per surgery] : as per surgery [FreeTextEntry4] : preop  Pt was explained the surgery , anesthesia , risks , complications and alternatives and all his questions were answered by his surgeon  He is an asa of 3 and cardiac risk index score of 0 . he is having intermediate risk surgery and is at low to intermediate risks for post operative cardiac complications.   He will stop diclofenac gel no nsaids  or aspirin no herbal medication  and will take with sip of water 3hrs prior to surgery take levothyroxine and metoprolol   pt will be cleared for surgery once I review his labs and if he is covid negative.    [FreeTextEntry7] : see above

## 2021-08-26 ENCOUNTER — OUTPATIENT (OUTPATIENT)
Dept: OUTPATIENT SERVICES | Facility: HOSPITAL | Age: 36
LOS: 1 days | End: 2021-08-26
Payer: MEDICAID

## 2021-08-26 VITALS
WEIGHT: 285.06 LBS | HEART RATE: 62 BPM | TEMPERATURE: 98 F | RESPIRATION RATE: 17 BRPM | DIASTOLIC BLOOD PRESSURE: 70 MMHG | SYSTOLIC BLOOD PRESSURE: 109 MMHG | OXYGEN SATURATION: 98 % | HEIGHT: 66 IN

## 2021-08-26 DIAGNOSIS — K80.20 CALCULUS OF GALLBLADDER WITHOUT CHOLECYSTITIS WITHOUT OBSTRUCTION: ICD-10-CM

## 2021-08-26 DIAGNOSIS — Z98.2 PRESENCE OF CEREBROSPINAL FLUID DRAINAGE DEVICE: Chronic | ICD-10-CM

## 2021-08-26 DIAGNOSIS — I10 ESSENTIAL (PRIMARY) HYPERTENSION: ICD-10-CM

## 2021-08-26 DIAGNOSIS — G47.33 OBSTRUCTIVE SLEEP APNEA (ADULT) (PEDIATRIC): ICD-10-CM

## 2021-08-26 DIAGNOSIS — Z01.818 ENCOUNTER FOR OTHER PREPROCEDURAL EXAMINATION: ICD-10-CM

## 2021-08-26 DIAGNOSIS — E03.9 HYPOTHYROIDISM, UNSPECIFIED: ICD-10-CM

## 2021-08-26 DIAGNOSIS — Z98.84 BARIATRIC SURGERY STATUS: Chronic | ICD-10-CM

## 2021-08-26 LAB — BLD GP AB SCN SERPL QL: SIGNIFICANT CHANGE UP

## 2021-08-26 PROCEDURE — G0463: CPT

## 2021-08-26 RX ORDER — SODIUM CHLORIDE 9 MG/ML
3 INJECTION INTRAMUSCULAR; INTRAVENOUS; SUBCUTANEOUS EVERY 8 HOURS
Refills: 0 | Status: DISCONTINUED | OUTPATIENT
Start: 2021-08-30 | End: 2021-08-30

## 2021-08-26 RX ORDER — CHOLECALCIFEROL (VITAMIN D3) 125 MCG
1 CAPSULE ORAL
Qty: 0 | Refills: 0 | DISCHARGE

## 2021-08-26 RX ORDER — PREGABALIN 225 MG/1
1 CAPSULE ORAL
Qty: 0 | Refills: 0 | DISCHARGE

## 2021-08-26 NOTE — H&P PST ADULT - PROBLEM SELECTOR PLAN 1
Patient is scheduled for robotic assisted cholecystectomy, possible open on 8/30/2021. Written and oral preoperative instructions given to patient with understanding verbalized. Instructions given to include using 4% chlorhexidine wash as directed day of surgery, maintaining NPO status post midnight day before surgery and expecting a phone call day before surgery to give arrival time for surgery.

## 2021-08-26 NOTE — H&P PST ADULT - ASSESSMENT
36year old male with calculus of gallbladder     CAPRINI SCORE [CLOT]    AGE RELATED RISK FACTORS                                                           MOBILITY RELATED FACTORS  [ ] Age 41-60 years                                            (1 Point)                 [ ] Bed rest                                                        (1 Point)  [ ] Age: 61-74 years                                           (2 Points)               [ ] Plaster cast                                                   (2 Points)  [ ] Age= 75 years                                                (3 Points)                 [ ] Bed bound for more than 72 hours          (2 Points)    DISEASE RELATED RISK FACTORS                                                     GENDER SPECIFIC FACTORS  [ ] Edema in the lower extremities                 (1 Point)                   [ ] Pregnancy                                                         (1 Point)  [x] Varicose veins                                               (1 Point)                    [ ] Post-partum < 6 weeks                                   (1 Point)             [x] BMI > 25 Kg/m2                                            (1 Point)                    [ ] Hormonal therapy  or oral contraception   (1 Point)                 [ ] Sepsis (in the previous month)                   (1 Point)                   [ ] History of pregnancy complications              (1 point)  [ ] Pneumonia or serious lung disease                                              [ ] Unexplained or recurrent                (1 Point)           (in the previous month)                               (1 Point)  [ ] Abnormal pulmonary function test                     (1 Point)            SURGERY RELATED RISK FACTORS  [ ] Acute myocardial infarction                              (1 Point)                [ ]  Section                                             (1 Point)  [ ] Congestive heart failure (in the previous month)  (1 Point)       [ ] Minor surgery                                                  (1 Point)   [ ] Inflammatory bowel disease                             (1 Point)                [ ] Arthroscopic surgery                                        (2 Points)  [ ] Central venous access                                      (2 Points)                [x] General surgery lasting more than 45 minutes   (2 Points)       [ ] Stroke (in the previous month)                          (5 Points)             [ ] Elective arthroplasty                                         (5 Points)                                                                                                                                               HEMATOLOGY RELATED FACTORS                                                       TRAUMA RELATED RISK FACTORS  [ ] Prior episodes of VTE                                     (3 Points)                   [ ] Fracture of the hip, pelvis, or leg                       (5 Points)  [ ] Positive family history for VTE                         (3 Points)                [ ] Acute spinal cord injury (in the previous month)  (5 Points)  [ ] Prothrombin 35581 A                                     (3 Points)                   [ ] Paralysis  (less than 1 month)                             (5 Points)  [ ] Factor V Leiden                                             (3 Points)                      [ ] Multiple Trauma within 1 month                        (5 Points)  [ ] Lupus anticoagulants                                     (3 Points)                                                           [ ] Anticardiolipin antibodies                               (3 Points)                                                       [ ] High homocysteine in the blood                      (3 Points)                                             [ ] Other congenital or acquired thrombophilia      (3 Points)                                                [ ] Heparin induced thrombocytopenia                  (3 Points)                                        Total Score [4]

## 2021-08-26 NOTE — H&P PST ADULT - NS ABD PE RECTAL EXAM
The patient's right knee was prepped with hibiclens solution after verification of allergies. Area approximately 10 cm x 10 cm prepped in a sterile fashion. After injection, hibiclens removed with soap and water and band-aids applied.    1ml depo medrol with 1% lidocaine plain injected into patient's right knee by Dr. Pierre Hi  LOT# [id number]  Exp. 8/19     not examined

## 2021-08-26 NOTE — H&P PST ADULT - PROBLEM SELECTOR PLAN 2
Patient instructed with understanding verbalized to take Metoprolol with a sip of water the morning of surgery.

## 2021-08-26 NOTE — H&P PST ADULT - NSICDXPASTSURGICALHX_GEN_ALL_CORE_FT
PAST SURGICAL HISTORY:  S/P  shunt Right side of head    Status post laparoscopic sleeve gastrectomy 01/11/2021 weight loss of 125lbs (preoperative weight 410-285 weight today)

## 2021-08-26 NOTE — H&P PST ADULT - NSICDXPASTMEDICALHX_GEN_ALL_CORE_FT
PAST MEDICAL HISTORY:  Asthma last hospitalization 2018    Celiac disease     Fatty liver     Gallstone     H/O heartburn     H/O hiatal hernia     History of varicose veins     Hydrocephalus     Hypertension     Hypothyroidism     Legally blind     Morbid obesity     LAZARO (obstructive sleep apnea)     Pseudotumor cerebri syndrome

## 2021-08-26 NOTE — H&P PST ADULT - ATTENDING COMMENTS
I have reviewed the history and physical – recorded 1 to 30 days prior the procedure – and I have re-examined the patient (so as to update any changes in the patient's health status), and I state that it is still appropriate with my corrections and/or amendments as documented.     Patient reports no interval changes to overall health status or medical history since our previous encounter.      I have had a lengthy conversation with the patient and have discussed my impressions and treatment plans with the patient.  Detailed informed consent and potential risks , benefits and alternatives to the planned surgery (robotic-assisted laparoscopic cholecystectomy)  were once again reinforced and reviewed, including but not limited to:   bleeding, infection, seroma, hematoma, abscess; intestinal, gastric, vascular or other visceral or solid organ injury; leak,  or injury to other adjacent or surrounding structures or organs;  non-resolution of the pain or symptoms, inability to complete the intended procedure or need to abort the procedure; port site or incisional hernia, need for reoperation or other procedure; perioperative myocardial infarction, stroke, deep vein thrombosis, pulmonary embolus, pneumonia and death. All surgical options were discussed including non-surgical treatments. The patient remains interested in robotic-assisted laparoscopic cholecystectomy.     The patient is aware of the expected immediate post operative course, length of stay and discharge plan for the planned procedure.     The patient had the opportunity to ask pertinent questions, and all of patient’s questions and concerns were addressed to patient’s satisfaction.  Patient verbalized an understanding of the information discussed, and wishes to proceed with surgery. Informed consent signed.

## 2021-08-26 NOTE — H&P PST ADULT - PROBLEM SELECTOR PLAN 4
Patient instructed with understanding verbalized to take levothyroxine with a sip of water the morning of surgery.

## 2021-08-26 NOTE — H&P PST ADULT - HISTORY OF PRESENT ILLNESS
36year old legally blind, morbidly obese male with pmhx of pseudomotor cerebri with  shunt, hypertension, varicose veins, hypothyroidism, LAZARO on CPAP presents with c/o intermittent RUQ abdominal pain x 15months that is exacerbated by consumption of greasy, high fat food items. Patient eventually had diagnostic studies done that revealed gallstones. Patient is here today for presurgical testing for scheduled robotic assisted cholecystectomy possible open on 8/30/2021

## 2021-08-27 ENCOUNTER — APPOINTMENT (OUTPATIENT)
Dept: DISASTER EMERGENCY | Facility: CLINIC | Age: 36
End: 2021-08-27

## 2021-08-28 LAB — SARS-COV-2 N GENE NPH QL NAA+PROBE: NOT DETECTED

## 2021-08-29 ENCOUNTER — TRANSCRIPTION ENCOUNTER (OUTPATIENT)
Age: 36
End: 2021-08-29

## 2021-08-30 ENCOUNTER — APPOINTMENT (OUTPATIENT)
Dept: SURGERY | Facility: HOSPITAL | Age: 36
End: 2021-08-30

## 2021-08-30 ENCOUNTER — OUTPATIENT (OUTPATIENT)
Dept: OUTPATIENT SERVICES | Facility: HOSPITAL | Age: 36
LOS: 1 days | End: 2021-08-30
Payer: MEDICAID

## 2021-08-30 ENCOUNTER — RESULT REVIEW (OUTPATIENT)
Age: 36
End: 2021-08-30

## 2021-08-30 VITALS
HEART RATE: 80 BPM | TEMPERATURE: 98 F | SYSTOLIC BLOOD PRESSURE: 129 MMHG | RESPIRATION RATE: 13 BRPM | OXYGEN SATURATION: 98 % | DIASTOLIC BLOOD PRESSURE: 64 MMHG

## 2021-08-30 VITALS
HEART RATE: 71 BPM | RESPIRATION RATE: 18 BRPM | DIASTOLIC BLOOD PRESSURE: 57 MMHG | OXYGEN SATURATION: 98 % | SYSTOLIC BLOOD PRESSURE: 124 MMHG

## 2021-08-30 DIAGNOSIS — Z98.2 PRESENCE OF CEREBROSPINAL FLUID DRAINAGE DEVICE: Chronic | ICD-10-CM

## 2021-08-30 DIAGNOSIS — Z98.84 BARIATRIC SURGERY STATUS: Chronic | ICD-10-CM

## 2021-08-30 DIAGNOSIS — Z01.818 ENCOUNTER FOR OTHER PREPROCEDURAL EXAMINATION: ICD-10-CM

## 2021-08-30 DIAGNOSIS — K80.20 CALCULUS OF GALLBLADDER WITHOUT CHOLECYSTITIS WITHOUT OBSTRUCTION: ICD-10-CM

## 2021-08-30 LAB
BLD GP AB SCN SERPL QL: SIGNIFICANT CHANGE UP
VIT B6 SERPL-MCNC: 23.2 UG/L

## 2021-08-30 PROCEDURE — S2900 ROBOTIC SURGICAL SYSTEM: CPT

## 2021-08-30 PROCEDURE — 47563 LAPARO CHOLECYSTECTOMY/GRAPH: CPT

## 2021-08-30 PROCEDURE — 86900 BLOOD TYPING SEROLOGIC ABO: CPT

## 2021-08-30 PROCEDURE — 86850 RBC ANTIBODY SCREEN: CPT

## 2021-08-30 PROCEDURE — 88304 TISSUE EXAM BY PATHOLOGIST: CPT | Mod: 26

## 2021-08-30 PROCEDURE — C1889: CPT

## 2021-08-30 PROCEDURE — 36415 COLL VENOUS BLD VENIPUNCTURE: CPT

## 2021-08-30 PROCEDURE — 47563 LAPARO CHOLECYSTECTOMY/GRAPH: CPT | Mod: AS

## 2021-08-30 PROCEDURE — 88304 TISSUE EXAM BY PATHOLOGIST: CPT

## 2021-08-30 PROCEDURE — 86901 BLOOD TYPING SEROLOGIC RH(D): CPT

## 2021-08-30 RX ORDER — HYDROMORPHONE HYDROCHLORIDE 2 MG/ML
1 INJECTION INTRAMUSCULAR; INTRAVENOUS; SUBCUTANEOUS
Refills: 0 | Status: DISCONTINUED | OUTPATIENT
Start: 2021-08-30 | End: 2021-08-31

## 2021-08-30 RX ORDER — HYDROMORPHONE HYDROCHLORIDE 2 MG/ML
0.5 INJECTION INTRAMUSCULAR; INTRAVENOUS; SUBCUTANEOUS
Refills: 0 | Status: DISCONTINUED | OUTPATIENT
Start: 2021-08-30 | End: 2021-08-31

## 2021-08-30 RX ORDER — INDOCYANINE GREEN 25 MG
2.5 KIT INTRAVASCULAR; INTRAVENOUS ONCE
Refills: 0 | Status: COMPLETED | OUTPATIENT
Start: 2021-08-30 | End: 2021-08-30

## 2021-08-30 RX ORDER — OXYCODONE AND ACETAMINOPHEN 5; 325 MG/1; MG/1
1 TABLET ORAL ONCE
Refills: 0 | Status: DISCONTINUED | OUTPATIENT
Start: 2021-08-30 | End: 2021-09-06

## 2021-08-30 RX ORDER — ONDANSETRON 8 MG/1
4 TABLET, FILM COATED ORAL ONCE
Refills: 0 | Status: DISCONTINUED | OUTPATIENT
Start: 2021-08-30 | End: 2021-09-06

## 2021-08-30 RX ORDER — METOCLOPRAMIDE HCL 10 MG
10 TABLET ORAL ONCE
Refills: 0 | Status: COMPLETED | OUTPATIENT
Start: 2021-08-30 | End: 2021-08-30

## 2021-08-30 RX ORDER — METOPROLOL TARTRATE 50 MG
1 TABLET ORAL
Qty: 0 | Refills: 0 | DISCHARGE

## 2021-08-30 RX ORDER — SODIUM CHLORIDE 9 MG/ML
1000 INJECTION, SOLUTION INTRAVENOUS
Refills: 0 | Status: DISCONTINUED | OUTPATIENT
Start: 2021-08-30 | End: 2021-08-31

## 2021-08-30 RX ADMIN — HYDROMORPHONE HYDROCHLORIDE 1 MILLIGRAM(S): 2 INJECTION INTRAMUSCULAR; INTRAVENOUS; SUBCUTANEOUS at 17:34

## 2021-08-30 RX ADMIN — HYDROMORPHONE HYDROCHLORIDE 1 MILLIGRAM(S): 2 INJECTION INTRAMUSCULAR; INTRAVENOUS; SUBCUTANEOUS at 17:16

## 2021-08-30 RX ADMIN — SODIUM CHLORIDE 3 MILLILITER(S): 9 INJECTION INTRAMUSCULAR; INTRAVENOUS; SUBCUTANEOUS at 13:56

## 2021-08-30 RX ADMIN — HYDROMORPHONE HYDROCHLORIDE 1 MILLIGRAM(S): 2 INJECTION INTRAMUSCULAR; INTRAVENOUS; SUBCUTANEOUS at 18:10

## 2021-08-30 RX ADMIN — Medication 10 MILLIGRAM(S): at 17:34

## 2021-08-30 RX ADMIN — INDOCYANINE GREEN 2.5 MILLIGRAM(S): KIT INTRAVASCULAR; INTRAVENOUS at 13:56

## 2021-08-30 NOTE — ASU DISCHARGE PLAN (ADULT/PEDIATRIC) - CARE PROVIDER_API CALL
Sriram Phillips)  Surgery  [address] [place] [id number]  Phone: [phone]  Fax: [phone]  Follow Up Time:

## 2021-08-30 NOTE — BRIEF OPERATIVE NOTE - OPERATION/FINDINGS
Thin *** Thick walled gallbladder with stones within.  Critical view of safety attained, cystic duct and cystic artery clearly identified. Thick walled gallbladder with stones within.  Critical view of safety attained, cystic duct and cystic artery clearly identified with intraoperative ICG cholangiography.

## 2021-08-31 PROBLEM — Z86.79 PERSONAL HISTORY OF OTHER DISEASES OF THE CIRCULATORY SYSTEM: Chronic | Status: ACTIVE | Noted: 2021-08-26

## 2021-08-31 PROBLEM — G93.2 BENIGN INTRACRANIAL HYPERTENSION: Chronic | Status: ACTIVE | Noted: 2021-08-26

## 2021-08-31 PROBLEM — H54.8 LEGAL BLINDNESS, AS DEFINED IN USA: Chronic | Status: ACTIVE | Noted: 2021-08-26

## 2021-08-31 PROBLEM — E03.9 HYPOTHYROIDISM, UNSPECIFIED: Chronic | Status: ACTIVE | Noted: 2021-08-26

## 2021-08-31 PROBLEM — K90.0 CELIAC DISEASE: Chronic | Status: ACTIVE | Noted: 2021-08-26

## 2021-08-31 PROBLEM — E66.01 MORBID (SEVERE) OBESITY DUE TO EXCESS CALORIES: Chronic | Status: ACTIVE | Noted: 2021-08-26

## 2021-09-01 LAB — SURGICAL PATHOLOGY STUDY: SIGNIFICANT CHANGE UP

## 2021-09-08 ENCOUNTER — APPOINTMENT (OUTPATIENT)
Dept: SURGERY | Facility: CLINIC | Age: 36
End: 2021-09-08
Payer: MEDICAID

## 2021-09-08 VITALS
HEART RATE: 62 BPM | HEIGHT: 66 IN | DIASTOLIC BLOOD PRESSURE: 104 MMHG | BODY MASS INDEX: 45.48 KG/M2 | WEIGHT: 283 LBS | SYSTOLIC BLOOD PRESSURE: 153 MMHG | OXYGEN SATURATION: 99 %

## 2021-09-08 VITALS — DIASTOLIC BLOOD PRESSURE: 87 MMHG | HEART RATE: 84 BPM | SYSTOLIC BLOOD PRESSURE: 127 MMHG

## 2021-09-08 VITALS — TEMPERATURE: 96.6 F

## 2021-09-08 PROCEDURE — 99024 POSTOP FOLLOW-UP VISIT: CPT

## 2021-09-08 NOTE — REVIEW OF SYSTEMS
[As Noted in HPI] : as noted in HPI [Negative] : Integumentary [Fever] : no fever [Chills] : no chills [Feeling Poorly] : not feeling poorly [Feeling Tired] : not feeling tired [Sore Throat] : no sore throat [Hoarseness] : no hoarseness [Heart Rate Is Fast] : the heart rate was not fast [Chest Pain] : no chest pain [Palpitations] : no palpitations [Shortness Of Breath] : no shortness of breath [Wheezing] : no wheezing [Cough] : no cough [Abdominal Pain] : no abdominal pain [Vomiting] : no vomiting [Constipation] : no constipation [Diarrhea] : no diarrhea [Heartburn] : no heartburn [Melena] : no melena [Dysuria] : no dysuria [Incontinence] : no incontinence [Arthralgias] : no arthralgias [Limb Pain] : no limb pain [FreeTextEntry9] : Chronic low back pain

## 2021-09-08 NOTE — HISTORY OF PRESENT ILLNESS
[de-identified] : Patient  is here for post-op visit after robotic assisted cholecystectomy. \par \par Patient is doing well, denies pain at this point. Patient states there is minimal incisional discomfort at this time, and he is not taking any analgesics.\par Patient denies fever or chills.  Tolerating diet and having regular bowel movements.   \par \par

## 2021-09-08 NOTE — ASSESSMENT
[FreeTextEntry1] : Patient  is doing well, with excellent post-operative recovery.  All surgical incisions are healing well and as expected.  There is no evidence of infection or complication, and patient is progressing as expected.  \par \par \par \par

## 2021-09-08 NOTE — PLAN
[FreeTextEntry1] : Post-operative wound care, activity, restrictions and precautions reinforced.  Patient instructed to refrain from any heavy lifting greater than 10-15  pounds for at least 4 weeks post-operatively.  \par \par Patient's questions and concerns addressed to patient's satisfaction.\par \par He will return to see me in 1 month

## 2021-09-08 NOTE — PHYSICAL EXAM
[Respiratory Effort] : normal respiratory effort [Normal Rate and Rhythm] : normal rate and rhythm [Abdominal Masses] : No abdominal masses [Abdomen Tenderness] : ~T ~M No abdominal tenderness [de-identified] : Well developed, well nourished adult, in NAD. [de-identified] : Obese, large abdominal pannus.  Soft, nontender, nondistended. No masses, rebound or guarding. Incisions well-approximated, without erythema or drainage.  No hernias palpable.\par  [de-identified] : NC/AT, PERRL, EOMI, sclera anicteric [de-identified] : No jaundice

## 2021-09-08 NOTE — DATA REVIEWED
[FreeTextEntry1] : \par  Pathology             Final\par \par No Documents Attached\par \par \par \par \par   [facility] Accession Number : [id number]\par \par CHIQUIS BARAKAT                2\par \par \par \par Surgical Final Report\par \par \par \par \par Final Diagnosis\par \par Gallbladder; laparoscopic cholecystectomy:\par - Chronic calculous cholecystitis.\par - One reactive lymph node with lipogranulomas.\par \par Verified by: Gia Wayne MD\par (Electronic Signature)\par Reported on: 09/01/21 14:19 EDT, [facility],\par [address], [place] [address]\par Phone: [phone]   Fax: [phone]\par _________________________________________________________________\par \par Clinical History\par Cholecystitis\par \par Specimen(s) Submitted\par 1     Gallbladder\par \par Gross Description\par Received: In formalin labeled "gallbladder"\par Integrity: Intact, measuring 12.0 cm in length and 4.5 cm in\par diameter\par Cystic Duct: Patent, marked by a white plastic clip and inked\par black\par Size: 0.1 cm in length and 0.2 cm in diameter\par Lymph Node: 0.3 cm\par Serosa: Pink-tan and smooth with scattered hemorrhagic areas\par Hepatic Surface: Cauterized and shaggy\par Bile: clear mucin, present in the lumen\par Calculi: Multiple brown multifaceted stones, ranging from 0.2 cm\par to 2.0 cm in greatest dimension. There is also a similar stone\par 1.5 cm, lodged at the neck\par Mucosa: Translucent white, with smooth and trabeculated areas\par Wall: 0.1 cm  thick\par Submitted: Representative sections in 3 cassettes:\par 1A: Shaved cystic duct margin and full thickness sections to\par include neck, body and fundus\par 1B: Additional representative sections\par 1C: Entire lymph node\par \par KATIUSKA Barry. 08/31/2021 01:27 PM\par \par Disclaimer\par In addition to other data that may appear on the specimen\par containers, all labels have been inspected to confirm the\par presence of the patient's name and date of birth.\par \par \par \par \par \par \par CHIQUIS BARAKAT                2\par \par \par \par Surgical Final Report\par \par \par \par \par Histological processing performed at [facility]\[place] [address] [address].\par \par \par Surgical Consult Report\par \par \par \par \par Disclaimer\par In addition to other data that may appear on the specimen\par containers, all labels have been inspected to confirm the\par presence of the patient's name and date of birth.\par \par [place] [address].\par \par  \par \par  Ordered by: DALE GILBERT       Collected/Examined: [id number] 04:30PM       \par Verified by: DALE GILBERT 01Sep2021 02:31PM       \par  Result Communication: No patient communication needed at this time;\par Stage: Final       \par  Performed at: [facility]       Resulted: 01Sep2021 02:19PM       Last Updated: 01Sep2021 02:31PM       Accession: [id number]

## 2021-09-22 ENCOUNTER — APPOINTMENT (OUTPATIENT)
Dept: INTERNAL MEDICINE | Facility: CLINIC | Age: 36
End: 2021-09-22

## 2021-09-24 ENCOUNTER — APPOINTMENT (OUTPATIENT)
Dept: RHEUMATOLOGY | Facility: CLINIC | Age: 36
End: 2021-09-24
Payer: MEDICAID

## 2021-09-24 VITALS
HEIGHT: 66 IN | OXYGEN SATURATION: 97 % | SYSTOLIC BLOOD PRESSURE: 124 MMHG | BODY MASS INDEX: 44.84 KG/M2 | DIASTOLIC BLOOD PRESSURE: 80 MMHG | WEIGHT: 279 LBS | RESPIRATION RATE: 16 BRPM | HEART RATE: 62 BPM | TEMPERATURE: 97.8 F

## 2021-09-24 DIAGNOSIS — R07.81 PLEURODYNIA: ICD-10-CM

## 2021-09-24 DIAGNOSIS — R07.89 OTHER CHEST PAIN: ICD-10-CM

## 2021-09-24 PROCEDURE — 99214 OFFICE O/P EST MOD 30 MIN: CPT

## 2021-09-24 NOTE — PHYSICAL EXAM
[General Appearance - Alert] : alert [General Appearance - In No Acute Distress] : in no acute distress [Neck Appearance] : the appearance of the neck was normal [Neck Cervical Mass (___cm)] : no neck mass was observed [Jugular Venous Distention Increased] : there was no jugular-venous distention [Thyroid Diffuse Enlargement] : the thyroid was not enlarged [Thyroid Nodule] : there were no palpable thyroid nodules [Auscultation Breath Sounds / Voice Sounds] : lungs were clear to auscultation bilaterally [Heart Rate And Rhythm] : heart rate was normal and rhythm regular [Heart Sounds] : normal S1 and S2 [Heart Sounds Gallop] : no gallops [Murmurs] : no murmurs [Heart Sounds Pericardial Friction Rub] : no pericardial rub [Full Pulse] : the pedal pulses are present [Edema] : there was no peripheral edema [Bowel Sounds] : normal bowel sounds [Abdomen Soft] : soft [Abdomen Tenderness] : non-tender [Abdomen Mass (___ Cm)] : no abdominal mass palpated [Cervical Lymph Nodes Enlarged Posterior Bilaterally] : posterior cervical [Cervical Lymph Nodes Enlarged Anterior Bilaterally] : anterior cervical [Supraclavicular Lymph Nodes Enlarged Bilaterally] : supraclavicular [No CVA Tenderness] : no ~M costovertebral angle tenderness [No Spinal Tenderness] : no spinal tenderness [Abnormal Walk] : normal gait [Nail Clubbing] : no clubbing  or cyanosis of the fingernails [Musculoskeletal - Swelling] : no joint swelling seen [Motor Tone] : muscle strength and tone were normal [Skin Color & Pigmentation] : normal skin color and pigmentation [Skin Turgor] : normal skin turgor [] : no rash [Oriented To Time, Place, And Person] : oriented to person, place, and time [Impaired Insight] : insight and judgment were intact [Affect] : the affect was normal [FreeTextEntry1] : all joints with full ROM - no synovitis - bilateral knee with crepitus

## 2021-09-24 NOTE — ASSESSMENT
[FreeTextEntry1] : 36 year-old male with back pain from cervical to lumbar - no paresthesias -\par ongoing left shoulder pain radiating to the left rib cage \par \par \par \par 1. chest wall pain - started after trying to exercise - possible muscle tear - send for US\par 2. back pain - x-rays with multilevel DDD and scoliosis - send for PT and now with numbness over the right left buttocks - send for lumbar MRI\par \par I reviewed previous labs results with patients.\par Diagnosis and Prognosis discussed\par Continue with current medications\par medications refilled\par education provided on home exercises\par F/u 2 months\par

## 2021-09-24 NOTE — HISTORY OF PRESENT ILLNESS
[___ Week(s) Ago] : [unfilled] week(s) ago [FreeTextEntry1] : s/p cholecystectomy\par x-rays of cervical thoracic and lumbar with mild scoliosis\par worsening low back pain and numbness over the right buttocks\par ongoing chest wall - this started after he was trying to exercise last year

## 2021-10-06 ENCOUNTER — APPOINTMENT (OUTPATIENT)
Dept: INTERNAL MEDICINE | Facility: CLINIC | Age: 36
End: 2021-10-06

## 2021-10-14 NOTE — PROGRESS NOTE ADULT - SUBJECTIVE AND OBJECTIVE BOX
PAtient discharged back to [place] with [facility]   postop check note    35M s/p laparoscopic sleeve gastrectomy. patient doing well at this time with no complaints. does acknowledge mild abdominal pain but this is tolerable. denies N/V. has not yet ambulated.     NAD, asleep but easily awoken, converses appropriately, GCS15  unlabored breathing on room air on room air, no accessory muscle use  obese abdomen, laparoscopic with steristrips in place with small amount of dried blood, soft, appropriately tender, nondistended  moves all extremities  skin warm and well perfused       progressive as expected postop  pain and nausea control  will do benny clears starting tomorrow

## 2021-10-28 ENCOUNTER — APPOINTMENT (OUTPATIENT)
Dept: INTERNAL MEDICINE | Facility: CLINIC | Age: 36
End: 2021-10-28
Payer: MEDICAID

## 2021-10-28 VITALS
OXYGEN SATURATION: 98 % | HEART RATE: 59 BPM | WEIGHT: 290 LBS | BODY MASS INDEX: 46.61 KG/M2 | TEMPERATURE: 97.3 F | HEIGHT: 66 IN | DIASTOLIC BLOOD PRESSURE: 82 MMHG | SYSTOLIC BLOOD PRESSURE: 129 MMHG

## 2021-10-28 DIAGNOSIS — Z87.19 PERSONAL HISTORY OF OTHER DISEASES OF THE DIGESTIVE SYSTEM: ICD-10-CM

## 2021-10-28 DIAGNOSIS — E53.1 PYRIDOXINE DEFICIENCY: ICD-10-CM

## 2021-10-28 DIAGNOSIS — G89.18 OTHER ACUTE POSTPROCEDURAL PAIN: ICD-10-CM

## 2021-10-28 DIAGNOSIS — Z01.818 ENCOUNTER FOR OTHER PREPROCEDURAL EXAMINATION: ICD-10-CM

## 2021-10-28 PROCEDURE — 99214 OFFICE O/P EST MOD 30 MIN: CPT

## 2021-10-28 RX ORDER — CHROMIUM 200 MCG
TABLET ORAL
Refills: 0 | Status: COMPLETED | COMMUNITY
End: 2021-10-28

## 2021-10-28 RX ORDER — LACTOBACILLUS ACIDOPHILUS/PECT 30 MG-20MG
TABLET ORAL
Refills: 0 | Status: COMPLETED | COMMUNITY
End: 2021-10-28

## 2021-10-28 NOTE — HISTORY OF PRESENT ILLNESS
[Family Member] : family member [FreeTextEntry1] :  fu  [de-identified] : Pt had cholecystectomy and is feeling much better .  he is following a gluten free diet  ,. He has started pt  and not doing regular exercise  but tries to do stretches.  He has gained 1lb.  He has lost 233 lbs in all.  He still has mid sternal discomfort when he gets anxious.  he didn’t lower his metoprolol and has been taking 50 mg.    His transglutaminase was slightly elevated  he has low folic acid level and has not been taking his medications.

## 2021-10-28 NOTE — PHYSICAL EXAM
[PERRL] : pupils equal round and reactive to light [Normal Oropharynx] : the oropharynx was normal [Supple] : supple [Normal Rate] : normal rate  [Regular Rhythm] : with a regular rhythm [Normal S1, S2] : normal S1 and S2 [No Edema] : there was no peripheral edema [No Extremity Clubbing/Cyanosis] : no extremity clubbing/cyanosis [Soft] : abdomen soft [Non Tender] : non-tender [Non-distended] : non-distended [Normal Posterior Cervical Nodes] : no posterior cervical lymphadenopathy [Normal Anterior Cervical Nodes] : no anterior cervical lymphadenopathy [No CVA Tenderness] : no CVA  tenderness [Coordination Grossly Intact] : coordination grossly intact [Normal] : affect was normal and insight and judgment were intact [de-identified] : blind

## 2021-10-28 NOTE — COUNSELING
[Fall prevention counseling provided] : Fall prevention counseling provided [Adequate lighting] : Adequate lighting [No throw rugs] : No throw rugs [Use proper foot wear] : Use proper foot wear [Use recommended devices] : Use recommended devices [Sleep ___ hours/day] : Sleep [unfilled] hours/day [Engage in a relaxing activity] : Engage in a relaxing activity [Plan in advance] : Plan in advance [FreeTextEntry2] : continue healthy eating and exercise

## 2021-10-28 NOTE — HEALTH RISK ASSESSMENT
[] : No [No] : In the past 12 months have you used drugs other than those required for medical reasons? No [No falls in past year] : Patient reported no falls in the past year [0] : 2) Feeling down, depressed, or hopeless: Not at all (0) [PHQ-2 Negative - No further assessment needed] : PHQ-2 Negative - No further assessment needed [de-identified] : Dr Pelletier  surg  [facility]  [de-identified] : stretches  [de-identified] :  low fat low carb  [[id number]] : 0

## 2021-10-28 NOTE — ASSESSMENT
[FreeTextEntry1] : 1 bmi 46  he will continue to lose weight exercise  pt should eat 60-70  gms of protein a day or 20-30 gms per meal This is fish chicken eggs lean meat such as chicken turkey pork and beef .  - Pt should not eat more than a 200 calorie snack  and make sure they are protein and fiber rich. he should eat 7 serving of protein, 12 servings of carbohydrates and 3 servings of non starchy vegetables and 4 servings of fat Dont eat unless you are physically hungry and never eat in front of a TV go to the kitchen table.  he should not eat more than a 1500 calories per day.\par 2 celiac   follow gluten free diet  \par 3.  folic acid def  restart taking  folic acid.  \par 4.  hpn well controlled  continue present medications and diet and weight loss and low salt diet \par 5.  hypothyroid  stable and euthyroid.  \par 6 back pain  he has scoliosis and inverted cervical spine  and was to start methocarbamol 500mg at bedtime but has not taken. he has significantly narrowed l5s1   Unless acute low back pain is caused by a serious medical condition (which is uncommon), it typically resolves fairly quickly, even if there is a bulging or herniated disc.\par Still, low back pain can make it hard to do your usual activities, and it can be frustrating to feel like you just have to wait for it to get better. Below are some simple things you can do that may help relieve your pain.\par Remaining active — Many people are afraid that they will hurt their back further or delay recovery by remaining active. However, remaining active, to the extent that you are able, is one of the best things you can do for your back.\par If you have severe pain, you may need to rest your back for a day or so. It may be most comfortable to lie on your back with a pillow under your knees and your head and shoulders elevated. For sleeping, you may want to lie on your side with your upper knee bent and a pillow between your knees. However, prolonged bed rest is not recommended. Studies have shown that people with low back pain recover faster when they remain active. Movement helps to relieve muscle spasms and prevents loss of muscle strength.\par While you should avoid strenuous activities and sports while you are in pain, it is fine to continue doing regular day-to-day activities and light exercises, such as walking. If certain activities cause your back to hurt too much, try something else instead.\par Heat — Using a heating pad or heated wrap can help with low back pain during the first few weeks. It is not clear if cold helps as well, but some people may find that it relieves pain temporarily.\par Modifications at work — Most experts recommend that people with low back pain continue to work so long as it is possible to avoid prolonged standing or sitting and heavy lifting. If your job does not allow you to sit or stand comfortably, you may need to take some time off work while you recover. While standing at work, stepping on a block of wood with one foot (and periodically alternating the foot on the block) may be helpful.\par Pain medications — You can try taking an over-the-counter medication to help relieve pain. Nonsteroidal antiinflammatory drugs (NSAIDs), such as ibuprofen (sample brand names: Advil, Motrin) and naproxen (brand name: Aleve), may work better than acetaminophen (brand name: Tylenol) for low back pain.\par If you do take pain medication, it may be more effective to take a dose on a regular basis for three to five days, rather than using the medication only when your pain becomes unbearable. Muscle relaxants (eg, cyclobenzaprine [brand name: Flexeril]) are prescription medications; while these may help relieve back pain, they can cause drowsiness and are probably no better than ibuprofen in relieving pain. Your health care provider can talk to you about whether muscle relaxants might help in your situation. They may be helpful before bedtime when used for a short time, ie, a week or two. People who need to be alert, such as while driving or operating machinery, should not use muscle relaxants.\par Opioids (drugs derived from morphine) are not recommended for most people with back pain. In rare situations, a health care provider might prescribe them for a few days if a person has severe pain that is not responding to other treatments, but they are generally not much more effective than NSAIDs. In addition, opioids have a relatively high risk of side effects and the potential to cause harm, including the risk of dependency and abuse.\par Exercise — Starting a new exercise program immediately after a new episode of low back pain will not speed recovery from the acute episode. However, there is evidence that exercise is beneficial in people with chronic back pain. Spinal manipulation — "Spinal manipulation" is a technique sometimes used by chiropractors, physical therapists, osteopaths, massage therapists, and others to relieve back pain. It involves moving the joints of the spine beyond the normal range of motion. Studies suggest that spinal manipulation may provide modest pain relief and improved function, and it generally appears to be safe if performed by an experienced professional. If you are interested in trying this approach, talk with your health care provider about how to integrate it into your treatment plan.\par Acupuncture — Acupuncture involves inserting very fine needles into specific points, as determined by traditional Chinese maps of the body's flow of energy. There is not consistent evidence that acupuncture is effective for people with acute low back pain; however, some people find it helpful.\par Massage — There is no evidence that massage is effective in treating acute low back pain. However, you may find that it is generally relaxing and helps you feel better temporarily.\par Psychological therapy — In some cases, mental health issues can contribute to low back pain. Psychological therapy has mostly been studied in the context of treating longer-term (chronic) back pain; however, it may be beneficial for some people with acute pain as well. Other treatments — You may have heard of other treatments that claim to relieve back pain. Unfortunately, most of these have not been proven to work or are only effective in specific situations. Examples include:\par ?Injections – Some clinicians recommend injections of a local anesthetic (numbing medication) into the soft tissues of the back, although it is not clear if these injections are effective. The areas targeted by these injections are called "trigger points." Trigger-point injections may be of benefit in some people with chronic back pain, but they are typically not recommended for treating acute pain.\par Injections of a glucocorticoid (steroid) medication are sometimes recommended for people with chronic low back pain with sciatica or radiculopathy The injection is given into the epidural space, which is located below the spinal cord. Epidural glucocorticoid injections do appear to improve pain slightly at two and six weeks after the injection, but not at 3, 6, or 12 months after the injection. There is no evidence that epidural steroid injections are helpful for people with back pain without sciatica.\par ?Corsets and braces – While wearable supportive garments may claim to help relieve or prevent low back pain, these are typically not effective.\par ?Traction – Traction involves the use of weights to realign or pull the spinal column into alignment. Clinical studies have shown no benefit from traction in the treatment of acute back pain.\par ?Switching to a firmer mattress – People often wonder if sleeping on a firmer mattress can help prevent or treat low back pain. Small studies have suggested that using a less firm mattress may actually be more likely to relieve pain; however, there is not enough evidence to support switching to a specific type of sleeping surface for this reason.\par ?Methods involving energy or electricity – Other interventions include ultrasound, interferential therapy, shortwave diathermy, transcutaneous electrical nerve stimulation, and low-level laser therapy, all of which involve applying energy to the skin's surface. None of these interventions have been proven to be effective, particularly during the first four to six weeks of an episode of back pain.\par CHRONIC LOW BACK PAIN TREATMENTWhile most people recover completely from an episode of acute low back pain, some people do go on to have longer-term pain.\par

## 2021-11-19 ENCOUNTER — TRANSCRIPTION ENCOUNTER (OUTPATIENT)
Age: 36
End: 2021-11-19

## 2021-12-01 PROCEDURE — G9005: CPT

## 2021-12-06 ENCOUNTER — TRANSCRIPTION ENCOUNTER (OUTPATIENT)
Age: 36
End: 2021-12-06

## 2021-12-06 NOTE — HISTORY OF PRESENT ILLNESS
[FreeTextEntry1] : Patient is a 37 yo male with hypothyroidism, morbid obesity (BMI 80), self reported LAZARO (on CPAP) who initially presented to [facility] on 7/20/19 for visual loss. He was found to have severe papilledema and LP trial showed opening pressure of over 55 concerning pseudotumor cerebri. On 7/30/19, he underwent VPS placement (certas @ 4).  He is neurologically stable.

## 2021-12-06 NOTE — ASSESSMENT
[FreeTextEntry1] : 36 yo M s/p VPS placement for pseudotumor. Patient is legally blind and has not had significant improvement in his vision since shunt placement.

## 2021-12-07 ENCOUNTER — APPOINTMENT (OUTPATIENT)
Dept: NEUROSURGERY | Facility: CLINIC | Age: 36
End: 2021-12-07

## 2021-12-08 ENCOUNTER — APPOINTMENT (OUTPATIENT)
Dept: SURGERY | Facility: CLINIC | Age: 36
End: 2021-12-08
Payer: MEDICAID

## 2021-12-08 VITALS
SYSTOLIC BLOOD PRESSURE: 118 MMHG | WEIGHT: 274 LBS | HEART RATE: 80 BPM | HEIGHT: 66 IN | DIASTOLIC BLOOD PRESSURE: 81 MMHG | BODY MASS INDEX: 44.03 KG/M2

## 2021-12-08 VITALS — TEMPERATURE: 97.2 F

## 2021-12-08 PROCEDURE — 99214 OFFICE O/P EST MOD 30 MIN: CPT

## 2021-12-08 NOTE — HISTORY OF PRESENT ILLNESS
[Procedure: ___] : Procedure performed: [unfilled]  [Date of Surgery: ___] : Date of Surgery:   [unfilled] [Surgeon Name:   ___] : Surgeon Name: Dr. NELSON [Pre-Op Weight ___] : Pre-op weight was [unfilled] lbs [de-identified] : Mr. BARAKAT  is s/p Laparoscopic sleeve gastrectomy on 01/11/2021 and laparoscopic  cholecystectomy  on 08/30/2021. Patient feels well overall and has no complaints at this time. He is doing well with good interval progress and weight loss. He is making good food choices, eating small portions and has rapid and prolonged satiety. Denies any complaints of fever, pain, diarrhea, heartburn, reflux, or vomiting. Normal bowel function. Normal urination. Compliant with supplements.    he states that he had his Metoprolol reduced to 25 mg from 50 mg. he is scheduled to have dental procedure.  \par \par \par  \par \par   [de-identified] : Patient reports no interval changes to medications, medical history or overall health status.\par

## 2021-12-08 NOTE — ASSESSMENT
[FreeTextEntry1] : Patient with history of severe obesity s/p sleeve gastrectomy and laparoscopic  cholecystectomy  \par   \par \par Patient is doing well since the prior visit, with excellent interval and overall progress. Patient is tolerating an appropriate portion controlled and nutritionally balanced diet without difficulty. \par \par  \par

## 2021-12-08 NOTE — REVIEW OF SYSTEMS
[Vision Problems] : vision problems [As Noted in HPI] : as noted in HPI [Negative] : Integumentary [Fever] : no fever [Chills] : no chills [Feeling Poorly] : not feeling poorly [Feeling Tired] : not feeling tired [Sore Throat] : no sore throat [Hoarseness] : no hoarseness [Heart Rate Is Fast] : the heart rate was not fast [Chest Pain] : no chest pain [Palpitations] : no palpitations [Shortness Of Breath] : no shortness of breath [Wheezing] : no wheezing [Cough] : no cough [Abdominal Pain] : no abdominal pain [Vomiting] : no vomiting [Constipation] : no constipation [Diarrhea] : no diarrhea [Heartburn] : no heartburn [Melena] : no melena [Dysuria] : no dysuria [Incontinence] : no incontinence [Arthralgias] : no arthralgias [Limb Pain] : no limb pain [FreeTextEntry9] : Chronic low back pain

## 2021-12-08 NOTE — PLAN
[FreeTextEntry1] : I reviewed importance of behavioral modification and follow-up in order to optimize outcomes and avoid complications. Post-operative nutrition again reviewed and reinforced, including the importance of taking supplements, making healthy food choices.  The importance of maintaining regular exercise/physical activity to maximize progress also reinforced. \par Recommend patient to see nutritionist and attend support groups.  I also encouraged him to consult with Dr. Davide Dotson, and he is agreeable to this. \par We will have lab work done today. \par  \par Patient's questions and concerns addressed to patient's satisfaction. \par \par He will return to see me in two months.

## 2021-12-08 NOTE — PHYSICAL EXAM
[Obese, well nourished, in no acute distress] : obese, well nourished, in no acute distress [Normal] : affect appropriate [Respiratory Effort] : normal respiratory effort [Normal Rate and Rhythm] : normal rate and rhythm [de-identified] : bl eye blindness  [de-identified] : short [de-identified] : large pannus extending below pubis. non-tender , soft no palpable masses no hernias  [de-identified] : right hand minor weakness  [Abdominal Masses] : No abdominal masses [Abdomen Tenderness] : ~T ~M No abdominal tenderness [de-identified] : Well developed, well nourished adult, in NAD. [de-identified] : NC/AT, PERRL, EOMI, sclera anicteric [de-identified] : Obese, large abdominal pannus.  Soft, nontender, nondistended. No masses, rebound or guarding. Incisions well-healed.  No hernias palpable.\par  [de-identified] : No jaundice

## 2021-12-09 ENCOUNTER — APPOINTMENT (OUTPATIENT)
Dept: NEUROSURGERY | Facility: CLINIC | Age: 36
End: 2021-12-09

## 2021-12-15 LAB
24R-OH-CALCIDIOL SERPL-MCNC: 51.2 PG/ML
ANION GAP SERPL CALC-SCNC: 11 MMOL/L
BASOPHILS # BLD AUTO: 0.07 K/UL
BASOPHILS NFR BLD AUTO: 1 %
BUN SERPL-MCNC: 16 MG/DL
CALCIUM SERPL-MCNC: 10.1 MG/DL
CALCIUM SERPL-MCNC: 10.1 MG/DL
CHLORIDE SERPL-SCNC: 104 MMOL/L
CHOLEST SERPL-MCNC: 162 MG/DL
CO2 SERPL-SCNC: 26 MMOL/L
CREAT SERPL-MCNC: 0.74 MG/DL
EOSINOPHIL # BLD AUTO: 0.4 K/UL
EOSINOPHIL NFR BLD AUTO: 5.7 %
ESTIMATED AVERAGE GLUCOSE: 94 MG/DL
FOLATE SERPL-MCNC: 9.8 NG/ML
GLUCOSE SERPL-MCNC: 75 MG/DL
HBA1C MFR BLD HPLC: 4.9 %
HCT VFR BLD CALC: 47.8 %
HDLC SERPL-MCNC: 40 MG/DL
HGB BLD-MCNC: 15.3 G/DL
IMM GRANULOCYTES NFR BLD AUTO: 0.1 %
IRON SERPL-MCNC: 96 UG/DL
LDLC SERPL CALC-MCNC: 104 MG/DL
LYMPHOCYTES # BLD AUTO: 2.33 K/UL
LYMPHOCYTES NFR BLD AUTO: 33.2 %
MAGNESIUM SERPL-MCNC: 2.1 MG/DL
MAN DIFF?: NORMAL
MCHC RBC-ENTMCNC: 28.4 PG
MCHC RBC-ENTMCNC: 32 GM/DL
MCV RBC AUTO: 88.7 FL
MONOCYTES # BLD AUTO: 0.48 K/UL
MONOCYTES NFR BLD AUTO: 6.8 %
NEUTROPHILS # BLD AUTO: 3.73 K/UL
NEUTROPHILS NFR BLD AUTO: 53.2 %
NONHDLC SERPL-MCNC: 121 MG/DL
PARATHYROID HORMONE INTACT: 32 PG/ML
PLATELET # BLD AUTO: 266 K/UL
POTASSIUM SERPL-SCNC: 4.1 MMOL/L
RBC # BLD: 5.39 M/UL
RBC # FLD: 13.7 %
SODIUM SERPL-SCNC: 141 MMOL/L
TRIGL SERPL-MCNC: 87 MG/DL
TSH SERPL-ACNC: 1.45 UIU/ML
VIT B12 SERPL-MCNC: 847 PG/ML
WBC # FLD AUTO: 7.02 K/UL

## 2021-12-16 ENCOUNTER — LABORATORY RESULT (OUTPATIENT)
Age: 36
End: 2021-12-16

## 2021-12-16 ENCOUNTER — APPOINTMENT (OUTPATIENT)
Dept: INTERNAL MEDICINE | Facility: CLINIC | Age: 36
End: 2021-12-16
Payer: MEDICAID

## 2021-12-16 VITALS
RESPIRATION RATE: 16 BRPM | WEIGHT: 280 LBS | HEART RATE: 57 BPM | DIASTOLIC BLOOD PRESSURE: 69 MMHG | HEIGHT: 66 IN | BODY MASS INDEX: 45 KG/M2 | SYSTOLIC BLOOD PRESSURE: 109 MMHG | OXYGEN SATURATION: 98 % | TEMPERATURE: 97.2 F

## 2021-12-16 PROCEDURE — G0008: CPT

## 2021-12-16 PROCEDURE — 99215 OFFICE O/P EST HI 40 MIN: CPT | Mod: 25

## 2021-12-16 PROCEDURE — 93000 ELECTROCARDIOGRAM COMPLETE: CPT

## 2021-12-16 PROCEDURE — 90686 IIV4 VACC NO PRSV 0.5 ML IM: CPT

## 2021-12-16 RX ORDER — FAMOTIDINE 20 MG/1
20 TABLET, FILM COATED ORAL
Qty: 180 | Refills: 3 | Status: COMPLETED | COMMUNITY
Start: 2020-12-02 | End: 2021-12-16

## 2021-12-16 NOTE — RESULTS/DATA
[ECG Reviewed] : reviewed [Normal] : The 12 - lead ECG is normal [NSR] : normal sinus rhythm [Ventricular Rate___] : ventricular rate is [unfilled] beats per minute [P Waves Normal] : the P wave is normal [ECG Intervals RI.] : RI interval is normal [FL Interval___] : [unfilled] seconds [Normal QRS] : the QRS is normal [QRS Interval___] : QRS interval: [unfilled] seconds [ECG Axis] : the QRS axis is normal [QTc Interval___] : QTc interval: [unfilled] [Normal ST Segments] : the ST segments are normal [ECG T. Waves] : normal [No Interval Change] : no interval change

## 2021-12-16 NOTE — COUNSELING
[Weight management counseling provided] : Weight management [Fall prevention counseling provided] : fall prevention  [Good understanding] : Patient has a good understanding of disease, goals and obesity follow-up plan [Weigh Self Once Weekly] : Weigh self once weekly [Low Salt Diet] : Low salt diet [Decrease Portions] : Decrease food portions [Keep Food Diary] : Keep food diary [Walking] : Walking [Sleep ___ hours/day] : Sleep [unfilled] hours/day [Engage in a relaxing activity] : Engage in a relaxing activity [Plan in advance] : Plan in advance [Adequate lighting] : Adequate lighting [No throw rugs] : No throw rugs [Use proper foot wear] : Use proper foot wear [None] : None

## 2021-12-19 LAB
APPEARANCE: CLEAR
BILIRUBIN URINE: NEGATIVE
BLOOD URINE: NEGATIVE
COLOR: YELLOW
GLUCOSE QUALITATIVE U: NEGATIVE
KETONES URINE: NEGATIVE
LEUKOCYTE ESTERASE URINE: NEGATIVE
NITRITE URINE: NEGATIVE
PH URINE: 6
PROTEIN URINE: ABNORMAL
SPECIFIC GRAVITY URINE: 1.03
UROBILINOGEN URINE: NORMAL

## 2021-12-19 NOTE — HISTORY OF PRESENT ILLNESS
[No Pertinent Cardiac History] : no history of aortic stenosis, atrial fibrillation, coronary artery disease, recent myocardial infarction, or implantable device/pacemaker [Sleep Apnea] : sleep apnea [No Adverse Anesthesia Reaction] : no adverse anesthesia reaction in self or family member [(Patient denies any chest pain, claudication, dyspnea on exertion, orthopnea, palpitations or syncope)] : Patient denies any chest pain, claudication, dyspnea on exertion, orthopnea, palpitations or syncope [Parent] : parent [Chronic Anticoagulation] : no chronic anticoagulation [Chronic Kidney Disease] : no chronic kidney disease [Diabetes] : no diabetes [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 he has hx of hypercapnic respiratory failure on 4/19, and has had bariatric surgery and lost over 226 lbs   He has adult celiac disease  and  bursitis of left shoulder  he is here for medical clearance for dental  work but it is under local  anesthesia  if required.  He doesn’t have an infection  .   [FreeTextEntry3] : Dr Tennille Rojas [FreeTextEntry7] : Workup prior to surgery included LHC performed at [facility] which did not demonstrate the presence of obstructive CAD. Of note, his last echocardiogram from June 2020 also demonstrated normal biventricular systolic function. \par He underwent repeat CT of the chest on June 4, 2021 that did not demonstrate any rib, sternum or soft tissue abnormality. Lungs were normal. There was a borderline prevascular Lymph node 1.0 cm\par  he uses cpapm  8cmh20\par

## 2021-12-19 NOTE — PLAN
[FreeTextEntry1] : Influenza Virus Vaccine (Inactivated) (in floo EN za VYE dimitri vak SEEN, in ak ti DIONICIO lynnette)   COMMON USES:  It is used to prevent the flu.   HOW TO USE THIS MEDICINE:  HOW IS THIS DRUG BEST TAKEN? Use this drug as ordered by your doctor. Read all information given to you. Follow all instructions closely. It is given as a shot into a muscle. HOW DO I STORE AND/OR THROW OUT THIS DRUG? If you need to store this drug at home, talk with your doctor, nurse, or pharmacist about how to store it. WHAT DO I DO IF I MISS A DOSE? Call your doctor to find out what to do.   CAUTIONS:  Tell all of your health care providers that you take this drug. This includes your doctors, nurses, pharmacists, and dentists. If you have a latex allergy, talk with your doctor. If you are allergic to eggs, talk with the doctor. Like all vaccines, this vaccine may not fully protect all people who get it. If you have questions, talk with the doctor. This drug is a vaccine with a virus that is not active. It cannot cause the disease. This drug is not a cure for the flu. It must be given before you are exposed to the flu in order to work. Most of the time, it takes a few weeks for this drug to work. This drug only protects you for 1 flu season. You will need to get the flu vaccine each year. If you have a weak immune system or take drugs that weaken the immune system, talk with your doctor. This vaccine may not work as well. Not all brands of vaccines are for all children. Talk with your child's doctor. Some children may need to have more than 1 dose of this vaccine. Talk with your child's doctor. Some children have had a fever and seizures caused by fevers with some flu vaccines. Most of the time, this happened in children younger than 5 years of age. Fever has also been seen in children 5 to younger than 9 years of age. Talk with your child's doctor. Tell your doctor if you are pregnant, plan on getting pregnant, or are breast-feeding. You will need to talk about the benefits and risks to you and the baby.   POSSIBLE SIDE EFFECTS:  WHAT ARE SOME SIDE EFFECTS THAT I NEED TO CALL MY DOCTOR ABOUT RIGHT AWAY? WARNING/CAUTION: Even though it may be rare, some people may have very bad and sometimes deadly side effects when taking a drug. Tell your doctor or get medical help right away if you have any of the following signs or symptoms that may be related to a very bad side effect: Signs of an allergic reaction, like rash; hives; itching; red, swollen, blistered, or peeling skin with or without fever; wheezing; tightness in the chest or throat; trouble breathing, swallowing, or talking; unusual hoarseness; or swelling of the mouth, face, lips, tongue, or throat. A burning, numbness, or tingling feeling that is not normal. Not able to move face muscles as much. Trouble controlling body movements. Very bad dizziness or passing out. Muscle weakness. Seizures. Change in eyesight. WHAT ARE SOME OTHER SIDE EFFECTS OF THIS DRUG? All drugs may cause side effects. However, many people have no side effects or only have minor side effects. Call your doctor or get medical help if any of these side effects or any other side effects bother you or do not go away: For all patients taking this drug: Pain, redness, swelling, or other reaction where the injection was given. Headache. Muscle or joint pain. Feeling tired or weak. Chills. Young children: Mild fever. Upset stomach or throwing up. Stomach pain or diarrhea. Not hungry. Feeling sleepy. Feeling fussy. Crying that is not normal. These are not all of the side effects that may occur. If you have questions about side effects, call your doctor. Call your doctor for medical advice about side effects. Report side effects to the FDA/CDC Vaccine Adverse Event Reporting System (VAERS) at https://vaers.hhs.gov/reportevent.html or by calling 1-388.459.8883.   BEFORE USING THIS MEDICINE:  WHAT DO I NEED TO TELL MY DOCTOR BEFORE I TAKE THIS DRUG? TELL YOUR DOCTOR: If you are allergic to this drug; any part of this drug; or any other drugs, foods, or substances. Tell your doctor about the allergy and what signs you had. This drug may interact with other drugs or health problems. Tell your doctor and pharmacist about all of your drugs (prescription or OTC, natural products, vitamins) and health problems. You must check to make sure that it is safe for you to take this drug with all of your drugs and health problems. Do not start, stop, or change the dose of any drug without checking with your doctor.   OVERDOSE:  If you think there has been an overdose, call your poison control center or get medical care right away. Be ready to tell or show what was taken, how much, and when it happened.   ADDITIONAL INFORMATION:  If your symptoms or health problems do not get better or if they become worse, call your doctor. Do not share your drugs with others and do not take anyone else's drugs. Keep all drugs in a safe place. Keep all drugs out of the reach of children and pets. Throw away unused or  drugs. Do not flush down a toilet or pour down a drain unless you are told to do so. Check with your pharmacist if you have questions about the best way to throw out drugs. There may be drug take-back programs in your area. Some drugs may have another patient information leaflet. Check with your pharmacist. If you have any questions about this drug, please talk with your doctor, nurse, pharmacist, or other health care provider.   Copyright  [employer] All Rights Reserved.

## 2021-12-19 NOTE — PHYSICAL EXAM
[Well Developed] : well developed [Well Nourished] : well nourished [Normal Sclera/Conjunctiva] : normal sclera/conjunctiva [PERRL] : pupils equal round and reactive to light [Normal Oropharynx] : the oropharynx was normal [No JVD] : no jugular venous distention [No Lymphadenopathy] : no lymphadenopathy [Supple] : supple [Rate ___] : at [unfilled] breaths per minute [Normal Rhythm/Effort] : normal respiratory rhythm and effort [Clear Bilaterally] : the lungs were clear to auscultation bilaterally [Normal to Percussion] : the lungs were normal to percussion [5th Left ICS - MCL] : palpated at the 5th LICS in the midclavicular line [Heart Rate ___] : [unfilled] bpm [Rhythm Regular] : regular [Normal Rate] : normal [Normal S1] : normal S1 [Normal S2] : normal S2 [No Murmur] : no murmurs heard [No Pitting Edema] : no pitting edema present [2+] : left 2+ [No Abnormalities] : the abdominal aorta was not enlarged and no bruit was heard [Obese] : obese [Soft, Nontender] : the abdomen was soft and nontender [None] : no hernias were palpable [No CVA Tenderness] : no CVA  tenderness [No Spinal Tenderness] : no spinal tenderness [Normal Station and Gait] : the gait and station were normal [Normal Motor Tone] : the muscle tone was normal [Involuntary Movements] : no involuntary movements were seen [Coordination Grossly Intact] : coordination grossly intact [Normal Gait] : normal gait [Normal] : affect was normal and insight and judgment were intact [S3] : no S3 [S4] : no S4 [Rt] : no varicose veins of the right leg [Lt] : no varicose veins of the left leg [Right Carotid Bruit] : no bruit heard over the right carotid [Left Carotid Bruit] : no bruit heard over the left carotid [Right Femoral Bruit] : no bruit heard over the right femoral artery [Left Femoral Bruit] : no bruit heard over the left femoral artery [Bruit] : no bruit heard

## 2021-12-19 NOTE — ASSESSMENT
[High Risk Surgery - Intraperitoneal, Intrathoracic or Supringuinal Vascular Procedures] : High Risk Surgery - Intraperitoneal, Intrathoracic or Supringuinal Vascular Procedures - No (0) [Ischemic Heart Disease] : Ischemic Heart Disease - No (0) [Congestive Heart Failure] : Congestive Heart Failure - No (0) [Prior Cerebrovascular Accident or TIA] : Prior Cerebrovascular Accident or TIA - No (0) [Creatinine >= 2mg/dL (1 Point)] : Creatinine >= 2mg/dL - No (0) [Insulin-dependent Diabetic (1 Point)] : Insulin-dependent Diabetic - No (0) [0] : 0 , RCRI Class: I, Risk of Post-Op Cardiac Complications: 3.9%, 95% CI for Risk Estimate: 2.8% - 5.4% [Modify medications prior to procedure] : Modify medications prior to procedure [As per surgery] : as per surgery [FreeTextEntry4] : Preop  pt is having a low risks procedure and is a low risks for perioperative or psot operative cardiac event with RCRI  score of 0 .  He is not receiving  general anesthesia and can proceed with local anesthesia orally and have radiological exam by xrays and also  dental work done. Presently he doesn’t require prophylactic antibiotics for cleaning or filling of any cavities.   he has had blood testing and will do covid testing  prior to  dental work . He is medically cleared for procedures if his coviid test is negative.    Pt has been explained his procedures  risks anesthesia and complications and alternatives by his doctor.    [FreeTextEntry7] : he will take  his levothyroxin with sip of water in am 3hrs prior to procedures

## 2021-12-20 ENCOUNTER — APPOINTMENT (OUTPATIENT)
Dept: MRI IMAGING | Facility: HOSPITAL | Age: 36
End: 2021-12-20
Payer: MEDICAID

## 2021-12-20 ENCOUNTER — RESULT REVIEW (OUTPATIENT)
Age: 36
End: 2021-12-20

## 2021-12-20 ENCOUNTER — OUTPATIENT (OUTPATIENT)
Dept: OUTPATIENT SERVICES | Facility: HOSPITAL | Age: 36
LOS: 1 days | End: 2021-12-20
Payer: MEDICAID

## 2021-12-20 ENCOUNTER — APPOINTMENT (OUTPATIENT)
Dept: NEUROSURGERY | Facility: CLINIC | Age: 36
End: 2021-12-20
Payer: MEDICAID

## 2021-12-20 DIAGNOSIS — Z98.2 PRESENCE OF CEREBROSPINAL FLUID DRAINAGE DEVICE: Chronic | ICD-10-CM

## 2021-12-20 DIAGNOSIS — Z00.8 ENCOUNTER FOR OTHER GENERAL EXAMINATION: ICD-10-CM

## 2021-12-20 DIAGNOSIS — Z98.84 BARIATRIC SURGERY STATUS: Chronic | ICD-10-CM

## 2021-12-20 PROCEDURE — 99213 OFFICE O/P EST LOW 20 MIN: CPT

## 2021-12-20 PROCEDURE — 72148 MRI LUMBAR SPINE W/O DYE: CPT

## 2021-12-20 PROCEDURE — 72148 MRI LUMBAR SPINE W/O DYE: CPT | Mod: 26

## 2021-12-20 NOTE — PHYSICAL EXAM
[General Appearance - Alert] : alert [General Appearance - In No Acute Distress] : in no acute distress [Oriented To Time, Place, And Person] : oriented to person, place, and time [Impaired Insight] : insight and judgment were intact [Affect] : the affect was normal [Mood] : the mood was normal [Person] : oriented to person [Place] : oriented to place [Time] : oriented to time [Cranial Nerves Oculomotor (III)] : extraocular motion intact [Cranial Nerves Trigeminal (V)] : facial sensation intact symmetrically [Cranial Nerves Facial (VII)] : face symmetrical [Cranial Nerves Vestibulocochlear (VIII)] : hearing was intact bilaterally [Cranial Nerves Glossopharyngeal (IX)] : tongue and palate midline [Cranial Nerves Accessory (XI - Cranial And Spinal)] : head turning and shoulder shrug symmetric [Cranial Nerves Hypoglossal (XII)] : there was no tongue deviation with protrusion [Motor Tone] : muscle tone was normal in all four extremities [Motor Strength] : muscle strength was normal in all four extremities [Involuntary Movements] : no involuntary movements were seen [Sensation Tactile Decrease] : light touch was intact [Balance] : balance was intact [FreeTextEntry5] : legally blind  [Sclera] : the sclera and conjunctiva were normal [PERRL With Normal Accommodation] : pupils were equal in size, round, reactive to light, with normal accommodation [Extraocular Movements] : extraocular movements were intact [FreeTextEntry1] : legally blind  [Outer Ear] : the ears and nose were normal in appearance [Hearing Threshold Finger Rub Not Woods] : hearing was normal [Neck Appearance] : the appearance of the neck was normal [] : no respiratory distress [Exaggerated Use Of Accessory Muscles For Inspiration] : no accessory muscle use [Edema] : there was no peripheral edema [No CVA Tenderness] : no ~M costovertebral angle tenderness [Abnormal Walk] : normal gait [Skin Color & Pigmentation] : normal skin color and pigmentation

## 2021-12-20 NOTE — ASSESSMENT
[FreeTextEntry1] : 34 yo M s/p VPS placement for pseudotumor (strata at 1). Patient is legally blind and has not had significant improvement in his vision since shunt placement. Recently underwent bariatric surgery, doing well. Comes in today shunt setting confirmation after MRI. Shunt setting changed to 2.5 from 1 after MRI. \par \par \par Plan:\par - Shunt dialed back to 1 and confirmed \par - f/u in February \par \par \par \par \par

## 2021-12-20 NOTE — HISTORY OF PRESENT ILLNESS
[FreeTextEntry1] : Patient is a 37 yo male with hypothyroidism, morbid obesity (BMI 80), self reported LAZARO (on CPAP) who initially presented to [facility] ED on 7/20/19 for visual loss. He was found to have severe papilledema and LP trial showed opening pressure of over 55 concerning pseudotumor cerebri. On 7/30/19, he underwent VPS placement (certas @ 4). \par \par Last revision on 10/115/19 proximal valve replaced with Strata valve set @1. \par Patient is now status post gastric bypass surgery, doing well. Had an MRI today, come in to confirm his shunt setting. \par \par

## 2022-01-05 ENCOUNTER — TRANSCRIPTION ENCOUNTER (OUTPATIENT)
Age: 37
End: 2022-01-05

## 2022-01-06 ENCOUNTER — TRANSCRIPTION ENCOUNTER (OUTPATIENT)
Age: 37
End: 2022-01-06

## 2022-01-21 ENCOUNTER — APPOINTMENT (OUTPATIENT)
Dept: PULMONOLOGY | Facility: CLINIC | Age: 37
End: 2022-01-21
Payer: MEDICAID

## 2022-01-21 VITALS
WEIGHT: 277 LBS | BODY MASS INDEX: 44.52 KG/M2 | SYSTOLIC BLOOD PRESSURE: 120 MMHG | RESPIRATION RATE: 16 BRPM | TEMPERATURE: 98.1 F | OXYGEN SATURATION: 97 % | DIASTOLIC BLOOD PRESSURE: 78 MMHG | HEART RATE: 63 BPM | HEIGHT: 66 IN

## 2022-01-21 PROCEDURE — 99213 OFFICE O/P EST LOW 20 MIN: CPT

## 2022-01-23 NOTE — HISTORY OF PRESENT ILLNESS
[Obstructive Sleep Apnea] : obstructive sleep apnea [Snoring] : snoring [TextBox_4] : 36 year old patient presented for evaluation of left sided chest wall pain.  He has elevated BMI and states that pain is in adipose tissue of breast and surrounding area. An US of the area was negative. \par Of note, in 2019,he underwent CT coronary angiogram.  This demonstrated multiple periaortic and paratracheal lymph nodes, all \par measuring less than 1 cm in short axis. \par \par He underwent repeat CT of the chest on June 4, 2021 that did not demonstrate any rib, sternum or soft tissue abnormality.  Lungs were normal.  There was a borderline prevascular Lymph node 1.0 cm\par \par \par \par He is breathing well.  He still notes mild chest discomfort.\par \par He reports history of obstructive sleep apnea. He has obtained CPAP equipment. he has FFM.  he is using it every night at least 6 hours.  He states he sleep better using it\par \par He had symptoms consistent with COVID 3 weeks ago though did not get tested.  he has recovered. \par \par PSH:\par bariatric, gastric sleeve\par \par  shunt, pseudotumor cerebri\par \par \par \par PMH:\par \par hiatus hernia\par \par gallstones\par \par HTN\par fatty liver disease\par \par Sleep apnea:  he has CPAP but is not using it\par \par SH:\par \par \par \par former smoker\par \par \par smoked for 20  years\par \par stopped smoking 8 months ago\par \par ETOH:  occasional\par \par \par Occupation: no working\par \par No exposure to chemicals, dust, asbestos, mold\par \par \par ALLERGY:\par \par NKDA\par \par environmental/seasonal allergy: none\par \par \par \par Review of Systems:\par \par No rash, skin problems\par No dry eyes\par no mouth ulcers\par no sinusitis, sinus infections, nasal obstruction\par has hiatus hernia\par no dry mouth\par \par has back pain\par \par \par no asthma\par no pneumonia\par no wheeze\par no lung cancer\par \par no CAD, he had negative angiogram\par no MI\par no chest pain\par no murmur\par no CHF\par \par no edema\par \par no peptic ulcer or gastritis\par \par \par no Diabetes\par no thyroid disease\par no hyperlipidemia\par \par \par no bleeding\par \par no DVT or PE\par \par no kidney disease\par \par no stroke\par no seizure\par \par \par \par \par \par \par \par \par \par \par \par

## 2022-01-23 NOTE — DISCUSSION/SUMMARY
[FreeTextEntry1] : Chest wall pain: This may be musculoskeletal in origin, probable rib or costal cartilage. The repeat CT scan of the chest in June 2021 did not show significant lymphadenopathy nor any active pathology. \par \par He has not used analgesic, acetaminophen.  he has used Salonpas and heating pad and rest. \par \par He did undergo repeat sleep study and is using CPAP.\par  \par The patient has had the influenza vaccine this season. \par \par Lucas Emerson MD

## 2022-01-27 ENCOUNTER — APPOINTMENT (OUTPATIENT)
Dept: RHEUMATOLOGY | Facility: CLINIC | Age: 37
End: 2022-01-27

## 2022-01-31 ENCOUNTER — APPOINTMENT (OUTPATIENT)
Dept: CARDIOLOGY | Facility: CLINIC | Age: 37
End: 2022-01-31
Payer: MEDICAID

## 2022-01-31 ENCOUNTER — NON-APPOINTMENT (OUTPATIENT)
Age: 37
End: 2022-01-31

## 2022-01-31 VITALS
BODY MASS INDEX: 44.52 KG/M2 | HEART RATE: 73 BPM | DIASTOLIC BLOOD PRESSURE: 71 MMHG | SYSTOLIC BLOOD PRESSURE: 109 MMHG | RESPIRATION RATE: 16 BRPM | OXYGEN SATURATION: 97 % | WEIGHT: 277 LBS | HEIGHT: 66 IN | TEMPERATURE: 98.6 F

## 2022-01-31 PROCEDURE — 93000 ELECTROCARDIOGRAM COMPLETE: CPT

## 2022-01-31 PROCEDURE — 99214 OFFICE O/P EST MOD 30 MIN: CPT

## 2022-02-04 ENCOUNTER — APPOINTMENT (OUTPATIENT)
Dept: RHEUMATOLOGY | Facility: CLINIC | Age: 37
End: 2022-02-04
Payer: MEDICAID

## 2022-02-04 VITALS
HEART RATE: 64 BPM | WEIGHT: 277 LBS | BODY MASS INDEX: 44.52 KG/M2 | OXYGEN SATURATION: 96 % | HEIGHT: 66 IN | TEMPERATURE: 97.3 F | DIASTOLIC BLOOD PRESSURE: 72 MMHG | SYSTOLIC BLOOD PRESSURE: 104 MMHG | RESPIRATION RATE: 16 BRPM

## 2022-02-04 PROCEDURE — 99214 OFFICE O/P EST MOD 30 MIN: CPT

## 2022-02-04 NOTE — HISTORY OF PRESENT ILLNESS
[FreeTextEntry1] : ongoing pain over the sciatica right notch - that can be severe and throbbing\par MRi as follows\par Lumbar spondylosis at L4 and L5 as follows.\par L4/L5, small left foraminal disc protrusion is present with moderate left neural foraminal stenosis. No central stenosis.\par \par L5/S1, diffuse disc desiccation and trace retrolisthesis with left foraminal disc herniation. Moderate left neural foraminal stenosis. No central stenosis.\par

## 2022-02-04 NOTE — PHYSICAL EXAM
[General Appearance - Alert] : alert [General Appearance - In No Acute Distress] : in no acute distress [Neck Appearance] : the appearance of the neck was normal [Neck Cervical Mass (___cm)] : no neck mass was observed [Jugular Venous Distention Increased] : there was no jugular-venous distention [Thyroid Diffuse Enlargement] : the thyroid was not enlarged [Thyroid Nodule] : there were no palpable thyroid nodules [Auscultation Breath Sounds / Voice Sounds] : lungs were clear to auscultation bilaterally [Heart Rate And Rhythm] : heart rate was normal and rhythm regular [Heart Sounds] : normal S1 and S2 [Heart Sounds Gallop] : no gallops [Murmurs] : no murmurs [Heart Sounds Pericardial Friction Rub] : no pericardial rub [Full Pulse] : the pedal pulses are present [Edema] : there was no peripheral edema [Bowel Sounds] : normal bowel sounds [Abdomen Soft] : soft [Abdomen Tenderness] : non-tender [Abdomen Mass (___ Cm)] : no abdominal mass palpated [Cervical Lymph Nodes Enlarged Posterior Bilaterally] : posterior cervical [Cervical Lymph Nodes Enlarged Anterior Bilaterally] : anterior cervical [Supraclavicular Lymph Nodes Enlarged Bilaterally] : supraclavicular [No CVA Tenderness] : no ~M costovertebral angle tenderness [No Spinal Tenderness] : no spinal tenderness [Abnormal Walk] : normal gait [Nail Clubbing] : no clubbing  or cyanosis of the fingernails [Musculoskeletal - Swelling] : no joint swelling seen [Motor Tone] : muscle strength and tone were normal [Skin Color & Pigmentation] : normal skin color and pigmentation [Skin Turgor] : normal skin turgor [] : no rash [Oriented To Time, Place, And Person] : oriented to person, place, and time [Impaired Insight] : insight and judgment were intact [Affect] : the affect was normal [FreeTextEntry1] : all joints with full ROM - no synovitis - bilateral knee with crepitus - left side rib tenderness

## 2022-02-04 NOTE — ASSESSMENT
[FreeTextEntry1] : 36 year-old male with back pain from cervical to lumbar - no paresthesias -\par ongoing left shoulder pain radiating to the left rib cage \par \par \par \par 1. chest wall pain - US of the rib - r/o muscle tear - referral to PT\par 2. back pain -MRi with left side disc herniation - referral to physiatry - continue with PT at this time - ongoing left side sciatica - can try gabapentin 100 mg at bed time\par \par I reviewed previous labs results with patients.\par Diagnosis and Prognosis discussed\par Continue with current medications\par medications refilled\par education provided on gabapentin\par F/u 2 months\par \par \par 35 minutes spent in preparation of this visit, including review of previous notes and test results, interview and examination of patient, discussion of plan, arranging for appropriate testing and treatment, and documentation, review of current labs and radiology exams\par

## 2022-02-09 ENCOUNTER — APPOINTMENT (OUTPATIENT)
Dept: SURGERY | Facility: CLINIC | Age: 37
End: 2022-02-09
Payer: MEDICAID

## 2022-02-16 ENCOUNTER — APPOINTMENT (OUTPATIENT)
Dept: SURGERY | Facility: CLINIC | Age: 37
End: 2022-02-16
Payer: MEDICAID

## 2022-02-16 VITALS
WEIGHT: 275 LBS | DIASTOLIC BLOOD PRESSURE: 80 MMHG | HEART RATE: 66 BPM | OXYGEN SATURATION: 97 % | BODY MASS INDEX: 44.2 KG/M2 | HEIGHT: 66 IN | SYSTOLIC BLOOD PRESSURE: 119 MMHG

## 2022-02-16 VITALS — TEMPERATURE: 97.1 F

## 2022-02-16 PROCEDURE — 99213 OFFICE O/P EST LOW 20 MIN: CPT

## 2022-02-16 NOTE — ASSESSMENT
[FreeTextEntry1] : Patient with history of severe obesity s/p sleeve gastrectomy and laparoscopic cholecystectomy \par  \par \par Patient is doing well since the prior visit, with excellent interval and overall progress. Patient is tolerating an appropriate portion controlled and nutritionally balanced diet without difficulty. \par

## 2022-02-16 NOTE — PLAN
[FreeTextEntry1] : I reviewed importance of behavioral modification and follow-up in order to optimize outcomes and avoid complications. Post-operative nutrition again reviewed and reinforced, including the importance of taking supplements, making healthy food choices. The importance of maintaining regular exercise/physical activity to maximize progress also reinforced. Recommend patient to see nutritionist and attend support groups.  \par \par He will follow up in six months. \par

## 2022-02-16 NOTE — PHYSICAL EXAM
[Respiratory Effort] : normal respiratory effort [Normal Rate and Rhythm] : normal rate and rhythm [de-identified] : bl eye blindness  [de-identified] : short [de-identified] : large pannus extending below pubis. non-tender , soft no palpable masses no hernias  [de-identified] : right hand minor weakness  [Abdominal Masses] : No abdominal masses [Abdomen Tenderness] : ~T ~M No abdominal tenderness [de-identified] : Well developed, well nourished adult, in NAD. [de-identified] : NC/AT, PERRL, EOMI, sclera anicteric [de-identified] : Obese, large abdominal pannus.  Soft, nontender, nondistended. No masses, rebound or guarding. Incisions well-healed.  No hernias palpable.\par  [de-identified] : No jaundice

## 2022-02-16 NOTE — HISTORY OF PRESENT ILLNESS
[de-identified] : Mr. BARAKAT  is s/p Laparoscopic sleeve gastrectomy on 01/11/2021 and laparoscopic  cholecystectomy  on 08/30/2021. . Patient feels well overall and has no complaints at this time. He is doing well with good interval progress and weight loss. He is making good food choices, eating small portions and has rapid and prolonged satiety. Denies any complaints of fever, pain, diarrhea, heartburn, reflux, or vomiting. Normal bowel function. Normal urination. Compliant with supplements. he states that he had his Metoprolol reduced to 25 mg from 50 mg. he is scheduled to  see his PMD in March for his annual physical  exam. he is very happy with his progress.  patient want to join gym but due to the disability is having issues with transportation.  \par \par \par  \par \par   [de-identified] : Patient reports no interval changes to medications, medical history or overall health status.\par

## 2022-03-15 ENCOUNTER — APPOINTMENT (OUTPATIENT)
Dept: INTERNAL MEDICINE | Facility: CLINIC | Age: 37
End: 2022-03-15
Payer: MEDICAID

## 2022-03-15 VITALS
BODY MASS INDEX: 43.71 KG/M2 | DIASTOLIC BLOOD PRESSURE: 80 MMHG | TEMPERATURE: 97.5 F | SYSTOLIC BLOOD PRESSURE: 117 MMHG | OXYGEN SATURATION: 98 % | HEIGHT: 66 IN | HEART RATE: 73 BPM | WEIGHT: 272 LBS

## 2022-03-15 DIAGNOSIS — R59.0 LOCALIZED ENLARGED LYMPH NODES: ICD-10-CM

## 2022-03-15 DIAGNOSIS — Z01.818 ENCOUNTER FOR OTHER PREPROCEDURAL EXAMINATION: ICD-10-CM

## 2022-03-15 DIAGNOSIS — Z86.79 PERSONAL HISTORY OF OTHER DISEASES OF THE CIRCULATORY SYSTEM: ICD-10-CM

## 2022-03-15 DIAGNOSIS — R10.13 EPIGASTRIC PAIN: ICD-10-CM

## 2022-03-15 DIAGNOSIS — E53.8 DEFICIENCY OF OTHER SPECIFIED B GROUP VITAMINS: ICD-10-CM

## 2022-03-15 DIAGNOSIS — Z00.00 ENCOUNTER FOR GENERAL ADULT MEDICAL EXAMINATION W/OUT ABNORMAL FINDINGS: ICD-10-CM

## 2022-03-15 DIAGNOSIS — R06.02 SHORTNESS OF BREATH: ICD-10-CM

## 2022-03-15 PROCEDURE — 99213 OFFICE O/P EST LOW 20 MIN: CPT | Mod: 25

## 2022-03-15 PROCEDURE — 99395 PREV VISIT EST AGE 18-39: CPT | Mod: 25

## 2022-03-15 RX ORDER — GABAPENTIN 100 MG/1
100 CAPSULE ORAL
Qty: 20 | Refills: 0 | Status: COMPLETED | COMMUNITY
Start: 2022-02-04 | End: 2022-03-15

## 2022-03-15 NOTE — PLAN
[FreeTextEntry1] :   fu with dental , eye exams    continue weight loss  exercise   us of gonzales ordered fu with  recommendations  continue pt .

## 2022-03-15 NOTE — HISTORY OF PRESENT ILLNESS
[Parent] : parent [FreeTextEntry1] : cpe  [de-identified] : Pt is a 37 yr old man with obesity and is sp bariatric surgery and has lost  241 lbs.  He has hx of sleep apnea , hpn,  shunt , legally blind, back pain  left shoulder pain , scoliosis  and hypothyroid and fatty liver who is here for his cpe.   He has fu with  surgeon and rheum . He was given gabapentin for his pain but has not taken it.  He has started Physcial therapy.  Lumbar spondylosis at L4 and L5 as follows.\par \par L4/L5, small left foraminal disc protrusion is present with moderate left neural foraminal stenosis.  No central stenosis.\par \par L5/S1, diffuse disc desiccation and trace retrolisthesis with left foraminal disc herniation. Moderate left neural foraminal stenosis. No central stenosis.\par

## 2022-03-15 NOTE — PHYSICAL EXAM
[Well Developed] : well developed [Well Nourished] : well nourished [Conjunctiva] : the conjunctiva were normal in both eyes [PERRL] : pupils were equal in size, round, and reactive to light [EOM Intact] : extraocular movements were intact [Normal Appearance] : was normal in appearance [Neck Supple] : was supple [Enlarged Diffusely] : was not enlarged [JVP Elevated ___cm] : the JVP was not elevated [Rate ___] : at [unfilled] breaths per minute [Normal Rhythm/Effort] : normal respiratory rhythm and effort [Clear Bilaterally] : the lungs were clear to auscultation bilaterally [Normal to Percussion] : the lungs were normal to percussion [5th Left ICS - MCL] : palpated at the 5th LICS in the midclavicular line [Heart Rate ___] : [unfilled] bpm [Rhythm Regular] : regular [Normal Rate] : normal [Normal S1] : normal S1 [Normal S2] : normal S2 [S3] : no S3 [S4] : no S4 [No Murmur] : no murmurs heard [No Pitting Edema] : no pitting edema present [Rt] : no varicose veins of the right leg [Lt] : no varicose veins of the left leg [Right Carotid Bruit] : no bruit heard over the right carotid [Left Carotid Bruit] : no bruit heard over the left carotid [Right Femoral Bruit] : no bruit heard over the right femoral artery [Left Femoral Bruit] : no bruit heard over the left femoral artery [2+] : left 2+ [No Abnormalities] : the abdominal aorta was not enlarged and no bruit was heard [Bruit] : no bruit heard [Examination Of The Breasts] : a normal appearance [No Discharge] : no discharge [Obese] : obese [Soft, Nontender] : the abdomen was soft and nontender [None] : no CVA tenderness [Penis Abnormality] : normal uncircumcised penis [Urinary Bladder Findings] : the bladder was normal on palpation [Scrotum] : the scrotum was normal [Rectal Exam - Seminal Vesicles] : the seminal vesicles were normal [Epididymis] : the epididymides were normal [Testes Tenderness] : no tenderness of the testes [Testes Mass (___cm)] : there were no testicular masses [Postauricular Lymph Nodes Enlarged Bilaterally] : nodes not enlarged [Preauricular Lymph Nodes Enlarged Bilaterally] : nodes not enlarged [Submandibular Lymph Nodes Enlarged Bilaterally] : nodes not enlarged [Suboccipital Lymph Nodes Enlarged Bilaterally] : nodes not enlarged [Submental Lymph Nodes Enlarged] : nodes not enlarged [Cervical Lymph Nodes Enlarged Posterior Bilaterally] : nodes not enlarged [Cervical Lymph Nodes Enlarged Anterior Bilaterally] : nodes not enlarged [Supraclavicular Lymph Nodes Enlarged Bilaterally] : nodes not enlarged [Axillary Lymph Nodes Enlarged Bilaterally] : nodes not enlarged [Epitrochlear Lymph Nodes Enlarged Bilaterally] : nodes not enlarged [Femoral Lymph Nodes Enlarged Bilaterally] : nodes not enlarged [Inguinal Lymph Nodes Enlarged Bilaterally] : nodes not enlarged [No Lymphangitis] : no lymphangitis observed [No Scoliosis] : no scoliosis [Normal Kyphosis] : normal kyphosis [No Visual Abnormalities] : no visible abnormalities [Normal Lordosis] : normal lordosis [No Tenderness to Palpation] : no spine tenderness on palpation [No Masses] : no masses [Full ROM] : full ROM [No Pain with ROM] : no pain with motion in any direction [Intact] : all reflexes within normal limits bilaterally [Normal Station and Gait] : the gait and station were normal [Normal Motor Tone] : the muscle tone was normal [Involuntary Movements] : no involuntary movements were seen [Normal Scalp] : inspection of the scalp showed no abnormalities [Examination Of The Hair] : texture and distribution of hair was normal [Abnormal Color] : normal color and pigmentation [Complexion Medium] : medium complexion [Skin Lesions 1] : no skin lesions were observed [Tattoo - Single] : no tattoos observed [Skin Turgor Decreased] : normal skin turgor [Cranial Nerves Oculomotor (III)] : the extraocular motions were intact [Cranial Nerves Trigeminal (V)] : sensation to the face and masseter strength were intact [Cranial Nerves Facial (VII)] : facial strength was intact bilaterally [Cranial Nerves Vestibulocochlear (VIII)] : hearing was intact [Cranial Nerves Glossopharyngeal (IX)] : there was normal movement of the soft palate and normal gag [Cranial Nerves Accessory (XI - Cranial And Spinal)] : shoulder shrug was intact bilaterally [Cranial Nerves Hypoglossal (XII)] : there was no tongue deviation with protrusion [Normal] : the deep tendon reflexes were normal [Normal Mental Status] : the patient's orientation, memory, attention, language and fund of knowledge were normal [Appropriate] : appropriate [Impaired judgment] : intact judgment [Impaired Insight] : intact insight [de-identified] : tongue normal teeth in good repair  [de-identified] : tinea of feet

## 2022-03-15 NOTE — COUNSELING
[Fall prevention counseling provided] : Fall prevention counseling provided [Adequate lighting] : Adequate lighting [No throw rugs] : No throw rugs [Use proper foot wear] : Use proper foot wear [Use recommended devices] : Use recommended devices [Sleep ___ hours/day] : Sleep [unfilled] hours/day [Engage in a relaxing activity] : Engage in a relaxing activity [Plan in advance] : Plan in advance [Potential consequences of obesity discussed] : Potential consequences of obesity discussed [Benefits of weight loss discussed] : Benefits of weight loss discussed [Structured Weight Management Program suggested:] : Structured weight management program suggested [Encouraged to maintain food diary] : Encouraged to maintain food diary [Encouraged to increase physical activity] : Encouraged to increase physical activity [Encouraged to use exercise tracking device] : Encouraged to use exercise tracking device [Target Wt Loss Goal ___] : Weight Loss Goals: Target weight loss goal [unfilled] lbs [Weigh Self Weekly] : weigh self weekly [Decrease Portions] : decrease portions [____ min/wk Activity] : [unfilled] min/wk activity [Keep Food Diary] : keep food diary [FreeTextEntry1] : continue present diet  [FreeTextEntry2] : ideal  134- 154  he is 89  bmi 43

## 2022-03-15 NOTE — ASSESSMENT
[FreeTextEntry1] : health  Pt has not had dtap  and pneumonia vaccine   He needs eye and dental exam   \par 2  bmi 43   Weight loss, exercise, and diet control were discussed and are highly encouraged. Treatment options were given such as, aqua therapy, and contacting a nutritionist. Recommended to use the elliptical, stationary bike, less use of treadmill. Mindful eating was explained to the patient Obesity is associated with worsening asthma, shortness of breath, and potential for cardiac disease, diabetes, and other underlying medical conditions.\par \par continue weight loss \par 3.  hypothyroid stable and doing well fu tsh in three months \par 4.  celiac check his levels  continue gluten free diet \par 5.  back pain  he is in pt  continue  Unless acute low back pain is caused by a serious medical condition (which is uncommon), it typically resolves fairly quickly, even if there is a bulging or herniated disc.\par Still, low back pain can make it hard to do your usual activities, and it can be frustrating to feel like you just have to wait for it to get better. Below are some simple things you can do that may help relieve your pain.\par Remaining active — Many people are afraid that they will hurt their back further or delay recovery by remaining active. However, remaining active, to the extent that you are able, is one of the best things you can do for your back.\par If you have severe pain, you may need to rest your back for a day or so. It may be most comfortable to lie on your back with a pillow under your knees and your head and shoulders elevated. For sleeping, you may want to lie on your side with your upper knee bent and a pillow between your knees. However, prolonged bed rest is not recommended. Studies have shown that people with low back pain recover faster when they remain active. Movement helps to relieve muscle spasms and prevents loss of muscle strength.\par While you should avoid strenuous activities and sports while you are in pain, it is fine to continue doing regular day-to-day activities and light exercises, such as walking. If certain activities cause your back to hurt too much, try something else instead.\par Heat — Using a heating pad or heated wrap can help with low back pain during the first few weeks. It is not clear if cold helps as well, but some people may find that it relieves pain temporarily.\par Modifications at work — Most experts recommend that people with low back pain continue to work so long as it is possible to avoid prolonged standing or sitting and heavy lifting. If your job does not allow you to sit or stand comfortably, you may need to take some time off work while you recover. While standing at work, stepping on a block of wood with one foot (and periodically alternating the foot on the block) may be helpful.\par Pain medications — You can try taking an over-the-counter medication to help relieve pain. Nonsteroidal antiinflammatory drugs (NSAIDs), such as ibuprofen (sample brand names: Advil, Motrin) and naproxen (brand name: Aleve), may work better than acetaminophen (brand name: Tylenol) for low back pain.\par If you do take pain medication, it may be more effective to take a dose on a regular basis for three to five days, rather than using the medication only when your pain becomes unbearable. Muscle relaxants (eg, cyclobenzaprine [brand name: Flexeril]) are prescription medications; while these may help relieve back pain, they can cause drowsiness and are probably no better than ibuprofen in relieving pain. Your health care provider can talk to you about whether muscle relaxants might help in your situation. They may be helpful before bedtime when used for a short time, ie, a week or two. People who need to be alert, such as while driving or operating machinery, should not use muscle relaxants.\par Opioids (drugs derived from morphine) are not recommended for most people with back pain. In rare situations, a health care provider might prescribe them for a few days if a person has severe pain that is not responding to other treatments, but they are generally not much more effective than NSAIDs. In addition, opioids have a relatively high risk of side effects and the potential to cause harm, including the risk of dependency and abuse.\par Exercise — Starting a new exercise program immediately after a new episode of low back pain will not speed recovery from the acute episode. However, there is evidence that exercise is beneficial in people with chronic back pain. Spinal manipulation — "Spinal manipulation" is a technique sometimes used by chiropractors, physical therapists, osteopaths, massage therapists, and others to relieve back pain. It involves moving the joints of the spine beyond the normal range of motion. Studies suggest that spinal manipulation may provide modest pain relief and improved function, and it generally appears to be safe if performed by an experienced professional. If you are interested in trying this approach, talk with your health care provider about how to integrate it into your treatment plan.\par Acupuncture — Acupuncture involves inserting very fine needles into specific points, as determined by traditional Chinese maps of the body's flow of energy. There is not consistent evidence that acupuncture is effective for people with acute low back pain; however, some people find it helpful.\par Massage — There is no evidence that massage is effective in treating acute low back pain. However, you may find that it is generally relaxing and helps you feel better temporarily.\par Psychological therapy — In some cases, mental health issues can contribute to low back pain. Psychological therapy has mostly been studied in the context of treating longer-term (chronic) back pain; however, it may be beneficial for some people with acute pain as well. Other treatments — You may have heard of other treatments that claim to relieve back pain. Unfortunately, most of these have not been proven to work or are only effective in specific situations. Examples include:\par ?Injections – Some clinicians recommend injections of a local anesthetic (numbing medication) into the soft tissues of the back, although it is not clear if these injections are effective. The areas targeted by these injections are called "trigger points." Trigger-point injections may be of benefit in some people with chronic back pain, but they are typically not recommended for treating acute pain.\par Injections of a glucocorticoid (steroid) medication are sometimes recommended for people with chronic low back pain with sciatica or radiculopathy The injection is given into the epidural space, which is located below the spinal cord. Epidural glucocorticoid injections do appear to improve pain slightly at two and six weeks after the injection, but not at 3, 6, or 12 months after the injection. There is no evidence that epidural steroid injections are helpful for people with back pain without sciatica.\par ?Corsets and braces – While wearable supportive garments may claim to help relieve or prevent low back pain, these are typically not effective.\par ?Traction – Traction involves the use of weights to realign or pull the spinal column into alignment. Clinical studies have shown no benefit from traction in the treatment of acute back pain.\par ?Switching to a firmer mattress – People often wonder if sleeping on a firmer mattress can help prevent or treat low back pain. Small studies have suggested that using a less firm mattress may actually be more likely to relieve pain; however, there is not enough evidence to support switching to a specific type of sleeping surface for this reason.\par ?Methods involving energy or electricity – Other interventions include ultrasound, interferential therapy, shortwave diathermy, transcutaneous electrical nerve stimulation, and low-level laser therapy, all of which involve applying energy to the skin's surface. None of these interventions have been proven to be effective, particularly during the first four to six weeks of an episode of back pain.\par CHRONIC LOW BACK PAIN TREATMENTWhile most people recover completely from an episode of acute low back pain, some people do go on to have longer-term pain. Chronic pain is typica\par 6  hpn resolved  will lower  medication to 1/2 tab  now  \par 7 fatty liver fu us  Fatty Liver: The patient denies any jaundice or pruritus. The patient denies any alcohol use. The patient denies taking large doses of nonsteroidal anti-inflammatory drugs or acetaminophen. The findings are suggestive of fatty liver. The patient and I had a long discussion regarding the risks of fatty liver progressing to cirrhosis. The patient was told of the possible increased risk of developing liver failure, cirrhosis, ascites, GI bleeding secondary to varices, hepatic encephalopathy, bleeding tendencies and liver cancer. The patient was told of the importance of follow-up. The patient was advised to follow up every 6 months for blood work and imaging studies. The patient agreed and will follow up. The patient was advised to lose weight. I recommend a trial of vitamin E supplementation for the fatty liver. If the liver enzymes remain elevated, the patient may require a trial of Pioglitazone for the fatty liver. I recommend avoid alcohol and hepato-toxic agents. The patient was also advised to avoid NSAIDs, Acetaminophen and any other hepatotoxic drugs. The patient was also advised not to share needles, razors, scissors, nail clippers, etc.. The patient is to continue close follow-up in our office for blood work and exams. If the liver enzymes remain elevated, the patient may require a CT guided liver biopsy to assess the liver parenchyma and for possible treatment. We had a long discussion regarding the risks and benefits of the procedure. The patient was told of the risks of bleeding, perforation, infections, emergency surgery and missing lesions. The patient agreed and will follow-up to reassess the symptoms Patient has been counseled on life style interventions, including but not limited to: weight loss (3-5% loss of body weight might improve fat in the liver, while 7-10% needed for potential improvement of other components, including inflammation and scarring of the liver, called fibrosis); healthy diet, avoiding added sugars, sodas, avoiding saturated fats, limiting sodium, avoiding alcohol; and on the importance of regular exercise (> 150 min/week moderate intensity aerobic exercise with at least 2x/week muscle strengthening or exercise as tolerated). \par - I explained to patient the natural Hx of NAFLD. \par 8 sleep apnea  leep apnea is associated with adverse clinical consequences which an affect most organ systems. Cardiovascular disease risk includes arrhythmias, atrial fibrillation, hypertension, coronary artery disease, and stroke. Metabolic disorders include diabetes type 2, non-alcoholic fatty liver disease. Mood disorder especially depression; and cognitive decline especially in the elderly. Associations with chronic reflux/Rojas’s esophagus some but not all inclusive. \par -Reasons include arousal consistent with hypopnea; respiratory events most prominent in REM sleep or supine position; therefore sleep staging and body position are important for accurate diagnosis and estimation of AHI. \par \par 9  std testing  needed \par 10  shunt he fu with neurosurgeon \par 11.  mediastinal lymphadenopathy fu with pulmonary ct scan done  borderline  ln \par \par

## 2022-03-15 NOTE — HEALTH RISK ASSESSMENT
[Fair] : ~his/her~ current health as fair  [Very Good] : ~his/her~  mood as very good [Never] : Never [No] : In the past 12 months have you used drugs other than those required for medical reasons? No [No falls in past year] : Patient reported no falls in the past year [0] : 2) Feeling down, depressed, or hopeless: Not at all (0) [PHQ-2 Negative - No further assessment needed] : PHQ-2 Negative - No further assessment needed [HIV Test offered] : HIV Test offered [Hepatitis C test offered] : Hepatitis C test offered [Handling Complex Tasks] : difficulty handling complex tasks [None] : None [With Family] : lives with family [# of Members in Household ___] :  household currently consist of [unfilled] member(s) [Single] : single [# Of Children ___] : has [unfilled] children [Sexually Active] : sexually active [Feels Safe at Home] : Feels safe at home [Smoke Detector] : smoke detector [Carbon Monoxide Detector] : carbon monoxide detector [Safety elements used in home] : safety elements used in home [Seat Belt] :  uses seat belt [FreeTextEntry1] : see above  [de-identified] : surgeon Dr Alan Emerson,  card Dr Monten[place]ro [de-identified] : pt  [de-identified] : low carb diet  [[id number]] : 0 [Change in mental status noted] : No change in mental status noted [Language] : denies difficulty with language [Behavior] : denies difficulty with behavior [Learning/Retaining New Information] : denies difficulty learning/retaining new information [Reasoning] : denies difficulty with reasoning [Spatial Ability and Orientation] : denies difficulty with spatial ability and orientation [Reports changes in hearing] : Reports no changes in hearing [Reports changes in vision] : Reports no changes in vision [Reports changes in dental health] : Reports no changes in dental health [Guns at Home] : no guns at home [Sunscreen] : does not use sunscreen [Travel to Developing Areas] : does not  travel to developing areas [TB Exposure] : is not being exposed to tuberculosis [Caregiver Concerns] : does not have caregiver concerns [de-identified] : due to vision lost  [de-identified] : needs help  [de-identified] : needs help  [de-identified] : last eye exam   [de-identified] : last dental exam   [AdvancecareDate] : 03/22

## 2022-03-16 LAB
HIV1+2 AB SPEC QL IA.RAPID: NONREACTIVE
TSH SERPL-ACNC: 1.8 UIU/ML

## 2022-03-17 LAB
C TRACH RRNA SPEC QL NAA+PROBE: NOT DETECTED
IGA SER QL IEP: 549 MG/DL
N GONORRHOEA RRNA SPEC QL NAA+PROBE: NOT DETECTED
SOURCE AMPLIFICATION: NORMAL
T PALLIDUM AB SER QL IA: NEGATIVE
TTG IGA SER IA-ACNC: 8 U/ML
TTG IGA SER-ACNC: ABNORMAL
TTG IGG SER IA-ACNC: 7.8 U/ML
TTG IGG SER IA-ACNC: ABNORMAL

## 2022-03-18 ENCOUNTER — APPOINTMENT (OUTPATIENT)
Dept: RHEUMATOLOGY | Facility: CLINIC | Age: 37
End: 2022-03-18
Payer: MEDICAID

## 2022-03-18 VITALS
SYSTOLIC BLOOD PRESSURE: 112 MMHG | HEART RATE: 66 BPM | TEMPERATURE: 97.7 F | RESPIRATION RATE: 16 BRPM | HEIGHT: 66 IN | OXYGEN SATURATION: 98 % | DIASTOLIC BLOOD PRESSURE: 77 MMHG | BODY MASS INDEX: 43.71 KG/M2 | WEIGHT: 272 LBS

## 2022-03-18 DIAGNOSIS — M75.102 UNSPECIFIED ROTATOR CUFF TEAR OR RUPTURE OF LEFT SHOULDER, NOT SPECIFIED AS TRAUMATIC: ICD-10-CM

## 2022-03-18 PROCEDURE — 99213 OFFICE O/P EST LOW 20 MIN: CPT

## 2022-03-18 NOTE — PHYSICAL EXAM
[General Appearance - Alert] : alert [General Appearance - In No Acute Distress] : in no acute distress [Neck Appearance] : the appearance of the neck was normal [Neck Cervical Mass (___cm)] : no neck mass was observed [Jugular Venous Distention Increased] : there was no jugular-venous distention [Thyroid Nodule] : there were no palpable thyroid nodules [Thyroid Diffuse Enlargement] : the thyroid was not enlarged [Auscultation Breath Sounds / Voice Sounds] : lungs were clear to auscultation bilaterally [Heart Rate And Rhythm] : heart rate was normal and rhythm regular [Heart Sounds] : normal S1 and S2 [Heart Sounds Gallop] : no gallops [Murmurs] : no murmurs [Heart Sounds Pericardial Friction Rub] : no pericardial rub [Full Pulse] : the pedal pulses are present [Edema] : there was no peripheral edema [Bowel Sounds] : normal bowel sounds [Abdomen Soft] : soft [Abdomen Tenderness] : non-tender [Abdomen Mass (___ Cm)] : no abdominal mass palpated [Cervical Lymph Nodes Enlarged Posterior Bilaterally] : posterior cervical [Cervical Lymph Nodes Enlarged Anterior Bilaterally] : anterior cervical [Supraclavicular Lymph Nodes Enlarged Bilaterally] : supraclavicular [No Spinal Tenderness] : no spinal tenderness [No CVA Tenderness] : no ~M costovertebral angle tenderness [Abnormal Walk] : normal gait [Nail Clubbing] : no clubbing  or cyanosis of the fingernails [Musculoskeletal - Swelling] : no joint swelling seen [Motor Tone] : muscle strength and tone were normal [FreeTextEntry1] : all joints with full ROM - no synovitis - bilateral knee with crepitus - left side rib tenderness [Skin Color & Pigmentation] : normal skin color and pigmentation [Skin Turgor] : normal skin turgor [] : no rash [Oriented To Time, Place, And Person] : oriented to person, place, and time [Impaired Insight] : insight and judgment were intact [Affect] : the affect was normal

## 2022-03-18 NOTE — ASSESSMENT
[FreeTextEntry1] : 36 year-old male with back pain from cervical to lumbar - no paresthesias -\par ongoing left shoulder pain radiating to the left rib cage \par \par \par \par 1. chest wall pain - symptoms started 2 years ago - ongoing with severe pain at times - has failed nsaids - oral and topical ,PT and massage - send for MRI\par 2. back pain -MRi with left side disc herniation -new referral to physiatry\par ongoing PT with improvement\par 3. neck and shoulder pain - send for PT\par \par I reviewed previous labs results with patients.\par Diagnosis and Prognosis discussed\par Continue with current medications\par medications refilled\par education provided on gabapentin\par F/u 2 months\par \par \par

## 2022-03-18 NOTE — HISTORY OF PRESENT ILLNESS
[FreeTextEntry1] : back pain is improving on PT\par ongoing left chest wall pain - has not done US - this pain started oer 2 years ago - he has failed conservative treatment such as NSAIDS, topical NSAIDS, massage and PT\par MRi as follows\par Lumbar spondylosis at L4 and L5 as follows.\par L4/L5, small left foraminal disc protrusion is present with moderate left neural foraminal stenosis. No central stenosis.\par \par L5/S1, diffuse disc desiccation and trace retrolisthesis with left foraminal disc herniation. Moderate left neural foraminal stenosis. No central stenosis.\par

## 2022-03-28 ENCOUNTER — APPOINTMENT (OUTPATIENT)
Dept: INTERNAL MEDICINE | Facility: CLINIC | Age: 37
End: 2022-03-28

## 2022-04-05 ENCOUNTER — APPOINTMENT (OUTPATIENT)
Dept: ULTRASOUND IMAGING | Facility: HOSPITAL | Age: 37
End: 2022-04-05
Payer: MEDICAID

## 2022-04-05 ENCOUNTER — OUTPATIENT (OUTPATIENT)
Dept: OUTPATIENT SERVICES | Facility: HOSPITAL | Age: 37
LOS: 1 days | End: 2022-04-05
Payer: MEDICAID

## 2022-04-05 DIAGNOSIS — K76.0 FATTY (CHANGE OF) LIVER, NOT ELSEWHERE CLASSIFIED: ICD-10-CM

## 2022-04-05 DIAGNOSIS — Z98.2 PRESENCE OF CEREBROSPINAL FLUID DRAINAGE DEVICE: Chronic | ICD-10-CM

## 2022-04-05 DIAGNOSIS — Z98.84 BARIATRIC SURGERY STATUS: Chronic | ICD-10-CM

## 2022-04-05 PROCEDURE — 76700 US EXAM ABDOM COMPLETE: CPT

## 2022-04-05 PROCEDURE — 76700 US EXAM ABDOM COMPLETE: CPT | Mod: 26

## 2022-04-06 ENCOUNTER — OUTPATIENT (OUTPATIENT)
Dept: OUTPATIENT SERVICES | Facility: HOSPITAL | Age: 37
LOS: 1 days | End: 2022-04-06
Payer: MEDICAID

## 2022-04-06 ENCOUNTER — APPOINTMENT (OUTPATIENT)
Dept: MRI IMAGING | Facility: CLINIC | Age: 37
End: 2022-04-06
Payer: MEDICAID

## 2022-04-06 ENCOUNTER — APPOINTMENT (OUTPATIENT)
Dept: NEUROSURGERY | Facility: CLINIC | Age: 37
End: 2022-04-06
Payer: MEDICAID

## 2022-04-06 VITALS
HEART RATE: 66 BPM | OXYGEN SATURATION: 95 % | BODY MASS INDEX: 43.71 KG/M2 | HEIGHT: 66 IN | DIASTOLIC BLOOD PRESSURE: 71 MMHG | WEIGHT: 272 LBS | TEMPERATURE: 98 F | SYSTOLIC BLOOD PRESSURE: 102 MMHG

## 2022-04-06 DIAGNOSIS — Z98.2 PRESENCE OF CEREBROSPINAL FLUID DRAINAGE DEVICE: Chronic | ICD-10-CM

## 2022-04-06 DIAGNOSIS — Z98.84 BARIATRIC SURGERY STATUS: Chronic | ICD-10-CM

## 2022-04-06 DIAGNOSIS — R07.89 OTHER CHEST PAIN: ICD-10-CM

## 2022-04-06 PROCEDURE — 71550 MRI CHEST W/O DYE: CPT

## 2022-04-06 PROCEDURE — 71550 MRI CHEST W/O DYE: CPT | Mod: 26

## 2022-04-06 PROCEDURE — 99212 OFFICE O/P EST SF 10 MIN: CPT

## 2022-04-08 NOTE — PHYSICAL EXAM
[General Appearance - Alert] : alert [General Appearance - In No Acute Distress] : in no acute distress [Oriented To Time, Place, And Person] : oriented to person, place, and time [Impaired Insight] : insight and judgment were intact [Affect] : the affect was normal [Mood] : the mood was normal [Person] : oriented to person [Place] : oriented to place [Time] : oriented to time [Cranial Nerves Oculomotor (III)] : extraocular motion intact [Cranial Nerves Trigeminal (V)] : facial sensation intact symmetrically [Cranial Nerves Facial (VII)] : face symmetrical [Cranial Nerves Glossopharyngeal (IX)] : tongue and palate midline [Cranial Nerves Vestibulocochlear (VIII)] : hearing was intact bilaterally [Cranial Nerves Accessory (XI - Cranial And Spinal)] : head turning and shoulder shrug symmetric [Cranial Nerves Hypoglossal (XII)] : there was no tongue deviation with protrusion [Motor Tone] : muscle tone was normal in all four extremities [Motor Strength] : muscle strength was normal in all four extremities [Involuntary Movements] : no involuntary movements were seen [Sensation Tactile Decrease] : light touch was intact [Balance] : balance was intact [Sclera] : the sclera and conjunctiva were normal [PERRL With Normal Accommodation] : pupils were equal in size, round, reactive to light, with normal accommodation [Extraocular Movements] : extraocular movements were intact [Outer Ear] : the ears and nose were normal in appearance [Hearing Threshold Finger Rub Not [place]] : hearing was normal [Neck Appearance] : the appearance of the neck was normal [Exaggerated Use Of Accessory Muscles For Inspiration] : no accessory muscle use [] : no respiratory distress [Edema] : there was no peripheral edema [No CVA Tenderness] : no ~M costovertebral angle tenderness [Abnormal Walk] : normal gait [Skin Color & Pigmentation] : normal skin color and pigmentation [FreeTextEntry5] : legally blind  [FreeTextEntry1] : legally blind

## 2022-04-08 NOTE — HISTORY OF PRESENT ILLNESS
[FreeTextEntry1] : Patient is a 35 yo male with hypothyroidism, morbid obesity (BMI 80), self reported LAZARO (on CPAP) who initially presented to [facility] ED on 7/20/19 for visual loss. He was found to have severe papilledema and LP trial showed opening pressure of over 55 concerning pseudotumor cerebri. On 7/30/19, he underwent VPS placement (certas @ 4). \par \par Last revision on 10/115/19 proximal valve replaced with Strata valve set @1. \par Patient is now status post gastric bypass surgery, doing well. \par Had an MRI and comes in to confirm his shunt setting. \par Last setting (Strata @1)\par \par \par \par

## 2022-04-08 NOTE — ASSESSMENT
[FreeTextEntry1] : IMPRESSION:\par 34 yo M s/p VPS placement for pseudotumor (strata at 1). Recently underwent bariatric surgery, doing well. \par Comes in to confirm his shunt setting. Last setting (Strata @1)\par Shunt setting changed to 1.5\par \par \par \par \par PLAN:\par - Setting changed to 1.0 and confirmed by another provider \par \par \par \par \par

## 2022-04-13 ENCOUNTER — TRANSCRIPTION ENCOUNTER (OUTPATIENT)
Age: 37
End: 2022-04-13

## 2022-04-15 ENCOUNTER — NON-APPOINTMENT (OUTPATIENT)
Age: 37
End: 2022-04-15

## 2022-04-26 NOTE — PROGRESS NOTE ADULT - SUBJECTIVE AND OBJECTIVE BOX
Pt clear for DC from case management standpoint. Discharging to home with [facility]       04/26/22 1550   Final Note   Assessment Type Final Discharge Note   [facility]position [facility]      [facility] Ophthalmology Follow Up Note    S: Pt seen at bedside, admits improvement stable vision in both eyes but is noticing that the periphery which used to have a whitish hue is now red in both eyes    Mood and Affect Appropriate ( x ),  Oriented to Time, Place, and Person x 3 ( x )    Ophthalmology Exam    Visual acuity (sc): 20/400 with eccentric fixation OD, 20/40 OS, PHNI OU  Pupils: PERRL OU,  APD OD  Ttono: 12  Extraocular movements (EOMs): Full OU, no pain, no diplopia   Confrontational Visual Field (CVF):  OD HM all 4 quadrants, OS central island  Color Plates: CHRIS OD 2/2 poor VA, 6/12 OS     Pen Light Exam (PLE)  External:  Flat OU  Lids/Lashes/Lacrimal Ducts: Flat Ou    Sclera/Conjunctiva:  W+Q OU  Cornea: Cl OU  Anterior Chamber: D+F OU   Iris:  Flat OU  Lens:  Cl OU    Dilation approved by team and patient:  Vitreous: wnl OU, no hemorrhage OU  Disc, cup/disc: bilaterally swollen hyperemic discs w/ nerve fiber layer edema circumferentially around the discs, margins obscured OU,. Dilated and tortuous retinal veins OU   Macula:  temporal IRH OD, flat OU  Vessels:  narrow arterioles OU  Periphery: scattered IRH and MAs OD    Assessment: 35 y/o M, LAZARO and morbid obesity, presents with 5 days of poor vision OD, headaches, pulsatile tinnitus, and tunnel vision intermittently.  Pt found to have elevated open pressure on LP, suggestive of IIH secondary to morbid obesity s/p  shunt 7/30/19 with mild improvement of vision, now with red hue in periphery, no vit heme, no RT/RH/ RD  Plan:  - non-ocular cause of reddish hue, can d/w neuro  - on Diamox per neuro  - weight loss stressed  - pt advised to let team know if he notices any changes in his vision  - guarded visual prognosis at this time, explained to patient  - case and findings initially d/w Dr. Adkins (neuro-ophthalmology attending), will need appointment upon discharge  - will continue to follow closely    Follow-Up:  Patient should follow up his/her ophthalmologist or in the [facility] Neuro-Ophthalmology Practice within 1 week of discharge.  [address] [place] [id number]  [phone]    s/d/w Dr. Zamorano -attending

## 2022-05-06 ENCOUNTER — APPOINTMENT (OUTPATIENT)
Dept: RHEUMATOLOGY | Facility: CLINIC | Age: 37
End: 2022-05-06

## 2022-05-20 ENCOUNTER — APPOINTMENT (OUTPATIENT)
Dept: PULMONOLOGY | Facility: CLINIC | Age: 37
End: 2022-05-20
Payer: MEDICAID

## 2022-05-20 VITALS
RESPIRATION RATE: 16 BRPM | TEMPERATURE: 97.3 F | HEIGHT: 66 IN | WEIGHT: 271 LBS | SYSTOLIC BLOOD PRESSURE: 131 MMHG | OXYGEN SATURATION: 97 % | HEART RATE: 62 BPM | DIASTOLIC BLOOD PRESSURE: 84 MMHG | BODY MASS INDEX: 43.55 KG/M2

## 2022-05-20 PROCEDURE — 99213 OFFICE O/P EST LOW 20 MIN: CPT

## 2022-05-22 NOTE — DISCUSSION/SUMMARY
[FreeTextEntry1] : Chest wall pain: This may be musculoskeletal in origin, probable rib or costal cartilage. The repeat CT scan of the chest in June 2021 did not show significant lymphadenopathy nor any active pathology. \par \par he has MRI of chest and was told he has herniated disc\par \par \par He did undergo repeat sleep study and obtained  CPAP.  he is advised that he needs to use it.\par \par He has a history of obesity hypoventilation.  He weighed over 500 pounds he underwent bariatric surgery and weight is now 270.  He was originally using Trilogy NIV.  The repeat sleep study determined that CPAP 8 cm H20 eliminated apneic episodes.  He has  been tolerating this well.\par  \par The patient has had the influenza vaccine this season. \par \par Total time spent : 30 minutes\par Including:\par Preparation prior to visit - Reviewing prior record, results of tests and Consultation Reports as applicable\par Conducting an appropriate H & P during today's encounter\par Appropriate orders for tests, medications and procedures, as applicable\par Counseling patient \par Note completion \par \par Lucas Emerson MD

## 2022-05-22 NOTE — HISTORY OF PRESENT ILLNESS
[Obstructive Sleep Apnea] : obstructive sleep apnea [Snoring] : snoring [TextBox_4] : 37 year old patient presented for evaluation of left sided chest wall pain.  He has elevated BMI and states that pain is in adipose tissue of breast and surrounding area. An US of the area was negative. \par Of note, in 2019,he underwent CT coronary angiogram.  This demonstrated multiple periaortic and paratracheal lymph nodes, all \par measuring less than 1 cm in short axis. \par \par He underwent repeat CT of the chest on June 4, 2021 that did not demonstrate any rib, sternum or soft tissue abnormality.  Lungs were normal.  There was a borderline prevascular Lymph node 1.0 cm\par \par \par \par He is breathing well.  He still notes mild chest discomfort.\par \par He reports history of obstructive sleep apnea. He has obtained CPAP equipment. he has FFM.  \par \par He state that he has not been using CPAP consistently because he has not been home.\par \par He is advised to take CPAP with him when he is away.\par He had gained benefit when he did use it.\par \par \par PSH:\par bariatric, gastric sleeve\par \par  shunt, pseudotumor cerebri\par \par \par \par PMH:\par \par hiatus hernia\par \par gallstones\par \par HTN\par fatty liver disease\par \par Sleep apnea:  he has CPAP but is not using it\par \par SH:\par \par \par \par former smoker\par \par \par smoked for 20  years\par \par stopped smoking 8 months ago\par \par ETOH:  occasional\par \par \par Occupation: no working\par \par No exposure to chemicals, dust, asbestos, mold\par \par \par ALLERGY:\par \par NKDA\par \par environmental/seasonal allergy: none\par \par \par \par Review of Systems:\par \par No rash, skin problems\par No dry eyes\par no mouth ulcers\par no sinusitis, sinus infections, nasal obstruction\par has hiatus hernia\par no dry mouth\par \par has back pain\par \par \par no asthma\par no pneumonia\par no wheeze\par no lung cancer\par \par no CAD, he had negative angiogram\par no MI\par no chest pain\par no murmur\par no CHF\par \par no edema\par \par no peptic ulcer or gastritis\par \par \par no Diabetes\par no thyroid disease\par no hyperlipidemia\par \par \par no bleeding\par \par no DVT or PE\par \par no kidney disease\par \par no stroke\par no seizure\par \par \par \par \par \par \par \par \par \par \par \par

## 2022-06-02 ENCOUNTER — APPOINTMENT (OUTPATIENT)
Dept: OPHTHALMOLOGY | Facility: CLINIC | Age: 37
End: 2022-06-02

## 2022-06-07 NOTE — ED ADULT NURSE NOTE - CCCP TRG CHIEF CMPLNT
POC reviewed, resp even et unlabored. Sleepy easily aroused. Still nauseated and belching after having phenergan downstairs. Given Zofran IVP for nausea. Good relief. Asleep w IV infusing. Eye glasses at bedside. Bed low and locked. Monitor intact.   w cp x 1 week/shortness of breath

## 2022-06-20 ENCOUNTER — APPOINTMENT (OUTPATIENT)
Dept: OPHTHALMOLOGY | Facility: CLINIC | Age: 37
End: 2022-06-20
Payer: MEDICAID

## 2022-06-20 ENCOUNTER — NON-APPOINTMENT (OUTPATIENT)
Age: 37
End: 2022-06-20

## 2022-06-20 PROCEDURE — 92014 COMPRE OPH EXAM EST PT 1/>: CPT

## 2022-06-21 ENCOUNTER — APPOINTMENT (OUTPATIENT)
Dept: INTERNAL MEDICINE | Facility: CLINIC | Age: 37
End: 2022-06-21
Payer: MEDICAID

## 2022-06-21 VITALS
BODY MASS INDEX: 41.78 KG/M2 | HEART RATE: 83 BPM | TEMPERATURE: 97.3 F | DIASTOLIC BLOOD PRESSURE: 75 MMHG | OXYGEN SATURATION: 98 % | HEIGHT: 66 IN | WEIGHT: 260 LBS | SYSTOLIC BLOOD PRESSURE: 112 MMHG

## 2022-06-21 PROCEDURE — 99214 OFFICE O/P EST MOD 30 MIN: CPT

## 2022-06-21 NOTE — HISTORY OF PRESENT ILLNESS
[Family Member] : family member [FreeTextEntry1] : fu  [de-identified] : pt has seen Dr Cruz for  shunt check . he has seen pulmonary  and has sleep apnea   cpap 8 cm  and has seen ophthalmology   and was referred to  gato Figueroa   \par He has lost  9 lbs .  He had mri of chest and was negative for  muscle teart and has panic attacks when he is stressed and that’s when he has chest pain and at times feels as if he has tremors and neck tightness  and he tries to calm down and has been speaking to psychologist and is to see psychiatrist.    We discussed behavioral modification.   He had  normal thyroid function in march.   he also still had positive  celiac ab .   He has not been using  cpap since it needed to be  serviced and  didn’t come to house

## 2022-06-21 NOTE — PHYSICAL EXAM
[Normal Sclera/Conjunctiva] : normal sclera/conjunctiva [PERRL] : pupils equal round and reactive to light [Normal Oropharynx] : the oropharynx was normal [Supple] : supple [Normal Rate] : normal rate  [Regular Rhythm] : with a regular rhythm [Normal S1, S2] : normal S1 and S2 [No Edema] : there was no peripheral edema [No Extremity Clubbing/Cyanosis] : no extremity clubbing/cyanosis [Normal Posterior Cervical Nodes] : no posterior cervical lymphadenopathy [Normal Anterior Cervical Nodes] : no anterior cervical lymphadenopathy [Coordination Grossly Intact] : coordination grossly intact [No Focal Deficits] : no focal deficits [Normal] : affect was normal and insight and judgment were intact

## 2022-06-21 NOTE — ASSESSMENT
[FreeTextEntry1] : 1 anxiety  referal for  bio feed back behavior mod    start buspar  Treatments include:\par \par ?Psychotherapy – Psychotherapy involves meeting with a mental health counselor to talk about your feelings, relationships, and worries. Therapy can help you find new ways of thinking about your situation so that you feel less anxious. In therapy, you might also learn new skills to reduce anxiety.\par \par \par ?Medicines – Medicines used to treat depression can relieve anxiety, too, even in people who are not depressed. Your doctor or nurse will decide which medicines are best for your situation.\par \par \par Some people have psychotherapy and take medicines at the same time.\par \par There is no reason to feel embarrassed about getting treatment for anxiety. Anxiety is a common problem. It affects all kinds of people.\par \par Keep in mind that it might take a little while to find the right treatment. People respond in different ways to medicines and therapy, so you might need to try a few approaches before you find the 1 that helps you most. The key is to not give up and to let your doctor or nurse know how you feel along the way.Treatment begins with education on:\par \par ?Informing and correcting misconceptions regarding anxiety, worry, and associated symptoms\par \par \par ?Causative factors of pathological worry and anxiety\par \par \par ?A model of factors that perpetuate STEPHANIE\par \par \par ?The treatment plan and rationale (ie, symptoms of STEPHANIE will subside by using evidence-based and coping oriented thinking, by dealing directly with anxiety provoking images and situations, and by learning to relax)\par \par \par Much of this information is integrated in presenting how a pathological cycle of worry and anxiety develops and is maintained in patients’ lives.\par \par Self-monitoring — Self-monitoring is introduced in the first treatment session and continues throughout the entire treatment. Learning to observe their reactions from an objective standpoint encourages the patient’s development as a personal  and increases his or her accuracy in self-observation. Self-monitoring allows patients to chart their progress in therapy.\par \par Patients keep track of significant episodes of worry on a Worry Record (form 1) to be completed as soon as possible during or after each worry episode. The record provides a description of the cues, maximal distress, and symptoms, thoughts, and behaviors. Patients additionally complete a daily mood record at the end of each day to record overall or average levels of anxiety.\par \par Relaxation training — Relaxation training can be particularly meaningful for STEPHANIE patients as they often experience elevated muscle tension and reduced flexibility of autonomic functioning [36]. Relaxation training consists of progressive muscle relaxation (after brief deliberate tension) [37] of all muscle groups of the body in a systematic manner, beginning with 16 muscle groups, and then condensing to 8 muscle groups, and 4 muscle groups.\par \par Relaxation training ends with cue-control relaxation, where patients cue themselves to relax by simply repeating a word (such as “relax”) that has been repeatedly paired with relaxation phases during the preceding weeks of progressive muscle relaxation training. Cue-control relaxation is then used as a coping skill for practicing exposure to anxiety-producing images or situations (also referred to as “applied relaxation”). Breathing exercises, such as slow, diaphragmatic breathing, may be incorporated into relaxation training.\par \par Cognitive restructuring — Cognitive restructuring is a set of skills for identifying and modifying misappraisals that contribute to anxiety, including:\par \par ?Patients are shown how anxiety and maladaptive behaviors are generated by overly-negative interpretations of events.\par \par \par ?Patients are helped to identify errors in thinking (eg, overestimating the probability or valence of negative events) and rigid rules or beliefs that underlie dysfunctional thought patterns.\par \par \par ?Patients are encouraged to use an empirical approach to examine the validity of thoughts by considering all of the available evidence.\par \par \par Therapists use Socratic questioning to help patients make guided discoveries and question their anxious thinking. Patients then generate alternative interpretations or “hypotheses” to situations with the help of additional evidence gathered in behavioral practices in anxiety-provoking situations. As an example, a person who typically avoided taking on new responsibilities due to worries about making mistakes was encouraged to take on new responsibilities, to gather evidence on what happens subsequently. He or she learned that mistakes were less frequent than anticipated and did not have negative consequences. Underlying beliefs (eg, “I am incompetent”) are postulated to change with the patient’s accrual of evidence that challenges his or her negative thoughts.\par \par Cognitive bias modification programs have been developed using paradigms initially developed to assess biases in attention (ie, dot probe) [10]. In the modification programs, individuals are trained to attend to neutral (instead of negative) words or images, or are trained to develop less negative interpretations of ambiguous material. However, effect sizes are relatively small and from the most recent meta-analysis, become nonsignificant when outliers are excluded [38].\par \par Imagery exposure — Imagery exposure is designed to help patients tolerate negative affect and autonomic arousal associated with fearful images that they often attempt to avoid through worry [39]. Patients generate hierarchies of fear images related to two or three main areas of worry and are led through systematic exposure to these images. When anxiety elicited by an image is reduced to a mild level, then patients progress to the next image on the hierarchy.\par \par Two main versions of imagery exposure have been developed \par \par ?In one version, patients imagine a worst case scenario for 25 to 30 minutes, after which they generate alternative outcomes to the scenario. This approach has been shown to be effective for STEPHANIE as a standalone treatment in a small randomized trial \par \par \par ?The second version, self-controlled desensitization, involves utilization of cognitive restructuring and relaxation skills during imagery exposure to anxiety-provoking situations. It has been incorporated into CBT in a number of studies [43].\par \par \par Exposure to anxiety-provoking situations — This technique involves repeated exposure to situations that are avoided or engaged in with excessive preparation or checking. Patients generate a hierarchy of situations or activities. Examples include allowing children to have sleep overs, family vacations, arriving on time (instead of excessively early) at scheduled appointments, taking on responsibilities, or saying ‘no’ to requests. Patients rehearse cognitive restructuring and relaxation coping skills in session. Subsequently, they practice using these techniques to manage anxiety in situations that occur between sessions.\par \par Additional components — Other techniques that may be incorporated into CBT for STEPHANIE include:\par \par ?Problem-solving to combat indecisiveness and increase the ability to generate alternative solutions to problems \par \par \par ?Time management training and goal setting to facilitate present task accomplishment instead of allowing worry to dominate \par \par ?Intermittent motivational interviewing. In a randomized trial of patients with various anxiety disorders receiving CBT, adjunctive motivational interviewing resulted in subjective improvements in anxiety symptoms as well as other comorbid symptoms, such as depression, compared with adjunctive psychoeducation \par \par 2  sleep apnea  leep apnea is associated with adverse clinical consequences which an affect most organ systems. Cardiovascular disease risk includes arrhythmias, atrial fibrillation, hypertension, coronary artery disease, and stroke. Metabolic disorders include diabetes type 2, non-alcoholic fatty liver disease. Mood disorder especially depression; and cognitive decline especially in the elderly. Associations with chronic reflux/Rojas’s esophagus some but not all inclusive. \par -Reasons include arousal consistent with hypopnea; respiratory events most prominent in REM sleep or supine position; therefore sleep staging and body position are important for accurate diagnosis and estimation of AHI. \par \par 3.  obesity he continues to exercise and lsoe weight and is doing well \par 4.  hypothyroid   continue present  treatment \par 5.  fatty liver  mild  Fatty Liver: The patient denies any jaundice or pruritus. The patient denies any alcohol use. The patient denies taking large doses of nonsteroidal anti-inflammatory drugs or acetaminophen. The findings are suggestive of fatty liver. The patient and I had a long discussion regarding the risks of fatty liver progressing to cirrhosis. The patient was told of the possible increased risk of developing liver failure, cirrhosis, ascites, GI bleeding secondary to varices, hepatic encephalopathy, bleeding tendencies and liver cancer. The patient was told of the importance of follow-up. The patient was advised to follow up every 6 months for blood work and imaging studies. The patient agreed and will follow up. The patient was advised to lose weight. I recommend a trial of vitamin E supplementation for the fatty liver. If the liver enzymes remain elevated, the patient may require a trial of Pioglitazone for the fatty liver. I recommend avoid alcohol and hepato-toxic agents. The patient was also advised to avoid NSAIDs, Acetaminophen and any other hepatotoxic drugs. The patient was also advised not to share needles, razors, scissors, nail clippers, etc.. The patient is to continue close follow-up in our office for blood work and exams. If the liver enzymes remain elevated, the patient may require a CT guided liver biopsy to assess the liver parenchyma and for possible treatment. We had a long discussion regarding the risks and benefits of the procedure. The patient was told of the risks of bleeding, perforation, infections, emergency surgery and missing lesions. The patient agreed and will follow-up to reassess the symptoms Patient has been counseled on life style interventions, including but not limited to: weight loss (3-5% loss of body weight might improve fat in the liver, while 7-10% needed for potential improvement of other components, including inflammation and scarring of the liver, called fibrosis); healthy diet, avoiding added sugars, sodas, avoiding saturated fats, limiting sodium, avoiding alcohol; and on the importance of regular exercise (> 150 min/week moderate intensity aerobic exercise with at least 2x/week muscle strengthening or exercise as tolerated). \par - I explained to patient the natural Hx of NAFLD. \par \par

## 2022-06-21 NOTE — HEALTH RISK ASSESSMENT
[Never] : Never [No] : In the past 12 months have you used drugs other than those required for medical reasons? No [No falls in past year] : Patient reported no falls in the past year [0] : 2) Feeling down, depressed, or hopeless: Not at all (0) [PHQ-2 Negative - No further assessment needed] : PHQ-2 Negative - No further assessment needed [de-identified] : ophthalmology  Dr Galvan,   pul  Dr Emerson   ,  neuro sx Dr Anthony Wiggins [de-identified] : walks   [de-identified] : low chasity low fat  [[id number]] : 0

## 2022-08-17 ENCOUNTER — APPOINTMENT (OUTPATIENT)
Dept: SURGERY | Facility: CLINIC | Age: 37
End: 2022-08-17

## 2022-08-17 VITALS
BODY MASS INDEX: 39.7 KG/M2 | DIASTOLIC BLOOD PRESSURE: 76 MMHG | SYSTOLIC BLOOD PRESSURE: 130 MMHG | WEIGHT: 247 LBS | HEART RATE: 76 BPM | HEIGHT: 66 IN

## 2022-08-17 PROCEDURE — 99214 OFFICE O/P EST MOD 30 MIN: CPT

## 2022-08-17 NOTE — ASSESSMENT
[FreeTextEntry1] : Patient with history of severe obesity s/p sleeve gastrectomy . \par \par Patient is doing well since the prior visit, with excellent interval and overall  progress.  Patient is tolerating an appropriate portion controlled and nutritionally balanced diet without difficulty.  \par \par He has lost almost 200 pounds since his surgery which has resulted in significant amount of excess skin.  This is causing him some distress and I feel that he would benefit from a consultation for removal of this excess skin and reconstruction.\par I also feel that he would benefit from consultation with their mental health provider to assess and address his anxiety.\par

## 2022-08-17 NOTE — REVIEW OF SYSTEMS
[Recent Change In Weight] : ~T recent weight change [Vision Problems] : vision problems [Skin Rash] : skin rash [Anxiety] : anxiety [As Noted in HPI] : as noted in HPI [Negative] : Integumentary [Dysphagia] : no dysphagia [Odynophagia] : no odynophagia [Reflux/Heartburn] : no reflex/heartburn [Skin Wound] : no skin wound [Depression] : no depression [de-identified] : Significant irritation lower abdominal and axilla. [Fever] : no fever [Chills] : no chills [Feeling Poorly] : not feeling poorly [Feeling Tired] : not feeling tired [Sore Throat] : no sore throat [Hoarseness] : no hoarseness [Heart Rate Is Fast] : the heart rate was not fast [Chest Pain] : no chest pain [Palpitations] : no palpitations [Shortness Of Breath] : no shortness of breath [Wheezing] : no wheezing [Cough] : no cough [Abdominal Pain] : no abdominal pain [Vomiting] : no vomiting [Constipation] : no constipation [Diarrhea] : no diarrhea [Heartburn] : no heartburn [Melena] : no melena [Dysuria] : no dysuria [Incontinence] : no incontinence [Arthralgias] : no arthralgias [Limb Pain] : no limb pain [FreeTextEntry9] : Chronic low back pain

## 2022-08-17 NOTE — HISTORY OF PRESENT ILLNESS
[de-identified] : Mr. BARAKAT  is s/p Laparoscopic sleeve gastrectomy on 01/11/2021 and laparoscopic  cholecystectomy  on 08/30/2021. \par  \par Patient feels well overall and has no complaints at this time.  He is however having significant issues with his excessively sagging lower abdominal pannus.  It is causing a great deal of irritation and discomfort.\par Patient is doing well with good interval progress and weight loss.  Patient is making good food choices, eating small portions and has rapid and prolonged satiety. Patient denies any complaints of fever, pain, diarrhea, heartburn, reflux, or vomiting. Normal bowel function. Normal urination.\par He also states that he has been having some issues with anxiety, manifest as palpitations and pressure.  He had an extensive cardiac work-up all of which was negative.  He does feel that he would benefit from speaking to a mental health provider.\par Compliant with supplements. \par Patient is engaging in regular exercise. \par \par Patient's medications, allergies, past medical, surgical, social and family histories were reviewed and updated as appropriate.  \par \par  [de-identified] : Patient reports no interval changes to medications, medical history or overall health status.\par

## 2022-08-17 NOTE — PHYSICAL EXAM
[Obese, well nourished, in no acute distress] : obese, well nourished, in no acute distress [Normal] : affect appropriate [Respiratory Effort] : normal respiratory effort [Normal Rate and Rhythm] : normal rate and rhythm [de-identified] : bl eye blindness  [de-identified] : short [de-identified] : large pannus extending below pubis. non-tender , soft no palpable masses no hernias  [de-identified] : Lower abdominal pannus with nonblanching erythema of skin folds. [de-identified] : right hand minor weakness  [Abdominal Masses] : No abdominal masses [Abdomen Tenderness] : ~T ~M No abdominal tenderness [de-identified] : Well developed, well nourished adult, in NAD. [de-identified] : NC/AT, PERRL, EOMI, sclera anicteric [de-identified] : Obese, large abdominal pannus.  Soft, nontender, nondistended. No masses, rebound or guarding. Incisions well-healed.  No hernias palpable.\par  [de-identified] : No jaundice

## 2022-08-17 NOTE — PLAN
[FreeTextEntry1] : I reviewed importance of behavioral modification and follow-up in order to optimize outcomes and avoid complications. Post-operative nutrition again reviewed and reinforced, including the importance of taking supplements, making healthy food choices.  \par The importance of maintaining regular exercise/physical activity to maximize progress also reinforced. \par \par Recommend patient to see nutritionist and attend support groups.\par \par I gave patient information regarding a plastic surgeon, Dr. Israel.\par \par I also recommended that he follow-up with Dr. Flaherty our program psychologist, which he is agreeable to.\par \par Patient's questions and concerns addressed to patient's satisfaction. \par \par He will follow up in 6 months.

## 2022-08-19 ENCOUNTER — APPOINTMENT (OUTPATIENT)
Dept: PULMONOLOGY | Facility: CLINIC | Age: 37
End: 2022-08-19

## 2022-08-19 VITALS
WEIGHT: 247 LBS | OXYGEN SATURATION: 97 % | BODY MASS INDEX: 39.7 KG/M2 | HEART RATE: 74 BPM | RESPIRATION RATE: 16 BRPM | DIASTOLIC BLOOD PRESSURE: 85 MMHG | TEMPERATURE: 97.2 F | SYSTOLIC BLOOD PRESSURE: 129 MMHG | HEIGHT: 66 IN

## 2022-08-19 PROCEDURE — 99214 OFFICE O/P EST MOD 30 MIN: CPT

## 2022-08-21 NOTE — HISTORY OF PRESENT ILLNESS
[Obstructive Sleep Apnea] : obstructive sleep apnea [Snoring] : snoring [TextBox_4] : 37 year old patient presented for evaluation of left sided chest wall pain.  He has elevated BMI and states that pain is in adipose tissue of breast and surrounding area. An US of the area was negative. \par Of note, in 2019,he underwent CT coronary angiogram.  This demonstrated multiple periaortic and paratracheal lymph nodes, all \par measuring less than 1 cm in short axis. \par \par He underwent repeat CT of the chest on June 4, 2021 that did not demonstrate any rib, sternum or soft tissue abnormality.  Lungs were normal.  There was a borderline prevascular Lymph node 1.0 cm\par \par \par \par He is breathing well.  He still notes mild chest discomfort.\par \par He has history of obstructive sleep apnea. He has been using CPAP.  He was initially prescribed BiPAP.  After weight loss he was switched to CPAP.\par \par He state that he has not been using CPAP consistently \par \par He states that CPAP was malfunctioning.  He contacted supplier who attempted to repair it over phone.  He is now informed that it is no longer covered because he has not been using it.\par \par He has continued to lose weight, now at 247\par \par He will have surgery to remove excess skin.\par \par he has less chest pain, which he attributed to anxiety\par \par \par PSH:\par bariatric, gastric sleeve\par \par  shunt, pseudotumor cerebri\par \par \par \par PMH:\par \par hiatus hernia\par \par gallstones\par \par HTN\par fatty liver disease\par \par Sleep apnea:  he has CPAP but is not using it\par \par SH:\par \par \par \par former smoker\par \par \par smoked for 20  years\par \par stopped smoking 8 months ago\par \par ETOH:  occasional\par \par \par Occupation: no working\par \par No exposure to chemicals, dust, asbestos, mold\par \par \par ALLERGY:\par \par NKDA\par \par environmental/seasonal allergy: none\par \par \par \par Review of Systems:\par \par No rash, skin problems\par No dry eyes\par no mouth ulcers\par no sinusitis, sinus infections, nasal obstruction\par has hiatus hernia\par no dry mouth\par \par has back pain\par \par \par no asthma\par no pneumonia\par no wheeze\par no lung cancer\par \par no CAD, he had negative angiogram\par no MI\par no chest pain\par no murmur\par no CHF\par \par no edema\par \par no peptic ulcer or gastritis\par \par \par no Diabetes\par no thyroid disease\par no hyperlipidemia\par \par \par no bleeding\par \par no DVT or PE\par \par no kidney disease\par \par no stroke\par no seizure\par \par \par \par \par \par \par \par \par \par \par \par

## 2022-08-21 NOTE — DISCUSSION/SUMMARY
[FreeTextEntry1] : obstructive sleep apnea :  will will undergo repeat sleep study for CPAP titration.It is likely his pressure requirements have changed after losing considerable weight over 200 pounds\par He cannot use current CPAP due to malfunction\par \par \par \par The patient has had the influenza vaccine this season. \par \par Total time spent : 30 minutes\par Including:\par Preparation prior to visit - Reviewing prior record, results of tests and Consultation Reports as applicable\par Conducting an appropriate H & P during today's encounter\par Appropriate orders for tests, medications and procedures, as applicable\par Counseling patient \par Note completion \par \par Lucas Emerson MD

## 2022-09-22 ENCOUNTER — LABORATORY RESULT (OUTPATIENT)
Age: 37
End: 2022-09-22

## 2022-09-22 ENCOUNTER — APPOINTMENT (OUTPATIENT)
Dept: INTERNAL MEDICINE | Facility: CLINIC | Age: 37
End: 2022-09-22

## 2022-09-22 VITALS
DIASTOLIC BLOOD PRESSURE: 77 MMHG | HEIGHT: 66 IN | BODY MASS INDEX: 38.41 KG/M2 | SYSTOLIC BLOOD PRESSURE: 139 MMHG | HEART RATE: 64 BPM | OXYGEN SATURATION: 99 % | RESPIRATION RATE: 15 BRPM | TEMPERATURE: 98 F | WEIGHT: 239 LBS

## 2022-09-22 DIAGNOSIS — R20.2 PARESTHESIA OF SKIN: ICD-10-CM

## 2022-09-22 DIAGNOSIS — M48.061 SPINAL STENOSIS, LUMBAR REGION WITHOUT NEUROGENIC CLAUDICATION: ICD-10-CM

## 2022-09-22 DIAGNOSIS — K76.0 FATTY (CHANGE OF) LIVER, NOT ELSEWHERE CLASSIFIED: ICD-10-CM

## 2022-09-22 DIAGNOSIS — M54.12 RADICULOPATHY, CERVICAL REGION: ICD-10-CM

## 2022-09-22 DIAGNOSIS — Z23 ENCOUNTER FOR IMMUNIZATION: ICD-10-CM

## 2022-09-22 DIAGNOSIS — Z87.898 PERSONAL HISTORY OF OTHER SPECIFIED CONDITIONS: ICD-10-CM

## 2022-09-22 DIAGNOSIS — M41.9 SCOLIOSIS, UNSPECIFIED: ICD-10-CM

## 2022-09-22 LAB
ALBUMIN SERPL ELPH-MCNC: 4 G/DL
ALP BLD-CCNC: 77 U/L
ALT SERPL-CCNC: 8 U/L
ANION GAP SERPL CALC-SCNC: 13 MMOL/L
AST SERPL-CCNC: 12 U/L
BILIRUB SERPL-MCNC: 1.1 MG/DL
BUN SERPL-MCNC: 13 MG/DL
CALCIUM SERPL-MCNC: 9.2 MG/DL
CHLORIDE SERPL-SCNC: 105 MMOL/L
CHOLEST SERPL-MCNC: 158 MG/DL
CO2 SERPL-SCNC: 25 MMOL/L
CREAT SERPL-MCNC: 0.62 MG/DL
EGFR: 127 ML/MIN/1.73M2
FRUCTOSAMINE SERPL-MCNC: 204 UMOL/L
GLUCOSE SERPL-MCNC: 75 MG/DL
HDLC SERPL-MCNC: 42 MG/DL
IRON SATN MFR SERPL: 40 %
IRON SERPL-MCNC: 99 UG/DL
LDLC SERPL CALC-MCNC: 103 MG/DL
NONHDLC SERPL-MCNC: 116 MG/DL
POTASSIUM SERPL-SCNC: 3.7 MMOL/L
PROT SERPL-MCNC: 6.3 G/DL
SODIUM SERPL-SCNC: 143 MMOL/L
TIBC SERPL-MCNC: 247 UG/DL
TRIGL SERPL-MCNC: 64 MG/DL
UIBC SERPL-MCNC: 148 UG/DL

## 2022-09-22 PROCEDURE — 99214 OFFICE O/P EST MOD 30 MIN: CPT | Mod: 25

## 2022-09-22 PROCEDURE — G0008: CPT

## 2022-09-22 PROCEDURE — 90686 IIV4 VACC NO PRSV 0.5 ML IM: CPT

## 2022-09-22 NOTE — COUNSELING
[Fall prevention counseling provided] : Fall prevention counseling provided [Adequate lighting] : Adequate lighting [No throw rugs] : No throw rugs [Use proper foot wear] : Use proper foot wear [Use recommended devices] : Use recommended devices [Sleep ___ hours/day] : Sleep [unfilled] hours/day [Engage in a relaxing activity] : Engage in a relaxing activity [Potential consequences of obesity discussed] : Potential consequences of obesity discussed [Benefits of weight loss discussed] : Benefits of weight loss discussed [Structured Weight Management Program suggested:] : Structured weight management program suggested [Encouraged to maintain food diary] : Encouraged to maintain food diary [Encouraged to increase physical activity] : Encouraged to increase physical activity [Encouraged to use exercise tracking device] : Encouraged to use exercise tracking device [FreeTextEntry1] : continue weight loss  exercise

## 2022-09-22 NOTE — PHYSICAL EXAM
[Normal Sclera/Conjunctiva] : normal sclera/conjunctiva [PERRL] : pupils equal round and reactive to light [Normal Oropharynx] : the oropharynx was normal [No JVD] : no jugular venous distention [Supple] : supple [Normal Rate] : normal rate  [Regular Rhythm] : with a regular rhythm [Normal S1, S2] : normal S1 and S2 [No Edema] : there was no peripheral edema [No Extremity Clubbing/Cyanosis] : no extremity clubbing/cyanosis [No CVA Tenderness] : no CVA  tenderness [No Spinal Tenderness] : no spinal tenderness [Normal] : no rash [de-identified] : blind  [de-identified] : walks with cane

## 2022-09-22 NOTE — ASSESSMENT
[FreeTextEntry1] : celiac disease   continue  gluten free diet  \par 2.  back pain refer to spine specialist  and  pt   Unless acute low back pain is caused by a serious medical condition (which is uncommon), it typically resolves fairly quickly, even if there is a bulging or herniated disc.\par Still, low back pain can make it hard to do your usual activities, and it can be frustrating to feel like you just have to wait for it to get better. Below are some simple things you can do that may help relieve your pain.\par Remaining active — Many people are afraid that they will hurt their back further or delay recovery by remaining active. However, remaining active, to the extent that you are able, is one of the best things you can do for your back.\par If you have severe pain, you may need to rest your back for a day or so. It may be most comfortable to lie on your back with a pillow under your knees and your head and shoulders elevated. For sleeping, you may want to lie on your side with your upper knee bent and a pillow between your knees. However, prolonged bed rest is not recommended. Studies have shown that people with low back pain recover faster when they remain active. Movement helps to relieve muscle spasms and prevents loss of muscle strength.\par While you should avoid strenuous activities and sports while you are in pain, it is fine to continue doing regular day-to-day activities and light exercises, such as walking. If certain activities cause your back to hurt too much, try something else instead.\par Heat — Using a heating pad or heated wrap can help with low back pain during the first few weeks. It is not clear if cold helps as well, but some people may find that it relieves pain temporarily.\par Modifications at work — Most experts recommend that people with low back pain continue to work so long as it is possible to avoid prolonged standing or sitting and heavy lifting. If your job does not allow you to sit or stand comfortably, you may need to take some time off work while you recover. While standing at work, stepping on a block of wood with one foot (and periodically alternating the foot on the block) may be helpful.\par Pain medications — You can try taking an over-the-counter medication to help relieve pain. Nonsteroidal antiinflammatory drugs (NSAIDs), such as ibuprofen (sample brand names: Advil, Motrin) and naproxen (brand name: Aleve), may work better than acetaminophen (brand name: Tylenol) for low back pain.\par If you do take pain medication, it may be more effective to take a dose on a regular basis for three to five days, rather than using the medication only when your pain becomes unbearable. Muscle relaxants (eg, cyclobenzaprine [brand name: Flexeril]) are prescription medications; while these may help relieve back pain, they can cause drowsiness and are probably no better than ibuprofen in relieving pain. Your health care provider can talk to you about whether muscle relaxants might help in your situation. They may be helpful before bedtime when used for a short time, ie, a week or two. People who need to be alert, such as while driving or operating machinery, should not use muscle relaxants.\par Opioids (drugs derived from morphine) are not recommended for most people with back pain. In rare situations, a health care provider might prescribe them for a few days if a person has severe pain that is not responding to other treatments, but they are generally not much more effective than NSAIDs. In addition, opioids have a relatively high risk of side effects and the potential to cause harm, including the risk of dependency and abuse.\par Exercise — Starting a new exercise program immediately after a new episode of low back pain will not speed recovery from the acute episode. However, there is evidence that exercise is beneficial in people with chronic back pain. Spinal manipulation — "Spinal manipulation" is a technique sometimes used by chiropractors, physical therapists, osteopaths, massage therapists, and others to relieve back pain. It involves moving the joints of the spine beyond the normal range of motion. Studies suggest that spinal manipulation may provide modest pain relief and improved function, and it generally appears to be safe if performed by an experienced professional. If you are interested in trying this approach, talk with your health care provider about how to integrate it into your treatment plan.\par Acupuncture — Acupuncture involves inserting very fine needles into specific points, as determined by traditional Chinese maps of the body's flow of energy. There is not consistent evidence that acupuncture is effective for people with acute low back pain; however, some people find it helpful.\par Massage — There is no evidence that massage is effective in treating acute low back pain. However, you may find that it is generally relaxing and helps you feel better temporarily.\par Psychological therapy — In some cases, mental health issues can contribute to low back pain. Psychological therapy has mostly been studied in the context of treating longer-term (chronic) back pain; however, it may be beneficial for some people with acute pain as well. Other treatments — You may have heard of other treatments that claim to relieve back pain. Unfortunately, most of these have not been proven to work or are only effective in specific situations. Examples include:\par ?Injections – Some clinicians recommend injections of a local anesthetic (numbing medication) into the soft tissues of the back, although it is not clear if these injections are effective. The areas targeted by these injections are called "trigger points." Trigger-point injections may be of benefit in some people with chronic back pain, but they are typically not recommended for treating acute pain.\par Injections of a glucocorticoid (steroid) medication are sometimes recommended for people with chronic low back pain with sciatica or radiculopathy The injection is given into the epidural space, which is located below the spinal cord. Epidural glucocorticoid injections do appear to improve pain slightly at two and six weeks after the injection, but not at 3, 6, or 12 months after the injection. There is no evidence that epidural steroid injections are helpful for people with back pain without sciatica.\par ?Corsets and braces – While wearable supportive garments may claim to help relieve or prevent low back pain, these are typically not effective.\par ?Traction – Traction involves the use of weights to realign or pull the spinal column into alignment. Clinical studies have shown no benefit from traction in the treatment of acute back pain.\par ?Switching to a firmer mattress – People often wonder if sleeping on a firmer mattress can help prevent or treat low back pain. Small studies have suggested that using a less firm mattress may actually be more likely to relieve pain; however, there is not enough evidence to support switching to a specific type of sleeping surface for this reason.\par ?Methods involving energy or electricity – Other interventions include ultrasound, interferential therapy, shortwave diathermy, transcutaneous electrical nerve stimulation, and low-level laser therapy, all of which involve applying energy to the skin's surface. None of these interventions have been proven to be effective, particularly during the first four to six weeks of an episode of back pain.\par CHRONIC LOW BACK PAIN TREATMENTWhile most people recover completely from an episode of acute low back pain, some people do go on to have longer-term pain. Chronic pain is typica\par 3   bmi 39  continue weight loss \par 4 hypothyroid  fu tsh \par 5  sleeopapnea  leep apnea is associated with adverse clinical consequences which an affect most organ systems. Cardiovascular disease risk includes arrhythmias, atrial fibrillation, hypertension, coronary artery disease, and stroke. Metabolic disorders include diabetes type 2, non-alcoholic fatty liver disease. Mood disorder especially depression; and cognitive decline especially in the elderly. Associations with chronic reflux/Rojas’s esophagus some but not all inclusive. \par -Reasons include arousal consistent with hypopnea; respiratory events most prominent in REM sleep or supine position; therefore sleep staging and body position are important for accurate diagnosis and estimation of AHI. \par \par retesting to be done \par 6 fatty liver    continue weight loss
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
## 2022-09-22 NOTE — PLAN
[FreeTextEntry1] :   1  back pain  Unless acute low back pain is caused by a serious medical condition (which is uncommon), it typically resolves fairly quickly, even if there is a bulging or herniated disc.\par Still, low back pain can make it hard to do your usual activities, and it can be frustrating to feel like you just have to wait for it to get better. Below are some simple things you can do that may help relieve your pain.\par Remaining active — Many people are afraid that they will hurt their back further or delay recovery by remaining active. However, remaining active, to the extent that you are able, is one of the best things you can do for your back.\par If you have severe pain, you may need to rest your back for a day or so. It may be most comfortable to lie on your back with a pillow under your knees and your head and shoulders elevated. For sleeping, you may want to lie on your side with your upper knee bent and a pillow between your knees. However, prolonged bed rest is not recommended. Studies have shown that people with low back pain recover faster when they remain active. Movement helps to relieve muscle spasms and prevents loss of muscle strength.\par While you should avoid strenuous activities and sports while you are in pain, it is fine to continue doing regular day-to-day activities and light exercises, such as walking. If certain activities cause your back to hurt too much, try something else instead.\par Heat — Using a heating pad or heated wrap can help with low back pain during the first few weeks. It is not clear if cold helps as well, but some people may find that it relieves pain temporarily.\par Modifications at work — Most experts recommend that people with low back pain continue to work so long as it is possible to avoid prolonged standing or sitting and heavy lifting. If your job does not allow you to sit or stand comfortably, you may need to take some time off work while you recover. While standing at work, stepping on a block of wood with one foot (and periodically alternating the foot on the block) may be helpful.\par Pain medications — You can try taking an over-the-counter medication to help relieve pain. Nonsteroidal antiinflammatory drugs (NSAIDs), such as ibuprofen (sample brand names: Advil, Motrin) and naproxen (brand name: Aleve), may work better than acetaminophen (brand name: Tylenol) for low back pain.\par If you do take pain medication, it may be more effective to take a dose on a regular basis for three to five days, rather than using the medication only when your pain becomes unbearable. Muscle relaxants (eg, cyclobenzaprine [brand name: Flexeril]) are prescription medications; while these may help relieve back pain, they can cause drowsiness and are probably no better than ibuprofen in relieving pain. Your health care provider can talk to you about whether muscle relaxants might help in your situation. They may be helpful before bedtime when used for a short time, ie, a week or two. People who need to be alert, such as while driving or operating machinery, should not use muscle relaxants.\par Opioids (drugs derived from morphine) are not recommended for most people with back pain. In rare situations, a health care provider might prescribe them for a few days if a person has severe pain that is not responding to other treatments, but they are generally not much more effective than NSAIDs. In addition, opioids have a relatively high risk of side effects and the potential to cause harm, including the risk of dependency and abuse.\par Exercise — Starting a new exercise program immediately after a new episode of low back pain will not speed recovery from the acute episode. However, there is evidence that exercise is beneficial in people with chronic back pain. Spinal manipulation — "Spinal manipulation" is a technique sometimes used by chiropractors, physical therapists, osteopaths, massage therapists, and others to relieve back pain. It involves moving the joints of the spine beyond the normal range of motion. Studies suggest that spinal manipulation may provide modest pain relief and improved function, and it generally appears to be safe if performed by an experienced professional. If you are interested in trying this approach, talk with your health care provider about how to integrate it into your treatment plan.\par Acupuncture — Acupuncture involves inserting very fine needles into specific points, as determined by traditional Chinese maps of the body's flow of energy. There is not consistent evidence that acupuncture is effective for people with acute low back pain; however, some people find it helpful.\par Massage — There is no evidence that massage is effective in treating acute low back pain. However, you may find that it is generally relaxing and helps you feel better temporarily.\par Psychological therapy — In some cases, mental health issues can contribute to low back pain. Psychological therapy has mostly been studied in the context of treating longer-term (chronic) back pain; however, it may be beneficial for some people with acute pain as well. Other treatments — You may have heard of other treatments that claim to relieve back pain. Unfortunately, most of these have not been proven to work or are only effective in specific situations. Examples include:\par ?Injections – Some clinicians recommend injections of a local anesthetic (numbing medication) into the soft tissues of the back, although it is not clear if these injections are effective. The areas targeted by these injections are called "trigger points." Trigger-point injections may be of benefit in some people with chronic back pain, but they are typically not recommended for treating acute pain.\par Injections of a glucocorticoid (steroid) medication are sometimes recommended for people with chronic low back pain with sciatica or radiculopathy The injection is given into the epidural space, which is located below the spinal cord. Epidural glucocorticoid injections do appear to improve pain slightly at two and six weeks after the injection, but not at 3, 6, or 12 months after the injection. There is no evidence that epidural steroid injections are helpful for people with back pain without sciatica.\par ?Corsets and braces – While wearable supportive garments may claim to help relieve or prevent low back pain, these are typically not effective.\par ?Traction – Traction involves the use of weights to realign or pull the spinal column into alignment. Clinical studies have shown no benefit from traction in the treatment of acute back pain.\par ?Switching to a firmer mattress – People often wonder if sleeping on a firmer mattress can help prevent or treat low back pain. Small studies have suggested that using a less firm mattress may actually be more likely to relieve pain; however, there is not enough evidence to support switching to a specific type of sleeping surface for this reason.\par ?Methods involving energy or electricity – Other interventions include ultrasound, interferential therapy, shortwave diathermy, transcutaneous electrical nerve stimulation, and low-level laser therapy, all of which involve applying energy to the skin's surface. None of these interventions have been proven to be effective, particularly during the first four to six weeks of an episode of back pain.\par CHRONIC LOW BACK PAIN TREATMENTWhile most people recover completely from an episode of acute low back pain, some people do go on to have longer-term pain. Chronic pain is typica\par   refer to  spine pt and therapy \par 2.  bmi 39 continue weight loss  exercise   \par 3.  hypothyroid   check for tsh \par 4.  celiac  -  continue gluten free diet   fu labs  \par 5.  fatty liver continue weight loss fu  us  abd   \par 6.  sleepapnea   he is to have repeat testing  to determine if he needs cpap  leep apnea is associated with adverse clinical consequences which an affect most organ systems. Cardiovascular disease risk includes arrhythmias, atrial fibrillation, hypertension, coronary artery disease, and stroke. Metabolic disorders include diabetes type 2, non-alcoholic fatty liver disease. Mood disorder especially depression; and cognitive decline especially in the elderly. Associations with chronic reflux/Rojas’s esophagus some but not all inclusive. \par -Reasons include arousal consistent with hypopnea; respiratory events most prominent in REM sleep or supine position; therefore sleep staging and body position are important for accurate diagnosis and estimation of AHI. \par \par 7  Padmini  he was referred for  abdominoplasty to Dr Israel

## 2022-09-23 LAB
25(OH)D3 SERPL-MCNC: 27.3 NG/ML
APPEARANCE: ABNORMAL
BASOPHILS # BLD AUTO: 0.05 K/UL
BASOPHILS NFR BLD AUTO: 0.8 %
BILIRUBIN URINE: NEGATIVE
BLOOD URINE: NEGATIVE
COLOR: YELLOW
EOSINOPHIL # BLD AUTO: 0.15 K/UL
EOSINOPHIL NFR BLD AUTO: 2.4 %
ESTIMATED AVERAGE GLUCOSE: 91 MG/DL
FERRITIN SERPL-MCNC: 251 NG/ML
FOLATE SERPL-MCNC: <2 NG/ML
GLUCOSE QUALITATIVE U: NEGATIVE
HBA1C MFR BLD HPLC: 4.8 %
HCT VFR BLD CALC: 44.3 %
HGB BLD-MCNC: 14.1 G/DL
IMM GRANULOCYTES NFR BLD AUTO: 0.2 %
KETONES URINE: ABNORMAL
LEUKOCYTE ESTERASE URINE: NEGATIVE
LYMPHOCYTES # BLD AUTO: 1.99 K/UL
LYMPHOCYTES NFR BLD AUTO: 31.7 %
MAN DIFF?: NORMAL
MCHC RBC-ENTMCNC: 28.6 PG
MCHC RBC-ENTMCNC: 31.8 GM/DL
MCV RBC AUTO: 89.9 FL
MONOCYTES # BLD AUTO: 0.38 K/UL
MONOCYTES NFR BLD AUTO: 6.1 %
NEUTROPHILS # BLD AUTO: 3.69 K/UL
NEUTROPHILS NFR BLD AUTO: 58.8 %
NITRITE URINE: NEGATIVE
PH URINE: 6
PLATELET # BLD AUTO: 217 K/UL
PROTEIN URINE: ABNORMAL
RBC # BLD: 4.93 M/UL
RBC # FLD: 15 %
SPECIFIC GRAVITY URINE: 1.03
TSH SERPL-ACNC: 1.25 UIU/ML
UROBILINOGEN URINE: NORMAL
VIT B12 SERPL-MCNC: 485 PG/ML
WBC # FLD AUTO: 6.27 K/UL

## 2022-09-23 RX ORDER — FOLIC ACID 1 MG/1
1 TABLET ORAL DAILY
Qty: 90 | Refills: 3 | Status: ACTIVE | COMMUNITY
Start: 2021-06-20 | End: 1900-01-01

## 2022-09-23 RX ORDER — ERGOCALCIFEROL 1.25 MG/1
1.25 MG CAPSULE, LIQUID FILLED ORAL
Qty: 13 | Refills: 2 | Status: ACTIVE | COMMUNITY
Start: 2020-04-20 | End: 1900-01-01

## 2022-09-25 LAB
IGA SER QL IEP: 458 MG/DL
TTG IGA SER IA-ACNC: 7.1 U/ML
TTG IGA SER-ACNC: ABNORMAL
TTG IGG SER IA-ACNC: 4.6 U/ML
TTG IGG SER IA-ACNC: NEGATIVE

## 2022-09-28 ENCOUNTER — APPOINTMENT (OUTPATIENT)
Dept: ORTHOPEDIC SURGERY | Facility: CLINIC | Age: 37
End: 2022-09-28

## 2022-09-28 VITALS
HEIGHT: 66 IN | BODY MASS INDEX: 38.09 KG/M2 | DIASTOLIC BLOOD PRESSURE: 78 MMHG | WEIGHT: 237 LBS | SYSTOLIC BLOOD PRESSURE: 117 MMHG | HEART RATE: 56 BPM

## 2022-09-28 VITALS — BODY MASS INDEX: 39.44 KG/M2 | HEIGHT: 65 IN

## 2022-09-28 DIAGNOSIS — M47.817 SPONDYLOSIS W/OUT MYELOPATHY OR RADICULOPATHY, LUMBOSACRAL REGION: ICD-10-CM

## 2022-09-28 PROCEDURE — 99203 OFFICE O/P NEW LOW 30 MIN: CPT

## 2022-09-28 NOTE — HISTORY OF PRESENT ILLNESS
[de-identified] : Mr. CHIQUIS BARAKAT  is a 37 year old male who presents with a chronic history of low back pain and mid thoracic pain that radiates around to his chest.  Denies any LE radicular symptoms.  Normal bowel and bladder control.   Denies any recent fevers, chills, sweats, weight loss, or infection.  He has lost a great deal of weight.\par \par The patients past medical history, past surgical history, medications, allergies, and social history were reviewed by me today with the patient and documented accordingly.  In addition, the patient's family history, which is noncontributory to their visit, was also reviewed.\par

## 2022-09-28 NOTE — PHYSICAL EXAM
[Antalgic] : not antalgic [Ataxic] : not ataxic [de-identified] : Examination of the thoracic and lumbar spine reveals no midline tenderness palpation, step-offs, or skin lesions. Decreased range of motion with respect to flexion, extension, lateral bending, and rotation. No tenderness to palpation of the sciatic notch. No tenderness palpation of the bilateral greater trochanters. No pain with passive internal/external rotation of the hips. No instability of bilateral lower extremities.  Negative HOMER. Negative straight leg raise bilaterally. No bowstring. Negative femoral stretch. 5 out of 5 iliopsoas, hip abductors, hips adductors, quadriceps, hamstrings, gastrocsoleus, tibialis anterior, extensor hallucis longus, peroneals. Grossly intact sensation to light touch bilateral lower extremities. 1+ patellar and Achilles reflexes. Downgoing Babinski. No clonus. Intact proprioception. Palpable pulses. No skin lesion and no edema on the right and left lower extremities. [de-identified] : Review of her thoracic x-rays reveal some spondylosis without instability or aggressive lesions\par \par Lumbar MRI reviewed by me reveals spondylotic changes at L5-S1

## 2022-09-28 NOTE — DISCUSSION/SUMMARY
[de-identified] : We discussed further treatment options.  He will try course of physical therapy.  Given his thoracic radiculopathy I have also recommended an MRI of his thoracic spine.  Follow-up afterwards.

## 2022-09-28 NOTE — DISCUSSION/SUMMARY
[de-identified] : We discussed further treatment options.  He will try course of physical therapy.  Given his thoracic radiculopathy I have also recommended an MRI of his thoracic spine.  Follow-up afterwards.

## 2022-09-28 NOTE — PHYSICAL EXAM
[Antalgic] : not antalgic [Ataxic] : not ataxic [de-identified] : Examination of the thoracic and lumbar spine reveals no midline tenderness palpation, step-offs, or skin lesions. Decreased range of motion with respect to flexion, extension, lateral bending, and rotation. No tenderness to palpation of the sciatic notch. No tenderness palpation of the bilateral greater trochanters. No pain with passive internal/external rotation of the hips. No instability of bilateral lower extremities.  Negative HOMER. Negative straight leg raise bilaterally. No bowstring. Negative femoral stretch. 5 out of 5 iliopsoas, hip abductors, hips adductors, quadriceps, hamstrings, gastrocsoleus, tibialis anterior, extensor hallucis longus, peroneals. Grossly intact sensation to light touch bilateral lower extremities. 1+ patellar and Achilles reflexes. Downgoing Babinski. No clonus. Intact proprioception. Palpable pulses. No skin lesion and no edema on the right and left lower extremities. [de-identified] : Review of her thoracic x-rays reveal some spondylosis without instability or aggressive lesions\par \par Lumbar MRI reviewed by me reveals spondylotic changes at L5-S1

## 2022-09-28 NOTE — HISTORY OF PRESENT ILLNESS
[de-identified] : Mr. CHIQUIS BARAKAT  is a 37 year old male who presents with a chronic history of low back pain and mid thoracic pain that radiates around to his chest.  Denies any LE radicular symptoms.  Normal bowel and bladder control.   Denies any recent fevers, chills, sweats, weight loss, or infection.  He has lost a great deal of weight.\par \par The patients past medical history, past surgical history, medications, allergies, and social history were reviewed by me today with the patient and documented accordingly.  In addition, the patient's family history, which is noncontributory to their visit, was also reviewed.\par

## 2022-09-30 ENCOUNTER — APPOINTMENT (OUTPATIENT)
Dept: NEUROLOGY | Facility: CLINIC | Age: 37
End: 2022-09-30

## 2022-09-30 VITALS
HEART RATE: 60 BPM | DIASTOLIC BLOOD PRESSURE: 74 MMHG | WEIGHT: 237 LBS | OXYGEN SATURATION: 97 % | SYSTOLIC BLOOD PRESSURE: 117 MMHG | BODY MASS INDEX: 39.49 KG/M2 | TEMPERATURE: 97.4 F | HEIGHT: 65 IN

## 2022-09-30 DIAGNOSIS — M54.12 RADICULOPATHY, CERVICAL REGION: ICD-10-CM

## 2022-09-30 DIAGNOSIS — R20.2 ANESTHESIA OF SKIN: ICD-10-CM

## 2022-09-30 DIAGNOSIS — R20.0 ANESTHESIA OF SKIN: ICD-10-CM

## 2022-09-30 PROCEDURE — 99215 OFFICE O/P EST HI 40 MIN: CPT

## 2022-09-30 NOTE — CONSULT LETTER
[Dear  ___] : Dear  [unfilled], [Consult Letter:] : I had the pleasure of evaluating your patient, [unfilled]. [Please see my note below.] : Please see my note below. [Consult Closing:] : Thank you very much for allowing me to participate in the care of this patient.  If you have any questions, please do not hesitate to contact me. [Sincerely,] : Sincerely, [FreeTextEntry3] : Kelly Dumas MD, MPH\par

## 2022-09-30 NOTE — DATA REVIEWED
[de-identified] : Orthopedics note appreciated\par EKG notable for QTC of 413\par Hemoglobin A1c normal

## 2022-09-30 NOTE — HISTORY OF PRESENT ILLNESS
[FreeTextEntry1] : 38 yo M with h/o hydrocephalus s/p  Shunt 2019 with blindness; s/p bariatric surgery s/p herniated disks here for shooting thoracic pain on left side of the chest under the breast area, feels like ache.  Seen by orthos spine, pending MRI T spine.  Patient interested in medication.\par \par Seen by neurosurgery for  Shunt 4/22, no headaches or seizures.\par \par No difficulty with language.  No double vision.  No dizziness, vertigo or new hearing loss.  No difficulty with speech or swallowing.  No focal weakness.    No difficulty with balance.  No difficulty with ambulation.\par \par Also has left sided numbness, feels weakness, occasional neck pain, present for some time.\par

## 2022-09-30 NOTE — PHYSICAL EXAM
[General Appearance - Alert] : alert [General Appearance - In No Acute Distress] : in no acute distress [Person] : oriented to person [Place] : oriented to place [Time] : oriented to time [Concentration Intact] : normal concentrating ability [Naming Objects] : no difficulty naming common objects [Fluency] : fluency intact [Comprehension] : comprehension intact [Vocabulary] : adequate range of vocabulary [Cranial Nerves Oculomotor (III)] : extraocular motion intact [Cranial Nerves Trigeminal (V)] : facial sensation intact symmetrically [Cranial Nerves Facial (VII)] : face symmetrical [Cranial Nerves Vestibulocochlear (VIII)] : hearing was intact bilaterally [Cranial Nerves Glossopharyngeal (IX)] : tongue and palate midline [Cranial Nerves Accessory (XI - Cranial And Spinal)] : head turning and shoulder shrug symmetric [Cranial Nerves Hypoglossal (XII)] : there was no tongue deviation with protrusion [Motor Tone] : muscle tone was normal in all four extremities [Motor Strength] : muscle strength was normal in all four extremities [Involuntary Movements] : no involuntary movements were seen [Sensation Tactile Decrease] : light touch was intact [Abnormal Walk] : normal gait [Balance] : balance was intact [1+] : Ankle jerk left 1+ [Coordination - Dysmetria Impaired Finger-to-Nose Bilateral] : not present [Coordination - Dysmetria Impaired Heel-to-Shin Bilateral] : not present [Plantar Reflex Right Only] : normal on the right [Plantar Reflex Left Only] : normal on the left [FreeTextEntry5] : pupils bl trace reactive, Light perception at 1 foot

## 2022-09-30 NOTE — ASSESSMENT
[FreeTextEntry1] : Patient is here for uncomfortable sensation in left side of the posterior chest radiating to the anterior region, concerning for possible radicular symptoms.  He had an MRI of the thoracic spine ordered by orthopedics, pending.  Will start nortriptyline 10 mg for pain relief.\par \par Additionally, the patient has numbness and tingling in the left arm as well as leg, in setting of neck and back pain, concerning for possible radicular symptoms.  MRI of the lumbar spine is in chart.  We will get an MRI of the cervical spine as well as nerve conduction EMG of bilateral arms and legs.  Will refer patient to physical therapy.\par \par I spent the time noted on the day of this patient encounter preparing for, providing and documenting the above E/M service and counseling and educate patient on differential, workup, disease course, and treatment/management. Education was provided to the patient during this encounter. All questions and concerns were answered and addressed in detail. The patient verbalized understanding and agreed to plan. Patient was advised to continue to monitor for neurologic symptoms and to notify my office or go to the nearest emergency room if there are any changes. Any orders/referrals and communications were provided as well. \par Side effects of the above medications were discussed in detail including but not limited to applicable black box warning and teratogenicity as appropriate. \par Patient was advised to bring previous records to my office, including CD of imaging, when applicable. \par \par

## 2022-10-24 ENCOUNTER — NON-APPOINTMENT (OUTPATIENT)
Age: 37
End: 2022-10-24

## 2022-10-24 ENCOUNTER — APPOINTMENT (OUTPATIENT)
Dept: CARDIOLOGY | Facility: CLINIC | Age: 37
End: 2022-10-24

## 2022-10-24 VITALS
BODY MASS INDEX: 39.32 KG/M2 | HEART RATE: 54 BPM | OXYGEN SATURATION: 98 % | WEIGHT: 236 LBS | SYSTOLIC BLOOD PRESSURE: 114 MMHG | TEMPERATURE: 97.2 F | DIASTOLIC BLOOD PRESSURE: 67 MMHG | HEIGHT: 65 IN

## 2022-10-24 DIAGNOSIS — Z13.6 ENCOUNTER FOR SCREENING FOR CARDIOVASCULAR DISORDERS: ICD-10-CM

## 2022-10-24 DIAGNOSIS — M94.0 CHONDROCOSTAL JUNCTION SYNDROME [TIETZE]: ICD-10-CM

## 2022-10-24 DIAGNOSIS — E66.01 MORBID (SEVERE) OBESITY DUE TO EXCESS CALORIES: ICD-10-CM

## 2022-10-24 PROCEDURE — 99214 OFFICE O/P EST MOD 30 MIN: CPT | Mod: 25

## 2022-10-24 PROCEDURE — 93000 ELECTROCARDIOGRAM COMPLETE: CPT

## 2022-10-25 NOTE — ASSESSMENT
Reason for Disposition  • [1] SEVERE pain (e.g. excruciating)  AND [2] not improved 2 hours after pain medicine/ice packs    Protocols used: LEG INJURY-A-AH     [FreeTextEntry1] : 34M with morbid obesity (BMI 70), obesity hypoventilation syndrome, chronic hypercapnia, admission for hypercapnic respiratory failure at [facility] 4/2019 discharged on trilogy, pseudotumor cerebri (dx 7/2019) s/p  shunt 7/2019 but with residual blindness presents for follow up of obesity hypoventilation syndrome and preoperative risk assessment for bariatric surgery and endoscopy. Pt has no hypoxemia and HCO3 is normal suggesting improvement of hypercapnia with trilogy AVAPs-AE. His PFT is unremarkable, shows normal lung function aside from suggestion of air trapping and decreased ERV. His pulmonary function is currently optimized for bariatric surgery and endoscopy. After surgery, he should be extubated to noninvasive ventilator support, bipap 18/10. \par \par Discussed with Dr. Posadas\par

## 2022-11-03 ENCOUNTER — APPOINTMENT (OUTPATIENT)
Dept: PLASTIC SURGERY | Facility: CLINIC | Age: 37
End: 2022-11-03

## 2022-11-03 VITALS
SYSTOLIC BLOOD PRESSURE: 113 MMHG | BODY MASS INDEX: 37.99 KG/M2 | TEMPERATURE: 98.1 F | DIASTOLIC BLOOD PRESSURE: 74 MMHG | WEIGHT: 228 LBS | HEIGHT: 65 IN | OXYGEN SATURATION: 98 % | HEART RATE: 49 BPM

## 2022-11-03 VITALS — HEIGHT: 65 IN | BODY MASS INDEX: 38.72 KG/M2 | WEIGHT: 232.4 LBS

## 2022-11-03 DIAGNOSIS — Z98.84 BARIATRIC SURGERY STATUS: ICD-10-CM

## 2022-11-03 DIAGNOSIS — Z98.2 PRESENCE OF CEREBROSPINAL FLUID DRAINAGE DEVICE: ICD-10-CM

## 2022-11-03 PROCEDURE — 99204 OFFICE O/P NEW MOD 45 MIN: CPT

## 2022-11-03 NOTE — CONSULT LETTER
[Dear  ___] : Dear  [unfilled], [Consult Letter:] : I had the pleasure of evaluating your patient, [unfilled]. [Please see my note below.] : Please see my note below. [Consult Closing:] : Thank you very much for allowing me to participate in the care of this patient.  If you have any questions, please do not hesitate to contact me. [Sincerely,] : Sincerely, [FreeTextEntry3] : Delano Israel MD, FACS

## 2022-11-03 NOTE — REASON FOR VISIT
[Consultation] : a consultation visit [Parent] : parent [FreeTextEntry1] : Dr. Sriram Phillips (Bariatric Surgery)

## 2022-11-09 ENCOUNTER — APPOINTMENT (OUTPATIENT)
Dept: ORTHOPEDIC SURGERY | Facility: CLINIC | Age: 37
End: 2022-11-09

## 2022-11-09 VITALS
HEART RATE: 51 BPM | WEIGHT: 228 LBS | HEIGHT: 65 IN | OXYGEN SATURATION: 100 % | TEMPERATURE: 97.8 F | DIASTOLIC BLOOD PRESSURE: 69 MMHG | SYSTOLIC BLOOD PRESSURE: 132 MMHG | BODY MASS INDEX: 37.99 KG/M2

## 2022-11-09 DIAGNOSIS — G89.29 PAIN IN LEFT SHOULDER: ICD-10-CM

## 2022-11-09 DIAGNOSIS — M25.512 PAIN IN LEFT SHOULDER: ICD-10-CM

## 2022-11-09 PROCEDURE — 73030 X-RAY EXAM OF SHOULDER: CPT | Mod: LT

## 2022-11-09 PROCEDURE — 99213 OFFICE O/P EST LOW 20 MIN: CPT

## 2022-11-15 LAB — SARS-COV-2 N GENE NPH QL NAA+PROBE: NOT DETECTED

## 2022-11-17 DIAGNOSIS — R15.9 FULL INCONTINENCE OF FECES: ICD-10-CM

## 2022-12-02 ENCOUNTER — APPOINTMENT (OUTPATIENT)
Dept: PULMONOLOGY | Facility: CLINIC | Age: 37
End: 2022-12-02

## 2022-12-02 ENCOUNTER — NON-APPOINTMENT (OUTPATIENT)
Age: 37
End: 2022-12-02

## 2022-12-02 ENCOUNTER — APPOINTMENT (OUTPATIENT)
Dept: NEUROSURGERY | Facility: CLINIC | Age: 37
End: 2022-12-02

## 2022-12-02 VITALS
OXYGEN SATURATION: 98 % | WEIGHT: 222 LBS | DIASTOLIC BLOOD PRESSURE: 78 MMHG | BODY MASS INDEX: 36.99 KG/M2 | RESPIRATION RATE: 16 BRPM | HEART RATE: 55 BPM | SYSTOLIC BLOOD PRESSURE: 136 MMHG | TEMPERATURE: 98.4 F | HEIGHT: 65 IN

## 2022-12-02 VITALS
OXYGEN SATURATION: 100 % | HEIGHT: 65 IN | SYSTOLIC BLOOD PRESSURE: 148 MMHG | HEART RATE: 58 BPM | WEIGHT: 228 LBS | TEMPERATURE: 98.4 F | DIASTOLIC BLOOD PRESSURE: 93 MMHG | BODY MASS INDEX: 37.99 KG/M2

## 2022-12-02 DIAGNOSIS — M54.16 RADICULOPATHY, LUMBAR REGION: ICD-10-CM

## 2022-12-02 DIAGNOSIS — R07.89 OTHER CHEST PAIN: ICD-10-CM

## 2022-12-02 DIAGNOSIS — E66.2 MORBID (SEVERE) OBESITY WITH ALVEOLAR HYPOVENTILATION: ICD-10-CM

## 2022-12-02 PROCEDURE — 99214 OFFICE O/P EST MOD 30 MIN: CPT

## 2022-12-02 PROCEDURE — 99215 OFFICE O/P EST HI 40 MIN: CPT

## 2022-12-04 PROBLEM — E66.2 OBESITY HYPOVENTILATION SYNDROME: Status: RESOLVED | Noted: 2019-08-19 | Resolved: 2022-12-04

## 2022-12-04 PROBLEM — R07.89 CHEST WALL DISCOMFORT: Status: ACTIVE | Noted: 2020-10-27

## 2022-12-04 NOTE — HISTORY OF PRESENT ILLNESS
[< 20 pack-years] : < 20 pack-years [Never] : never [Former] : former [Obstructive Sleep Apnea] : obstructive sleep apnea [Snoring] : snoring [TextBox_4] : 37 year old patient presented initially  for evaluation of left sided chest wall pain.  He has elevated BMI and states that pain is in adipose tissue of breast and surrounding area. An US of the area was negative. \par Of note, in 2019,he underwent CT coronary angiogram.  This demonstrated multiple periaortic and paratracheal lymph nodes, all \par measuring less than 1 cm in short axis. \par \par He underwent repeat CT of the chest on June 4, 2021 that did not demonstrate any rib, sternum or soft tissue abnormality.  Lungs were normal.  There was a borderline prevascular Lymph node 1.0 cm\par \par \par He has history of obstructive sleep apnea. He has been using CPAP.  He was initially prescribed BiPAP.  After weight loss he was switched to CPAP.\par \par His CPAP was malfunctioning.  He stopped using it.\par \par He has continued to lose weight and consequently his sleep improved. \par \par He is planning to have surgery to remove excess skin.\par \par He has less chest pain, which he attributed to anxiety\par \par He underwent repeat overnight sleep study on 11/17/2022.  This demonstrated mild to moderate obstructive sleep apnea \par AHI was 8.3 events per hour, mostly hypopnea, with arousals.  \par \par Auto CPAP has been ordered\par \par He is breathing well.\par \par \par PSH:\par bariatric, gastric sleeve\par \par  shunt, pseudotumor cerebri\par \par \par \par PMH:\par \par hiatus hernia\par \par gallstones\par \par HTN\par fatty liver disease\par \par Sleep apnea:  he has CPAP but is not using it\par \par SH:\par \par \par \par former smoker\par \par \par smoked for 20  years\par \par stopped smoking 8 months ago\par \par ETOH:  occasional\par \par \par Occupation: no working\par \par No exposure to chemicals, dust, asbestos, mold\par \par \par ALLERGY:\par \par NKDA\par \par environmental/seasonal allergy: none\par \par \par \par Review of Systems:\par \par No rash, skin problems\par No dry eyes\par no mouth ulcers\par no sinusitis, sinus infections, nasal obstruction\par has hiatus hernia\par no dry mouth\par \par has back pain\par \par \par no asthma\par no pneumonia\par no wheeze\par no lung cancer\par \par no CAD, he had negative angiogram\par no MI\par no chest pain\par no murmur\par no CHF\par \par no edema\par \par no peptic ulcer or gastritis\par \par \par no Diabetes\par no thyroid disease\par no hyperlipidemia\par \par \par no bleeding\par \par no DVT or PE\par \par no kidney disease\par \par no stroke\par no seizure\par \par \par \par \par \par \par \par \par \par \par \par   [TextBox_11] : .2 [TextBox_13] : 20 [YearQuit] : 2021

## 2022-12-04 NOTE — DISCUSSION/SUMMARY
[FreeTextEntry1] : obstructive sleep apnea :  has had repeat sleep study after significant weight loss\par \par His pressure requirements have decreased after losing considerable weight over 200 pounds\par \par He head nor been using CPAP\par \par Based on current sleep study, I have ordered AutoCPAP through [facility]\par \par He awaits delivery\par \par He has had back pain and will start PT \par PFT next visit at other office\par \par The patient has had the influenza vaccine this season. \par \par Total time spent : 30 minutes\par Including:\par Preparation prior to visit - Reviewing prior record, results of tests and Consultation Reports as applicable\par Conducting an appropriate H & P during today's encounter\par Appropriate orders for tests, medications and procedures, as applicable\par Counseling patient \par Note completion \par \par Lucas Emerson MD

## 2022-12-07 ENCOUNTER — APPOINTMENT (OUTPATIENT)
Dept: NEUROSURGERY | Facility: CLINIC | Age: 37
End: 2022-12-07

## 2022-12-11 NOTE — ASSESSMENT
[FreeTextEntry1] : IMPRESSION:\par \par  37 year male  with hypothyroidism, morbid obesity (BMI 80),  LAZARO (on CPAP) , had a  shunt (7/19) for pseudotumor by Dr. Cruz.  Last revision on 10/115/19 proximal valve replaced with Strata valve set @1(Dr. Cruz) Patient is now status post gastric bypass surgery, doing well , lost  300+ lbs. Pt presented with worsening of low back pain and neck pain for the past few weeks. Pt had an MRI  Lumbar spine an year ago which showed L4-L5 and L5-S1 disc protrusion. Many sessions of PT completed, received trigger point injections for shoulder. Orthopedic ordered  MR C spine, but he need some one to follow him for Shunt as Dr. Cruz left the practice.  Pt report the sagging skin with weight loss  make him have back strain and when he hold it , make him feel better with the back pain.  His C spine MRI is already scheduled on 12/7/22.\par \par  PLAN:\par \par MRI Head w+ w/o contrast to evaluate the ventricles. \par MRI Lumbar spine w/o contrast for radiculopathy\par CT Lumbar spine w/o contrast \par Xray Shunt series to see shunt patency, given lost abdominal muscles and fat.\par PT for back for strengthening and modalities , 2-3 times/week x 8 weeks. \par Abdominal binder for supporting abdominal sagging skin\par F/U on 12/7 for shunt check\par F/u 12/16 for reviewing image

## 2022-12-11 NOTE — RESULTS/DATA
[FreeTextEntry1] : ACC: [id number] EXAM: MR SPINE LUMBAR\par \par PROCEDURE DATE: 12/20/2021\par \par \par \par INTERPRETATION: EXAMINATION: MR LUMBAR SPINE\par \par CLINICAL INDICATION: Low back pain.\par \par TECHNIQUE: Multi-planar, multi-sequence MR of the lumbar spine was performed without intravenous contrast.\par \par COMPARISON: Radiographs dated 8/19/2021\par \par INTERPRETATION:\par \par BONES AND BONE MARROW: Endplate discogenic marrow changes which are predominantly fatty at L5-S1.\par INTERVERTEBRAL DISCS: Diffuse disc desiccation of the intervertebral disc at L5-S1. Discs otherwise normal in appearance.\par ALIGNMENT: There is trace retrolisthesis of L5 on S1.\par CONUS AND CAUDA EQUINA: The conus medullaris is normal in size, signal and location. The conus terminates at the L1 level.\par EXTRASPINAL: There is no epidural mass or fluid collection. The paraspinal and included extraspinal soft tissues are unremarkable.\par \par Evaluation of individual lumbar disc space levels demonstrates the following:\par \par L1/L2, There is no significant spinal canal or neural foraminal stenosis.\par L2/L3, There is no significant spinal canal or neural foraminal stenosis.\par L3/L4, There is no significant spinal canal or neural foraminal stenosis.\par L4/L5, small left foraminal disc protrusion is present with moderate left neural foraminal stenosis. No right neural foraminal stenosis.\par L5/S1, trace retrolisthesis at this level with left foraminal disc herniation and moderate left neural foraminal stenosis. No right neural foraminal or central stenosis.\par \par IMPRESSION:\par Lumbar spondylosis at L4 and L5 as follows.\par \par L4/L5, small left foraminal disc protrusion is present with moderate left neural foraminal stenosis. No central stenosis.\par \par L5/S1, diffuse disc desiccation and trace retrolisthesis with left foraminal disc herniation. Moderate left neural foraminal stenosis. No central stenosis.\par \par EXAM: CT BRAIN \par \par \par PROCEDURE DATE: 10/15/2019 \par \par \par \par \par INTERPRETATION: INDICATIONS: post op, must be done before leaving pacu at \par 5:30 pm \par \par TECHNIQUE: Serial axial images were obtained from the skull base to the \par vertex without intravenous contrast. \par \par COMPARISON EXAMINATION: 10/12/2019 \par \par FINDINGS: \par VENTRICLES AND SULCI: Right frontal ventricular catheter with its tip in the \par right foramen of Dimitri. Air is now appreciated in the frontal horn of the \par right lateral ventricle not seen on the prior. \par INTRA-AXIAL: No mass, blood or abnormal attenuation is seen. \par EXTRA-AXIAL: No mass or collection is seen. \par VISUALIZED SINUSES: Clear. \par VISUALIZED MASTOIDS: Clear. \par CALVARIUM: Right frontal tonya hole \par MISCELLANEOUS: Suspicion of a partially empty sella. \par \par IMPRESSION: Right frontal ventricular catheter with its tip in the region \par of the foramen of Dimitri. There is new right frontal ventricular air \par suggesting recent manipulation of the catheter..

## 2022-12-11 NOTE — REVIEW OF SYSTEMS
[Leg Weakness] : leg weakness [Numbness] : numbness [Tingling] : tingling [Limb Pain] : limb pain [Negative] : Heme/Lymph [FreeTextEntry3] : legally blind

## 2022-12-11 NOTE — PHYSICAL EXAM
[General Appearance - Alert] : alert [General Appearance - In No Acute Distress] : in no acute distress [General Appearance - Well Nourished] : well nourished [General Appearance - Well-Appearing] : healthy appearing [Oriented To Time, Place, And Person] : oriented to person, place, and time [Impaired Insight] : insight and judgment were intact [Affect] : the affect was normal [Memory Recent] : recent memory was not impaired [Person] : oriented to person [Place] : oriented to place [Time] : oriented to time [Short Term Intact] : short term memory intact [Cranial Nerves Trigeminal (V)] : facial sensation intact symmetrically [Cranial Nerves Facial (VII)] : face symmetrical [Cranial Nerves Vestibulocochlear (VIII)] : hearing was intact bilaterally [Cranial Nerves Glossopharyngeal (IX)] : tongue and palate midline [Cranial Nerves Accessory (XI - Cranial And Spinal)] : head turning and shoulder shrug symmetric [Cranial Nerves Hypoglossal (XII)] : there was no tongue deviation with protrusion [Balance] : balance was intact [2+] : Ankle jerk left 2+ [Sclera] : the sclera and conjunctiva were normal [PERRL With Normal Accommodation] : pupils were equal in size, round, reactive to light, with normal accommodation [Outer Ear] : the ears and nose were normal in appearance [Both Tympanic Membranes Were Examined] : both tympanic membranes were normal [Neck Appearance] : the appearance of the neck was normal [] : no respiratory distress [Respiration, Rhythm And Depth] : normal respiratory rhythm and effort [Apical Impulse] : the apical impulse was normal [Heart Rate And Rhythm] : heart rate was normal and rhythm regular [Heart Sounds] : normal S1 and S2 [Murmurs] : no murmurs [Bowel Sounds] : normal bowel sounds [Abdomen Soft] : soft [No CVA Tenderness] : no ~M costovertebral angle tenderness [Abnormal Walk] : normal gait [Involuntary Movements] : no involuntary movements were seen [Motor Tone] : muscle tone was normal in all four extremities [Motor Strength] : muscle strength was normal in all four extremities [FreeTextEntry6] : Shunt depresses and refills readily, Strata set to 1 [Restricted] : was not restricted [Pain] : was painless [FreeTextEntry1] : sagging abdominal skin

## 2022-12-11 NOTE — HISTORY OF PRESENT ILLNESS
[FreeTextEntry1] : low back pain with radiculopathy [de-identified] : Mr. 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 Cruz.  Last revision on 10/115/19 proximal valve replaced with Strata valve set @1. Patient is now status post gastric bypass surgery, doing well , lost  300+ lbs. Pt presented with worsening of low back pain and neck pain for the past few weeks. The patient has previously seen Neurologist and Orthopedic who  suggested  PT. He completed many PT sessions last session was in summer 2022. Pt also received few trigger point injections. Pt had an MRI  Lumbar spine an year ago which showed L4-L5 and L5-S1 disc protrusion. Orthopedic ordered  MR C spine, but he need some one to follow him for Shunt as Dr. Cruz left the practice. \par \par Today he presented with no headache. He is legally blind. He is walking well with no balance issue. His  back pain starts from his lower  back radiates through the left  hip and transcends down his left leg into his foot. Pt feels the pain mostly in the back. He also report neck pain and left shoulder pain.  He is not able to sit or stand for few minutes. He feels weakness in his both leg.At this time he does not know which is the worse pain.  Pt also  denies acute urinary incontinence or retention, no bowel changes or saddle anesthesia. Pt report the sagging skin with weight loss  make him have back strain and when he hold it , make him feel better with the back pain.  His C spine MRI is already scheduled on 12/7/22.\par \par

## 2022-12-21 ENCOUNTER — APPOINTMENT (OUTPATIENT)
Dept: RADIOLOGY | Facility: CLINIC | Age: 37
End: 2022-12-21

## 2022-12-21 ENCOUNTER — APPOINTMENT (OUTPATIENT)
Dept: MRI IMAGING | Facility: CLINIC | Age: 37
End: 2022-12-21

## 2022-12-21 ENCOUNTER — APPOINTMENT (OUTPATIENT)
Dept: CT IMAGING | Facility: CLINIC | Age: 37
End: 2022-12-21

## 2022-12-21 ENCOUNTER — OUTPATIENT (OUTPATIENT)
Dept: OUTPATIENT SERVICES | Facility: HOSPITAL | Age: 37
LOS: 1 days | End: 2022-12-21
Payer: MEDICAID

## 2022-12-21 DIAGNOSIS — M54.16 RADICULOPATHY, LUMBAR REGION: ICD-10-CM

## 2022-12-21 DIAGNOSIS — Z00.8 ENCOUNTER FOR OTHER GENERAL EXAMINATION: ICD-10-CM

## 2022-12-21 DIAGNOSIS — Z98.2 PRESENCE OF CEREBROSPINAL FLUID DRAINAGE DEVICE: Chronic | ICD-10-CM

## 2022-12-21 DIAGNOSIS — M54.50 LOW BACK PAIN, UNSPECIFIED: ICD-10-CM

## 2022-12-21 DIAGNOSIS — Z98.84 BARIATRIC SURGERY STATUS: Chronic | ICD-10-CM

## 2022-12-21 PROCEDURE — 72141 MRI NECK SPINE W/O DYE: CPT | Mod: 26

## 2022-12-21 PROCEDURE — 72141 MRI NECK SPINE W/O DYE: CPT

## 2022-12-21 PROCEDURE — 71045 X-RAY EXAM CHEST 1 VIEW: CPT

## 2022-12-21 PROCEDURE — 70551 MRI BRAIN STEM W/O DYE: CPT | Mod: 26

## 2022-12-21 PROCEDURE — 70250 X-RAY EXAM OF SKULL: CPT | Mod: 26

## 2022-12-21 PROCEDURE — 72131 CT LUMBAR SPINE W/O DYE: CPT | Mod: 26

## 2022-12-21 PROCEDURE — 74018 RADEX ABDOMEN 1 VIEW: CPT

## 2022-12-21 PROCEDURE — 72148 MRI LUMBAR SPINE W/O DYE: CPT | Mod: 26

## 2022-12-21 PROCEDURE — 72148 MRI LUMBAR SPINE W/O DYE: CPT

## 2022-12-21 PROCEDURE — 74018 RADEX ABDOMEN 1 VIEW: CPT | Mod: 26

## 2022-12-21 PROCEDURE — 71045 X-RAY EXAM CHEST 1 VIEW: CPT | Mod: 26

## 2022-12-21 PROCEDURE — 70551 MRI BRAIN STEM W/O DYE: CPT

## 2022-12-21 PROCEDURE — 70250 X-RAY EXAM OF SKULL: CPT

## 2022-12-21 PROCEDURE — 72131 CT LUMBAR SPINE W/O DYE: CPT

## 2022-12-22 ENCOUNTER — APPOINTMENT (OUTPATIENT)
Dept: NEUROSURGERY | Facility: CLINIC | Age: 37
End: 2022-12-22

## 2022-12-22 VITALS
DIASTOLIC BLOOD PRESSURE: 71 MMHG | HEART RATE: 51 BPM | SYSTOLIC BLOOD PRESSURE: 115 MMHG | OXYGEN SATURATION: 97 % | BODY MASS INDEX: 36.99 KG/M2 | WEIGHT: 222 LBS | HEIGHT: 65 IN

## 2022-12-22 PROCEDURE — 99213 OFFICE O/P EST LOW 20 MIN: CPT

## 2022-12-27 NOTE — ASSESSMENT
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 Cruz. Last revision on 10/115/19 proximal valve replaced with Strata valve set @1(Dr. Cruz) Patient is now status post gastric bypass surgery, doing well , lost 300+ lbs. Pt consulted for f/u for VPS. Pt also  with worsening of low back pain and neck pain for which he completed MRI and today here  for shunt check.  His VPS depresses and refill readily. Shunt found at 1.5 re programmed to 1. Confirmed by another ACP. \par \par  PLAN:\par \par Shunt reprogrammed to 1 - confirmed with a second check.\par Start PT for back for strengthening and modalities , 2-3 times/week x 8 weeks. \par Abdominal binder for supporting abdominal sagging skin.\par F/u to discuss imaging to be made, as per sister, does not want to be double booked, she will call for  appointment. \par \par

## 2022-12-27 NOTE — PHYSICAL EXAM
[General Appearance - Alert] : alert [General Appearance - In No Acute Distress] : in no acute distress [General Appearance - Well Nourished] : well nourished [General Appearance - Well-Appearing] : healthy appearing [Oriented To Time, Place, And Person] : oriented to person, place, and time [Impaired Insight] : insight and judgment were intact [Affect] : the affect was normal [Memory Recent] : recent memory was not impaired [Person] : oriented to person [Place] : oriented to place [Time] : oriented to time [Short Term Intact] : short term memory intact [Cranial Nerves Trigeminal (V)] : facial sensation intact symmetrically [Cranial Nerves Facial (VII)] : face symmetrical [Cranial Nerves Vestibulocochlear (VIII)] : hearing was intact bilaterally [Cranial Nerves Accessory (XI - Cranial And Spinal)] : head turning and shoulder shrug symmetric [Cranial Nerves Hypoglossal (XII)] : there was no tongue deviation with protrusion [Non-ambulatory] : Non-ambulatory [Balance] : balance was intact [Sclera] : the sclera and conjunctiva were normal [Outer Ear] : the ears and nose were normal in appearance [Neck Appearance] : the appearance of the neck was normal [] : no respiratory distress [Apical Impulse] : the apical impulse was normal [Heart Rate And Rhythm] : heart rate was normal and rhythm regular [Arterial Pulses Carotid] : carotid pulses were normal with no bruits [No CVA Tenderness] : no ~M costovertebral angle tenderness [No Spinal Tenderness] : no spinal tenderness [Abnormal Walk] : normal gait [Involuntary Movements] : no involuntary movements were seen [Skin Color & Pigmentation] : normal skin color and pigmentation

## 2022-12-27 NOTE — REVIEW OF SYSTEMS
[Arm Weakness] : arm weakness [Leg Weakness] : leg weakness [Numbness] : numbness [Tingling] : tingling [Joint Pain] : joint pain [Limb Pain] : limb pain [Negative] : Heme/Lymph [FreeTextEntry3] : legally blind [FreeTextEntry9] : neck pain

## 2022-12-27 NOTE — PHYSICAL EXAM
[General Appearance - Alert] : alert [General Appearance - In No Acute Distress] : in no acute distress [General Appearance - Well Nourished] : well nourished [General Appearance - Well-Appearing] : healthy appearing [Oriented To Time, Place, And Person] : oriented to person, place, and time [Impaired Insight] : insight and judgment were intact [Affect] : the affect was normal [Memory Recent] : recent memory was not impaired [Person] : oriented to person [Place] : oriented to place [Time] : oriented to time [Short Term Intact] : short term memory intact [Cranial Nerves Trigeminal (V)] : facial sensation intact symmetrically [Cranial Nerves Facial (VII)] : face symmetrical [Cranial Nerves Vestibulocochlear (VIII)] : hearing was intact bilaterally [Cranial Nerves Accessory (XI - Cranial And Spinal)] : head turning and shoulder shrug symmetric [Cranial Nerves Hypoglossal (XII)] : there was no tongue deviation with protrusion [Balance] : balance was intact [Non-ambulatory] : Non-ambulatory [Sclera] : the sclera and conjunctiva were normal [Outer Ear] : the ears and nose were normal in appearance [Neck Appearance] : the appearance of the neck was normal [] : no respiratory distress [Apical Impulse] : the apical impulse was normal [Heart Rate And Rhythm] : heart rate was normal and rhythm regular [Arterial Pulses Carotid] : carotid pulses were normal with no bruits [No CVA Tenderness] : no ~M costovertebral angle tenderness [No Spinal Tenderness] : no spinal tenderness [Abnormal Walk] : normal gait [Involuntary Movements] : no involuntary movements were seen [Skin Color & Pigmentation] : normal skin color and pigmentation

## 2022-12-27 NOTE — HISTORY OF PRESENT ILLNESS
[FreeTextEntry1] : Mr. 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 Cruz. Last revision on 10/115/19 proximal valve replaced with Strata valve set @1. Patient is now status post gastric bypass surgery, doing well , lost 300+ lbs. Pt presented with worsening of low back pain and neck pain for the past few weeks. The patient has previously seen Neurologist and Orthopedic who suggested PT. He completed many PT sessions last session was in summer 2022. Pt also received few trigger point injections. Pt had an MRI Lumbar spine an year ago which showed L4-L5 and L5-S1 disc protrusion. Orthopedic ordered MR C spine, but he need some one to follow him for Shunt as Dr. Cruz left the practice. Pt was seen and imaging were ordered. \par \par Today he presented for shunt check. Pt has no symptoms. Pt had his imaging done yesterday.  His back pain starts from his lower back radiates through the left hip and transcends down his left leg into his foot. Pt report persisting pain at neck and shoulder, unable to sit or stand for few minutes. He feels weakness in his both leg. His strata shunt depresses and refill readily. Denies any headache, dizziness or balance issue.

## 2022-12-29 ENCOUNTER — APPOINTMENT (OUTPATIENT)
Dept: INTERNAL MEDICINE | Facility: CLINIC | Age: 37
End: 2022-12-29

## 2022-12-29 ENCOUNTER — LABORATORY RESULT (OUTPATIENT)
Age: 37
End: 2022-12-29

## 2022-12-29 VITALS
SYSTOLIC BLOOD PRESSURE: 132 MMHG | DIASTOLIC BLOOD PRESSURE: 68 MMHG | WEIGHT: 222 LBS | OXYGEN SATURATION: 97 % | BODY MASS INDEX: 36.99 KG/M2 | HEART RATE: 78 BPM | HEIGHT: 65 IN | TEMPERATURE: 97.2 F

## 2022-12-29 DIAGNOSIS — G47.30 SLEEP APNEA, UNSPECIFIED: ICD-10-CM

## 2022-12-29 DIAGNOSIS — N39.9 DISORDER OF URINARY SYSTEM, UNSPECIFIED: ICD-10-CM

## 2022-12-29 DIAGNOSIS — R89.4 ABNORMAL IMMUNOLOGICAL FINDINGS IN SPECIMENS FROM OTHER ORGANS, SYSTEMS AND TISSUES: ICD-10-CM

## 2022-12-29 DIAGNOSIS — M54.50 LOW BACK PAIN, UNSPECIFIED: ICD-10-CM

## 2022-12-29 DIAGNOSIS — R23.8 OTHER SKIN CHANGES: ICD-10-CM

## 2022-12-29 DIAGNOSIS — M50.90 CERVICAL DISC DISORDER, UNSPECIFIED, UNSPECIFIED CERVICAL REGION: ICD-10-CM

## 2022-12-29 DIAGNOSIS — G93.2 BENIGN INTRACRANIAL HYPERTENSION: ICD-10-CM

## 2022-12-29 PROCEDURE — 99215 OFFICE O/P EST HI 40 MIN: CPT

## 2022-12-29 RX ORDER — NORTRIPTYLINE HYDROCHLORIDE 10 MG/1
10 CAPSULE ORAL
Qty: 30 | Refills: 5 | Status: DISCONTINUED | COMMUNITY
Start: 2022-09-30 | End: 2022-12-29

## 2022-12-29 NOTE — HISTORY OF PRESENT ILLNESS
[Parent] : parent [FreeTextEntry1] : fu  pt is with  mother   [de-identified] : pt has seen pulmonary and neuro sx and had  shunt checked  . He had repeat  sleep  test and still has  moderate sleep apnea and cpap was ordered.    He has seen ortho for his left shoulder pain   He had mri of brain  [id number] - MR BRAIN  - ORDERED BY:  ROC JONES\par \par \par PROCEDURE DATE:  12/21/2022\par MR SPINE CERVICAL  - ORDERED BY: SEAN GARCIA\par \par \par PROCEDURE DATE:  12/21/2022\par \par \par \par INTERPRETATION:  CERVICAL SPINE MRI\par \par CLINICAL INFORMATION: Neck pain and left hand numbness.\par TECHNIQUE: Multiplanar, multisequence MRI was obtained of the cervical spine.\par \par FINDINGS:\par \par DISC LEVEL EVALUATION:\par \par C1/2: Minimal degeneration is seen between the dens and the in charge of C1.\par C2/C3: Normal\par C3/C4: Normal\par C4/C5: Minimal disc degeneration\par C5/C6: Minimal disc degeneration with anterior endplate osteophyte formation and minimal endplate edema at the inferior endplate of C5.\par C6/C7: Very small central disc protrusion without central canal or foraminal narrowing.\par C7/T1: Normal\par \par ALIGNMENT: Reversal of the normal cervical lordosis centered upon the C4 vertebral body.\par CORD: There is no cord signal abnormality.\par MARROW: Minimal endplate marrow edema at the inferior endplate of C5.\par IMAGED BRAIN: Please refer to the brain MRI report from the same date.\par PERIPHERAL/NECK SOFT TISSUES: Normal\par \par IMPRESSION: Reversal of the normal cervical lordosis. Minimal disc degeneration at C5-C6 with mild endplate edema at the inferior endplate of C5. No disc bulge or herniation. No central canal or foraminal narrowing.\par \par MR SPINE CERVICAL  - ORDERED BY: ESAN GARCIA\par \par \par PROCEDURE DATE:  12/21/2022\par \par \par \par INTERPRETATION:  CERVICAL SPINE MRI\par \par CLINICAL INFORMATION: Neck pain and left hand numbness.\par TECHNIQUE: Multiplanar, multisequence MRI was obtained of the cervical spine.\par \par FINDINGS:\par \par DISC LEVEL EVALUATION:\par \par C1/2: Minimal degeneration is seen between the dens and the in charge of C1.\par C2/C3: Normal\par C3/C4: Normal\par C4/C5: Minimal disc degeneration\par C5/C6: Minimal disc degeneration with anterior endplate osteophyte formation and minimal endplate edema at the inferior endplate of C5.\par C6/C7: Very small central disc protrusion without central canal or foraminal narrowing.\par C7/T1: Normal\par \par ALIGNMENT: Reversal of the normal cervical lordosis centered upon the C4 vertebral body.\par CORD: There is no cord signal abnormality.\par MARROW: Minimal endplate marrow edema at the inferior endplate of C5.\par IMAGED BRAIN: Please refer to the brain MRI report from the same date.\par PERIPHERAL/NECK SOFT TISSUES: Normal\par \par IMPRESSION: Reversal of the normal cervical lordosis. Minimal disc degeneration at C5-C6 with mild endplate edema at the inferior endplate of C5. No disc bulge or herniation. No central canal or foraminal narrowing.\par MR SPINE CERVICAL  - ORDERED BY: SEAN GARCIA\par \par \par PROCEDURE DATE:  12/21/2022\par \par \par \par INTERPRETATION:  CERVICAL SPINE MRI\par \par CLINICAL INFORMATION: Neck pain and left hand numbness.\par TECHNIQUE: Multiplanar, multisequence MRI was obtained of the cervical spine.\par \par FINDINGS:\par \par DISC LEVEL EVALUATION:\par \par C1/2: Minimal degeneration is seen between the dens and the in charge of C1.\par C2/C3: Normal\par C3/C4: Normal\par C4/C5: Minimal disc degeneration\par C5/C6: Minimal disc degeneration with anterior endplate osteophyte formation and minimal endplate edema at the inferior endplate of C5.\par C6/C7: Very small central disc protrusion without central canal or foraminal narrowing.\par C7/T1: Normal\par \par ALIGNMENT: Reversal of the normal cervical lordosis centered upon the C4 vertebral body.\par CORD: There is no cord signal abnormality.\par MARROW: Minimal endplate marrow edema at the inferior endplate of C5.\par IMAGED BRAIN: Please refer to the brain MRI report from the same date.\par PERIPHERAL/NECK SOFT TISSUES: Normal\par \par IMPRESSION: Reversal of the normal cervical lordosis. Minimal disc degeneration at C5-C6 with mild endplate edema at the inferior endplate of C5. No disc bulge or herniation. No central canal or foraminal narrowing.\par \par \par \par \par \par INTERPRETATION:  Clinical indication:  shunt. History pseudotumor.\par \par MRI of the brain was performed using sagittal T1 axial T1 T2 T2 FLAIR diffusion and susceptibility weighted sequence.\par \par Low-lying cerebellar tonsils seen. The tonsils extend approximately 2.5 mm below the foramen. This does not meet the criteria for a Chiari I malformation.\par \par High right frontal tonya hole and shunt catheter is identified.\par \par The lateral ventricles appear slightly asymmetric with the left lateral ventricle slightly more prominent than the right. No evidence of hydrocephalus is seen.\par \par Gliosis along the right frontal shunt catheter tract is identified.\par \par There is no evidence acute hemorrhage mass or mass effect seen\par \par Evaluation of the diffusion weighted sequence demonstrates no abnormal areas of restricted diffusion to suggest acute infarct.\par \par The large vessels demonstrate normal flow voids\par \par Irregularity involving the medial wall left orbit which could be result of old trauma. Please correlate clinically.\par \par Both mastoid and middle ear regions appear clear.\par \par IMPRESSION: Low-lying cerebellar tonsils\par \par No evidence of hydrocephalus.\par MR SPINE CERVICAL  - ORDERED BY: SEAN GARCIA\par \par \par PROCEDURE DATE:  12/21/2022\par \par \par \par INTERPRETATION:  CERVICAL SPINE MRI\par \par CLINICAL INFORMATION: Neck pain and left hand numbness.\par TECHNIQUE: Multiplanar, multisequence MRI was obtained of the cervical spine.\par \par FINDINGS:\par \par DISC LEVEL EVALUATION:\par \par C1/2: Minimal degeneration is seen between the dens and the in charge of C1.\par C2/C3: Normal\par C3/C4: Normal\par C4/C5: Minimal disc degeneration\par C5/C6: Minimal disc degeneration with anterior endplate osteophyte formation and minimal endplate edema at the inferior endplate of C5.\par C6/C7: Very small central disc protrusion without central canal or foraminal narrowing.\par C7/T1: Normal\par \par ALIGNMENT: Reversal of the normal cervical lordosis centered upon the C4 vertebral body.\par CORD: There is no cord signal abnormality.\par MARROW: Minimal endplate marrow edema at the inferior endplate of C5.\par IMAGED BRAIN: Please refer to the brain MRI report from the same date.\par PERIPHERAL/NECK SOFT TISSUES: Normal\par \par IMPRESSION: Reversal of the normal cervical lordosis. Minimal disc degeneration at C5-C6 with mild endplate edema at the inferior endplate of C5. No disc bulge or herniation. No central canal or foraminal narrowing.\par  he has seen  plastic surgery  Dr Israel and discussed   panniculectomy  abdominoplasty      he decided on  panniculectomy  He has seen cardiologist Dr Montenegro \par   pt states he has lower back pain   and is intense and only thing that helps is exercise and stretches.     and had  mri  done  .  \par L5-S1: Marked fatty endplate change with minimal endplate edema. Moderate disc height loss with mild to moderate disc bulging and osteophyte formation causing mild to moderate left and mild right foraminal narrowing. No central canal stenosis.\par \par and will start pt   continued weight loss .

## 2022-12-29 NOTE — HEALTH RISK ASSESSMENT
[Never] : Never [No] : In the past 12 months have you used drugs other than those required for medical reasons? No [No falls in past year] : Patient reported no falls in the past year [0] : 2) Feeling down, depressed, or hopeless: Not at all (0) [PHQ-2 Negative - No further assessment needed] : PHQ-2 Negative - No further assessment needed [de-identified] : neuro sx  Kevin Mahoneyol  , pulmonary  [place]  ortho Dr Kathryn cyr ,  Dr Mata  neuro dr Hummel   spine  [de-identified] : exercises  5 days a week. [de-identified] : healthy  [[id number]] : 0

## 2022-12-29 NOTE — PHYSICAL EXAM
[PERRL] : pupils equal round and reactive to light [Normal Oropharynx] : the oropharynx was normal [Supple] : supple [Normal Rate] : normal rate  [Regular Rhythm] : with a regular rhythm [Normal S1, S2] : normal S1 and S2 [No Edema] : there was no peripheral edema [No Extremity Clubbing/Cyanosis] : no extremity clubbing/cyanosis [None] : no CVA tenderness [Normal Posterior Cervical Nodes] : no posterior cervical lymphadenopathy [Normal Anterior Cervical Nodes] : no anterior cervical lymphadenopathy [Normal Gait] : normal gait [Normal] : affect was normal and insight and judgment were intact [de-identified] : uses  cane  bland

## 2022-12-29 NOTE — ASSESSMENT
[FreeTextEntry1] : 1 abd luis felipe  he would like to have surgery soon  and would like to see another plastic surgeon  and asked for referral    \par 2  bmi  37  continue weight loss  exercise  sp  sleeve and doing very well .  \par 3.  back pain  .core exercise  refer for pt    he cant have nsaids  he can take Tylenol and use TheraPatch  \par 4.  celiac  he needs  to be more strick with his diet   gluten free.  \par 5  folic acid  def continue folic acid  .  \par 6  sleep apnea  leep apnea is associated with adverse clinical consequences which an affect most organ systems. Cardiovascular disease risk includes arrhythmias, atrial fibrillation, hypertension, coronary artery disease, and stroke. Metabolic disorders include diabetes type 2, non-alcoholic fatty liver disease. Mood disorder especially depression; and cognitive decline especially in the elderly. Associations with chronic reflux/Rojas’s esophagus some but not all inclusive. \par -Reasons include arousal consistent with hypopnea; respiratory events most prominent in REM sleep or supine position; therefore sleep staging and body position are important for accurate diagnosis and estimation of AHI. \par \par 7 arthralgia   Pts that consume certain foods can worsen their symptoms of arthritis.  The five worse foods are bagels, gluten, French fries and processed fast foods, trans fats  such as soybean, sunflower oil, omega 6 , vegetable oil and corn oil .  Blackened and barbecue foods  Advanced glycogen enzymes , night shade vegetables such as tomatoes, peppers, potatoes , and sugars.  Foods that help   tart cherries, broccoli, papaya, olive oil probiotics nuts such as almonds, pineapple  green tea.  Papain breaks down protein and bromelin reduces inflammation tumeric destroys free radicals and devils claw reduces arthritis pain.  as well as ginger extract , rutin , Linn yucca root, citrus bioflavonoids  and Boswelia extract.\par 8  scalp itching   no lesions seen  avoid washing daily  use  Neutrogena shampoo   massage esstential

## 2022-12-29 NOTE — DATA REVIEWED
[FreeTextEntry1] : MR SPINE CERVICAL  - ORDERED BY: SEAN GARCIA\par \par \par PROCEDURE DATE:  12/21/2022\par \par \par \par INTERPRETATION:  CERVICAL SPINE MRI\par \par CLINICAL INFORMATION: Neck pain and left hand numbness.\par TECHNIQUE: Multiplanar, multisequence MRI was obtained of the cervical spine.\par \par FINDINGS:\par \par DISC LEVEL EVALUATION:\par \par C1/2: Minimal degeneration is seen between the dens and the in charge of C1.\par C2/C3: Normal\par C3/C4: Normal\par C4/C5: Minimal disc degeneration\par C5/C6: Minimal disc degeneration with anterior endplate osteophyte formation and minimal endplate edema at the inferior endplate of C5.\par C6/C7: Very small central disc protrusion without central canal or foraminal narrowing.\par C7/T1: Normal\par \par ALIGNMENT: Reversal of the normal cervical lordosis centered upon the C4 vertebral body.\par CORD: There is no cord signal abnormality.\par MARROW: Minimal endplate marrow edema at the inferior endplate of C5.\par IMAGED BRAIN: Please refer to the brain MRI report from the same date.\par PERIPHERAL/NECK SOFT TISSUES: Normal\par \par IMPRESSION: Reversal of the normal cervical lordosis. Minimal disc degeneration at C5-C6 with mild endplate edema at the inferior endplate of C5. No disc bulge or herniation. No central canal or foraminal narrowing.\par \par  - XR SHUNT SERIES#  - ORDERED BY:  ROC JONES\par \par \par PROCEDURE DATE:  12/21/2022\par \par \par \par INTERPRETATION:  HISTORY: Status post  shunt. Recent bariatric surgery.\par \par TECHNIQUE: AP lateral skull/neck, AP chest, AP abdomen pelvic radiograph\par \par FINDINGS: RIGHT and shunt catheter positioned through RIGHT frontal tonya hole with distal tip just off midline in the center of the brain.\par The catheter courses along the RIGHT neck soft tissues a RIGHT anterior chest wall and is coiled in the pelvis.\par No break in the catheter identified. Osseous calvarium intact. Lungs clear. Abdominal bowel pattern nonspecific.\par \par IMPRESSION: Ventriculoperitoneal shunt catheter radiographically intact.\par If patency of catheter requires further assessment nuclear ventriculoperitoneal shuntogram procedure recommended.\par \par \par \par \par --- End of Report ---\par \par \par \par \par

## 2023-01-02 DIAGNOSIS — R80.9 PROTEINURIA, UNSPECIFIED: ICD-10-CM

## 2023-01-02 LAB
APPEARANCE: ABNORMAL
BILIRUBIN URINE: NEGATIVE
BLOOD URINE: NEGATIVE
COLOR: YELLOW
GLUCOSE QUALITATIVE U: NEGATIVE
IGA SER QL IEP: 395 MG/DL
KETONES URINE: NEGATIVE
LEUKOCYTE ESTERASE URINE: NEGATIVE
NITRITE URINE: NEGATIVE
PH URINE: 5.5
PROTEIN URINE: ABNORMAL
SPECIFIC GRAVITY URINE: 1.03
TTG IGA SER IA-ACNC: 5.9 U/ML
TTG IGA SER-ACNC: ABNORMAL
TTG IGG SER IA-ACNC: 5.3 U/ML
TTG IGG SER IA-ACNC: NEGATIVE
UROBILINOGEN URINE: NORMAL

## 2023-01-06 LAB
CREAT 24H UR-MCNC: 1.7 G/24 H
CREAT ?TM UR-MCNC: 145 MG/DL
PROT 24H UR-MRATE: 8 MG/DL
PROT ?TM UR-MCNC: 24 HR
PROT UR-MCNC: 96 MG/24 H
SPECIMEN VOL 24H UR: 1200 ML

## 2023-01-13 NOTE — H&P PST ADULT - VENOUS THROMBOEMBOLISM BMI
Please call for any fever, increase in pain, nausea or vomiting or redness or drainage from incision(s).    There are no limits on your activity      Wound care     Soak in the tub or get in the shower and let the gauze in the wound get wet.  Removed the gauze and gently fill the wound with clean gauze.  Change the dressing once or twice a day as needed      If you become constipated from the pain medication you can use over the counter laxatives,  Miralax or Glycolax,    If you have questions or concerns please call the office.      The office should contact you with a postop appointment or anal appear in your [employer] application    If you need a refill on your pain medicine please call the office or send a message.  We can only give you 1 refill between now in your follow-up visit    Our office phone numbers are  [phone] and     
(1) obesity (BMI greater than 25)

## 2023-01-25 ENCOUNTER — APPOINTMENT (OUTPATIENT)
Dept: NEUROLOGY | Facility: CLINIC | Age: 38
End: 2023-01-25
Payer: MEDICAID

## 2023-01-25 VITALS
OXYGEN SATURATION: 95 % | TEMPERATURE: 95.5 F | SYSTOLIC BLOOD PRESSURE: 124 MMHG | WEIGHT: 213 LBS | BODY MASS INDEX: 35.49 KG/M2 | HEIGHT: 65 IN | HEART RATE: 68 BPM | DIASTOLIC BLOOD PRESSURE: 75 MMHG

## 2023-01-25 PROCEDURE — 95886 MUSC TEST DONE W/N TEST COMP: CPT

## 2023-01-25 PROCEDURE — 95885 MUSC TST DONE W/NERV TST LIM: CPT | Mod: LT

## 2023-01-25 PROCEDURE — 95911 NRV CNDJ TEST 9-10 STUDIES: CPT

## 2023-01-27 ENCOUNTER — APPOINTMENT (OUTPATIENT)
Dept: NEUROSURGERY | Facility: CLINIC | Age: 38
End: 2023-01-27
Payer: MEDICAID

## 2023-01-27 VITALS
BODY MASS INDEX: 35.49 KG/M2 | HEIGHT: 65 IN | HEART RATE: 62 BPM | OXYGEN SATURATION: 98 % | WEIGHT: 213 LBS | SYSTOLIC BLOOD PRESSURE: 136 MMHG | DIASTOLIC BLOOD PRESSURE: 86 MMHG

## 2023-01-27 PROCEDURE — 62252 CSF SHUNT REPROGRAM: CPT

## 2023-01-27 PROCEDURE — 99215 OFFICE O/P EST HI 40 MIN: CPT

## 2023-01-27 NOTE — PHYSICAL EXAM
[General Appearance - Alert] : alert [General Appearance - In No Acute Distress] : in no acute distress [General Appearance - Well Nourished] : well nourished [General Appearance - Well-Appearing] : healthy appearing [Oriented To Time, Place, And Person] : oriented to person, place, and time [Impaired Insight] : insight and judgment were intact [Affect] : the affect was normal [Memory Recent] : recent memory was not impaired [Person] : oriented to person [Place] : oriented to place [Time] : oriented to time [Short Term Intact] : short term memory intact [Cranial Nerves Oculomotor (III)] : extraocular motion intact [Cranial Nerves Trigeminal (V)] : facial sensation intact symmetrically [Cranial Nerves Facial (VII)] : face symmetrical [Cranial Nerves Vestibulocochlear (VIII)] : hearing was intact bilaterally [Cranial Nerves Accessory (XI - Cranial And Spinal)] : head turning and shoulder shrug symmetric [Cranial Nerves Hypoglossal (XII)] : there was no tongue deviation with protrusion [Motor Tone] : muscle tone was normal in all four extremities [Motor Strength] : muscle strength was normal in all four extremities [Sensation Tactile Decrease] : light touch was intact [Sensation Pain / Temperature Decrease] : pain and temperature was intact [Balance] : balance was intact [Sclera] : the sclera and conjunctiva were normal [PERRL With Normal Accommodation] : pupils were equal in size, round, reactive to light, with normal accommodation [Outer Ear] : the ears and nose were normal in appearance [Both Tympanic Membranes Were Examined] : both tympanic membranes were normal [Neck Appearance] : the appearance of the neck was normal [] : no respiratory distress [Respiration, Rhythm And Depth] : normal respiratory rhythm and effort [Apical Impulse] : the apical impulse was normal [Heart Rate And Rhythm] : heart rate was normal and rhythm regular [Arterial Pulses Carotid] : carotid pulses were normal with no bruits [Abdominal Aorta] : the abdominal aorta was normal [No CVA Tenderness] : no ~M costovertebral angle tenderness [Abnormal Walk] : normal gait [Involuntary Movements] : no involuntary movements were seen [Skin Color & Pigmentation] : normal skin color and pigmentation

## 2023-02-03 ENCOUNTER — APPOINTMENT (OUTPATIENT)
Dept: UROLOGY | Facility: CLINIC | Age: 38
End: 2023-02-03
Payer: MEDICAID

## 2023-02-03 VITALS
WEIGHT: 213 LBS | BODY MASS INDEX: 35.49 KG/M2 | DIASTOLIC BLOOD PRESSURE: 72 MMHG | TEMPERATURE: 98 F | SYSTOLIC BLOOD PRESSURE: 113 MMHG | HEIGHT: 65 IN | HEART RATE: 80 BPM | OXYGEN SATURATION: 97 %

## 2023-02-03 PROCEDURE — 99204 OFFICE O/P NEW MOD 45 MIN: CPT

## 2023-02-03 NOTE — ASSESSMENT
[FreeTextEntry1] : Very pleasant 37-year-old gentleman who presents for evaluation for screening for STDs, dysuria, concern for left buttock cellulitis\par -Small area on left buttocks concerning for cellulitis.  We discussed that if this would worsen he should go to the emergency department.  There is no definable area that would benefit from drainage at this time\par -Start Duricef x7 days\par -Urinalysis\par -Urine culture\par -HIV screen\par -Syphilis screen\par -Herpes screen\par -GC/chlamydia screen\par -ureaplasm/mycoplasma screen\par -Follow-up in 1 to 2 weeks

## 2023-02-03 NOTE — PHYSICAL EXAM
[General Appearance - Well Developed] : well developed [General Appearance - Well Nourished] : well nourished [Normal Appearance] : normal appearance [Well Groomed] : well groomed [General Appearance - In No Acute Distress] : no acute distress [Edema] : no peripheral edema [Respiration, Rhythm And Depth] : normal respiratory rhythm and effort [Exaggerated Use Of Accessory Muscles For Inspiration] : no accessory muscle use [Abdomen Soft] : soft [Abdomen Tenderness] : non-tender [Costovertebral Angle Tenderness] : no ~M costovertebral angle tenderness [Urethral Meatus] : meatus normal [Urinary Bladder Findings] : the bladder was normal on palpation [Scrotum] : the scrotum was normal [Testes Mass (___cm)] : there were no testicular masses [FreeTextEntry1] : No visible lesions; left buttocks indurated area concerning for cellulitis [Normal Station and Gait] : the gait and station were normal for the patient's age [] : no rash [No Focal Deficits] : no focal deficits [Oriented To Time, Place, And Person] : oriented to person, place, and time [Affect] : the affect was normal [Mood] : the mood was normal [Not Anxious] : not anxious [No Palpable Adenopathy] : no palpable adenopathy

## 2023-02-03 NOTE — HISTORY OF PRESENT ILLNESS
[FreeTextEntry1] : Very pleasant 37-year-old gentleman who presents for evaluation for dysuria and screening for STDs.  He reports that he is currently sexually active with 2 partners.  He was last sexually active in December and used protection at that time, however recently he began to experience vesicular lesions on his phallus and suprapubic region.  He reports that these have resolved.  He is concerned about the possibility of herpes.  Additionally, he reports an erythematous indurated area on his left buttocks

## 2023-02-06 ENCOUNTER — TRANSCRIPTION ENCOUNTER (OUTPATIENT)
Age: 38
End: 2023-02-06

## 2023-02-06 LAB
APPEARANCE: ABNORMAL
BACTERIA UR CULT: NORMAL
BACTERIA: NEGATIVE
BILIRUBIN URINE: NEGATIVE
BLOOD URINE: NEGATIVE
C TRACH RRNA SPEC QL NAA+PROBE: NOT DETECTED
COLOR: ABNORMAL
GLUCOSE QUALITATIVE U: NEGATIVE
HIV1+2 AB SPEC QL IA.RAPID: NONREACTIVE
HSV 1+2 IGG SER IA-IMP: NEGATIVE
HSV 1+2 IGG SER IA-IMP: NEGATIVE
HSV1 IGG SER QL: 0.32 INDEX
HSV2 IGG SER QL: 0.36 INDEX
HYALINE CASTS: 7 /LPF
KETONES URINE: NEGATIVE
LEUKOCYTE ESTERASE URINE: NEGATIVE
MICROSCOPIC-UA: NORMAL
N GONORRHOEA RRNA SPEC QL NAA+PROBE: NOT DETECTED
NITRITE URINE: NEGATIVE
PH URINE: 6
PROTEIN URINE: ABNORMAL
RED BLOOD CELLS URINE: 7 /HPF
SOURCE AMPLIFICATION: NORMAL
SPECIFIC GRAVITY URINE: 1.04
SQUAMOUS EPITHELIAL CELLS: 6 /HPF
T PALLIDUM AB SER QL IA: NEGATIVE
URINE COMMENTS: NORMAL
UROBILINOGEN URINE: NORMAL
WHITE BLOOD CELLS URINE: 2 /HPF

## 2023-02-09 LAB
HSV1 IGM SER QL: NEGATIVE
HSV2 AB FLD-ACNC: NEGATIVE

## 2023-02-13 ENCOUNTER — NON-APPOINTMENT (OUTPATIENT)
Age: 38
End: 2023-02-13

## 2023-02-14 LAB
MYCOPLASMA HOMINIS CULTURE: NEGATIVE
UREAPLASMA CULTURE: NEGATIVE

## 2023-02-14 NOTE — RESULTS/DATA
Tremfya Pregnancy And Lactation Text: The risk during pregnancy and breastfeeding is uncertain with this medication. [FreeTextEntry1] : EXAM: [id number] - MR BRAIN - ORDERED BY: ROC JONES\par \par \par PROCEDURE DATE: 12/21/2022\par \par \par \par INTERPRETATION: Clinical indication:  shunt. History pseudotumor.\par \par MRI of the brain was performed using sagittal T1 axial T1 T2 T2 FLAIR diffusion and susceptibility weighted sequence.\par \par Low-lying cerebellar tonsils seen. The tonsils extend approximately 2.5 mm below the foramen. This does not meet the criteria for a Chiari I malformation.\par \par High right frontal tonya hole and shunt catheter is identified.\par \par The lateral ventricles appear slightly asymmetric with the left lateral ventricle slightly more prominent than the right. No evidence of hydrocephalus is seen.\par \par Gliosis along the right frontal shunt catheter tract is identified.\par \par There is no evidence acute hemorrhage mass or mass effect seen\par \par Evaluation of the diffusion weighted sequence demonstrates no abnormal areas of restricted diffusion to suggest acute infarct.\par \par The large vessels demonstrate normal flow voids\par \par Irregularity involving the medial wall left orbit which could be result of old trauma. Please correlate clinically.\par \par Both mastoid and middle ear regions appear clear.\par \par IMPRESSION: Low-lying cerebellar tonsils\par \par No evidence of hydrocephalus\par \par \par EXAM: [id number] - MR SPINE LUMBAR - ORDERED BY: ROC JONES\par \par \par PROCEDURE DATE: 12/21/2022\par \par \par \par INTERPRETATION: CLINICAL INFORMATION: Lumbar radiculopathy. Low back pain.\par \par ADDITIONAL CLINICAL INFORMATION: Not Applicable\par \par TECHNIQUE: Multiplanar, multisequence MRI was performed of the lumbar spine.\par IV Contrast: NONE\par \par PRIOR STUDIES: CT scan performed on the same date. MRI from 12/20/2021.\par \par FINDINGS:\par \par LOCALIZER: No additional findings.\par BONES: Fatty change and mild endplate edema at T11-T12 and L5-S1.\par ALIGNMENT: Mild scoliosis that is convex to the left.\par SACROILIAC JOINTS/SACRUM: There is no sacral fracture. The SI joints are partially visualized but are intact.\par CONUS AND CAUDA EQUINA: The distal cord and conus are normal in signal. Conus terminates at L1.\par VISUALIZED INTRAPELVIC/INTRA-ABDOMINAL SOFT TISSUES: Normal.\par PARASPINAL SOFT TISSUES: Normal.\par \par \par INDIVIDUAL LEVELS:\par \par LOWER THORACIC SPINE: Disc degeneration with minimal disc bulging and anterior osteophyte formation with fatty endplate change and edema at T11-T12.\par \par L1-L2: No spinal canal or neuroforaminal stenosis.\par L2-L3: No spinal canal or neuroforaminal stenosis.\par L3-L4: No spinal canal or neuroforaminal stenosis.\par L4-L5: Small to moderate left foraminal disc protrusion that causes mild left foraminal narrowing nearly contacting the exiting left L4 nerve root.\par L5-S1: Marked fatty endplate change with minimal endplate edema. Moderate disc height loss with mild to moderate disc bulging and osteophyte formation causing mild to moderate left and mild right foraminal narrowing. No central canal stenosis.\par \par \par IMPRESSION:\par \par Lower lumbar spondylosis at L4-L5 and L5-S1 as detailed above and without change compared to the prior study from 12/20/2021.\par EXAM: [id number] - MR SPINE CERVICAL - ORDERED BY: SEAN GARCIA\par \par \par PROCEDURE DATE: 12/21/2022\par \par \par \par INTERPRETATION: CERVICAL SPINE MRI\par \par CLINICAL INFORMATION: Neck pain and left hand numbness.\par TECHNIQUE: Multiplanar, multisequence MRI was obtained of the cervical spine.\par \par FINDINGS:\par \par DISC LEVEL EVALUATION:\par \par C1/2: Minimal degeneration is seen between the dens and the in charge of C1.\par C2/C3: Normal\par C3/C4: Normal\par C4/C5: Minimal disc degeneration\par C5/C6: Minimal disc degeneration with anterior endplate osteophyte formation and minimal endplate edema at the inferior endplate of C5.\par C6/C7: Very small central disc protrusion without central canal or foraminal narrowing.\par C7/T1: Normal\par \par ALIGNMENT: Reversal of the normal cervical lordosis centered upon the C4 vertebral body.\par CORD: There is no cord signal abnormality.\par MARROW: Minimal endplate marrow edema at the inferior endplate of C5.\par IMAGED BRAIN: Please refer to the brain MRI report from the same date.\par PERIPHERAL/NECK SOFT TISSUES: Normal\par \par IMPRESSION: Reversal of the normal cervical lordosis. Minimal disc degeneration at C5 -C6 with mild endplate edema at the inferior endplate of C5. No disc bulge or herniation. No central canal or foraminal narrowing.\par \par

## 2023-02-14 NOTE — ASSESSMENT
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 Cruz. Last revision on 10/15/19 proximal valve replaced with Strata valve set @1(Dr. Cruz) Patient is now status post gastric bypass surgery, doing well , lost 300+ lbs. Pt consulted for f/u for VPS. Pt also with worsening of low back pain and neck pain for which he completed MRI C spine and L spine. MRI head with no hydrocephalous. MRI C spine with degenerative changes and L spine at L5- S1 with Moderate disc height loss with mild to moderate disc bulging and osteophyte formation causing mild to moderate left and mild right foraminal narrowing. His VPS depresses and refill readily. Shunt found at 0.5 re programmed to 1. Confirmed again. Pt states that much of his back pain is related to the loose/redundant abdominal skin resulting from his significant weight loss; when he holds the pannus up, his back feels better.\par \par  PLAN:\par \par Shunt reprogrammed to 1 - confirmed with a second check. \par Start PT for back for strengthening and modalities , 2-3 times/week x 8 weeks. \par Abdominal binder for supporting abdominal sagging skin.\par Plan Panniculectomy as decided. \par F/u in 3 months. \par \par

## 2023-02-14 NOTE — HISTORY OF PRESENT ILLNESS
[FreeTextEntry1] : Mr. 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 Cruz, bilateral vision loss. Last revision on 10/115/19 proximal valve replaced with Strata valve set @1. Patient is now status post gastric bypass surgery, doing well , lost 300+ lbs. Pt presented with worsening of low back pain and neck pain for the past few weeks. The patient has previously seen Neurologist and Orthopedic who suggested PT. He completed many PT sessions last session was in summer 2022. Pt also received few trigger point injections. Pt had an MRI Lumbar spine an year ago which showed L4- L5 and L5- S1 disc protrusion. Orthopedic ordered MR C spine, but he need some one to follow him for Shunt as Dr. Cruz left the practice. Pt was seen and imaging were ordered. \par \par  Pt had his imaging done yesterday. His back pain starts from his lower back radiates through the left hip and transcends down his left leg into his foot. Pt report persisting pain at neck and shoulder, pain radiating to left arm and goes down to left little finger. Pt unable to sit or stand for few minutes. He feels weakness in his both leg. His strata shunt depresses and refill readily. Denies any headache, dizziness or balance issue.

## 2023-02-17 ENCOUNTER — APPOINTMENT (OUTPATIENT)
Dept: UROLOGY | Facility: CLINIC | Age: 38
End: 2023-02-17

## 2023-02-17 ENCOUNTER — APPOINTMENT (OUTPATIENT)
Dept: UROLOGY | Facility: CLINIC | Age: 38
End: 2023-02-17
Payer: MEDICAID

## 2023-02-17 VITALS
SYSTOLIC BLOOD PRESSURE: 127 MMHG | OXYGEN SATURATION: 98 % | HEIGHT: 65 IN | HEART RATE: 66 BPM | TEMPERATURE: 97.7 F | DIASTOLIC BLOOD PRESSURE: 68 MMHG | BODY MASS INDEX: 35.49 KG/M2 | WEIGHT: 213 LBS

## 2023-02-17 DIAGNOSIS — R39.16 STRAINING TO VOID: ICD-10-CM

## 2023-02-17 DIAGNOSIS — L02.31 CUTANEOUS ABSCESS OF BUTTOCK: ICD-10-CM

## 2023-02-17 PROCEDURE — 76775 US EXAM ABDO BACK WALL LIM: CPT

## 2023-02-17 PROCEDURE — 99214 OFFICE O/P EST MOD 30 MIN: CPT | Mod: 25

## 2023-02-17 NOTE — ASSESSMENT
[FreeTextEntry1] : Very pleasant 37-year-old gentleman who presents for follow-up of dysuria, microscopic hematuria, screening for STDs, left buttocks lesion concerning for an abscess\par -HIV negative\par -Syphilis negative\par -GC/chlamydia negative\par -Herpes type I and II IgG negative\par -Urinalysis demonstrates 7 red blood cells per high-powered field and 100 mg/dL of protein\par -I have recommended that he see a nephrologist given proteinuria\par -Renal ultrasound today demonstrates no kidney stones, hydronephrosis, renal masses\par -Follow-up for cystoscopy to complete work-up for microscopic hematuria and for straining to void\par -We discussed the proper way to take Duricef, twice daily\par -We discussed that he should go to the emergency department if the lesion enlarges or does not completely improve in the next few days\par

## 2023-02-17 NOTE — PHYSICAL EXAM
[General Appearance - Well Developed] : well developed [General Appearance - Well Nourished] : well nourished [Normal Appearance] : normal appearance [Well Groomed] : well groomed [General Appearance - In No Acute Distress] : no acute distress [Abdomen Soft] : soft [Abdomen Tenderness] : non-tender [Costovertebral Angle Tenderness] : no ~M costovertebral angle tenderness [Urethral Meatus] : meatus normal [Urinary Bladder Findings] : the bladder was normal on palpation [Scrotum] : the scrotum was normal [Testes Mass (___cm)] : there were no testicular masses [Edema] : no peripheral edema [] : no respiratory distress [Respiration, Rhythm And Depth] : normal respiratory rhythm and effort [Exaggerated Use Of Accessory Muscles For Inspiration] : no accessory muscle use [Oriented To Time, Place, And Person] : oriented to person, place, and time [Affect] : the affect was normal [Mood] : the mood was normal [Not Anxious] : not anxious [Normal Station and Gait] : the gait and station were normal for the patient's age [No Focal Deficits] : no focal deficits [No Palpable Adenopathy] : no palpable adenopathy [FreeTextEntry1] : Unchanged appearance of left groin erythematous and indurated lesion

## 2023-02-17 NOTE — HISTORY OF PRESENT ILLNESS
[FreeTextEntry1] : Very pleasant 37-year-old gentleman who presents for follow-up of dysuria and screening for STDs, new finding of microscopic hematuria.  He reports that he has been forgetting to take antibiotics as prescribed for an erythematous indurated left buttocks lesion.  Additionally he has only been taking the medication once per day.  STD testing at last visit negative, including HIV, syphilis, GC/chlamydia, herpes type I and II IgG.  Urinalysis did demonstrate 7 red blood cells per high-powered field and 100 mg/dL of protein.

## 2023-03-02 NOTE — DIETITIAN INITIAL EVALUATION ADULT. - FLUIDS
Assessment/Plan:   Diagnoses and all orders for this visit:    Abnormal CT of the chest  -     CT chest without contrast; Future  -     Complete PFT with post bronchodilator; Future    Simple chronic bronchitis (HCC)  -     Complete PFT with post bronchodilator; Future    Other tobacco product nicotine dependence, uncomplicated    History of lung cancer    Centrilobular emphysema (HCC)    SHIVANI (obstructive sleep apnea)        PFTs in 2019 with normal spirometry FEV1 of 88% then, lung volumes at 81% and DLCO 41%  Will get repeat PFTs and follow-up  Discussed with the patient to continue with Trelegy 1 puff daily  Rinse mouth after use  Continue with albuterol via the nebulizer 4 times daily as needed  Recent CT of the chest demonstrating new area of spiculated nodularity in the left upper lobe measuring 16 mm which was not there in the prior CT of the chest done about a year ago  He does have history of lung cancer status post left upper lobectomy  Reviewed the CT of the chest report and images with the patient and also discussed regarding repeating CT of the chest in 3 months and follow-up,   Will discuss in tumor board as well   Obstructive sleep apnea on CPAP  Continue with current settings  Cleaning of the supplies and change of CPAP supplies discussed  We will follow-up in 3 months or as needed earlier as needed  Return in about 3 months (around 6/2/2023)  All questions are answered to the patient's satisfaction and understanding  He verbalizes understanding  He is encouraged to call with any further questions or concerns  Portions of the record may have been created with voice recognition software  Occasional wrong word or "sound a like" substitutions may have occurred due to the inherent limitations of voice recognition software  Read the chart carefully and recognize, using context, where substitutions have occurred      Electronically Signed by Ashok Bess MD    ______________________________________________________________________    Chief Complaint:   Chief Complaint   Patient presents with   • Follow-up   • Sleep Apnea       Patient ID: Daphne Mccord is a [de-identified] y o  y o  male has a past medical history of BPH (benign prostatic hyperplasia), Cancer ([facility] [address] ) (2013), Cervical myelopathy ([address] ) (6/20/2019), Chemical exposure, COPD (chronic obstructive pulmonary disease) ([address] ), CPAP (continuous positive airway pressure) dependence, DDD (degenerative disc disease), cervical, Diabetes mellitus ([address] ), Foraminal stenosis of cervical region, Hyperlipidemia, Hypertension, SHIVANI (obstructive sleep apnea), Overactive bladder, and Spinal cord compression ([facility] [address] ) (5/8/2019)     3/2/2023  Patient presents today for follow-up visit  Patient is a an 60-year-old male with past medical history of chronic bronchitis/emphysema, lung cancer status post left upper lobectomy, lung nodules, prostate cancer status post radiation, SHIVANI on CPAP, hypertension, diabetes  He is here today for follow-up  He is overall doing well with his breathing does find that in the mornings he has a lot of coughing, will bring up some mucus and generally feels well afterwards  He is using his Trelegy daily, he uses his nebulizer machine with albuterol once a day especially in the morning he states, he continues to smoke pipes, quit smoking cigarettes some time ago  He continues with his CPAP on a nightly basis and is sleeping well without significant nocturnal awakenings  He is here for follow-up with a repeat CT of the chest that he gets annually  Review of Systems   Constitutional: Negative  Eyes: Negative  Respiratory: Positive for cough  Cardiovascular: Negative  Gastrointestinal: Negative  Endocrine: Negative  Genitourinary: Negative  Musculoskeletal: Negative  Allergic/Immunologic: Negative  Neurological: Negative  Hematological: Negative  Psychiatric/Behavioral: Negative  Smoking history: He reports that he has been smoking pipe and cigarettes  He started smoking about 42 years ago  He has a 0 61 pack-year smoking history  He has never used smokeless tobacco     The following portions of the patient's history were reviewed and updated as appropriate: allergies, current medications, past family history, past medical history, past social history, past surgical history and problem list     Immunization History   Administered Date(s) Administered   • COVID-19 PFIZER VACCINE 0 3 ML IM 03/16/2021, 04/07/2021, 12/03/2021   • COVID-19 Pfizer Vac BIVALENT Bi-sucrose 12 Yr+ IM (BOOSTER ONLY) 09/23/2022   • INFLUENZA 10/01/2018   • Influenza, high dose seasonal 0 7 mL 09/13/2019, 09/24/2020, 11/11/2021, 12/09/2022   • Pneumococcal Conjugate 13-Valent 08/18/2016   • Pneumococcal Polysaccharide PPV23 03/16/2018   • Zoster 06/23/2014   • Zoster Vaccine Recombinant 11/06/2018, 02/01/2019     Current Outpatient Medications   Medication Sig Dispense Refill   • acetaminophen (TYLENOL) 500 mg tablet Take 500 mg by mouth every 6 (six) hours as needed for mild pain       • albuterol (2 5 mg/3 mL) 0 083 % nebulizer solution Take 3 mL (2 5 mg total) by nebulization every 6 (six) hours as needed for wheezing or shortness of breath 120 mL 1   • aspirin (ECOTRIN LOW STRENGTH) 81 mg EC tablet Take 1 tablet by mouth daily 30 tablet 0   • Blood Glucose Monitoring Suppl (OneTouch Verio) w/Device KIT use as directed 1 kit 0   • chlorhexidine (PERIDEX) 0 12 % solution RINSE MOUTH WITH 15 ML (1 CAPFUL) FOR 30 SECONDS IN THE MORNING A   (REFER TO PRESCRIPTION NOTES)       • clindamycin (CLEOCIN) 300 MG capsule Take 300 mg by mouth every 8 (eight) hours       • diltiazem (TIAZAC) 360 MG 24 hr capsule take 1 capsule by mouth once daily 90 capsule 1   • DULoxetine (CYMBALTA) 60 mg delayed release capsule TAKE 1 CAPSULE DAILY 90 capsule 3   • gabapentin (NEURONTIN) 100 mg capsule Take 2 capsules (200 mg total) by mouth 2 (two) times a day 360 capsule 3   • glucose blood (FREESTYLE LITE) test strip use 1 TEST STRIP to TEST BLOOD SUGAR twice a day 100 strip 0   • hydrochlorothiazide (HYDRODIURIL) 12 5 mg tablet Take 1 tablet (12 5 mg total) by mouth daily 90 tablet 1   • ibuprofen (MOTRIN) 600 mg tablet Take 600 mg by mouth every 6 (six) hours as needed       • Lancets (OneTouch Delica Plus [id number]) MISC use 1 LANCET to TEST BLOOD SUGAR one to two times a day 100 each 0   • losartan (COZAAR) 100 MG tablet TAKE 1 TABLET DAILY 90 tablet 3   • metFORMIN (GLUCOPHAGE) 500 mg tablet take 1 tablet by mouth daily WITH BREAKFAST 90 tablet 3   • Multiple Vitamin (MULTI-VITAMINS PO) Take by mouth daily     • oxybutynin (DITROPAN-XL) 5 mg 24 hr tablet take 1 tablet by mouth once daily 90 tablet 3   • polyvinyl alcohol (LIQUIFILM TEARS) 1 4 % ophthalmic solution Administer 1 drop to both eyes 4 (four) times a day 15 mL 0   • pravastatin (PRAVACHOL) 40 mg tablet take 1 tablet by mouth once daily 90 tablet 1   • pyridoxine (VITAMIN B6) 50 mg tablet Take 50 mg by mouth daily     • sildenafil (REVATIO) 20 mg tablet Take 1 tablet (20 mg total) by mouth if needed (Erectile dysfunction) Take 1-5 tablets as needed for erection on an empty stomach 30 tablet 1   • thiamine 50 MG tablet Take 1 tablet (50 mg total) by mouth daily 30 tablet 3   • Trelegy Ellipta 100-62 5-25 MCG/INH inhaler USE 1 INHALATION DAILY (RINSE MOUTH AFTER USE) 360 blister 3   • VITAMIN E PO Take by mouth 268 mg 400 iu     • Continuous Blood Gluc Sensor (FreeStyle Eben 14 Day Sensor) MISC Check blood sugars continuously  Switch every 14 days  6 each 3     No current facility-administered medications for this visit  Allergies: Chloroquine and Qualaquin [quinine]    Objective:  Vitals:    03/02/23 0903   BP: 124/80   Pulse: 72   Temp: 97 7 °F (36 5 °C)   SpO2: 97%   Weight: 70 3 kg (155 lb)   Height: 5' 8" (1 727 m)   Oxygen Therapy  SpO2: 97 %      Wt Readings from Last 3 Encounters:   03/02/23 70 3 kg (155 lb)   01/27/23 70 3 kg (155 lb)   01/10/23 70 3 kg (155 lb)     Body mass index is 23 57 kg/m²  Physical Exam  Vitals and nursing note reviewed  Constitutional:       Appearance: He is well-developed  HENT:      Head: Normocephalic and atraumatic  Eyes:      Conjunctiva/sclera: Conjunctivae normal       Pupils: Pupils are equal, round, and reactive to light  Neck:      Thyroid: No thyromegaly  Vascular: No JVD  Cardiovascular:      Rate and Rhythm: Normal rate and regular rhythm  Heart sounds: Normal heart sounds  No murmur heard  No friction rub  No gallop  Pulmonary:      Effort: Pulmonary effort is normal  No respiratory distress  Breath sounds: Normal breath sounds  No wheezing or rales  Chest:      Chest wall: No tenderness  Musculoskeletal:         General: No tenderness or deformity  Normal range of motion  Cervical back: Normal range of motion and neck supple  Lymphadenopathy:      Cervical: No cervical adenopathy  Skin:     General: Skin is warm and dry  Neurological:      Mental Status: He is alert and oriented to person, place, and time  Diagnostics:  I have personally reviewed pertinent films in PACS  ESS: Total score: 5  CT chest wo contrast    Result Date: 2/20/2023  Narrative: CT CHEST WITHOUT IV CONTRAST INDICATION:   F17 290: Nicotine dependence, other tobacco product, uncomplicated M63 0: Solitary pulmonary nodule  COMPARISON:  CT, dated 1/31/2022  CT, dated 2/21/2021  TECHNIQUE: CT examination of the chest was performed without intravenous contrast  Axial, sagittal, and coronal 2D reformatted images were created from the source data and submitted for interpretation  Radiation dose length product (DLP) for this visit:  254 mGy-cm     This examination, like all CT scans performed in the [facility], was performed utilizing techniques to minimize radiation dose exposure, including the use of iterative reconstruction and automated exposure control  FINDINGS: LUNGS:  New area of relative spiculation and retraction in the reoriented anterior left lower lobe measures 16 x 13 mm (series 3, image 74 and series 602, image 601)  Note that scarring or volume loss was not present in this region last year  Other tiny pulmonary nodules are stable for 2 years, benign (all of which are annotated on series 3)  Redemonstrated apical predominant moderate centrilobular emphysema  Redemonstrated combination of atelectasis in a bibasilar scarring  No honeycombing  Mild bronchiectasis  Left upper lobectomy  Small amount of debris in a distal right mainstem bronchus  Trachea also has a small amount of debris  PLEURA:  Unremarkable  HEART/GREAT VESSELS: Mild cardiac enlargement  No pericardial effusion  Coronary arterial atherosclerotic calcifications  No thoracic aortic aneurysm  MEDIASTINUM AND JO:  Unremarkable  CHEST WALL AND LOWER NECK:  Gynecomastia  Area of superficial skin thickening along the upper sternal soft tissues (series 2, image 20)  VISUALIZED STRUCTURES IN THE UPPER ABDOMEN:  Bilateral previously characterized renal cysts  OSSEOUS STRUCTURES:  No acute fracture or destructive osseous lesion  Impression: 1  New area of relative spiculated nodularity with retraction in the reoriented anterior left upper lobe, measuring 16 mm  2   Area of superficial skin thickening along the upper sternal soft tissues, for which correlation with direct examination is recommended  Either 3 month follow-up chest CT, PET/CT, or percutaneous biopsy could be considered  Note that the patient would be relatively higher risk for complication from percutaneous biopsy given his concomitant emphysema  The study was marked in EPIC for significant notification   Workstation performed: [id number]

## 2023-03-03 ENCOUNTER — APPOINTMENT (OUTPATIENT)
Dept: NEUROSURGERY | Facility: CLINIC | Age: 38
End: 2023-03-03

## 2023-03-08 ENCOUNTER — APPOINTMENT (OUTPATIENT)
Dept: SURGERY | Facility: CLINIC | Age: 38
End: 2023-03-08
Payer: MEDICAID

## 2023-03-08 VITALS
WEIGHT: 219 LBS | TEMPERATURE: 97 F | OXYGEN SATURATION: 100 % | BODY MASS INDEX: 36.49 KG/M2 | SYSTOLIC BLOOD PRESSURE: 127 MMHG | HEART RATE: 73 BPM | HEIGHT: 65 IN | DIASTOLIC BLOOD PRESSURE: 73 MMHG

## 2023-03-08 PROCEDURE — 99213 OFFICE O/P EST LOW 20 MIN: CPT

## 2023-03-08 NOTE — PLAN
[FreeTextEntry1] : Please follow up at the office annually next visit  January 2024  it  and as needed for any questions or concerns

## 2023-03-08 NOTE — PHYSICAL EXAM
[Normal] : affect appropriate [de-identified] : Normoactive bowel sounds, soft and nontender, no hepatosplenomegaly or masses noted

## 2023-03-08 NOTE — HISTORY OF PRESENT ILLNESS
[Procedure: ___] : Procedure performed: [unfilled]  [Date of Surgery: ___] : Date of Surgery:   [unfilled] [Surgeon Name:   ___] : Surgeon Name: Dr. NELSON [Pre-Op Weight ___] : Pre-op weight was [unfilled] lbs [de-identified] : CHIQUIS BARAKAT is a 38 year old male who present in the office for a follow up s/p  Laparoscopic sleeve gastrectomy  on 01/11/2021. Patient able to tolerate  stage 4  diet, compliant with medications and vitamins  (MVI, Calcium and vitamin D, and vitamin B 12 ), drinks enough fluids. Patient stated that he gained 10 lb due to his eating hobbits due to his birthday celebration. Patient denies any pain or discomfort at present time. Patient's questions and concerns addressed to patient's satisfaction. Patient has no recent ER visit post surgery. Mr. BARAKAT stated that he is struggling with depression and was started on Prozac by his psychiatrist, he stated that he did not start taking it. \par \par Daily Diet \par Breakfast:  sandwich with turnkey, egg,cheese gluten fee bread\par Lunch:  \par Snack:  \par Dinner:   \par  \par

## 2023-03-08 NOTE — ASSESSMENT
[FreeTextEntry1] : CHIQUIS BARAKAT is a 38 year old male who underwent a Laparoscopic sleeve gastrectomy  on 01/11/2021\par \par On oral medication and vitamins. Feels well. Mr. BARAKAT report having some rushes with excoriation of the skin due to excess skin. We recommend to f/u with plastic for possible panniculectomy  \par \par Pre-op weight: 402 lbs\par Ideal body weight: 153 lbs\par Today's weight: 219 lbs. Today's BMI : 36.44 kg/m2\par Excess body weight: 249  lbs\par total weight loss since surgery: 183 lbs\par % of Excess Body Weight Loss: 73 % which is on target. \par - Stay well hydrated.\par - Reinforced need for daily MVI, Calcium and vit D, and vit B 12.\par - Reinforced need for adequate hydration (at least 64 oz daily) and adequate protein intake (at least 60 g daily)\par - Reinforced need to chew food properly before swallowing. \par - Instructed patient not to eat and drink at the same time and to space them out 30 minutes apart. \par - Instructed patient not to drink from a straw, chew gum, or drink carbonated beverages.\par - Informed patient about post-op support groups held every 3rd Tuesday of the month.\par - Please continue with  stage 4 solid food at this time. \par - Informed no restrictions with exercise at this time. \par - Follow-up in annually January 2024 \par - Call with concerns. \par - Follow-up blood work this visit\par

## 2023-03-08 NOTE — CONSULT LETTER
[Dear  ___] : Dear  [unfilled], [Courtesy Letter:] : I had the pleasure of seeing your patient, [unfilled], in my office today. [Consult Closing:] : Thank you very much for allowing me to participate in the care of this patient.  If you have any questions, please do not hesitate to contact me. [Sincerely,] : Sincerely, [FreeTextEntry3] : Peyton Shah MD FACS

## 2023-03-09 LAB
25(OH)D3 SERPL-MCNC: 35.5 NG/ML
ALBUMIN SERPL ELPH-MCNC: 3.9 G/DL
ALP BLD-CCNC: 79 U/L
ALT SERPL-CCNC: 23 U/L
ANION GAP SERPL CALC-SCNC: 12 MMOL/L
APTT BLD: 34.3 SEC
AST SERPL-CCNC: 17 U/L
BASOPHILS # BLD AUTO: 0.04 K/UL
BASOPHILS NFR BLD AUTO: 0.6 %
BILIRUB SERPL-MCNC: 0.8 MG/DL
BUN SERPL-MCNC: 17 MG/DL
CALCIUM SERPL-MCNC: 9.6 MG/DL
CALCIUM SERPL-MCNC: 9.6 MG/DL
CHLORIDE SERPL-SCNC: 104 MMOL/L
CHOLEST SERPL-MCNC: 169 MG/DL
CO2 SERPL-SCNC: 27 MMOL/L
CREAT SERPL-MCNC: 0.75 MG/DL
EGFR: 119 ML/MIN/1.73M2
EOSINOPHIL # BLD AUTO: 0.21 K/UL
EOSINOPHIL NFR BLD AUTO: 3 %
ESTIMATED AVERAGE GLUCOSE: 100 MG/DL
FERRITIN SERPL-MCNC: 250 NG/ML
FOLATE SERPL-MCNC: 4.1 NG/ML
GLUCOSE SERPL-MCNC: 84 MG/DL
HBA1C MFR BLD HPLC: 5.1 %
HCT VFR BLD CALC: 42.5 %
HDLC SERPL-MCNC: 53 MG/DL
HGB BLD-MCNC: 13.4 G/DL
IMM GRANULOCYTES NFR BLD AUTO: 0.1 %
INR PPP: 1.08 RATIO
IRON SATN MFR SERPL: 25 %
IRON SERPL-MCNC: 60 UG/DL
LDLC SERPL CALC-MCNC: 104 MG/DL
LYMPHOCYTES # BLD AUTO: 2.27 K/UL
LYMPHOCYTES NFR BLD AUTO: 32.5 %
MAN DIFF?: NORMAL
MCHC RBC-ENTMCNC: 29 PG
MCHC RBC-ENTMCNC: 31.5 GM/DL
MCV RBC AUTO: 92 FL
MONOCYTES # BLD AUTO: 0.43 K/UL
MONOCYTES NFR BLD AUTO: 6.2 %
NEUTROPHILS # BLD AUTO: 4.02 K/UL
NEUTROPHILS NFR BLD AUTO: 57.6 %
NONHDLC SERPL-MCNC: 116 MG/DL
PARATHYROID HORMONE INTACT: 42 PG/ML
PLATELET # BLD AUTO: 238 K/UL
POTASSIUM SERPL-SCNC: 4.2 MMOL/L
PROT SERPL-MCNC: 6.1 G/DL
PT BLD: 12.6 SEC
RBC # BLD: 4.62 M/UL
RBC # FLD: 14.1 %
SODIUM SERPL-SCNC: 144 MMOL/L
TIBC SERPL-MCNC: 242 UG/DL
TRIGL SERPL-MCNC: 63 MG/DL
TSH SERPL-ACNC: 1.46 UIU/ML
UIBC SERPL-MCNC: 182 UG/DL
VIT B12 SERPL-MCNC: 672 PG/ML
WBC # FLD AUTO: 6.98 K/UL

## 2023-03-13 LAB — VIT B1 SERPL-MCNC: 102.7 NMOL/L

## 2023-03-14 ENCOUNTER — APPOINTMENT (OUTPATIENT)
Dept: UROLOGY | Facility: CLINIC | Age: 38
End: 2023-03-14

## 2023-03-17 ENCOUNTER — APPOINTMENT (OUTPATIENT)
Dept: UROLOGY | Facility: CLINIC | Age: 38
End: 2023-03-17
Payer: MEDICAID

## 2023-03-17 DIAGNOSIS — R30.0 DYSURIA: ICD-10-CM

## 2023-03-17 PROCEDURE — 99213 OFFICE O/P EST LOW 20 MIN: CPT | Mod: 25

## 2023-03-17 PROCEDURE — 52000 CYSTOURETHROSCOPY: CPT

## 2023-03-17 NOTE — PHYSICAL EXAM
[General Appearance - Well Developed] : well developed [General Appearance - Well Nourished] : well nourished [Normal Appearance] : normal appearance [Well Groomed] : well groomed [General Appearance - In No Acute Distress] : no acute distress [Abdomen Soft] : soft [Abdomen Tenderness] : non-tender [Costovertebral Angle Tenderness] : no ~M costovertebral angle tenderness [FreeTextEntry1] : Redundant skin after bariatric surgery [Urethral Meatus] : meatus normal [Urinary Bladder Findings] : the bladder was normal on palpation [Scrotum] : the scrotum was normal [Testes Mass (___cm)] : there were no testicular masses [Edema] : no peripheral edema [] : no respiratory distress [Respiration, Rhythm And Depth] : normal respiratory rhythm and effort [Exaggerated Use Of Accessory Muscles For Inspiration] : no accessory muscle use [Oriented To Time, Place, And Person] : oriented to person, place, and time [Affect] : the affect was normal [Mood] : the mood was normal [Not Anxious] : not anxious [Normal Station and Gait] : the gait and station were normal for the patient's age [No Focal Deficits] : no focal deficits [No Palpable Adenopathy] : no palpable adenopathy

## 2023-03-17 NOTE — HISTORY OF PRESENT ILLNESS
[FreeTextEntry1] : Very pleasant 38-year-old gentleman who presents for follow-up of microscopic hematuria, screening for STDs, dysuria.  He reports that dysuria has subsided.  STD testing negative, including GC/chlamydia and Ureaplasma/mycoplasma.  He was recently found to have 7 red blood cells per high-powered field on urinalysis.  Because of this he underwent a renal ultrasound approximately 1 month ago demonstrating no kidney stones, hydronephrosis, nor renal masses.  He underwent a cystoscopy today which demonstrated a number of small but easily passable urethral strictures in the anterior urethra.  No urethral discharge.  No other complaints.

## 2023-03-17 NOTE — ASSESSMENT
[FreeTextEntry1] : Very pleasant 38-year-old gentleman who presents for follow-up of microscopic hematuria, dysuria, screening for STDs\par -Ureaplasma/mycoplasma negative\par -GC/chlamydia negative\par -Urinalysis demonstrates 7 red blood cells per high-powered field\par -Renal ultrasound demonstrates no kidney stones, hydronephrosis, renal masses\par -HIV negative\par -Syphilis negative\par -A1c 5.1%\par -Cystoscopy today demonstrates a number of small but easily passable urethral strictures\par -Follow-up in 6 months with repeat urine studies

## 2023-03-31 NOTE — H&P ADULT - NSHPPHYSICALEXAM_GEN_ALL_CORE
I spoke to Ms. Oliveros.  Reports her left shoulder pain has persisted.  I recommended getting a MRI for further evaluation.  She agreed.  She had a MRI in 2021 at [facility].  She is interested in getting this study completed there as well.  I have entered this referral.  I will call her with the results and come up with a plan at that time.   Scheduling Appointment     Who Is Calling to Rosemary Banda   Doctor Dr Tad Monet    Date and Time 2/10 8:40 am          Patient verbalized understanding    yes AAOx3, states he has no vision and has not had vision since previous Sx. Shunt pumps easily but does not refill. Full strength bilateral upper and lower extremities. Denies HA, N, V. Previous incision well healed.

## 2023-04-03 ENCOUNTER — APPOINTMENT (OUTPATIENT)
Dept: CARDIOLOGY | Facility: CLINIC | Age: 38
End: 2023-04-03

## 2023-04-10 ENCOUNTER — APPOINTMENT (OUTPATIENT)
Dept: INTERNAL MEDICINE | Facility: CLINIC | Age: 38
End: 2023-04-10

## 2023-04-28 ENCOUNTER — APPOINTMENT (OUTPATIENT)
Dept: NEUROSURGERY | Facility: CLINIC | Age: 38
End: 2023-04-28
Payer: MEDICAID

## 2023-04-28 VITALS
SYSTOLIC BLOOD PRESSURE: 144 MMHG | BODY MASS INDEX: 36.49 KG/M2 | DIASTOLIC BLOOD PRESSURE: 86 MMHG | HEIGHT: 65 IN | OXYGEN SATURATION: 100 % | WEIGHT: 219 LBS | HEART RATE: 56 BPM

## 2023-04-28 PROCEDURE — 99213 OFFICE O/P EST LOW 20 MIN: CPT

## 2023-04-28 PROCEDURE — 62252 CSF SHUNT REPROGRAM: CPT

## 2023-04-30 NOTE — ASSESSMENT
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 Cruz. Last revision on 10/15/19 proximal valve replaced with Strata valve set @1(Dr. Cruz) Patient is now status post gastric bypass surgery, doing well , lost 300+ lbs. Pt consulted for f/u for VPS. Pt also with worsening of low back pain and neck pain for which he completed MRI C spine and L spine. MRI head with no hydrocephalous. MRI C spine with degenerative changes and L spine at L5- S1 with Moderate disc height loss with mild to moderate disc bulging and osteophyte formation causing mild to moderate left and mild right foraminal narrowing. His VPS depresses and refill slowly. Shunt found at 0.5 re programmed to 1. Pt denies headphone use, but uses blue tooth ear pods. We are awaiting the plastic surgery for abdomen to assess whether this has been the cause of the back pain.\par \par  PLAN:\par \par Shunt reprogrammed to 1 - confirmed with a second check. \par Pt is awaiting new PT for current insurance\par Plan Panniculectomy as decided. \par F/u in 2 months to check shunt.  Unclear if ear pod use should affect shunt setting.\par Rx PT.

## 2023-04-30 NOTE — PHYSICAL EXAM
[General Appearance - Alert] : alert [General Appearance - In No Acute Distress] : in no acute distress [Motor Tone] : muscle tone was normal in all four extremities [Motor Strength] : muscle strength was normal in all four extremities [Abnormal Walk] : normal gait [FreeTextEntry1] : Shunt depresses and refills slowly.  Shunt checked and read at 0.5. [FreeTextEntry6] : No drift

## 2023-04-30 NOTE — HISTORY OF PRESENT ILLNESS
[FreeTextEntry1] : Mr. 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 Cruz, bilateral vision loss. Last revision on 10/115/19 proximal valve replaced with Strata valve set @1. Patient is now status post gastric bypass surgery, doing well , lost 300+ lbs. Pt presented with worsening of low back pain and neck pain for the past few weeks. The patient has previously seen Neurologist and Orthopedic who suggested PT. He completed many PT sessions last session was in summer 2022. Pt also received few trigger point injections. Pt had an MRI Lumbar spine an year ago which showed L4- L5 and L5- S1 disc protrusion. Orthopedic ordered MR C spine, but he need some one to follow him for Shunt as Dr. Cruz left the practice. Pt was seen and imaging were ordered. \par \par Today, pt os feeling good, but is in constant pain in his lower back.  He is in between insurance and needs to find a surgeon who takes his insurance regarding the panniculectomy.  PT weighs 2019 lbs.  He is trying to gain muscle mass.  Uses an abdominal binder.  No headaches.  Gets numbness left face and head and down arm to pinky finger at times.  Also gets occasional numbness left leg to toes.

## 2023-05-08 ENCOUNTER — APPOINTMENT (OUTPATIENT)
Dept: CARDIOLOGY | Facility: CLINIC | Age: 38
End: 2023-05-08

## 2023-05-09 ENCOUNTER — NON-APPOINTMENT (OUTPATIENT)
Age: 38
End: 2023-05-09

## 2023-05-09 ENCOUNTER — APPOINTMENT (OUTPATIENT)
Dept: CARDIOLOGY | Facility: HOSPITAL | Age: 38
End: 2023-05-09

## 2023-05-09 VITALS
SYSTOLIC BLOOD PRESSURE: 110 MMHG | BODY MASS INDEX: 36.49 KG/M2 | DIASTOLIC BLOOD PRESSURE: 74 MMHG | WEIGHT: 219 LBS | TEMPERATURE: 97 F | OXYGEN SATURATION: 98 % | HEIGHT: 65 IN | HEART RATE: 53 BPM

## 2023-05-09 NOTE — REASON FOR VISIT
[Symptom and Test Evaluation] : symptom and test evaluation [FreeTextEntry1] : 37 yo man h/o morbid obestiy s/p gastric sleeve now with significant weight loss (from 515 to approx 220 lbs), prior LAZARO, hypertension, radiculopathy with arm and chest discomfort (has has cardiac w/u with echo and cath, both of which were unremarkable) presents for follow up. He states he is fairly active, lifts weights and used stationary bicycle, is asymptomatic during these activities, get MSK / radicular pain at rest. Discussed stopping metoprolol in s/o bradycardia, patient reluctant as states that he gets palpitations when he stops this medication, denies dizziness or syncope, states that this may be associated with anxiety but occaisonally precedes anxiety.

## 2023-05-09 NOTE — ASSESSMENT
[FreeTextEntry1] : 37 yo man h/o morbid obestiy s/p gastric sleeve now with significant weight loss (from 515 to approx 220 lbs), prior LAZARO, hypertension, radiculopathy with arm and chest discomfort with negative cardiac work up presents for follow up, discussion of palpitations.\par \par - lipids and A1c reasonable, continue lifestyle modification \par - palpitations are of unclear etiology, discussed holding metoprolol, plan to stop taking temporarily and to obtain holter during trial of discontinuation to determine etiology of palpitations \par - can follow up after holter monitor

## 2023-05-15 ENCOUNTER — APPOINTMENT (OUTPATIENT)
Dept: PLASTIC SURGERY | Facility: HOSPITAL | Age: 38
End: 2023-05-15

## 2023-05-15 ENCOUNTER — OUTPATIENT (OUTPATIENT)
Dept: OUTPATIENT SERVICES | Facility: HOSPITAL | Age: 38
LOS: 1 days | End: 2023-05-15

## 2023-05-15 VITALS
HEIGHT: 65 IN | SYSTOLIC BLOOD PRESSURE: 150 MMHG | BODY MASS INDEX: 36.65 KG/M2 | TEMPERATURE: 98 F | WEIGHT: 220 LBS | DIASTOLIC BLOOD PRESSURE: 86 MMHG | HEART RATE: 66 BPM

## 2023-05-15 DIAGNOSIS — Z98.2 PRESENCE OF CEREBROSPINAL FLUID DRAINAGE DEVICE: Chronic | ICD-10-CM

## 2023-05-15 DIAGNOSIS — Z98.84 BARIATRIC SURGERY STATUS: Chronic | ICD-10-CM

## 2023-05-15 NOTE — ASSESSMENT
[FreeTextEntry1] : HPI: 38 year old male, legally blind, with h/o lap sleeve gastrectomy in January 2021 and subsequent MWL, presents for consultation for abdominal pannus. Patient referred from bariatric clinic, compliant with medications and vitamins (MVI, calcium and vitamin D, and vitamin B 12 ), drinks enough fluids. Maximum weight preop 517 lbs. Now 220 lbs., lowest can get to is approximately 210 lbs.\par Patient stated that he gained 10 lbs. due to his eating habits recently with basketball season and drinking more but able to get back to 210 pounds easily. Patient denies any pain or discomfort at present time. He has a h/o rashes beneath the abdominal pannus treated with local wound care. The pannus is considerable and affects wear of clothing and ability to work out. It is difficult to maintain regular hygiene. It is socially and psychologically stigmatizing. \par Recently seen by cardiology for palpitations, getting Holter monitor placed within next 1-2 weeks.\par \par - patient is a good candidate for panniculectomy either in form of belt lipectomy or Adelita de lis, this is medically necessary for treatment of significant pannus and alleviation of above symptoms\par - still needs cardiac clearance pending the Holter monitor\par - pictures taken\par - will try to lose the 10 pounds to get and stay at 210 pounds\par - f/u 1 month for surgical booking and resolution of above\par

## 2023-05-15 NOTE — PHYSICAL EXAM
[NI] : Normal [de-identified] : breathing comfortably on room air [de-identified] : grade 1 ptosis, significant excess skin, no intertrigo [de-identified] : Vertical and horizontal laxity with pannus completely obscuring the groin and reaching to \par mid-thigh, no palpable hernia or masses, healed lap port site scars, no active intertrigo

## 2023-05-15 NOTE — HISTORY OF PRESENT ILLNESS
[FreeTextEntry1] : HPI: 38 year old male, legally blind, with h/o lap sleeve gastrectomy in January 2021 and subsequent MWL, presents for consultation for abdominal pannus. Patient referred from bariatric clinic, compliant with medications and vitamins (MVI, calcium and vitamin D, and vitamin B 12 ), drinks enough fluids. Maximum weight preop 517 lbs. Now 220 lbs., lowest can get to is approximately 210 lbs.\par Patient stated that he gained 10 lbs. due to his eating habits recently with basketball season and drinking more but able to get back to 210 pounds easily. Patient denies any pain or discomfort at present time. He has a h/o rashes beneath the abdominal pannus treated with local wound care. The pannus is considerable and affects wear of clothing and ability to work out. It is difficult to maintain regular hygiene. It is socially and psychologically stigmatizing. \par Recently seen by cardiology for palpitations, getting holter monitor placed within next 1-2 weeks.\par \par PMH: hypothyroid, anxiety, blind with h/o hydrocephalus and increased ICP (s/p  shunt 4 years ago), LAZARO\par PSH: lap sleeve gastrectomy, lap cholecystectomy,  shunt placement\par Meds: metoprolol, buspirone, levothyroxine\par SH: non-smoker (quit over a year ago), drinks 3 drinks at a time max, not really a drinker and minimal times a week\par \par \par

## 2023-05-16 ENCOUNTER — APPOINTMENT (OUTPATIENT)
Dept: NEUROLOGY | Facility: CLINIC | Age: 38
End: 2023-05-16

## 2023-05-17 DIAGNOSIS — E65 LOCALIZED ADIPOSITY: ICD-10-CM

## 2023-06-20 ENCOUNTER — NON-APPOINTMENT (OUTPATIENT)
Age: 38
End: 2023-06-20

## 2023-06-20 ENCOUNTER — APPOINTMENT (OUTPATIENT)
Dept: CARDIOLOGY | Facility: HOSPITAL | Age: 38
End: 2023-06-20

## 2023-06-20 VITALS
HEART RATE: 53 BPM | BODY MASS INDEX: 36.65 KG/M2 | WEIGHT: 220 LBS | TEMPERATURE: 98.2 F | DIASTOLIC BLOOD PRESSURE: 90 MMHG | OXYGEN SATURATION: 96 % | HEIGHT: 65 IN | SYSTOLIC BLOOD PRESSURE: 153 MMHG

## 2023-06-20 NOTE — REASON FOR VISIT
[Symptom and Test Evaluation] : symptom and test evaluation [FreeTextEntry1] : 37 yo man h/o morbid obestiy s/p gastric sleeve now with significant weight loss (from 515 to approx 220 lbs), prior LAZARO, hypertension, radiculopathy with arm and chest discomfort (has has cardiac w/u with echo and cath, both of which were unremarkable) presents for follow up. He states he is fairly active, lifts weights and used stationary bicycle, is asymptomatic during these activities, get MSK / radicular pain at rest. Again, discussed stopping metoprolol in s/o bradycardia, patient reluctant as states that he gets palpitations when he stops this medication, denies dizziness or syncope. Lower concern for cardiac etiology, likely anxiety related but patient is very reluctant to change current therapy.

## 2023-06-20 NOTE — ASSESSMENT
[FreeTextEntry1] : 37 yo man h/o morbid obestiy s/p gastric sleeve now with significant weight loss (from 515 to approx 220 lbs), prior LAZARO, hypertension, radiculopathy with arm and chest discomfort with negative cardiac work up presents for follow up, discussion of palpitations.\par \par - lipids and A1c reasonable, continue lifestyle modification \par - palpitations are of unclear etiology although suspect anxiety, reasonable to obtain holter although suspect will be negative for arrhythmia \par - discussed discontinuation of beta blockade, patient is extremely hesitant, will continue to think about it \par - adding amlodipine for hypertension as metoprolol is not a sufficient antihypertensive, discussed with patient \par \par - plan for surgical pannus reduction, patient exercises regularly without symptoms, has recent negative cardiac work up, patient is low risk for fairly low risk surgery, no further cardiac risk stratification necessary prior to proceeding, results of holter are not necessary prior to this procedure

## 2023-06-30 ENCOUNTER — APPOINTMENT (OUTPATIENT)
Dept: NEUROSURGERY | Facility: CLINIC | Age: 38
End: 2023-06-30
Payer: MEDICAID

## 2023-06-30 VITALS
SYSTOLIC BLOOD PRESSURE: 143 MMHG | BODY MASS INDEX: 35.65 KG/M2 | OXYGEN SATURATION: 97 % | DIASTOLIC BLOOD PRESSURE: 87 MMHG | WEIGHT: 214 LBS | HEIGHT: 65 IN | HEART RATE: 59 BPM

## 2023-06-30 PROCEDURE — 99214 OFFICE O/P EST MOD 30 MIN: CPT

## 2023-07-02 NOTE — PHYSICAL EXAM
[General Appearance - Alert] : alert [General Appearance - In No Acute Distress] : in no acute distress [General Appearance - Well Nourished] : well nourished [General Appearance - Well-Appearing] : healthy appearing [Oriented To Time, Place, And Person] : oriented to person, place, and time [Impaired Insight] : insight and judgment were intact [Affect] : the affect was normal [Memory Recent] : recent memory was not impaired [Person] : oriented to person [Place] : oriented to place [Time] : oriented to time [Short Term Intact] : short term memory intact [Cranial Nerves Trigeminal (V)] : facial sensation intact symmetrically [Cranial Nerves Facial (VII)] : face symmetrical [Cranial Nerves Vestibulocochlear (VIII)] : hearing was intact bilaterally [Cranial Nerves Accessory (XI - Cranial And Spinal)] : head turning and shoulder shrug symmetric [Cranial Nerves Hypoglossal (XII)] : there was no tongue deviation with protrusion [Motor Tone] : muscle tone was normal in all four extremities [Motor Strength] : muscle strength was normal in all four extremities [Balance] : balance was intact [Restricted] : was restricted [Pain] : was painful [Both Tympanic Membranes Were Examined] : both tympanic membranes were normal [Neck Appearance] : the appearance of the neck was normal [] : no respiratory distress [Respiration, Rhythm And Depth] : normal respiratory rhythm and effort [Apical Impulse] : the apical impulse was normal [Heart Rate And Rhythm] : heart rate was normal and rhythm regular [No CVA Tenderness] : no ~M costovertebral angle tenderness [No Spinal Tenderness] : no spinal tenderness [Abnormal Walk] : normal gait [Involuntary Movements] : no involuntary movements were seen [Outer Ear] : the ears and nose were normal in appearance [FreeTextEntry1] : Certas set to 0.5, depresses and refills readily.

## 2023-07-02 NOTE — HISTORY OF PRESENT ILLNESS
[FreeTextEntry1] : Mr. 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 Cruz, bilateral vision loss. Last revision on 10/115/19 proximal valve replaced with Strata valve set @1. Patient is now status post gastric bypass surgery, doing well , lost 300+ lbs. Pt presented with worsening of low back pain and neck pain for the past few weeks.  He completed many PT sessions last session was in summer 2022. Pt also received few trigger point injections. MRI Lumbar and Cervical spine with mild multilevel  DDD. MRI head with no hydrocephalous. Pt awaiting Pt had an MRI Lumbar spine an year ago which showed L4- L5 and L5- S1 disc protrusion. Orthopedic ordered MR C spine, but he need some one to follow him for Shunt as Dr. Cruz left the practice. Pt was seen and imaging were ordered.  \par \par Pt reports   feeling good today,  but is in constant pain in his lower back, some time goes to left leg posteriorly. Also gets occasional numbness left leg to toes. Pt report chest pain since few weeks and was on Holter monitor for last few days and to be seen by cardiology.  He  has everything set for panniculectomy, but some more time given for weight loss. Pt feels neck pan and gets numbness left face and head and down arm to pinky finger at times.  Pt presents for shunt check.

## 2023-07-02 NOTE — ASSESSMENT
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 Cruz. Last revision on 10/15/19 proximal valve replaced with Strata valve set @1(Dr. Cruz) Patient is now status post gastric bypass surgery, doing well , lost 300+ lbs. Pt consulted for f/u for VPS. Pt also with worsening of low back pain and neck pain for which he completed MRI C spine and L spine. MRI head with no hydrocephalous. MRI C spine with degenerative changes and L spine at L5- S1 with Moderate disc height loss with mild to moderate disc bulging and osteophyte formation causing mild to moderate left and mild right foraminal narrowing. His VPS depresses and refill slowly. Shunt found at 0.5 re programmed to 1 today again. Pt denies headphone use, but uses blue tooth ear pods. Plastic scheduled panniculectomy in July, looking for clearance.  Shunt reprogrammed to 1 - confirmed with a second check. \par Cleared for Panniculectomy , surgeon should call us for discussion \par \par PLAN\par \par F/u in 1  months to check shunt. \par Referred to endocrinology for gynecomastia\par Letter for clearance given - for panniculectomy- letter provided, Surgeon should discuss with us about the procedure and will stress the importance of imaging  up (X ray ) ready for visualizing while at surgery and to avoid incisions in the right upper quadrant and paraumbilical region.\par F/U in 1 month

## 2023-07-08 NOTE — DATA REVIEWED
[No studies available for review at this time.] : No studies available for review at this time.
none present

## 2023-08-08 ENCOUNTER — APPOINTMENT (OUTPATIENT)
Dept: CARDIOLOGY | Facility: HOSPITAL | Age: 38
End: 2023-08-08

## 2023-08-08 ENCOUNTER — NON-APPOINTMENT (OUTPATIENT)
Age: 38
End: 2023-08-08

## 2023-08-08 VITALS
OXYGEN SATURATION: 99 % | HEART RATE: 55 BPM | TEMPERATURE: 98.2 F | SYSTOLIC BLOOD PRESSURE: 136 MMHG | HEIGHT: 65 IN | RESPIRATION RATE: 15 BRPM | DIASTOLIC BLOOD PRESSURE: 88 MMHG | BODY MASS INDEX: 37.49 KG/M2 | WEIGHT: 225 LBS

## 2023-08-08 NOTE — ASSESSMENT
[FreeTextEntry1] : 37 yo man h/o morbid obestiy s/p gastric sleeve now with significant weight loss (from 515 to approx 220 lbs), prior LAZARO, hypertension, radiculopathy with arm and chest discomfort with negative cardiac work up presents for follow up, discussion of palpitations.  - lipids and A1c reasonable, continue lifestyle modification  - palpitations likely feeling single PVC events and anxiety. Holter monitor reveals normal sinus rhythm with infrequent PAC/PVC despite the patient experiences symptoms 12x. symptoms improved with anxiety therapies with psych.   -Hypertension: Today 130's systolic. Still above goal, but not taking amlodipine prescribed at prior appt. Will start taking amlodipine.   - plan for surgical pannus reduction, patient exercises regularly without symptoms, has recent negative cardiac work up, patient is low risk for fairly low risk surgery, no further cardiac risk stratification necessary prior to proceeding.

## 2023-08-08 NOTE — REVIEW OF SYSTEMS
[Palpitations] : palpitations [Fever] : no fever [Chills] : no chills [Blurry Vision] : no blurred vision [Sore Throat] : no sore throat [SOB] : no shortness of breath [Dyspnea on exertion] : not dyspnea during exertion [Chest Discomfort] : no chest discomfort [Lower Ext Edema] : no extremity edema [Orthopnea] : no orthopnea [PND] : no PND [Cough] : no cough [Wheezing] : no wheezing [Abdominal Pain] : no abdominal pain [Nausea] : no nausea [Vomiting] : no vomiting [Urinary Frequency] : no change in urinary frequency [Hematuria] : no hematuria [Joint Pain] : no joint pain [Myalgia] : no myalgia [Rash] : no rash [Dizziness] : no dizziness [Weakness] : no weakness [Confusion] : no confusion was observed [Easy Bleeding] : no tendency for easy bleeding

## 2023-08-08 NOTE — HISTORY OF PRESENT ILLNESS
[FreeTextEntry1] : 37 yo man h/o morbid obestiy s/p gastric sleeve now with significant weight loss (from 515 to approx 220 lbs), prior LAZARO, hypertension, radiculopathy with arm and chest discomfort (has has cardiac w/u with echo and cath, both of which were unremarkable) presents for follow up.   Pt is fairly active, lifts weights and used stationary bicycle, is asymptomatic during these activities, get MSK / radicular pain at rest.  Patient reports that he is using metoprolol for palpitations that he experiences. These were thought to be related to anxiety, but a holter monitor was ordered at the last appt to evaluated for arrythmia.   The patient trigger 12 events for palpitations and chest pain. During these events half the readings reports a single PVC and otherwise normal sinus rhythm the other half revealed normal sinus rhythm. PVC represented 1% of total QRS's recorded. There was no documentation of arrhythmia.    The patient reports that he is working with a psychiatrist on his anxiety and he is now of buspirone and klonopin which have significantly improved his palpitations.   He is scheduled to see surgery on the 14th for pannus excision.

## 2023-08-17 ENCOUNTER — APPOINTMENT (OUTPATIENT)
Dept: OPHTHALMOLOGY | Facility: CLINIC | Age: 38
End: 2023-08-17

## 2023-08-18 NOTE — ED ADULT NURSE NOTE - ED STAT RN HANDOFF DETAILS
Patient requests all Lab, Cardiology, and Radiology Results on their Discharge Instructions Report given to DONNA Monreal.

## 2023-08-25 ENCOUNTER — OUTPATIENT (OUTPATIENT)
Dept: OUTPATIENT SERVICES | Facility: HOSPITAL | Age: 38
LOS: 1 days | End: 2023-08-25
Payer: MEDICAID

## 2023-08-25 ENCOUNTER — APPOINTMENT (OUTPATIENT)
Dept: INTERNAL MEDICINE | Facility: CLINIC | Age: 38
End: 2023-08-25
Payer: MEDICAID

## 2023-08-25 VITALS
HEART RATE: 67 BPM | DIASTOLIC BLOOD PRESSURE: 88 MMHG | HEIGHT: 65 IN | WEIGHT: 232 LBS | SYSTOLIC BLOOD PRESSURE: 129 MMHG | OXYGEN SATURATION: 100 % | RESPIRATION RATE: 16 BRPM | BODY MASS INDEX: 38.65 KG/M2 | TEMPERATURE: 98.2 F

## 2023-08-25 DIAGNOSIS — E03.9 HYPOTHYROIDISM, UNSPECIFIED: ICD-10-CM

## 2023-08-25 DIAGNOSIS — Z00.00 ENCOUNTER FOR GENERAL ADULT MEDICAL EXAMINATION WITHOUT ABNORMAL FINDINGS: ICD-10-CM

## 2023-08-25 DIAGNOSIS — F41.9 ANXIETY DISORDER, UNSPECIFIED: ICD-10-CM

## 2023-08-25 DIAGNOSIS — Z98.2 PRESENCE OF CEREBROSPINAL FLUID DRAINAGE DEVICE: Chronic | ICD-10-CM

## 2023-08-25 DIAGNOSIS — Z86.19 PERSONAL HISTORY OF OTHER INFECTIOUS AND PARASITIC DISEASES: ICD-10-CM

## 2023-08-25 DIAGNOSIS — B35.4 TINEA CORPORIS: ICD-10-CM

## 2023-08-25 DIAGNOSIS — Z98.84 BARIATRIC SURGERY STATUS: Chronic | ICD-10-CM

## 2023-08-25 DIAGNOSIS — Z98.84 BARIATRIC SURGERY STATUS: ICD-10-CM

## 2023-08-25 PROCEDURE — G0463: CPT

## 2023-08-25 PROCEDURE — XXXXX: CPT | Mod: 1L

## 2023-08-25 RX ORDER — CLOTRIMAZOLE AND BETAMETHASONE DIPROPIONATE 10; .5 MG/G; MG/G
1-0.05 CREAM TOPICAL
Qty: 1 | Refills: 1 | Status: ACTIVE | COMMUNITY
Start: 2023-08-25 | End: 1900-01-01

## 2023-08-25 RX ORDER — METHOCARBAMOL 500 MG/1
500 TABLET, FILM COATED ORAL
Qty: 90 | Refills: 3 | Status: DISCONTINUED | COMMUNITY
Start: 2023-01-27 | End: 2023-08-25

## 2023-08-25 RX ORDER — AMLODIPINE BESYLATE 2.5 MG/1
2.5 TABLET ORAL DAILY
Qty: 90 | Refills: 3 | Status: DISCONTINUED | COMMUNITY
Start: 2023-06-20 | End: 2023-08-25

## 2023-08-25 RX ORDER — METOPROLOL SUCCINATE 25 MG/1
25 TABLET, EXTENDED RELEASE ORAL
Qty: 90 | Refills: 3 | Status: ACTIVE | COMMUNITY
Start: 1900-01-01 | End: 1900-01-01

## 2023-08-25 RX ORDER — BUSPIRONE HYDROCHLORIDE 7.5 MG/1
7.5 TABLET ORAL
Qty: 30 | Refills: 2 | Status: ACTIVE | COMMUNITY
Start: 2022-06-21

## 2023-08-25 RX ORDER — CLONAZEPAM 0.5 MG/1
0.5 TABLET ORAL
Refills: 0 | Status: ACTIVE | COMMUNITY

## 2023-08-25 RX ORDER — CEFADROXIL 500 MG/1
500 CAPSULE ORAL TWICE DAILY
Qty: 14 | Refills: 0 | Status: DISCONTINUED | COMMUNITY
Start: 2023-02-03 | End: 2023-08-25

## 2023-08-28 NOTE — HEALTH RISK ASSESSMENT
[No] : No [0] : 2) Feeling down, depressed, or hopeless: Not at all (0) [[id number]] : 0 [Never] : Never

## 2023-08-28 NOTE — HISTORY OF PRESENT ILLNESS
[FreeTextEntry1] : establish care [de-identified] : 38yoM, accompanied by mother, PMH acquired blindness 2/2 hydrocephalus s/p  shunt, obesity s/p sleeve gastrectomy, HTN, hypothyroidism, celiac disease presents to establish care  endorses nasal congestion, throat itch - has seasonal allergies  follows with neurosurgy q 1-2 years   pannectomy on hold 2/2 weight gain

## 2023-09-20 ENCOUNTER — APPOINTMENT (OUTPATIENT)
Dept: UROLOGY | Facility: CLINIC | Age: 38
End: 2023-09-20

## 2023-09-29 NOTE — PROGRESS NOTE ADULT - SUBJECTIVE AND OBJECTIVE BOX
[facility] Ophthalmology Progress Note    S: Pt seen and examined at bedside. C/o persistent blurry vision OD. Also percieved red flashing lights in periphery of vision OD for few seconds. Denies floaters, new vision loss    Mood and Affect Appropriate ( x ),  Oriented to Time, Place, and Person x 3 ( x )    Ophthalmology Exam    Visual acuity (sc): 20/200-2 with eccentric fixation OD; 20/20 -2 OS  Pupils: + APD OD, Round and reactive OS  Ttono: 14 OU  Extraocular movements (EOMs): Full OU, no pain, no diplopia  Confrontational Visual Field (CVF):  2/4 (superior) OD, 3/4 OS, missing infero nasal  Color Plates: 10/12 OS, unable to test OD 2/2 poor vision    Pen Light Exam (PLE)  External:  Flat OU  Lids/Lashes/Lacrimal Ducts: Flat OU    Sclera/Conjunctiva:  W+Q OU  Cornea: CL OU  Anterior Chamber: D+Q OU   Iris:  Flat OU  Lens:  Cl OU    Fundus Exam: dilated with 1% tropicamide and 2.5% phenylephrine  Approval obtained from primary team for dilation  Patient aware that pupils can remained dilated for at least 4-6 hours  Exam performed with 20D lens    Vitreous: wnl OU  Disc, cup/disc: bilaterally swollen hyperemic discs w/ nerve fiber layer edema circumferentially around the discs, margins obscured OU, OS > OD. Dilated and tortuous retinal veins OU with macular folds OS  Macula:  temporal IRH OD, flat OU  Vessels:  narrow arterioles OU  Periphery: scattered IRH and MAs OD    Diagnostic Testing: EXAM: CT ANGIO BRAIN (W)AW IC     EXAM: CT ANGIO NECK (W)AW IC       PROCEDURE DATE: 07/21/2019           INTERPRETATION: CLINICAL INFORMATION: Blurry vision, mila-oral numbness,   right arm paresthesia.     TECHNIQUE:   Contrast enhanced axial CT images were acquired from the aortic arch to the   vertex of the calvarium, during the angiographic phase. Additional   post-contrast axial CT images through the head were obtained. Two- and   three-dimensional maximum intensity projection reformats were generated from   the angiographic images.   160 cc of Omnipaque-300 mg/ml were administered intravenously, without   immediate complication. 40 cc was discarded.     COMPARISON: None.     FINDINGS:     CT ANGIOGRAPHY NECK:     Nondiagnostic evaluation of the major arterial vessels and soft tissues of   the neck secondary to body habitus.     CT ANGIOGRAPHY BRAIN:     Nondiagnostic evaluation of the petrous and cavernous segments of the   internal carotid arteries. Bilateral supraclinoid segments are grossly   patent. Bilateral proximal middle cerebral and anterior cerebral arteries   are grossly patent.     Nondiagnostic evaluation of the intracranial vertebral and proximal basilar   artery. Proximal bilateral posterior cerebral arteries are patent.     Evaluation for intracranial aneurysm is markedly limited. Partially empty   sella. Marked focal bony thinning involving the posterior walls of the   sphenoid sinuses at the level of the cavernous sinuses/cavernous segments of   the internal carotid arteries. Bony dehiscence cannot be entirely excluded.   Evaluation is limited on this examination. Chronic left medial orbital wall   deformity.     IMPRESSION:     CT angiography neck: Nondiagnostic evaluation of the cervical carotid and   vertebral vasculature.     CT angiography brain: No proximal large vessel occlusion involving the   anterior, middle, or posterior cerebral arteries. Nondiagnostic evaluation   of the intracranial internal carotid, vertebral, and basilar arteries.     Essentially nondiagnostic evaluation for intracranial aneurysm.       Assessment: 35 y/o M, LAZARO and morbid obesity, presents with 5 days of poor vision OD, headaches, pulsatile tinnitus, and tunnel vision intermittently. HM vision OD, + APD, EOMs full w/o pain. Ant seg exam unremarkable. Severe optic disc edema seen on DFE, left eye > right eye. Multiple intraretinal hemorrhages and microaneurysms also seen OD. Given morbid obesity and LAZARO, differential for poor vision in right eye includes non arteritic ischemic optic neuropathy vs. less likely ocular ischemic syndrome. SLE negative on admission for cell in AC. Inferior altitudinal defect suspicious for NAION    Plan:  - May require further imaging as initial imaging non-diagnostic  - IR guided LP per neurology with opening pressure   - will follow in hospital  - Plan per Neuro  - findings d/w Dr. Adkins (neuro-ophthalmology attending)    Follow-Up:  Patient should follow up his/her ophthalmologist or in the [facility] Ophthalmology Practice within 1 week of discharge  [address] [place] [id number]  [phone]    s/d/w Dr. Suggs [facility] Ophthalmology Progress Note    S: Pt seen and examined at bedside. C/o persistent blurry vision OD. Also percieved red flashing lights in periphery of vision OD for few seconds. Denies floaters or new vision loss    Mood and Affect Appropriate ( x ),  Oriented to Time, Place, and Person x 3 ( x )    Ophthalmology Exam    Visual acuity (sc): 20/200-2 with eccentric fixation OD; 20/20 -2 OS  Pupils: + APD OD, Round and reactive OS  Ttono: 14 OU  Extraocular movements (EOMs): Full OU, no pain, no diplopia  Confrontational Visual Field (CVF):  2/4 (superior) OD, 3/4 OS, missing infero nasal  Color Plates: 10/12 OS, unable to test OD 2/2 poor vision    Pen Light Exam (PLE)  External:  Flat OU  Lids/Lashes/Lacrimal Ducts: Flat OU    Sclera/Conjunctiva:  W+Q OU  Cornea: CL OU  Anterior Chamber: D+Q OU   Iris:  Flat OU  Lens:  Cl OU    Fundus Exam: dilated with 1% tropicamide and 2.5% phenylephrine  Approval obtained from primary team for dilation  Patient aware that pupils can remained dilated for at least 4-6 hours  Exam performed with 20D lens    Vitreous: wnl OU  Disc, cup/disc: bilaterally swollen hyperemic discs w/ nerve fiber layer edema circumferentially around the discs, margins obscured OU, OS > OD. Dilated and tortuous retinal veins OU with macular folds OS  Macula:  temporal IRH OU  Vessels:  narrow arterioles OU  Periphery: scattered IRH and MAs OU    Diagnostic Testing: EXAM: CT ANGIO BRAIN (W)AW IC     EXAM: CT ANGIO NECK (W)AW IC       PROCEDURE DATE: 07/21/2019           INTERPRETATION: CLINICAL INFORMATION: Blurry vision, mila-oral numbness,   right arm paresthesia.     TECHNIQUE:   Contrast enhanced axial CT images were acquired from the aortic arch to the   vertex of the calvarium, during the angiographic phase. Additional   post-contrast axial CT images through the head were obtained. Two- and   three-dimensional maximum intensity projection reformats were generated from   the angiographic images.   160 cc of Omnipaque-300 mg/ml were administered intravenously, without   immediate complication. 40 cc was discarded.     COMPARISON: None.     FINDINGS:     CT ANGIOGRAPHY NECK:     Nondiagnostic evaluation of the major arterial vessels and soft tissues of   the neck secondary to body habitus.     CT ANGIOGRAPHY BRAIN:     Nondiagnostic evaluation of the petrous and cavernous segments of the   internal carotid arteries. Bilateral supraclinoid segments are grossly   patent. Bilateral proximal middle cerebral and anterior cerebral arteries   are grossly patent.     Nondiagnostic evaluation of the intracranial vertebral and proximal basilar   artery. Proximal bilateral posterior cerebral arteries are patent.     Evaluation for intracranial aneurysm is markedly limited. Partially empty   sella. Marked focal bony thinning involving the posterior walls of the   sphenoid sinuses at the level of the cavernous sinuses/cavernous segments of   the internal carotid arteries. Bony dehiscence cannot be entirely excluded.   Evaluation is limited on this examination. Chronic left medial orbital wall   deformity.     IMPRESSION:     CT angiography neck: Nondiagnostic evaluation of the cervical carotid and   vertebral vasculature.     CT angiography brain: No proximal large vessel occlusion involving the   anterior, middle, or posterior cerebral arteries. Nondiagnostic evaluation   of the intracranial internal carotid, vertebral, and basilar arteries.     Essentially nondiagnostic evaluation for intracranial aneurysm.       Assessment: 35 y/o M, LAZARO and morbid obesity, presents with 5 days of poor vision OD, headaches, pulsatile tinnitus, and tunnel vision intermittently. HM vision OD, + APD, EOMs full w/o pain. Ant seg exam unremarkable. Severe optic disc edema seen on DFE, left eye > right eye. Multiple intraretinal hemorrhages and microaneurysms also seen OD. Given morbid obesity and LAZARO, differential for poor vision in right eye includes non arteritic ischemic optic neuropathy vs. less likely ocular ischemic syndrome. SLE negative on admission for cell in AC. Inferior altitudinal defect suspicious for NAION OD.  Pt will require w/u for bilateral disc swelling to evaluated for elevated intracranial pressure.  Pt also has evidence of venous congestion possibly secondary to chronic papilledema vs cardiovascular vs hypercoaguable cause.  Scattered mid-peripheral DBH raises concern for ocular ischemic syndrome, although less likely as pt has no other signs.  The decrease in vision, APD, along with an altitudinal field defect OD suggsts that this pt may have had an NAION secondary to chronic disc swelling vs undiagnosed cardiovascular risk factors.  There are case reports of pts with bilateral CRVO secondary to LAZARO.  Pt has severe LAZARO and low O2 which may be an explanation as well if imaging and LP does not explain exam findings.    Plan:  - May require further imaging as initial imaging non-diagnostic- such as MRI/MRV/MRA head and neck or carotid dopplers  - IR guided LP per neurology with opening pressure- needed urgently for diagnosis and further treatment  - will need hypercoaguable work up, consider heme/onc consult  - will need cardiovascular work up including EKG, echo, carotids, BP, BS, HgA1c  - will follow in hospital closely  - pt advised to let team know if he notices any changes in his vision  - guarded visual prognosis at this time, explained to patient  - plan per Neuro, agree with Diamox for now  - if pt has elevated ICP, weight loss required  - findings and plan discussed with patient and primary team  - case and findings d/w Dr. Adkins (neuro-ophthalmology attending)    Follow-Up:  Patient should follow up his/her ophthalmologist or in the [facility] Neuro-Ophthalmology Practice within 1 week of discharge  [address] [place] [address]  [phone]    s/d/w Dr. Suggs Rifampin Pregnancy And Lactation Text: This medication is Pregnancy Category C and it isn't know if it is safe during pregnancy. It is also excreted in breast milk and should not be used if you are breast feeding.

## 2023-10-10 ENCOUNTER — OUTPATIENT (OUTPATIENT)
Dept: OUTPATIENT SERVICES | Facility: HOSPITAL | Age: 38
LOS: 1 days | End: 2023-10-10
Payer: MEDICAID

## 2023-10-10 ENCOUNTER — APPOINTMENT (OUTPATIENT)
Dept: NEUROLOGY | Facility: HOSPITAL | Age: 38
End: 2023-10-10

## 2023-10-10 VITALS — TEMPERATURE: 96 F | DIASTOLIC BLOOD PRESSURE: 87 MMHG | HEART RATE: 57 BPM | SYSTOLIC BLOOD PRESSURE: 119 MMHG

## 2023-10-10 DIAGNOSIS — Z98.2 PRESENCE OF CEREBROSPINAL FLUID DRAINAGE DEVICE: Chronic | ICD-10-CM

## 2023-10-10 DIAGNOSIS — M54.2 CERVICALGIA: ICD-10-CM

## 2023-10-10 DIAGNOSIS — R51.9 HEADACHE, UNSPECIFIED: ICD-10-CM

## 2023-10-10 DIAGNOSIS — Z98.84 BARIATRIC SURGERY STATUS: Chronic | ICD-10-CM

## 2023-10-10 PROCEDURE — G0463: CPT

## 2023-10-10 RX ORDER — CYCLOBENZAPRINE HYDROCHLORIDE 5 MG/1
5 TABLET, FILM COATED ORAL
Qty: 30 | Refills: 0 | Status: ACTIVE | COMMUNITY
Start: 2023-10-10 | End: 1900-01-01

## 2023-10-10 RX ORDER — METHYLPREDNISOLONE 4 MG/1
4 TABLET ORAL
Qty: 1 | Refills: 0 | Status: DISCONTINUED | COMMUNITY
Start: 2023-10-10 | End: 2023-10-10

## 2023-10-13 ENCOUNTER — APPOINTMENT (OUTPATIENT)
Dept: NEUROSURGERY | Facility: CLINIC | Age: 38
End: 2023-10-13
Payer: MEDICAID

## 2023-10-13 VITALS
HEIGHT: 65 IN | BODY MASS INDEX: 38.65 KG/M2 | DIASTOLIC BLOOD PRESSURE: 89 MMHG | WEIGHT: 232 LBS | SYSTOLIC BLOOD PRESSURE: 124 MMHG | OXYGEN SATURATION: 97 % | HEART RATE: 59 BPM

## 2023-10-13 PROCEDURE — 99214 OFFICE O/P EST MOD 30 MIN: CPT

## 2023-10-16 NOTE — ASU PATIENT PROFILE, ADULT - PT NEEDS ASSIST
----- Message from Kalani St sent at 10/16/2023 12:13 PM CDT -----  Type:  Appointment Request      Name of Caller:pt  When is the first available appointment?n/a  Symptoms:follow up anemia  Best Call Back Number:[phone]  Additional Information:          yes

## 2023-10-27 ENCOUNTER — APPOINTMENT (OUTPATIENT)
Dept: UROLOGY | Facility: CLINIC | Age: 38
End: 2023-10-27

## 2023-10-30 ENCOUNTER — OUTPATIENT (OUTPATIENT)
Dept: OUTPATIENT SERVICES | Facility: HOSPITAL | Age: 38
LOS: 1 days | End: 2023-10-30
Payer: MEDICAID

## 2023-10-30 ENCOUNTER — APPOINTMENT (OUTPATIENT)
Dept: NEUROSURGERY | Facility: CLINIC | Age: 38
End: 2023-10-30
Payer: MEDICAID

## 2023-10-30 ENCOUNTER — APPOINTMENT (OUTPATIENT)
Dept: MRI IMAGING | Facility: CLINIC | Age: 38
End: 2023-10-30
Payer: MEDICAID

## 2023-10-30 VITALS
DIASTOLIC BLOOD PRESSURE: 73 MMHG | BODY MASS INDEX: 38.65 KG/M2 | HEART RATE: 65 BPM | TEMPERATURE: 98 F | HEIGHT: 65 IN | OXYGEN SATURATION: 97 % | WEIGHT: 232 LBS | SYSTOLIC BLOOD PRESSURE: 115 MMHG

## 2023-10-30 DIAGNOSIS — M54.9 DORSALGIA, UNSPECIFIED: ICD-10-CM

## 2023-10-30 DIAGNOSIS — Z98.2 PRESENCE OF CEREBROSPINAL FLUID DRAINAGE DEVICE: Chronic | ICD-10-CM

## 2023-10-30 DIAGNOSIS — Z00.8 ENCOUNTER FOR OTHER GENERAL EXAMINATION: ICD-10-CM

## 2023-10-30 PROCEDURE — 72157 MRI CHEST SPINE W/O & W/DYE: CPT | Mod: 26

## 2023-10-30 PROCEDURE — 72157 MRI CHEST SPINE W/O & W/DYE: CPT

## 2023-10-30 PROCEDURE — 99214 OFFICE O/P EST MOD 30 MIN: CPT

## 2023-10-30 PROCEDURE — A9585: CPT

## 2023-11-03 ENCOUNTER — OUTPATIENT (OUTPATIENT)
Dept: OUTPATIENT SERVICES | Facility: HOSPITAL | Age: 38
LOS: 1 days | End: 2023-11-03
Payer: MEDICAID

## 2023-11-03 ENCOUNTER — APPOINTMENT (OUTPATIENT)
Dept: UROLOGY | Facility: CLINIC | Age: 38
End: 2023-11-03
Payer: MEDICAID

## 2023-11-03 VITALS
HEIGHT: 65 IN | HEART RATE: 55 BPM | DIASTOLIC BLOOD PRESSURE: 73 MMHG | TEMPERATURE: 97.4 F | WEIGHT: 232 LBS | SYSTOLIC BLOOD PRESSURE: 143 MMHG | OXYGEN SATURATION: 99 % | BODY MASS INDEX: 38.65 KG/M2

## 2023-11-03 DIAGNOSIS — R31.29 OTHER MICROSCOPIC HEMATURIA: ICD-10-CM

## 2023-11-03 DIAGNOSIS — Z11.3 ENCOUNTER FOR SCREENING FOR INFECTIONS WITH A PREDOMINANTLY SEXUAL MODE OF TRANSMISSION: ICD-10-CM

## 2023-11-03 DIAGNOSIS — Z00.00 ENCOUNTER FOR GENERAL ADULT MEDICAL EXAMINATION WITHOUT ABNORMAL FINDINGS: ICD-10-CM

## 2023-11-03 DIAGNOSIS — Z98.2 PRESENCE OF CEREBROSPINAL FLUID DRAINAGE DEVICE: Chronic | ICD-10-CM

## 2023-11-03 PROCEDURE — 99213 OFFICE O/P EST LOW 20 MIN: CPT

## 2023-11-03 PROCEDURE — G0463: CPT

## 2023-11-06 DIAGNOSIS — Z11.3 ENCOUNTER FOR SCREENING FOR INFECTIONS WITH A PREDOMINANTLY SEXUAL MODE OF TRANSMISSION: ICD-10-CM

## 2023-11-06 DIAGNOSIS — R31.29 OTHER MICROSCOPIC HEMATURIA: ICD-10-CM

## 2023-11-06 LAB
APPEARANCE: CLEAR
BACTERIA UR CULT: NORMAL
BACTERIA: NEGATIVE /HPF
BILIRUBIN URINE: NEGATIVE
BLOOD URINE: NEGATIVE
C TRACH RRNA SPEC QL NAA+PROBE: NOT DETECTED
CAST: 0 /LPF
COLOR: YELLOW
EPITHELIAL CELLS: 3 /HPF
GLUCOSE QUALITATIVE U: NEGATIVE MG/DL
HIV1+2 AB SPEC QL IA.RAPID: NONREACTIVE
HSV 1+2 IGG SER IA-IMP: NEGATIVE
HSV 1+2 IGG SER IA-IMP: NEGATIVE
HSV1 IGG SER QL: 0.25 INDEX
HSV2 IGG SER QL: 0.34 INDEX
KETONES URINE: NEGATIVE MG/DL
LEUKOCYTE ESTERASE URINE: NEGATIVE
MICROSCOPIC-UA: NORMAL
N GONORRHOEA RRNA SPEC QL NAA+PROBE: NOT DETECTED
NITRITE URINE: NEGATIVE
PH URINE: 5.5
PROTEIN URINE: NEGATIVE MG/DL
RED BLOOD CELLS URINE: 0 /HPF
SOURCE AMPLIFICATION: NORMAL
SPECIFIC GRAVITY URINE: 1.02
T PALLIDUM AB SER QL IA: NEGATIVE
UROBILINOGEN URINE: 0.2 MG/DL
WHITE BLOOD CELLS URINE: 0 /HPF

## 2023-11-14 ENCOUNTER — OUTPATIENT (OUTPATIENT)
Dept: OUTPATIENT SERVICES | Facility: HOSPITAL | Age: 38
LOS: 1 days | End: 2023-11-14

## 2023-11-14 ENCOUNTER — APPOINTMENT (OUTPATIENT)
Dept: ENDOCRINOLOGY | Facility: CLINIC | Age: 38
End: 2023-11-14
Payer: MEDICAID

## 2023-11-14 VITALS
HEART RATE: 60 BPM | BODY MASS INDEX: 39.22 KG/M2 | TEMPERATURE: 97.6 F | RESPIRATION RATE: 16 BRPM | HEIGHT: 65 IN | DIASTOLIC BLOOD PRESSURE: 70 MMHG | WEIGHT: 235.38 LBS | SYSTOLIC BLOOD PRESSURE: 107 MMHG | OXYGEN SATURATION: 98 %

## 2023-11-14 DIAGNOSIS — K90.0 CELIAC DISEASE: ICD-10-CM

## 2023-11-14 DIAGNOSIS — E03.9 HYPOTHYROIDISM, UNSPECIFIED: ICD-10-CM

## 2023-11-14 DIAGNOSIS — Z86.39 PERSONAL HISTORY OF OTHER ENDOCRINE, NUTRITIONAL AND METABOLIC DISEASE: ICD-10-CM

## 2023-11-14 DIAGNOSIS — Z98.84 BARIATRIC SURGERY STATUS: Chronic | ICD-10-CM

## 2023-11-14 DIAGNOSIS — H54.7 UNSPECIFIED VISUAL LOSS: ICD-10-CM

## 2023-11-14 DIAGNOSIS — N62 HYPERTROPHY OF BREAST: ICD-10-CM

## 2023-11-14 DIAGNOSIS — I10 ESSENTIAL (PRIMARY) HYPERTENSION: ICD-10-CM

## 2023-11-14 DIAGNOSIS — Z98.84 BARIATRIC SURGERY STATUS: ICD-10-CM

## 2023-11-14 PROCEDURE — 99204 OFFICE O/P NEW MOD 45 MIN: CPT

## 2023-11-20 ENCOUNTER — OUTPATIENT (OUTPATIENT)
Dept: OUTPATIENT SERVICES | Facility: HOSPITAL | Age: 38
LOS: 1 days | End: 2023-11-20

## 2023-11-20 ENCOUNTER — APPOINTMENT (OUTPATIENT)
Dept: PLASTIC SURGERY | Facility: HOSPITAL | Age: 38
End: 2023-11-20

## 2023-11-20 VITALS
SYSTOLIC BLOOD PRESSURE: 136 MMHG | HEIGHT: 65 IN | TEMPERATURE: 97.7 F | HEART RATE: 66 BPM | BODY MASS INDEX: 36.65 KG/M2 | DIASTOLIC BLOOD PRESSURE: 66 MMHG | WEIGHT: 220 LBS

## 2023-11-20 DIAGNOSIS — Z98.2 PRESENCE OF CEREBROSPINAL FLUID DRAINAGE DEVICE: Chronic | ICD-10-CM

## 2023-11-21 DIAGNOSIS — E66.01 MORBID (SEVERE) OBESITY DUE TO EXCESS CALORIES: ICD-10-CM

## 2023-11-22 ENCOUNTER — NON-APPOINTMENT (OUTPATIENT)
Age: 38
End: 2023-11-22

## 2023-11-22 LAB
25(OH)D3 SERPL-MCNC: 31.1 NG/ML
ALBUMIN SERPL ELPH-MCNC: 4.3 G/DL
ALP BLD-CCNC: 79 U/L
ALT SERPL-CCNC: 17 U/L
ANION GAP SERPL CALC-SCNC: 11 MMOL/L
AST SERPL-CCNC: 13 U/L
BILIRUB SERPL-MCNC: 1 MG/DL
BUN SERPL-MCNC: 16 MG/DL
CALCIUM SERPL-MCNC: 9.6 MG/DL
CHLORIDE SERPL-SCNC: 102 MMOL/L
CO2 SERPL-SCNC: 26 MMOL/L
CREAT SERPL-MCNC: 0.62 MG/DL
EGFR: 126 ML/MIN/1.73M2
ESTRADIOL SERPL-MCNC: 29 PG/ML
FSH SERPL-MCNC: 5.3 IU/L
GLUCOSE SERPL-MCNC: 87 MG/DL
LH SERPL-ACNC: 11.8 IU/L
POTASSIUM SERPL-SCNC: 4.4 MMOL/L
PROLACTIN SERPL-MCNC: 8.6 NG/ML
PROT SERPL-MCNC: 6.8 G/DL
SHBG SERPL-SCNC: 74 NMOL/L
SODIUM SERPL-SCNC: 139 MMOL/L
TESTOST FREE SERPL-MCNC: 32.6 PG/ML
TESTOST SERPL-MCNC: 1186 NG/DL
TSH SERPL-ACNC: 1.69 UIU/ML

## 2023-11-27 ENCOUNTER — APPOINTMENT (OUTPATIENT)
Dept: NEUROSURGERY | Facility: CLINIC | Age: 38
End: 2023-11-27
Payer: MEDICAID

## 2023-11-27 VITALS
OXYGEN SATURATION: 98 % | HEIGHT: 65 IN | SYSTOLIC BLOOD PRESSURE: 100 MMHG | BODY MASS INDEX: 36.65 KG/M2 | WEIGHT: 220 LBS | HEART RATE: 58 BPM | DIASTOLIC BLOOD PRESSURE: 66 MMHG

## 2023-11-27 PROCEDURE — 99212 OFFICE O/P EST SF 10 MIN: CPT

## 2024-01-10 ENCOUNTER — APPOINTMENT (OUTPATIENT)
Dept: SURGERY | Facility: CLINIC | Age: 39
End: 2024-01-10
Payer: MEDICAID

## 2024-01-10 ENCOUNTER — LABORATORY RESULT (OUTPATIENT)
Age: 39
End: 2024-01-10

## 2024-01-10 VITALS
OXYGEN SATURATION: 97 % | TEMPERATURE: 97.2 F | SYSTOLIC BLOOD PRESSURE: 115 MMHG | BODY MASS INDEX: 36.49 KG/M2 | HEIGHT: 65 IN | HEART RATE: 60 BPM | WEIGHT: 219 LBS | DIASTOLIC BLOOD PRESSURE: 72 MMHG

## 2024-01-10 PROCEDURE — 99213 OFFICE O/P EST LOW 20 MIN: CPT

## 2024-01-10 RX ORDER — NYSTATIN 100000 1/G
100000 POWDER TOPICAL
Qty: 1 | Refills: 0 | Status: ACTIVE | COMMUNITY
Start: 2024-01-10 | End: 1900-01-01

## 2024-01-10 NOTE — HISTORY OF PRESENT ILLNESS
[Procedure: ___] : Procedure performed: [unfilled]  [Date of Surgery: ___] : Date of Surgery:   [unfilled] [Surgeon Name:   ___] : Surgeon Name: Dr. NELSON [Pre-Op Weight ___] : Pre-op weight was [unfilled] lbs [de-identified] : CHIQUIS BARAKAT is a 38 year old male who present in the office for a follow up s/p  Laparoscopic sleeve gastrectomy  on 01/11/2021. Patient able to tolerate  stage 4  diet, compliant with medications, advised to start taking vitamin daily.(MVI, Calcium and vitamin D, and vitamin B 12 ), drinks enough fluids. Patient denies any pain or discomfort at present time. Patient c/o Reflux at times started on Omeprazole.  Patient's questions and concerns addressed to patient's satisfaction. Patient has no recent ER visit post-surgery.  He wishes to see a Registered Dietitian. He stated that he needs to 15 lb lose weight prior plastic surgery.

## 2024-01-10 NOTE — PLAN
[FreeTextEntry1] : Please follow up at the office in January 2025 and as needed for any questions or concerns

## 2024-01-10 NOTE — ASSESSMENT
[FreeTextEntry1] : CHIQUIS BARAKAT is a 38 year old male who underwent a Laparoscopic sleeve gastrectomy on 01/11/2021 by Dr. Sriram Phillips    On oral medication and vitamins. Feels well. Patient report having some rushes with excoriation of the skin due to excess skin. Patient started on Nystatin powder ass needed. Has a follow up visit with plastics for possible panniculectomy. He was advised that his BMI need to be below 33 kg/m2 prior panniculectomy.   Pre-op weight: 402 lbs Ideal body weight: 153 lbs Today's weight: 219 lbs. Today's BMI: 36.44 kg/m2 Excess body weight: 249 lbs total weight loss since surgery: 183 lbs % of Excess Body Weight Loss: 73 % which is on target. - Stay well hydrated. - Reinforced need for daily MVI, Calcium and vit D, and vit B 12. - Reinforced need for adequate hydration (at least 64 oz daily) and adequate protein intake (at least 60 g daily) - Reinforced need to chew food properly before swallowing. - Instructed patient not to eat and drink at the same time and to space them out 30 minutes apart. - Instructed patient not to drink from a straw, chew gum, or drink carbonated beverages. - Informed patient about post-op support groups held every 3rd Tuesday of the month. - Please continue with stage 4 solid food at this time. - Informed no restrictions with exercise at this time. - Follow-up in annually January 2025, and as needed - Call with concerns. - Follow-up blood work this visit

## 2024-01-10 NOTE — PHYSICAL EXAM
[Normal] : affect appropriate [de-identified] : Normoactive bowel sounds, soft and nontender, no hepatosplenomegaly or masses noted

## 2024-01-16 LAB
25(OH)D3 SERPL-MCNC: 33.5 NG/ML
ALBUMIN SERPL ELPH-MCNC: 4.3 G/DL
ALP BLD-CCNC: 80 U/L
ALT SERPL-CCNC: 14 U/L
ANION GAP SERPL CALC-SCNC: 11 MMOL/L
APTT BLD: 32.3 SEC
AST SERPL-CCNC: 15 U/L
BASOPHILS # BLD AUTO: 0.14 K/UL
BASOPHILS NFR BLD AUTO: 2.6 %
BILIRUB SERPL-MCNC: 0.8 MG/DL
BUN SERPL-MCNC: 23 MG/DL
CALCIUM SERPL-MCNC: 9.3 MG/DL
CALCIUM SERPL-MCNC: 9.3 MG/DL
CHLORIDE SERPL-SCNC: 102 MMOL/L
CHOLEST SERPL-MCNC: 162 MG/DL
CO2 SERPL-SCNC: 26 MMOL/L
CREAT SERPL-MCNC: 0.65 MG/DL
EGFR: 124 ML/MIN/1.73M2
EOSINOPHIL # BLD AUTO: 0.32 K/UL
EOSINOPHIL NFR BLD AUTO: 6 %
ESTIMATED AVERAGE GLUCOSE: 100 MG/DL
FERRITIN SERPL-MCNC: 326 NG/ML
FOLATE SERPL-MCNC: 6.7 NG/ML
GLUCOSE SERPL-MCNC: 75 MG/DL
HBA1C MFR BLD HPLC: 5.1 %
HCT VFR BLD CALC: 45.4 %
HDLC SERPL-MCNC: 52 MG/DL
HGB BLD-MCNC: 14.7 G/DL
INR PPP: 0.99 RATIO
IRON SATN MFR SERPL: 38 %
IRON SERPL-MCNC: 105 UG/DL
LDLC SERPL CALC-MCNC: 95 MG/DL
LYMPHOCYTES # BLD AUTO: 2.51 K/UL
LYMPHOCYTES NFR BLD AUTO: 47.9 %
MAN DIFF?: NORMAL
MCHC RBC-ENTMCNC: 28.5 PG
MCHC RBC-ENTMCNC: 32.4 GM/DL
MCV RBC AUTO: 88 FL
MONOCYTES # BLD AUTO: 0.27 K/UL
MONOCYTES NFR BLD AUTO: 5.1 %
NEUTROPHILS # BLD AUTO: 2.02 K/UL
NEUTROPHILS NFR BLD AUTO: 38.4 %
NONHDLC SERPL-MCNC: 110 MG/DL
PARATHYROID HORMONE INTACT: 30 PG/ML
PLATELET # BLD AUTO: 221 K/UL
POTASSIUM SERPL-SCNC: 3.6 MMOL/L
PROT SERPL-MCNC: 6.7 G/DL
PT BLD: 11.3 SEC
RBC # BLD: 5.16 M/UL
RBC # FLD: 13.9 %
SODIUM SERPL-SCNC: 139 MMOL/L
TIBC SERPL-MCNC: 276 UG/DL
TRIGL SERPL-MCNC: 78 MG/DL
TSH SERPL-ACNC: 1.49 UIU/ML
UIBC SERPL-MCNC: 171 UG/DL
VIT B1 SERPL-MCNC: 88.4 NMOL/L
VIT B12 SERPL-MCNC: 697 PG/ML
WBC # FLD AUTO: 5.25 K/UL

## 2024-01-21 NOTE — PROGRESS NOTE ADULT - ASSESSMENT
PLAN  Plan for possible  shunt  Rec LP for temporizing measure  acetozolamide.   rec med consult for clearnce for vps 97

## 2024-01-22 ENCOUNTER — APPOINTMENT (OUTPATIENT)
Dept: NEUROSURGERY | Facility: CLINIC | Age: 39
End: 2024-01-22

## 2024-01-26 ENCOUNTER — OUTPATIENT (OUTPATIENT)
Dept: OUTPATIENT SERVICES | Facility: HOSPITAL | Age: 39
LOS: 1 days | End: 2024-01-26
Payer: MEDICAID

## 2024-01-26 ENCOUNTER — APPOINTMENT (OUTPATIENT)
Dept: INTERNAL MEDICINE | Facility: CLINIC | Age: 39
End: 2024-01-26

## 2024-01-26 VITALS
TEMPERATURE: 97.9 F | WEIGHT: 212 LBS | OXYGEN SATURATION: 97 % | HEIGHT: 65 IN | SYSTOLIC BLOOD PRESSURE: 111 MMHG | RESPIRATION RATE: 16 BRPM | BODY MASS INDEX: 35.32 KG/M2 | DIASTOLIC BLOOD PRESSURE: 75 MMHG | HEART RATE: 62 BPM

## 2024-01-26 DIAGNOSIS — Z98.84 BARIATRIC SURGERY STATUS: Chronic | ICD-10-CM

## 2024-01-26 DIAGNOSIS — Z98.84 BARIATRIC SURGERY STATUS: ICD-10-CM

## 2024-01-26 DIAGNOSIS — Z00.00 ENCOUNTER FOR GENERAL ADULT MEDICAL EXAMINATION WITHOUT ABNORMAL FINDINGS: ICD-10-CM

## 2024-01-26 DIAGNOSIS — Z98.2 PRESENCE OF CEREBROSPINAL FLUID DRAINAGE DEVICE: Chronic | ICD-10-CM

## 2024-01-26 PROCEDURE — 99204 OFFICE O/P NEW MOD 45 MIN: CPT | Mod: 1L

## 2024-01-26 PROCEDURE — G0463: CPT

## 2024-01-26 RX ORDER — LEVOTHYROXINE SODIUM 0.03 MG/1
25 TABLET ORAL
Qty: 90 | Refills: 3 | Status: DISCONTINUED | COMMUNITY
Start: 2019-10-02 | End: 2024-01-26

## 2024-01-26 RX ORDER — COAL TAR 0.5 %
0.5 SHAMPOO TOPICAL DAILY
Qty: 1 | Refills: 2 | Status: DISCONTINUED | COMMUNITY
Start: 2022-12-29 | End: 2024-01-26

## 2024-02-03 PROBLEM — Z98.84 BARIATRIC SURGERY STATUS: Status: ACTIVE | Noted: 2020-01-07

## 2024-02-03 NOTE — HISTORY OF PRESENT ILLNESS
[de-identified] : 38yoM, accompanied by mother, PMH acquired blindness 2/2 hydrocephalus s/p  shunt, obesity s/p sleeve gastrectomy, HTN, hypothyroidism, celiac disease presents to establish care  endorses nasal congestion, throat itch - has seasonal allergies  follows with neurosurgy q 1-2 years   pannectomy on hold 2/2 weight gain  prior 1/26: doing well, no complaints  bariatric surgery f/u 2/8/24

## 2024-02-05 DIAGNOSIS — Z98.84 BARIATRIC SURGERY STATUS: ICD-10-CM

## 2024-02-05 DIAGNOSIS — I10 ESSENTIAL (PRIMARY) HYPERTENSION: ICD-10-CM

## 2024-02-12 ENCOUNTER — NON-APPOINTMENT (OUTPATIENT)
Age: 39
End: 2024-02-12

## 2024-02-20 ENCOUNTER — APPOINTMENT (OUTPATIENT)
Dept: CARDIOLOGY | Facility: HOSPITAL | Age: 39
End: 2024-02-20

## 2024-02-20 ENCOUNTER — NON-APPOINTMENT (OUTPATIENT)
Age: 39
End: 2024-02-20

## 2024-02-20 VITALS
HEIGHT: 65 IN | OXYGEN SATURATION: 97 % | WEIGHT: 216 LBS | SYSTOLIC BLOOD PRESSURE: 108 MMHG | DIASTOLIC BLOOD PRESSURE: 68 MMHG | BODY MASS INDEX: 35.99 KG/M2 | HEART RATE: 57 BPM

## 2024-02-20 DIAGNOSIS — I10 ESSENTIAL (PRIMARY) HYPERTENSION: ICD-10-CM

## 2024-02-20 DIAGNOSIS — E65 LOCALIZED ADIPOSITY: ICD-10-CM

## 2024-02-20 DIAGNOSIS — F41.9 ANXIETY DISORDER, UNSPECIFIED: ICD-10-CM

## 2024-02-20 DIAGNOSIS — R00.2 PALPITATIONS: ICD-10-CM

## 2024-02-20 NOTE — HISTORY OF PRESENT ILLNESS
[FreeTextEntry1] : Patient was last seen by Dr. Timi Blakely in cardiology clinic 8/8/2023.  Presents today for follow up.   Patient is a 39yo man with h/o morbid obestiy s/p gastric sleeve now with significant weight loss (from 515 to approx 220 lbs), prior LAZARO, hypertension, radiculopathy with arm and chest discomfort (had has cardiac workup with echo and cath, both of which were unremarkable) presents for follow up.  Patient has been evaluated for palpitations with holter monitor which recorded NSR with occasional PVCs and no arrhythmias. Symptoms thought to be anxiety related and patient is now on buspirone and clonazepam prescribed by psychiatrist with improvement in symptoms.   Today patient reports he has not taken amlodipine. Blood pressure is at goal on metoprolol succinate 25 mg daily.   Patient needs clearance for penectomy. Prior surgery deferred by bariatric surgery due to not meeting goal weight. Patient exercises 30 min three times per week with elliptical can dumbbells, has celiac disease so eats limited carbohydrates with diet.

## 2024-02-20 NOTE — ASSESSMENT
[FreeTextEntry1] : 37 yo man h/o morbid obestiy s/p gastric sleeve now with significant weight loss (from 515 to approx 220 lbs), prior LAZARO, hypertension, anxiety, and radiculopathy with arm and chest discomfort with negative cardiac work up who presents for follow up visit.   #HTN - Continue metoprolol succinate 25 mg QD  #Palpitations - Holter monitor with NSR, 1% PVCs, no arrhythmias  - Continue metoprolol succinate as above  - Symptoms improved with anxiolytics prescribed by psychiatry   #Cardiac Clearance - Patient is planned for penectomy  - No further cardiac testing is advised - Patient is physically active, does >4 METs regularly  - ECG is within normal limits - Can continue metoprolol succinate 25 mg QD - RCRI: 0, 3.9% 30-day risk of death, MI, or cardiac arrest   Andi Vargas MD Cardiology Fellow, PGY6

## 2024-02-20 NOTE — PHYSICAL EXAM

## 2024-02-26 ENCOUNTER — APPOINTMENT (OUTPATIENT)
Dept: PLASTIC SURGERY | Facility: HOSPITAL | Age: 39
End: 2024-02-26

## 2024-02-26 ENCOUNTER — OUTPATIENT (OUTPATIENT)
Dept: OUTPATIENT SERVICES | Facility: HOSPITAL | Age: 39
LOS: 1 days | End: 2024-02-26

## 2024-02-26 VITALS
WEIGHT: 206.2 LBS | HEIGHT: 65.5 IN | SYSTOLIC BLOOD PRESSURE: 119 MMHG | DIASTOLIC BLOOD PRESSURE: 86 MMHG | TEMPERATURE: 98 F | BODY MASS INDEX: 33.94 KG/M2

## 2024-02-26 DIAGNOSIS — Z98.2 PRESENCE OF CEREBROSPINAL FLUID DRAINAGE DEVICE: Chronic | ICD-10-CM

## 2024-02-26 NOTE — ASSESSMENT
[FreeTextEntry1] :     38 ye/o M with history of obesity s/p gastric sleeve and massive weight loss, presenting for possible panniculectomy. He lost weight since his last visit and is now an appropriate weight for surgery.  - BMI< 33; cardiac risk stratification obtained, neurosurgery assessed shunt in 11/23 and states recommendations for abdominal incision. "okay for incisions on left side, midline, and RLQ." - recommend patient return in 1mo to see if weight stable - needs medicine for risk stratification and clearance for panniculectomy with general anesthesia  - seen by cardiologist: "#Cardiac Clearance - Patient is planned for penectomy - No further cardiac testing is advised - Patient is physically active, does >4 METs regularly - ECG is within normal limits - Can continue metoprolol succinate 25 mg QD - RCRI: 0, 3.9% 30-day risk of death, MI, or cardiac arrest"  Adriana Mary PGY6

## 2024-02-26 NOTE — PHYSICAL EXAM
[de-identified] : Constitutional: NAD   Abdomen: Well healed laparoscopic scaring from previous surgery. Large hanging pannus below level of pubic symphysis.

## 2024-02-26 NOTE — HISTORY OF PRESENT ILLNESS
[FreeTextEntry1] :   June 23: HPI: 38 year old male, legally blind, with h/o lap sleeve gastrectomy in January 2021 and subsequent MWL, presents for consultation for abdominal pannus. Patient referred from bariatric clinic, compliant with medications and vitamins (MVI, calcium and vitamin D, and vitamin B 12 ), drinks enough fluids. Maximum weight preop 517 lbs. Now 220 lbs., lowest can get to is approximately 210 lbs.Patient stated that he gained 10 lbs. due to his eating habits recently with basketball season and drinking more but able to get back to 210 pounds easily. Patient denies any pain or discomfort at present time. He has a h/o rashes beneath the abdominal pannus treated with local wound care. The pannus is considerable and affects wear of clothing and ability to work out. It is difficult to maintain regular hygiene. It is socially and psychologically stigmatizing. Recently seen by cardiology for palpitations, getting holter monitor placed within next 1-2 weeks.  11/20/23: Patient here today for follow up, saw cardiology 8/23 for palpitations. Placed holter monitor, no significant findings - deemed low risk for the panniculectomy surgery. Patient has been very diligent with eating well and exercises, says he recently lost 17lbs however still 220lbs on todays visit. He is agreeable to continue to loose weight and get down to below 220 for a BMI of 33 or lower.  02/26: patient returns today after attempts of losing weight. He states he has been extremely motivated to lose the weight and appears today with a BMI <33. He states he saw his PCP, cardiologist and bariatric surgery.   PMH: hypothyroid, anxiety, blind with h/o hydrocephalus and increased ICP (s/p  shunt 4 years ago), LAZARO  PSH: lap sleeve gastrectomy, lap cholecystectomy,  shunt placement  Meds: metoprolol, buspirone, levothyroxine  SH: non-smoker (quit over a year ago), drinks 3 drinks at a time max, not really a drinker and minimal times a week

## 2024-02-27 DIAGNOSIS — E66.9 OBESITY, UNSPECIFIED: ICD-10-CM

## 2024-03-08 ENCOUNTER — APPOINTMENT (OUTPATIENT)
Dept: NEUROSURGERY | Facility: CLINIC | Age: 39
End: 2024-03-08
Payer: MEDICAID

## 2024-03-08 PROCEDURE — 99214 OFFICE O/P EST MOD 30 MIN: CPT

## 2024-03-12 ENCOUNTER — NON-APPOINTMENT (OUTPATIENT)
Age: 39
End: 2024-03-12

## 2024-03-14 ENCOUNTER — OUTPATIENT (OUTPATIENT)
Dept: OUTPATIENT SERVICES | Facility: HOSPITAL | Age: 39
LOS: 1 days | End: 2024-03-14
Payer: MEDICAID

## 2024-03-14 ENCOUNTER — APPOINTMENT (OUTPATIENT)
Dept: NEUROLOGY | Facility: HOSPITAL | Age: 39
End: 2024-03-14

## 2024-03-14 VITALS — TEMPERATURE: 97.8 F | SYSTOLIC BLOOD PRESSURE: 109 MMHG | DIASTOLIC BLOOD PRESSURE: 73 MMHG | HEART RATE: 59 BPM

## 2024-03-14 DIAGNOSIS — Z98.84 BARIATRIC SURGERY STATUS: Chronic | ICD-10-CM

## 2024-03-14 DIAGNOSIS — Z98.2 PRESENCE OF CEREBROSPINAL FLUID DRAINAGE DEVICE: Chronic | ICD-10-CM

## 2024-03-14 DIAGNOSIS — M47.814 SPONDYLOSIS W/OUT MYELOPATHY OR RADICULOPATHY, THORACIC REGION: ICD-10-CM

## 2024-03-14 DIAGNOSIS — R56.9 UNSPECIFIED CONVULSIONS: ICD-10-CM

## 2024-03-14 DIAGNOSIS — M54.9 DORSALGIA, UNSPECIFIED: ICD-10-CM

## 2024-03-14 DIAGNOSIS — G89.29 DORSALGIA, UNSPECIFIED: ICD-10-CM

## 2024-03-14 DIAGNOSIS — M47.814 SPONDYLOSIS WITHOUT MYELOPATHY OR RADICULOPATHY, THORACIC REGION: ICD-10-CM

## 2024-03-14 PROCEDURE — G0463: CPT

## 2024-03-14 RX ORDER — OMEPRAZOLE 40 MG/1
40 CAPSULE, DELAYED RELEASE ORAL
Qty: 30 | Refills: 2 | Status: DISCONTINUED | COMMUNITY
Start: 2024-01-10 | End: 2024-03-14

## 2024-03-14 RX ORDER — GABAPENTIN 100 MG/1
100 CAPSULE ORAL
Qty: 30 | Refills: 3 | Status: ACTIVE | COMMUNITY
Start: 2024-03-14 | End: 1900-01-01

## 2024-03-14 NOTE — HISTORY OF PRESENT ILLNESS
[FreeTextEntry1] : 3/14/24 follow up: Pt presents for follow up. Pt states the pain in the L shoulder/LUE has improved over time. Pt has occasional numbness of L pinky fingers at times. As in the past, pt states that he has been having pain/tightness in mid thoracic back with referral to Mid chest as well as pain/tingling from L buttock to L ankle. The pains alleviate with stretching but worsen with laying down or sitting still. Flexeril but pt states he has not been taking it due to lethargy/insomnia with med. Cardio work up in the past has been negative per patient. Pt is pending panniculectomy per plastic surgery.    38 y.o. M with PMH of morbid obesity s/p bariatric surgery (1/2021), LAZARO on CPAP, pseudotumor cerebri by Dr. Cruz s/p  Shunt latest revision 10/2019, b/l blindness presented to neurology clinic for left sided neck pain. Patient noted that after waking up from sleep on 10/7, has been having L sided neck stiffness. There had been chronic neuropathic pain where he described electrical type of pain radiating from left neck to his entire arm, then to his last to fingers of the left hand. He had been suffering from chronic lower back muscle pain and neuropathic pain due to morbid obesity, but this neck pain and stiffness is new. He also noted that chronically has been suffering from sharp shooting pain coming from the mid back to the chest, and then spread to the chest. He had tried gabapentin in the past, caused tongue numbness. Denies any recent fever, chills, runny nose, new chest pain/abd pain, new numbness/tingling/weakness throughout extremities. Takes buspar and klonopin prn for anxiety. Awaiting for panniculectomy per plastic surgery. He lives at home with family and mother usually drives him around. He has been legally blind, sometimes sees light but needs assistance in walking with a stick.

## 2024-03-14 NOTE — DISCUSSION/SUMMARY
[FreeTextEntry1] : 38 y.o. M with PMH of morbid obesity s/p bariatric surgery (1/2021), LAZARO on CPAP, pseudotumor cerebri by Dr. Cruz s/p  Shunt latest revision 10/2019, b/l blindness presented to neurology clinic. Pt presents for follow up. Pt states the pain in the L shoulder/LUE has improved over time. Pt has occasional numbness of L pinky fingers at times. As in the past, pt states that he has been having pain/tightness in mid thoracic back with referral to Mid chest as well as pain/tingling from L buttock to L ankle. The pains alleviate with stretching but worsen with laying down or sitting still. Flexeril but pt states he has not been taking it due to lethargy/insomnia with med. Pt is pending panniculectomy per plastic surgery. Imaging as above.   Impression: Chronic mid thoracic with radiation to mid chest and left buttock pain with radiation to L ankle likely 2/2 changes noted on MRs of spine. In the differentials include sciatica, radiculopathy etc in the settig of hx of morbid obesity.  Pt is pending panniculectomy per plastic surgery.  Plan: [] Start gabapentin 100 mg QHS, if tolerated.  [] Physical therapy [] Follow up in 4-5 months [] Follow up with neurosurgery and plastic surgery  Pt seen and discussed plan with general neurology attending Dr. Reid

## 2024-03-14 NOTE — REVIEW OF SYSTEMS
[As Noted in HPI] : as noted in HPI [Anxiety] : anxiety [Negative] : Constitutional [FreeTextEntry9] : back pain, LLE pain

## 2024-03-14 NOTE — REASON FOR VISIT
[Follow-Up: _____] : a [unfilled] follow-up visit [Initial Evaluation] : an initial evaluation [Parent] : parent

## 2024-03-14 NOTE — PHYSICAL EXAM
[General Appearance - Alert] : alert [Oriented To Time, Place, And Person] : oriented to person, place, and time [Person] : oriented to person [Place] : oriented to place [Time] : oriented to time [Span Intact] : the attention span was normal [Comprehension] : comprehension intact [Fluency] : fluency intact [Cranial Nerves Oculomotor (III)] : extraocular motion intact [Cranial Nerves Facial (VII)] : face symmetrical [Cranial Nerves Trigeminal (V)] : facial sensation intact symmetrically [Cranial Nerves Accessory (XI - Cranial And Spinal)] : head turning and shoulder shrug symmetric [Cranial Nerves Vestibulocochlear (VIII)] : hearing was intact bilaterally [Motor Strength] : muscle strength was normal in all four extremities [Cranial Nerves Hypoglossal (XII)] : there was no tongue deviation with protrusion [Sensation Tactile Decrease] : light touch was intact [Sensation Pain / Temperature Decrease] : pain and temperature was intact [Sensation Vibration Decrease] : vibration was intact [Abnormal Walk] : normal gait [Proprioception] : proprioception was intact [2+] : Ankle jerk left 2+ [FreeTextEntry1] : In mild distress due to left neck pain and stiffness [Motor Strength Upper Extremities Bilaterally] : strength was normal in both upper extremities [Motor Strength Lower Extremities Bilaterally] : strength was normal in both lower extremities [FreeTextEntry5] : B/l blindness. R eye is esotropic at baseline and restricted EOMI on R eye.

## 2024-03-14 NOTE — DATA REVIEWED
[de-identified] :  MR scales 12/2022:   DISC LEVEL EVALUATION:  C1/2: Minimal degeneration is seen between the dens and the in charge of C1. C2/C3: Normal C3/C4: Normal C4/C5: Minimal disc degeneration C5/C6: Minimal disc degeneration with anterior endplate osteophyte formation and minimal endplate edema at the inferior endplate of C5. C6/C7: Very small central disc protrusion without central canal or foraminal narrowing. C7/T1: Normal  ALIGNMENT: Reversal of the normal cervical lordosis centered upon the C4 vertebral body. CORD: There is no cord signal abnormality. MARROW: Minimal endplate marrow edema at the inferior endplate of C5. IMAGED BRAIN: Please refer to the brain MRI report from the same date. PERIPHERAL/NECK SOFT TISSUES: Normal  IMPRESSION: Reversal of the normal cervical lordosis. Minimal disc degeneration at C5-C6 with mild endplate edema at the inferior endplate of C5. No disc bulge or herniation. No central canal or foraminal narrowing.  --- End of Report ---\  MR L spine:  PROCEDURE DATE:  12/21/2022    INTERPRETATION:  CLINICAL INFORMATION: Lumbar radiculopathy. Low back pain.  ADDITIONAL CLINICAL INFORMATION: Not Applicable  TECHNIQUE: Multiplanar, multisequence MRI was performed of the lumbar spine. IV Contrast: NONE  PRIOR STUDIES: CT scan performed on the same date. MRI from 12/20/2021.  FINDINGS:  LOCALIZER: No additional findings. BONES: Fatty change and mild endplate edema at T11-T12 and L5-S1. ALIGNMENT: Mild scoliosis that is convex to the left. SACROILIAC JOINTS/SACRUM: There is no sacral fracture. The SI joints are partially visualized but are intact. CONUS AND CAUDA EQUINA: The distal cord and conus are normal in signal. Conus terminates at L1. VISUALIZED INTRAPELVIC/INTRA-ABDOMINAL SOFT TISSUES: Normal. PARASPINAL SOFT TISSUES: Normal.   INDIVIDUAL LEVELS:  LOWER THORACIC SPINE: Disc degeneration with minimal disc bulging and anterior osteophyte formation with fatty endplate change and edema at T11-T12.  L1-L2: No spinal canal or neuroforaminal stenosis. L2-L3: No spinal canal or neuroforaminal stenosis. L3-L4: No spinal canal or neuroforaminal stenosis. L4-L5: Small to moderate left foraminal disc protrusion that causes mild left foraminal narrowing nearly contacting the exiting left L4 nerve root. L5-S1: Marked fatty endplate change with minimal endplate edema. Moderate disc height loss with mild to moderate disc bulging and osteophyte formation causing mild to moderate left and mild right foraminal narrowing. No central canal stenosis.   IMPRESSION:  Lower lumbar spondylosis at L4-L5 and L5-S1 as detailed above and without change compared to the prior study from 12/20/2021.  --- End of Report ---  	 EXAM: [id number] - MR SPINE THORACIC WAW IC  - ORDERED BY:  ROC JONES   PROCEDURE DATE:  10/30/2023    INTERPRETATION:  THORACIC SPINE MRI  CLINICAL INFORMATION: Thoracic spine pain radiating to the left and around to the sternum TECHNIQUE: Multiplanar, multisequence MRI was obtained of the Lasix spine with and without intravenous contrast 10 cc Gadavist.  FINDINGS:  Preservation of the vertebral body heights. No listhesis. Slight degenerative endplate edema anteriorly at T11/T12. No cord signal abnormality or abnormal enhancement. No soft tissue mass lesion.  Evaluation of the individual levels of the thoracic spine demonstrates: at T6/T7, a small left paracentral protrusion indenting the thecal sac. At T7/T8, central protrusion resulting in spinal canal narrowing with disc moderate the ventral cord. At T9/T10, a left foraminal protrusion resulting in moderate left foraminal narrowing. Remaining levels of the thoracic spine are unremarkable.  No paraspinal muscle edema or atrophy. Imaged chest and upper abdomen are unremarkable.  IMPRESSION: Thoracic spondylosis, most notably at T7/T8 with disc bulge contacting the ventral cord and at T9/T10 with left foraminal protrusion resulting in moderate left foraminal narrowing. No cord signal abnormality or abnormal enhancement. No soft tissue mass lesion.  --- End of Report ---

## 2024-03-17 NOTE — HISTORY OF PRESENT ILLNESS
[FreeTextEntry1] : Mr. 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 Cruz, bilateral vision loss. Last revision on 10/115/19 proximal valve replaced with Strata valve set @1. Patient is now status post gastric bypass surgery, doing well, lost 300+ lbs and is pending panniculectomy Pt presented with worsening of low back pain and neck pain,  MRI Lumbar and Cervical spine with mild multilevel DDD. MRI head with no hydrocephalous. Receiving PT from the neurologist for neck/shoulder.    He lost weight again, now to 206 met the criteria for panniculectomy. He saw Dr. Israel on 2/15 and was confirmed readiness for surgery with the completion of target weight. He is seeing surgeon on March 25 ,2024 for scheduling surgery. He is looking NS clearance for surgery.

## 2024-03-17 NOTE — REVIEW OF SYSTEMS
[Joint Pain] : joint pain [Limb Pain] : limb pain [Negative] : Genitourinary [FreeTextEntry3] : legally blind

## 2024-03-17 NOTE — REASON FOR VISIT
[Follow-Up: _____] : a [unfilled] follow-up visit [Parent] : parent [FreeTextEntry1] : s/p VPS for pseudo tumor

## 2024-03-17 NOTE — ASSESSMENT
[FreeTextEntry1] : IMPRESSION: PT s/p  shunt for IIH and is pending for panniculectomy. PT has thoracic back pain radiating around left chest to sternum.  Strata valve is at 1.5  today (should be at 1) with shunt depresses and refills readily. He uses ear phones, now wearing wired ear buds.  Pt with no head symptoms.  MRI T spine with mild disc bulge at T7-8 with left foraminal narrowing at T9/10 from a foraminal disc possible osteophyte, some back pain could be related to disc disease. Pt has met the weight criteria for the panniculectomy and surgery is being scheduled. Will keep the shut at 1.5 for now. Pt asking if the shunt is still needed. At a later time, can continue to raise shunt valve, but pt would like to concentrate on his upcoming abdominoplasty,  PLAN: Letter for clearance for panniculectomy given, mentioning, ok  with all future incisions confined to midline or left side of abdomen or right lower quadrant. Return in 3 months/ may reschedule depends on plastic surgery recovery

## 2024-03-17 NOTE — PHYSICAL EXAM
[General Appearance - Alert] : alert [General Appearance - Well Nourished] : well nourished [General Appearance - In No Acute Distress] : in no acute distress [Oriented To Time, Place, And Person] : oriented to person, place, and time [General Appearance - Well-Appearing] : healthy appearing [Impaired Insight] : insight and judgment were intact [Affect] : the affect was normal [Memory Recent] : recent memory was not impaired [Person] : oriented to person [Place] : oriented to place [Time] : oriented to time [Short Term Intact] : short term memory intact [Outer Ear] : the ears and nose were normal in appearance [Neck Appearance] : the appearance of the neck was normal [Both Tympanic Membranes Were Examined] : both tympanic membranes were normal [Respiration, Rhythm And Depth] : normal respiratory rhythm and effort [] : the neck was supple [Abnormal Walk] : normal gait [No Spinal Tenderness] : no spinal tenderness [Cranial Nerves Hypoglossal (XII)] : there was no tongue deviation with protrusion [Cranial Nerves Vestibulocochlear (VIII)] : hearing was intact bilaterally [Cranial Nerves Accessory (XI - Cranial And Spinal)] : head turning and shoulder shrug symmetric [Motor Strength] : muscle strength was normal in all four extremities [Motor Tone] : muscle tone was normal in all four extremities [Involuntary Movements] : no involuntary movements were seen [FreeTextEntry6] : shunt depresses and refill readily and valve found at 1.5.

## 2024-03-19 ENCOUNTER — NON-APPOINTMENT (OUTPATIENT)
Age: 39
End: 2024-03-19

## 2024-03-19 NOTE — SBIRT NOTE ADULT - NSSBIRTSCREENAVAIL_GEN_A_CORE
Yes [FreeTextEntry1] :  PLAN AND RECOMMENDATIONS :  We discussed differential diagnosis and clinical impression she needs a new PT hand script other ran out and her ins changed  she only had one session   Recommend .symptomatic care and support  medications advise prescription NSAIDS for both pain and anti inflammation  to help with healing / calming the soft tissue and reducing swelling- for at least 2 weeks strict -Naprosyn 500 mg BID after food -warned of possible GI side effects -advised to take with meals or add over the counter Nexium, if sensitive- if GI upset (nausea, vomiting, heartburn) becomes noticeable -stop medication and notify the office.   can take nsaid prn bad days   imaging done  referral to rehab   hydrotherapy /heat / cold for pain  continue  ergonomic precautions including pacing ,posture and frequent breaks while typing.   relative rest and avoidance of painful activity where possible   increasing activity as discussed   return for follow up prn 8 weeks

## 2024-03-22 ENCOUNTER — APPOINTMENT (OUTPATIENT)
Dept: CT IMAGING | Facility: CLINIC | Age: 39
End: 2024-03-22
Payer: MEDICAID

## 2024-03-22 PROCEDURE — 74176 CT ABD & PELVIS W/O CONTRAST: CPT

## 2024-04-08 ENCOUNTER — OUTPATIENT (OUTPATIENT)
Dept: OUTPATIENT SERVICES | Facility: HOSPITAL | Age: 39
LOS: 1 days | End: 2024-04-08

## 2024-04-08 ENCOUNTER — APPOINTMENT (OUTPATIENT)
Dept: PLASTIC SURGERY | Facility: HOSPITAL | Age: 39
End: 2024-04-08

## 2024-04-08 VITALS
DIASTOLIC BLOOD PRESSURE: 61 MMHG | SYSTOLIC BLOOD PRESSURE: 104 MMHG | HEART RATE: 58 BPM | HEIGHT: 65 IN | WEIGHT: 205 LBS | BODY MASS INDEX: 34.16 KG/M2 | TEMPERATURE: 98 F

## 2024-04-08 DIAGNOSIS — Z98.2 PRESENCE OF CEREBROSPINAL FLUID DRAINAGE DEVICE: Chronic | ICD-10-CM

## 2024-04-08 NOTE — HISTORY OF PRESENT ILLNESS
[FreeTextEntry1] :   June 23: HPI: 38 year old male, legally blind, with h/o lap sleeve gastrectomy in January 2021 and subsequent MWL, presents for consultation for abdominal pannus. Patient referred from bariatric clinic, compliant with medications and vitamins (MVI, calcium and vitamin D, and vitamin B 12 ), drinks enough fluids. Maximum weight preop 517 lbs. Now 220 lbs., lowest can get to is approximately 210 lbs.Patient stated that he gained 10 lbs. due to his eating habits recently with basketball season and drinking more but able to get back to 210 pounds easily. Patient denies any pain or discomfort at present time. He has a h/o rashes beneath the abdominal pannus treated with local wound care. The pannus is considerable and affects wear of clothing and ability to work out. It is difficult to maintain regular hygiene. It is socially and psychologically stigmatizing. Recently seen by cardiology for palpitations, getting holter monitor placed within next 1-2 weeks.  11/20/23: Patient here today for follow up, saw cardiology 8/23 for palpitations. Placed holter monitor, no significant findings - deemed low risk for the panniculectomy surgery. Patient has been very diligent with eating well and exercises, says he recently lost 17lbs however still 220lbs on todays visit. He is agreeable to continue to loose weight and get down to below 220 for a BMI of 33 or lower.  02/26: patient returns today after attempts of losing weight. He states he has been extremely motivated to lose the weight and appears today with a BMI <33. He states he saw his PCP, cardiologist and bariatric surgery.   4/8: pt returns for weight check. says he feel he gained some weight. CT scan noted as no hernia/bulge  PMH: hypothyroid, anxiety, blind with h/o hydrocephalus and increased ICP (s/p  shunt 4 years ago), LAZARO  PSH: lap sleeve gastrectomy, lap cholecystectomy,  shunt placement  Meds: metoprolol, buspirone, levothyroxine  SH: non-smoker (quit over a year ago), drinks 3 drinks at a time max, not really a drinker and minimal times a week

## 2024-04-08 NOTE — ASSESSMENT
[FreeTextEntry1] :     38 ye/o M with history of obesity s/p gastric sleeve and massive weight loss, presenting for possible panniculectomy. He lost weight since his last visit and is now an appropriate weight for surgery.    - current BMI is 34, currently above goal: discussed with patient that weight is generally stable but needs to be below 33; pt expressed understanding, will RTC in 2 months for weight check stability. if stable below 33 will book for OR ; cardiac risk stratification obtained, neurosurgery assessed shunt in 11/23 and states recommendations for abdominal incision. "okay for incisions on left side, midline, and RLQ." - needs medicine for risk stratification and clearance for panniculectomy with general anesthesia - seen by cardiologist: "#Cardiac Clearance - Patient is planned for penectomy - No further cardiac testing is advised - Patient is physically active, does >4 METs regularly - ECG is within normal limits - Can continue metoprolol succinate 25 mg QD - RCRI: 0, 3.9% 30-day risk of death, MI, or cardiac arrest"

## 2024-04-08 NOTE — PHYSICAL EXAM
[de-identified] : Constitutional: NAD   Abdomen: Well healed laparoscopic scaring from previous surgery. Large hanging pannus below level of pubic symphysis.

## 2024-04-09 DIAGNOSIS — L98.7 EXCESSIVE AND REDUNDANT SKIN AND SUBCUTANEOUS TISSUE: ICD-10-CM

## 2024-04-25 NOTE — PATIENT PROFILE ADULT - CENTRAL VENOUS CATHETER/PICC LINE
From: Jonelle Alonzo  To: Angeles Kennedy  Sent: 4/24/2024 6:24 PM CDT  Subject: Spine imaging results     Hello. I saw the results from my mother’s X-RAY on [facility] but not sure what it all means. You ordered the X-RAY due to her chronic neck pain. My mom had an appointment in May but you will not be in the office and your next availability is not until July. We were going to talk to you about it at her appointment in May. Could the results of her X-ray explain any of her neck pain? We did not start her on the anti depressants yet because of her tooth and the infection that had her in the hospital for a few days. She is feeling much better now and the tooth extraction site is all healed, but she still gets the neck pain.   We just wanted to touch base with you before July and see if we can do anything more for her neck pain.   Thank you very much   Jonelle Alonzo and Daughter Lakeisha Menendez    no

## 2024-05-03 ENCOUNTER — APPOINTMENT (OUTPATIENT)
Dept: UROLOGY | Facility: CLINIC | Age: 39
End: 2024-05-03

## 2024-06-09 ENCOUNTER — NON-APPOINTMENT (OUTPATIENT)
Age: 39
End: 2024-06-09

## 2024-06-10 ENCOUNTER — APPOINTMENT (OUTPATIENT)
Dept: NEUROSURGERY | Facility: CLINIC | Age: 39
End: 2024-06-10
Payer: MEDICAID

## 2024-06-10 VITALS
BODY MASS INDEX: 34.16 KG/M2 | HEIGHT: 65 IN | OXYGEN SATURATION: 97 % | WEIGHT: 205 LBS | HEART RATE: 58 BPM | SYSTOLIC BLOOD PRESSURE: 128 MMHG | DIASTOLIC BLOOD PRESSURE: 84 MMHG

## 2024-06-10 PROCEDURE — 62252 CSF SHUNT REPROGRAM: CPT

## 2024-06-10 PROCEDURE — 99214 OFFICE O/P EST MOD 30 MIN: CPT

## 2024-06-17 ENCOUNTER — APPOINTMENT (OUTPATIENT)
Dept: PLASTIC SURGERY | Facility: HOSPITAL | Age: 39
End: 2024-06-17

## 2024-06-18 NOTE — HISTORY OF PRESENT ILLNESS
[FreeTextEntry1] : Mr. 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 Cruz, bilateral vision loss. Last revision on 10/115/19 proximal valve replaced with Strata valve set @1. Patient is now status post gastric bypass surgery, doing well, lost 300+ lbs and is pending panniculectomy Pt presented with worsening of low back pain and neck pain, MRI Lumbar and Cervical spine with mild multilevel DDD. MRI head with no hydrocephalous. Receiving PT from the neurologist for neck/shoulder.      He reports lost weight again, now to 200 met the criteria for panniculectomy. But now the surgery protocol changed the target to 195. Pt doing intermittent fasting for losing weight further for meeting the target. Pt continue to have the back pain, need Stright postures to relieve it. He also has to hold his abdomen to get the back pain better. He feels a like a rushing/ whooshing feeling near the shunt valve since few weeks. Denies any pain or headache.

## 2024-06-18 NOTE — PHYSICAL EXAM
[General Appearance - Alert] : alert [General Appearance - In No Acute Distress] : in no acute distress [General Appearance - Well Nourished] : well nourished [General Appearance - Well-Appearing] : healthy appearing [Oriented To Time, Place, And Person] : oriented to person, place, and time [Impaired Insight] : insight and judgment were intact [Affect] : the affect was normal [Memory Recent] : recent memory was not impaired [Person] : oriented to person [Place] : oriented to place [Time] : oriented to time [Short Term Intact] : short term memory intact [Motor Tone] : muscle tone was normal in all four extremities [Motor Strength] : muscle strength was normal in all four extremities [Involuntary Movements] : no involuntary movements were seen [Outer Ear] : the ears and nose were normal in appearance [Both Tympanic Membranes Were Examined] : both tympanic membranes were normal [Neck Appearance] : the appearance of the neck was normal [] : no respiratory distress [Respiration, Rhythm And Depth] : normal respiratory rhythm and effort [No Spinal Tenderness] : no spinal tenderness [Abnormal Walk] : normal gait [FreeTextEntry6] : shunt depresses and refill readily and valve found at 1.5.

## 2024-06-18 NOTE — ASSESSMENT
[FreeTextEntry1] : IMPRESSION: PT s/p  shunt for IIH and is pending for panniculectomy. PT has thoracic back pain radiating around left chest to sternum.  Strata valve is at 1.5 (should be at 1) with shunt depresses and refills readily. He uses ear phones, now wearing wired ear buds.  Pt with no head symptoms.  MRI T spine with mild disc bulge at T7-8 with left foraminal narrowing at T9/10 from a foraminal disc possible osteophyte, some back pain could be related to disc disease. Pt has not met the weight criteria for the panniculectomy as the weight target now ins 195 and he is 200. C/o unusual feeling near the shunt, changed strata setting to 1 today.  shunt valve depresses well, but refill slowly.   PLAN: Continue diet modification and exercises for weight loss Maintain good posture for alleviating back pain Return in 1 months for checking shunt setting.

## 2024-06-18 NOTE — PROVIDER CONTACT NOTE (OTHER) - RECOMMENDATIONS
June 21, 2024      Arianna Perera       [id number] [place] [address]            Dear Arianna,    There’s no question about it - preventive testing can save lives or can help stop a disease process in its tracks. Many health problems start out silently without symptoms. Testing is often the only way to catch these problems in early stages, when they can be more successfully treated.    Our records show that you are due for the following preventive tests(s). If you have had this testing done, please call us so we can update your record.    Colonoscopy: Colorectal cancer usually comes from polyps (abnormal growths) in the colon or rectum. Colonoscopy can find the polyps and remove them before they turn to cancer. To schedule your Colonoscopy, please call [phone]            Sincerely,         Frederic Holley MD   [id number] [place] [address] [phone]         [facility] offers online access to your health information via [email or web address] .   By registering for [employer] you will be able to view test results, pay your bill, send messages to your care team (not for immediate response), refill prescriptions, schedule and verify appointments.      
Medication. Will continue to monitor the patient

## 2024-07-10 ENCOUNTER — EMERGENCY (EMERGENCY)
Facility: HOSPITAL | Age: 39
LOS: 1 days | Discharge: HOME | End: 2024-07-10
Attending: EMERGENCY MEDICINE
Payer: MEDICAID

## 2024-07-10 VITALS
HEART RATE: 61 BPM | RESPIRATION RATE: 14 BRPM | OXYGEN SATURATION: 98 % | SYSTOLIC BLOOD PRESSURE: 121 MMHG | DIASTOLIC BLOOD PRESSURE: 79 MMHG

## 2024-07-10 VITALS
OXYGEN SATURATION: 100 % | DIASTOLIC BLOOD PRESSURE: 83 MMHG | WEIGHT: 205.03 LBS | TEMPERATURE: 98 F | HEART RATE: 102 BPM | RESPIRATION RATE: 20 BRPM | HEIGHT: 64.96 IN | SYSTOLIC BLOOD PRESSURE: 175 MMHG

## 2024-07-10 DIAGNOSIS — Z98.2 PRESENCE OF CEREBROSPINAL FLUID DRAINAGE DEVICE: Chronic | ICD-10-CM

## 2024-07-10 DIAGNOSIS — Z98.84 BARIATRIC SURGERY STATUS: Chronic | ICD-10-CM

## 2024-07-10 LAB
ALBUMIN SERPL ELPH-MCNC: 3.8 G/DL — SIGNIFICANT CHANGE UP (ref 3.5–5)
ALP SERPL-CCNC: 93 U/L — SIGNIFICANT CHANGE UP (ref 40–120)
ALT FLD-CCNC: 27 U/L DA — SIGNIFICANT CHANGE UP (ref 10–60)
ANION GAP SERPL CALC-SCNC: 12 MMOL/L — SIGNIFICANT CHANGE UP (ref 5–17)
AST SERPL-CCNC: 15 U/L — SIGNIFICANT CHANGE UP (ref 10–40)
BASOPHILS # BLD AUTO: 0.07 K/UL — SIGNIFICANT CHANGE UP (ref 0–0.2)
BASOPHILS NFR BLD AUTO: 0.6 % — SIGNIFICANT CHANGE UP (ref 0–2)
BILIRUB SERPL-MCNC: 0.8 MG/DL — SIGNIFICANT CHANGE UP (ref 0.2–1.2)
BUN SERPL-MCNC: 22 MG/DL — HIGH (ref 7–18)
CALCIUM SERPL-MCNC: 9.4 MG/DL — SIGNIFICANT CHANGE UP (ref 8.4–10.5)
CHLORIDE SERPL-SCNC: 108 MMOL/L — SIGNIFICANT CHANGE UP (ref 96–108)
CO2 SERPL-SCNC: 19 MMOL/L — LOW (ref 22–31)
CREAT SERPL-MCNC: 1.02 MG/DL — SIGNIFICANT CHANGE UP (ref 0.5–1.3)
EGFR: 96 ML/MIN/1.73M2 — SIGNIFICANT CHANGE UP
EGFR: 96 ML/MIN/1.73M2 — SIGNIFICANT CHANGE UP
EOSINOPHIL # BLD AUTO: 0.04 K/UL — SIGNIFICANT CHANGE UP (ref 0–0.5)
EOSINOPHIL NFR BLD AUTO: 0.4 % — SIGNIFICANT CHANGE UP (ref 0–6)
FLUAV AG NPH QL: SIGNIFICANT CHANGE UP
FLUBV AG NPH QL: SIGNIFICANT CHANGE UP
GLUCOSE SERPL-MCNC: 132 MG/DL — HIGH (ref 70–99)
HCT VFR BLD CALC: 44 % — SIGNIFICANT CHANGE UP (ref 39–50)
HGB BLD-MCNC: 15.2 G/DL — SIGNIFICANT CHANGE UP (ref 13–17)
IMM GRANULOCYTES NFR BLD AUTO: 0.3 % — SIGNIFICANT CHANGE UP (ref 0–0.9)
LYMPHOCYTES # BLD AUTO: 4.62 K/UL — HIGH (ref 1–3.3)
LYMPHOCYTES # BLD AUTO: 40.6 % — SIGNIFICANT CHANGE UP (ref 13–44)
MCHC RBC-ENTMCNC: 29.6 PG — SIGNIFICANT CHANGE UP (ref 27–34)
MCHC RBC-ENTMCNC: 34.5 GM/DL — SIGNIFICANT CHANGE UP (ref 32–36)
MCV RBC AUTO: 85.8 FL — SIGNIFICANT CHANGE UP (ref 80–100)
MONOCYTES # BLD AUTO: 1.39 K/UL — HIGH (ref 0–0.9)
MONOCYTES NFR BLD AUTO: 12.2 % — SIGNIFICANT CHANGE UP (ref 2–14)
NEUTROPHILS # BLD AUTO: 5.22 K/UL — SIGNIFICANT CHANGE UP (ref 1.8–7.4)
NEUTROPHILS NFR BLD AUTO: 45.9 % — SIGNIFICANT CHANGE UP (ref 43–77)
NRBC # BLD: 0 /100 WBCS — SIGNIFICANT CHANGE UP (ref 0–0)
NRBC BLD-RTO: 0 /100 WBCS — SIGNIFICANT CHANGE UP (ref 0–0)
PLATELET # BLD AUTO: 218 K/UL — SIGNIFICANT CHANGE UP (ref 150–400)
POTASSIUM SERPL-MCNC: 3.1 MMOL/L — LOW (ref 3.5–5.3)
POTASSIUM SERPL-SCNC: 3.1 MMOL/L — LOW (ref 3.5–5.3)
PROT SERPL-MCNC: 7.5 G/DL — SIGNIFICANT CHANGE UP (ref 6–8.3)
RBC # BLD: 5.13 M/UL — SIGNIFICANT CHANGE UP (ref 4.2–5.8)
RBC # FLD: 13.3 % — SIGNIFICANT CHANGE UP (ref 10.3–14.5)
SARS-COV-2 RNA SPEC QL NAA+PROBE: DETECTED
SODIUM SERPL-SCNC: 139 MMOL/L — SIGNIFICANT CHANGE UP (ref 135–145)
TROPONIN I, HIGH SENSITIVITY RESULT: 4.8 NG/L — SIGNIFICANT CHANGE UP
WBC # BLD: 11.37 K/UL — HIGH (ref 3.8–10.5)
WBC # FLD AUTO: 11.37 K/UL — HIGH (ref 3.8–10.5)

## 2024-07-10 PROCEDURE — 99285 EMERGENCY DEPT VISIT HI MDM: CPT

## 2024-07-10 PROCEDURE — 87636 SARSCOV2 & INF A&B AMP PRB: CPT

## 2024-07-10 PROCEDURE — 84484 ASSAY OF TROPONIN QUANT: CPT

## 2024-07-10 PROCEDURE — 85025 COMPLETE CBC W/AUTO DIFF WBC: CPT

## 2024-07-10 PROCEDURE — 36415 COLL VENOUS BLD VENIPUNCTURE: CPT

## 2024-07-10 PROCEDURE — 93005 ELECTROCARDIOGRAM TRACING: CPT

## 2024-07-10 PROCEDURE — 99285 EMERGENCY DEPT VISIT HI MDM: CPT | Mod: 25

## 2024-07-10 PROCEDURE — 96374 THER/PROPH/DIAG INJ IV PUSH: CPT

## 2024-07-10 PROCEDURE — 80053 COMPREHEN METABOLIC PANEL: CPT

## 2024-07-10 PROCEDURE — 71045 X-RAY EXAM CHEST 1 VIEW: CPT | Mod: 26

## 2024-07-10 PROCEDURE — 71045 X-RAY EXAM CHEST 1 VIEW: CPT

## 2024-07-10 RX ORDER — LORAZEPAM 4 MG/ML
1 VIAL (ML) INJECTION ONCE
Refills: 0 | Status: DISCONTINUED | OUTPATIENT
Start: 2024-07-10 | End: 2024-07-10

## 2024-07-10 RX ADMIN — Medication 1 MILLIGRAM(S): at 21:05

## 2024-07-22 ENCOUNTER — APPOINTMENT (OUTPATIENT)
Dept: INTERNAL MEDICINE | Facility: CLINIC | Age: 39
End: 2024-07-22
Payer: MEDICAID

## 2024-07-22 VITALS
SYSTOLIC BLOOD PRESSURE: 145 MMHG | OXYGEN SATURATION: 98 % | WEIGHT: 202 LBS | BODY MASS INDEX: 33.66 KG/M2 | TEMPERATURE: 97.2 F | HEART RATE: 64 BPM | DIASTOLIC BLOOD PRESSURE: 67 MMHG | HEIGHT: 65 IN

## 2024-07-22 DIAGNOSIS — E87.6 HYPOKALEMIA: ICD-10-CM

## 2024-07-22 DIAGNOSIS — E66.9 OBESITY, UNSPECIFIED: ICD-10-CM

## 2024-07-22 DIAGNOSIS — Z00.00 ENCOUNTER FOR GENERAL ADULT MEDICAL EXAMINATION W/OUT ABNORMAL FINDINGS: ICD-10-CM

## 2024-07-22 DIAGNOSIS — G47.33 OBSTRUCTIVE SLEEP APNEA (ADULT) (PEDIATRIC): ICD-10-CM

## 2024-07-22 PROCEDURE — 99385 PREV VISIT NEW AGE 18-39: CPT | Mod: 25

## 2024-07-22 PROCEDURE — G0447 BEHAVIOR COUNSEL OBESITY 15M: CPT

## 2024-07-22 PROCEDURE — G0444 DEPRESSION SCREEN ANNUAL: CPT | Mod: 59

## 2024-07-23 ENCOUNTER — NON-APPOINTMENT (OUTPATIENT)
Age: 39
End: 2024-07-23

## 2024-07-23 PROBLEM — B00.9 HSV INFECTION: Status: ACTIVE | Noted: 2024-07-23

## 2024-07-23 PROBLEM — E66.9 OBESITY (BMI 30-39.9): Status: ACTIVE | Noted: 2024-07-23

## 2024-07-23 LAB
ANION GAP SERPL CALC-SCNC: 11 MMOL/L
BUN SERPL-MCNC: 17 MG/DL
CALCIUM SERPL-MCNC: 9.6 MG/DL
CHLORIDE SERPL-SCNC: 103 MMOL/L
CO2 SERPL-SCNC: 26 MMOL/L
CREAT SERPL-MCNC: 0.61 MG/DL
EGFR: 126 ML/MIN/1.73M2
GLUCOSE SERPL-MCNC: 85 MG/DL
HCV AB SER QL: NONREACTIVE
HCV S/CO RATIO: 0.11 S/CO
HIV1+2 AB SPEC QL IA.RAPID: NONREACTIVE
MAGNESIUM SERPL-MCNC: 2.2 MG/DL
PHOSPHATE SERPL-MCNC: 3.1 MG/DL
POTASSIUM SERPL-SCNC: 3.9 MMOL/L
SODIUM SERPL-SCNC: 140 MMOL/L
TSH SERPL-ACNC: 1.62 UIU/ML

## 2024-07-23 RX ORDER — VALACYCLOVIR 1 G/1
1 TABLET, FILM COATED ORAL DAILY
Qty: 14 | Refills: 0 | Status: ACTIVE | COMMUNITY
Start: 2024-07-23 | End: 1900-01-01

## 2024-07-26 ENCOUNTER — APPOINTMENT (OUTPATIENT)
Dept: INTERNAL MEDICINE | Facility: CLINIC | Age: 39
End: 2024-07-26
Payer: MEDICAID

## 2024-07-26 ENCOUNTER — APPOINTMENT (OUTPATIENT)
Dept: INTERNAL MEDICINE | Facility: CLINIC | Age: 39
End: 2024-07-26

## 2024-07-26 ENCOUNTER — APPOINTMENT (OUTPATIENT)
Dept: UROLOGY | Facility: CLINIC | Age: 39
End: 2024-07-26
Payer: MEDICAID

## 2024-07-26 VITALS
SYSTOLIC BLOOD PRESSURE: 125 MMHG | HEART RATE: 64 BPM | BODY MASS INDEX: 33.99 KG/M2 | TEMPERATURE: 98.1 F | OXYGEN SATURATION: 97 % | WEIGHT: 204 LBS | HEIGHT: 65 IN | DIASTOLIC BLOOD PRESSURE: 84 MMHG

## 2024-07-26 DIAGNOSIS — B00.9 HERPESVIRAL INFECTION, UNSPECIFIED: ICD-10-CM

## 2024-07-26 DIAGNOSIS — K13.6 IRRITATIVE HYPERPLASIA OF ORAL MUCOSA: ICD-10-CM

## 2024-07-26 DIAGNOSIS — R39.16 STRAINING TO VOID: ICD-10-CM

## 2024-07-26 DIAGNOSIS — K14.9 DISEASE OF TONGUE, UNSPECIFIED: ICD-10-CM

## 2024-07-26 DIAGNOSIS — H54.7 UNSPECIFIED VISUAL LOSS: ICD-10-CM

## 2024-07-26 DIAGNOSIS — F41.9 ANXIETY DISORDER, UNSPECIFIED: ICD-10-CM

## 2024-07-26 DIAGNOSIS — R33.9 RETENTION OF URINE, UNSPECIFIED: ICD-10-CM

## 2024-07-26 PROCEDURE — G2211 COMPLEX E/M VISIT ADD ON: CPT | Mod: NC,1L

## 2024-07-26 PROCEDURE — 99214 OFFICE O/P EST MOD 30 MIN: CPT | Mod: 25

## 2024-07-26 PROCEDURE — 99213 OFFICE O/P EST LOW 20 MIN: CPT

## 2024-07-26 NOTE — HEALTH RISK ASSESSMENT
[1] : 2) Feeling down, depressed, or hopeless for several days (1) [Yes] : Yes [Former] : Former [< 15 Years] : < 15 Years [On disability] : on disability [No] : In the past 12 months have you used drugs other than those required for medical reasons? No [PHQ-9 Positive] : PHQ-9 Positive [I have developed a follow-up plan documented below in the note.] : I have developed a follow-up plan documented below in the note. [Time Spent: ___ Minutes] : I spent [unfilled] minutes performing a depression screening for this patient. [NO] : No [HIV test declined] : HIV test declined [Hepatitis C test declined] : Hepatitis C test declined [Transportation] : transportation [Smoke Detector] : smoke detector [Carbon Monoxide Detector] : carbon monoxide detector [Seat Belt] :  uses seat belt [Sunscreen] : uses sunscreen [de-identified] : social [de-identified] : walking [de-identified] : small frequent meals [[id number]] : 2 [de-identified] : 1-2 cigarettes a day and weed use for 8 years, quit 5 years ago [Change in mental status noted] : No change in mental status noted [Language] : denies difficulty with language [Behavior] : denies difficulty with behavior [Learning/Retaining New Information] : denies difficulty learning/retaining new information

## 2024-07-26 NOTE — COUNSELING
[Encouraged to increase physical activity] : Encouraged to increase physical activity [Target Wt Loss Goal ___] : Weight Loss Goals: Target weight loss goal [unfilled] lbs [Weigh Self Weekly] : weigh self weekly [____ min/wk Activity] : [unfilled] min/wk activity [Good understanding] : Patient has a good understanding of disease, goals and obesity follow-up plan [FreeTextEntry2] : s/p bariatirc surgery continuing to lose weight monitoring food intake closely.  [FreeTextEntry4] : 17

## 2024-07-26 NOTE — PHYSICAL EXAM
[No Acute Distress] : no acute distress [Well Nourished] : well nourished [Well Developed] : well developed [Supple] : supple [No Respiratory Distress] : no respiratory distress  [No Accessory Muscle Use] : no accessory muscle use [Clear to Auscultation] : lungs were clear to auscultation bilaterally [Normal Rate] : normal rate  [Regular Rhythm] : with a regular rhythm [Normal S1, S2] : normal S1 and S2 [Non Tender] : non-tender [Normal Bowel Sounds] : normal bowel sounds [de-identified] : Has white cane  [de-identified] : no lesions around oral region [de-identified] : R pupil > L pupil [de-identified] : panus  [de-identified] : no lesions at scrotum, penis or anus; Dr. Yola garcia

## 2024-07-26 NOTE — PHYSICAL EXAM
[General Appearance - Well Nourished] : well nourished [FreeTextEntry1] : Blind [de-identified] : Redundant skin from weight loss, no penile lesions or pain, testes normal bilaterally. PVR 0.

## 2024-07-26 NOTE — HEALTH RISK ASSESSMENT
[1] : 2) Feeling down, depressed, or hopeless for several days (1) [Yes] : Yes [Former] : Former [< 15 Years] : < 15 Years [On disability] : on disability [No] : In the past 12 months have you used drugs other than those required for medical reasons? No [PHQ-9 Positive] : PHQ-9 Positive [I have developed a follow-up plan documented below in the note.] : I have developed a follow-up plan documented below in the note. [Time Spent: ___ Minutes] : I spent [unfilled] minutes performing a depression screening for this patient. [NO] : No [HIV test declined] : HIV test declined [Hepatitis C test declined] : Hepatitis C test declined [Transportation] : transportation [Smoke Detector] : smoke detector [Carbon Monoxide Detector] : carbon monoxide detector [Seat Belt] :  uses seat belt [Sunscreen] : uses sunscreen [de-identified] : social [de-identified] : walking [de-identified] : small frequent meals [[id number]] : 2 [de-identified] : 1-2 cigarettes a day and weed use for 8 years, quit 5 years ago [Change in mental status noted] : No change in mental status noted [Language] : denies difficulty with language [Behavior] : denies difficulty with behavior [Learning/Retaining New Information] : denies difficulty learning/retaining new information

## 2024-07-26 NOTE — PHYSICAL EXAM
[No Acute Distress] : no acute distress [Well Nourished] : well nourished [Well Developed] : well developed [Supple] : supple [No Respiratory Distress] : no respiratory distress  [No Accessory Muscle Use] : no accessory muscle use [Clear to Auscultation] : lungs were clear to auscultation bilaterally [Normal Rate] : normal rate  [Regular Rhythm] : with a regular rhythm [Normal S1, S2] : normal S1 and S2 [Non Tender] : non-tender [Normal Bowel Sounds] : normal bowel sounds [de-identified] : Has white cane  [de-identified] : R pupil > L pupil [de-identified] : no lesions around oral region [de-identified] : panus  [de-identified] : no lesions at scrotum, penis or anus; Dr. Yola garcia

## 2024-07-26 NOTE — PLAN
[FreeTextEntry1] : Patient is a 39 year old male with a PMHx of HTN, hypothyroidism, celiac disease, pseudotumor with  shunt in 2019, anxiety, morbid obesity (BMI was 80) with bariatric surgery in 2021, LAZARO (used to be on CPAP but not anymore), bilateral vision loss here for a new visit.   HSV infection, STI screening  - patient reports known contact with HSV and previous herpes lesions at his anus and mouth; currently reports a burning sensation at anus and itchiness and numbness at left oral region; on physical exam no lesions at anus, penis, scrotum or oral region  - start valtrex 1g for breakthrough herpes lesions  - f/u chlamydia/ Gonorrhea, hep c, HIV, syphilis   Acute hypokalemia - Patient went to ER 1 week ago for a panic attack, K was found to be 3.1 - f/u BMP, Mg, phosphorus   Hypothyroidism  - not on levothyroxine for 6 months - f/u TSH with free T4 reflex  Urinary disorder - reports benign cystoscopy but reports intermittent difficulty urinating, denies dysuria - urology referral  LAZARO - pulmonology referral for CPAP  Blindness, chronic permanent  - acquired due to neuro complications - follows with neurology  HCM pt unsure of last Tdap, will look at medcial records LSM for obesity discussed annual depression screen is negative - strong family support

## 2024-07-26 NOTE — PHYSICAL EXAM
[General Appearance - Well Nourished] : well nourished [FreeTextEntry1] : Blind [de-identified] : Redundant skin from weight loss, no penile lesions or pain, testes normal bilaterally. PVR 0.

## 2024-07-26 NOTE — ASSESSMENT
[FreeTextEntry1] : 39M presenting for follow up for sensation of incomplete bladder emptying and sometimes straining to void. Reportedly diagnosed with HSV but no genital lesions or pain. PVR today 0. Discussed with patient that he is emptying well. He can follow up to clinic as needed should urinary symptoms worsen.   Plan:  - U/A reflex -HIV negative -GC/chlamydia negative -Syphilis screen negative -Herpes screen negative - RTC PRN

## 2024-07-26 NOTE — REVIEW OF SYSTEMS
[Itching] : itching [Hesitancy] : hesitancy [Shortness Of Breath] : no shortness of breath [Abdominal Pain] : no abdominal pain [Dysuria] : no dysuria [Hematuria] : no hematuria [FreeTextEntry8] : + urgency  [de-identified] : burning sensation at anus and mouth region, numbness at left side of mouth

## 2024-07-26 NOTE — HISTORY OF PRESENT ILLNESS
[FreeTextEntry1] : establish care [de-identified] : Patient is a 39 year old male with a PMHx of HTN, hypothyroidism, celiac disease, pseudotumor with  shunt in 2019, anxiety, morbid obesity (BMI was 80) with bariatric surgery in 2021, LAZARO (used to be on CPAP but not anymore), bilateral vision loss here for a new visit.  In March 2024, his female partner was HSV-2 + and had lesions on his penis. He went to urgent care, was swabbed and HSV testing was negative, and was given valtrex for 4-5 days. Since March 2024, he had a flare up of lesions at his anus x3 times and mouth x2; reports numbness and itchiness of his left side of his mouth and states the lesions around his mouth scabbed. Reports yesterday there was a burning sensation at his anus but he also ate gluten. Thinks flare ups occurs with sun exposure. Concerned the lesions spread to his eye as his left eye is itchy. Denies pain with extraocular eye movement, burning, or gritty sensation. Denies fever, chills, SOB, abdominal pain.   Patient reports he had "problems emptying out his bladder" for months, and completed a cystoscopy before March 2024, and was told he had "no problems". He reports intermittent urgency to urinate but cannot urinate; drinking water helps. Did not go back to urology after cystoscopy. Denies dysuria.   Had covid 2 weeks ago and went to ER and had panic attack, got EKG and CXR which was "normal" - received Ativan which calmed him down. He f/u with psychiatrist and therapist routinely for his anxiety and panic attacks.   He reports he f/u with neurology for sciatica. He reports pain and a burning sensation from his coccyx to left foot. Has burning and pain station from left mid torso to mid sternal chest region. He went to cardiology and was told his heart is "normal". He takes gabapentin around 1x/week but it makes him drowsy.   He stopped taking levothyroxine 25 mcg 6 months ago because his endo said the dosage was not effective with his weight in the past (he was 300-400lbs)  goal to have a panniculectomy - goal is 195lbs and to remain that weight for 6 weeks.   take metoprolol 25mg daily for HTN, clonazepam and buspirone for anxiety, gabapentin 100 mg, folic acid

## 2024-07-26 NOTE — HISTORY OF PRESENT ILLNESS
[FreeTextEntry1] : establish care [de-identified] : Patient is a 39 year old male with a PMHx of HTN, hypothyroidism, celiac disease, pseudotumor with  shunt in 2019, anxiety, morbid obesity (BMI was 80) with bariatric surgery in 2021, LAZARO (used to be on CPAP but not anymore), bilateral vision loss here for a new visit.  In March 2024, his female partner was HSV-2 + and had lesions on his penis. He went to urgent care, was swabbed and HSV testing was negative, and was given valtrex for 4-5 days. Since March 2024, he had a flare up of lesions at his anus x3 times and mouth x2; reports numbness and itchiness of his left side of his mouth and states the lesions around his mouth scabbed. Reports yesterday there was a burning sensation at his anus but he also ate gluten. Thinks flare ups occurs with sun exposure. Concerned the lesions spread to his eye as his left eye is itchy. Denies pain with extraocular eye movement, burning, or gritty sensation. Denies fever, chills, SOB, abdominal pain.   Patient reports he had "problems emptying out his bladder" for months, and completed a cystoscopy before March 2024, and was told he had "no problems". He reports intermittent urgency to urinate but cannot urinate; drinking water helps. Did not go back to urology after cystoscopy. Denies dysuria.   Had covid 2 weeks ago and went to ER and had panic attack, got EKG and CXR which was "normal" - received Ativan which calmed him down. He f/u with psychiatrist and therapist routinely for his anxiety and panic attacks.   He reports he f/u with neurology for sciatica. He reports pain and a burning sensation from his coccyx to left foot. Has burning and pain station from left mid torso to mid sternal chest region. He went to cardiology and was told his heart is "normal". He takes gabapentin around 1x/week but it makes him drowsy.   He stopped taking levothyroxine 25 mcg 6 months ago because his endo said the dosage was not effective with his weight in the past (he was 300-400lbs)  goal to have a panniculectomy - goal is 195lbs and to remain that weight for 6 weeks.   take metoprolol 25mg daily for HTN, clonazepam and buspirone for anxiety, gabapentin 100 mg, folic acid

## 2024-07-26 NOTE — HEALTH RISK ASSESSMENT
[1] : 2) Feeling down, depressed, or hopeless for several days (1) [Yes] : Yes [Former] : Former [< 15 Years] : < 15 Years [On disability] : on disability [No] : In the past 12 months have you used drugs other than those required for medical reasons? No [PHQ-9 Positive] : PHQ-9 Positive [I have developed a follow-up plan documented below in the note.] : I have developed a follow-up plan documented below in the note. [Time Spent: ___ Minutes] : I spent [unfilled] minutes performing a depression screening for this patient. [NO] : No [HIV test declined] : HIV test declined [Hepatitis C test declined] : Hepatitis C test declined [Transportation] : transportation [Smoke Detector] : smoke detector [Carbon Monoxide Detector] : carbon monoxide detector [Seat Belt] :  uses seat belt [Sunscreen] : uses sunscreen [de-identified] : social [de-identified] : walking [[id number]] : 2 [de-identified] : small frequent meals [de-identified] : 1-2 cigarettes a day and weed use for 8 years, quit 5 years ago [Change in mental status noted] : No change in mental status noted [Language] : denies difficulty with language [Behavior] : denies difficulty with behavior [Learning/Retaining New Information] : denies difficulty learning/retaining new information

## 2024-07-26 NOTE — PHYSICAL EXAM
[No Acute Distress] : no acute distress [Well Nourished] : well nourished [Well Developed] : well developed [Supple] : supple [No Respiratory Distress] : no respiratory distress  [No Accessory Muscle Use] : no accessory muscle use [Clear to Auscultation] : lungs were clear to auscultation bilaterally [Normal Rate] : normal rate  [Regular Rhythm] : with a regular rhythm [Normal S1, S2] : normal S1 and S2 [Non Tender] : non-tender [Normal Bowel Sounds] : normal bowel sounds [de-identified] : Has white cane  [de-identified] : no lesions around oral region [de-identified] : R pupil > L pupil [de-identified] : panus  [de-identified] : no lesions at scrotum, penis or anus; Dr. Yola garcia

## 2024-07-26 NOTE — REVIEW OF SYSTEMS
[Itching] : itching [Hesitancy] : hesitancy [Shortness Of Breath] : no shortness of breath [Abdominal Pain] : no abdominal pain [Dysuria] : no dysuria [Hematuria] : no hematuria [FreeTextEntry8] : + urgency  [de-identified] : burning sensation at anus and mouth region, numbness at left side of mouth

## 2024-07-26 NOTE — REVIEW OF SYSTEMS
[Itching] : itching [Hesitancy] : hesitancy [Shortness Of Breath] : no shortness of breath [Abdominal Pain] : no abdominal pain [Dysuria] : no dysuria [Hematuria] : no hematuria [FreeTextEntry8] : + urgency  [de-identified] : burning sensation at anus and mouth region, numbness at left side of mouth

## 2024-07-26 NOTE — END OF VISIT
[] : Resident [FreeTextEntry3] : I, Dr. Arnaud Aceves, saw and evaluated this patient in the presence of the resident. I discussed the management with the resident. I reviewed the note and I agree with the documented plan of care with the following confirmations/corrections/additions: None.

## 2024-07-26 NOTE — HISTORY OF PRESENT ILLNESS
[FreeTextEntry1] : establish care [de-identified] : Patient is a 39 year old male with a PMHx of HTN, hypothyroidism, celiac disease, pseudotumor with  shunt in 2019, anxiety, morbid obesity (BMI was 80) with bariatric surgery in 2021, LAZARO (used to be on CPAP but not anymore), bilateral vision loss here for a new visit.  In March 2024, his female partner was HSV-2 + and had lesions on his penis. He went to urgent care, was swabbed and HSV testing was negative, and was given valtrex for 4-5 days. Since March 2024, he had a flare up of lesions at his anus x3 times and mouth x2; reports numbness and itchiness of his left side of his mouth and states the lesions around his mouth scabbed. Reports yesterday there was a burning sensation at his anus but he also ate gluten. Thinks flare ups occurs with sun exposure. Concerned the lesions spread to his eye as his left eye is itchy. Denies pain with extraocular eye movement, burning, or gritty sensation. Denies fever, chills, SOB, abdominal pain.   Patient reports he had "problems emptying out his bladder" for months, and completed a cystoscopy before March 2024, and was told he had "no problems". He reports intermittent urgency to urinate but cannot urinate; drinking water helps. Did not go back to urology after cystoscopy. Denies dysuria.   Had covid 2 weeks ago and went to ER and had panic attack, got EKG and CXR which was "normal" - received Ativan which calmed him down. He f/u with psychiatrist and therapist routinely for his anxiety and panic attacks.   He reports he f/u with neurology for sciatica. He reports pain and a burning sensation from his coccyx to left foot. Has burning and pain station from left mid torso to mid sternal chest region. He went to cardiology and was told his heart is "normal". He takes gabapentin around 1x/week but it makes him drowsy.   He stopped taking levothyroxine 25 mcg 6 months ago because his endo said the dosage was not effective with his weight in the past (he was 300-400lbs)  goal to have a panniculectomy - goal is 195lbs and to remain that weight for 6 weeks.   take metoprolol 25mg daily for HTN, clonazepam and buspirone for anxiety, gabapentin 100 mg, folic acid

## 2024-07-26 NOTE — HISTORY OF PRESENT ILLNESS
[FreeTextEntry1] : 38-year-old blind gentleman who presents for follow-up with his mother.  3/17/23: He reports that dysuria has subsided. STD testing negative, including GC/chlamydia and Ureaplasma/mycoplasma. He was recently found to have 7 red blood cells per high-powered field on urinalysis. Because of this he underwent a renal ultrasound approximately 1 month ago demonstrating no kidney stones, hydronephrosis, nor renal masses. He underwent a cystoscopy today which demonstrated a number of small but easily passable urethral strictures in the anterior urethra. No urethral discharge. No other complaints.  11/3/23: Doing well today. Complains of penile itching intermittently since December of 2022. Endorses being sexually active, recent STD panel negative. Currently using an antifungal as needed prescribed by his PCP which has helped. Otherwise, denies dysuria, incomplete bladder emptying, straining, or hesitancy.  7/26/24: Patient presents today for follow up for occasional sensation of incomplete bladder emptying and sometimes straining to urinate. He believes it is neurological related. Otherwise, no other urinary issues. His partner was recently diagnosed with HSV-2 which he believes he also has and is reportedly starting Valtrex today. He underwent STD testing with his PCP two days ago which was negative, but HSV swab was not done. He does not have any penile lesions or pain and denies difficulty urinating, dysuria, gross hematuria.

## 2024-07-29 PROBLEM — K13.6 IRRITATION OF ORAL CAVITY: Status: ACTIVE | Noted: 2024-07-26

## 2024-07-29 NOTE — HEALTH RISK ASSESSMENT
[0] : 2) Feeling down, depressed, or hopeless: Not at all (0) [PHQ-2 Negative - No further assessment needed] : PHQ-2 Negative - No further assessment needed [[id number]] : 0

## 2024-07-29 NOTE — HEALTH RISK ASSESSMENT
[0] : 2) Feeling down, depressed, or hopeless: Not at all (0) [PHQ-2 Negative - No further assessment needed] : PHQ-2 Negative - No further assessment needed [[id number]] : 0

## 2024-07-29 NOTE — END OF VISIT
[] : Resident [FreeTextEntry3] : I, Dr. Arnaud Aceves, saw and evaluated this patient in the presence of the resident. I discussed the management with the resident. I reviewed the note and agree with the documented plan of care with the following confirmations/corrections/additions: None.

## 2024-07-29 NOTE — PHYSICAL EXAM
[No Acute Distress] : no acute distress [No Respiratory Distress] : no respiratory distress  [No Accessory Muscle Use] : no accessory muscle use [Clear to Auscultation] : lungs were clear to auscultation bilaterally [Normal Rate] : normal rate  [Regular Rhythm] : with a regular rhythm [Normal S1, S2] : normal S1 and S2 [No Murmur] : no murmur heard [Normal Bowel Sounds] : normal bowel sounds [de-identified] : white cane present [de-identified] : lesion at left posterior tongue, no discharge, not tender to palpation with tongue depressor [de-identified] : equal sensation of face, symmetrical movement of lips and eyebrows

## 2024-07-29 NOTE — HISTORY OF PRESENT ILLNESS
[FreeTextEntry1] : numbness of left cheek and mouth and hx of panic attack [de-identified] : Patient is a 39 year old male with a PMHx of HTN, hypothyroidism, celiac disease, pseudotumor with  shunt in 2019, anxiety, morbid obesity (BMI was 80) with bariatric surgery in 2021, LAZARO (used to be on CPAP but not anymore), bilateral vision loss here for a follow up. Patient reports numbness of his left cheek and mouth for 1 week; denies itching or burning. Patient also reports lesions at his left posterior tongue, started 1 week ago. Reports it was previously tender to palpation when he brushed his teeth and used Listerine, but thinks it is healing. Had vomited once due to dumping syndrome. No dysphagia or discharge. Patient has not started valtrex yet.  Patient has a history of panic attacks and anxiety. He reports a panic attack 2 days ago; patient think maybe it's anxiety related, unsure of trigger. Patient finished working out and felt a lot of energy - then started having chest pain, neck pain, palpitations, dizzy, mild SOB. This panic attack is similar to the ones in the past, but this one was "more intense" but less severe than the panic attack that led him to go to the ER 2 weeks ago (noted in past visit). Patient took a shower and took deep breaths and felt relief. Is taking buspirone 20mg TID, clonazepam 0.5mg PRN; reports buspirone is not helping with his anxiety/panic attack. Last spoke to therapist yesterday; last spoke to psychiatrist 1 week ago who refilled buspirone. Is requesting venlafaxine. Psychiatrist prescribed Prozac in the past but patient never took it. States his anxiety is caused by different stressors.

## 2024-07-29 NOTE — PHYSICAL EXAM
[No Acute Distress] : no acute distress [No Respiratory Distress] : no respiratory distress  [No Accessory Muscle Use] : no accessory muscle use [Clear to Auscultation] : lungs were clear to auscultation bilaterally [Normal Rate] : normal rate  [Regular Rhythm] : with a regular rhythm [Normal S1, S2] : normal S1 and S2 [No Murmur] : no murmur heard [Normal Bowel Sounds] : normal bowel sounds [de-identified] : white cane present [de-identified] : lesion at left posterior tongue, no discharge, not tender to palpation with tongue depressor [de-identified] : equal sensation of face, symmetrical movement of lips and eyebrows

## 2024-07-29 NOTE — REVIEW OF SYSTEMS
[Anxiety] : anxiety [FreeTextEntry4] : bumps on his left posterior tongue that are healing [de-identified] : numbness of left cheek, mouth and chin [de-identified] : panic attack

## 2024-07-29 NOTE — HISTORY OF PRESENT ILLNESS
[FreeTextEntry1] : numbness of left cheek and mouth and hx of panic attack [de-identified] : Patient is a 39 year old male with a PMHx of HTN, hypothyroidism, celiac disease, pseudotumor with  shunt in 2019, anxiety, morbid obesity (BMI was 80) with bariatric surgery in 2021, LAZARO (used to be on CPAP but not anymore), bilateral vision loss here for a follow up. Patient reports numbness of his left cheek and mouth for 1 week; denies itching or burning. Patient also reports lesions at his left posterior tongue, started 1 week ago. Reports it was previously tender to palpation when he brushed his teeth and used Listerine, but thinks it is healing. Had vomited once due to dumping syndrome. No dysphagia or discharge. Patient has not started valtrex yet.  Patient has a history of panic attacks and anxiety. He reports a panic attack 2 days ago; patient think maybe it's anxiety related, unsure of trigger. Patient finished working out and felt a lot of energy - then started having chest pain, neck pain, palpitations, dizzy, mild SOB. This panic attack is similar to the ones in the past, but this one was "more intense" but less severe than the panic attack that led him to go to the ER 2 weeks ago (noted in past visit). Patient took a shower and took deep breaths and felt relief. Is taking buspirone 20mg TID, clonazepam 0.5mg PRN; reports buspirone is not helping with his anxiety/panic attack. Last spoke to therapist yesterday; last spoke to psychiatrist 1 week ago who refilled buspirone. Is requesting venlafaxine. Psychiatrist prescribed Prozac in the past but patient never took it. States his anxiety is caused by different stressors.

## 2024-07-29 NOTE — REVIEW OF SYSTEMS
[Anxiety] : anxiety [FreeTextEntry4] : bumps on his left posterior tongue that are healing [de-identified] : numbness of left cheek, mouth and chin [de-identified] : panic attack

## 2024-07-29 NOTE — PLAN
[FreeTextEntry1] : Patient is a 39 year old male with a PMHx of HTN, hypothyroidism, celiac disease, pseudotumor with  shunt in 2019, anxiety, morbid obesity (BMI was 80) with bariatric surgery in 2021, LAZARO (used to be on CPAP but not anymore), and bilateral vision loss here for numbness of left mouth and cheek, tongue irritation and anxiety/panic attacks  HSV infection - reports numbness of left mouth and cheek. There is equal sensation of face and symmetrical movement of lips and eyebrows on physical exam. Patient has not started valtrex - most likely HSV related - start valtrex 1g for 7 days  Tongue irritation  - at left posterior tongue  - ENT referral   Panic attacks/anxiety  - taking buspirone 20mg TID and clonazepam PRN - recommend f/u with psychiatrist for medication management   Blindness, chronic permanent

## 2024-08-02 LAB
APPEARANCE: CLEAR
BACTERIA: ABNORMAL /HPF
BILIRUBIN URINE: NEGATIVE
BLOOD URINE: NEGATIVE
C TRACH RRNA SPEC QL NAA+PROBE: NOT DETECTED
CALCIUM OXALATE CRYSTALS: PRESENT
CAST: 6 /LPF
COLOR: YELLOW
EPITHELIAL CELLS: 3 /HPF
GLUCOSE QUALITATIVE U: NEGATIVE MG/DL
HYALINE CASTS: PRESENT
KETONES URINE: NEGATIVE MG/DL
LEUKOCYTE ESTERASE URINE: NEGATIVE
MICROSCOPIC-UA: NORMAL
MUCUS: PRESENT
N GONORRHOEA RRNA SPEC QL NAA+PROBE: NOT DETECTED
NITRITE URINE: NEGATIVE
PH URINE: 5.5
PROTEIN URINE: NORMAL MG/DL
RED BLOOD CELLS URINE: 1 /HPF
REVIEW: NORMAL
SOURCE AMPLIFICATION: NORMAL
SPECIFIC GRAVITY URINE: 1.03
T PALLIDUM AB SER QL IA: NEGATIVE
UROBILINOGEN URINE: 0.2 MG/DL
WHITE BLOOD CELLS URINE: 0 /HPF

## 2024-08-05 ENCOUNTER — APPOINTMENT (OUTPATIENT)
Dept: NEUROSURGERY | Facility: CLINIC | Age: 39
End: 2024-08-05

## 2024-08-15 ENCOUNTER — NON-APPOINTMENT (OUTPATIENT)
Age: 39
End: 2024-08-15

## 2024-08-16 ENCOUNTER — EMERGENCY (EMERGENCY)
Facility: HOSPITAL | Age: 39
LOS: 1 days | Discharge: HOME | End: 2024-08-16
Payer: MEDICAID

## 2024-08-16 VITALS
TEMPERATURE: 98 F | DIASTOLIC BLOOD PRESSURE: 79 MMHG | HEART RATE: 58 BPM | WEIGHT: 199.96 LBS | RESPIRATION RATE: 17 BRPM | OXYGEN SATURATION: 100 % | HEIGHT: 65 IN | SYSTOLIC BLOOD PRESSURE: 128 MMHG

## 2024-08-16 DIAGNOSIS — Z98.84 BARIATRIC SURGERY STATUS: Chronic | ICD-10-CM

## 2024-08-16 DIAGNOSIS — Z98.2 PRESENCE OF CEREBROSPINAL FLUID DRAINAGE DEVICE: Chronic | ICD-10-CM

## 2024-08-16 PROCEDURE — 93971 EXTREMITY STUDY: CPT | Mod: 26,LT

## 2024-08-16 PROCEDURE — 93971 EXTREMITY STUDY: CPT

## 2024-08-16 PROCEDURE — 99284 EMERGENCY DEPT VISIT MOD MDM: CPT

## 2024-08-16 PROCEDURE — 99284 EMERGENCY DEPT VISIT MOD MDM: CPT | Mod: 25

## 2024-08-16 RX ORDER — IBUPROFEN 200 MG
600 TABLET ORAL ONCE
Refills: 0 | Status: COMPLETED | OUTPATIENT
Start: 2024-08-16 | End: 2024-08-16

## 2024-08-16 NOTE — ED PROVIDER NOTE - CLINICAL SUMMARY MEDICAL DECISION MAKING FREE TEXT BOX
39-year-old male with a past medical history of sleep apnea,  shunt, hydrocephalus, hypertension, varicose vein, pseudotumor cerebri syndrome, legally blind, presents with reports that 2 days ago he was sitting down working out his biceps with weights and he felt a sharp pain in his left lower leg and a popping sensation.  Patient reports that the pain is constant, worse with standing and ambulating.  Better when the leg is elevated.  Patient reports he went to urgent care who referred him to the emergency room to rule out DVT.    Will obtain ultrasound to rule out DVT.  Exam and history more consistent with a possible left gastrocnemis tear vs strain.  Will have patient follow-up with orthopedics.

## 2024-08-16 NOTE — ED PROVIDER NOTE - NSFOLLOWUPCLINICS_GEN_ALL_ED_FT
[facility]  Orthopedic Surgery  [address], 3rd & 4th [place] [address]  Phone: [phone]  Fax:     [facility]  Orthopedics  [address] [place] [address]  Phone: [phone]  Fax: [phone]

## 2024-08-16 NOTE — ED PROVIDER NOTE - NSFOLLOWUPINSTRUCTIONS_ED_ALL_ED_FT
Follow up with your primary care doctor within 1 week.     Follow up with orthopedics within 1 week.     You can take Motrin (ibuprofen) 600 mg every 6 hours or Tylenol (acetaminophen) 1000 mg every 6 hours as needed for pain or fever.     If you experience any new or worsening symptoms or if you are concerned you can always come back to the emergency for a re-evaluation.

## 2024-08-16 NOTE — ED ADULT NURSE NOTE - NSFALLUNIVINTERV_ED_ALL_ED
Bed/Stretcher in lowest position, wheels locked, appropriate side rails in place/Call bell, personal items and telephone in reach/Instruct patient to call for assistance before getting out of bed/chair/stretcher/Non-slip footwear applied when patient is off stretcher/[place] to call system/Physically safe environment - no spills, clutter or unnecessary equipment/Purposeful proactive rounding/Room/bathroom lighting operational, light cord in reach

## 2024-08-16 NOTE — ED PROVIDER NOTE - PATIENT PORTAL LINK FT
You can access the FollowMyHealth Patient Portal offered by [facility] by registering at the following website: [email or web address]. By joining [employer]’s FollowMyHealth portal, you will also be able to view your health information using other applications (apps) compatible with our system.

## 2024-08-16 NOTE — ED PROVIDER NOTE - PHYSICAL EXAMINATION
Left lower extremity: Left calf tenderness, left Achilles tendon intact, minimally swollen, full range of motion.  Positive distal sensation.  Palpable pedal pulse.

## 2024-08-16 NOTE — ED PROVIDER NOTE - OBJECTIVE STATEMENT
39-year-old male with a past medical history of sleep apnea,  shunt, hydrocephalus, hypertension, varicose vein, pseudotumor cerebri syndrome, legally blind, presents with reports that 2 days ago he was sitting down working out his biceps with weights and he felt a sharp pain in his left lower leg and a popping sensation.  Patient reports that the pain is constant, worse with standing and ambulating.  Better when the leg is elevated.  Patient reports he went to urgent care who referred him to the emergency room to rule out DVT.

## 2024-08-26 ENCOUNTER — APPOINTMENT (OUTPATIENT)
Dept: INTERNAL MEDICINE | Facility: CLINIC | Age: 39
End: 2024-08-26
Payer: MEDICAID

## 2024-08-26 ENCOUNTER — OUTPATIENT (OUTPATIENT)
Dept: OUTPATIENT SERVICES | Facility: HOSPITAL | Age: 39
LOS: 1 days | End: 2024-08-26
Payer: MEDICAID

## 2024-08-26 VITALS
TEMPERATURE: 97 F | HEART RATE: 51 BPM | HEIGHT: 65 IN | OXYGEN SATURATION: 99 % | DIASTOLIC BLOOD PRESSURE: 80 MMHG | SYSTOLIC BLOOD PRESSURE: 126 MMHG

## 2024-08-26 DIAGNOSIS — G47.33 OBSTRUCTIVE SLEEP APNEA (ADULT) (PEDIATRIC): ICD-10-CM

## 2024-08-26 DIAGNOSIS — I83.90 ASYMPTOMATIC VARICOSE VEINS OF UNSPECIFIED LOWER EXTREMITY: ICD-10-CM

## 2024-08-26 DIAGNOSIS — Z98.2 PRESENCE OF CEREBROSPINAL FLUID DRAINAGE DEVICE: Chronic | ICD-10-CM

## 2024-08-26 DIAGNOSIS — Z00.00 ENCOUNTER FOR GENERAL ADULT MEDICAL EXAMINATION WITHOUT ABNORMAL FINDINGS: ICD-10-CM

## 2024-08-26 DIAGNOSIS — Z98.84 BARIATRIC SURGERY STATUS: Chronic | ICD-10-CM

## 2024-08-26 DIAGNOSIS — S86.912A STRAIN OF UNSPECIFIED MUSCLE(S) AND TENDON(S) AT LOWER LEG LEVEL, LEFT LEG, INITIAL ENCOUNTER: ICD-10-CM

## 2024-08-26 PROCEDURE — G2211 COMPLEX E/M VISIT ADD ON: CPT | Mod: NC,1L

## 2024-08-26 PROCEDURE — G0463: CPT

## 2024-08-26 PROCEDURE — 99214 OFFICE O/P EST MOD 30 MIN: CPT | Mod: 25

## 2024-08-26 NOTE — HISTORY OF PRESENT ILLNESS
[FreeTextEntry1] : left leg pain [de-identified] : Patient is a 39 year old male with a PMHx of HTN, hypothyroidism, celiac disease, pseudotumor with  shunt in 2019, anxiety, morbid obesity (BMI was 80) with bariatric surgery in 2021, LAZARO (used to be on CPAP but not anymore), bilateral vision loss here for a follow up.   Patient was exercising his biceps 1-2 days ago and felt a "jolt" from his left thigh to calf, and heard a "pop" and saw his left leg varicose vein swell up. Denies falls/injuries or trauma to the area. Patient went to ER on 8/16/24 which showed no left LE DVT. Currently feels intermittent sharp left leg pain radiating from left shin/calf and ankle and intermittent numbness of his left toes, all which is improving from last week. He is trying to stay off his left leg but is able to walk on it. Patient iced his left leg which helped with his pain. Patient also reports left hip pain due to elevation of his left leg and "compensating for not putting weight on his left leg" that started 1-2 days ago. Patient has gabapentin for his history of sciatica but takes gabapentin PRN.

## 2024-08-26 NOTE — PLAN
[FreeTextEntry1] : Patient is a 39 year old male with a PMHx of HTN, hypothyroidism, celiac disease, pseudotumor with  shunt in 2019, anxiety, morbid obesity (BMI was 80) with bariatric surgery in 2021, LAZARO (used to be on CPAP but not anymore), and bilateral vision loss here for a follow up.  1) Muscle strain of left leg - Reports intermittent left lower leg pain after exercising his upper arms and heard a "pop", no left LE DVT on US from the ED - Physical exam shows no discoloration, erythema or swelling of left leg, minimal tenderness to palpation of left ankle but not left shin or calf region, no tenderness to palpation of left lower leg varicose vein, full active ROM of left hip, knee and ankle, left Achilles tendon strength intact, normal gait, able to bear weight of left leg - recommend conservative treatment with tylenol and ice packs - refer to PM&R  2) Varicose vein of left leg - refer to vascular surgery  3) LAZARO - Resent referral to pulmonology for LAZARO - patient needs a CPAP machine

## 2024-08-26 NOTE — PHYSICAL EXAM
[Normal] : no acute distress, well nourished, well developed and well-appearing [No Respiratory Distress] : no respiratory distress  [No Accessory Muscle Use] : no accessory muscle use [Clear to Auscultation] : lungs were clear to auscultation bilaterally [Normal Rate] : normal rate  [Regular Rhythm] : with a regular rhythm [Normal S1, S2] : normal S1 and S2 [No Murmur] : no murmur heard [No Edema] : there was no peripheral edema [Soft] : abdomen soft [Non Tender] : non-tender [Normal Gait] : normal gait [Pedal Pulses Present] : the pedal pulses are present [No Acute Distress] : no acute distress [Well Nourished] : well nourished [Well Developed] : well developed [Well-Appearing] : well-appearing [de-identified] : white cane present [de-identified] : varicose vein of left lower leg, no LE edema [de-identified] : active ROM of left knee, left hip, left ankle, minimal tender to palpation of left ankle, no tenderness to palpation of left calf or shin, able to bear weight on left leg [de-identified] : no discoloration, erythema or swelling of left leg [de-identified] : left Achilles tendon strength intact

## 2024-08-29 DIAGNOSIS — I83.90 ASYMPTOMATIC VARICOSE VEINS OF UNSPECIFIED LOWER EXTREMITY: ICD-10-CM

## 2024-08-29 DIAGNOSIS — G47.33 OBSTRUCTIVE SLEEP APNEA (ADULT) (PEDIATRIC): ICD-10-CM

## 2024-08-29 DIAGNOSIS — S86.912A STRAIN OF UNSPECIFIED MUSCLE(S) AND TENDON(S) AT LOWER LEG LEVEL, LEFT LEG, INITIAL ENCOUNTER: ICD-10-CM

## 2024-08-31 NOTE — OCCUPATIONAL THERAPY INITIAL EVALUATION ADULT - ADAPTIVE EQUIPMENT NEEDED
HOSPITALIST DISCHARGE INSTRUCTIONS    NAME: Fortunato Kowalski   :  1966   MRN:  [id number]     Date/Time:  2024 11:28 AM    ADMIT DATE: 2024   DISCHARGE DATE: 2024         It is important that you take the medication exactly as they are prescribed.   Keep your medication in the bottles provided by the pharmacist and keep a list of the medication names, dosages, and times to be taken in your wallet.   Do not take other medications without consulting your doctor.       What to do at Home    Recommended diet:  regular diet    Recommended activity: activity as tolerated      If you have questions regarding the hospital related prescriptions or hospital related issues please call [facility]' office at . You can always direct your questions to your primary care doctor if you are unable to reach your hospital physician; your PCP works as an extension of your hospital doctor just like your hospital doctor is an extension of your PCP for your time at the hospital ([facility])    If you experience any of the following symptoms then please call your primary care physician or return to the emergency room if you cannot get hold of your doctor:    Fever, chills, nausea, vomiting, or persistent diarrhea  Worsening weakness or new problems with your speech or balance  Dark stools or visible blood in your stools  New Leg swelling or shortness of breath as these could be signs of a clot    Additional Instructions:      Bring these papers with you to your follow up appointments. The papers will help your doctors be sure to continue the care plan from the hospital.     no

## 2024-09-05 ENCOUNTER — APPOINTMENT (OUTPATIENT)
Dept: ORTHOPEDIC SURGERY | Facility: CLINIC | Age: 39
End: 2024-09-05
Payer: MEDICAID

## 2024-09-05 VITALS
HEIGHT: 65 IN | BODY MASS INDEX: 33.32 KG/M2 | DIASTOLIC BLOOD PRESSURE: 72 MMHG | HEART RATE: 44 BPM | SYSTOLIC BLOOD PRESSURE: 111 MMHG | WEIGHT: 200 LBS

## 2024-09-05 DIAGNOSIS — M79.605 PAIN IN LEFT LEG: ICD-10-CM

## 2024-09-05 DIAGNOSIS — M79.662 PAIN IN LEFT LOWER LEG: ICD-10-CM

## 2024-09-05 PROCEDURE — 73590 X-RAY EXAM OF LOWER LEG: CPT | Mod: LT

## 2024-09-05 PROCEDURE — 99214 OFFICE O/P EST MOD 30 MIN: CPT

## 2024-09-05 NOTE — DISCUSSION/SUMMARY
[de-identified] : The patient sustained a sprain to his left leg.  Muscular structures and Achilles tendon is intact.  He may have had an injury to a varicose vein although I see no evidence of that on today's exam.  He may resume activities in accordance with his symptoms.  If symptoms progress he will be reevaluated.

## 2024-09-05 NOTE — HISTORY OF PRESENT ILLNESS
[de-identified] : 39-year-old vision impaired male presents for evaluation of left calf pain x 2 weeks. He reports he was working out and suddenly felt a pop at his medial calf. He has some pain over the anterior and posterior calf worse with prolonged walking and standing. He has tried ice with some relief. Denies prior injuries.

## 2024-09-05 NOTE — PHYSICAL EXAM
[LE] : Sensory: Intact in bilateral lower extremities [DP] : dorsalis pedis 2+ and symmetric bilaterally [PT] : posterior tibial 2+ and symmetric bilaterally [Normal] : Alert and in no acute distress [Poor Appearance] : well-appearing [Acute Distress] : not in acute distress [Obese] : not obese [de-identified] : The patient has no respiratory distress. Mood and affect are normal. The patient is alert and oriented to person, place and time. The patient has no pain with motion of the hips.  The patient has no pain with motion of the knees.  There is mild tenderness in the medial aspect of the left calf.  There is no deformity.  There is no area of ecchymosis.  Achilles tendon is intact.  Montgomery test is normal.  The ankle is stable.  The skin is intact.  There is no lymphedema. [de-identified] : AP and lateral x-rays of left tibia and fibula demonstrate no fracture, no dislocation and no bony abnormality.

## 2024-09-13 ENCOUNTER — APPOINTMENT (OUTPATIENT)
Dept: FAMILY MEDICINE | Facility: CLINIC | Age: 39
End: 2024-09-13

## 2024-09-13 ENCOUNTER — APPOINTMENT (OUTPATIENT)
Dept: VASCULAR SURGERY | Facility: CLINIC | Age: 39
End: 2024-09-13
Payer: MEDICAID

## 2024-09-13 VITALS
WEIGHT: 200 LBS | SYSTOLIC BLOOD PRESSURE: 108 MMHG | HEIGHT: 65 IN | HEART RATE: 50 BPM | BODY MASS INDEX: 33.32 KG/M2 | OXYGEN SATURATION: 98 % | DIASTOLIC BLOOD PRESSURE: 72 MMHG

## 2024-09-13 DIAGNOSIS — Z86.69 PERSONAL HISTORY OF OTHER DISEASES OF THE NERVOUS SYSTEM AND SENSE ORGANS: ICD-10-CM

## 2024-09-13 DIAGNOSIS — Z78.9 OTHER SPECIFIED HEALTH STATUS: ICD-10-CM

## 2024-09-13 DIAGNOSIS — Z86.79 PERSONAL HISTORY OF OTHER DISEASES OF THE CIRCULATORY SYSTEM: ICD-10-CM

## 2024-09-13 PROCEDURE — 99204 OFFICE O/P NEW MOD 45 MIN: CPT

## 2024-09-13 NOTE — HISTORY OF PRESENT ILLNESS
[FreeTextEntry1] : Patient is a 39-year-old gentleman with past medical history significant for morbid obesity status post gastric sleeve surgery with significant weight loss, sleep apnea, celiac disease, hypertension, blindness presenting to us with complaints of left lower extremity varicosities which are enlarging in size associated with heaviness and tiredness.  No history of coronary artery disease or smoking.  No complaint of DVT or history of DVT.  No complaint of rest pain or claudication symptoms.

## 2024-09-13 NOTE — PHYSICAL EXAM
[Normal Heart Sounds] : normal heart sounds [2+] : left 2+ [Varicose Veins Of Lower Extremities] : present [Varicose Veins Of The Left Leg] : of the left leg [Ankle Swelling Bilaterally] : severe [Abdomen Masses] : No abdominal masses [Skin Ulcer] : no ulcer

## 2024-09-13 NOTE — ASSESSMENT
[FreeTextEntry1] : Patient with severe venous insufficiency involving left lower extremity with enlarging varicosities and discomfort.  No evidence of significant arterial insufficiency.  Recommend compression stockings and elevation.  Recommend further weight loss.  Recommend exercise.  All the issues were discussed with the patient and the patient's family in detail.  Follow-up in 3 months with venous duplex.

## 2024-09-20 ENCOUNTER — APPOINTMENT (OUTPATIENT)
Dept: UROLOGY | Facility: CLINIC | Age: 39
End: 2024-09-20

## 2024-09-23 ENCOUNTER — APPOINTMENT (OUTPATIENT)
Dept: NEUROSURGERY | Facility: CLINIC | Age: 39
End: 2024-09-23

## 2024-09-23 NOTE — PHYSICAL EXAM
[General Appearance - Alert] : alert [General Appearance - In No Acute Distress] : in no acute distress [General Appearance - Well Nourished] : well nourished [General Appearance - Well-Appearing] : healthy appearing [Oriented To Time, Place, And Person] : oriented to person, place, and time [Impaired Insight] : insight and judgment were intact [Affect] : the affect was normal [Memory Recent] : recent memory was not impaired [Person] : oriented to person [Place] : oriented to place [Time] : oriented to time [Short Term Intact] : short term memory intact [Motor Tone] : muscle tone was normal in all four extremities [Motor Strength] : muscle strength was normal in all four extremities [Involuntary Movements] : no involuntary movements were seen [FreeTextEntry6] : shunt depresses and refill readily and valve found at 1.5. [Outer Ear] : the ears and nose were normal in appearance [Both Tympanic Membranes Were Examined] : both tympanic membranes were normal [Neck Appearance] : the appearance of the neck was normal [] : no respiratory distress [Respiration, Rhythm And Depth] : normal respiratory rhythm and effort Celsa Ospina [No Spinal Tenderness] : no spinal tenderness [Abnormal Walk] : normal gait

## 2024-09-23 NOTE — ASSESSMENT
[FreeTextEntry1] : IMPRESSION: PT s/p  shunt for IIH and is pending for panniculectomy. PT has thoracic back pain radiating around left chest to sternum.  Strata valve is at 1.5 (should be at 1) with shunt depresses and refills readily. He uses ear phones, now wearing wired ear buds.  Pt with no head symptoms.  MRI T spine with mild disc bulge at T7-8 with left foraminal narrowing at T9/10 from a foraminal disc possible osteophyte, some back pain could be related to disc disease. Pt has not met the weight criteria for the panniculectomy as the weight target now ins 195. changed strata setting to 1 today.  shunt valve depresses well, but refill slowly.   PLAN: Continue diet modification and exercises for weight loss Maintain good posture for alleviating back pain Return in 1 months for checking shunt setting.

## 2024-09-23 NOTE — HISTORY OF PRESENT ILLNESS
[FreeTextEntry1] : Mr. 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 Cruz, bilateral vision loss. Last revision on 10/115/19 proximal valve replaced with Strata valve set @1. Patient is now status post gastric bypass surgery, doing well, lost 300+ lbs and is pending panniculectomy Pt presented with worsening of low back pain and neck pain, MRI Lumbar and Cervical spine with mild multilevel DDD. MRI head with no hydrocephalous. Receiving PT from the neurologist for neck/shoulder.      He reports has gained some weight after stopping his work out for few weeks due to a leg injury. But started exercise back now.  Has to meet 195 lbs met the criteria for panniculectomy. Pt doing intermittent fasting for losing weight further for meeting the target. Pt continue to have the back pain, now horrible, need Stright postures to relieve it. He hold his abdomen to get the back pain better. He is recently diagnosed with HSV, has numbness in the chin, thought to be due to the HSV.   Denies any pain or headache.

## 2024-09-26 ENCOUNTER — APPOINTMENT (OUTPATIENT)
Dept: NEUROLOGY | Facility: HOSPITAL | Age: 39
End: 2024-09-26

## 2024-09-26 ENCOUNTER — OUTPATIENT (OUTPATIENT)
Dept: OUTPATIENT SERVICES | Facility: HOSPITAL | Age: 39
LOS: 1 days | End: 2024-09-26
Payer: MEDICAID

## 2024-09-26 VITALS
DIASTOLIC BLOOD PRESSURE: 84 MMHG | HEART RATE: 86 BPM | HEIGHT: 65 IN | OXYGEN SATURATION: 98 % | SYSTOLIC BLOOD PRESSURE: 130 MMHG | RESPIRATION RATE: 14 BRPM | WEIGHT: 200 LBS | TEMPERATURE: 98 F | BODY MASS INDEX: 33.32 KG/M2

## 2024-09-26 DIAGNOSIS — M54.16 RADICULOPATHY, LUMBAR REGION: ICD-10-CM

## 2024-09-26 DIAGNOSIS — R56.9 UNSPECIFIED CONVULSIONS: ICD-10-CM

## 2024-09-26 PROCEDURE — G0463: CPT

## 2024-09-26 RX ORDER — GABAPENTIN 100 MG/1
100 CAPSULE ORAL
Qty: 180 | Refills: 0 | Status: ACTIVE | COMMUNITY
Start: 2024-09-26 | End: 1900-01-01

## 2024-09-26 NOTE — PHYSICAL EXAM
[General Appearance - Alert] : alert [General Appearance - In No Acute Distress] : in no acute distress [General Appearance - Well Nourished] : well nourished [General Appearance - Well Developed] : well developed [General Appearance - Well-Appearing] : healthy appearing [] : normal voice and communication [Oriented To Time, Place, And Person] : oriented to person, place, and time [Impaired Insight] : insight and judgment were intact [Affect] : the affect was normal [Mood] : the mood was normal [Memory Recent] : recent memory was not impaired [Memory Remote] : remote memory was not impaired [Person] : oriented to person [Place] : oriented to place [Time] : oriented to time [Short Term Intact] : short term memory intact [Remote Intact] : remote memory intact [Registration Intact] : recent registration memory intact [Span Intact] : the attention span was normal [Concentration Intact] : normal concentrating ability [Visual Intact] : visual attention was ~T not ~L decreased [Naming Objects] : no difficulty naming common objects [Repeating Phrases] : no difficulty repeating a phrase [Fluency] : fluency intact [Comprehension] : comprehension intact [Vocabulary] : adequate range of vocabulary [Cranial Nerves Oculomotor (III)] : extraocular motion intact [Cranial Nerves Trigeminal (V)] : facial sensation intact symmetrically [Cranial Nerves Facial (VII)] : face symmetrical [Cranial Nerves Vestibulocochlear (VIII)] : hearing was intact bilaterally [Cranial Nerves Glossopharyngeal (IX)] : tongue and palate midline [Cranial Nerves Accessory (XI - Cranial And Spinal)] : head turning and shoulder shrug symmetric [Cranial Nerves Hypoglossal (XII)] : there was no tongue deviation with protrusion [Motor Tone] : muscle tone was normal in all four extremities [Motor Strength] : muscle strength was normal in all four extremities [Involuntary Movements] : no involuntary movements were seen [No Muscle Atrophy] : normal bulk in all four extremities [Sensation Tactile Decrease] : light touch was intact [Abnormal Walk] : normal gait [Balance] : balance was intact [2+] : Brachioradialis left 2+ [1+] : Ankle jerk left 1+ [FreeTextEntry1] : obese male [Paresis Pronator Drift Right-Sided] : no pronator drift on the right [Paresis Pronator Drift Left-Sided] : no pronator drift on the left [Motor Strength Upper Extremities Bilaterally] : strength was normal in both upper extremities [Motor Strength Lower Extremities Bilaterally] : strength was normal in both lower extremities [Romberg's Sign] : Romberg's sign was negtive

## 2024-09-26 NOTE — REVIEW OF SYSTEMS
[Tension Headache] : tension-type headaches [Difficulty Walking] : difficulty walking [Cluster Headache] : no cluster headache [Migraine Headache] : no migraine headache [FreeTextEntry1] : LUE and LLE pain

## 2024-09-26 NOTE — DISCUSSION/SUMMARY
[FreeTextEntry1] : 39y M with obesity, IIH s/p VPS (c/b blindness), who presents for pain follow-up. Etiology of pain is likely spinal disease in lumbar and thoracic regions, as well as excessive weight. He was encouraged to be complaint with gabapentin and is agreeable.  Recommendations: [] PT referral [] c/w gabapentin 100mg daily for 4 days, followed by 100mg BID [] RTC in 3 months  Case discussed w/ attending Dr Pereira

## 2024-09-26 NOTE — HISTORY OF PRESENT ILLNESS
[FreeTextEntry1] : *** 9/26/24 *** Patient here for neuropathic pain follow-up. Accompanied by his mother. He reports the pain is about the same compared to last visit in 3/2024. He has only been taking the gabapentin 100mg as needed (1-2x per week), but is willing to try it daily as prescribed. He states the pain is worst in mid-thoracic back and LLE, as well as severe in the LUE. The pain occurs daily, improves w/ exercise, worsens when sitting in a car. He has not been exercising for the past few weeks, because he ruptured a varicose vein in the L calf in early September 2024. He wears compression stocking and is planning to return for repeat sonogram in a few months. He also endorses intermittent pressure-like headaches when he is dehydrated. No hearing loss or speech difficulties. He does have difficulty walking due to pain. He also requests refill for PT.  3/14/24 follow up: Pt presents for follow up. Pt states the pain in the L shoulder/LUE has improved over time. Pt has occasional numbness of L pinky fingers at times. As in the past, pt states that he has been having pain/tightness in mid thoracic back with referral to Mid chest as well as pain/tingling from L buttock to L ankle. The pains alleviate with stretching but worsen with laying down or sitting still. Flexeril but pt states he has not been taking it due to lethargy/insomnia with med. Cardio work up in the past has been negative per patient. Pt is pending panniculectomy per plastic surgery.   38 y.o. M with PMH of morbid obesity s/p bariatric surgery (1/2021), LAZARO on CPAP, pseudotumor cerebri by Dr. Cruz s/p  Shunt latest revision 10/2019, b/l blindness presented to neurology clinic for left sided neck pain. Patient noted that after waking up from sleep on 10/7, has been having L sided neck stiffness. There had been chronic neuropathic pain where he described electrical type of pain radiating from left neck to his entire arm, then to his last to fingers of the left hand. He had been suffering from chronic lower back muscle pain and neuropathic pain due to morbid obesity, but this neck pain and stiffness is new. He also noted that chronically has been suffering from sharp shooting pain coming from the mid back to the chest, and then spread to the chest. He had tried gabapentin in the past, caused tongue numbness. Denies any recent fever, chills, runny nose, new chest pain/abd pain, new numbness/tingling/weakness throughout extremities. Takes buspar and klonopin prn for anxiety. Awaiting for panniculectomy per plastic surgery. He lives at home with family and mother usually drives him around. He has been legally blind, sometimes sees light but needs assistance in walking with a stick.

## 2024-09-27 ENCOUNTER — APPOINTMENT (OUTPATIENT)
Dept: NEUROSURGERY | Facility: CLINIC | Age: 39
End: 2024-09-27

## 2024-09-27 PROCEDURE — 99441: CPT

## 2024-10-03 NOTE — REASON FOR VISIT
[Home] : at home, [unfilled] , at the time of the visit. [Medical Office: ([facility])___] : at the medical office located in  [Verbal consent obtained from patient] : the patient, [unfilled] [Follow-Up: _____] : a [unfilled] follow-up visit [FreeTextEntry1] : VPS

## 2024-10-03 NOTE — HISTORY OF PRESENT ILLNESS
[FreeTextEntry1] : Mr. 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 Cruz, bilateral vision loss. Last revision on 10/115/19 proximal valve replaced with Strata valve set @1. Patient is now status post gastric bypass surgery, doing well, lost 300+ lbs and is pending panniculectomy Pt presented with worsening of low back pain and neck pain, MRI Lumbar and Cervical spine with mild multilevel DDD. MRI head with no hydrocephalous. Receiving PT from the neurologist for neck/shoulder. Pt was seen in the office by NP a few days ago and shunt was checked (could not complete the visit)  Today pt is doing a TTM for completing the previous visit.  He reports has gained some weight after stopping his work out for few weeks due to a leg injury. But started exercise back now.  Has to meet 195 lbs met the criteria for panniculectomy. Pt doing intermittent fasting for losing weight further for meeting the target. Pt continue to have the back pain, now horrible, need Stright postures to relieve it. He holds his abdomen to get the back pain better. He is recently diagnosed with HSV, has numbness in the chin and left side of the face thought to be due to the HSV.   Denies any pain or headache.

## 2024-10-03 NOTE — ASSESSMENT
[FreeTextEntry1] : IMPRESSION: PT s/p  shunt for IIH and is pending for panniculectomy. PT has thoracic back pain radiating around left chest to sternum.  Strata valve is at 1.5 (should be at 1) with shunt depresses and refills readily. He uses ear phones, now wearing wired ear buds.  Pt with no head symptoms.  MRI T spine with mild disc bulge at T7-8 with left foraminal narrowing at T9/10 from a foraminal disc possible osteophyte, some back pain could be related to disc disease. Pt has not met the weight criteria for the panniculectomy as the weight target now at 195. Shunt setting confirmed on 9/20 when he was in the office at 1 ( could not complete the visit ).  shunt valve depresses well, but refill slowly as before. Pt was neurologically at his baseline. Pt with left side facial numbness, possibly HSV as per PCP.   PLAN:  Continue diet modification and exercises for weight loss Maintain good posture for alleviating back pain F/U with neurology for numbness of the face.  Return in 4 months for checking shunt setting.

## 2024-10-03 NOTE — HISTORY OF PRESENT ILLNESS
[FreeTextEntry1] : Mr. 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 Cruz, bilateral vision loss. Last revision on 10/115/19 proximal valve replaced with Strata valve set @1. Patient is now status post gastric bypass surgery, doing well, lost 300+ lbs and is pending panniculectomy Pt presented with worsening of low back pain and neck pain, MRI Lumbar and Cervical spine with mild multilevel DDD. MRI head with no hydrocephalous. Receiving PT from the neurologist for neck/shoulder. Pt was seen in the office by NP a few days ago and shunt was checked (could not complete the visit)  Today pt is doing a TTM for completing the previous visit.  He reports has gained some weight after stopping his work out for few weeks due to a leg injury. But started exercise back now.  Has to meet 195 lbs met the criteria for panniculectomy. Pt doing intermittent fasting for losing weight further for meeting the target. Pt continue to have the back pain, now horrible, need Stright postures to relieve it. He holds his abdomen to get the back pain better. He is recently diagnosed with HSV, has numbness in the chin and left side of the face thought to be due to the HSV.   Denies any pain or headache.

## 2024-10-03 NOTE — REASON FOR VISIT
[Home] : at home, [unfilled] , at the time of the visit. [Medical Office: ([facility])___] : at the medical office located in  [Verbal consent obtained from patient] : the patient, [unfilled] [Follow-Up: _____] : a [unfilled] follow-up visit [FreeTextEntry1] : VPS

## 2024-10-03 NOTE — REASON FOR VISIT
[Home] : at home, [unfilled] , at the time of the visit. [Medical Office: ([facility] [place])___] : at the medical office located in  [Verbal consent obtained from patient] : the patient, [unfilled] [Follow-Up: _____] : a [unfilled] follow-up visit [FreeTextEntry1] : VPS

## 2024-11-22 ENCOUNTER — APPOINTMENT (OUTPATIENT)
Dept: INTERNAL MEDICINE | Facility: CLINIC | Age: 39
End: 2024-11-22
Payer: MEDICAID

## 2024-11-22 ENCOUNTER — OUTPATIENT (OUTPATIENT)
Dept: OUTPATIENT SERVICES | Facility: HOSPITAL | Age: 39
LOS: 1 days | End: 2024-11-22
Payer: MEDICAID

## 2024-11-22 ENCOUNTER — NON-APPOINTMENT (OUTPATIENT)
Age: 39
End: 2024-11-22

## 2024-11-22 VITALS
WEIGHT: 200 LBS | HEIGHT: 65 IN | BODY MASS INDEX: 33.32 KG/M2 | RESPIRATION RATE: 16 BRPM | SYSTOLIC BLOOD PRESSURE: 118 MMHG | HEART RATE: 55 BPM | DIASTOLIC BLOOD PRESSURE: 75 MMHG | OXYGEN SATURATION: 98 % | TEMPERATURE: 97.4 F

## 2024-11-22 DIAGNOSIS — G89.29 DORSALGIA, UNSPECIFIED: ICD-10-CM

## 2024-11-22 DIAGNOSIS — M79.605 PAIN IN LEFT LEG: ICD-10-CM

## 2024-11-22 DIAGNOSIS — Z00.00 ENCOUNTER FOR GENERAL ADULT MEDICAL EXAMINATION WITHOUT ABNORMAL FINDINGS: ICD-10-CM

## 2024-11-22 DIAGNOSIS — B00.9 HERPESVIRAL INFECTION, UNSPECIFIED: ICD-10-CM

## 2024-11-22 DIAGNOSIS — Z98.2 PRESENCE OF CEREBROSPINAL FLUID DRAINAGE DEVICE: Chronic | ICD-10-CM

## 2024-11-22 DIAGNOSIS — M54.9 DORSALGIA, UNSPECIFIED: ICD-10-CM

## 2024-11-22 DIAGNOSIS — Z98.84 BARIATRIC SURGERY STATUS: Chronic | ICD-10-CM

## 2024-11-22 PROCEDURE — 99214 OFFICE O/P EST MOD 30 MIN: CPT

## 2024-11-22 PROCEDURE — G0463: CPT

## 2024-11-22 PROCEDURE — 90656 IIV3 VACC NO PRSV 0.5 ML IM: CPT

## 2024-11-22 PROCEDURE — G0008: CPT

## 2024-11-22 PROCEDURE — G2211 COMPLEX E/M VISIT ADD ON: CPT | Mod: NC

## 2024-12-02 DIAGNOSIS — Z23 ENCOUNTER FOR IMMUNIZATION: ICD-10-CM

## 2024-12-02 DIAGNOSIS — B00.9 HERPESVIRAL INFECTION, UNSPECIFIED: ICD-10-CM

## 2024-12-02 DIAGNOSIS — M79.605 PAIN IN LEFT LEG: ICD-10-CM

## 2024-12-20 ENCOUNTER — OUTPATIENT (OUTPATIENT)
Dept: OUTPATIENT SERVICES | Facility: HOSPITAL | Age: 39
LOS: 1 days | End: 2024-12-20
Payer: MEDICAID

## 2024-12-20 ENCOUNTER — APPOINTMENT (OUTPATIENT)
Dept: VASCULAR SURGERY | Facility: CLINIC | Age: 39
End: 2024-12-20
Payer: MEDICAID

## 2024-12-20 ENCOUNTER — APPOINTMENT (OUTPATIENT)
Dept: INTERNAL MEDICINE | Facility: CLINIC | Age: 39
End: 2024-12-20
Payer: MEDICAID

## 2024-12-20 ENCOUNTER — NON-APPOINTMENT (OUTPATIENT)
Age: 39
End: 2024-12-20

## 2024-12-20 VITALS
DIASTOLIC BLOOD PRESSURE: 88 MMHG | BODY MASS INDEX: 33.49 KG/M2 | RESPIRATION RATE: 16 BRPM | HEIGHT: 65 IN | SYSTOLIC BLOOD PRESSURE: 144 MMHG | WEIGHT: 201 LBS | HEART RATE: 59 BPM | TEMPERATURE: 97.3 F | OXYGEN SATURATION: 98 %

## 2024-12-20 DIAGNOSIS — Z87.898 PERSONAL HISTORY OF OTHER SPECIFIED CONDITIONS: ICD-10-CM

## 2024-12-20 DIAGNOSIS — M54.50 LOW BACK PAIN, UNSPECIFIED: ICD-10-CM

## 2024-12-20 DIAGNOSIS — R21 RASH AND OTHER NONSPECIFIC SKIN ERUPTION: ICD-10-CM

## 2024-12-20 DIAGNOSIS — B00.9 HERPESVIRAL INFECTION, UNSPECIFIED: ICD-10-CM

## 2024-12-20 DIAGNOSIS — E87.6 HYPOKALEMIA: ICD-10-CM

## 2024-12-20 DIAGNOSIS — I10 ESSENTIAL (PRIMARY) HYPERTENSION: ICD-10-CM

## 2024-12-20 DIAGNOSIS — Z98.2 PRESENCE OF CEREBROSPINAL FLUID DRAINAGE DEVICE: Chronic | ICD-10-CM

## 2024-12-20 DIAGNOSIS — Z87.39 PERSONAL HISTORY OF OTHER DISEASES OF THE MUSCULOSKELETAL SYSTEM AND CONNECTIVE TISSUE: ICD-10-CM

## 2024-12-20 DIAGNOSIS — H54.7 UNSPECIFIED VISUAL LOSS: ICD-10-CM

## 2024-12-20 DIAGNOSIS — E55.9 VITAMIN D DEFICIENCY, UNSPECIFIED: ICD-10-CM

## 2024-12-20 DIAGNOSIS — M79.662 PAIN IN LEFT LOWER LEG: ICD-10-CM

## 2024-12-20 DIAGNOSIS — R07.89 OTHER CHEST PAIN: ICD-10-CM

## 2024-12-20 DIAGNOSIS — Z00.00 ENCOUNTER FOR GENERAL ADULT MEDICAL EXAMINATION WITHOUT ABNORMAL FINDINGS: ICD-10-CM

## 2024-12-20 DIAGNOSIS — E78.5 HYPERLIPIDEMIA, UNSPECIFIED: ICD-10-CM

## 2024-12-20 DIAGNOSIS — E03.9 HYPOTHYROIDISM, UNSPECIFIED: ICD-10-CM

## 2024-12-20 DIAGNOSIS — E66.9 OBESITY, UNSPECIFIED: ICD-10-CM

## 2024-12-20 DIAGNOSIS — M79.605 PAIN IN LEFT LEG: ICD-10-CM

## 2024-12-20 DIAGNOSIS — Z98.84 BARIATRIC SURGERY STATUS: Chronic | ICD-10-CM

## 2024-12-20 DIAGNOSIS — F41.9 ANXIETY DISORDER, UNSPECIFIED: ICD-10-CM

## 2024-12-20 PROCEDURE — 83036 HEMOGLOBIN GLYCOSYLATED A1C: CPT

## 2024-12-20 PROCEDURE — G2211 COMPLEX E/M VISIT ADD ON: CPT | Mod: NC

## 2024-12-20 PROCEDURE — 82607 VITAMIN B-12: CPT

## 2024-12-20 PROCEDURE — 36415 COLL VENOUS BLD VENIPUNCTURE: CPT

## 2024-12-20 PROCEDURE — 99214 OFFICE O/P EST MOD 30 MIN: CPT

## 2024-12-20 PROCEDURE — G0463: CPT

## 2024-12-20 PROCEDURE — 80061 LIPID PANEL: CPT

## 2024-12-20 PROCEDURE — 80048 BASIC METABOLIC PNL TOTAL CA: CPT

## 2024-12-20 PROCEDURE — 93970 EXTREMITY STUDY: CPT

## 2024-12-20 PROCEDURE — 82306 VITAMIN D 25 HYDROXY: CPT

## 2024-12-20 PROCEDURE — 85025 COMPLETE CBC W/AUTO DIFF WBC: CPT

## 2024-12-20 RX ORDER — DOCOSANOL 100 MG/G
10 CREAM TOPICAL DAILY
Qty: 1 | Refills: 2 | Status: ACTIVE | COMMUNITY
Start: 2024-12-20 | End: 1900-01-01

## 2024-12-22 PROBLEM — E78.5 HLD (HYPERLIPIDEMIA): Status: ACTIVE | Noted: 2024-12-22

## 2024-12-22 LAB
ANION GAP SERPL CALC-SCNC: 10 MMOL/L
BASOPHILS # BLD AUTO: 0.05 K/UL
BASOPHILS NFR BLD AUTO: 0.8 %
BUN SERPL-MCNC: 15 MG/DL
CALCIUM SERPL-MCNC: 9.4 MG/DL
CHLORIDE SERPL-SCNC: 107 MMOL/L
CO2 SERPL-SCNC: 25 MMOL/L
CREAT SERPL-MCNC: 0.7 MG/DL
EGFR: 121 ML/MIN/1.73M2
EOSINOPHIL # BLD AUTO: 0.41 K/UL
EOSINOPHIL NFR BLD AUTO: 6.7 %
ESTIMATED AVERAGE GLUCOSE: 100 MG/DL
GLUCOSE SERPL-MCNC: 88 MG/DL
HBA1C MFR BLD HPLC: 5.1 %
HCT VFR BLD CALC: 43.5 %
HGB BLD-MCNC: 14.4 G/DL
IMM GRANULOCYTES NFR BLD AUTO: 0.2 %
LYMPHOCYTES # BLD AUTO: 2.33 K/UL
LYMPHOCYTES NFR BLD AUTO: 37.8 %
MAN DIFF?: NORMAL
MCHC RBC-ENTMCNC: 29.2 PG
MCHC RBC-ENTMCNC: 33.1 G/DL
MCV RBC AUTO: 88.2 FL
MONOCYTES # BLD AUTO: 0.35 K/UL
MONOCYTES NFR BLD AUTO: 5.7 %
NEUTROPHILS # BLD AUTO: 3.01 K/UL
NEUTROPHILS NFR BLD AUTO: 48.8 %
PLATELET # BLD AUTO: 221 K/UL
POTASSIUM SERPL-SCNC: 4.2 MMOL/L
RBC # BLD: 4.93 M/UL
RBC # FLD: 13.7 %
SODIUM SERPL-SCNC: 143 MMOL/L
VIT B12 SERPL-MCNC: 609 PG/ML
WBC # FLD AUTO: 6.16 K/UL

## 2024-12-23 DIAGNOSIS — E55.9 VITAMIN D DEFICIENCY, UNSPECIFIED: ICD-10-CM

## 2024-12-23 DIAGNOSIS — R21 RASH AND OTHER NONSPECIFIC SKIN ERUPTION: ICD-10-CM

## 2024-12-23 DIAGNOSIS — I10 ESSENTIAL (PRIMARY) HYPERTENSION: ICD-10-CM

## 2024-12-23 DIAGNOSIS — E03.9 HYPOTHYROIDISM, UNSPECIFIED: ICD-10-CM

## 2024-12-23 DIAGNOSIS — H54.7 UNSPECIFIED VISUAL LOSS: ICD-10-CM

## 2024-12-23 DIAGNOSIS — E66.9 OBESITY, UNSPECIFIED: ICD-10-CM

## 2024-12-23 DIAGNOSIS — B00.9 HERPESVIRAL INFECTION, UNSPECIFIED: ICD-10-CM

## 2024-12-23 DIAGNOSIS — E78.5 HYPERLIPIDEMIA, UNSPECIFIED: ICD-10-CM

## 2024-12-23 LAB
25(OH)D3 SERPL-MCNC: 25.6 NG/ML
CHOLEST SERPL-MCNC: 180 MG/DL
HDLC SERPL-MCNC: 53 MG/DL
LDLC SERPL CALC-MCNC: 113 MG/DL
NONHDLC SERPL-MCNC: 126 MG/DL
TRIGL SERPL-MCNC: 73 MG/DL

## 2025-01-23 ENCOUNTER — APPOINTMENT (OUTPATIENT)
Dept: NEUROLOGY | Facility: HOSPITAL | Age: 40
End: 2025-01-23

## 2025-01-23 ENCOUNTER — OUTPATIENT (OUTPATIENT)
Dept: OUTPATIENT SERVICES | Facility: HOSPITAL | Age: 40
LOS: 1 days | End: 2025-01-23
Payer: MEDICAID

## 2025-01-23 VITALS
RESPIRATION RATE: 14 BRPM | BODY MASS INDEX: 33.32 KG/M2 | SYSTOLIC BLOOD PRESSURE: 131 MMHG | HEART RATE: 70 BPM | HEIGHT: 65 IN | DIASTOLIC BLOOD PRESSURE: 86 MMHG | TEMPERATURE: 97.7 F | OXYGEN SATURATION: 98 % | WEIGHT: 200 LBS

## 2025-01-23 DIAGNOSIS — M54.16 RADICULOPATHY, LUMBAR REGION: ICD-10-CM

## 2025-01-23 DIAGNOSIS — R51.9 HEADACHE, UNSPECIFIED: ICD-10-CM

## 2025-01-23 DIAGNOSIS — Z98.2 PRESENCE OF CEREBROSPINAL FLUID DRAINAGE DEVICE: Chronic | ICD-10-CM

## 2025-01-23 DIAGNOSIS — M54.50 LOW BACK PAIN, UNSPECIFIED: ICD-10-CM

## 2025-01-23 PROCEDURE — G0463: CPT

## 2025-01-24 ENCOUNTER — APPOINTMENT (OUTPATIENT)
Dept: INTERNAL MEDICINE | Facility: CLINIC | Age: 40
End: 2025-01-24
Payer: MEDICAID

## 2025-01-24 ENCOUNTER — OUTPATIENT (OUTPATIENT)
Dept: OUTPATIENT SERVICES | Facility: HOSPITAL | Age: 40
LOS: 1 days | End: 2025-01-24
Payer: MEDICAID

## 2025-01-24 ENCOUNTER — NON-APPOINTMENT (OUTPATIENT)
Age: 40
End: 2025-01-24

## 2025-01-24 VITALS
BODY MASS INDEX: 33.49 KG/M2 | TEMPERATURE: 97.3 F | WEIGHT: 201 LBS | SYSTOLIC BLOOD PRESSURE: 120 MMHG | RESPIRATION RATE: 16 BRPM | HEART RATE: 83 BPM | OXYGEN SATURATION: 98 % | HEIGHT: 65 IN | DIASTOLIC BLOOD PRESSURE: 76 MMHG

## 2025-01-24 DIAGNOSIS — Z11.3 ENCOUNTER FOR SCREENING FOR INFECTIONS WITH A PREDOMINANTLY SEXUAL MODE OF TRANSMISSION: ICD-10-CM

## 2025-01-24 DIAGNOSIS — E78.5 HYPERLIPIDEMIA, UNSPECIFIED: ICD-10-CM

## 2025-01-24 DIAGNOSIS — B00.9 HERPESVIRAL INFECTION, UNSPECIFIED: ICD-10-CM

## 2025-01-24 DIAGNOSIS — H66.90 OTITIS MEDIA, UNSPECIFIED, UNSPECIFIED EAR: ICD-10-CM

## 2025-01-24 DIAGNOSIS — Z00.00 ENCOUNTER FOR GENERAL ADULT MEDICAL EXAMINATION WITHOUT ABNORMAL FINDINGS: ICD-10-CM

## 2025-01-24 DIAGNOSIS — H92.02 OTALGIA, LEFT EAR: ICD-10-CM

## 2025-01-24 DIAGNOSIS — R42 DIZZINESS AND GIDDINESS: ICD-10-CM

## 2025-01-24 PROCEDURE — G0463: CPT

## 2025-01-24 PROCEDURE — 99214 OFFICE O/P EST MOD 30 MIN: CPT

## 2025-01-24 PROCEDURE — G2211 COMPLEX E/M VISIT ADD ON: CPT | Mod: NC

## 2025-01-24 RX ORDER — AMOXICILLIN AND CLAVULANATE POTASSIUM 875; 125 MG/1; MG/1
875-125 TABLET, COATED ORAL
Qty: 14 | Refills: 0 | Status: ACTIVE | COMMUNITY
Start: 2025-01-24 | End: 1900-01-01

## 2025-01-26 LAB
HIV1+2 AB SPEC QL IA.RAPID: NONREACTIVE
T PALLIDUM AB SER QL IA: NEGATIVE

## 2025-01-28 LAB
C TRACH RRNA SPEC QL NAA+PROBE: NOT DETECTED
N GONORRHOEA RRNA SPEC QL NAA+PROBE: NOT DETECTED
SOURCE AMPLIFICATION: NORMAL

## 2025-01-29 DIAGNOSIS — R42 DIZZINESS AND GIDDINESS: ICD-10-CM

## 2025-01-29 DIAGNOSIS — H92.02 OTALGIA, LEFT EAR: ICD-10-CM

## 2025-01-29 DIAGNOSIS — E78.5 HYPERLIPIDEMIA, UNSPECIFIED: ICD-10-CM

## 2025-01-29 DIAGNOSIS — B00.9 HERPESVIRAL INFECTION, UNSPECIFIED: ICD-10-CM

## 2025-01-29 DIAGNOSIS — Z11.3 ENCOUNTER FOR SCREENING FOR INFECTIONS WITH A PREDOMINANTLY SEXUAL MODE OF TRANSMISSION: ICD-10-CM

## 2025-03-20 ENCOUNTER — APPOINTMENT (OUTPATIENT)
Dept: INFECTIOUS DISEASE | Facility: CLINIC | Age: 40
End: 2025-03-20
Payer: MEDICAID

## 2025-03-20 ENCOUNTER — LABORATORY RESULT (OUTPATIENT)
Age: 40
End: 2025-03-20

## 2025-03-20 VITALS
OXYGEN SATURATION: 98 % | TEMPERATURE: 97.5 F | SYSTOLIC BLOOD PRESSURE: 112 MMHG | DIASTOLIC BLOOD PRESSURE: 61 MMHG | BODY MASS INDEX: 33.32 KG/M2 | WEIGHT: 200 LBS | HEART RATE: 55 BPM | HEIGHT: 65 IN

## 2025-03-20 PROCEDURE — 99204 OFFICE O/P NEW MOD 45 MIN: CPT

## 2025-03-20 RX ORDER — DOCOSANOL 100 MG/G
10 CREAM TOPICAL DAILY
Qty: 1 | Refills: 1 | Status: ACTIVE | COMMUNITY
Start: 2025-03-20 | End: 1900-01-01

## 2025-03-21 ENCOUNTER — NON-APPOINTMENT (OUTPATIENT)
Age: 40
End: 2025-03-21

## 2025-03-21 ENCOUNTER — APPOINTMENT (OUTPATIENT)
Dept: INTERNAL MEDICINE | Facility: CLINIC | Age: 40
End: 2025-03-21
Payer: MEDICAID

## 2025-03-21 ENCOUNTER — OUTPATIENT (OUTPATIENT)
Dept: OUTPATIENT SERVICES | Facility: HOSPITAL | Age: 40
LOS: 1 days | End: 2025-03-21
Payer: MEDICAID

## 2025-03-21 VITALS
WEIGHT: 200 LBS | HEART RATE: 81 BPM | BODY MASS INDEX: 33.32 KG/M2 | DIASTOLIC BLOOD PRESSURE: 75 MMHG | TEMPERATURE: 97 F | SYSTOLIC BLOOD PRESSURE: 124 MMHG | OXYGEN SATURATION: 97 % | HEIGHT: 65 IN

## 2025-03-21 DIAGNOSIS — E66.9 OBESITY, UNSPECIFIED: ICD-10-CM

## 2025-03-21 DIAGNOSIS — I10 ESSENTIAL (PRIMARY) HYPERTENSION: ICD-10-CM

## 2025-03-21 DIAGNOSIS — N63.0 UNSPECIFIED LUMP IN UNSPECIFIED BREAST: ICD-10-CM

## 2025-03-21 DIAGNOSIS — Z11.3 ENCOUNTER FOR SCREENING FOR INFECTIONS WITH A PREDOMINANTLY SEXUAL MODE OF TRANSMISSION: ICD-10-CM

## 2025-03-21 DIAGNOSIS — M25.50 PAIN IN UNSPECIFIED JOINT: ICD-10-CM

## 2025-03-21 DIAGNOSIS — R33.9 RETENTION OF URINE, UNSPECIFIED: ICD-10-CM

## 2025-03-21 DIAGNOSIS — M54.9 DORSALGIA, UNSPECIFIED: ICD-10-CM

## 2025-03-21 DIAGNOSIS — L02.31 CUTANEOUS ABSCESS OF BUTTOCK: ICD-10-CM

## 2025-03-21 DIAGNOSIS — H66.90 OTITIS MEDIA, UNSPECIFIED, UNSPECIFIED EAR: ICD-10-CM

## 2025-03-21 DIAGNOSIS — M54.2 CERVICALGIA: ICD-10-CM

## 2025-03-21 DIAGNOSIS — M75.52 BURSITIS OF LEFT SHOULDER: ICD-10-CM

## 2025-03-21 DIAGNOSIS — R20.0 ANESTHESIA OF SKIN: ICD-10-CM

## 2025-03-21 DIAGNOSIS — Z98.2 PRESENCE OF CEREBROSPINAL FLUID DRAINAGE DEVICE: Chronic | ICD-10-CM

## 2025-03-21 DIAGNOSIS — Z00.00 ENCOUNTER FOR GENERAL ADULT MEDICAL EXAMINATION WITHOUT ABNORMAL FINDINGS: ICD-10-CM

## 2025-03-21 DIAGNOSIS — K14.9 DISEASE OF TONGUE, UNSPECIFIED: ICD-10-CM

## 2025-03-21 DIAGNOSIS — R21 RASH AND OTHER NONSPECIFIC SKIN ERUPTION: ICD-10-CM

## 2025-03-21 DIAGNOSIS — B35.1 TINEA UNGUIUM: ICD-10-CM

## 2025-03-21 DIAGNOSIS — M94.0 CHONDROCOSTAL JUNCTION SYNDROME [TIETZE]: ICD-10-CM

## 2025-03-21 DIAGNOSIS — M50.90 CERVICAL DISC DISORDER, UNSPECIFIED, UNSPECIFIED CERVICAL REGION: ICD-10-CM

## 2025-03-21 DIAGNOSIS — Z13.6 ENCOUNTER FOR SCREENING FOR CARDIOVASCULAR DISORDERS: ICD-10-CM

## 2025-03-21 DIAGNOSIS — Z87.898 PERSONAL HISTORY OF OTHER SPECIFIED CONDITIONS: ICD-10-CM

## 2025-03-21 DIAGNOSIS — Z87.448 PERSONAL HISTORY OF OTHER DISEASES OF URINARY SYSTEM: ICD-10-CM

## 2025-03-21 DIAGNOSIS — M54.16 RADICULOPATHY, LUMBAR REGION: ICD-10-CM

## 2025-03-21 DIAGNOSIS — S86.912A STRAIN OF UNSPECIFIED MUSCLE(S) AND TENDON(S) AT LOWER LEG LEVEL, LEFT LEG, INITIAL ENCOUNTER: ICD-10-CM

## 2025-03-21 DIAGNOSIS — B35.9 DERMATOPHYTOSIS, UNSPECIFIED: ICD-10-CM

## 2025-03-21 DIAGNOSIS — R20.2 ANESTHESIA OF SKIN: ICD-10-CM

## 2025-03-21 DIAGNOSIS — R23.8 OTHER SKIN CHANGES: ICD-10-CM

## 2025-03-21 DIAGNOSIS — Z86.39 PERSONAL HISTORY OF OTHER ENDOCRINE, NUTRITIONAL AND METABOLIC DISEASE: ICD-10-CM

## 2025-03-21 DIAGNOSIS — R07.81 PLEURODYNIA: ICD-10-CM

## 2025-03-21 DIAGNOSIS — R39.16 STRAINING TO VOID: ICD-10-CM

## 2025-03-21 DIAGNOSIS — G89.29 PAIN IN LEFT SHOULDER: ICD-10-CM

## 2025-03-21 DIAGNOSIS — Z86.69 PERSONAL HISTORY OF OTHER DISEASES OF THE NERVOUS SYSTEM AND SENSE ORGANS: ICD-10-CM

## 2025-03-21 DIAGNOSIS — E78.5 HYPERLIPIDEMIA, UNSPECIFIED: ICD-10-CM

## 2025-03-21 DIAGNOSIS — K13.6 IRRITATIVE HYPERPLASIA OF ORAL MUCOSA: ICD-10-CM

## 2025-03-21 DIAGNOSIS — M25.512 PAIN IN LEFT SHOULDER: ICD-10-CM

## 2025-03-21 DIAGNOSIS — R89.4 ABNORMAL IMMUNOLOGICAL FINDINGS IN SPECIMENS FROM OTHER ORGANS, SYSTEMS AND TISSUES: ICD-10-CM

## 2025-03-21 DIAGNOSIS — M19.019 PRIMARY OSTEOARTHRITIS, UNSPECIFIED SHOULDER: ICD-10-CM

## 2025-03-21 DIAGNOSIS — Z23 ENCOUNTER FOR IMMUNIZATION: ICD-10-CM

## 2025-03-21 DIAGNOSIS — Z86.19 PERSONAL HISTORY OF OTHER INFECTIOUS AND PARASITIC DISEASES: ICD-10-CM

## 2025-03-21 DIAGNOSIS — M75.22 BICIPITAL TENDINITIS, LEFT SHOULDER: ICD-10-CM

## 2025-03-21 DIAGNOSIS — R07.89 OTHER CHEST PAIN: ICD-10-CM

## 2025-03-21 DIAGNOSIS — G89.29 DORSALGIA, UNSPECIFIED: ICD-10-CM

## 2025-03-21 DIAGNOSIS — R42 DIZZINESS AND GIDDINESS: ICD-10-CM

## 2025-03-21 DIAGNOSIS — Z98.84 BARIATRIC SURGERY STATUS: Chronic | ICD-10-CM

## 2025-03-21 DIAGNOSIS — Z87.39 PERSONAL HISTORY OF OTHER DISEASES OF THE MUSCULOSKELETAL SYSTEM AND CONNECTIVE TISSUE: ICD-10-CM

## 2025-03-21 LAB
ALBUMIN SERPL ELPH-MCNC: 4.1 G/DL
ALP BLD-CCNC: 78 U/L
ALT SERPL-CCNC: 16 U/L
ANION GAP SERPL CALC-SCNC: 10 MMOL/L
APPEARANCE: CLEAR
AST SERPL-CCNC: 14 U/L
BASOPHILS # BLD AUTO: 0.07 K/UL
BASOPHILS NFR BLD AUTO: 0.8 %
BILIRUB SERPL-MCNC: 0.7 MG/DL
BILIRUBIN URINE: NEGATIVE
BLOOD URINE: NEGATIVE
BUN SERPL-MCNC: 16 MG/DL
C TRACH RRNA SPEC QL NAA+PROBE: NOT DETECTED
C TRACH RRNA SPEC QL NAA+PROBE: NOT DETECTED
CALCIUM SERPL-MCNC: 9.1 MG/DL
CHLORIDE SERPL-SCNC: 106 MMOL/L
CO2 SERPL-SCNC: 26 MMOL/L
COLOR: YELLOW
CREAT SERPL-MCNC: 0.73 MG/DL
EGFRCR SERPLBLD CKD-EPI 2021: 119 ML/MIN/1.73M2
EOSINOPHIL # BLD AUTO: 0.82 K/UL
EOSINOPHIL NFR BLD AUTO: 9.5 %
GLUCOSE QUALITATIVE U: NEGATIVE MG/DL
GLUCOSE SERPL-MCNC: 86 MG/DL
HCT VFR BLD CALC: 42.5 %
HCV RNA SERPL NAA+PROBE-LOG IU: NOT DETECTED LOGIU/ML
HEPB DNA PCR INT: NOT DETECTED
HEPB DNA PCR LOG: NOT DETECTED LOGIU/ML
HEPC RNA INTERP: NOT DETECTED
HGB BLD-MCNC: 14.3 G/DL
HIV1 RNA # SERPL NAA+PROBE: NORMAL
HIV1 RNA # SERPL NAA+PROBE: NORMAL COPIES/ML
HIV1+2 AB SPEC QL IA.RAPID: NONREACTIVE
HSV1-DNA: NOT DETECTED
HSV2-DNA: NOT DETECTED
IMM GRANULOCYTES NFR BLD AUTO: 0.1 %
KETONES URINE: NEGATIVE MG/DL
LEUKOCYTE ESTERASE URINE: ABNORMAL
LYMPHOCYTES # BLD AUTO: 1.68 K/UL
LYMPHOCYTES NFR BLD AUTO: 19.4 %
MAN DIFF?: NORMAL
MCHC RBC-ENTMCNC: 29.7 PG
MCHC RBC-ENTMCNC: 33.6 G/DL
MCV RBC AUTO: 88.4 FL
MONOCYTES # BLD AUTO: 0.52 K/UL
MONOCYTES NFR BLD AUTO: 6 %
N GONORRHOEA RRNA SPEC QL NAA+PROBE: NOT DETECTED
N GONORRHOEA RRNA SPEC QL NAA+PROBE: NOT DETECTED
NEUTROPHILS # BLD AUTO: 5.54 K/UL
NEUTROPHILS NFR BLD AUTO: 64.2 %
NITRITE URINE: NEGATIVE
PH URINE: 6.5
PLATELET # BLD AUTO: 206 K/UL
POTASSIUM SERPL-SCNC: 4.2 MMOL/L
PROT SERPL-MCNC: 6.2 G/DL
PROTEIN URINE: NEGATIVE MG/DL
RBC # BLD: 4.81 M/UL
RBC # FLD: 13.3 %
SODIUM SERPL-SCNC: 141 MMOL/L
SOURCE ANAL: NORMAL
SOURCE ORAL: NORMAL
SOURCE: NORMAL
SPECIFIC GRAVITY URINE: 1.01
T PALLIDUM AB SER QL IA: NEGATIVE
UROBILINOGEN URINE: 0.2 MG/DL
VIRAL LOAD INTERP: NORMAL
VIRAL LOAD LOG: NORMAL LG COP/ML
WBC # FLD AUTO: 8.64 K/UL

## 2025-03-21 PROCEDURE — G0463: CPT

## 2025-03-21 PROCEDURE — 99214 OFFICE O/P EST MOD 30 MIN: CPT

## 2025-03-21 RX ORDER — CLOTRIMAZOLE 10 MG/G
1 CREAM TOPICAL 3 TIMES DAILY
Qty: 1 | Refills: 0 | Status: ACTIVE | COMMUNITY
Start: 2025-03-21 | End: 1900-01-01

## 2025-03-21 RX ORDER — AMOXICILLIN AND CLAVULANATE POTASSIUM 875; 125 MG/1; MG/1
875-125 TABLET, COATED ORAL
Qty: 14 | Refills: 0 | Status: ACTIVE | COMMUNITY
Start: 2025-03-21 | End: 1900-01-01

## 2025-03-23 ENCOUNTER — NON-APPOINTMENT (OUTPATIENT)
Age: 40
End: 2025-03-23

## 2025-03-24 ENCOUNTER — APPOINTMENT (OUTPATIENT)
Dept: OTOLARYNGOLOGY | Facility: CLINIC | Age: 40
End: 2025-03-24
Payer: MEDICAID

## 2025-03-24 VITALS — SYSTOLIC BLOOD PRESSURE: 124 MMHG | DIASTOLIC BLOOD PRESSURE: 80 MMHG | HEART RATE: 52 BPM

## 2025-03-24 DIAGNOSIS — H92.02 OTALGIA, LEFT EAR: ICD-10-CM

## 2025-03-24 DIAGNOSIS — M26.609 UNSPECIFIED TEMPOROMANDIBULAR JOINT DISORDER: ICD-10-CM

## 2025-03-24 DIAGNOSIS — H61.22 IMPACTED CERUMEN, LEFT EAR: ICD-10-CM

## 2025-03-24 LAB
C TRACH RRNA SPEC QL NAA+PROBE: NOT DETECTED
HBV CORE IGG+IGM SER QL: NONREACTIVE
HBV SURFACE AG SER QL: NONREACTIVE
HCV AB SER QL: NONREACTIVE
HCV S/CO RATIO: 0.11 S/CO
HEPATITIS A IGG ANTIBODY: NONREACTIVE
N GONORRHOEA RRNA SPEC QL NAA+PROBE: NOT DETECTED
SOURCE AMPLIFICATION: NORMAL

## 2025-03-24 PROCEDURE — 69210 REMOVE IMPACTED EAR WAX UNI: CPT | Mod: LT

## 2025-03-25 ENCOUNTER — NON-APPOINTMENT (OUTPATIENT)
Age: 40
End: 2025-03-25

## 2025-03-25 DIAGNOSIS — H66.90 OTITIS MEDIA, UNSPECIFIED, UNSPECIFIED EAR: ICD-10-CM

## 2025-03-25 DIAGNOSIS — E78.5 HYPERLIPIDEMIA, UNSPECIFIED: ICD-10-CM

## 2025-03-25 DIAGNOSIS — B00.9 HERPESVIRAL INFECTION, UNSPECIFIED: ICD-10-CM

## 2025-03-25 DIAGNOSIS — E66.9 OBESITY, UNSPECIFIED: ICD-10-CM

## 2025-03-25 DIAGNOSIS — M54.16 RADICULOPATHY, LUMBAR REGION: ICD-10-CM

## 2025-03-25 DIAGNOSIS — B35.1 TINEA UNGUIUM: ICD-10-CM

## 2025-03-25 DIAGNOSIS — I10 ESSENTIAL (PRIMARY) HYPERTENSION: ICD-10-CM

## 2025-03-25 DIAGNOSIS — B35.9 DERMATOPHYTOSIS, UNSPECIFIED: ICD-10-CM

## 2025-03-25 DIAGNOSIS — R20.0 ANESTHESIA OF SKIN: ICD-10-CM

## 2025-03-27 ENCOUNTER — APPOINTMENT (OUTPATIENT)
Dept: INFECTIOUS DISEASE | Facility: CLINIC | Age: 40
End: 2025-03-27
Payer: MEDICAID

## 2025-03-27 VITALS
DIASTOLIC BLOOD PRESSURE: 75 MMHG | OXYGEN SATURATION: 98 % | HEIGHT: 65 IN | SYSTOLIC BLOOD PRESSURE: 136 MMHG | HEART RATE: 78 BPM | TEMPERATURE: 98.5 F

## 2025-03-27 DIAGNOSIS — R20.2 PARESTHESIA OF SKIN: ICD-10-CM

## 2025-03-27 DIAGNOSIS — K64.9 UNSPECIFIED HEMORRHOIDS: ICD-10-CM

## 2025-03-27 DIAGNOSIS — B35.6 TINEA CRURIS: ICD-10-CM

## 2025-03-27 DIAGNOSIS — B00.9 HERPESVIRAL INFECTION, UNSPECIFIED: ICD-10-CM

## 2025-03-27 PROCEDURE — 99214 OFFICE O/P EST MOD 30 MIN: CPT

## 2025-03-28 RX ORDER — NYSTATIN 100000 [USP'U]/G
100000 POWDER TOPICAL
Qty: 1 | Refills: 0 | Status: ACTIVE | COMMUNITY
Start: 2025-03-27 | End: 1900-01-01

## 2025-03-28 RX ORDER — HYDROCORTISONE ACETATE 25 MG/1
25 SUPPOSITORY RECTAL
Qty: 30 | Refills: 0 | Status: ACTIVE | COMMUNITY
Start: 2025-03-27 | End: 1900-01-01

## 2025-04-10 ENCOUNTER — NON-APPOINTMENT (OUTPATIENT)
Age: 40
End: 2025-04-10

## 2025-04-24 ENCOUNTER — APPOINTMENT (OUTPATIENT)
Dept: INTERNAL MEDICINE | Facility: CLINIC | Age: 40
End: 2025-04-24
Payer: MEDICAID

## 2025-04-24 ENCOUNTER — OUTPATIENT (OUTPATIENT)
Dept: OUTPATIENT SERVICES | Facility: HOSPITAL | Age: 40
LOS: 1 days | End: 2025-04-24
Payer: MEDICAID

## 2025-04-24 ENCOUNTER — NON-APPOINTMENT (OUTPATIENT)
Age: 40
End: 2025-04-24

## 2025-04-24 VITALS
DIASTOLIC BLOOD PRESSURE: 65 MMHG | TEMPERATURE: 97.5 F | RESPIRATION RATE: 18 BRPM | HEART RATE: 68 BPM | WEIGHT: 200 LBS | SYSTOLIC BLOOD PRESSURE: 104 MMHG | HEIGHT: 65 IN | BODY MASS INDEX: 33.32 KG/M2 | OXYGEN SATURATION: 98 %

## 2025-04-24 DIAGNOSIS — Z00.00 ENCOUNTER FOR GENERAL ADULT MEDICAL EXAMINATION WITHOUT ABNORMAL FINDINGS: ICD-10-CM

## 2025-04-24 DIAGNOSIS — Z98.2 PRESENCE OF CEREBROSPINAL FLUID DRAINAGE DEVICE: Chronic | ICD-10-CM

## 2025-04-24 DIAGNOSIS — Z98.84 BARIATRIC SURGERY STATUS: Chronic | ICD-10-CM

## 2025-04-24 DIAGNOSIS — I10 ESSENTIAL (PRIMARY) HYPERTENSION: ICD-10-CM

## 2025-04-24 DIAGNOSIS — R42 DIZZINESS AND GIDDINESS: ICD-10-CM

## 2025-04-24 DIAGNOSIS — H61.22 IMPACTED CERUMEN, LEFT EAR: ICD-10-CM

## 2025-04-24 DIAGNOSIS — H92.02 OTALGIA, LEFT EAR: ICD-10-CM

## 2025-04-24 DIAGNOSIS — K64.9 UNSPECIFIED HEMORRHOIDS: ICD-10-CM

## 2025-04-24 PROCEDURE — G2211 COMPLEX E/M VISIT ADD ON: CPT | Mod: NC,GC

## 2025-04-24 PROCEDURE — 99214 OFFICE O/P EST MOD 30 MIN: CPT | Mod: GC

## 2025-04-24 PROCEDURE — G0463: CPT

## 2025-04-29 PROBLEM — I10 HYPERTENSION: Status: ACTIVE | Noted: 2025-04-29

## 2025-04-30 DIAGNOSIS — R42 DIZZINESS AND GIDDINESS: ICD-10-CM

## 2025-04-30 DIAGNOSIS — H61.22 IMPACTED CERUMEN, LEFT EAR: ICD-10-CM

## 2025-04-30 DIAGNOSIS — H92.02 OTALGIA, LEFT EAR: ICD-10-CM

## 2025-04-30 DIAGNOSIS — I10 ESSENTIAL (PRIMARY) HYPERTENSION: ICD-10-CM

## 2025-04-30 DIAGNOSIS — K64.9 UNSPECIFIED HEMORRHOIDS: ICD-10-CM

## 2025-05-08 ENCOUNTER — NON-APPOINTMENT (OUTPATIENT)
Age: 40
End: 2025-05-08

## 2025-06-18 ENCOUNTER — APPOINTMENT (OUTPATIENT)
Dept: DERMATOLOGY | Facility: CLINIC | Age: 40
End: 2025-06-18
Payer: MEDICAID

## 2025-06-18 ENCOUNTER — APPOINTMENT (OUTPATIENT)
Dept: COLORECTAL SURGERY | Facility: CLINIC | Age: 40
End: 2025-06-18

## 2025-06-18 VITALS — WEIGHT: 210 LBS | BODY MASS INDEX: 34.99 KG/M2 | HEIGHT: 65 IN

## 2025-06-18 PROBLEM — L21.9 SEBORRHEIC DERMATITIS: Status: ACTIVE | Noted: 2025-06-18

## 2025-06-18 PROBLEM — L30.9 DERMATITIS: Status: ACTIVE | Noted: 2025-06-18

## 2025-06-18 PROBLEM — L73.9 FOLLICULITIS: Status: ACTIVE | Noted: 2025-06-18

## 2025-06-18 PROBLEM — Z86.19 HISTORY OF HERPES SIMPLEX INFECTION: Status: RESOLVED | Noted: 2024-07-23 | Resolved: 2025-06-18

## 2025-06-18 PROCEDURE — G2211 COMPLEX E/M VISIT ADD ON: CPT | Mod: NC

## 2025-06-18 PROCEDURE — 99204 OFFICE O/P NEW MOD 45 MIN: CPT

## 2025-06-18 RX ORDER — KETOCONAZOLE 20 MG/G
2 CREAM TOPICAL
Qty: 1 | Refills: 1 | Status: ACTIVE | COMMUNITY
Start: 2025-06-18 | End: 1900-01-01

## 2025-06-18 RX ORDER — BENZOYL PEROXIDE 10 G/100G
10 SUSPENSION TOPICAL
Qty: 1 | Refills: 1 | Status: ACTIVE | COMMUNITY
Start: 2025-06-18 | End: 1900-01-01

## 2025-06-18 RX ORDER — TRIAMCINOLONE ACETONIDE 1 MG/ML
0.1 LOTION TOPICAL
Qty: 1 | Refills: 1 | Status: ACTIVE | COMMUNITY
Start: 2025-06-18 | End: 1900-01-01

## 2025-06-18 RX ORDER — CLINDAMYCIN PHOSPHATE 10 MG/ML
1 LOTION TOPICAL
Qty: 1 | Refills: 1 | Status: ACTIVE | COMMUNITY
Start: 2025-06-18 | End: 1900-01-01

## 2025-07-25 ENCOUNTER — APPOINTMENT (OUTPATIENT)
Dept: INTERNAL MEDICINE | Facility: CLINIC | Age: 40
End: 2025-07-25

## 2025-08-21 ENCOUNTER — APPOINTMENT (OUTPATIENT)
Dept: DERMATOLOGY | Facility: CLINIC | Age: 40
End: 2025-08-21
Payer: MEDICAID

## 2025-08-21 VITALS — WEIGHT: 210 LBS | HEIGHT: 65 IN | BODY MASS INDEX: 34.99 KG/M2

## 2025-08-21 DIAGNOSIS — L21.9 SEBORRHEIC DERMATITIS, UNSPECIFIED: ICD-10-CM

## 2025-08-21 DIAGNOSIS — L73.9 FOLLICULAR DISORDER, UNSPECIFIED: ICD-10-CM

## 2025-08-21 DIAGNOSIS — L30.9 DERMATITIS, UNSPECIFIED: ICD-10-CM

## 2025-08-21 PROCEDURE — G2211 COMPLEX E/M VISIT ADD ON: CPT | Mod: NC

## 2025-08-21 PROCEDURE — 99214 OFFICE O/P EST MOD 30 MIN: CPT

## 2025-08-21 RX ORDER — MOMETASONE FUROATE 1 MG/G
0.1 OINTMENT TOPICAL
Qty: 1 | Refills: 1 | Status: ACTIVE | COMMUNITY
Start: 2025-08-21 | End: 1900-01-01

## 2025-08-21 RX ORDER — HYDROCORTISONE 25 MG/G
2.5 OINTMENT TOPICAL
Qty: 30 | Refills: 2 | Status: ACTIVE | COMMUNITY
Start: 2025-08-21 | End: 1900-01-01

## 2025-08-21 RX ORDER — KETOCONAZOLE 20 MG/ML
2 SHAMPOO TOPICAL
Qty: 1 | Refills: 2 | Status: ACTIVE | COMMUNITY
Start: 2025-08-21 | End: 1900-01-01

## 2025-09-19 ENCOUNTER — NON-APPOINTMENT (OUTPATIENT)
Age: 40
End: 2025-09-19

## 2025-09-19 ENCOUNTER — APPOINTMENT (OUTPATIENT)
Dept: NEUROSURGERY | Facility: CLINIC | Age: 40
End: 2025-09-19
Payer: MEDICAID

## 2025-09-19 VITALS
DIASTOLIC BLOOD PRESSURE: 63 MMHG | WEIGHT: 220 LBS | BODY MASS INDEX: 36.65 KG/M2 | HEART RATE: 57 BPM | OXYGEN SATURATION: 98 % | RESPIRATION RATE: 16 BRPM | HEIGHT: 65 IN | SYSTOLIC BLOOD PRESSURE: 106 MMHG

## 2025-09-19 DIAGNOSIS — G91.9 HYDROCEPHALUS, UNSPECIFIED: ICD-10-CM

## 2025-09-19 PROCEDURE — 62252 CSF SHUNT REPROGRAM: CPT

## 2025-09-19 PROCEDURE — 99213 OFFICE O/P EST LOW 20 MIN: CPT
